# Patient Record
Sex: FEMALE | Race: WHITE | NOT HISPANIC OR LATINO | Employment: OTHER | URBAN - METROPOLITAN AREA
[De-identification: names, ages, dates, MRNs, and addresses within clinical notes are randomized per-mention and may not be internally consistent; named-entity substitution may affect disease eponyms.]

---

## 2019-04-18 ENCOUNTER — HOSPITAL ENCOUNTER (OUTPATIENT)
Dept: RADIOLOGY | Facility: MEDICAL CENTER | Age: 64
End: 2019-04-18

## 2019-04-23 ENCOUNTER — HOSPITAL ENCOUNTER (OUTPATIENT)
Dept: RADIOLOGY | Facility: MEDICAL CENTER | Age: 64
End: 2019-04-23
Attending: NURSE PRACTITIONER
Payer: COMMERCIAL

## 2019-04-23 DIAGNOSIS — Z12.31 VISIT FOR SCREENING MAMMOGRAM: ICD-10-CM

## 2019-04-23 PROCEDURE — 77067 SCR MAMMO BI INCL CAD: CPT

## 2019-10-23 ENCOUNTER — PATIENT OUTREACH (OUTPATIENT)
Dept: HEALTH INFORMATION MANAGEMENT | Facility: OTHER | Age: 64
End: 2019-10-23

## 2019-10-23 NOTE — PROGRESS NOTES
October 2019 Welcome Call     Outcome: Left Message    Please transfer to Patient Outreach Team at [phone] when patient returns call.    [facility] Verified: yes    Attempt # 1

## 2019-10-29 NOTE — PROGRESS NOTES
Outcome: Left Message    Please transfer to Patient Outreach Team at [phone] when patient returns call.    [facility] Verified: yes    Attempt # 2

## 2020-06-23 ENCOUNTER — HOSPITAL ENCOUNTER (OUTPATIENT)
Dept: RADIOLOGY | Facility: MEDICAL CENTER | Age: 65
End: 2020-06-23
Attending: NURSE PRACTITIONER
Payer: MEDICARE

## 2020-06-23 DIAGNOSIS — Z12.31 VISIT FOR SCREENING MAMMOGRAM: ICD-10-CM

## 2020-06-23 PROCEDURE — 77067 SCR MAMMO BI INCL CAD: CPT

## 2020-07-13 ENCOUNTER — HOSPITAL ENCOUNTER (OUTPATIENT)
Dept: LAB | Facility: MEDICAL CENTER | Age: 65
End: 2020-07-13
Attending: NURSE PRACTITIONER
Payer: MEDICARE

## 2020-07-13 LAB
25(OH)D3 SERPL-MCNC: 31 NG/ML (ref 30–100)
ALBUMIN SERPL BCP-MCNC: 4.3 G/DL (ref 3.2–4.9)
ALBUMIN/GLOB SERPL: 1.4 G/DL
ALP SERPL-CCNC: 170 U/L (ref 30–99)
ALT SERPL-CCNC: 24 U/L (ref 2–50)
ANION GAP SERPL CALC-SCNC: 22 MMOL/L (ref 7–16)
ANISOCYTOSIS BLD QL SMEAR: ABNORMAL
APPEARANCE UR: CLEAR
AST SERPL-CCNC: 68 U/L (ref 12–45)
BACTERIA #/AREA URNS HPF: NEGATIVE /HPF
BASOPHILS # BLD AUTO: 0.9 % (ref 0–1.8)
BASOPHILS # BLD: 0.08 K/UL (ref 0–0.12)
BILIRUB SERPL-MCNC: 0.9 MG/DL (ref 0.1–1.5)
BILIRUB UR QL STRIP.AUTO: NEGATIVE
BUN SERPL-MCNC: 16 MG/DL (ref 8–22)
CALCIUM SERPL-MCNC: 9.4 MG/DL (ref 8.5–10.5)
CHLORIDE SERPL-SCNC: 94 MMOL/L (ref 96–112)
CHOLEST SERPL-MCNC: 189 MG/DL (ref 100–199)
CO2 SERPL-SCNC: 17 MMOL/L (ref 20–33)
COLOR UR: YELLOW
CREAT SERPL-MCNC: 1.01 MG/DL (ref 0.5–1.4)
EOSINOPHIL # BLD AUTO: 0 K/UL (ref 0–0.51)
EOSINOPHIL NFR BLD: 0 % (ref 0–6.9)
EPI CELLS #/AREA URNS HPF: NEGATIVE /HPF
ERYTHROCYTE [DISTWIDTH] IN BLOOD BY AUTOMATED COUNT: 70.9 FL (ref 35.9–50)
FASTING STATUS PATIENT QL REPORTED: NORMAL
GLOBULIN SER CALC-MCNC: 3.1 G/DL (ref 1.9–3.5)
GLUCOSE SERPL-MCNC: 85 MG/DL (ref 65–99)
GLUCOSE UR STRIP.AUTO-MCNC: NEGATIVE MG/DL
HCT VFR BLD AUTO: 34.7 % (ref 37–47)
HDLC SERPL-MCNC: 112 MG/DL
HGB BLD-MCNC: 11.7 G/DL (ref 12–16)
HYALINE CASTS #/AREA URNS LPF: NORMAL /LPF
KETONES UR STRIP.AUTO-MCNC: 15 MG/DL
LDLC SERPL CALC-MCNC: 56 MG/DL
LEUKOCYTE ESTERASE UR QL STRIP.AUTO: ABNORMAL
LYMPHOCYTES # BLD AUTO: 1.51 K/UL (ref 1–4.8)
LYMPHOCYTES NFR BLD: 17.2 % (ref 22–41)
MACROCYTES BLD QL SMEAR: ABNORMAL
MANUAL DIFF BLD: NORMAL
MCH RBC QN AUTO: 39.3 PG (ref 27–33)
MCHC RBC AUTO-ENTMCNC: 33.7 G/DL (ref 33.6–35)
MCV RBC AUTO: 116.4 FL (ref 81.4–97.8)
MICRO URNS: ABNORMAL
MONOCYTES # BLD AUTO: 0.84 K/UL (ref 0–0.85)
MONOCYTES NFR BLD AUTO: 9.5 % (ref 0–13.4)
MORPHOLOGY BLD-IMP: NORMAL
NEUTROPHILS # BLD AUTO: 6.37 K/UL (ref 2–7.15)
NEUTROPHILS NFR BLD: 72.4 % (ref 44–72)
NEUTS HYPERSEG BLD QL SMEAR: NORMAL
NITRITE UR QL STRIP.AUTO: NEGATIVE
NRBC # BLD AUTO: 0 K/UL
NRBC BLD-RTO: 0 /100 WBC
PH UR STRIP.AUTO: 6.5 [PH] (ref 5–8)
PLATELET # BLD AUTO: 370 K/UL (ref 164–446)
PLATELET BLD QL SMEAR: NORMAL
PMV BLD AUTO: 9.3 FL (ref 9–12.9)
POLYCHROMASIA BLD QL SMEAR: NORMAL
POTASSIUM SERPL-SCNC: 3.7 MMOL/L (ref 3.6–5.5)
PROT SERPL-MCNC: 7.4 G/DL (ref 6–8.2)
PROT UR QL STRIP: NEGATIVE MG/DL
RBC # BLD AUTO: 2.98 M/UL (ref 4.2–5.4)
RBC # URNS HPF: NORMAL /HPF
RBC BLD AUTO: PRESENT
RBC UR QL AUTO: NEGATIVE
SODIUM SERPL-SCNC: 133 MMOL/L (ref 135–145)
SP GR UR STRIP.AUTO: 1.02
TRIGL SERPL-MCNC: 105 MG/DL (ref 0–149)
TSH SERPL DL<=0.005 MIU/L-ACNC: 0.64 UIU/ML (ref 0.38–5.33)
UROBILINOGEN UR STRIP.AUTO-MCNC: 0.2 MG/DL
WBC # BLD AUTO: 8.8 K/UL (ref 4.8–10.8)
WBC #/AREA URNS HPF: NORMAL /HPF

## 2020-07-13 PROCEDURE — 36415 COLL VENOUS BLD VENIPUNCTURE: CPT

## 2020-07-13 PROCEDURE — 81001 URINALYSIS AUTO W/SCOPE: CPT

## 2020-07-13 PROCEDURE — 85007 BL SMEAR W/DIFF WBC COUNT: CPT

## 2020-07-13 PROCEDURE — 80061 LIPID PANEL: CPT

## 2020-07-13 PROCEDURE — 82306 VITAMIN D 25 HYDROXY: CPT

## 2020-07-13 PROCEDURE — 84443 ASSAY THYROID STIM HORMONE: CPT

## 2020-07-13 PROCEDURE — 85027 COMPLETE CBC AUTOMATED: CPT

## 2020-07-13 PROCEDURE — 80053 COMPREHEN METABOLIC PANEL: CPT

## 2020-08-11 ENCOUNTER — HOSPITAL ENCOUNTER (OUTPATIENT)
Dept: LAB | Facility: MEDICAL CENTER | Age: 65
End: 2020-08-11
Attending: NURSE PRACTITIONER
Payer: MEDICARE

## 2020-08-11 PROCEDURE — 83540 ASSAY OF IRON: CPT

## 2020-08-11 PROCEDURE — 82746 ASSAY OF FOLIC ACID SERUM: CPT

## 2020-08-11 PROCEDURE — 83550 IRON BINDING TEST: CPT

## 2020-08-11 PROCEDURE — 82728 ASSAY OF FERRITIN: CPT

## 2020-08-11 PROCEDURE — 36415 COLL VENOUS BLD VENIPUNCTURE: CPT

## 2020-08-11 PROCEDURE — 82607 VITAMIN B-12: CPT

## 2020-08-12 LAB
FERRITIN SERPL-MCNC: 1144 NG/ML (ref 10–291)
FOLATE SERPL-MCNC: 4.9 NG/ML
IRON SATN MFR SERPL: 94 % (ref 15–55)
IRON SERPL-MCNC: 245 UG/DL (ref 40–170)
TIBC SERPL-MCNC: 262 UG/DL (ref 250–450)
UIBC SERPL-MCNC: <17 UG/DL (ref 110–370)
VIT B12 SERPL-MCNC: 618 PG/ML (ref 211–911)

## 2020-08-25 ENCOUNTER — HOSPITAL ENCOUNTER (OUTPATIENT)
Dept: RADIOLOGY | Facility: MEDICAL CENTER | Age: 65
End: 2020-08-25
Attending: NURSE PRACTITIONER
Payer: MEDICARE

## 2020-08-25 DIAGNOSIS — R94.5 ABNORMAL RESULTS OF LIVER FUNCTION STUDIES: ICD-10-CM

## 2020-08-25 PROCEDURE — 76705 ECHO EXAM OF ABDOMEN: CPT

## 2020-09-15 ENCOUNTER — HOSPITAL ENCOUNTER (OUTPATIENT)
Facility: MEDICAL CENTER | Age: 65
End: 2020-09-15
Attending: INTERNAL MEDICINE
Payer: MEDICARE

## 2020-09-15 LAB
ALBUMIN SERPL BCP-MCNC: 4.5 G/DL (ref 3.2–4.9)
ALBUMIN/GLOB SERPL: 1.5 G/DL
ALP SERPL-CCNC: 133 U/L (ref 30–99)
ALT SERPL-CCNC: 23 U/L (ref 2–50)
ANION GAP SERPL CALC-SCNC: 18 MMOL/L (ref 7–16)
AST SERPL-CCNC: 45 U/L (ref 12–45)
BILIRUB SERPL-MCNC: 0.7 MG/DL (ref 0.1–1.5)
BUN SERPL-MCNC: 20 MG/DL (ref 8–22)
CALCIUM SERPL-MCNC: 9.8 MG/DL (ref 8.5–10.5)
CHLORIDE SERPL-SCNC: 98 MMOL/L (ref 96–112)
CO2 SERPL-SCNC: 21 MMOL/L (ref 20–33)
CREAT SERPL-MCNC: 0.86 MG/DL (ref 0.5–1.4)
GLOBULIN SER CALC-MCNC: 3.1 G/DL (ref 1.9–3.5)
GLUCOSE SERPL-MCNC: 120 MG/DL (ref 65–99)
HGB RETIC QN AUTO: 40.8 PG/CELL (ref 29–35)
IMM RETICS NFR: 8.8 % (ref 9.3–17.4)
IRON SATN MFR SERPL: 37 % (ref 15–55)
IRON SERPL-MCNC: 115 UG/DL (ref 40–170)
LDH SERPL L TO P-CCNC: 250 U/L (ref 107–266)
POTASSIUM SERPL-SCNC: 4.2 MMOL/L (ref 3.6–5.5)
PROT SERPL-MCNC: 7.6 G/DL (ref 6–8.2)
RETICS # AUTO: 0.06 M/UL (ref 0.04–0.06)
RETICS/RBC NFR: 1.8 % (ref 0.8–2.1)
SODIUM SERPL-SCNC: 137 MMOL/L (ref 135–145)
TIBC SERPL-MCNC: 311 UG/DL (ref 250–450)
UIBC SERPL-MCNC: 196 UG/DL (ref 110–370)

## 2020-09-15 PROCEDURE — 83540 ASSAY OF IRON: CPT

## 2020-09-15 PROCEDURE — 85046 RETICYTE/HGB CONCENTRATE: CPT

## 2020-09-15 PROCEDURE — 84155 ASSAY OF PROTEIN SERUM: CPT | Mod: XU

## 2020-09-15 PROCEDURE — 83615 LACTATE (LD) (LDH) ENZYME: CPT

## 2020-09-15 PROCEDURE — 81256 HFE GENE: CPT

## 2020-09-15 PROCEDURE — 83520 IMMUNOASSAY QUANT NOS NONAB: CPT

## 2020-09-15 PROCEDURE — 84165 PROTEIN E-PHORESIS SERUM: CPT

## 2020-09-15 PROCEDURE — 80053 COMPREHEN METABOLIC PANEL: CPT

## 2020-09-15 PROCEDURE — 87389 HIV-1 AG W/HIV-1&-2 AB AG IA: CPT

## 2020-09-15 PROCEDURE — 82728 ASSAY OF FERRITIN: CPT

## 2020-09-15 PROCEDURE — 80074 ACUTE HEPATITIS PANEL: CPT

## 2020-09-15 PROCEDURE — 86038 ANTINUCLEAR ANTIBODIES: CPT

## 2020-09-15 PROCEDURE — 83550 IRON BINDING TEST: CPT

## 2020-09-16 LAB
FERRITIN SERPL-MCNC: 519 NG/ML (ref 10–291)
HAV IGM SERPL QL IA: NORMAL
HBV CORE IGM SER QL: NORMAL
HBV SURFACE AG SER QL: NORMAL
HCV AB SER QL: NORMAL
HIV 1+2 AB+HIV1 P24 AG SERPL QL IA: NORMAL

## 2020-09-17 LAB — NUCLEAR IGG SER QL IA: NORMAL

## 2020-09-18 LAB
KAPPA LC FREE SER-MCNC: 45.63 MG/L (ref 3.3–19.4)
KAPPA LC FREE/LAMBDA FREE SER NEPH: 1.25 {RATIO} (ref 0.26–1.65)
LAMBDA LC FREE SERPL-MCNC: 36.64 MG/L (ref 5.71–26.3)

## 2020-09-19 LAB
ALBUMIN SERPL ELPH-MCNC: 4.21 G/DL (ref 3.75–5.01)
ALPHA1 GLOB SERPL ELPH-MCNC: 0.32 G/DL (ref 0.19–0.46)
ALPHA2 GLOB SERPL ELPH-MCNC: 0.81 G/DL (ref 0.48–1.05)
B-GLOBULIN SERPL ELPH-MCNC: 1.05 G/DL (ref 0.48–1.1)
EER PROT ELECT SER Q1092: NORMAL
GAMMA GLOB SERPL ELPH-MCNC: 1.11 G/DL (ref 0.62–1.51)
INTERPRETATION SERPL IFE-IMP: NORMAL
PROT SERPL-MCNC: 7.5 G/DL (ref 6.3–8.2)

## 2020-09-22 LAB
HFE GENE MUT ANL BLD/T: NORMAL
HFE P.C282Y BLD/T QL: NEGATIVE
HFE P.H63D BLD/T QL: NEGATIVE
HFE P.S65C BLD/T QL: NEGATIVE

## 2020-11-18 ENCOUNTER — HOSPITAL ENCOUNTER (OUTPATIENT)
Dept: LAB | Facility: MEDICAL CENTER | Age: 65
End: 2020-11-18
Attending: INTERNAL MEDICINE
Payer: MEDICARE

## 2020-11-18 LAB
ALBUMIN SERPL BCP-MCNC: 4.7 G/DL (ref 3.2–4.9)
ALBUMIN/GLOB SERPL: 1.5 G/DL
ALP SERPL-CCNC: 92 U/L (ref 30–99)
ALT SERPL-CCNC: 15 U/L (ref 2–50)
ANION GAP SERPL CALC-SCNC: 14 MMOL/L (ref 7–16)
ANISOCYTOSIS BLD QL SMEAR: ABNORMAL
AST SERPL-CCNC: 43 U/L (ref 12–45)
BASOPHILS # BLD AUTO: 0.7 % (ref 0–1.8)
BASOPHILS # BLD: 0.04 K/UL (ref 0–0.12)
BILIRUB SERPL-MCNC: 0.7 MG/DL (ref 0.1–1.5)
BUN SERPL-MCNC: 20 MG/DL (ref 8–22)
CALCIUM SERPL-MCNC: 10 MG/DL (ref 8.5–10.5)
CHLORIDE SERPL-SCNC: 104 MMOL/L (ref 96–112)
CO2 SERPL-SCNC: 21 MMOL/L (ref 20–33)
COMMENT 1642: NORMAL
CREAT SERPL-MCNC: 1.04 MG/DL (ref 0.5–1.4)
EOSINOPHIL # BLD AUTO: 0.05 K/UL (ref 0–0.51)
EOSINOPHIL NFR BLD: 0.9 % (ref 0–6.9)
ERYTHROCYTE [DISTWIDTH] IN BLOOD BY AUTOMATED COUNT: 67.2 FL (ref 35.9–50)
FERRITIN SERPL-MCNC: 322 NG/ML (ref 10–291)
GLOBULIN SER CALC-MCNC: 3.2 G/DL (ref 1.9–3.5)
GLUCOSE SERPL-MCNC: 79 MG/DL (ref 65–99)
HCT VFR BLD AUTO: 37.4 % (ref 37–47)
HGB BLD-MCNC: 12.6 G/DL (ref 12–16)
IMM GRANULOCYTES # BLD AUTO: 0.01 K/UL (ref 0–0.11)
IMM GRANULOCYTES NFR BLD AUTO: 0.2 % (ref 0–0.9)
LYMPHOCYTES # BLD AUTO: 1.01 K/UL (ref 1–4.8)
LYMPHOCYTES NFR BLD: 17.4 % (ref 22–41)
MACROCYTES BLD QL SMEAR: ABNORMAL
MCH RBC QN AUTO: 38.5 PG (ref 27–33)
MCHC RBC AUTO-ENTMCNC: 33.7 G/DL (ref 33.6–35)
MCV RBC AUTO: 114.4 FL (ref 81.4–97.8)
MONOCYTES # BLD AUTO: 0.44 K/UL (ref 0–0.85)
MONOCYTES NFR BLD AUTO: 7.6 % (ref 0–13.4)
MORPHOLOGY BLD-IMP: NORMAL
NEUTROPHILS # BLD AUTO: 4.27 K/UL (ref 2–7.15)
NEUTROPHILS NFR BLD: 73.2 % (ref 44–72)
NRBC # BLD AUTO: 0 K/UL
NRBC BLD-RTO: 0 /100 WBC
PLATELET # BLD AUTO: 204 K/UL (ref 164–446)
PLATELET BLD QL SMEAR: NORMAL
PMV BLD AUTO: 11.7 FL (ref 9–12.9)
POTASSIUM SERPL-SCNC: 4.5 MMOL/L (ref 3.6–5.5)
PROT SERPL-MCNC: 7.9 G/DL (ref 6–8.2)
RBC # BLD AUTO: 3.27 M/UL (ref 4.2–5.4)
RBC BLD AUTO: PRESENT
SODIUM SERPL-SCNC: 139 MMOL/L (ref 135–145)
WBC # BLD AUTO: 5.8 K/UL (ref 4.8–10.8)

## 2020-11-18 PROCEDURE — 85025 COMPLETE CBC W/AUTO DIFF WBC: CPT

## 2020-11-18 PROCEDURE — 82728 ASSAY OF FERRITIN: CPT

## 2020-11-18 PROCEDURE — 36415 COLL VENOUS BLD VENIPUNCTURE: CPT

## 2020-11-18 PROCEDURE — 80053 COMPREHEN METABOLIC PANEL: CPT

## 2021-03-03 DIAGNOSIS — Z23 NEED FOR VACCINATION: ICD-10-CM

## 2021-05-22 ENCOUNTER — APPOINTMENT (OUTPATIENT)
Dept: RADIOLOGY | Facility: MEDICAL CENTER | Age: 66
DRG: 871 | End: 2021-05-22
Attending: EMERGENCY MEDICINE
Payer: MEDICARE

## 2021-05-22 ENCOUNTER — HOSPITAL ENCOUNTER (INPATIENT)
Facility: MEDICAL CENTER | Age: 66
LOS: 5 days | DRG: 871 | End: 2021-05-27
Attending: EMERGENCY MEDICINE | Admitting: INTERNAL MEDICINE
Payer: MEDICARE

## 2021-05-22 ENCOUNTER — APPOINTMENT (OUTPATIENT)
Dept: RADIOLOGY | Facility: MEDICAL CENTER | Age: 66
DRG: 871 | End: 2021-05-22
Attending: INTERNAL MEDICINE
Payer: MEDICARE

## 2021-05-22 DIAGNOSIS — R53.1 WEAKNESS: ICD-10-CM

## 2021-05-22 DIAGNOSIS — A41.9 SEPSIS, DUE TO UNSPECIFIED ORGANISM, UNSPECIFIED WHETHER ACUTE ORGAN DYSFUNCTION PRESENT (HCC): ICD-10-CM

## 2021-05-22 DIAGNOSIS — J85.1 ABSCESS OF LOWER LOBE OF RIGHT LUNG WITH PNEUMONIA (HCC): ICD-10-CM

## 2021-05-22 PROBLEM — R19.7 DIARRHEA: Status: ACTIVE | Noted: 2021-05-22

## 2021-05-22 PROBLEM — I95.9 HYPOTENSION: Status: ACTIVE | Noted: 2021-05-22

## 2021-05-22 PROBLEM — F10.10 ALCOHOL ABUSE: Status: ACTIVE | Noted: 2021-05-22

## 2021-05-22 PROBLEM — F17.200 SMOKING: Status: ACTIVE | Noted: 2021-05-22

## 2021-05-22 PROBLEM — E87.6 HYPOKALEMIA: Status: ACTIVE | Noted: 2021-05-22

## 2021-05-22 PROBLEM — N17.9 ACUTE KIDNEY INJURY (HCC): Status: ACTIVE | Noted: 2021-05-22

## 2021-05-22 PROBLEM — E87.1 HYPONATREMIA: Status: ACTIVE | Noted: 2021-05-22

## 2021-05-22 PROBLEM — J85.0 NECROTIZING PNEUMONIA (HCC): Status: ACTIVE | Noted: 2021-05-22

## 2021-05-22 LAB
ALBUMIN SERPL BCP-MCNC: 2.3 G/DL (ref 3.2–4.9)
ALBUMIN SERPL BCP-MCNC: 2.5 G/DL (ref 3.2–4.9)
ALBUMIN/GLOB SERPL: 0.5 G/DL
ALBUMIN/GLOB SERPL: 0.6 G/DL
ALP SERPL-CCNC: 173 U/L (ref 30–99)
ALP SERPL-CCNC: 218 U/L (ref 30–99)
ALT SERPL-CCNC: 27 U/L (ref 2–50)
ALT SERPL-CCNC: 30 U/L (ref 2–50)
ANION GAP SERPL CALC-SCNC: 14 MMOL/L (ref 7–16)
ANION GAP SERPL CALC-SCNC: 15 MMOL/L (ref 7–16)
ANISOCYTOSIS BLD QL SMEAR: ABNORMAL
APPEARANCE UR: CLEAR
AST SERPL-CCNC: 44 U/L (ref 12–45)
AST SERPL-CCNC: 53 U/L (ref 12–45)
BACTERIA #/AREA URNS HPF: NEGATIVE /HPF
BASOPHILS # BLD AUTO: 0 % (ref 0–1.8)
BASOPHILS # BLD: 0 K/UL (ref 0–0.12)
BILIRUB SERPL-MCNC: 1 MG/DL (ref 0.1–1.5)
BILIRUB SERPL-MCNC: 1.1 MG/DL (ref 0.1–1.5)
BILIRUB UR QL STRIP.AUTO: NEGATIVE
BUN SERPL-MCNC: 42 MG/DL (ref 8–22)
BUN SERPL-MCNC: 47 MG/DL (ref 8–22)
CALCIUM SERPL-MCNC: 9.3 MG/DL (ref 8.5–10.5)
CALCIUM SERPL-MCNC: 9.7 MG/DL (ref 8.5–10.5)
CHLORIDE SERPL-SCNC: 93 MMOL/L (ref 96–112)
CHLORIDE SERPL-SCNC: 97 MMOL/L (ref 96–112)
CK SERPL-CCNC: 11 U/L (ref 0–154)
CO2 SERPL-SCNC: 21 MMOL/L (ref 20–33)
CO2 SERPL-SCNC: 23 MMOL/L (ref 20–33)
COLOR UR: YELLOW
CORTIS SERPL-MCNC: 20.8 UG/DL (ref 0–23)
CREAT SERPL-MCNC: 0.95 MG/DL (ref 0.5–1.4)
CREAT SERPL-MCNC: 1.11 MG/DL (ref 0.5–1.4)
CREAT UR-MCNC: 56.31 MG/DL
EKG IMPRESSION: NORMAL
EOSINOPHIL # BLD AUTO: 0 K/UL (ref 0–0.51)
EOSINOPHIL NFR BLD: 0 % (ref 0–6.9)
EPI CELLS #/AREA URNS HPF: NEGATIVE /HPF
ERYTHROCYTE [DISTWIDTH] IN BLOOD BY AUTOMATED COUNT: 54.3 FL (ref 35.9–50)
GLOBULIN SER CALC-MCNC: 3.6 G/DL (ref 1.9–3.5)
GLOBULIN SER CALC-MCNC: 4.6 G/DL (ref 1.9–3.5)
GLUCOSE SERPL-MCNC: 107 MG/DL (ref 65–99)
GLUCOSE SERPL-MCNC: 119 MG/DL (ref 65–99)
GLUCOSE UR STRIP.AUTO-MCNC: NEGATIVE MG/DL
HCT VFR BLD AUTO: 29.3 % (ref 37–47)
HGB BLD-MCNC: 9.7 G/DL (ref 12–16)
HIV 1+2 AB+HIV1 P24 AG SERPL QL IA: NORMAL
HYALINE CASTS #/AREA URNS LPF: NORMAL /LPF
INR PPP: 1.19 (ref 0.87–1.13)
KETONES UR STRIP.AUTO-MCNC: NEGATIVE MG/DL
LACTATE BLD-SCNC: 1.1 MMOL/L (ref 0.5–2)
LACTATE BLD-SCNC: 1.5 MMOL/L (ref 0.5–2)
LACTATE BLD-SCNC: 2.2 MMOL/L (ref 0.5–2)
LACTATE BLD-SCNC: 2.8 MMOL/L (ref 0.5–2)
LEUKOCYTE ESTERASE UR QL STRIP.AUTO: ABNORMAL
LYMPHOCYTES # BLD AUTO: 1.47 K/UL (ref 1–4.8)
LYMPHOCYTES NFR BLD: 7 % (ref 22–41)
MACROCYTES BLD QL SMEAR: ABNORMAL
MAGNESIUM SERPL-MCNC: 1.2 MG/DL (ref 1.5–2.5)
MANUAL DIFF BLD: NORMAL
MCH RBC QN AUTO: 35.4 PG (ref 27–33)
MCHC RBC AUTO-ENTMCNC: 33.1 G/DL (ref 33.6–35)
MCV RBC AUTO: 106.9 FL (ref 81.4–97.8)
MICRO URNS: ABNORMAL
MONOCYTES # BLD AUTO: 1.64 K/UL (ref 0–0.85)
MONOCYTES NFR BLD AUTO: 7.8 % (ref 0–13.4)
MORPHOLOGY BLD-IMP: NORMAL
NEUTROPHILS # BLD AUTO: 17.89 K/UL (ref 2–7.15)
NEUTROPHILS NFR BLD: 81.7 % (ref 44–72)
NEUTS BAND NFR BLD MANUAL: 3.5 % (ref 0–10)
NITRITE UR QL STRIP.AUTO: NEGATIVE
NRBC # BLD AUTO: 0 K/UL
NRBC BLD-RTO: 0 /100 WBC
PH UR STRIP.AUTO: 7 [PH] (ref 5–8)
PHOSPHATE SERPL-MCNC: 2.9 MG/DL (ref 2.5–4.5)
PLATELET # BLD AUTO: 812 K/UL (ref 164–446)
PLATELET BLD QL SMEAR: NORMAL
PMV BLD AUTO: 10.2 FL (ref 9–12.9)
POTASSIUM SERPL-SCNC: 3.1 MMOL/L (ref 3.6–5.5)
POTASSIUM SERPL-SCNC: 3.4 MMOL/L (ref 3.6–5.5)
PROT SERPL-MCNC: 5.9 G/DL (ref 6–8.2)
PROT SERPL-MCNC: 7.1 G/DL (ref 6–8.2)
PROT UR QL STRIP: NEGATIVE MG/DL
PROTHROMBIN TIME: 15.5 SEC (ref 12–14.6)
RBC # BLD AUTO: 2.74 M/UL (ref 4.2–5.4)
RBC # URNS HPF: NORMAL /HPF
RBC BLD AUTO: PRESENT
RBC UR QL AUTO: NEGATIVE
SODIUM SERPL-SCNC: 129 MMOL/L (ref 135–145)
SODIUM SERPL-SCNC: 134 MMOL/L (ref 135–145)
SODIUM UR-SCNC: 37 MMOL/L
SP GR UR STRIP.AUTO: 1.04
TOXIC GRANULES BLD QL SMEAR: SLIGHT
TSH SERPL DL<=0.005 MIU/L-ACNC: 0.94 UIU/ML (ref 0.38–5.33)
URATE SERPL-MCNC: 6 MG/DL (ref 1.9–8.2)
UROBILINOGEN UR STRIP.AUTO-MCNC: 1 MG/DL
WBC # BLD AUTO: 21 K/UL (ref 4.8–10.8)
WBC #/AREA URNS HPF: NORMAL /HPF

## 2021-05-22 PROCEDURE — 83605 ASSAY OF LACTIC ACID: CPT

## 2021-05-22 PROCEDURE — A9270 NON-COVERED ITEM OR SERVICE: HCPCS | Performed by: INTERNAL MEDICINE

## 2021-05-22 PROCEDURE — 87899 AGENT NOS ASSAY W/OPTIC: CPT

## 2021-05-22 PROCEDURE — 83735 ASSAY OF MAGNESIUM: CPT

## 2021-05-22 PROCEDURE — 700111 HCHG RX REV CODE 636 W/ 250 OVERRIDE (IP): Performed by: EMERGENCY MEDICINE

## 2021-05-22 PROCEDURE — 36415 COLL VENOUS BLD VENIPUNCTURE: CPT

## 2021-05-22 PROCEDURE — 96365 THER/PROPH/DIAG IV INF INIT: CPT

## 2021-05-22 PROCEDURE — 84550 ASSAY OF BLOOD/URIC ACID: CPT

## 2021-05-22 PROCEDURE — 82550 ASSAY OF CK (CPK): CPT

## 2021-05-22 PROCEDURE — 87040 BLOOD CULTURE FOR BACTERIA: CPT | Mod: 91

## 2021-05-22 PROCEDURE — 85007 BL SMEAR W/DIFF WBC COUNT: CPT

## 2021-05-22 PROCEDURE — 76775 US EXAM ABDO BACK WALL LIM: CPT

## 2021-05-22 PROCEDURE — 93005 ELECTROCARDIOGRAM TRACING: CPT

## 2021-05-22 PROCEDURE — 700105 HCHG RX REV CODE 258: Performed by: INTERNAL MEDICINE

## 2021-05-22 PROCEDURE — 85610 PROTHROMBIN TIME: CPT

## 2021-05-22 PROCEDURE — 71045 X-RAY EXAM CHEST 1 VIEW: CPT

## 2021-05-22 PROCEDURE — 99285 EMERGENCY DEPT VISIT HI MDM: CPT

## 2021-05-22 PROCEDURE — 87045 FECES CULTURE AEROBIC BACT: CPT

## 2021-05-22 PROCEDURE — 700102 HCHG RX REV CODE 250 W/ 637 OVERRIDE(OP): Performed by: INTERNAL MEDICINE

## 2021-05-22 PROCEDURE — 87086 URINE CULTURE/COLONY COUNT: CPT

## 2021-05-22 PROCEDURE — U0005 INFEC AGEN DETEC AMPLI PROBE: HCPCS

## 2021-05-22 PROCEDURE — 80053 COMPREHEN METABOLIC PANEL: CPT

## 2021-05-22 PROCEDURE — 84443 ASSAY THYROID STIM HORMONE: CPT

## 2021-05-22 PROCEDURE — 84100 ASSAY OF PHOSPHORUS: CPT

## 2021-05-22 PROCEDURE — 700111 HCHG RX REV CODE 636 W/ 250 OVERRIDE (IP): Performed by: INTERNAL MEDICINE

## 2021-05-22 PROCEDURE — 87798 DETECT AGENT NOS DNA AMP: CPT

## 2021-05-22 PROCEDURE — 71260 CT THORAX DX C+: CPT | Mod: ME

## 2021-05-22 PROCEDURE — 700117 HCHG RX CONTRAST REV CODE 255: Performed by: EMERGENCY MEDICINE

## 2021-05-22 PROCEDURE — 770020 HCHG ROOM/CARE - TELE (206)

## 2021-05-22 PROCEDURE — 85027 COMPLETE CBC AUTOMATED: CPT

## 2021-05-22 PROCEDURE — G0475 HIV COMBINATION ASSAY: HCPCS

## 2021-05-22 PROCEDURE — 99221 1ST HOSP IP/OBS SF/LOW 40: CPT | Mod: AI | Performed by: INTERNAL MEDICINE

## 2021-05-22 PROCEDURE — 87633 RESP VIRUS 12-25 TARGETS: CPT

## 2021-05-22 PROCEDURE — 94760 N-INVAS EAR/PLS OXIMETRY 1: CPT

## 2021-05-22 PROCEDURE — 82533 TOTAL CORTISOL: CPT

## 2021-05-22 PROCEDURE — 87581 M.PNEUMON DNA AMP PROBE: CPT

## 2021-05-22 PROCEDURE — 82570 ASSAY OF URINE CREATININE: CPT

## 2021-05-22 PROCEDURE — 93005 ELECTROCARDIOGRAM TRACING: CPT | Performed by: EMERGENCY MEDICINE

## 2021-05-22 PROCEDURE — 87449 NOS EACH ORGANISM AG IA: CPT

## 2021-05-22 PROCEDURE — 84300 ASSAY OF URINE SODIUM: CPT

## 2021-05-22 PROCEDURE — 700105 HCHG RX REV CODE 258: Performed by: EMERGENCY MEDICINE

## 2021-05-22 PROCEDURE — 81001 URINALYSIS AUTO W/SCOPE: CPT

## 2021-05-22 PROCEDURE — U0003 INFECTIOUS AGENT DETECTION BY NUCLEIC ACID (DNA OR RNA); SEVERE ACUTE RESPIRATORY SYNDROME CORONAVIRUS 2 (SARS-COV-2) (CORONAVIRUS DISEASE [COVID-19]), AMPLIFIED PROBE TECHNIQUE, MAKING USE OF HIGH THROUGHPUT TECHNOLOGIES AS DESCRIBED BY CMS-2020-01-R: HCPCS

## 2021-05-22 PROCEDURE — 87486 CHLMYD PNEUM DNA AMP PROBE: CPT

## 2021-05-22 RX ORDER — SIMVASTATIN 20 MG
20 TABLET ORAL EVERY EVENING
COMMUNITY
Start: 2022-03-19 | End: 2023-05-28

## 2021-05-22 RX ORDER — SODIUM CHLORIDE, SODIUM LACTATE, POTASSIUM CHLORIDE, AND CALCIUM CHLORIDE .6; .31; .03; .02 G/100ML; G/100ML; G/100ML; G/100ML
1000 INJECTION, SOLUTION INTRAVENOUS
Status: COMPLETED | OUTPATIENT
Start: 2021-05-22 | End: 2021-05-22

## 2021-05-22 RX ORDER — HYDROCHLOROTHIAZIDE 25 MG/1
25 TABLET ORAL DAILY
COMMUNITY
End: 2021-05-22

## 2021-05-22 RX ORDER — POTASSIUM CHLORIDE 20 MEQ/1
40 TABLET, EXTENDED RELEASE ORAL ONCE
Status: COMPLETED | OUTPATIENT
Start: 2021-05-22 | End: 2021-05-22

## 2021-05-22 RX ORDER — SODIUM CHLORIDE, SODIUM LACTATE, POTASSIUM CHLORIDE, AND CALCIUM CHLORIDE .6; .31; .03; .02 G/100ML; G/100ML; G/100ML; G/100ML
500 INJECTION, SOLUTION INTRAVENOUS
Status: COMPLETED | OUTPATIENT
Start: 2021-05-22 | End: 2021-05-22

## 2021-05-22 RX ORDER — ACETAMINOPHEN 325 MG/1
650 TABLET ORAL EVERY 6 HOURS PRN
Status: DISCONTINUED | OUTPATIENT
Start: 2021-05-22 | End: 2021-05-27 | Stop reason: HOSPADM

## 2021-05-22 RX ORDER — SODIUM CHLORIDE, SODIUM LACTATE, POTASSIUM CHLORIDE, AND CALCIUM CHLORIDE .6; .31; .03; .02 G/100ML; G/100ML; G/100ML; G/100ML
1000 INJECTION, SOLUTION INTRAVENOUS
Status: DISCONTINUED | OUTPATIENT
Start: 2021-05-22 | End: 2021-05-27 | Stop reason: HOSPADM

## 2021-05-22 RX ORDER — BISACODYL 10 MG
10 SUPPOSITORY, RECTAL RECTAL
Status: DISCONTINUED | OUTPATIENT
Start: 2021-05-22 | End: 2021-05-22

## 2021-05-22 RX ORDER — LOSARTAN POTASSIUM 25 MG/1
25 TABLET ORAL DAILY
COMMUNITY
End: 2021-05-22

## 2021-05-22 RX ORDER — ENALAPRILAT 1.25 MG/ML
1.25 INJECTION INTRAVENOUS EVERY 6 HOURS PRN
Status: DISCONTINUED | OUTPATIENT
Start: 2021-05-22 | End: 2021-05-27 | Stop reason: HOSPADM

## 2021-05-22 RX ORDER — ONDANSETRON 4 MG/1
4 TABLET, ORALLY DISINTEGRATING ORAL EVERY 4 HOURS PRN
Status: DISCONTINUED | OUTPATIENT
Start: 2021-05-22 | End: 2021-05-27 | Stop reason: HOSPADM

## 2021-05-22 RX ORDER — LABETALOL HYDROCHLORIDE 5 MG/ML
10 INJECTION, SOLUTION INTRAVENOUS EVERY 4 HOURS PRN
Status: DISCONTINUED | OUTPATIENT
Start: 2021-05-22 | End: 2021-05-27 | Stop reason: HOSPADM

## 2021-05-22 RX ORDER — LINEZOLID 600 MG/1
600 TABLET, FILM COATED ORAL EVERY 12 HOURS
Status: DISCONTINUED | OUTPATIENT
Start: 2021-05-22 | End: 2021-05-23

## 2021-05-22 RX ORDER — ONDANSETRON 2 MG/ML
4 INJECTION INTRAMUSCULAR; INTRAVENOUS EVERY 4 HOURS PRN
Status: DISCONTINUED | OUTPATIENT
Start: 2021-05-22 | End: 2021-05-27 | Stop reason: HOSPADM

## 2021-05-22 RX ORDER — AMOXICILLIN 250 MG
2 CAPSULE ORAL 2 TIMES DAILY
Status: DISCONTINUED | OUTPATIENT
Start: 2021-05-22 | End: 2021-05-22

## 2021-05-22 RX ORDER — MAGNESIUM SULFATE HEPTAHYDRATE 40 MG/ML
4 INJECTION, SOLUTION INTRAVENOUS ONCE
Status: COMPLETED | OUTPATIENT
Start: 2021-05-22 | End: 2021-05-22

## 2021-05-22 RX ORDER — LOSARTAN POTASSIUM AND HYDROCHLOROTHIAZIDE 12.5; 1 MG/1; MG/1
1 TABLET ORAL DAILY
Status: ON HOLD | COMMUNITY
End: 2022-02-28

## 2021-05-22 RX ORDER — GAUZE BANDAGE 2" X 2"
100 BANDAGE TOPICAL DAILY
Status: DISCONTINUED | OUTPATIENT
Start: 2021-05-22 | End: 2021-05-27 | Stop reason: HOSPADM

## 2021-05-22 RX ORDER — POLYETHYLENE GLYCOL 3350 17 G/17G
1 POWDER, FOR SOLUTION ORAL
Status: DISCONTINUED | OUTPATIENT
Start: 2021-05-22 | End: 2021-05-22

## 2021-05-22 RX ADMIN — THIAMINE HCL TAB 100 MG 100 MG: 100 TAB at 17:19

## 2021-05-22 RX ADMIN — AMPICILLIN SODIUM AND SULBACTAM SODIUM 3 G: 2; 1 INJECTION, POWDER, FOR SOLUTION INTRAMUSCULAR; INTRAVENOUS at 17:19

## 2021-05-22 RX ADMIN — ENOXAPARIN SODIUM 40 MG: 40 INJECTION SUBCUTANEOUS at 20:21

## 2021-05-22 RX ADMIN — POTASSIUM CHLORIDE 40 MEQ: 1500 TABLET, EXTENDED RELEASE ORAL at 17:19

## 2021-05-22 RX ADMIN — SODIUM CHLORIDE, POTASSIUM CHLORIDE, SODIUM LACTATE AND CALCIUM CHLORIDE 1000 ML: 600; 310; 30; 20 INJECTION, SOLUTION INTRAVENOUS at 14:28

## 2021-05-22 RX ADMIN — SODIUM CHLORIDE, POTASSIUM CHLORIDE, SODIUM LACTATE AND CALCIUM CHLORIDE 500 ML: 600; 310; 30; 20 INJECTION, SOLUTION INTRAVENOUS at 12:00

## 2021-05-22 RX ADMIN — LINEZOLID 600 MG: 600 TABLET, FILM COATED ORAL at 17:19

## 2021-05-22 RX ADMIN — IOHEXOL 75 ML: 350 INJECTION, SOLUTION INTRAVENOUS at 17:00

## 2021-05-22 RX ADMIN — PIPERACILLIN AND TAZOBACTAM 3.38 G: 3; .375 INJECTION, POWDER, LYOPHILIZED, FOR SOLUTION INTRAVENOUS; PARENTERAL at 12:56

## 2021-05-22 RX ADMIN — MAGNESIUM SULFATE IN WATER 4 G: 40 INJECTION, SOLUTION INTRAVENOUS at 18:50

## 2021-05-22 ASSESSMENT — COGNITIVE AND FUNCTIONAL STATUS - GENERAL
MOVING TO AND FROM BED TO CHAIR: A LITTLE
DRESSING REGULAR LOWER BODY CLOTHING: A LOT
DRESSING REGULAR UPPER BODY CLOTHING: A LITTLE
TURNING FROM BACK TO SIDE WHILE IN FLAT BAD: A LITTLE
WALKING IN HOSPITAL ROOM: A LOT
DAILY ACTIVITIY SCORE: 17
MOBILITY SCORE: 15
PERSONAL GROOMING: A LITTLE
STANDING UP FROM CHAIR USING ARMS: A LITTLE
MOVING FROM LYING ON BACK TO SITTING ON SIDE OF FLAT BED: A LOT
CLIMB 3 TO 5 STEPS WITH RAILING: A LOT
EATING MEALS: A LITTLE
SUGGESTED CMS G CODE MODIFIER DAILY ACTIVITY: CK
TOILETING: A LITTLE
SUGGESTED CMS G CODE MODIFIER MOBILITY: CK
HELP NEEDED FOR BATHING: A LITTLE

## 2021-05-22 ASSESSMENT — ENCOUNTER SYMPTOMS
PALPITATIONS: 0
DIZZINESS: 1
COUGH: 1
HEADACHES: 0
BRUISES/BLEEDS EASILY: 0
VOMITING: 0
BLURRED VISION: 0
POLYDIPSIA: 0
FOCAL WEAKNESS: 0
BACK PAIN: 0
HALLUCINATIONS: 0
DOUBLE VISION: 0
PHOTOPHOBIA: 0
SPEECH CHANGE: 0
NAUSEA: 0
ORTHOPNEA: 0
NECK PAIN: 0
TREMORS: 0
WEIGHT LOSS: 0
SHORTNESS OF BREATH: 1
HEARTBURN: 0
NERVOUS/ANXIOUS: 0
FEVER: 0
CHILLS: 0
HEMOPTYSIS: 0
SPUTUM PRODUCTION: 1
FLANK PAIN: 0

## 2021-05-22 ASSESSMENT — LIFESTYLE VARIABLES
ON A TYPICAL DAY WHEN YOU DRINK ALCOHOL HOW MANY DRINKS DO YOU HAVE: 0
TOTAL SCORE: 0
TOTAL SCORE: 0
ALCOHOL_USE: NO
SUBSTANCE_ABUSE: 0
AVERAGE NUMBER OF DAYS PER WEEK YOU HAVE A DRINK CONTAINING ALCOHOL: 0
HOW MANY TIMES IN THE PAST YEAR HAVE YOU HAD 5 OR MORE DRINKS IN A DAY: 0
CONSUMPTION TOTAL: NEGATIVE
HAVE YOU EVER FELT YOU SHOULD CUT DOWN ON YOUR DRINKING: NO
TOTAL SCORE: 0
EVER HAD A DRINK FIRST THING IN THE MORNING TO STEADY YOUR NERVES TO GET RID OF A HANGOVER: NO
DOES PATIENT WANT TO STOP DRINKING: NO
HAVE PEOPLE ANNOYED YOU BY CRITICIZING YOUR DRINKING: NO
EVER FELT BAD OR GUILTY ABOUT YOUR DRINKING: NO
EVER_SMOKED: YES

## 2021-05-22 ASSESSMENT — PATIENT HEALTH QUESTIONNAIRE - PHQ9
1. LITTLE INTEREST OR PLEASURE IN DOING THINGS: NOT AT ALL
2. FEELING DOWN, DEPRESSED, IRRITABLE, OR HOPELESS: NOT AT ALL
SUM OF ALL RESPONSES TO PHQ9 QUESTIONS 1 AND 2: 0

## 2021-05-22 ASSESSMENT — COPD QUESTIONNAIRES
COPD SCREENING SCORE: 6
HAVE YOU SMOKED AT LEAST 100 CIGARETTES IN YOUR ENTIRE LIFE: YES
DURING THE PAST 4 WEEKS HOW MUCH DID YOU FEEL SHORT OF BREATH: SOME OF THE TIME
DO YOU EVER COUGH UP ANY MUCUS OR PHLEGM?: YES, A FEW DAYS A WEEK OR MONTH

## 2021-05-22 ASSESSMENT — FIBROSIS 4 INDEX
FIB4 SCORE: 0.69
FIB4 SCORE: 3.59
FIB4 SCORE: 0.69

## 2021-05-22 NOTE — ED NOTES
Patient ambulated to ER room with a strong steady gait. Nontoxic appearing. resp e/u. Speaks full clear sentences without difficulty. Calm, cooperative, no apparent distress noted. follows commands. Skin warm, dry, nml color. +CARMEN +PAN.      Pt c/o of productive cough x1 week with associated SOB. Hx of smoking   Also c/o of diarrhea for 10 days and weakness.     Of note, patient quit drinking etoh 12 days ago    Patient changed to hospital gown  Placed on cardiac monitor.   V/s stable. Afebrile  Pending MD weston.

## 2021-05-22 NOTE — PROGRESS NOTES
Patient transported from ED. Patient  A&Ox4. Plan of care discussed with patient. Family at bedside. Patient oriented to floor and surroundings. Patient currently on 3L nc. VSS. Patient currently resting in bed. Patient is complaining of pain 0/10. Tele box placed. Monitor room notified. 2 rn skin check performed. Patient educated to call for assistance before ambulating. Patient education regarding fall risk. Call light within reach. Fall precautions in place.

## 2021-05-22 NOTE — ASSESSMENT & PLAN NOTE
This is Sepsis Present on admission  SIRS criteria identified on my evaluation include: Tachycardia, with heart rate greater than 90 BPM and Tachypnea, with respirations greater than 20 per minute  Source is necrotizing pneumonia  Suspected onset of infection (date and time) 5/12/2021  Sepsis protocol initiated  Fluid resuscitation ordered per protocol  IV antibiotics as appropriate for source of sepsis  While organ dysfunction may be noted elsewhere in this problem list or in the chart, degree of organ dysfunction does not meet CMS criteria for severe sepsis      Now resolved.

## 2021-05-22 NOTE — ED NOTES
Med rec complete via interview with pt at bedside and phone call with pt home pharmacy. Allergies reviewed. Pt denies antibiotic use in past 14 days.

## 2021-05-22 NOTE — ED TRIAGE NOTES
"Chief Complaint   Patient presents with   • Cough     For 8 days and productive with associated SOB. History of smoking.    • Weakness     general malaise for the last couple days   • Diarrhea     for 10 days. Stool goes from soft to watery over the last 10 days. No blood noted     Patient ambulatory through triage for above complaint. Patient states \"my neighbor made me come in.\" The patient also reports stopping alcohol 12 days ago. She states she has been compliant with medications. Sepsis score of 4. Protocol initiated and two IV's with EKG done.  "

## 2021-05-22 NOTE — ASSESSMENT & PLAN NOTE
Secondary to sepsis versus diarrhea versus losartan  Hold losartan  Status post 30 cc/kg bolus, continue IV hydration  She is asymptomatic  Improving

## 2021-05-22 NOTE — ASSESSMENT & PLAN NOTE
Holding losartan on admission  Responded well to IVF  Avoid nephrotoxins  Monitor kidney function    Resolved

## 2021-05-22 NOTE — ED PROVIDER NOTES
ED Provider Note    Scribed for Mayte Thompson M.D. by Johana Nation. 5/22/2021, 12:10 PM.    Primary care provider: JERSON Lisa  Means of arrival: Walk in  History obtained from: patient  History limited by: none    CHIEF COMPLAINT  Chief Complaint   Patient presents with   • Cough     For 8 days and productive with associated SOB. History of smoking.    • Weakness     general malaise for the last couple days   • Diarrhea     for 10 days. Stool goes from soft to watery over the last 10 days. No blood noted     This patient was cared for during the COVID-19 pandemic. History and physical exam may be limited/truncated by the inherent challenges of PPE and the need to decrease staff exposure to novel coronavirus. Some aspects of disease management may be different to protect staff and help slow the spread of disease. I verified that, if possible, the patient was wearing a mask and I was wearing appropriate PPE every time I encountered the patient.     HPI  Jaymie Peacock is a 65 y.o. female who presents to the Emergency Department for chest congestion onset 10 days ago. She describes her chest congestion has been intermittent. Patient has associated diarrhea x1 daily, productive cough, mild lightheadedness and mild dizziness. Denies fevers, nausea, or vomiting. She states she has been able to hydrate adequately. Endorses smoking a pack of cigarettes daily, but states she stopped drinking alcohol 1 week ago. Patient denies any COVID exposure and received her COVID vaccine. She has a history of hypertension and takes Losartan and HCTZ. Patient states her blood pressure blood pressure is typically around 104/70.    REVIEW OF SYSTEMS  Pertinent positives include chest congestion, diarrhea, productive cough, mild lightheadedness and mild dizziness. Pertinent negatives include no fevers, nausea, or vomiting. All other systems reviewed and negative.    PAST MEDICAL HISTORY   has a past medical history of  "Hypertension and Smoking.    SURGICAL HISTORY   has a past surgical history that includes us-needle core bx-breast panel (Right).    SOCIAL HISTORY  Social History     Tobacco Use   • Smoking status: Current Every Day Smoker   • Smokeless tobacco: Never Used   Vaping Use   • Vaping Use: Never used   Substance Use Topics   • Alcohol use: Not Currently     Comment: Stopped 12 days ago   • Drug use: Not Currently      Social History     Substance and Sexual Activity   Drug Use Not Currently       FAMILY HISTORY  History reviewed. No pertinent family history.    CURRENT MEDICATIONS  Home Medications     Reviewed by Paola Hunter (Pharmacy Tech) on 05/22/21 at 1424  Med List Status: Complete   Medication Last Dose Status   losartan-hydrochlorothiazide (HYZAAR) 100-12.5 MG per tablet 5/22/2021 Active   Multiple Vitamins-Minerals (OCUVITE ADULT 50+ PO) >5 days ago Active                ALLERGIES  No Known Allergies    PHYSICAL EXAM  VITAL SIGNS: BP (!) 86/50   Pulse (!) 110   Temp 36.4 °C (97.5 °F) (Temporal)   Resp (!) 24   Ht 1.676 m (5' 6\")   Wt 53.5 kg (118 lb)   SpO2 95%   BMI 19.05 kg/m²   Constitutional: Thin, Alert in no apparent distress.  HENT: No signs of trauma, Bilateral external ears normal, Nose normal.   Eyes: Pupils are equal and reactive, Conjunctiva normal, Non-icteric.   Neck: Normal range of motion, No tenderness, Supple, No stridor.   Cardiovascular: Tachycardic, Regular rhythm, no murmurs.   Thorax & Lungs: Normal breath sounds, No respiratory distress, No wheezing, No chest tenderness.   Abdomen: Bowel sounds normal, Soft, No tenderness, No masses, No peritoneal signs.  Skin: Warm, Dry, No erythema, No rash.   Musculoskeletal:  No major deformities noted.  Neurologic: Alert, moving all extremities without difficulty, no focal deficits.    LABS  Labs Reviewed   LACTIC ACID - Abnormal; Notable for the following components:       Result Value    Lactic Acid 2.8 (*)     All other " "components within normal limits   COMP METABOLIC PANEL - Abnormal; Notable for the following components:    Sodium 134 (*)     Potassium 3.1 (*)     Glucose 107 (*)     Bun 42 (*)     Alkaline Phosphatase 173 (*)     Albumin 2.3 (*)     Total Protein 5.9 (*)     Globulin 3.6 (*)     All other components within normal limits   URINALYSIS - Abnormal; Notable for the following components:    Leukocyte Esterase Small (*)     All other components within normal limits    Narrative:     Indication for culture:->Evaluation for sepsis without a  clear source of infection   COMP METABOLIC PANEL - Abnormal; Notable for the following components:    Sodium 129 (*)     Potassium 3.4 (*)     Chloride 93 (*)     Glucose 119 (*)     Bun 47 (*)     AST(SGOT) 53 (*)     Alkaline Phosphatase 218 (*)     Albumin 2.5 (*)     Globulin 4.6 (*)     All other components within normal limits   ESTIMATED GFR - Abnormal; Notable for the following components:    GFR If  59 (*)     GFR If Non  49 (*)     All other components within normal limits   ESTIMATED GFR - Abnormal; Notable for the following components:    GFR If Non  59 (*)     All other components within normal limits   URINE CULTURE(NEW)    Narrative:     Indication for culture:->Evaluation for sepsis without a  clear source of infection   BLOOD CULTURE    Narrative:     Per Hospital Policy: Only change Specimen Src: to \"Line\" if  specified by physician order.   BLOOD CULTURE    Narrative:     Per Hospital Policy: Only change Specimen Src: to \"Line\" if  specified by physician order.   SARS-COV-2, PCR (IN-HOUSE)    Narrative:     Have you been in close contact with a person who is suspected  or known to be positive for COVID-19 within the last 30 days  (e.g. last seen that person < 30 days ago)->No   LACTIC ACID   LEGIONELLA PNEUMOPHILA UR AG    Narrative:     UTILIZATION ALERT: Has Flu/RSV/COVID PCR testing been  performed, resulted " reviewed, and consulted with Infectious  Diseases or PICU Provider Teams?->Yes  Have you been in close contact with a person who is suspected  or known to be positive for COVID-19 within the last 30 days  (e.g. last seen that person < 30 days ago)->Unknown  Sputum cultures, induced if needed. If not done within the  last 24 hours  FENa = (UrineNa / SerumNA) / (UrineCr / SerumCr) *100   URINE SODIUM RANDOM    Narrative:     UTILIZATION ALERT: Has Flu/RSV/COVID PCR testing been  performed, resulted reviewed, and consulted with Infectious  Diseases or PICU Provider Teams?->Yes  Have you been in close contact with a person who is suspected  or known to be positive for COVID-19 within the last 30 days  (e.g. last seen that person < 30 days ago)->Unknown  Sputum cultures, induced if needed. If not done within the  last 24 hours  FENa = (UrineNa / SerumNA) / (UrineCr / SerumCr) *100   URINE CREATININE RANDOM    Narrative:     UTILIZATION ALERT: Has Flu/RSV/COVID PCR testing been  performed, resulted reviewed, and consulted with Infectious  Diseases or PICU Provider Teams?->Yes  Have you been in close contact with a person who is suspected  or known to be positive for COVID-19 within the last 30 days  (e.g. last seen that person < 30 days ago)->Unknown  Sputum cultures, induced if needed. If not done within the  last 24 hours  FENa = (UrineNa / SerumNA) / (UrineCr / SerumCr) *100   URINE MICROSCOPIC (W/UA)    Narrative:     Indication for culture:->Evaluation for sepsis without a  clear source of infection   LACTIC ACID   MRSA BY PCR (AMP)   RESPIRATORY PANEL, PCR (W/SARS COV-2)   CULTURE RESPIRATORY W/ [facility] STN   CULTURE STOOL   BLOOD CULTURE   BLOOD CULTURE   CBC WITH DIFFERENTIAL   CORTISOL   HIV AG/AB COMBO ASSAY DIAGNOSTIC   LACTIC ACID   MAGNESIUM   PHOSPHORUS   PROTHROMBIN TIME   TSH WITH REFLEX TO FT4   URIC ACID   CREATINE KINASE     All labs reviewed by me.    EKG  Results for orders placed or performed during the  hospital encounter of 21   EKG   Result Value Ref Range    Report       [facility] Emergency Dept.    Test Date:  2021  Pt Name:    VALENTINE ALANIZ                Department: ER  MRN:        [id number]                      Room:  Gender:     Female                       Technician: [id number]  :        [id number]                   Requested By:ER TRIAGE PROTOCOL  Order #:    [id number]                    Reading MD: ILA HANSON    Measurements  Intervals                                Axis  Rate:       128                          P:          0  KS:         177                          QRS:        57  QRSD:       94                           T:          -25  QT:         312  QTc:        456    Interpretive Statements  Sinus tachycardia  Normal axis  No evidence of acute ischemia  Electronically Signed On 2021 14:12:49 PDT by ILA HANSON       12 Lead EKG; Interpreted by me as shown above.     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      No renal calculus. No hydronephrosis.      CT-CHEST (THORAX) WITH   Final Result         1.  Necrotizing right lower lobe pneumonia with loculated air-fluid collection which may represent abscess. Recommend follow-up after treatment to ensure resolution and exclude possibility of malignancy.   2.  Mildly prominent mediastinal lymph nodes are most likely reactive.   3.  Emphysematous changes.   4.  No pleural effusion.   5.  Hepatic steatosis/chronic liver disease.      Fleischner Society pulmonary nodule recommendations:         DX-CHEST-PORTABLE (1 VIEW)   Final Result         Airspace opacity with cavitary change in the right middle lobe, concerning for necrotizing pneumonia. The differential includes necrotic mass. Further evaluation with CT is recommended.      EC-ECHOCARDIOGRAM COMPLETE W/O CONT    (Results Pending)     The radiologist's interpretation of all radiological studies  have been reviewed by me.    CRITICAL CARE NOTE:  Critical care time was provided for 30 minutes minutes exclusive of separately billable procedures and treating other patients. This involved direct bedside patient care, speaking with family members, review of past medical records, reviewing the results of the laboratory and diagnostic studies, consulting with other physicians, as well as evaluating the effectiveness of the therapy instituted as described.      COURSE & MEDICAL DECISION MAKING  Pertinent Labs & Imaging studies reviewed. (See chart for details)    Differential diagnoses include but are not limited to: pneumonia, electrolyte abnormality, sepsis.    12:10 PM - Patient seen and examined at bedside. Patients chest x-ray showed necrotizing pneumonia. I discussed that we will obtain labs and imaging to further evaluate for a cause of her symptoms. Patient will be treated with Zosyn and IV fluids for hypertension and tachycardia. Ordered CT chest, chest x-ray, lactic acid, CBC w/ diff, CMP, UA, urine culture, blood cultures, and EKG to evaluate her symptoms.     2:09 PM - Patient was reevaluated at bedside. Discussed lab and radiology results with the patient and informed them that there is an abscess in her right lung that will need to be drained. Patient will be treated with an additional liter of fluids for sepsis. Sudarshan hospitalist. Ordered COVID/SARS.    2:22 PM - I discussed the patient's case and the above findings with Dr. Simons (Hospitalist) who agreed to evaluate patient for hospitalization. Patients care will be transferred at this time.     HYDRATION: Based on the patient's presentation of Sepsis the patient was given IV fluids. IV Hydration was used because oral hydration was not adequate alone. Upon recheck following hydration, the patient was improved.    Decision Making:  This is a 65 y.o. year old female who presents with cough some shortness of breath generalized weakness and  orthostasis.  On exam patient was hypotensive and tachycardic.  She did meet sepsis criteria.  She has evidence of a right lower lobe necrotizing pneumonia/possible abscess.  She was given fluids per sepsis protocol she was given antibiotics.  She had some improvement of her heart rate with the fluids but she remains slightly hypotensive.  Her initial lactate was 2.8 this improved to 1.5 with fluids.  She will need hospitalization for further fluid resuscitation and antibiotics.    DISPOSITION:  Patient will be hospitalized by Dr. Simons in guarded condition.    FINAL IMPRESSION  1. Sepsis, due to unspecified organism, unspecified whether acute organ dysfunction present (HCC)    2. Abscess of lower lobe of right lung with pneumonia (HCC)           This dictation has been created using voice recognition software and/or scribes. The accuracy of the dictation is limited by the abilities of the software and the expertise of the scribes. I expect there may be some errors of grammar and possibly content. I made every attempt to manually correct the errors within my dictation. However, errors related to voice recognition software and/or scribes may still exist and should be interpreted within the appropriate context.     Johana HORTON (Melvin), am scribing for, and in the presence of, Mayte Thompson M.D..    Electronically signed by: Johana Nation (Melvin), 5/22/2021    Mayte HORTON M.D. personally performed the services described in this documentation, as scribed by Johana Nation in my presence, and it is both accurate and complete. C    The note accurately reflects work and decisions made by me.  Mayte Thompson M.D.  5/22/2021  5:05 PM

## 2021-05-22 NOTE — CARE PLAN
Problem: Pain - Standard  Goal: Alleviation of pain or a reduction in pain to the patient’s comfort goal  Outcome: Progressing     Problem: Knowledge Deficit - Standard  Goal: Patient and family/care givers will demonstrate understanding of plan of care, disease process/condition, diagnostic tests and medications  Outcome: Progressing     Problem: Hemodynamics  Goal: Patient's hemodynamics, fluid balance and neurologic status will be stable or improve  Outcome: Progressing   The patient is Watcher - Medium risk of patient condition declining or worsening

## 2021-05-22 NOTE — H&P
Hospital Medicine History & Physical Note    Date of Service  5/22/2021    Primary Care Physician  JERSON Lisa    Consultants      Code Status  Full Code    Chief Complaint  Chief Complaint   Patient presents with   • Cough     For 8 days and productive with associated SOB. History of smoking.    • Weakness     general malaise for the last couple days   • Diarrhea     for 10 days. Stool goes from soft to watery over the last 10 days. No blood noted       History of Presenting Illness  65 y.o. female with past medical history of hypertension, alcohol abuse, smoking cigarettes, who presented 5/22/2021 with complaints of cough productive of brown sputum for 1-1/2 weeks, exertional shortness of breath, generalized weakness, orthostatic lightheadedness, loss in 130 bowel movements 1 times a day in the last 10 days.  Patient states she quit drinking about 12 days ago, she used to drink 3 strong alcoholic beverages a day.  She denies exposure to people with tuberculosis.  According to her she got COVID-19 vaccination in April.  Denies travel outside [place].  No sick contacts reported.  Denies history of seizure or loss of consciousness in the last month.  Denies dysphagia.  Patient presented hypotensive 86/50, heart rate 110, respiratory rate 24.  Blood pressure improved to 91/63 after 30 cc/kg sepsis bolus.  CT showed concern for right lower lobe abscess.    Review of Systems  Review of Systems   Constitutional: Positive for malaise/fatigue. Negative for chills, fever and weight loss.   HENT: Negative for ear pain, hearing loss and tinnitus.    Eyes: Negative for blurred vision, double vision and photophobia.   Respiratory: Positive for cough, sputum production and shortness of breath. Negative for hemoptysis.    Cardiovascular: Negative for chest pain, palpitations and orthopnea.   Gastrointestinal: Negative for heartburn, nausea and vomiting.   Genitourinary: Negative for dysuria, flank pain, frequency and  hematuria.   Musculoskeletal: Negative for back pain, joint pain and neck pain.   Skin: Negative for itching and rash.   Neurological: Positive for dizziness. Negative for tremors, speech change, focal weakness and headaches.   Endo/Heme/Allergies: Negative for environmental allergies and polydipsia. Does not bruise/bleed easily.   Psychiatric/Behavioral: Negative for hallucinations and substance abuse. The patient is not nervous/anxious.        Past Medical History   has a past medical history of Hypertension and Smoking.    Surgical History   has a past surgical history that includes us-needle core bx-breast panel (Right).     Family History  family history is not on file.     Social History   reports that she has been smoking. She has never used smokeless tobacco. She reports previous alcohol use. She reports previous drug use.    Allergies  No Known Allergies    Medications  Prior to Admission Medications   Prescriptions Last Dose Informant Patient Reported? Taking?   Multiple Vitamins-Minerals (OCUVITE ADULT 50+ PO) >5 days ago at unknown Patient Yes Yes   Sig: Take 1 tablet by mouth every day.   losartan-hydrochlorothiazide (HYZAAR) 100-12.5 MG per tablet 5/22/2021 at AM Patient Yes Yes   Sig: Take 1 tablet by mouth every day.   simvastatin (ZOCOR) 20 MG Tab 5/22/2021 at AM  Yes Yes   Sig: Take 20 mg by mouth every day.      Facility-Administered Medications: None       Physical Exam  Temp:  [36.4 °C (97.5 °F)] 36.4 °C (97.5 °F)  Pulse:  [] 108  Resp:  [19-26] 19  BP: (86-91)/(50-63) 91/63  SpO2:  [92 %-95 %] 92 %    Physical Exam  Vitals and nursing note reviewed.   Constitutional:       General: She is not in acute distress.     Appearance: Normal appearance.   HENT:      Head: Normocephalic and atraumatic.      Nose: Nose normal.      Mouth/Throat:      Mouth: Mucous membranes are moist.   Eyes:      Extraocular Movements: Extraocular movements intact.      Pupils: Pupils are equal, round, and  reactive to light.   Cardiovascular:      Rate and Rhythm: Normal rate and regular rhythm.   Pulmonary:      Effort: Pulmonary effort is normal.      Breath sounds: Examination of the right-lower field reveals decreased breath sounds and rhonchi. Decreased breath sounds and rhonchi present.   Abdominal:      General: Abdomen is flat. There is no distension.      Tenderness: There is no abdominal tenderness.   Musculoskeletal:         General: No swelling or deformity. Normal range of motion.      Cervical back: Normal range of motion and neck supple.   Skin:     General: Skin is warm and dry.   Neurological:      General: No focal deficit present.      Mental Status: She is alert and oriented to person, place, and time.   Psychiatric:         Mood and Affect: Mood normal.         Behavior: Behavior normal.         Laboratory:      Recent Labs     05/22/21  1132   SODIUM 129*   POTASSIUM 3.4*   CHLORIDE 93*   CO2 21   GLUCOSE 119*   BUN 47*   CREATININE 1.11   CALCIUM 9.7     Recent Labs     05/22/21  1132   ALTSGPT 30   ASTSGOT 53*   ALKPHOSPHAT 218*   TBILIRUBIN 1.1   GLUCOSE 119*         No results for input(s): NTPROBNP in the last 72 hours.      No results for input(s): TROPONINT in the last 72 hours.    Imaging:  CT-CHEST (THORAX) WITH   Final Result         1.  Necrotizing right lower lobe pneumonia with loculated air-fluid collection which may represent abscess. Recommend follow-up after treatment to ensure resolution and exclude possibility of malignancy.   2.  Mildly prominent mediastinal lymph nodes are most likely reactive.   3.  Emphysematous changes.   4.  No pleural effusion.   5.  Hepatic steatosis/chronic liver disease.      Fleischner Society pulmonary nodule recommendations:         DX-CHEST-PORTABLE (1 VIEW)   Final Result         Airspace opacity with cavitary change in the right middle lobe, concerning for necrotizing pneumonia. The differential includes necrotic mass. Further evaluation with CT  is recommended.            Assessment/Plan:  I anticipate this patient will require at least two midnights for appropriate medical management, necessitating inpatient admission.    Necrotizing pneumonia (HCC)  Assessment & Plan  CT chest: Necrotizing right lower lobe pneumonia with loculated air-fluid collection which may represent abscess. Recommend follow-up after treatment to ensure resolution and exclude possibility of malignancy  Probably secondary to aspiration?  Will start empiric antibiotics Unasyn and vancomycin, will check MRSA screen and will consider to discontinue vancomycin  Blood culture, sputum culture ordered  Legionella urine antigen, considering concomitant diarrhea  Repeat chest x-ray; aspiration could be considered if fluid collection does not resolve with antibiotics  We will consider ID and pulmonary consult    Sepsis (HCC)  Assessment & Plan  This is Sepsis Present on admission  SIRS criteria identified on my evaluation include: Tachycardia, with heart rate greater than 90 BPM and Tachypnea, with respirations greater than 20 per minute  Source is necrotizing pneumonia  Suspected onset of infection (date and time) 5/12/2021  Sepsis protocol initiated  Fluid resuscitation ordered per protocol  IV antibiotics as appropriate for source of sepsis  While organ dysfunction may be noted elsewhere in this problem list or in the chart, degree of organ dysfunction does not meet CMS criteria for severe sepsis          Diarrhea  Assessment & Plan  Denies recent exposure to antibiotics  States that bowel movements are loose to watery only once a day in the last 10 days  We will check stool culture, Legionella urine antigen.  If continues/worsen, will consider C. difficile rule out    Hyponatremia  Assessment & Plan  We will plan to recheck sodium level after fluid resuscitation    Hypokalemia  Assessment & Plan  Will replace p.o.    Hypotension  Assessment & Plan  Secondary to sepsis versus diarrhea versus  losartan  Hold losartan  Status post 30 cc/kg bolus, continue IV hydration  Check cortisol level    Acute kidney injury (HCC)  Assessment & Plan  Will hold losartan  We will check urine electrolytes, urinalysis, kidney ultrasound  Avoid nephrotoxins  Monitor kidney function    Alcohol abuse  Assessment & Plan  Quit drinking 12 days ago  Thiamine supplementation  Monitor for withdrawal    Smoking  Assessment & Plan  Smoking cessation counseling  Nicotine patch

## 2021-05-22 NOTE — PROGRESS NOTES
2 RN skin check complete with Taneya.   Devices in place oxygen tubing.  Skin assessed under devices Yes.  Confirmed pressure ulcers found on none.  New potential pressure ulcers noted on none.   The following interventions in place patient turns self, pillows in support for positioning and encouraged to reposition.     Skin intact, free of redness.

## 2021-05-22 NOTE — ASSESSMENT & PLAN NOTE
CT chest: Necrotizing right lower lobe pneumonia with loculated air-fluid collection which may represent abscess. Recommend follow-up after treatment to ensure resolution and exclude possibility of malignancy  Probably secondary to aspiration?  Started empiric antibiotics Unasyn and vancomycin initially  Blood culture, sputum culture ordered  Legionella urine antigen, considering concomitant diarrhea  ID consulted and recommending IV Unasyn only at this point  Improving

## 2021-05-22 NOTE — ASSESSMENT & PLAN NOTE
Denies, recent exposure to antibiotics  States that bowel movements are loose to watery only once a day in the last 10 days  We will check stool culture, Legionella urine antigen.  If continues/worsen, will consider C. difficile rule out

## 2021-05-23 ENCOUNTER — APPOINTMENT (OUTPATIENT)
Dept: CARDIOLOGY | Facility: MEDICAL CENTER | Age: 66
DRG: 871 | End: 2021-05-23
Attending: INTERNAL MEDICINE
Payer: MEDICARE

## 2021-05-23 PROBLEM — D72.829 LEUKOCYTOSIS: Status: ACTIVE | Noted: 2021-05-23

## 2021-05-23 PROBLEM — D75.839 THROMBOCYTOSIS: Status: ACTIVE | Noted: 2021-05-23

## 2021-05-23 LAB
ANION GAP SERPL CALC-SCNC: 15 MMOL/L (ref 7–16)
ANISOCYTOSIS BLD QL SMEAR: ABNORMAL
B PARAP IS1001 DNA NPH QL NAA+NON-PROBE: NOT DETECTED
B PERT.PT PRMT NPH QL NAA+NON-PROBE: NOT DETECTED
BASOPHILS # BLD AUTO: 0 % (ref 0–1.8)
BASOPHILS # BLD: 0 K/UL (ref 0–0.12)
BUN SERPL-MCNC: 29 MG/DL (ref 8–22)
C PNEUM DNA NPH QL NAA+NON-PROBE: NOT DETECTED
CALCIUM SERPL-MCNC: 9.6 MG/DL (ref 8.5–10.5)
CHLORIDE SERPL-SCNC: 99 MMOL/L (ref 96–112)
CO2 SERPL-SCNC: 21 MMOL/L (ref 20–33)
CREAT SERPL-MCNC: 0.98 MG/DL (ref 0.5–1.4)
EOSINOPHIL # BLD AUTO: 0.22 K/UL (ref 0–0.51)
EOSINOPHIL NFR BLD: 0.9 % (ref 0–6.9)
ERYTHROCYTE [DISTWIDTH] IN BLOOD BY AUTOMATED COUNT: 51.9 FL (ref 35.9–50)
FERRITIN SERPL-MCNC: 1707 NG/ML (ref 10–291)
FLUAV RNA NPH QL NAA+NON-PROBE: NOT DETECTED
FLUBV RNA NPH QL NAA+NON-PROBE: NOT DETECTED
FOLATE SERPL-MCNC: 5.5 NG/ML
GLUCOSE SERPL-MCNC: 108 MG/DL (ref 65–99)
GRAM STN SPEC: NORMAL
HADV DNA NPH QL NAA+NON-PROBE: NOT DETECTED
HCOV 229E RNA NPH QL NAA+NON-PROBE: NOT DETECTED
HCOV HKU1 RNA NPH QL NAA+NON-PROBE: NOT DETECTED
HCOV NL63 RNA NPH QL NAA+NON-PROBE: NOT DETECTED
HCOV OC43 RNA NPH QL NAA+NON-PROBE: NOT DETECTED
HCT VFR BLD AUTO: 24.9 % (ref 37–47)
HGB BLD-MCNC: 8.5 G/DL (ref 12–16)
HGB RETIC QN AUTO: 35.7 PG/CELL (ref 29–35)
HMPV RNA NPH QL NAA+NON-PROBE: NOT DETECTED
HPIV1 RNA NPH QL NAA+NON-PROBE: NOT DETECTED
HPIV2 RNA NPH QL NAA+NON-PROBE: NOT DETECTED
HPIV3 RNA NPH QL NAA+NON-PROBE: NOT DETECTED
HPIV4 RNA NPH QL NAA+NON-PROBE: NOT DETECTED
HYPOCHROMIA BLD QL SMEAR: ABNORMAL
IMM RETICS NFR: 41.1 % (ref 9.3–17.4)
IRON SATN MFR SERPL: 13 % (ref 15–55)
IRON SERPL-MCNC: 19 UG/DL (ref 40–170)
LACTATE BLD-SCNC: 1 MMOL/L (ref 0.5–2)
LV EJECT FRACT MOD 2C 99903: 55.58
LV EJECT FRACT MOD 4C 99902: 70.57
LV EJECT FRACT MOD BP 99901: 64.96
LYMPHOCYTES # BLD AUTO: 0.42 K/UL (ref 1–4.8)
LYMPHOCYTES NFR BLD: 1.7 % (ref 22–41)
M PNEUMO DNA NPH QL NAA+NON-PROBE: NOT DETECTED
MACROCYTES BLD QL SMEAR: ABNORMAL
MAGNESIUM SERPL-MCNC: 1.9 MG/DL (ref 1.5–2.5)
MANUAL DIFF BLD: NORMAL
MCH RBC QN AUTO: 36 PG (ref 27–33)
MCHC RBC AUTO-ENTMCNC: 34.1 G/DL (ref 33.6–35)
MCV RBC AUTO: 105.5 FL (ref 81.4–97.8)
MICROCYTES BLD QL SMEAR: ABNORMAL
MONOCYTES # BLD AUTO: 1.93 K/UL (ref 0–0.85)
MONOCYTES NFR BLD AUTO: 7.8 % (ref 0–13.4)
MORPHOLOGY BLD-IMP: NORMAL
MRSA DNA SPEC QL NAA+PROBE: NORMAL
NEUTROPHILS # BLD AUTO: 22.22 K/UL (ref 2–7.15)
NEUTROPHILS NFR BLD: 88.7 % (ref 44–72)
NEUTS BAND NFR BLD MANUAL: 0.9 % (ref 0–10)
NRBC # BLD AUTO: 0 K/UL
NRBC BLD-RTO: 0 /100 WBC
PLATELET # BLD AUTO: 690 K/UL (ref 164–446)
PLATELET BLD QL SMEAR: NORMAL
PMV BLD AUTO: 10 FL (ref 9–12.9)
POLYCHROMASIA BLD QL SMEAR: NORMAL
POTASSIUM SERPL-SCNC: 3.2 MMOL/L (ref 3.6–5.5)
PROCALCITONIN SERPL-MCNC: 1.47 NG/ML
RBC # BLD AUTO: 2.36 M/UL (ref 4.2–5.4)
RBC BLD AUTO: PRESENT
RETICS # AUTO: 0.07 M/UL (ref 0.04–0.06)
RETICS/RBC NFR: 2.7 % (ref 0.8–2.1)
RSV RNA NPH QL NAA+NON-PROBE: NOT DETECTED
RV+EV RNA NPH QL NAA+NON-PROBE: NOT DETECTED
SARS-COV-2 RNA NPH QL NAA+NON-PROBE: NOTDETECTED
SARS-COV-2 RNA RESP QL NAA+PROBE: NOTDETECTED
SIGNIFICANT IND 70042: NORMAL
SIGNIFICANT IND 70042: NORMAL
SITE SITE: NORMAL
SITE SITE: NORMAL
SODIUM SERPL-SCNC: 135 MMOL/L (ref 135–145)
SOURCE SOURCE: NORMAL
SOURCE SOURCE: NORMAL
SPECIMEN SOURCE: NORMAL
TIBC SERPL-MCNC: 142 UG/DL (ref 250–450)
UIBC SERPL-MCNC: 123 UG/DL (ref 110–370)
VIT B12 SERPL-MCNC: 1763 PG/ML (ref 211–911)
WBC # BLD AUTO: 24.8 K/UL (ref 4.8–10.8)

## 2021-05-23 PROCEDURE — 83735 ASSAY OF MAGNESIUM: CPT

## 2021-05-23 PROCEDURE — 700102 HCHG RX REV CODE 250 W/ 637 OVERRIDE(OP): Performed by: STUDENT IN AN ORGANIZED HEALTH CARE EDUCATION/TRAINING PROGRAM

## 2021-05-23 PROCEDURE — 85007 BL SMEAR W/DIFF WBC COUNT: CPT

## 2021-05-23 PROCEDURE — 770020 HCHG ROOM/CARE - TELE (206)

## 2021-05-23 PROCEDURE — 700111 HCHG RX REV CODE 636 W/ 250 OVERRIDE (IP): Performed by: INTERNAL MEDICINE

## 2021-05-23 PROCEDURE — 83550 IRON BINDING TEST: CPT

## 2021-05-23 PROCEDURE — 85027 COMPLETE CBC AUTOMATED: CPT

## 2021-05-23 PROCEDURE — 700105 HCHG RX REV CODE 258: Performed by: STUDENT IN AN ORGANIZED HEALTH CARE EDUCATION/TRAINING PROGRAM

## 2021-05-23 PROCEDURE — 87205 SMEAR GRAM STAIN: CPT

## 2021-05-23 PROCEDURE — 36415 COLL VENOUS BLD VENIPUNCTURE: CPT

## 2021-05-23 PROCEDURE — 82728 ASSAY OF FERRITIN: CPT

## 2021-05-23 PROCEDURE — 87641 MR-STAPH DNA AMP PROBE: CPT

## 2021-05-23 PROCEDURE — 85046 RETICYTE/HGB CONCENTRATE: CPT

## 2021-05-23 PROCEDURE — 700105 HCHG RX REV CODE 258: Performed by: INTERNAL MEDICINE

## 2021-05-23 PROCEDURE — 84145 PROCALCITONIN (PCT): CPT

## 2021-05-23 PROCEDURE — 700102 HCHG RX REV CODE 250 W/ 637 OVERRIDE(OP): Performed by: INTERNAL MEDICINE

## 2021-05-23 PROCEDURE — 87070 CULTURE OTHR SPECIMN AEROBIC: CPT

## 2021-05-23 PROCEDURE — 93306 TTE W/DOPPLER COMPLETE: CPT | Mod: 26 | Performed by: INTERNAL MEDICINE

## 2021-05-23 PROCEDURE — 99233 SBSQ HOSP IP/OBS HIGH 50: CPT | Performed by: STUDENT IN AN ORGANIZED HEALTH CARE EDUCATION/TRAINING PROGRAM

## 2021-05-23 PROCEDURE — A9270 NON-COVERED ITEM OR SERVICE: HCPCS | Performed by: INTERNAL MEDICINE

## 2021-05-23 PROCEDURE — A9270 NON-COVERED ITEM OR SERVICE: HCPCS | Performed by: STUDENT IN AN ORGANIZED HEALTH CARE EDUCATION/TRAINING PROGRAM

## 2021-05-23 PROCEDURE — 80048 BASIC METABOLIC PNL TOTAL CA: CPT

## 2021-05-23 PROCEDURE — 93306 TTE W/DOPPLER COMPLETE: CPT

## 2021-05-23 PROCEDURE — 83540 ASSAY OF IRON: CPT

## 2021-05-23 PROCEDURE — 83605 ASSAY OF LACTIC ACID: CPT

## 2021-05-23 PROCEDURE — 82746 ASSAY OF FOLIC ACID SERUM: CPT

## 2021-05-23 PROCEDURE — 99223 1ST HOSP IP/OBS HIGH 75: CPT | Performed by: INTERNAL MEDICINE

## 2021-05-23 PROCEDURE — 82607 VITAMIN B-12: CPT

## 2021-05-23 RX ORDER — SODIUM CHLORIDE 9 MG/ML
INJECTION, SOLUTION INTRAVENOUS CONTINUOUS
Status: DISCONTINUED | OUTPATIENT
Start: 2021-05-23 | End: 2021-05-26

## 2021-05-23 RX ORDER — POTASSIUM CHLORIDE 20 MEQ/1
40 TABLET, EXTENDED RELEASE ORAL 3 TIMES DAILY
Status: COMPLETED | OUTPATIENT
Start: 2021-05-23 | End: 2021-05-24

## 2021-05-23 RX ADMIN — POTASSIUM CHLORIDE 40 MEQ: 1500 TABLET, EXTENDED RELEASE ORAL at 21:50

## 2021-05-23 RX ADMIN — LINEZOLID 600 MG: 600 TABLET, FILM COATED ORAL at 16:38

## 2021-05-23 RX ADMIN — SODIUM CHLORIDE: 9 INJECTION, SOLUTION INTRAVENOUS at 19:35

## 2021-05-23 RX ADMIN — THIAMINE HCL TAB 100 MG 100 MG: 100 TAB at 05:06

## 2021-05-23 RX ADMIN — AMPICILLIN SODIUM AND SULBACTAM SODIUM 3 G: 2; 1 INJECTION, POWDER, FOR SOLUTION INTRAMUSCULAR; INTRAVENOUS at 00:05

## 2021-05-23 RX ADMIN — LINEZOLID 600 MG: 600 TABLET, FILM COATED ORAL at 05:06

## 2021-05-23 RX ADMIN — AMPICILLIN SODIUM AND SULBACTAM SODIUM 3 G: 2; 1 INJECTION, POWDER, FOR SOLUTION INTRAMUSCULAR; INTRAVENOUS at 23:31

## 2021-05-23 RX ADMIN — AMPICILLIN SODIUM AND SULBACTAM SODIUM 3 G: 2; 1 INJECTION, POWDER, FOR SOLUTION INTRAMUSCULAR; INTRAVENOUS at 11:27

## 2021-05-23 RX ADMIN — AMPICILLIN SODIUM AND SULBACTAM SODIUM 3 G: 2; 1 INJECTION, POWDER, FOR SOLUTION INTRAMUSCULAR; INTRAVENOUS at 05:06

## 2021-05-23 RX ADMIN — AMPICILLIN SODIUM AND SULBACTAM SODIUM 3 G: 2; 1 INJECTION, POWDER, FOR SOLUTION INTRAMUSCULAR; INTRAVENOUS at 16:38

## 2021-05-23 ASSESSMENT — PAIN DESCRIPTION - PAIN TYPE
TYPE: ACUTE PAIN;CHRONIC PAIN

## 2021-05-23 ASSESSMENT — ENCOUNTER SYMPTOMS
ABDOMINAL PAIN: 0
NERVOUS/ANXIOUS: 0
WEAKNESS: 0
NAUSEA: 0
COUGH: 1
SHORTNESS OF BREATH: 1
PALPITATIONS: 0
FEVER: 0
VOMITING: 0
DIZZINESS: 0
DIARRHEA: 1
CHILLS: 0
BLURRED VISION: 0
DEPRESSION: 0
MYALGIAS: 0
NECK PAIN: 0
HEARTBURN: 0
WEAKNESS: 1
CONSTIPATION: 0
HEADACHES: 0
DOUBLE VISION: 0
SPUTUM PRODUCTION: 1
SHORTNESS OF BREATH: 0
HEMOPTYSIS: 0
SORE THROAT: 0
FOCAL WEAKNESS: 0

## 2021-05-23 NOTE — PROGRESS NOTES
Notified hospitalist of BP of 85/55, MAP 65. Pt asymptomatic and runs low according to her and previously recorded Bps. According to pt PCP and day shift MDs aware. No new orders at this time.

## 2021-05-23 NOTE — CARE PLAN
The patient is Watcher - Medium risk of patient condition declining or worsening         Progress made toward(s) clinical / shift goals:  educated pt on the purpose of various medications including lovenox.       Patient is not progressing towards the following goals: ADLs, would like to brush teeth in the morning      Problem: Knowledge Deficit - Standard  Goal: Patient and family/care givers will demonstrate understanding of plan of care, disease process/condition, diagnostic tests and medications  5/22/2021 2059 by Jennifer Richardson R.N.  Outcome: Progressing  Educated pt on the purpose of lovenox injection, calling before getting out of bed, and the plan of care.     Problem: Respiratory  Goal: Patient will achieve/maintain optimum respiratory ventilation and gas exchange  Outcome: Progressing     Problem: Communication  Goal: The ability to communicate needs accurately and effectively will improve  Outcome: Progressing     Problem: Self Care  Goal: Patient will have the ability to perform ADLs independently or with assistance (bathe, groom, dress, toilet and feed)  Outcome: Progressing   Educated pt on the importance of completing ADLs like brushing teeth to prevent cardiac related issues, infections, etc. Will continue to reinforce

## 2021-05-23 NOTE — PROGRESS NOTES
"Hospital Medicine Daily Progress Note    Date of Service  5/23/2021    Chief Complaint  65 y.o. female presented 5/22/2021 with cough, weakness and diarrhea    Hospital Course  Per Dr. Simons's HPI:  \"65 y.o. F with past  hypertension, alcohol abuse, smoking cigarettes presented 5/22/2021 with complaints of cough productive of brown sputum for 1-1/2 weeks, exertional shortness of breath, generalized weakness, orthostatic lightheadedness, loss of bowel movements 1 times a day in the last 10 days. Patient states she quit drinking about 12 days ago, she used to drink 3 strong alcoholic beverages a day.  She denies exposure to people with tuberculosis.  According to her, she got COVID-19 vaccination in April.  Denies travel outside [place].  No sick contacts reported.  Denies history of seizure or loss of consciousness in the last month.  Denies dysphagia.  Patient presented hypotensive 86/50, heart rate 110, respiratory rate 24. Blood pressure improved to 91/63 after 30 cc/kg sepsis bolus.  CT showed concern for right lower lobe abscess.\"    Pt was then admitted for management of sepsis 2/2 necrotizing pneumonia. IV Abx started on admission.      Interval Problem Update  5/23 on telemetry  Vitals reviewed; afebrile.  The rest of the vitals within normal parameters on 3L O2 (O2 sat in high 90s).  Pain scale reported - none    At bedside when seen alongside her two friends, reported feeling a bit better. Current treatment plan with IV Abx and eval from ID team discussed.     Labs reviewed  Procal 1.47  Iron 19, TIBC 142, Ferritin 1707 - ?acute inflammation  B12 1763  WBC 21 > 24.8  Hb 8.5  .5  Plt 690    Consultants/Specialty  ID    Code Status  Full Code    Disposition  TBD   Likely home when medically cleared.    Discussed with patient, patient's nurse and with multidisciplinary team during rounds including , pharmacist and charge nurse.      Review of Systems  Review of Systems   Constitutional: " Negative for chills, fever and malaise/fatigue.   HENT: Negative for sore throat.    Respiratory: Positive for cough and sputum production. Negative for shortness of breath.    Cardiovascular: Negative for chest pain, palpitations and leg swelling.   Gastrointestinal: Positive for diarrhea. Negative for abdominal pain, heartburn, nausea and vomiting.   Genitourinary: Negative for dysuria, frequency and urgency.   Musculoskeletal: Negative for myalgias and neck pain.   Neurological: Positive for weakness. Negative for dizziness, focal weakness and headaches.   Psychiatric/Behavioral: Negative for depression.        Physical Exam  Temp:  [36.3 °C (97.4 °F)-37 °C (98.6 °F)] 36.4 °C (97.5 °F)  Pulse:  [] 90  Resp:  [14-18] 18  BP: (74-92)/(52-59) 83/54  SpO2:  [91 %-100 %] 99 %    Physical Exam  Vitals and nursing note reviewed.   Constitutional:       General: She is not in acute distress.     Appearance: Normal appearance. She is not ill-appearing.   HENT:      Head: Normocephalic and atraumatic.      Mouth/Throat:      Mouth: Mucous membranes are moist.      Pharynx: Oropharynx is clear.   Eyes:      General: No scleral icterus.     Conjunctiva/sclera: Conjunctivae normal.   Cardiovascular:      Rate and Rhythm: Normal rate and regular rhythm.      Pulses: Normal pulses.      Heart sounds: Normal heart sounds.   Pulmonary:      Effort: Pulmonary effort is normal. No respiratory distress.      Breath sounds: Rales present. No wheezing.   Abdominal:      General: Bowel sounds are normal. There is no distension.      Palpations: Abdomen is soft.      Tenderness: There is no abdominal tenderness.   Musculoskeletal:         General: No swelling or tenderness. Normal range of motion.   Skin:     General: Skin is warm and dry.      Capillary Refill: Capillary refill takes less than 2 seconds.   Neurological:      General: No focal deficit present.      Mental Status: She is alert and oriented to person, place, and  time. Mental status is at baseline.   Psychiatric:         Mood and Affect: Mood normal.         Fluids    Intake/Output Summary (Last 24 hours) at 5/23/2021 1735  Last data filed at 5/23/2021 0715  Gross per 24 hour   Intake 510 ml   Output 0 ml   Net 510 ml       Laboratory  Recent Labs     05/22/21  1645 05/23/21  0230   WBC 21.0* 24.8*   RBC 2.74* 2.36*   HEMOGLOBIN 9.7* 8.5*   HEMATOCRIT 29.3* 24.9*   .9* 105.5*   MCH 35.4* 36.0*   MCHC 33.1* 34.1   RDW 54.3* 51.9*   PLATELETCT 812* 690*   MPV 10.2 10.0     Recent Labs     05/22/21  1132 05/22/21  1423   SODIUM 129* 134*   POTASSIUM 3.4* 3.1*   CHLORIDE 93* 97   CO2 21 23   GLUCOSE 119* 107*   BUN 47* 42*   CREATININE 1.11 0.95   CALCIUM 9.7 9.3     Recent Labs     05/22/21  1645   INR 1.19*               Imaging  EC-ECHOCARDIOGRAM COMPLETE W/O CONT   Final Result      US-RENAL   Final Result         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      No renal calculus. No hydronephrosis.      CT-CHEST (THORAX) WITH   Final Result         1.  Necrotizing right lower lobe pneumonia with loculated air-fluid collection which may represent abscess. Recommend follow-up after treatment to ensure resolution and exclude possibility of malignancy.   2.  Mildly prominent mediastinal lymph nodes are most likely reactive.   3.  Emphysematous changes.   4.  No pleural effusion.   5.  Hepatic steatosis/chronic liver disease.      Fleischner Society pulmonary nodule recommendations:         DX-CHEST-PORTABLE (1 VIEW)   Final Result         Airspace opacity with cavitary change in the right middle lobe, concerning for necrotizing pneumonia. The differential includes necrotic mass. Further evaluation with CT is recommended.           Assessment/Plan  Necrotizing pneumonia (HCC)  Assessment & Plan  CT chest: Necrotizing right lower lobe pneumonia with loculated air-fluid collection which may represent abscess. Recommend follow-up after treatment to ensure  resolution and exclude possibility of malignancy  Probably secondary to aspiration?  Started empiric antibiotics Unasyn and vancomycin; vancomycin switched to linezolid  Will check MRSA screen and will consider to discontinue linezolid  Blood culture, sputum culture ordered  Legionella urine antigen, considering concomitant diarrhea    ID consult appreciated     Sepsis (HCC)  Assessment & Plan  This is Sepsis Present on admission  SIRS criteria identified on my evaluation include: Tachycardia, with heart rate greater than 90 BPM and Tachypnea, with respirations greater than 20 per minute  Source is necrotizing pneumonia  Suspected onset of infection (date and time) 5/12/2021  Sepsis protocol initiated  Fluid resuscitation ordered per protocol  IV antibiotics as appropriate for source of sepsis  While organ dysfunction may be noted elsewhere in this problem list or in the chart, degree of organ dysfunction does not meet CMS criteria for severe sepsis          Diarrhea  Assessment & Plan  Denies, recent exposure to antibiotics  States that bowel movements are loose to watery only once a day in the last 10 days  We will check stool culture, Legionella urine antigen.  If continues/worsen, will consider C. difficile rule out    Hyponatremia  Assessment & Plan  Improved with fluid resuscitation  Will monitor    Hypokalemia  Assessment & Plan  Replaced p.o.  Will monitor    Hypotension  Assessment & Plan  Secondary to sepsis versus diarrhea versus losartan  Hold losartan  Status post 30 cc/kg bolus, continue IV hydration      Acute kidney injury (HCC)  Assessment & Plan  Holding losartan on admission  Responded well to IVF  Avoid nephrotoxins  Monitor kidney function    Alcohol abuse  Assessment & Plan  Quit drinking 12 days ago  Thiamine supplementation  Monitor for withdrawal    Smoking- (present on admission)  Assessment & Plan  Smoking cessation counseling  Nicotine patch         VTE prophylaxis: Enoxaparin

## 2021-05-23 NOTE — CONSULTS
Consults   INFECTIOUS DISEASES INPATIENT CONSULT NOTE     Date of Service: 5/23/2021    Consult Requested By: Pancho Melendez M.D.    Reason for Consultation: Necrotizing pneumonia    History of Present Illness:   Jaymie Peacock is a 65 y.o.  admitted 5/22/2021. Pt has a past medical history of alcohol abuse and tobacco abuse.  She states she stopped smoking and drinking approximately 10 days ago due to feeling unwell.  She reports that approximately 10 days ago she developed a productive cough and shortness of breath which was progressive.  No noted fevers or chills.  She also generalized malaise and some diarrhea which is now resolved.    Hospital Course:   She has been afebrile, hypotensive and on 3 L nasal cannula.  Significant leukocytosis continues.  Cultures were obtained as well as CT chest which found a significant right-sided necrotizing pneumonia.    Review Of Systems:  Review of Systems   Constitutional: Positive for malaise/fatigue. Negative for chills and fever.   HENT: Negative for hearing loss and tinnitus.    Eyes: Negative for blurred vision and double vision.   Respiratory: Positive for cough, sputum production and shortness of breath. Negative for hemoptysis.    Cardiovascular: Negative for chest pain and leg swelling.   Gastrointestinal: Positive for diarrhea. Negative for abdominal pain, constipation, nausea and vomiting.   Genitourinary: Negative for dysuria.   Musculoskeletal: Negative for joint pain and myalgias.   Skin: Negative for rash.   Neurological: Negative for weakness and headaches.   Psychiatric/Behavioral: The patient is not nervous/anxious.          PMH:   Past Medical History:   Diagnosis Date   • Hypertension    • Smoking        PSH:  Past Surgical History:   Procedure Laterality Date   • US-NEEDLE CORE BX-BREAST PANEL Right        FAMILY HX:  History reviewed. No pertinent family history.    SOCIAL HX:  Social History     Socioeconomic History   • Marital status: Unknown      Spouse name: Not on file   • Number of children: Not on file   • Years of education: Not on file   • Highest education level: Not on file   Occupational History   • Not on file   Tobacco Use   • Smoking status: Current Every Day Smoker   • Smokeless tobacco: Never Used   Vaping Use   • Vaping Use: Never used   Substance and Sexual Activity   • Alcohol use: Not Currently     Comment: Stopped 12 days ago   • Drug use: Not Currently   • Sexual activity: Not on file   Other Topics Concern   • Not on file   Social History Narrative   • Not on file     Social Determinants of Health     Financial Resource Strain:    • Difficulty of Paying Living Expenses:    Food Insecurity:    • Worried About Running Out of Food in the Last Year:    • Ran Out of Food in the Last Year:    Transportation Needs:    • Lack of Transportation (Medical):    • Lack of Transportation (Non-Medical):    Physical Activity:    • Days of Exercise per Week:    • Minutes of Exercise per Session:    Stress:    • Feeling of Stress :    Social Connections:    • Frequency of Communication with Friends and Family:    • Frequency of Social Gatherings with Friends and Family:    • Attends Alev[employer] Services:    • Active Member of Clubs or Organizations:    • Attends Club or Organization Meetings:    • Marital Status:    Intimate Partner Violence:    • Fear of Current or Ex-Partner:    • Emotionally Abused:    • Physically Abused:    • Sexually Abused:      Social History     Tobacco Use   Smoking Status Current Every Day Smoker   Smokeless Tobacco Never Used     Social History     Substance and Sexual Activity   Alcohol Use Not Currently    Comment: Stopped 12 days ago       Allergies/Intolerances:  No Known Allergies    History reviewed with the patient     Other Current Medications:    Current Facility-Administered Medications:   •  Respiratory Therapy Consult, , Nebulization, Continuous RT, Rogers Simons M.D.  •  enoxaparin (LOVENOX) inj 40 mg, 40 mg,  "Subcutaneous, DAILY, Rogers Simons M.D., 40 mg at 21  •  acetaminophen (Tylenol) tablet 650 mg, 650 mg, Oral, Q6HRS PRN, Rogers Simons M.D.  •  enalaprilat (VASOTEC) injection 1.25 mg, 1.25 mg, Intravenous, Q6HRS PRN, Rogers Simons M.D.  •  labetalol (NORMODYNE/TRANDATE) injection 10 mg, 10 mg, Intravenous, Q4HRS PRN, Rogers Simons M.D.  •  ondansetron (ZOFRAN) syringe/vial injection 4 mg, 4 mg, Intravenous, Q4HRS PRN, Rogers Simons M.D.  •  ondansetron (ZOFRAN ODT) dispertab 4 mg, 4 mg, Oral, Q4HRS PRN, Rogers Simons M.D.  •  ampicillin/sulbactam (UNASYN) 3 g in  mL IVPB, 3 g, Intravenous, Q6HRS, Rogers Simons M.D., Stopped at 21 1157  •  lactated ringers infusion (BOLUS), 1,000 mL, Intravenous, Once PRN, Rogers Simons M.D.  •  thiamine (Vitamin B-1) tablet 100 mg, 100 mg, Oral, DAILY, Rogers Simons M.D., 100 mg at 21 0506  •  Pharmacy Consult Request - to monitor for nephrotoxic agents, 1 Each, Other, PHARMACY TO DOSE, Rogers Simons M.D.  •  linezolid (ZYVOX) tablet 600 mg, 600 mg, Oral, Q12HRS, Rogers Simons M.D., 600 mg at 21 0506  [unfilled]    Most Recent Vital Signs:  BP (!) 86/55   Pulse 97   Temp 37 °C (98.6 °F) (Temporal)   Resp 14   Ht 1.676 m (5' 6\")   Wt 60.3 kg (132 lb 15 oz)   SpO2 100%   BMI 21.46 kg/m²   Temp  Av.5 °C (97.7 °F)  Min: 36.3 °C (97.4 °F)  Max: 37 °C (98.6 °F)    Physical Exam:  Physical Exam  Constitutional:       Appearance: Normal appearance.   HENT:      Head: Normocephalic and atraumatic.      Right Ear: External ear normal.      Left Ear: External ear normal.      Nose: Nose normal.      Mouth/Throat:      Mouth: Mucous membranes are dry.      Pharynx: Oropharynx is clear.   Eyes:      Extraocular Movements: Extraocular movements intact.      Conjunctiva/sclera: Conjunctivae normal.      Pupils: Pupils are equal, round, and reactive to light.   Cardiovascular:      Rate and Rhythm: " "Normal rate and regular rhythm.      Heart sounds: Normal heart sounds.   Pulmonary:      Effort: Pulmonary effort is normal.      Comments: Decreased breath sounds bilaterally, most significant on right  Abdominal:      General: Abdomen is flat. Bowel sounds are normal.      Palpations: Abdomen is soft.   Musculoskeletal:      Cervical back: Normal range of motion.      Right lower leg: No edema.      Left lower leg: No edema.   Skin:     General: Skin is warm and dry.   Neurological:      General: No focal deficit present.      Mental Status: She is alert and oriented to person, place, and time.   Psychiatric:         Mood and Affect: Mood normal.         Behavior: Behavior normal.             Pertinent Lab Results:  Recent Labs     05/22/21  1645 05/23/21  0230   WBC 21.0* 24.8*      Recent Labs     05/22/21  1645 05/23/21  0230   HEMOGLOBIN 9.7* 8.5*   HEMATOCRIT 29.3* 24.9*   .9* 105.5*   MCH 35.4* 36.0*   MACROCYTOSIS 2+* 1+   ANISOCYTOSIS 2+* 1+   PLATELETCT 812* 690*         Recent Labs     05/22/21  1132 05/22/21  1423   SODIUM 129* 134*   POTASSIUM 3.4* 3.1*   CHLORIDE 93* 97   CO2 21 23   CREATININE 1.11 0.95        Recent Labs     05/22/21  1132 05/22/21  1423   ALBUMIN 2.5* 2.3*        Pertinent Micro:  Results     Procedure Component Value Units Date/Time    SARS-CoV-2 PCR (24 hour In-House): Collect NP swab in [facility], [place] [[id number]] Collected: 05/22/21 1423    Order Status: Completed Specimen: Respirate Updated: 05/23/21 1036     SARS-CoV-2 Source NP Swab     SARS-CoV-2 by PCR NotDetected     Comment: PATIENTS: Important information regarding your results and instructions can  be found at [email or web address]   \"After your  Covid-19 Test\"    [facility] providers: PLEASE REFER TO DE-ESCALATION AND RETESTING PROTOCOL  on [email or web address]    **The Roche SARS-CoV-2 RT-PCR test has been made available for use under the  Emergency Use Authorization (EUA) only.         Narrative:      " "Have you been in close contact with a person who is suspected  or known to be positive for COVID-19 within the last 30 days  (e.g. last seen that person < 30 days ago)->No    BLOOD CULTURE [[id number]] Collected: 05/22/21 1132    Order Status: Completed Specimen: Blood from Peripheral Updated: 05/23/21 0721     Significant Indicator NEG     Source BLD     Site PERIPHERAL     Culture Result No Growth  Note: Blood cultures are incubated for 5 days and  are monitored continuously.Positive blood cultures  are called to the RN and reported as soon as  they are identified.      Narrative:      Per Hospital Policy: Only change Specimen Src: to \"Line\" if  specified by physician order.  No site indicated    Blood Culture [[id number]] Collected: 05/22/21 1645    Order Status: Completed Specimen: Blood from Peripheral Updated: 05/23/21 0721     Significant Indicator NEG     Source BLD     Site PERIPHERAL     Culture Result No Growth  Note: Blood cultures are incubated for 5 days and  are monitored continuously.Positive blood cultures  are called to the RN and reported as soon as  they are identified.      Narrative:      From different peripheral sites, if not done within the last  24 hours (Per Hospital Policy: Only change specimen source to  \"Line\" if specified by physician order)  Right AC    Blood Culture [[id number]] Collected: 05/22/21 1658    Order Status: Completed Specimen: Blood from Peripheral Updated: 05/23/21 0721     Significant Indicator NEG     Source BLD     Site PERIPHERAL     Culture Result No Growth  Note: Blood cultures are incubated for 5 days and  are monitored continuously.Positive blood cultures  are called to the RN and reported as soon as  they are identified.      Narrative:      From different peripheral sites, if not done within the last  24 hours (Per Hospital Policy: Only change specimen source to  \"Line\" if specified by physician order)  Left Forearm/Arm    BLOOD CULTURE [[id number]] Collected: " "05/22/21 1132    Order Status: Completed Specimen: Blood from Peripheral Updated: 05/23/21 0721     Significant Indicator NEG     Source BLD     Site PERIPHERAL     Culture Result No Growth  Note: Blood cultures are incubated for 5 days and  are monitored continuously.Positive blood cultures  are called to the RN and reported as soon as  they are identified.      Narrative:      Per Hospital Policy: Only change Specimen Src: to \"Line\" if  specified by physician order.  No site indicated    URINALYSIS [[id number]]  (Abnormal) Collected: 05/22/21 1600    Order Status: Completed Specimen: Urine Updated: 05/22/21 1650     Color Yellow     Character Clear     Specific Gravity 1.042     Ph 7.0     Glucose Negative mg/dL      Ketones Negative mg/dL      Protein Negative mg/dL      Bilirubin Negative     Urobilinogen, Urine 1.0     Nitrite Negative     Leukocyte Esterase Small     Occult Blood Negative     Micro Urine Req Microscopic    Narrative:      Indication for culture:->Evaluation for sepsis without a  clear source of infection    URINE CULTURE(NEW) [[id number]] Collected: 05/22/21 1600    Order Status: Completed Specimen: Urine Updated: 05/22/21 1622    Narrative:      Indication for culture:->Evaluation for sepsis without a  clear source of infection    MRSA By PCR (Amp) [[id number]]     Order Status: Sent Specimen: Respirate from Nares     Blood Culture [[id number]]     Order Status: Canceled Specimen: Blood from Peripheral     Blood Culture [[id number]]     Order Status: Canceled Specimen: Blood from Peripheral     Culture Respiratory W/ GRM STN [[id number]]     Order Status: Completed Specimen: Respirate from Sputum     CULTURE STOOL [[id number]]     Order Status: Sent Specimen: Stool         No results found for: BLOODCULTU, BLDCULT, BCHOLD     Studies:  CT-CHEST (THORAX) WITH    Result Date: 5/22/2021 5/22/2021 12:57 PM HISTORY/REASON FOR EXAM:  Pneumonia. TECHNIQUE/EXAM DESCRIPTION: CT scan of the chest with " contrast. Thin-section helical images were obtained from the lung apices through the adrenal glands following the bolus administration of contrast. 75 mL of Omnipaque 350 nonionic contrast was utilized. Low dose optimization technique was utilized for this CT exam including automated exposure control and adjustment of the mA and/or kV according to patient size. COMPARISON:  Radiograph earlier in the day FINDINGS: Neck Base:  10 mm circumscribed hypodense right thyroid nodule is indeterminate. This could be further characterized with outpatient ultrasound as clinically indicated. Lungs/Pleura: Right lower lobe consolidation consistent with pneumonia. There is extensive irregular somewhat circumscribed fluid collection in the right lower lobe in keeping with necrotizing infection and possible pulmonary abscess. Mild emphysematous changes. Cardiovascular Structures:  Heart size is normal. No pericardial effusion. There is mild atherosclerosis. Aorta is normal in caliber without aneurysm or dissection. Central pulmonary arteries are normal in caliber. Mediastinum/ lymph nodes: Borderline enlarged mediastinal lymph nodes are probably reactive. Upper Abdomen:  Diffuse hypoattenuation of the liver suggesting fatty infiltration. Prominence of the left lobe and small appearance of the right lobe, nonspecific however could represent changes of chronic liver disease/cirrhosis. Soft tissues/wall: Within normal limits. Bones:  No acute or aggressive abnormality.     1.  Necrotizing right lower lobe pneumonia with loculated air-fluid collection which may represent abscess. Recommend follow-up after treatment to ensure resolution and exclude possibility of malignancy. 2.  Mildly prominent mediastinal lymph nodes are most likely reactive. 3.  Emphysematous changes. 4.  No pleural effusion. 5.  Hepatic steatosis/chronic liver disease. Fleischner Society pulmonary nodule recommendations:     DX-CHEST-PORTABLE (1 VIEW)    Result Date:  5/22/2021 5/22/2021 12:10 PM HISTORY/REASON FOR EXAM:  possible sepsis. Productive cough. TECHNIQUE/EXAM DESCRIPTION AND NUMBER OF VIEWS: Single portable view of the chest. COMPARISON: None FINDINGS: Airspace opacity with cavitary change in the right middle lobe No pleural effusion. No pneumothorax. Normal cardiopericardial silhouette.     Airspace opacity with cavitary change in the right middle lobe, concerning for necrotizing pneumonia. The differential includes necrotic mass. Further evaluation with CT is recommended.    US-RENAL    Result Date: 5/22/2021 5/22/2021 3:37 PM HISTORY/REASON FOR EXAM:  Abnormal Labs TECHNIQUE/EXAM DESCRIPTION: Renal ultrasound. COMPARISON:  None FINDINGS: The right kidney measures 9.76 cm.  The right kidney appears normal in contour and parenchymal echotexture. The corticomedullary differentiation is preserved. The right renal collecting system is not dilated. There are no renal calculi. There is a 0.8 cm  echogenic lesion in the lower pole right kidney. The left kidney measures 10.97 cm. The left kidney appears normal in contour and parenchymal echotexture. The corticomedullary differentiation is preserved. The left renal collecting system is not dilated. There are no renal calculi or masses. The bladder demonstrates no focal wall abnormality.     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 No renal calculus. No hydronephrosis.    EC-ECHOCARDIOGRAM COMPLETE W/O CONT    Result Date: 5/23/2021  Results Will be Available after Interpretation by Cardiologist.      ASSESSMENT/PLAN:     65 y.o.  admitted 5/22/2021. Pt has a past medical history of alcohol abuse and tobacco abuse.  She reports that approximately 10 days ago she developed a productive cough and shortness of breath which was progressive.      Hospital Course:   She has been afebrile, hypotensive and on 3 L nasal cannula.  Significant leukocytosis continues.  Cultures were obtained as well as CT chest  which found a significant right-sided necrotizing pneumonia.    Problem List     Necrotizing pneumonia  -Concern for aspiration particularly in the setting of heavy alcohol use  -CT chest on 5/23 with necrotizing right lower lobe pneumonia and loculated air-fluid collection thought to represent abscess.  To note emphysematous changes.  -HIV negative  Leukocytosis  Thrombocytosis  Hypotension, since admit may be at baseline     Plan     --- Continue Unasyn and linezolid for now, MRSA nares are negative can stop linezolid -dissipate she will need multiple weeks of antibiotics.  We will continue IV for now but once improved can likely transition to Augmentin for extended course and then repeat CT chest as an outpatient  --- MRSA nares are ordered -  need to be collected  --- Respiratory culture ordered, not yet collected  --- Follow-up Legionella urinary antigen -in process  --- Procalcitonin ordered for a.m.  --- Monitor labs  --- If recurrence of diarrhea obtain C. difficile testing  --- Monitor for alcohol withdrawal  --- Encouraged continuing cessation of smoking      Plan of care discussed with SOFY Melendez M.D.. Will continue to follow    Soni Piper M.D.

## 2021-05-23 NOTE — PROGRESS NOTES
Bedside report received from day nurse. Assumed care of patient. Pt. resting comfortably without any sign of distress. A&Ox4, on 3L NC, no complaints at this time. POC reviewed and white board updated, Tele box on, Call light in reach, Bed locked in lowest position.

## 2021-05-23 NOTE — CARE PLAN
Problem: Pain - Standard  Goal: Alleviation of pain or a reduction in pain to the patient’s comfort goal  Outcome: Progressing     Problem: Knowledge Deficit - Standard  Goal: Patient and family/care givers will demonstrate understanding of plan of care, disease process/condition, diagnostic tests and medications  Outcome: Progressing    Problem: Fluid Volume  Goal: Fluid volume balance will be maintained  Outcome: Progressing     Problem: Urinary - Renal Perfusion  Goal: Ability to achieve and maintain adequate renal perfusion and functioning will improve  Outcome: Progressing     Problem: Respiratory  Goal: Patient will achieve/maintain optimum respiratory ventilation and gas exchange  Outcome: Progressing     Assumed care of patient at 0715. Received bedside report from night shift RN. Bed is locked and lowest position, call light within reach. Treaded socks in place. Patient updated on plan of care, no complaints or pain at this time. White board updated. Pt AxOx4 Patient breathing pattern is unlabored. Pt is tele. All needs met at this time. Will continue care and monitoring as ordered.

## 2021-05-24 LAB
ALBUMIN SERPL BCP-MCNC: 2.1 G/DL (ref 3.2–4.9)
ALBUMIN/GLOB SERPL: 0.8 G/DL
ALP SERPL-CCNC: 156 U/L (ref 30–99)
ALT SERPL-CCNC: 20 U/L (ref 2–50)
ANION GAP SERPL CALC-SCNC: 13 MMOL/L (ref 7–16)
ANION GAP SERPL CALC-SCNC: 8 MMOL/L (ref 7–16)
ANISOCYTOSIS BLD QL SMEAR: ABNORMAL
AST SERPL-CCNC: 40 U/L (ref 12–45)
BACTERIA UR CULT: NORMAL
BASOPHILS # BLD AUTO: 0 % (ref 0–1.8)
BASOPHILS # BLD: 0 K/UL (ref 0–0.12)
BILIRUB SERPL-MCNC: 0.5 MG/DL (ref 0.1–1.5)
BUN SERPL-MCNC: 23 MG/DL (ref 8–22)
BUN SERPL-MCNC: 36 MG/DL (ref 8–22)
CALCIUM SERPL-MCNC: 8.5 MG/DL (ref 8.5–10.5)
CALCIUM SERPL-MCNC: 8.6 MG/DL (ref 8.5–10.5)
CHLORIDE SERPL-SCNC: 105 MMOL/L (ref 96–112)
CHLORIDE SERPL-SCNC: 98 MMOL/L (ref 96–112)
CO2 SERPL-SCNC: 20 MMOL/L (ref 20–33)
CO2 SERPL-SCNC: 23 MMOL/L (ref 20–33)
CREAT SERPL-MCNC: 0.87 MG/DL (ref 0.5–1.4)
CREAT SERPL-MCNC: 0.93 MG/DL (ref 0.5–1.4)
EOSINOPHIL # BLD AUTO: 0 K/UL (ref 0–0.51)
EOSINOPHIL NFR BLD: 0 % (ref 0–6.9)
ERYTHROCYTE [DISTWIDTH] IN BLOOD BY AUTOMATED COUNT: 54.5 FL (ref 35.9–50)
GLOBULIN SER CALC-MCNC: 2.8 G/DL (ref 1.9–3.5)
GLUCOSE SERPL-MCNC: 74 MG/DL (ref 65–99)
GLUCOSE SERPL-MCNC: 88 MG/DL (ref 65–99)
HCT VFR BLD AUTO: 24.9 % (ref 37–47)
HGB BLD-MCNC: 8.4 G/DL (ref 12–16)
HYPOCHROMIA BLD QL SMEAR: ABNORMAL
L PNEUMO AG UR QL IA: NEGATIVE
LYMPHOCYTES # BLD AUTO: 0.16 K/UL (ref 1–4.8)
LYMPHOCYTES NFR BLD: 0.9 % (ref 22–41)
MACROCYTES BLD QL SMEAR: ABNORMAL
MAGNESIUM SERPL-MCNC: 1.6 MG/DL (ref 1.5–2.5)
MANUAL DIFF BLD: NORMAL
MCH RBC QN AUTO: 36.5 PG (ref 27–33)
MCHC RBC AUTO-ENTMCNC: 33.7 G/DL (ref 33.6–35)
MCV RBC AUTO: 108.3 FL (ref 81.4–97.8)
MICROCYTES BLD QL SMEAR: ABNORMAL
MONOCYTES # BLD AUTO: 0.62 K/UL (ref 0–0.85)
MONOCYTES NFR BLD AUTO: 3.5 % (ref 0–13.4)
MORPHOLOGY BLD-IMP: NORMAL
NEUTROPHILS # BLD AUTO: 16.83 K/UL (ref 2–7.15)
NEUTROPHILS NFR BLD: 93.9 % (ref 44–72)
NEUTS BAND NFR BLD MANUAL: 1.7 % (ref 0–10)
NRBC # BLD AUTO: 0 K/UL
NRBC BLD-RTO: 0 /100 WBC
PLATELET # BLD AUTO: 654 K/UL (ref 164–446)
PLATELET BLD QL SMEAR: NORMAL
PMV BLD AUTO: 9.8 FL (ref 9–12.9)
POLYCHROMASIA BLD QL SMEAR: NORMAL
POTASSIUM SERPL-SCNC: 3.3 MMOL/L (ref 3.6–5.5)
POTASSIUM SERPL-SCNC: 3.5 MMOL/L (ref 3.6–5.5)
PROT SERPL-MCNC: 4.9 G/DL (ref 6–8.2)
RBC # BLD AUTO: 2.3 M/UL (ref 4.2–5.4)
RBC BLD AUTO: PRESENT
SIGNIFICANT IND 70042: NORMAL
SITE SITE: NORMAL
SODIUM SERPL-SCNC: 131 MMOL/L (ref 135–145)
SODIUM SERPL-SCNC: 136 MMOL/L (ref 135–145)
SOURCE SOURCE: NORMAL
WBC # BLD AUTO: 17.6 K/UL (ref 4.8–10.8)

## 2021-05-24 PROCEDURE — A9270 NON-COVERED ITEM OR SERVICE: HCPCS | Performed by: INTERNAL MEDICINE

## 2021-05-24 PROCEDURE — 85007 BL SMEAR W/DIFF WBC COUNT: CPT

## 2021-05-24 PROCEDURE — 700102 HCHG RX REV CODE 250 W/ 637 OVERRIDE(OP): Performed by: INTERNAL MEDICINE

## 2021-05-24 PROCEDURE — 700105 HCHG RX REV CODE 258: Performed by: INTERNAL MEDICINE

## 2021-05-24 PROCEDURE — 770020 HCHG ROOM/CARE - TELE (206)

## 2021-05-24 PROCEDURE — 700111 HCHG RX REV CODE 636 W/ 250 OVERRIDE (IP): Performed by: INTERNAL MEDICINE

## 2021-05-24 PROCEDURE — 700105 HCHG RX REV CODE 258: Performed by: STUDENT IN AN ORGANIZED HEALTH CARE EDUCATION/TRAINING PROGRAM

## 2021-05-24 PROCEDURE — 83735 ASSAY OF MAGNESIUM: CPT

## 2021-05-24 PROCEDURE — 92610 EVALUATE SWALLOWING FUNCTION: CPT

## 2021-05-24 PROCEDURE — 700102 HCHG RX REV CODE 250 W/ 637 OVERRIDE(OP): Performed by: STUDENT IN AN ORGANIZED HEALTH CARE EDUCATION/TRAINING PROGRAM

## 2021-05-24 PROCEDURE — 80048 BASIC METABOLIC PNL TOTAL CA: CPT

## 2021-05-24 PROCEDURE — 36415 COLL VENOUS BLD VENIPUNCTURE: CPT

## 2021-05-24 PROCEDURE — 99232 SBSQ HOSP IP/OBS MODERATE 35: CPT | Performed by: INTERNAL MEDICINE

## 2021-05-24 PROCEDURE — A9270 NON-COVERED ITEM OR SERVICE: HCPCS | Performed by: STUDENT IN AN ORGANIZED HEALTH CARE EDUCATION/TRAINING PROGRAM

## 2021-05-24 PROCEDURE — 85027 COMPLETE CBC AUTOMATED: CPT

## 2021-05-24 RX ADMIN — SODIUM CHLORIDE: 9 INJECTION, SOLUTION INTRAVENOUS at 03:35

## 2021-05-24 RX ADMIN — AMPICILLIN SODIUM AND SULBACTAM SODIUM 3 G: 2; 1 INJECTION, POWDER, FOR SOLUTION INTRAMUSCULAR; INTRAVENOUS at 18:26

## 2021-05-24 RX ADMIN — SODIUM CHLORIDE: 9 INJECTION, SOLUTION INTRAVENOUS at 15:21

## 2021-05-24 RX ADMIN — POTASSIUM CHLORIDE 40 MEQ: 1500 TABLET, EXTENDED RELEASE ORAL at 04:52

## 2021-05-24 RX ADMIN — AMPICILLIN SODIUM AND SULBACTAM SODIUM 3 G: 2; 1 INJECTION, POWDER, FOR SOLUTION INTRAMUSCULAR; INTRAVENOUS at 04:52

## 2021-05-24 RX ADMIN — AMPICILLIN SODIUM AND SULBACTAM SODIUM 3 G: 2; 1 INJECTION, POWDER, FOR SOLUTION INTRAMUSCULAR; INTRAVENOUS at 13:23

## 2021-05-24 RX ADMIN — AMPICILLIN SODIUM AND SULBACTAM SODIUM 3 G: 2; 1 INJECTION, POWDER, FOR SOLUTION INTRAMUSCULAR; INTRAVENOUS at 23:50

## 2021-05-24 RX ADMIN — ENOXAPARIN SODIUM 40 MG: 40 INJECTION SUBCUTANEOUS at 04:52

## 2021-05-24 RX ADMIN — THIAMINE HCL TAB 100 MG 100 MG: 100 TAB at 04:52

## 2021-05-24 ASSESSMENT — ENCOUNTER SYMPTOMS
TINGLING: 0
WEIGHT LOSS: 0
ABDOMINAL PAIN: 0
NAUSEA: 0
TREMORS: 0
ORTHOPNEA: 0
DOUBLE VISION: 0
SPUTUM PRODUCTION: 1
SPEECH CHANGE: 0
COUGH: 1
HEADACHES: 0
BLURRED VISION: 0
PALPITATIONS: 0
SHORTNESS OF BREATH: 1
BACK PAIN: 0
FOCAL WEAKNESS: 0
SHORTNESS OF BREATH: 0
SENSORY CHANGE: 0
HALLUCINATIONS: 0
MYALGIAS: 0
NERVOUS/ANXIOUS: 0
DIZZINESS: 0
HEMOPTYSIS: 0
FEVER: 0
VOMITING: 0
NECK PAIN: 0
CONSTIPATION: 0
DIARRHEA: 0
PHOTOPHOBIA: 0
CHILLS: 0
EYE PAIN: 0

## 2021-05-24 ASSESSMENT — LIFESTYLE VARIABLES: SUBSTANCE_ABUSE: 0

## 2021-05-24 ASSESSMENT — PAIN DESCRIPTION - PAIN TYPE: TYPE: ACUTE PAIN

## 2021-05-24 NOTE — THERAPY
"Speech Language Pathology   Clinical Swallow Evaluation     Patient Name: Jaymie Peacock  AGE:  65 y.o., SEX:  female  Medical Record #: [id number]  Today's Date: 5/24/2021          Assessment    Patient is a \"65 y.o. female presented 5/22/2021 with cough, weakness and diarrhea.  Admitted for  management of sepsis 2/2 necrotizing pneumonia.\" Recently stopped drinking; consumed 3 strong alcoholic beverages/day, per EMR.      The patient was seen for a clinical swallow evaluation on this date.  She was pleasant and agreeable.  Patient followed directives to the oral [facility] exam with no gross deficits appreciated.  Patient stated she primarily consumes a liquid diet (only had yogurt and liquids this morning for breakfast) at baseline due to preference and poor appetite.  Presentation of PO included ice chips, thin liquids, soft solids, mixed consistencies, and regular textures.  The patient presented with functional mastication and bolus manipulation, timely initiation of swallow trigger and complete laryngeal elevation upon palpation.  Patient with no overt s/sx of aspiration with any consistency consumed.  Recommend the patient continue a regular diet with thin liquids as tolerated.  Please hold PO with any change in mental status.  SLP following x1 during a meal.      Plan    Recommend Speech Therapy 1 time per week until therapy goals are met for the following treatments:  Dysphagia Training and Patient / Family / Caregiver Education.    Discharge Recommendations: (P) Anticipate that the patient will have no further speech therapy needs after discharge from the hospital    Subjective    Pleasant and agreeable.      Objective       05/24/21 1026   Oral Motor Eval    Is Patient Able to Complete Oral Motor Eval Yes, Within Normal Limits   Laryngeal Function   Voice Quality Within Functional Limits   Volutional Cough Within Functional Limits   Excursion Upon Swallow Complete   Oral Food Presentation   Ice Chips Within " Functional Limits   Single Swallow Thin (0) Within Functional Limits   Serial Swallow Thin (0) Within Functional Limits   Soft & Bite-Sized (6) - (Dysphagia III) Within Functional Limits   Regular (7) Within Functional Limits   Self Feeding Independent   Tracheostomy   Tracheostomy  No   Dysphagia Strategies / Recommendations   Strategies / Interventions Recommended (Yes / No) Yes   Compensatory Strategies Head of Bed 90 Degrees During Eating / Drinking   Diet / Liquid Recommendation Regular (7);Thin (0)   Medication Administration  Whole with Liquid Wash   Therapy Interventions Dysphagia Therapy By Speech Language Pathologist   Dysphagia Rating   Nutritional Liquid Intake Rating Scale Non thickened beverages   Nutritional Food Intake Rating Scale Total oral diet with no restrictions   Patient / Family Goals   Patient / Family Goal #1 To go home   Short Term Goals   Short Term Goal # 1 The patient will independenlty consume RG7/TN0 diet with no overt s/sx of aspiration.

## 2021-05-24 NOTE — CARE PLAN
The patient is Watcher - Medium risk of patient condition declining or worsening    Shift Goals  Clinical Goals: Oxygen demand will decrease  Patient Goals: Improve brathing    Progress made toward(s) clinical / shift goals:    Problem: Knowledge Deficit - Standard  Goal: Patient and family/care givers will demonstrate understanding of plan of care, disease process/condition, diagnostic tests and medications  Outcome: Progressing     Problem: Hemodynamics  Goal: Patient's hemodynamics, fluid balance and neurologic status will be stable or improve  Outcome: Progressing     Problem: Fluid Volume  Goal: Fluid volume balance will be maintained  Outcome: Progressing     Problem: Respiratory  Goal: Patient will achieve/maintain optimum respiratory ventilation and gas exchange  Outcome: Progressing     Problem: Fall Risk  Goal: Patient will remain free from falls  Outcome: Progressing       Patient is not progressing towards the following goals: n/a

## 2021-05-24 NOTE — PROGRESS NOTES
Telemetry Shift Summary     Rhythm SR/ST  HR Range   Ectopy Rpvc  Measurements .18/.12/.46       Normal Values  Rhythm SR  HR Range    Measurements 0.12-0.20 / 0.06-0.10  / 0.30-0.52    Ongoing care no acute issues at this time. Patient resting in bed safely with no c/o anything at this time. Will continue care and monitoring as ordered.

## 2021-05-24 NOTE — PROGRESS NOTES
Infectious Disease Progress Note    Author: Soni Piper M.D. Date & Time of service: 2021  10:41 AM    Chief Complaint:  Necrotizing pneumonia    Interval History:      Review of Systems:  Review of Systems   Constitutional: Negative for chills, fever and malaise/fatigue.   Respiratory: Positive for cough and sputum production. Negative for shortness of breath.    Gastrointestinal: Negative for abdominal pain, constipation, diarrhea, nausea and vomiting.   Musculoskeletal: Negative for myalgias.   Psychiatric/Behavioral: The patient is not nervous/anxious.        Hemodynamics:  Temp (24hrs), Av.6 °C (97.9 °F), Min:36.3 °C (97.3 °F), Max:37 °C (98.6 °F)  Temperature: 36.3 °C (97.3 °F)  Pulse  Av  Min: 71  Max: 110   Blood Pressure : (!) 85/54       Physical Exam:  Physical Exam  Constitutional:       Appearance: Normal appearance.   Cardiovascular:      Rate and Rhythm: Normal rate and regular rhythm.      Heart sounds: Normal heart sounds.   Pulmonary:      Effort: Pulmonary effort is normal.      Breath sounds: Wheezing present.      Comments: Decreased breath sounds bilaterally, left greater than right and mild crackles  Abdominal:      General: Abdomen is flat. Bowel sounds are normal.      Palpations: Abdomen is soft.   Musculoskeletal:      Right lower leg: No edema.      Left lower leg: No edema.   Skin:     General: Skin is warm and dry.   Neurological:      General: No focal deficit present.      Mental Status: She is alert and oriented to person, place, and time.   Psychiatric:         Mood and Affect: Mood normal.         Behavior: Behavior normal.         Meds:    Current Facility-Administered Medications:   •  NS  •  Respiratory Therapy Consult  •  enoxaparin  •  acetaminophen  •  enalaprilat  •  labetalol  •  ondansetron  •  ondansetron  •  ampicillin-sulbactam (UNASYN) IV  •  LR  •  thiamine  •  Pharmacy    Labs:  Recent Labs     21  1645 21  0230 21  0329   WBC  21.0* 24.8* 17.6*   RBC 2.74* 2.36* 2.30*   HEMOGLOBIN 9.7* 8.5* 8.4*   HEMATOCRIT 29.3* 24.9* 24.9*   .9* 105.5* 108.3*   MCH 35.4* 36.0* 36.5*   RDW 54.3* 51.9* 54.5*   PLATELETCT 812* 690* 654*   MPV 10.2 10.0 9.8   NEUTSPOLYS 81.70* 88.70* 93.90*   LYMPHOCYTES 7.00* 1.70* 0.90*   MONOCYTES 7.80 7.80 3.50   EOSINOPHILS 0.00 0.90 0.00   BASOPHILS 0.00 0.00 0.00   RBCMORPHOLO Present Present Present     Recent Labs     05/22/21  1423 05/22/21  1645 05/23/21  1351 05/24/21  0329   SODIUM 134*  --  135 136   POTASSIUM 3.1*  --  3.2* 3.3*   CHLORIDE 97  --  99 105   CO2 23 --  21 23   GLUCOSE 107*  --  108* 88   BUN 42*  --  29* 23*   CPKTOTAL  --  11  --   --      Recent Labs     05/22/21  1132 05/22/21  1132 05/22/21  1423 05/23/21  1351 05/24/21  0329   ALBUMIN 2.5*  --  2.3*  --   --    TBILIRUBIN 1.1  --  1.0  --   --    ALKPHOSPHAT 218*  --  173*  --   --    TOTPROTEIN 7.1  --  5.9*  --   --    ALTSGPT 30  --  27  --   --    ASTSGOT 53*  --  44  --   --    CREATININE 1.11   < > 0.95 0.98 0.87    < > = values in this interval not displayed.       Imaging:  CT-CHEST (THORAX) WITH    Result Date: 5/22/2021 5/22/2021 12:57 PM HISTORY/REASON FOR EXAM:  Pneumonia. TECHNIQUE/EXAM DESCRIPTION: CT scan of the chest with contrast. Thin-section helical images were obtained from the lung apices through the adrenal glands following the bolus administration of contrast. 75 mL of Omnipaque 350 nonionic contrast was utilized. Low dose optimization technique was utilized for this CT exam including automated exposure control and adjustment of the mA and/or kV according to patient size. COMPARISON:  Radiograph earlier in the day FINDINGS: Neck Base:  10 mm circumscribed hypodense right thyroid nodule is indeterminate. This could be further characterized with outpatient ultrasound as clinically indicated. Lungs/Pleura: Right lower lobe consolidation consistent with pneumonia. There is extensive irregular somewhat  circumscribed fluid collection in the right lower lobe in keeping with necrotizing infection and possible pulmonary abscess. Mild emphysematous changes. Cardiovascular Structures:  Heart size is normal. No pericardial effusion. There is mild atherosclerosis. Aorta is normal in caliber without aneurysm or dissection. Central pulmonary arteries are normal in caliber. Mediastinum/ lymph nodes: Borderline enlarged mediastinal lymph nodes are probably reactive. Upper Abdomen:  Diffuse hypoattenuation of the liver suggesting fatty infiltration. Prominence of the left lobe and small appearance of the right lobe, nonspecific however could represent changes of chronic liver disease/cirrhosis. Soft tissues/wall: Within normal limits. Bones:  No acute or aggressive abnormality.     1.  Necrotizing right lower lobe pneumonia with loculated air-fluid collection which may represent abscess. Recommend follow-up after treatment to ensure resolution and exclude possibility of malignancy. 2.  Mildly prominent mediastinal lymph nodes are most likely reactive. 3.  Emphysematous changes. 4.  No pleural effusion. 5.  Hepatic steatosis/chronic liver disease. Fleischner Society pulmonary nodule recommendations:     DX-CHEST-PORTABLE (1 VIEW)    Result Date: 5/22/2021 5/22/2021 12:10 PM HISTORY/REASON FOR EXAM:  possible sepsis. Productive cough. TECHNIQUE/EXAM DESCRIPTION AND NUMBER OF VIEWS: Single portable view of the chest. COMPARISON: None FINDINGS: Airspace opacity with cavitary change in the right middle lobe No pleural effusion. No pneumothorax. Normal cardiopericardial silhouette.     Airspace opacity with cavitary change in the right middle lobe, concerning for necrotizing pneumonia. The differential includes necrotic mass. Further evaluation with CT is recommended.    US-RENAL    Result Date: 5/22/2021 5/22/2021 3:37 PM HISTORY/REASON FOR EXAM:  Abnormal Labs TECHNIQUE/EXAM DESCRIPTION: Renal ultrasound. COMPARISON:  None  FINDINGS: The right kidney measures 9.76 cm.  The right kidney appears normal in contour and parenchymal echotexture. The corticomedullary differentiation is preserved. The right renal collecting system is not dilated. There are no renal calculi. There is a 0.8 cm  echogenic lesion in the lower pole right kidney. The left kidney measures 10.97 cm. The left kidney appears normal in contour and parenchymal echotexture. The corticomedullary differentiation is preserved. The left renal collecting system is not dilated. There are no renal calculi or masses. The bladder demonstrates no focal wall abnormality.     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 No renal calculus. No hydronephrosis.    EC-ECHOCARDIOGRAM COMPLETE W/O CONT    Result Date: 2021  Transthoracic Echo Report Echocardiography Laboratory CONCLUSIONS Normal left ventricular size, thickness, systolic function, and diastolic function. Left ventricular ejection fraction is visually estimated to be 65%. Trace tricuspid regurgitation. Normal inferior vena cava size and inspiratory collapse. VALENTINE ALANIZ Exam Date:         2021                    10:53 Exam Location:     Inpatient Priority:          Routine Ordering Physician:        BAL COLÓN Referring Physician: Sonographer:               Johana HUGHES Age:    65     Gender:    F MRN:    [id number] :    [id number] BSA:    1.58   Ht (in):    65     Wt (lb):    118 Exam Type:     Complete Indications:     Shortness of breath ICD Codes:       R06.02 CPT Codes:       21247 BP:   85     /   53     HR:   85 Technical Quality:       Fair MEASUREMENTS  (Male / Female) Normal Values 2D ECHO LV Diastolic Diameter PLAX        4.2 cm                4.2 - 5.9 / 3.9 - 5.3 cm LV Systolic Diameter PLAX         3 cm                  2.1 - 4.0 cm IVS Diastolic Thickness           0.48 cm               LVPW Diastolic Thickness          0.79 cm               LVOT Diameter                      1.9 cm                LV Ejection Fraction MOD BP       65 %                  >= 55  % LV Ejection Fraction MOD 4C       70.6 %                LV Ejection Fraction MOD 2C       55.6 %                IVC Diameter                      0.48 cm               DOPPLER AV Peak Velocity                  1.5 m/s               AV Peak Gradient                  9.4 mmHg              AV Mean Gradient                  4.9 mmHg              LVOT Peak Velocity                0.89 m/s              AV Area Cont Eq vti               1.8 cm2               Mitral E Point Velocity           0.59 m/s              Mitral E to A Ratio               0.71                  MV Pressure Half Time             29.2 ms               MV Area PHT                       7.5 cm2               MV Deceleration Time              101 ms                TR Peak Velocity                  221 cm/s              PV Peak Velocity                  0.82 m/s              PV Peak Gradient                  2.7 mmHg              RVOT Peak Velocity                0.76 m/s              * Indicates values subject to auto-interpretation LV EF:        % FINDINGS Left Ventricle Normal left ventricular size, thickness, systolic function, and diastolic function. Left ventricular ejection fraction is visually estimated to be 65%. Normal regional wall motion. Right Ventricle Normal right ventricular size and systolic function. Right Atrium Normal right atrial size. Normal inferior vena cava size and inspiratory collapse. Left Atrium Normal left atrial size. Left atrial volume index is 11  mL/sq m. Mitral Valve Structurally normal mitral valve without significant stenosis or regurgitation. Aortic Valve Structurally normal aortic valve without significant stenosis or regurgitation. Tricuspid Valve Structurally normal tricuspid valve. Trace tricuspid regurgitation. Right ventricular systolic pressure is estimated to be 24 mmHg. Right atrial pressure is estimated to be 3 mmHg.  Pulmonic Valve The pulmonic valve is not well visualized. Pericardium Normal pericardium without effusion. Aorta Normal aortic root for body surface area. Ascending aorta diameter is 2.7 normal aortic root for body surface area. cm. Jensen Purdy MD (Electronically Signed) Final Date:     23 May 2021 16:26      Micro:  Results     Procedure Component Value Units Date/Time    URINE CULTURE(NEW) [[id number]] Collected: 05/22/21 1600    Order Status: Completed Specimen: Urine Updated: 05/24/21 0826     Significant Indicator NEG     Source UR     Site -     Culture Result Usual urogenital jackelyn 10-50,000 cfu/mL    Narrative:      Indication for culture:->Evaluation for sepsis without a  clear source of infection  Indication for culture:->Evaluation for sepsis without a    CULTURE STOOL [[id number]] Collected: 05/22/21 0338    Order Status: Completed Specimen: Stool Updated: 05/24/21 0351    GRAM STAIN [[id number]] Collected: 05/23/21 1620    Order Status: Completed Specimen: Respirate Updated: 05/23/21 1902     Significant Indicator .     Source RESP     Site Sputum     Gram Stain Result Specimen Quality Score: 2+  Many WBCs.  Few epithelial cells.  Many mixed bacteria, no predominant organism seen.      MRSA By PCR (Amp) [[id number]] Collected: 05/23/21 1620    Order Status: Completed Specimen: Respirate from Nares Updated: 05/23/21 1902     Significant Indicator NEG     Source RESP     Site NARES     MRSA PCR Negative for MRSA by PCR.    Narrative:      UTILIZATION ALERT: Has Flu/RSV/COVID PCR testing been  performed, resulted reviewed, and consulted with Infectious  Diseases or PICU Provider Teams?->Yes  Have you been in close contact with a person who is suspected  or known to be positive for COVID-19 within the last 30 days  (e.g. last seen that person < 30 days ago)->Unknown  Sputum cultures, induced if needed. If not done within the  last 24 hours  FENa = (UrineNa / SerumNA) / (UrineCr / SerumCr) *100  Sputum  "cultures, induced if needed. If not done within the    CULTURE RESPIRATORY W/ GRM STN [[id number]] Collected: 05/23/21 1620    Order Status: Completed Specimen: Respirate Updated: 05/23/21 1902     Significant Indicator NEG     Source RESP     Site Sputum     Culture Result -     Gram Stain Result Specimen Quality Score: 2+  Many WBCs.  Few epithelial cells.  Many mixed bacteria, no predominant organism seen.      Culture Respiratory W/ GRM STN [[id number]] Collected: 05/23/21 1620    Order Status: Canceled Specimen: Other from Sputum     SARS-CoV-2 PCR (24 hour In-House): Collect NP swab in VTM [[id number]] Collected: 05/22/21 1423    Order Status: Completed Specimen: Respirate Updated: 05/23/21 1036     SARS-CoV-2 Source NP Swab     SARS-CoV-2 by PCR NotDetected     Comment: PATIENTS: Important information regarding your results and instructions can  be found at [email or web address]   \"After your  Covid-19 Test\"    [facility] providers: PLEASE REFER TO DE-ESCALATION AND RETESTING PROTOCOL  on [email or web address]    **The Roche SARS-CoV-2 RT-PCR test has been made available for use under the  Emergency Use Authorization (EUA) only.         Narrative:      Have you been in close contact with a person who is suspected  or known to be positive for COVID-19 within the last 30 days  (e.g. last seen that person < 30 days ago)->No    BLOOD CULTURE [[id number]] Collected: 05/22/21 1132    Order Status: Completed Specimen: Blood from Peripheral Updated: 05/23/21 0721     Significant Indicator NEG     Source BLD     Site PERIPHERAL     Culture Result No Growth  Note: Blood cultures are incubated for 5 days and  are monitored continuously.Positive blood cultures  are called to the RN and reported as soon as  they are identified.      Narrative:      Per Hospital Policy: Only change Specimen Src: to \"Line\" if  specified by physician order.  No site indicated    Blood Culture [[id number]] Collected: 05/22/21 " "1645    Order Status: Completed Specimen: Blood from Peripheral Updated: 05/23/21 0721     Significant Indicator NEG     Source BLD     Site PERIPHERAL     Culture Result No Growth  Note: Blood cultures are incubated for 5 days and  are monitored continuously.Positive blood cultures  are called to the RN and reported as soon as  they are identified.      Narrative:      From different peripheral sites, if not done within the last  24 hours (Per Hospital Policy: Only change specimen source to  \"Line\" if specified by physician order)  Right AC    Blood Culture [[id number]] Collected: 05/22/21 1658    Order Status: Completed Specimen: Blood from Peripheral Updated: 05/23/21 0721     Significant Indicator NEG     Source BLD     Site PERIPHERAL     Culture Result No Growth  Note: Blood cultures are incubated for 5 days and  are monitored continuously.Positive blood cultures  are called to the RN and reported as soon as  they are identified.      Narrative:      From different peripheral sites, if not done within the last  24 hours (Per Hospital Policy: Only change specimen source to  \"Line\" if specified by physician order)  Left Forearm/Arm    BLOOD CULTURE [[id number]] Collected: 05/22/21 1132    Order Status: Completed Specimen: Blood from Peripheral Updated: 05/23/21 0721     Significant Indicator NEG     Source BLD     Site PERIPHERAL     Culture Result No Growth  Note: Blood cultures are incubated for 5 days and  are monitored continuously.Positive blood cultures  are called to the RN and reported as soon as  they are identified.      Narrative:      Per Hospital Policy: Only change Specimen Src: to \"Line\" if  specified by physician order.  No site indicated    URINALYSIS [[id number]]  (Abnormal) Collected: 05/22/21 1600    Order Status: Completed Specimen: Urine Updated: 05/22/21 1650     Color Yellow     Character Clear     Specific Gravity 1.042     Ph 7.0     Glucose Negative mg/dL      Ketones Negative mg/dL      " Protein Negative mg/dL      Bilirubin Negative     Urobilinogen, Urine 1.0     Nitrite Negative     Leukocyte Esterase Small     Occult Blood Negative     Micro Urine Req Microscopic    Narrative:      Indication for culture:->Evaluation for sepsis without a  clear source of infection    Blood Culture [[id number]]     Order Status: Canceled Specimen: Blood from Peripheral     Blood Culture [[id number]]     Order Status: Canceled Specimen: Blood from Peripheral           Assessment:  Active Hospital Problems    Diagnosis    • Necrotizing pneumonia (HCC) [J85.0]    • Sepsis (HCC) [A41.9]    • Leukocytosis [D72.829]    • Thrombocytosis (HCC) [D47.3]    • Smoking [F17.200]    • Alcohol abuse [F10.10]    • Acute kidney injury (HCC) [N17.9]    • Hypotension [I95.9]    • Hypokalemia [E87.6]    • Hyponatremia [E87.1]    • Diarrhea [R19.7]      Interval 24 hours:      AF, O2 3 L nasal cannula but satting 99%  Labs reviewed  Micro reviewed    Patient continues to have productive cough is requiring 3 L oxygen although she is satting well.  Patient did on Unasyn as below.    Assessment:  65 y.o.  admitted 5/22/2021. Pt has a past medical history of alcohol abuse and tobacco abuse.  She reports that approximately 10 days ago she developed a productive cough and shortness of breath which was progressive.       Hospital Course:   She has been afebrile, hypotensive and on 3 L nasal cannula.  Significant leukocytosis continues.  Cultures were obtained as well as CT chest which found a significant right-sided necrotizing pneumonia.     Problem List      Necrotizing pneumonia  -Concern for aspiration particularly in the setting of heavy alcohol use  -CT chest on 5/23 with necrotizing right lower lobe pneumonia and loculated air-fluid collection thought to represent abscess.  To note emphysematous changes.  -HIV negative  -MRSA nares negative  -Respiratory culture with no predominant pathogen  -Respiratory panel and COVID-19  negative  -Procalcitonin 1.47 on 5/23  Leukocytosis, ongoing but improved today  Thrombocytosis  Hypotension, since admit may be at baseline      Plan      --- Continue Unasyn will stop linezolid -  anticipate she will need multiple weeks of antibiotics.  IV for now but once improved can likely transition to Augmentin 875/125 BID for 4-6 weeks and then repeat CT chest as an outpatient  --- Follow-up Legionella urinary antigen -in process  --- Monitor labs  --- If recurrence of diarrhea obtain C. difficile testing  --- Monitor for alcohol withdrawal  --- Encouraged continuing cessation of smoking        Plan of care discussed with Dr. Lopez.  Will continue to follow.

## 2021-05-24 NOTE — CARE PLAN
Problem: Pain - Standard  Goal: Alleviation of pain or a reduction in pain to the patient’s comfort goal  5/23/2021 1808 by Tom Bell R.N.  Outcome: Progressing  5/23/2021 0750 by Tom Bell R.N.  Outcome: Progressing     Problem: Knowledge Deficit - Standard  Goal: Patient and family/care givers will demonstrate understanding of plan of care, disease process/condition, diagnostic tests and medications  5/23/2021 1808 by Tom Bell R.N.  Outcome: Progressing  5/23/2021 0750 by Tom Bell R.N.  Outcome: Progressing     Problem: Fluid Volume  Goal: Fluid volume balance will be maintained  5/23/2021 1808 by Tom Bell R.N.  Outcome: Progressing  5/23/2021 0750 by Tom Bell R.N.  Outcome: Progressing     Problem: Urinary - Renal Perfusion  Goal: Ability to achieve and maintain adequate renal perfusion and functioning will improve  5/23/2021 1808 by Tom Bell R.N.  Outcome: Progressing  5/23/2021 0750 by Tom Bell R.N.  Outcome: Progressing     Ongoing care no acute issues at this time. Continue ABX as ordered. Patient resting in bed safely with no c/o anything at this time. Will continue care and monitoring as ordered.

## 2021-05-24 NOTE — PROGRESS NOTES
Bedside report received from day nurse. Assumed care of patient. Pt. resting comfortably without any sign of distress. A&Ox4, VSS, on 3L NC, no complaints at this time. POC reviewed and white board updated, Tele box on, Call light in reach, Bed locked in lowest position.

## 2021-05-24 NOTE — CARE PLAN
The patient is Watcher - Medium risk of patient condition declining or worsening         Progress made toward(s) clinical / shift goals:  ADLs     Patient is not progressing towards the following goals:      Problem: Fluid Volume  Goal: Fluid volume balance will be maintained  Outcome: Progressing   Educated pt on the purpose of IV fluids to remain hydrated      Problem: Respiratory  Goal: Patient will achieve/maintain optimum respiratory ventilation and gas exchange  Outcome: Progressing     Problem: Communication  Goal: The ability to communicate needs accurately and effectively will improve  Outcome: Progressing     Problem: Self Care  Goal: Patient will have the ability to perform ADLs independently or with assistance (bathe, groom, dress, toilet and feed)  Outcome: Progressing    Educated pt on the importance of completing oral care as pt already has pneumonia.

## 2021-05-24 NOTE — PROGRESS NOTES
Rechecked BP as per orders from MD Rae. New BP of 83/56, MAP 65. Pt asymptomatic. Still running continuous NS at 125ml/hr. Kyra updated on new BP. New orders to increase NS to 150ml/hr.

## 2021-05-24 NOTE — PROGRESS NOTES
Paged hospitalist regarding BP of 81/56, MAP of 64. Pt asymptomatic and runs low according to her and previous vitals recorded. Currently running NS at 125ml/hr. Orders to recheck in 30 minutes and page back if it is still low.

## 2021-05-25 LAB
ANION GAP SERPL CALC-SCNC: 8 MMOL/L (ref 7–16)
BACTERIA SPEC RESP CULT: NORMAL
BUN SERPL-MCNC: 11 MG/DL (ref 8–22)
CALCIUM SERPL-MCNC: 8.7 MG/DL (ref 8.5–10.5)
CHLORIDE SERPL-SCNC: 106 MMOL/L (ref 96–112)
CO2 SERPL-SCNC: 23 MMOL/L (ref 20–33)
CREAT SERPL-MCNC: 0.8 MG/DL (ref 0.5–1.4)
ERYTHROCYTE [DISTWIDTH] IN BLOOD BY AUTOMATED COUNT: 56.2 FL (ref 35.9–50)
GLUCOSE SERPL-MCNC: 86 MG/DL (ref 65–99)
GRAM STN SPEC: NORMAL
HCT VFR BLD AUTO: 24.4 % (ref 37–47)
HGB BLD-MCNC: 8 G/DL (ref 12–16)
MAGNESIUM SERPL-MCNC: 1.2 MG/DL (ref 1.5–2.5)
MCH RBC QN AUTO: 35.9 PG (ref 27–33)
MCHC RBC AUTO-ENTMCNC: 32.8 G/DL (ref 33.6–35)
MCV RBC AUTO: 109.4 FL (ref 81.4–97.8)
PLATELET # BLD AUTO: 652 K/UL (ref 164–446)
PMV BLD AUTO: 9.8 FL (ref 9–12.9)
POTASSIUM SERPL-SCNC: 3.7 MMOL/L (ref 3.6–5.5)
RBC # BLD AUTO: 2.23 M/UL (ref 4.2–5.4)
SIGNIFICANT IND 70042: NORMAL
SITE SITE: NORMAL
SODIUM SERPL-SCNC: 137 MMOL/L (ref 135–145)
SOURCE SOURCE: NORMAL
WBC # BLD AUTO: 15.6 K/UL (ref 4.8–10.8)

## 2021-05-25 PROCEDURE — 83735 ASSAY OF MAGNESIUM: CPT

## 2021-05-25 PROCEDURE — 700105 HCHG RX REV CODE 258: Performed by: INTERNAL MEDICINE

## 2021-05-25 PROCEDURE — A9270 NON-COVERED ITEM OR SERVICE: HCPCS | Performed by: INTERNAL MEDICINE

## 2021-05-25 PROCEDURE — 770020 HCHG ROOM/CARE - TELE (206)

## 2021-05-25 PROCEDURE — 80048 BASIC METABOLIC PNL TOTAL CA: CPT

## 2021-05-25 PROCEDURE — 700102 HCHG RX REV CODE 250 W/ 637 OVERRIDE(OP): Performed by: INTERNAL MEDICINE

## 2021-05-25 PROCEDURE — 36415 COLL VENOUS BLD VENIPUNCTURE: CPT

## 2021-05-25 PROCEDURE — 700111 HCHG RX REV CODE 636 W/ 250 OVERRIDE (IP): Performed by: INTERNAL MEDICINE

## 2021-05-25 PROCEDURE — 99232 SBSQ HOSP IP/OBS MODERATE 35: CPT | Performed by: INTERNAL MEDICINE

## 2021-05-25 PROCEDURE — 85027 COMPLETE CBC AUTOMATED: CPT

## 2021-05-25 RX ORDER — MAGNESIUM SULFATE HEPTAHYDRATE 40 MG/ML
2 INJECTION, SOLUTION INTRAVENOUS ONCE
Status: COMPLETED | OUTPATIENT
Start: 2021-05-25 | End: 2021-05-25

## 2021-05-25 RX ADMIN — ENOXAPARIN SODIUM 40 MG: 40 INJECTION SUBCUTANEOUS at 05:07

## 2021-05-25 RX ADMIN — AMPICILLIN SODIUM AND SULBACTAM SODIUM 3 G: 2; 1 INJECTION, POWDER, FOR SOLUTION INTRAMUSCULAR; INTRAVENOUS at 17:06

## 2021-05-25 RX ADMIN — AMPICILLIN SODIUM AND SULBACTAM SODIUM 3 G: 2; 1 INJECTION, POWDER, FOR SOLUTION INTRAMUSCULAR; INTRAVENOUS at 11:22

## 2021-05-25 RX ADMIN — AMPICILLIN SODIUM AND SULBACTAM SODIUM 3 G: 2; 1 INJECTION, POWDER, FOR SOLUTION INTRAMUSCULAR; INTRAVENOUS at 05:08

## 2021-05-25 RX ADMIN — THIAMINE HCL TAB 100 MG 100 MG: 100 TAB at 05:07

## 2021-05-25 RX ADMIN — MAGNESIUM SULFATE 2 G: 2 INJECTION INTRAVENOUS at 08:15

## 2021-05-25 RX ADMIN — SODIUM CHLORIDE: 9 INJECTION, SOLUTION INTRAVENOUS at 16:46

## 2021-05-25 ASSESSMENT — PAIN DESCRIPTION - PAIN TYPE: TYPE: ACUTE PAIN

## 2021-05-25 ASSESSMENT — ENCOUNTER SYMPTOMS
PHOTOPHOBIA: 0
NECK PAIN: 0
ORTHOPNEA: 0
SHORTNESS OF BREATH: 1
HEMOPTYSIS: 0
MYALGIAS: 0
SPUTUM PRODUCTION: 1
SPEECH CHANGE: 0
COUGH: 1
WEIGHT LOSS: 0
HALLUCINATIONS: 0
FOCAL WEAKNESS: 0
TINGLING: 0
EYE PAIN: 0
DIARRHEA: 0
SENSORY CHANGE: 0
BACK PAIN: 0
ABDOMINAL PAIN: 0
TREMORS: 0
CONSTIPATION: 0

## 2021-05-25 ASSESSMENT — FIBROSIS 4 INDEX: FIB4 SCORE: 0.91

## 2021-05-25 ASSESSMENT — LIFESTYLE VARIABLES: SUBSTANCE_ABUSE: 0

## 2021-05-25 NOTE — CARE PLAN
Problem: Respiratory  Goal: Patient will achieve/maintain optimum respiratory ventilation and gas exchange  Outcome: Progressing     Problem: Fall Risk  Goal: Patient will remain free from falls  Outcome: Progressing   The patient is Stable - Low risk of patient condition declining or worsening    Shift Goals  Clinical Goals: Patient will remain in the hospital unil medically cleared  Patient Goals: Patient will sleep comfrotably     Progress made toward(s) clinical / shift goals:  Titrated oxygen down to 1L. Patient tolerating ambulation to bathroom well    Patient is not progressing towards the following goals:

## 2021-05-25 NOTE — PROGRESS NOTES
"Hospital Medicine Daily Progress Note    Date of Service  5/24/2021    Chief Complaint  65 y.o. female presented 5/22/2021 with cough, weakness and diarrhea    Hospital Course  As per Dr. Melendez note    \"65 y.o. F with past  hypertension, alcohol abuse, smoking cigarettes presented 5/22/2021 with complaints of cough productive of brown sputum for 1-1/2 weeks, exertional shortness of breath, generalized weakness, orthostatic lightheadedness, loss of bowel movements 1 times a day in the last 10 days. Patient states she quit drinking about 12 days ago, she used to drink 3 strong alcoholic beverages a day.  She denies exposure to people with tuberculosis.  According to her, she got COVID-19 vaccination in April.  Denies travel outside [place].  No sick contacts reported.  Denies history of seizure or loss of consciousness in the last month.  Denies dysphagia.  Patient presented hypotensive 86/50, heart rate 110, respiratory rate 24. Blood pressure improved to 91/63 after 30 cc/kg sepsis bolus.  CT showed concern for right lower lobe abscess.\"    Pt was then admitted for management of sepsis 2/2 necrotizing pneumonia. IV Abx started on admission\".     Infectious disease consulted and she has been receiving IV antibiotics.     Interval Problem Update  I evaluated and examined her at the bedside  She reported that she is feeling better.  She continued to have productive cough.  Continue IV antibiotic as per infectious disease per  Discussed plan of care with her and answered all her questions    Consultants/Specialty  ID    Code Status  Full Code    Disposition  TBD   Likely home when medically cleared.        Review of Systems  Review of Systems   Constitutional: Positive for malaise/fatigue. Negative for chills, fever and weight loss.   HENT: Negative for hearing loss and tinnitus.    Eyes: Negative for blurred vision, double vision, photophobia and pain.   Respiratory: Positive for cough, sputum production and shortness of " breath. Negative for hemoptysis.    Cardiovascular: Negative for chest pain, palpitations, orthopnea and leg swelling.   Gastrointestinal: Negative for abdominal pain, constipation, diarrhea, nausea and vomiting.   Genitourinary: Negative for dysuria, frequency and urgency.   Musculoskeletal: Negative for back pain, joint pain, myalgias and neck pain.   Skin: Negative for rash.   Neurological: Negative for dizziness, tingling, tremors, sensory change, speech change, focal weakness and headaches.   Psychiatric/Behavioral: Negative for hallucinations and substance abuse.   All other systems reviewed and are negative.       Physical Exam  Temp:  [36.3 °C (97.3 °F)-37 °C (98.6 °F)] 36.4 °C (97.6 °F)  Pulse:  [71-88] 77  Resp:  [14-18] 18  BP: ()/(54-61) 96/61  SpO2:  [96 %-100 %] 99 %    Physical Exam  Vitals reviewed.   Constitutional:       General: She is not in acute distress.     Appearance: Normal appearance. She is not ill-appearing.   HENT:      Head: Normocephalic and atraumatic.      Nose: No congestion.   Eyes:      General:         Right eye: No discharge.         Left eye: No discharge.      Pupils: Pupils are equal, round, and reactive to light.   Cardiovascular:      Rate and Rhythm: Normal rate and regular rhythm.      Pulses: Normal pulses.      Heart sounds: Normal heart sounds. No murmur heard.     Pulmonary:      Effort: Pulmonary effort is normal. No respiratory distress.      Breath sounds: No stridor. Rales present.   Abdominal:      General: Bowel sounds are normal. There is no distension.      Palpations: Abdomen is soft.      Tenderness: There is no abdominal tenderness.   Musculoskeletal:         General: No swelling or tenderness. Normal range of motion.      Cervical back: Normal range of motion. No rigidity.   Skin:     General: Skin is warm.      Capillary Refill: Capillary refill takes less than 2 seconds.      Coloration: Skin is not jaundiced or pale.      Findings: No bruising.    Neurological:      General: No focal deficit present.      Mental Status: She is alert and oriented to person, place, and time.      Cranial Nerves: No cranial nerve deficit.   Psychiatric:         Mood and Affect: Mood normal.         Behavior: Behavior normal.         Fluids  No intake or output data in the 24 hours ending 05/24/21 1801    Laboratory  Recent Labs     05/22/21  1645 05/23/21  0230 05/24/21  0329   WBC 21.0* 24.8* 17.6*   RBC 2.74* 2.36* 2.30*   HEMOGLOBIN 9.7* 8.5* 8.4*   HEMATOCRIT 29.3* 24.9* 24.9*   .9* 105.5* 108.3*   MCH 35.4* 36.0* 36.5*   MCHC 33.1* 34.1 33.7   RDW 54.3* 51.9* 54.5*   PLATELETCT 812* 690* 654*   MPV 10.2 10.0 9.8     Recent Labs     05/22/21  2304 05/23/21  1351 05/24/21  0329   SODIUM 131* 135 136   POTASSIUM 3.5* 3.2* 3.3*   CHLORIDE 98 99 105   CO2 20 21 23   GLUCOSE 74 108* 88   BUN 36* 29* 23*   CREATININE 0.93 0.98 0.87   CALCIUM 8.6 9.6 8.5     Recent Labs     05/22/21  1645   INR 1.19*               Imaging  EC-ECHOCARDIOGRAM COMPLETE W/O CONT   Final Result      US-RENAL   Final Result         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      No renal calculus. No hydronephrosis.      CT-CHEST (THORAX) WITH   Final Result         1.  Necrotizing right lower lobe pneumonia with loculated air-fluid collection which may represent abscess. Recommend follow-up after treatment to ensure resolution and exclude possibility of malignancy.   2.  Mildly prominent mediastinal lymph nodes are most likely reactive.   3.  Emphysematous changes.   4.  No pleural effusion.   5.  Hepatic steatosis/chronic liver disease.      Fleischner Society pulmonary nodule recommendations:         DX-CHEST-PORTABLE (1 VIEW)   Final Result         Airspace opacity with cavitary change in the right middle lobe, concerning for necrotizing pneumonia. The differential includes necrotic mass. Further evaluation with CT is recommended.           Assessment/Plan  Necrotizing  pneumonia (HCC)  Assessment & Plan  CT chest: Necrotizing right lower lobe pneumonia with loculated air-fluid collection which may represent abscess. Recommend follow-up after treatment to ensure resolution and exclude possibility of malignancy  Probably secondary to aspiration?  Started empiric antibiotics Unasyn and vancomycin; vancomycin switched to linezolid  Will check MRSA screen and will consider to discontinue linezolid  Blood culture, sputum culture ordered  Legionella urine antigen, considering concomitant diarrhea    Continue IV antibiotic as per infectious disease recommendations    Sepsis (HCC)  Assessment & Plan  This is Sepsis Present on admission  SIRS criteria identified on my evaluation include: Tachycardia, with heart rate greater than 90 BPM and Tachypnea, with respirations greater than 20 per minute  Source is necrotizing pneumonia  Suspected onset of infection (date and time) 5/12/2021  Sepsis protocol initiated  Fluid resuscitation ordered per protocol  IV antibiotics as appropriate for source of sepsis  While organ dysfunction may be noted elsewhere in this problem list or in the chart, degree of organ dysfunction does not meet CMS criteria for severe sepsis      Now resolved.    Thrombocytosis (HCC)- (present on admission)  Assessment & Plan  Likely reactive from infection  Trending down.  Continue to monitor.    Diarrhea  Assessment & Plan  Denies, recent exposure to antibiotics  States that bowel movements are loose to watery only once a day in the last 10 days  We will check stool culture, Legionella urine antigen.  If continues/worsen, will consider C. difficile rule out    Hyponatremia  Assessment & Plan  Now resolved.    Hypokalemia  Assessment & Plan  Replaced p.o.  Will monitor    Hypotension  Assessment & Plan  Secondary to sepsis versus diarrhea versus losartan  Hold losartan  Status post 30 cc/kg bolus, continue IV hydration  She is asymptomatic    Acute kidney injury  (HCC)  Assessment & Plan  Holding losartan on admission  Responded well to IVF  Avoid nephrotoxins  Monitor kidney function    Resolved    Alcohol abuse  Assessment & Plan  Quit drinking 12 days ago  Thiamine supplementation  Monitor for withdrawal    Smoking- (present on admission)  Assessment & Plan  Smoking cessation counseling  Nicotine patch       I discussed plan of care during multidisciplinary rounds regarding her current medical condition and discharge plan.    VTE prophylaxis: Enoxaparin

## 2021-05-25 NOTE — PROGRESS NOTES
"Hospital Medicine Daily Progress Note    Date of Service  5/25/2021    Chief Complaint  65 y.o. female presented 5/22/2021 with cough, weakness and diarrhea    Hospital Course  As per Dr. Melendez note    \"65 y.o. F with past  hypertension, alcohol abuse, smoking cigarettes presented 5/22/2021 with complaints of cough productive of brown sputum for 1-1/2 weeks, exertional shortness of breath, generalized weakness, orthostatic lightheadedness, loss of bowel movements 1 times a day in the last 10 days. Patient states she quit drinking about 12 days ago, she used to drink 3 strong alcoholic beverages a day.  She denies exposure to people with tuberculosis.  According to her, she got COVID-19 vaccination in April.  Denies travel outside [place].  No sick contacts reported.  Denies history of seizure or loss of consciousness in the last month.  Denies dysphagia.  Patient presented hypotensive 86/50, heart rate 110, respiratory rate 24. Blood pressure improved to 91/63 after 30 cc/kg sepsis bolus.  CT showed concern for right lower lobe abscess.\"    Pt was then admitted for management of sepsis 2/2 necrotizing pneumonia. IV Abx started on admission\".     Infectious disease consulted and she has been receiving IV antibiotics.     Interval Problem Update  I evaluated and examined her at the bedside  She reported that she is feeling better.  She continued to have productive cough.  Continue IV antibiotic as per infectious disease per  Discussed plan of care with her and answered all her questions    5/25: The patient is only on 1 L oxygen.  Noticed infectious disease recommendation.  We will continue with antibiotic and titrate oxygen as needed.  Consultants/Specialty  ID    Code Status  Full Code    Disposition  TBD   Likely home when medically cleared.        Review of Systems  Review of Systems   Constitutional: Positive for malaise/fatigue. Negative for weight loss.   HENT: Negative for hearing loss and tinnitus.    Eyes: " Negative for photophobia and pain.   Respiratory: Positive for cough, sputum production and shortness of breath. Negative for hemoptysis.    Cardiovascular: Negative for orthopnea and leg swelling.   Gastrointestinal: Negative for abdominal pain, constipation and diarrhea.   Genitourinary: Negative for frequency and urgency.   Musculoskeletal: Negative for back pain, joint pain, myalgias and neck pain.   Skin: Negative for rash.   Neurological: Negative for tingling, tremors, sensory change, speech change and focal weakness.   Psychiatric/Behavioral: Negative for hallucinations and substance abuse.   All other systems reviewed and are negative.       Physical Exam  Temp:  [36.2 °C (97.1 °F)-36.8 °C (98.2 °F)] 36.4 °C (97.6 °F)  Pulse:  [77-93] 78  Resp:  [17-18] 17  BP: (85-99)/(50-69) 96/64  SpO2:  [92 %-99 %] 97 %    Physical Exam  Vitals reviewed.   Constitutional:       General: She is not in acute distress.     Appearance: Normal appearance. She is not ill-appearing.   HENT:      Head: Normocephalic and atraumatic.      Nose: No congestion.   Eyes:      Pupils: Pupils are equal, round, and reactive to light.   Cardiovascular:      Rate and Rhythm: Normal rate and regular rhythm.      Pulses: Normal pulses.      Heart sounds: Normal heart sounds. No murmur heard.     Pulmonary:      Effort: Pulmonary effort is normal. No respiratory distress.      Breath sounds: Rales present.   Abdominal:      General: Bowel sounds are normal. There is no distension.      Palpations: Abdomen is soft.   Musculoskeletal:         General: No swelling. Normal range of motion.      Cervical back: Normal range of motion. No rigidity.   Skin:     General: Skin is warm.      Capillary Refill: Capillary refill takes less than 2 seconds.      Coloration: Skin is not jaundiced.   Neurological:      General: No focal deficit present.      Mental Status: She is alert.      Cranial Nerves: No cranial nerve deficit.         Fluids  No intake  or output data in the 24 hours ending 05/25/21 0751    Laboratory  Recent Labs     05/23/21  0230 05/24/21  0329 05/25/21  0245   WBC 24.8* 17.6* 15.6*   RBC 2.36* 2.30* 2.23*   HEMOGLOBIN 8.5* 8.4* 8.0*   HEMATOCRIT 24.9* 24.9* 24.4*   .5* 108.3* 109.4*   MCH 36.0* 36.5* 35.9*   MCHC 34.1 33.7 32.8*   RDW 51.9* 54.5* 56.2*   PLATELETCT 690* 654* 652*   MPV 10.0 9.8 9.8     Recent Labs     05/23/21  1351 05/24/21  0329 05/25/21  0245   SODIUM 135 136 137   POTASSIUM 3.2* 3.3* 3.7   CHLORIDE 99 105 106   CO2 21 23 23   GLUCOSE 108* 88 86   BUN 29* 23* 11   CREATININE 0.98 0.87 0.80   CALCIUM 9.6 8.5 8.7     Recent Labs     05/22/21  1645   INR 1.19*               Imaging  EC-ECHOCARDIOGRAM COMPLETE W/O CONT   Final Result      US-RENAL   Final Result         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      No renal calculus. No hydronephrosis.      CT-CHEST (THORAX) WITH   Final Result         1.  Necrotizing right lower lobe pneumonia with loculated air-fluid collection which may represent abscess. Recommend follow-up after treatment to ensure resolution and exclude possibility of malignancy.   2.  Mildly prominent mediastinal lymph nodes are most likely reactive.   3.  Emphysematous changes.   4.  No pleural effusion.   5.  Hepatic steatosis/chronic liver disease.      Fleischner Society pulmonary nodule recommendations:         DX-CHEST-PORTABLE (1 VIEW)   Final Result         Airspace opacity with cavitary change in the right middle lobe, concerning for necrotizing pneumonia. The differential includes necrotic mass. Further evaluation with CT is recommended.           Assessment/Plan  Necrotizing pneumonia (HCC)  Assessment & Plan  CT chest: Necrotizing right lower lobe pneumonia with loculated air-fluid collection which may represent abscess. Recommend follow-up after treatment to ensure resolution and exclude possibility of malignancy  Probably secondary to aspiration?  Started empiric  antibiotics Unasyn and vancomycin initially  Blood culture, sputum culture ordered  Legionella urine antigen, considering concomitant diarrhea  ID consulted and recommending IV Unasyn only at this point      Sepsis (HCC)  Assessment & Plan  This is Sepsis Present on admission  SIRS criteria identified on my evaluation include: Tachycardia, with heart rate greater than 90 BPM and Tachypnea, with respirations greater than 20 per minute  Source is necrotizing pneumonia  Suspected onset of infection (date and time) 5/12/2021  Sepsis protocol initiated  Fluid resuscitation ordered per protocol  IV antibiotics as appropriate for source of sepsis  While organ dysfunction may be noted elsewhere in this problem list or in the chart, degree of organ dysfunction does not meet CMS criteria for severe sepsis      Now resolved.    Thrombocytosis (HCC)- (present on admission)  Assessment & Plan  Likely reactive from infection  Trending down.  Continue to monitor.    Diarrhea  Assessment & Plan  Denies, recent exposure to antibiotics  States that bowel movements are loose to watery only once a day in the last 10 days  We will check stool culture, Legionella urine antigen.  If continues/worsen, will consider C. difficile rule out    Hyponatremia  Assessment & Plan  Now resolved.    Hypokalemia  Assessment & Plan  Replaced p.o.  Will monitor    Hypotension  Assessment & Plan  Secondary to sepsis versus diarrhea versus losartan  Hold losartan  Status post 30 cc/kg bolus, continue IV hydration  She is asymptomatic  Improving    Acute kidney injury (HCC)  Assessment & Plan  Holding losartan on admission  Responded well to IVF  Avoid nephrotoxins  Monitor kidney function    Resolved    Alcohol abuse  Assessment & Plan  Quit drinking 12 days ago  Thiamine supplementation  Monitor for withdrawal    Smoking- (present on admission)  Assessment & Plan  Smoking cessation counseling  Nicotine patch       I discussed plan of care during  multidisciplinary rounds regarding her current medical condition and discharge plan.    VTE prophylaxis: Enoxaparin

## 2021-05-25 NOTE — PROGRESS NOTES
Received bedside report from RN, pt care assumed, VS stable, pt assessment complete. Pt AAOx4, c/o 0/10 pain at this time. No signs of acute distress noted at this time. POC discussed with pt and verbalizes no questions. Pt denies any additional needs at this time. Bed in lowest position, bed alarm on. Pt educated on fall risk and verbalized understanding, call light within reach, hourly rounding initiated.

## 2021-05-25 NOTE — PROGRESS NOTES
"Assumed care of patient at 1915, received bedside report from day shift RN. Bed is locked and in lowest position with call light within reach. Treaded socks in place. Patient updated on plan of care, no complaints or pain at this time. White board updated. Pt A&Ox4. Patient's breathing pattern is unlabored on 2L NC. Tele monitor in place and cardiac rhythm being monitored.     Pt states: \"I want to leave because I am sick of being here.\" Patient was educated on the importance of finishing her antibiotic therapy and getting more test results interpreted. Patient states she understands and is resting comfortably.  "

## 2021-05-25 NOTE — CARE PLAN
The patient is Stable - Low risk of patient condition declining or worsening    Shift Goals  Clinical Goals: Patient will remain in the hospital unil medically cleared  Patient Goals: Patient will sleep comfrotably     Progress made toward(s) clinical / shift goals:  Patient pulled off her tele box and IV and stated she wanted to leave the hospital. Patient was educated about the importance of finishing abx therapy. Patient is easy to redirect. Since then, patient has been resting comfortably.     Patient is not progressing towards the following goals:N/A      Problem: Psychosocial  Goal: Patient's level of anxiety will decrease  5/25/2021 0208 by Jaye Polanco R.N.  Outcome: Progressing    Problem: Communication  Goal: The ability to communicate needs accurately and effectively will improve  5/25/2021 0208 by Jaye Polanco R.N.  Outcome: Progressing

## 2021-05-26 LAB
ALBUMIN SERPL BCP-MCNC: 1.9 G/DL (ref 3.2–4.9)
ALBUMIN/GLOB SERPL: 0.5 G/DL
ALP SERPL-CCNC: 150 U/L (ref 30–99)
ALT SERPL-CCNC: 12 U/L (ref 2–50)
ANION GAP SERPL CALC-SCNC: 9 MMOL/L (ref 7–16)
ANISOCYTOSIS BLD QL SMEAR: ABNORMAL
AST SERPL-CCNC: 21 U/L (ref 12–45)
BACTERIA STL CULT: NORMAL
BASOPHILS # BLD AUTO: 0 % (ref 0–1.8)
BASOPHILS # BLD: 0 K/UL (ref 0–0.12)
BILIRUB SERPL-MCNC: 0.4 MG/DL (ref 0.1–1.5)
BUN SERPL-MCNC: 9 MG/DL (ref 8–22)
C JEJUNI+C COLI AG STL QL: NORMAL
CALCIUM SERPL-MCNC: 8.6 MG/DL (ref 8.5–10.5)
CHLORIDE SERPL-SCNC: 110 MMOL/L (ref 96–112)
CO2 SERPL-SCNC: 20 MMOL/L (ref 20–33)
CREAT SERPL-MCNC: 0.7 MG/DL (ref 0.5–1.4)
EOSINOPHIL # BLD AUTO: 0 K/UL (ref 0–0.51)
EOSINOPHIL NFR BLD: 0 % (ref 0–6.9)
ERYTHROCYTE [DISTWIDTH] IN BLOOD BY AUTOMATED COUNT: 55.8 FL (ref 35.9–50)
GLOBULIN SER CALC-MCNC: 3.7 G/DL (ref 1.9–3.5)
GLUCOSE SERPL-MCNC: 87 MG/DL (ref 65–99)
HCT VFR BLD AUTO: 26.3 % (ref 37–47)
HGB BLD-MCNC: 8.6 G/DL (ref 12–16)
LYMPHOCYTES # BLD AUTO: 1.22 K/UL (ref 1–4.8)
LYMPHOCYTES NFR BLD: 7.8 % (ref 22–41)
MACROCYTES BLD QL SMEAR: ABNORMAL
MANUAL DIFF BLD: NORMAL
MCH RBC QN AUTO: 36.1 PG (ref 27–33)
MCHC RBC AUTO-ENTMCNC: 32.7 G/DL (ref 33.6–35)
MCV RBC AUTO: 110.5 FL (ref 81.4–97.8)
METAMYELOCYTES NFR BLD MANUAL: 0.9 %
MONOCYTES # BLD AUTO: 0.67 K/UL (ref 0–0.85)
MONOCYTES NFR BLD AUTO: 4.3 % (ref 0–13.4)
MORPHOLOGY BLD-IMP: NORMAL
NEUTROPHILS # BLD AUTO: 13.57 K/UL (ref 2–7.15)
NEUTROPHILS NFR BLD: 86.1 % (ref 44–72)
NEUTS BAND NFR BLD MANUAL: 0.9 % (ref 0–10)
NRBC # BLD AUTO: 0 K/UL
NRBC BLD-RTO: 0 /100 WBC
PLATELET # BLD AUTO: 700 K/UL (ref 164–446)
PLATELET BLD QL SMEAR: NORMAL
PMV BLD AUTO: 9.9 FL (ref 9–12.9)
POTASSIUM SERPL-SCNC: 3.3 MMOL/L (ref 3.6–5.5)
PROT SERPL-MCNC: 5.6 G/DL (ref 6–8.2)
RBC # BLD AUTO: 2.38 M/UL (ref 4.2–5.4)
RBC BLD AUTO: PRESENT
SIGNIFICANT IND 70042: NORMAL
SITE SITE: NORMAL
SODIUM SERPL-SCNC: 139 MMOL/L (ref 135–145)
SOURCE SOURCE: NORMAL
WBC # BLD AUTO: 15.6 K/UL (ref 4.8–10.8)

## 2021-05-26 PROCEDURE — 99232 SBSQ HOSP IP/OBS MODERATE 35: CPT | Performed by: INTERNAL MEDICINE

## 2021-05-26 PROCEDURE — 700105 HCHG RX REV CODE 258: Performed by: INTERNAL MEDICINE

## 2021-05-26 PROCEDURE — 85007 BL SMEAR W/DIFF WBC COUNT: CPT

## 2021-05-26 PROCEDURE — 700102 HCHG RX REV CODE 250 W/ 637 OVERRIDE(OP): Performed by: INTERNAL MEDICINE

## 2021-05-26 PROCEDURE — 97161 PT EVAL LOW COMPLEX 20 MIN: CPT

## 2021-05-26 PROCEDURE — 80053 COMPREHEN METABOLIC PANEL: CPT

## 2021-05-26 PROCEDURE — 770020 HCHG ROOM/CARE - TELE (206)

## 2021-05-26 PROCEDURE — 700111 HCHG RX REV CODE 636 W/ 250 OVERRIDE (IP): Performed by: INTERNAL MEDICINE

## 2021-05-26 PROCEDURE — 36415 COLL VENOUS BLD VENIPUNCTURE: CPT

## 2021-05-26 PROCEDURE — A9270 NON-COVERED ITEM OR SERVICE: HCPCS | Performed by: INTERNAL MEDICINE

## 2021-05-26 PROCEDURE — 85027 COMPLETE CBC AUTOMATED: CPT

## 2021-05-26 RX ORDER — POTASSIUM CHLORIDE 20 MEQ/1
40 TABLET, EXTENDED RELEASE ORAL ONCE
Status: COMPLETED | OUTPATIENT
Start: 2021-05-26 | End: 2021-05-26

## 2021-05-26 RX ADMIN — AMPICILLIN SODIUM AND SULBACTAM SODIUM 3 G: 2; 1 INJECTION, POWDER, FOR SOLUTION INTRAMUSCULAR; INTRAVENOUS at 23:00

## 2021-05-26 RX ADMIN — THIAMINE HCL TAB 100 MG 100 MG: 100 TAB at 05:16

## 2021-05-26 RX ADMIN — POTASSIUM CHLORIDE 40 MEQ: 20 TABLET, EXTENDED RELEASE ORAL at 08:00

## 2021-05-26 RX ADMIN — AMPICILLIN SODIUM AND SULBACTAM SODIUM 3 G: 2; 1 INJECTION, POWDER, FOR SOLUTION INTRAMUSCULAR; INTRAVENOUS at 00:29

## 2021-05-26 RX ADMIN — ENOXAPARIN SODIUM 40 MG: 40 INJECTION SUBCUTANEOUS at 05:15

## 2021-05-26 RX ADMIN — AMPICILLIN SODIUM AND SULBACTAM SODIUM 3 G: 2; 1 INJECTION, POWDER, FOR SOLUTION INTRAMUSCULAR; INTRAVENOUS at 11:53

## 2021-05-26 RX ADMIN — AMPICILLIN SODIUM AND SULBACTAM SODIUM 3 G: 2; 1 INJECTION, POWDER, FOR SOLUTION INTRAMUSCULAR; INTRAVENOUS at 17:58

## 2021-05-26 RX ADMIN — SODIUM CHLORIDE: 9 INJECTION, SOLUTION INTRAVENOUS at 05:15

## 2021-05-26 RX ADMIN — AMPICILLIN SODIUM AND SULBACTAM SODIUM 3 G: 2; 1 INJECTION, POWDER, FOR SOLUTION INTRAMUSCULAR; INTRAVENOUS at 05:15

## 2021-05-26 ASSESSMENT — GAIT ASSESSMENTS
DEVIATION: DECREASED BASE OF SUPPORT;DECREASED HEEL STRIKE;DECREASED TOE OFF
DISTANCE (FEET): 100
ASSISTIVE DEVICE: FRONT WHEEL WALKER

## 2021-05-26 ASSESSMENT — ENCOUNTER SYMPTOMS
NERVOUS/ANXIOUS: 0
NAUSEA: 0
TINGLING: 0
HALLUCINATIONS: 0
DIARRHEA: 0
COUGH: 1
SPEECH CHANGE: 0
EYE PAIN: 0
CONSTIPATION: 0
CHILLS: 0
NECK PAIN: 0
VOMITING: 0
DIARRHEA: 1
SPUTUM PRODUCTION: 1
WEIGHT LOSS: 0
TREMORS: 0
PHOTOPHOBIA: 0
BACK PAIN: 0
HEMOPTYSIS: 0
SENSORY CHANGE: 0
SHORTNESS OF BREATH: 1
FEVER: 0
ORTHOPNEA: 0
ABDOMINAL PAIN: 0

## 2021-05-26 ASSESSMENT — COGNITIVE AND FUNCTIONAL STATUS - GENERAL
CLIMB 3 TO 5 STEPS WITH RAILING: A LITTLE
MOVING TO AND FROM BED TO CHAIR: A LITTLE
TURNING FROM BACK TO SIDE WHILE IN FLAT BAD: A LITTLE
SUGGESTED CMS G CODE MODIFIER MOBILITY: CK
MOVING FROM LYING ON BACK TO SITTING ON SIDE OF FLAT BED: A LOT
WALKING IN HOSPITAL ROOM: A LITTLE
MOBILITY SCORE: 17
STANDING UP FROM CHAIR USING ARMS: A LITTLE

## 2021-05-26 ASSESSMENT — LIFESTYLE VARIABLES: SUBSTANCE_ABUSE: 0

## 2021-05-26 ASSESSMENT — PAIN DESCRIPTION - PAIN TYPE: TYPE: ACUTE PAIN

## 2021-05-26 NOTE — PROGRESS NOTES
Monitor Summary:     Rhythm: SR 69-92  Ectopy: PVCs(r), PACs(r), big  Measurements: .20/.08/.38

## 2021-05-26 NOTE — PROGRESS NOTES
Received bedside report from ELROY berry, pt care assumed, VS stable, pt assessment complete. Pt AAOx4, no  pain at this time. No signs of acute distress noted at this time. POC discussed with pt and verbalizes no questions. Pt denies any additional needs at this time. Bed in lowest position, bed alarm off. Pt educated on fall risk and verbalized understanding, call light within reach, hourly rounding initiated. In a sinus rhythm.

## 2021-05-26 NOTE — PROGRESS NOTES
Assumed care of patient at 1915, received bedside report from day shift RN. Bed is locked and in lowest position with call light within reach. Treaded socks in place. Patient updated on plan of care, no complaints or pain at this time. White board updated. Pt A&Ox4. Patient's breathing pattern is unlabored on 1L NC. Tele monitor in place and cardiac rhythm being monitored. All needs met at this time.

## 2021-05-26 NOTE — PROGRESS NOTES
"Hospital Medicine Daily Progress Note    Date of Service  5/26/2021    Chief Complaint  65 y.o. female presented 5/22/2021 with cough, weakness and diarrhea    Hospital Course  As per Dr. Melendez note    \"65 y.o. F with past  hypertension, alcohol abuse, smoking cigarettes presented 5/22/2021 with complaints of cough productive of brown sputum for 1-1/2 weeks, exertional shortness of breath, generalized weakness, orthostatic lightheadedness, loss of bowel movements 1 times a day in the last 10 days. Patient states she quit drinking about 12 days ago, she used to drink 3 strong alcoholic beverages a day.  She denies exposure to people with tuberculosis.  According to her, she got COVID-19 vaccination in April.  Denies travel outside [place].  No sick contacts reported.  Denies history of seizure or loss of consciousness in the last month.  Denies dysphagia.  Patient presented hypotensive 86/50, heart rate 110, respiratory rate 24. Blood pressure improved to 91/63 after 30 cc/kg sepsis bolus.  CT showed concern for right lower lobe abscess.\"    Pt was then admitted for management of sepsis 2/2 necrotizing pneumonia. IV Abx started on admission\".     Infectious disease consulted and she has been receiving IV antibiotics.     Interval Problem Update  I evaluated and examined her at the bedside  She reported that she is feeling better.  She continued to have productive cough.  Continue IV antibiotic as per infectious disease per  Discussed plan of care with her and answered all her questions    5/25: The patient is only on 1 L oxygen.  Noticed infectious disease recommendation.  We will continue with antibiotic and titrate oxygen as needed.    5/26: The patient is feeling better each day and satting well on room air.  Her white blood cell count is still elevated at 15.6.  Continue antibiotic.  PT OT ordered.    Consultants/Specialty  ID    Code Status  Full Code    Disposition  TBD   Likely home when medically " cleared.        Review of Systems  Review of Systems   Constitutional: Positive for malaise/fatigue. Negative for weight loss.   HENT: Negative for hearing loss and tinnitus.    Eyes: Negative for photophobia and pain.   Respiratory: Positive for cough, sputum production and shortness of breath. Negative for hemoptysis.    Cardiovascular: Negative for orthopnea and leg swelling.   Gastrointestinal: Negative for abdominal pain, constipation and diarrhea.   Genitourinary: Negative for frequency and urgency.   Musculoskeletal: Negative for back pain, joint pain and neck pain.   Skin: Negative for rash.   Neurological: Negative for tingling, tremors, sensory change and speech change.   Psychiatric/Behavioral: Negative for hallucinations and substance abuse.   All other systems reviewed and are negative.       Physical Exam  Temp:  [35.8 °C (96.5 °F)-37.1 °C (98.8 °F)] 37.1 °C (98.7 °F)  Pulse:  [76-92] 92  Resp:  [16-17] 17  BP: ()/(64-76) 103/70  SpO2:  [96 %-98 %] 96 %    Physical Exam  Vitals reviewed.   Constitutional:       General: She is not in acute distress.     Appearance: Normal appearance. She is not ill-appearing.   HENT:      Head: Normocephalic and atraumatic.      Nose: No congestion.   Eyes:      Pupils: Pupils are equal, round, and reactive to light.   Cardiovascular:      Rate and Rhythm: Normal rate.      Pulses: Normal pulses.      Heart sounds: No murmur heard.     Pulmonary:      Effort: Pulmonary effort is normal. No respiratory distress.      Breath sounds: Rales present.   Abdominal:      General: Bowel sounds are normal. There is no distension.      Palpations: Abdomen is soft.   Musculoskeletal:         General: No swelling. Normal range of motion.      Cervical back: Normal range of motion. No rigidity.   Skin:     General: Skin is warm.      Capillary Refill: Capillary refill takes less than 2 seconds.      Coloration: Skin is not jaundiced.   Neurological:      General: No focal  deficit present.      Mental Status: She is alert.         Fluids    Intake/Output Summary (Last 24 hours) at 5/26/2021 0733  Last data filed at 5/25/2021 0900  Gross per 24 hour   Intake 360 ml   Output --   Net 360 ml       Laboratory  Recent Labs     05/24/21 0329 05/25/21 0245 05/26/21  0235   WBC 17.6* 15.6* 15.6*   RBC 2.30* 2.23* 2.38*   HEMOGLOBIN 8.4* 8.0* 8.6*   HEMATOCRIT 24.9* 24.4* 26.3*   .3* 109.4* 110.5*   MCH 36.5* 35.9* 36.1*   MCHC 33.7 32.8* 32.7*   RDW 54.5* 56.2* 55.8*   PLATELETCT 654* 652* 700*   MPV 9.8 9.8 9.9     Recent Labs     05/24/21 0329 05/25/21 0245 05/26/21  0235   SODIUM 136 137 139   POTASSIUM 3.3* 3.7 3.3*   CHLORIDE 105 106 110   CO2 23 23 20   GLUCOSE 88 86 87   BUN 23* 11 9   CREATININE 0.87 0.80 0.70   CALCIUM 8.5 8.7 8.6                   Imaging  EC-ECHOCARDIOGRAM COMPLETE W/O CONT   Final Result      US-RENAL   Final Result         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      No renal calculus. No hydronephrosis.      CT-CHEST (THORAX) WITH   Final Result         1.  Necrotizing right lower lobe pneumonia with loculated air-fluid collection which may represent abscess. Recommend follow-up after treatment to ensure resolution and exclude possibility of malignancy.   2.  Mildly prominent mediastinal lymph nodes are most likely reactive.   3.  Emphysematous changes.   4.  No pleural effusion.   5.  Hepatic steatosis/chronic liver disease.      Fleischner Society pulmonary nodule recommendations:         DX-CHEST-PORTABLE (1 VIEW)   Final Result         Airspace opacity with cavitary change in the right middle lobe, concerning for necrotizing pneumonia. The differential includes necrotic mass. Further evaluation with CT is recommended.           Assessment/Plan  Necrotizing pneumonia (HCC)  Assessment & Plan  CT chest: Necrotizing right lower lobe pneumonia with loculated air-fluid collection which may represent abscess. Recommend follow-up  after treatment to ensure resolution and exclude possibility of malignancy  Probably secondary to aspiration?  Started empiric antibiotics Unasyn and vancomycin initially  Blood culture, sputum culture ordered  Legionella urine antigen, considering concomitant diarrhea  ID consulted and recommending IV Unasyn only at this point  Improving      Sepsis (HCC)  Assessment & Plan  This is Sepsis Present on admission  SIRS criteria identified on my evaluation include: Tachycardia, with heart rate greater than 90 BPM and Tachypnea, with respirations greater than 20 per minute  Source is necrotizing pneumonia  Suspected onset of infection (date and time) 5/12/2021  Sepsis protocol initiated  Fluid resuscitation ordered per protocol  IV antibiotics as appropriate for source of sepsis  While organ dysfunction may be noted elsewhere in this problem list or in the chart, degree of organ dysfunction does not meet CMS criteria for severe sepsis      Now resolved.    Thrombocytosis (HCC)- (present on admission)  Assessment & Plan  Likely reactive from infection  Trending down.  Continue to monitor.    Leukocytosis- (present on admission)  Assessment & Plan  Remain elevated  Follow CBC in the morning    Diarrhea  Assessment & Plan  Denies, recent exposure to antibiotics  States that bowel movements are loose to watery only once a day in the last 10 days  We will check stool culture, Legionella urine antigen.  If continues/worsen, will consider C. difficile rule out    Hyponatremia  Assessment & Plan  Now resolved.    Hypokalemia  Assessment & Plan  Replaced p.o.  Will monitor    Hypotension  Assessment & Plan  Secondary to sepsis versus diarrhea versus losartan  Hold losartan  Status post 30 cc/kg bolus, continue IV hydration  She is asymptomatic  Improving    Acute kidney injury (HCC)  Assessment & Plan  Holding losartan on admission  Responded well to IVF  Avoid nephrotoxins  Monitor kidney function    Resolved    Alcohol  abuse  Assessment & Plan  Quit drinking 12 days ago  Thiamine supplementation  Monitor for withdrawal    Smoking- (present on admission)  Assessment & Plan  Smoking cessation counseling  Nicotine patch       I discussed plan of care during multidisciplinary rounds regarding her current medical condition and discharge plan.    VTE prophylaxis: Enoxaparin

## 2021-05-26 NOTE — CARE PLAN
The patient is Stable - Low risk of patient condition declining or worsening    Shift Goals  Clinical Goals: Patient will use the call bell before ambulating   Patient Goals: Patient will sleep through the night  Family Goals: N/A    Progress made toward(s) clinical / shift goals:  Patient has been sleeping comfortably throughout the night. Patient anxiety about being in the hospital has subsided.     Patient is not progressing towards the following goals: Patient has not been using the call bell before trying to ambulate. Patient has a bed alarm on to alert staff. Patient is a low fall risk but is attached to IV, oxygen and  so needs assistance disconnecting before ambulating. Patient educated on call bell use.          Problem: Fall Risk  Goal: Patient will remain free from falls  Outcome: Progressing     Problem: Communication  Goal: The ability to communicate needs accurately and effectively will improve  5/26/2021 0120 by Jaye Polanco REvelynN.  Outcome: Not Met

## 2021-05-26 NOTE — CARE PLAN
The patient is Stable - Low risk of patient condition declining or worsening    Shift Goals  Clinical Goals: Patient will use the call bell before ambulating   Patient Goals: Patient will sleep through the night  Family Goals: N/A    Progress made toward(s) clinical / shift goals:      Patient is not progressing towards the following goals:

## 2021-05-26 NOTE — THERAPY
Physical Therapy   Initial Evaluation     Patient Name: Jaymie Peacock  Age:  65 y.o., Sex:  female  Medical Record #: [id number]  Today's Date: 5/26/2021     Precautions: Fall Risk    Assessment  Patient is 65 y.o. female admitted with cough, weakness, and diarrhea diagnosed with sepsis 2/2 necrotizing pneumonia. Of note, pt reported she stopped drinking alcohol 12 days ago. Prior to admission, pt was independent living alone in [facility]. She has neighbors that check on her intermittently and may be able to assist with some IADLs at [place]. During the initial evaluation, pt completed bed mobility with SPV, transfers with SPV, and ambulated short household distances with CGA and FWW. Attempted to ambulate without FWW,pt required min assist and demonstrated staggered gait reaching for support on wall. PT will cont while in acute care setting.    Anticipate if patient ambulates 2-3x/day while in acute care setting, she will return to her functional baseline.     Plan    Recommend Physical Therapy 4 times per week until therapy goals are met for the following treatments:  Gait Training, Neuro Re-Education / Balance, Self Care/Home Evaluation, Stair Training, Therapeutic Activities and Therapeutic Exercises    DC Equipment Recommendations: Front-Wheel Walker  Discharge Recommendations: Recommend home health for continued physical therapy services          05/26/21 1609   Prior Living Situation   Prior Services None   Housing / Facility 2 Story House   Steps Into Home 3   Steps In Home   (FOS to basement, doesnt have to go down there)   Equipment Owned None   Lives with - Patient's Self Care Capacity Alone and Able to Care For Self   Comments pts neighbors check on her from time to time and may be able to assist some if needed with IADLs   Prior Level of Functional Mobility   Bed Mobility Independent   Transfer Status Independent   Ambulation Independent   Distance Ambulation (Feet)   (community)   Assistive Devices Used None    Stairs Independent   Comments independent prior   Cognition    Level of Consciousness Alert   Comments appeared disoriented to time. asked several times what day of the week it is   Gait Analysis   Gait Level Of Assist   (CGA)   Assistive Device Front Wheel Walker   Distance (Feet) 100   # of Times Distance was Traveled 1   Deviation Decreased Base Of Support;Decreased Heel Strike;Decreased Toe Off   # of Stairs Climbed 0   Weight Bearing Status no restrictions   Comments attempted without FWW, required min assist   Bed Mobility    Supine to Sit Supervised   Sit to Supine Supervised   Scooting Supervised   Rolling Supervised   Functional Mobility   Sit to Stand   (CGA)   Bed, Chair, Wheelchair Transfer   (CGA)   Transfer Method Stand Step   Mobility in room and hallway   Short Term Goals    Short Term Goal # 1 pt will be able to ambulate 300ft with LRAD and SPV in 6tx in order to return home at prior level   Short Term Goal # 2 pt will be able to negotiate 3 steps with LRAD and SPV in 6tx in order to return home   Education Group   Education Provided Role of Physical Therapist;Gait Training   Role of Physical Therapist Patient Response Patient;Acceptance;Explanation;Verbal Demonstration   Gait Training Patient Response Patient;Acceptance;Explanation;Verbal Demonstration   Anticipated Discharge Equipment and Recommendations   DC Equipment Recommendations Front-Wheel Walker   Discharge Recommendations Recommend home health for continued physical therapy services

## 2021-05-26 NOTE — PROGRESS NOTES
Infectious Disease Progress Note    Author: Soni Piper M.D. Date & Time of service: 2021  9:53 AM       Chief Complaint:  Necrotizing pneumonia     Interval History:   AF, O2 3 L nasal cannula but satting 99%, productive cough is requiring 3 L oxygen although she is satting well.        Review of Systems:  Review of Systems   Constitutional: Positive for malaise/fatigue. Negative for chills and fever.   Respiratory: Positive for cough and shortness of breath.    Cardiovascular: Negative for chest pain.   Gastrointestinal: Positive for diarrhea. Negative for abdominal pain, constipation, nausea and vomiting.   Psychiatric/Behavioral: The patient is not nervous/anxious.        Hemodynamics:  Temp (24hrs), Av.7 °C (98 °F), Min:35.8 °C (96.5 °F), Max:37.1 °C (98.8 °F)  Temperature: 37.1 °C (98.7 °F)  Pulse  Av.2  Min: 71  Max: 110   Blood Pressure : 103/70       Physical Exam:  Physical Exam  Constitutional:       Appearance: Normal appearance.   Cardiovascular:      Rate and Rhythm: Normal rate and regular rhythm.      Heart sounds: Normal heart sounds.   Pulmonary:      Effort: Pulmonary effort is normal.      Comments: Decreased breath sounds in bases, faint crackles  Abdominal:      General: Abdomen is flat. Bowel sounds are normal.      Palpations: Abdomen is soft.   Musculoskeletal:      Right lower leg: No edema.      Left lower leg: No edema.   Skin:     General: Skin is warm and dry.   Neurological:      General: No focal deficit present.      Mental Status: She is alert and oriented to person, place, and time.   Psychiatric:         Mood and Affect: Mood normal.         Behavior: Behavior normal.         Meds:    Current Facility-Administered Medications:   •  NS  •  Respiratory Therapy Consult  •  enoxaparin  •  acetaminophen  •  enalaprilat  •  labetalol  •  ondansetron  •  ondansetron  •  ampicillin-sulbactam (UNASYN) IV  •  LR  •  thiamine  •  Pharmacy    Labs:  Recent Labs      05/24/21 0329 05/25/21 0245 05/26/21  0235   WBC 17.6* 15.6* 15.6*   RBC 2.30* 2.23* 2.38*   HEMOGLOBIN 8.4* 8.0* 8.6*   HEMATOCRIT 24.9* 24.4* 26.3*   .3* 109.4* 110.5*   MCH 36.5* 35.9* 36.1*   RDW 54.5* 56.2* 55.8*   PLATELETCT 654* 652* 700*   MPV 9.8 9.8 9.9   NEUTSPOLYS 93.90*  --  86.10*   LYMPHOCYTES 0.90*  --  7.80*   MONOCYTES 3.50  --  4.30   EOSINOPHILS 0.00  --  0.00   BASOPHILS 0.00  --  0.00   RBCMORPHOLO Present  --  Present     Recent Labs     05/24/21 0329 05/25/21 0245 05/26/21  0235   SODIUM 136 137 139   POTASSIUM 3.3* 3.7 3.3*   CHLORIDE 105 106 110   CO2 23 23 20   GLUCOSE 88 86 87   BUN 23* 11 9     Recent Labs     05/24/21 0329 05/25/21 0245 05/26/21  0235   ALBUMIN  --   --  1.9*   TBILIRUBIN  --   --  0.4   ALKPHOSPHAT  --   --  150*   TOTPROTEIN  --   --  5.6*   ALTSGPT  --   --  12   ASTSGOT  --   --  21   CREATININE 0.87 0.80 0.70       Imaging:  CT-CHEST (THORAX) WITH    Result Date: 5/22/2021 5/22/2021 12:57 PM HISTORY/REASON FOR EXAM:  Pneumonia. TECHNIQUE/EXAM DESCRIPTION: CT scan of the chest with contrast. Thin-section helical images were obtained from the lung apices through the adrenal glands following the bolus administration of contrast. 75 mL of Omnipaque 350 nonionic contrast was utilized. Low dose optimization technique was utilized for this CT exam including automated exposure control and adjustment of the mA and/or kV according to patient size. COMPARISON:  Radiograph earlier in the day FINDINGS: Neck Base:  10 mm circumscribed hypodense right thyroid nodule is indeterminate. This could be further characterized with outpatient ultrasound as clinically indicated. Lungs/Pleura: Right lower lobe consolidation consistent with pneumonia. There is extensive irregular somewhat circumscribed fluid collection in the right lower lobe in keeping with necrotizing infection and possible pulmonary abscess. Mild emphysematous changes. Cardiovascular Structures:  Heart size  is normal. No pericardial effusion. There is mild atherosclerosis. Aorta is normal in caliber without aneurysm or dissection. Central pulmonary arteries are normal in caliber. Mediastinum/ lymph nodes: Borderline enlarged mediastinal lymph nodes are probably reactive. Upper Abdomen:  Diffuse hypoattenuation of the liver suggesting fatty infiltration. Prominence of the left lobe and small appearance of the right lobe, nonspecific however could represent changes of chronic liver disease/cirrhosis. Soft tissues/wall: Within normal limits. Bones:  No acute or aggressive abnormality.     1.  Necrotizing right lower lobe pneumonia with loculated air-fluid collection which may represent abscess. Recommend follow-up after treatment to ensure resolution and exclude possibility of malignancy. 2.  Mildly prominent mediastinal lymph nodes are most likely reactive. 3.  Emphysematous changes. 4.  No pleural effusion. 5.  Hepatic steatosis/chronic liver disease. Fleischner Society pulmonary nodule recommendations:     DX-CHEST-PORTABLE (1 VIEW)    Result Date: 5/22/2021 5/22/2021 12:10 PM HISTORY/REASON FOR EXAM:  possible sepsis. Productive cough. TECHNIQUE/EXAM DESCRIPTION AND NUMBER OF VIEWS: Single portable view of the chest. COMPARISON: None FINDINGS: Airspace opacity with cavitary change in the right middle lobe No pleural effusion. No pneumothorax. Normal cardiopericardial silhouette.     Airspace opacity with cavitary change in the right middle lobe, concerning for necrotizing pneumonia. The differential includes necrotic mass. Further evaluation with CT is recommended.    US-RENAL    Result Date: 5/22/2021 5/22/2021 3:37 PM HISTORY/REASON FOR EXAM:  Abnormal Labs TECHNIQUE/EXAM DESCRIPTION: Renal ultrasound. COMPARISON:  None FINDINGS: The right kidney measures 9.76 cm.  The right kidney appears normal in contour and parenchymal echotexture. The corticomedullary differentiation is preserved. The right renal collecting  system is not dilated. There are no renal calculi. There is a 0.8 cm  echogenic lesion in the lower pole right kidney. The left kidney measures 10.97 cm. The left kidney appears normal in contour and parenchymal echotexture. The corticomedullary differentiation is preserved. The left renal collecting system is not dilated. There are no renal calculi or masses. The bladder demonstrates no focal wall abnormality.     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 No renal calculus. No hydronephrosis.    EC-ECHOCARDIOGRAM COMPLETE W/O CONT    Result Date: 2021  Transthoracic Echo Report Echocardiography Laboratory CONCLUSIONS Normal left ventricular size, thickness, systolic function, and diastolic function. Left ventricular ejection fraction is visually estimated to be 65%. Trace tricuspid regurgitation. Normal inferior vena cava size and inspiratory collapse. VALENTINE ALANIZ Exam Date:         2021                    10:53 Exam Location:     Inpatient Priority:          Routine Ordering Physician:        BAL COLÓN Referring Physician: Sonographer:               Johana HUGHES Age:    65     Gender:    F MRN:    [id number] :    1955 BSA:    1.58   Ht (in):    65     Wt (lb):    118 Exam Type:     Complete Indications:     Shortness of breath ICD Codes:       R06.02 CPT Codes:       80584 BP:   85     /   53     HR:   85 Technical Quality:       Fair MEASUREMENTS  (Male / Female) Normal Values 2D ECHO LV Diastolic Diameter PLAX        4.2 cm                4.2 - 5.9 / 3.9 - 5.3 cm LV Systolic Diameter PLAX         3 cm                  2.1 - 4.0 cm IVS Diastolic Thickness           0.48 cm               LVPW Diastolic Thickness          0.79 cm               LVOT Diameter                     1.9 cm                LV Ejection Fraction MOD BP       65 %                  >= 55  % LV Ejection Fraction MOD 4C       70.6 %                LV Ejection Fraction MOD 2C       55.6 %                 IVC Diameter                      0.48 cm               DOPPLER AV Peak Velocity                  1.5 m/s               AV Peak Gradient                  9.4 mmHg              AV Mean Gradient                  4.9 mmHg              LVOT Peak Velocity                0.89 m/s              AV Area Cont Eq vti               1.8 cm2               Mitral E Point Velocity           0.59 m/s              Mitral E to A Ratio               0.71                  MV Pressure Half Time             29.2 ms               MV Area PHT                       7.5 cm2               MV Deceleration Time              101 ms                TR Peak Velocity                  221 cm/s              PV Peak Velocity                  0.82 m/s              PV Peak Gradient                  2.7 mmHg              RVOT Peak Velocity                0.76 m/s              * Indicates values subject to auto-interpretation LV EF:        % FINDINGS Left Ventricle Normal left ventricular size, thickness, systolic function, and diastolic function. Left ventricular ejection fraction is visually estimated to be 65%. Normal regional wall motion. Right Ventricle Normal right ventricular size and systolic function. Right Atrium Normal right atrial size. Normal inferior vena cava size and inspiratory collapse. Left Atrium Normal left atrial size. Left atrial volume index is 11  mL/sq m. Mitral Valve Structurally normal mitral valve without significant stenosis or regurgitation. Aortic Valve Structurally normal aortic valve without significant stenosis or regurgitation. Tricuspid Valve Structurally normal tricuspid valve. Trace tricuspid regurgitation. Right ventricular systolic pressure is estimated to be 24 mmHg. Right atrial pressure is estimated to be 3 mmHg. Pulmonic Valve The pulmonic valve is not well visualized. Pericardium Normal pericardium without effusion. Aorta Normal aortic root for body surface area. Ascending aorta diameter is 2.7 normal  aortic root for body surface area. cm. Jensen Purdy MD (Electronically Signed) Final Date:     23 May 2021 16:26      Micro:  Results     Procedure Component Value Units Date/Time    CULTURE STOOL [[id number]] Collected: 05/22/21 0338    Order Status: Completed Specimen: Stool Updated: 05/25/21 1646     Significant Indicator NEG     Source STL     Site STOOL     Culture Result Shiga Toxin testing not performed due to lack of growth in  [place]key broth.    NOTE:    Stool cultures are screened for Shiga Toxins 1 and 2,    Salmonella, Shigella, Campylobacter, Aeromonas,    Plesiomonas.   Culture in progress.       Campylobactor Antigen Negative for Campylobacter Antigen    CULTURE RESPIRATORY W/ GRM STN [[id number]] Collected: 05/23/21 1620    Order Status: Completed Specimen: Respirate Updated: 05/25/21 0907     Significant Indicator NEG     Source RESP     Site Sputum     Culture Result Light growth usual upper respiratory jackelyn  No clinically significant Staphylococcus aureus, Methicillin  Resistant Staphylococcus aureus, or Pseudomonas species  isolated.       Gram Stain Result Specimen Quality Score: 2+  Many WBCs.  Few epithelial cells.  Many mixed bacteria, no predominant organism seen.      URINE CULTURE(NEW) [[id number]] Collected: 05/22/21 1600    Order Status: Completed Specimen: Urine Updated: 05/24/21 0826     Significant Indicator NEG     Source UR     Site -     Culture Result Usual urogenital jackelyn 10-50,000 cfu/mL    Narrative:      Indication for culture:->Evaluation for sepsis without a  clear source of infection  Indication for culture:->Evaluation for sepsis without a    GRAM STAIN [[id number]] Collected: 05/23/21 1620    Order Status: Completed Specimen: Respirate Updated: 05/23/21 1902     Significant Indicator .     Source RESP     Site Sputum     Gram Stain Result Specimen Quality Score: 2+  Many WBCs.  Few epithelial cells.  Many mixed bacteria, no predominant organism seen.      MRSA By PCR (Amp)  "[[id number]] Collected: 05/23/21 1620    Order Status: Completed Specimen: Respirate from Nares Updated: 05/23/21 1902     Significant Indicator NEG     Source RESP     Site NARES     MRSA PCR Negative for MRSA by PCR.    Narrative:      UTILIZATION ALERT: Has Flu/RSV/COVID PCR testing been  performed, resulted reviewed, and consulted with Infectious  Diseases or PICU Provider Teams?->Yes  Have you been in close contact with a person who is suspected  or known to be positive for COVID-19 within the last 30 days  (e.g. last seen that person < 30 days ago)->Unknown  Sputum cultures, induced if needed. If not done within the  last 24 hours  FENa = (UrineNa / SerumNA) / (UrineCr / SerumCr) *100  Sputum cultures, induced if needed. If not done within the    Culture Respiratory W/ GRM STN [[id number]] Collected: 05/23/21 1620    Order Status: Canceled Specimen: Other from Sputum     SARS-CoV-2 PCR (24 hour In-House): Collect NP swab in VTM [[id number]] Collected: 05/22/21 1423    Order Status: Completed Specimen: Respirate Updated: 05/23/21 1036     SARS-CoV-2 Source NP Swab     SARS-CoV-2 by PCR NotDetected     Comment: PATIENTS: Important information regarding your results and instructions can  be found at [email or web address]   \"After your  Covid-19 Test\"    [facility] providers: PLEASE REFER TO DE-ESCALATION AND RETESTING PROTOCOL  on [email or web address]    **The Roche SARS-CoV-2 RT-PCR test has been made available for use under the  Emergency Use Authorization (EUA) only.         Narrative:      Have you been in close contact with a person who is suspected  or known to be positive for COVID-19 within the last 30 days  (e.g. last seen that person < 30 days ago)->No    BLOOD CULTURE [[id number]] Collected: 05/22/21 1132    Order Status: Completed Specimen: Blood from Peripheral Updated: 05/23/21 0721     Significant Indicator NEG     Source BLD     Site PERIPHERAL     Culture Result No Growth  Note: " "Blood cultures are incubated for 5 days and  are monitored continuously.Positive blood cultures  are called to the RN and reported as soon as  they are identified.      Narrative:      Per Hospital Policy: Only change Specimen Src: to \"Line\" if  specified by physician order.  No site indicated    Blood Culture [[id number]] Collected: 05/22/21 1645    Order Status: Completed Specimen: Blood from Peripheral Updated: 05/23/21 0721     Significant Indicator NEG     Source BLD     Site PERIPHERAL     Culture Result No Growth  Note: Blood cultures are incubated for 5 days and  are monitored continuously.Positive blood cultures  are called to the RN and reported as soon as  they are identified.      Narrative:      From different peripheral sites, if not done within the last  24 hours (Per Hospital Policy: Only change specimen source to  \"Line\" if specified by physician order)  Right AC    Blood Culture [[id number]] Collected: 05/22/21 1658    Order Status: Completed Specimen: Blood from Peripheral Updated: 05/23/21 0721     Significant Indicator NEG     Source BLD     Site PERIPHERAL     Culture Result No Growth  Note: Blood cultures are incubated for 5 days and  are monitored continuously.Positive blood cultures  are called to the RN and reported as soon as  they are identified.      Narrative:      From different peripheral sites, if not done within the last  24 hours (Per Hospital Policy: Only change specimen source to  \"Line\" if specified by physician order)  Left Forearm/Arm    BLOOD CULTURE [[id number]] Collected: 05/22/21 1132    Order Status: Completed Specimen: Blood from Peripheral Updated: 05/23/21 0721     Significant Indicator NEG     Source BLD     Site PERIPHERAL     Culture Result No Growth  Note: Blood cultures are incubated for 5 days and  are monitored continuously.Positive blood cultures  are called to the RN and reported as soon as  they are identified.      Narrative:      Per Hospital Policy: Only " "change Specimen Src: to \"Line\" if  specified by physician order.  No site indicated    URINALYSIS [[id number]]  (Abnormal) Collected: 05/22/21 1600    Order Status: Completed Specimen: Urine Updated: 05/22/21 1650     Color Yellow     Character Clear     Specific Gravity 1.042     Ph 7.0     Glucose Negative mg/dL      Ketones Negative mg/dL      Protein Negative mg/dL      Bilirubin Negative     Urobilinogen, Urine 1.0     Nitrite Negative     Leukocyte Esterase Small     Occult Blood Negative     Micro Urine Req Microscopic    Narrative:      Indication for culture:->Evaluation for sepsis without a  clear source of infection    Blood Culture [[id number]]     Order Status: Canceled Specimen: Blood from Peripheral     Blood Culture [[id number]]     Order Status: Canceled Specimen: Blood from Peripheral           Assessment:  Active Hospital Problems    Diagnosis    • Necrotizing pneumonia (HCC) [J85.0]    • Sepsis (HCC) [A41.9]    • Leukocytosis [D72.829]    • Thrombocytosis (HCC) [D47.3]    • Smoking [F17.200]    • Alcohol abuse [F10.10]    • Acute kidney injury (HCC) [N17.9]    • Hypotension [I95.9]    • Hypokalemia [E87.6]    • Hyponatremia [E87.1]    • Diarrhea [R19.7]      Interval 24 hours:      AF, O2 RA  Labs reviewed  Micro reviewed    Patient continues to improve still with cough but now on room air.  Occasional diarrhea but overall improved.  Antibiotic continued as below.    Assessment:  65 y.o.  admitted 5/22/2021. Pt has a past medical history of alcohol abuse and tobacco abuse.  She reports that approximately 10 days ago she developed a productive cough and shortness of breath which was progressive.       Hospital Course:   She has been afebrile, hypotensive and on 3 L nasal cannula.  Significant leukocytosis continues.  Cultures were obtained as well as CT chest which found a significant right-sided necrotizing pneumonia.     Problem List      Necrotizing pneumonia  -Concern for aspiration " particularly in the setting of heavy alcohol use  -CT chest on 5/23 with necrotizing right lower lobe pneumonia and loculated air-fluid collection thought to represent abscess.  To note emphysematous changes.  -HIV negative  -MRSA nares negative  -Respiratory culture with no predominant pathogen  -Respiratory panel and COVID-19 negative  -Legionella antigen negative  -Procalcitonin 1.47 on 5/23  Leukocytosis,  improved overall but persistent  Thrombocytosis  Hypotension, since admit may be at baseline   Diarrhea on arrival, improved  -Stool culture sent and Shiga toxin not perform due to lack of growth, negative for Campylobacter antigen     Plan      --- Continue Unasyn -  anticipate she will need multiple weeks of antibiotics.  IV for now but once improved can likely transition to Augmentin 875/125 BID for 4-6 weeks and then repeat CT chest as an outpatient  --- Monitor labs  --- If recurrence of significant iarrhea obtain C. difficile testing  --- Monitor for alcohol withdrawal  --- Encouraged continuing cessation of smoking      Plan of care discussed with Dr. Anthony.  Will continue to follow.

## 2021-05-27 ENCOUNTER — PHARMACY VISIT (OUTPATIENT)
Dept: PHARMACY | Facility: MEDICAL CENTER | Age: 66
End: 2021-05-27
Payer: COMMERCIAL

## 2021-05-27 ENCOUNTER — PATIENT OUTREACH (OUTPATIENT)
Dept: HEALTH INFORMATION MANAGEMENT | Facility: OTHER | Age: 66
End: 2021-05-27

## 2021-05-27 ENCOUNTER — HOME HEALTH ADMISSION (OUTPATIENT)
Dept: HOME HEALTH SERVICES | Facility: HOME HEALTHCARE | Age: 66
End: 2021-05-27
Payer: MEDICARE

## 2021-05-27 VITALS
RESPIRATION RATE: 18 BRPM | OXYGEN SATURATION: 98 % | BODY MASS INDEX: 21.29 KG/M2 | WEIGHT: 132.5 LBS | HEIGHT: 66 IN | TEMPERATURE: 97.3 F | HEART RATE: 81 BPM | DIASTOLIC BLOOD PRESSURE: 91 MMHG | SYSTOLIC BLOOD PRESSURE: 133 MMHG

## 2021-05-27 LAB
ALBUMIN SERPL BCP-MCNC: 2 G/DL (ref 3.2–4.9)
ALBUMIN/GLOB SERPL: 0.6 G/DL
ALP SERPL-CCNC: 130 U/L (ref 30–99)
ALT SERPL-CCNC: 11 U/L (ref 2–50)
ANION GAP SERPL CALC-SCNC: 10 MMOL/L (ref 7–16)
AST SERPL-CCNC: 16 U/L (ref 12–45)
BACTERIA BLD CULT: NORMAL
BASOPHILS # BLD AUTO: 0.6 % (ref 0–1.8)
BASOPHILS # BLD: 0.09 K/UL (ref 0–0.12)
BILIRUB SERPL-MCNC: 0.4 MG/DL (ref 0.1–1.5)
BUN SERPL-MCNC: 7 MG/DL (ref 8–22)
CALCIUM SERPL-MCNC: 8.5 MG/DL (ref 8.5–10.5)
CHLORIDE SERPL-SCNC: 110 MMOL/L (ref 96–112)
CO2 SERPL-SCNC: 20 MMOL/L (ref 20–33)
CREAT SERPL-MCNC: 0.68 MG/DL (ref 0.5–1.4)
EOSINOPHIL # BLD AUTO: 0.12 K/UL (ref 0–0.51)
EOSINOPHIL NFR BLD: 0.8 % (ref 0–6.9)
ERYTHROCYTE [DISTWIDTH] IN BLOOD BY AUTOMATED COUNT: 54.7 FL (ref 35.9–50)
GLOBULIN SER CALC-MCNC: 3.4 G/DL (ref 1.9–3.5)
GLUCOSE SERPL-MCNC: 93 MG/DL (ref 65–99)
HCT VFR BLD AUTO: 25.5 % (ref 37–47)
HGB BLD-MCNC: 8.4 G/DL (ref 12–16)
IMM GRANULOCYTES # BLD AUTO: 0.74 K/UL (ref 0–0.11)
IMM GRANULOCYTES NFR BLD AUTO: 5 % (ref 0–0.9)
LYMPHOCYTES # BLD AUTO: 1.04 K/UL (ref 1–4.8)
LYMPHOCYTES NFR BLD: 7.1 % (ref 22–41)
MAGNESIUM SERPL-MCNC: 1.1 MG/DL (ref 1.5–2.5)
MCH RBC QN AUTO: 36.1 PG (ref 27–33)
MCHC RBC AUTO-ENTMCNC: 32.9 G/DL (ref 33.6–35)
MCV RBC AUTO: 109.4 FL (ref 81.4–97.8)
MONOCYTES # BLD AUTO: 0.97 K/UL (ref 0–0.85)
MONOCYTES NFR BLD AUTO: 6.6 % (ref 0–13.4)
NEUTROPHILS # BLD AUTO: 11.71 K/UL (ref 2–7.15)
NEUTROPHILS NFR BLD: 79.9 % (ref 44–72)
NRBC # BLD AUTO: 0 K/UL
NRBC BLD-RTO: 0 /100 WBC
PLATELET # BLD AUTO: 630 K/UL (ref 164–446)
PMV BLD AUTO: 9.7 FL (ref 9–12.9)
POTASSIUM SERPL-SCNC: 3 MMOL/L (ref 3.6–5.5)
PROT SERPL-MCNC: 5.4 G/DL (ref 6–8.2)
RBC # BLD AUTO: 2.33 M/UL (ref 4.2–5.4)
SIGNIFICANT IND 70042: NORMAL
SITE SITE: NORMAL
SODIUM SERPL-SCNC: 140 MMOL/L (ref 135–145)
SOURCE SOURCE: NORMAL
WBC # BLD AUTO: 14.7 K/UL (ref 4.8–10.8)

## 2021-05-27 PROCEDURE — 700105 HCHG RX REV CODE 258: Performed by: INTERNAL MEDICINE

## 2021-05-27 PROCEDURE — A9270 NON-COVERED ITEM OR SERVICE: HCPCS | Performed by: INTERNAL MEDICINE

## 2021-05-27 PROCEDURE — 80053 COMPREHEN METABOLIC PANEL: CPT

## 2021-05-27 PROCEDURE — 97165 OT EVAL LOW COMPLEX 30 MIN: CPT

## 2021-05-27 PROCEDURE — 700102 HCHG RX REV CODE 250 W/ 637 OVERRIDE(OP): Performed by: INTERNAL MEDICINE

## 2021-05-27 PROCEDURE — 97530 THERAPEUTIC ACTIVITIES: CPT | Mod: CQ

## 2021-05-27 PROCEDURE — 36415 COLL VENOUS BLD VENIPUNCTURE: CPT

## 2021-05-27 PROCEDURE — 99239 HOSP IP/OBS DSCHRG MGMT >30: CPT | Performed by: INTERNAL MEDICINE

## 2021-05-27 PROCEDURE — 83735 ASSAY OF MAGNESIUM: CPT

## 2021-05-27 PROCEDURE — 97116 GAIT TRAINING THERAPY: CPT | Mod: CQ

## 2021-05-27 PROCEDURE — RXMED WILLOW AMBULATORY MEDICATION CHARGE: Performed by: INTERNAL MEDICINE

## 2021-05-27 PROCEDURE — 85025 COMPLETE CBC W/AUTO DIFF WBC: CPT

## 2021-05-27 PROCEDURE — 700111 HCHG RX REV CODE 636 W/ 250 OVERRIDE (IP): Performed by: INTERNAL MEDICINE

## 2021-05-27 RX ORDER — AMOXICILLIN AND CLAVULANATE POTASSIUM 875; 125 MG/1; MG/1
1 TABLET, FILM COATED ORAL 2 TIMES DAILY
Qty: 4 TABLET | Refills: 0 | Status: SHIPPED | OUTPATIENT
Start: 2021-05-27 | End: 2021-05-27 | Stop reason: SDUPTHER

## 2021-05-27 RX ORDER — POTASSIUM CHLORIDE 20 MEQ/1
40 TABLET, EXTENDED RELEASE ORAL 2 TIMES DAILY
Status: DISCONTINUED | OUTPATIENT
Start: 2021-05-27 | End: 2021-05-27 | Stop reason: HOSPADM

## 2021-05-27 RX ORDER — AMOXICILLIN AND CLAVULANATE POTASSIUM 875; 125 MG/1; MG/1
1 TABLET, FILM COATED ORAL 2 TIMES DAILY
Qty: 84 TABLET | Refills: 0 | Status: SHIPPED | OUTPATIENT
Start: 2021-05-27 | End: 2021-07-08

## 2021-05-27 RX ADMIN — AMPICILLIN SODIUM AND SULBACTAM SODIUM 3 G: 2; 1 INJECTION, POWDER, FOR SOLUTION INTRAMUSCULAR; INTRAVENOUS at 05:04

## 2021-05-27 RX ADMIN — THIAMINE HCL TAB 100 MG 100 MG: 100 TAB at 05:04

## 2021-05-27 RX ADMIN — POTASSIUM CHLORIDE 40 MEQ: 1500 TABLET, EXTENDED RELEASE ORAL at 05:04

## 2021-05-27 RX ADMIN — ENOXAPARIN SODIUM 40 MG: 40 INJECTION SUBCUTANEOUS at 05:04

## 2021-05-27 ASSESSMENT — ACTIVITIES OF DAILY LIVING (ADL): TOILETING: INDEPENDENT

## 2021-05-27 ASSESSMENT — GAIT ASSESSMENTS
GAIT LEVEL OF ASSIST: SUPERVISED
DISTANCE (FEET): 200

## 2021-05-27 ASSESSMENT — COGNITIVE AND FUNCTIONAL STATUS - GENERAL
SUGGESTED CMS G CODE MODIFIER MOBILITY: CK
MOVING FROM LYING ON BACK TO SITTING ON SIDE OF FLAT BED: A LOT
SUGGESTED CMS G CODE MODIFIER DAILY ACTIVITY: CI
MOBILITY SCORE: 17
DAILY ACTIVITIY SCORE: 23
STANDING UP FROM CHAIR USING ARMS: A LITTLE
CLIMB 3 TO 5 STEPS WITH RAILING: A LITTLE
TURNING FROM BACK TO SIDE WHILE IN FLAT BAD: A LITTLE
WALKING IN HOSPITAL ROOM: A LITTLE
HELP NEEDED FOR BATHING: A LITTLE
MOVING TO AND FROM BED TO CHAIR: A LITTLE

## 2021-05-27 ASSESSMENT — PAIN DESCRIPTION - PAIN TYPE: TYPE: ACUTE PAIN

## 2021-05-27 NOTE — PROGRESS NOTES
Assumed care of patient at 1915, received bedside report from day shift RN. Bed is locked and in lowest position with call light within reach. Treaded socks in place. Patient updated on plan of care, no complaints or pain at this time. White board updated. Pt A&Ox4 Patient's breathing pattern is unlabored. Tele monitor in place and cardiac rhythm being monitored. All needs met at this time.

## 2021-05-27 NOTE — DISCHARGE PLANNING
ATTN: Case Management  RE: Referral for Home Health    Please tell the patient that they need to establish with [facility] in order to have Home Health.  As of 05/27/2021, we have accepted the Home Health referral for the patient listed above.    A [facility] clinician will be out to see the patient within 48 hours. If you have any questions or concerns regarding the patient's transition to Home Health, please do not hesitate to contact us at [phone].      We look forward to collaborating with you,  [facility] Home Health Team

## 2021-05-27 NOTE — FACE TO FACE
Face to Face Supporting Documentation - Home Health    The encounter with this patient was in whole or in part the primary reason for home health admission.    Date of encounter:   Patient:                    MRN:                       YOB: 2022  Jaymie Peacock  [id number]  1955     Home health to see patient for:  Skilled Nursing care for assessment, interventions & education    Skilled need for:  Comment: weakness    Skilled nursing interventions to include:  Comment: PT/OT    Homebound status evidenced by:  Need the aid of supportive devices such as crutches, canes, wheelchairs or walkers. Leaving home requires a considerable and taxing effort. There is a normal inability to leave the home.    Community Physician to provide follow up care: JERSON Lisa     Optional Interventions? No      I certify the face to face encounter for this home health care referral meets the CMS requirements and the encounter/clinical assessment with the patient was, in whole, or in part, for the medical condition(s) listed above, which is the primary reason for home health care. Based on my clinical findings: the service(s) are medically necessary, support the need for home health care, and the homebound criteria are met.  I certify that this patient has had a face to face encounter by myself.  Ernesto Anthony M.D. - NPI: [id number]

## 2021-05-27 NOTE — CARE PLAN
The patient is Stable - Low risk of patient condition declining or worsening    Shift Goals  Clinical Goals: Patient oxygen saturation will remain WDL on room air  Patient Goals: Patient will sleep with minimal disruption  Family Goals: N/A    Progress made toward(s) clinical / shift goals:  Administered medications as ordered, pt on RA and no signs of distress noted.       Problem: Pain - Standard  Goal: Alleviation of pain or a reduction in pain to the patient’s comfort goal  Outcome: Progressing  Patient educated to pain scale system. Patient encouraged to verbalize discomfort. Patient taught about non-pharmacological pain management. Patient is comfortable at this time without statements of discomfort or pain.     Problem: Respiratory  Goal: Patient will achieve/maintain optimum respiratory ventilation and gas exchange  Outcome: Progressing  Patient respiratory status assessed. Patient educated on importance of coughing and deep breathing. Deep breaths are encouraged. Educated on how ambulation and movement can affect respiratory status. Patient indicates understanding.

## 2021-05-27 NOTE — DISCHARGE SUMMARY
"Discharge Summary    CHIEF COMPLAINT ON ADMISSION  Chief Complaint   Patient presents with   • Cough     For 8 days and productive with associated SOB. History of smoking.    • Weakness     general malaise for the last couple days   • Diarrhea     for 10 days. Stool goes from soft to watery over the last 10 days. No blood noted       Reason for Admission  Shortness of Breath; Cough     Admission Date  5/22/2021    CODE STATUS  Full Code    HPI & HOSPITAL COURSE  65 y.o. F with past  hypertension, alcohol abuse, smoking cigarettes presented 5/22/2021 with complaints of cough productive of brown sputum for 1-1/2 weeks, exertional shortness of breath, generalized weakness, orthostatic lightheadedness, loss of bowel movements 1 times a day in the last 10 days. Patient states she quit drinking about 12 days ago, she used to drink 3 strong alcoholic beverages a day.  She denies exposure to people with tuberculosis.  According to her, she got COVID-19 vaccination in April.  Denies travel outside [place].  No sick contacts reported.  Denies history of seizure or loss of consciousness in the last month.  Denies dysphagia.  Patient presented hypotensive 86/50, heart rate 110, respiratory rate 24. Blood pressure improved to 91/63 after 30 cc/kg sepsis bolus.  CT showed concern for right lower lobe abscess.\"     Pt was then admitted for management of sepsis 2/2 necrotizing pneumonia. IV Abx started on admission\".   He also developed acute kidney injury related to sepsis and later on resolved.  Initially put on supplemental oxygen right now patient is satting well on room air.  Infectious disease was consulted for her necrotizing pneumonia and recommended IV Unasyn and changed to p.o. oral Augmentin for 4 to 6 weeks upon discharge.  The patient will need to get repeat CT scan after antibiotic treatment.  PT OT saw the patient and recommended home health which was arranged upon discharge.  I saw and examined the patient " today.     Please call [phone] to schedule PCP appointment for patient.    Required specialty appointments include:     As below      Therefore, she is discharged in fair and stable condition to home with close outpatient follow-up.    The patient met 2-midnight criteria for an inpatient stay at the time of discharge.    Discharge Date  05/27/21      FOLLOW UP ITEMS POST DISCHARGE      DISCHARGE DIAGNOSES  Active Problems:    Necrotizing pneumonia (HCC) POA: Unknown    Sepsis (HCC) POA: Unknown    Smoking POA: Yes    Alcohol abuse POA: Unknown    Acute kidney injury (HCC) POA: Unknown    Hypotension POA: Unknown    Hypokalemia POA: Unknown    Hyponatremia POA: Unknown    Diarrhea POA: Unknown    Leukocytosis POA: Yes    Thrombocytosis (HCC) POA: Yes  Resolved Problems:    * No resolved hospital problems. *      FOLLOW UP  Future Appointments   Date Time Provider Department Center   6/28/2021  2:45 PM S KIM BD 1 [facility]   6/28/2021  3:50 PM S KIM MG 1 [facility]     Gin Diaz, A.P.R.N.  [address] [address]  [phone]    In 1 week      Dr. Piper in 1 week    MEDICATIONS ON DISCHARGE     Medication List      START taking these medications      Instructions   amoxicillin-clavulanate 875-125 MG Tabs  Commonly known as: AUGMENTIN   Take 1 tablet by mouth 2 times a day for 2 days.  Dose: 1 tablet        CONTINUE taking these medications      Instructions   losartan-hydrochlorothiazide 100-12.5 MG per tablet  Commonly known as: HYZAAR   Take 1 tablet by mouth every day.  Dose: 1 tablet     OCUVITE ADULT 50+ PO   Take 1 tablet by mouth every day.  Dose: 1 tablet     simvastatin 20 MG Tabs  Commonly known as: ZOCOR   Take 20 mg by mouth every day.  Dose: 20 mg            Allergies  No Known Allergies    DIET  Orders Placed This Encounter   Procedures   • Diet Order Diet: Regular (please include an Activia yogurt on breakfast and lunch trays. Thanks!)      Standing Status:   Standing     Number of Occurrences:   1     Order Specific Question:   Diet:     Answer:   Regular [1]     Comments:   please include an Activia yogurt on breakfast and lunch trays. Thanks!       ACTIVITY  As tolerated.  Weight bearing as tolerated    CONSULTATIONS  ID    PROCEDURES      LABORATORY  Lab Results   Component Value Date    SODIUM 140 05/27/2021    POTASSIUM 3.0 (L) 05/27/2021    CHLORIDE 110 05/27/2021    CO2 20 05/27/2021    GLUCOSE 93 05/27/2021    BUN 7 (L) 05/27/2021    CREATININE 0.68 05/27/2021        Lab Results   Component Value Date    WBC 14.7 (H) 05/27/2021    HEMOGLOBIN 8.4 (L) 05/27/2021    HEMATOCRIT 25.5 (L) 05/27/2021    PLATELETCT 630 (H) 05/27/2021        Total time of the discharge process exceeds 44 minutes.

## 2021-05-27 NOTE — THERAPY
"Occupational Therapy   Initial Evaluation     Patient Name: Jaymie Peacock  Age:  65 y.o., Sex:  female  Medical Record #: [id number]  Today's Date: 5/27/2021     Precautions  Precautions: Fall Risk    Assessment  Patient is 65 y.o. female who presents to acute due to cough, weakness, and diarrhea, found to have sepsis due to necrotizing pneumonia. Pt appears to have returned to functional baseline performing BADLs at SPV level. Recommend DC home.    Plan    Recommend Occupational Therapy Eval only    DC Equipment Recommendations: (P) None  Discharge Recommendations: (P) Home Health services.     Subjective    \"I need to get home to my cat!\"     Objective       05/27/21 1056   Total Time Spent   Total Time Spent (Mins) 20   Charge Group   OT Evaluation OT Evaluation Low   Initial Contact Note    Initial Contact Note Order Received and Verified, Occupational Therapy Evaluation in Progress with Full Report to Follow.   Prior Living Situation   Prior Services None   Housing / Facility 1 Story House  (w/ laundry in the basement)   Bathroom Set up Walk In Shower;Shower Chair   Equipment Owned Tub / Shower Seat   Lives with - Patient's Self Care Capacity Alone and Able to Care For Self   Comments Pt reports she has supportive neighbors that willl assist w/ groceries and laundry   Prior Level of ADL Function   Self Feeding Independent   Grooming / Hygiene Independent   Bathing Independent   Dressing Independent   Toileting Independent   Prior Level of IADL Function   Medication Management Independent   Laundry Independent   Kitchen Mobility Independent   Finances Independent   Home Management Independent   Shopping Independent   Prior Level Of Mobility Independent Without Device in Community;Independent Without Device in Home   Driving / Transportation Driving Independent   Occupation (Pre-Hospital Vocational) Retired Due To Age   Vitals   O2 Delivery Device None - Room Air   Pain 0 - 10 Group   Therapist Pain Assessment " Post Activity Pain Same as Prior to Activity;Nurse Notified  (no c/o pain during session)   Cognition    Cognition / Consciousness WDL   Level of Consciousness Alert   Comments pleasent, cooperative, eager to return home   Active ROM Upper Body   Active ROM Upper Body  WDL   Strength Upper Body   Upper Body Strength  WDL   Coordination Upper Body   Coordination WDL   Balance Assessment   Sitting Balance (Static) Fair +   Sitting Balance (Dynamic) Fair +   Standing Balance (Static) Fair   Standing Balance (Dynamic) Fair -   Weight Shift Sitting Good   Weight Shift Standing Fair   Comments no AD w/in room   Bed Mobility    Supine to Sit Supervised   Sit to Supine Supervised   Scooting Supervised   ADL Assessment   Grooming Supervision;Standing  (washed face, brushed hair)   Upper Body Dressing Supervision   Lower Body Dressing Supervision  (pants and shoes)   Toileting Supervision   How much help from another person does the patient currently need...   Putting on and taking off regular lower body clothing? 4   Bathing (including washing, rinsing, and drying)? 3   Toileting, which includes using a toilet, bedpan, or urinal? 4   Putting on and taking off regular upper body clothing? 4   Taking care of personal grooming such as brushing teeth? 4   Eating meals? 4   6 Clicks Daily Activity Score 23   Functional Mobility   Sit to Stand Supervised   Bed, Chair, Wheelchair Transfer Supervised   Toilet Transfers Supervised   Mobility within room and bathroom no AD   Edema / Skin Assessment   Edema / Skin  Not Assessed   Activity Tolerance   Sitting in Chair 2-3 min on toilet   Sitting Edge of Bed 8 min   Standing 8 min   Education Group   Role of Occupational Therapist Patient Response Patient;Acceptance;Explanation;Demonstration;Verbal Demonstration   Anticipated Discharge Equipment and Recommendations   DC Equipment Recommendations None   Discharge Recommendations Anticipate that the patient will have no further  occupational therapy needs after discharge from the hospital   Interdisciplinary Plan of Care Collaboration   IDT Collaboration with  Nursing   Patient Position at End of Therapy In Bed;Bed Alarm On;Call Light within Reach;Tray Table within Reach;Phone within Reach   Collaboration Comments report given   Session Information   Date / Session Number  5/27, 1x only

## 2021-05-27 NOTE — DISCHARGE PLANNING
Home Health to accept this referral pending patient's completion of appointment to establish with [facility] CALL scheduled for 06/02/21.  Thank you!

## 2021-05-27 NOTE — PROGRESS NOTES
Assumed care of pt. Bedside report received from night shift RNJaye. Pt aaox4, on RA and no signs of distress noted at this time. Tele box is on and rhythm verified. POC discussed with pt and pt verbalized no questions at this time. Bed low and locked. Call light and personal belongings within reach. All needs met at this time. Will continue POC

## 2021-05-27 NOTE — DISCHARGE PLANNING
Anticipated Discharge Disposition: Home with [facility]     Action: RN CM spoke with patient at bedside.  RN CM explained that [facility] will reach out to set up an appointment, and then home health services will start afterwards.  RN CM stressed the importance of attending appointment with [facility].  Patient verbalized understanding.    Barriers to Discharge: None     Plan: Case coordination available to discuss any further discharge barriers.

## 2021-05-27 NOTE — PROGRESS NOTES
Monitor Summary:     Rhythm:  SR 69-78  Ectopy: PVCs(r), PACs(r)  Measurements: .20/.07/.33

## 2021-05-27 NOTE — DISCHARGE PLANNING
Received Choice form at [phone]  Agency/Facility Name: [facility]  Referral sent per Choice form @ [phone]

## 2021-05-27 NOTE — DISCHARGE PLANNING
Anticipated Discharge Disposition: Home with HH    Action: RN CM spoke with patient at bedside.  Patient confirmed that the address on the facesheet is correct.  Patient's current PCP is Toña Tejada PA-C, last seen in late April.  RN CM went over  choice form with patient, patient provided choice for 1. [facility] , and 2. [facility] .  Choice form faxed to SAMRA Mohamud.    Barriers to Discharge:  set up     Plan: Case coordination to continue to follow up with medical team to discuss discharge barriers.

## 2021-05-27 NOTE — CARE PLAN
The patient is Stable - Low risk of patient condition declining or worsening    Shift Goals  Clinical Goals: Patient oxygen saturation will remain WDL on room air  Patient Goals: Patient will sleep with minimal disruption  Family Goals: N/A    Progress made toward(s) clinical / shift goals:  Patient O2 sats have remained in the mid-high 90s on RA. Patient has a productive cough but only mild WOB upon exertion.     Patient is not progressing towards the following goals: N/A    Problem: Respiratory  Goal: Patient will achieve/maintain optimum respiratory ventilation and gas exchange  Outcome: Progressing     Problem: Psychosocial  Goal: Patient's level of anxiety will decrease  Outcome: Progressing

## 2021-05-27 NOTE — THERAPY
Physical Therapy   Daily Treatment     Patient Name: Jaymie Peacock  Age:  65 y.o., Sex:  female  Medical Record #: [id number]  Today's Date: 5/27/2021     Precautions: Fall Risk    Assessment    Pt was pleasant and agreeable to therapy session. She has progressed with all mobility and more alert today compared to previous. Pt wanting to try further distance with no AD. She did complete with decreased angelia but no lob. Close SBA provided for safety, educated using fww for safety while recovering for longer distances. She completed all transfers within room with no ad and spv. Educated on pacing and energy conservation once home. At this time pt has met all acute therapy goals, updated primary PT at this time. Encouraged to continue to mobilize with nursing staff.     Plan      DC Equipment Recommendations: Front-Wheel Walker  Discharge Recommendations: Recommend home health for continued physical therapy services         05/27/21 0974   Other Treatments   Other Treatments Provided educated on energy conservation, pacing and safety with activity at home    Balance   Sitting Balance (Static) Fair +   Sitting Balance (Dynamic) Fair +   Standing Balance (Static) Fair   Standing Balance (Dynamic) Fair -   Weight Shift Sitting Good   Weight Shift Standing Fair   Comments with no AD    Gait Analysis   Gait Level Of Assist Supervised  (close SBA )   Assistive Device None   Distance (Feet) 200  (capable of further but wanting to wait for friends arrival )   # of Times Distance was Traveled 1   # of Stairs Climbed   (pt declined stating not concerned )   Skilled Intervention Verbal Cuing   Comments pt wanting to trial without ad, close SBA. Decreased angelia but no lob at this time. Educated fww for stability with longer distance while recovery. Receptive and agreeable   Bed Mobility    Supine to Sit Supervised   Sit to Supine Supervised   Scooting Supervised   Rolling Supervised   Comments no bed features at this time     Functional Mobility   Sit to Stand Supervised   Bed, Chair, Wheelchair Transfer Supervised   Comments no AD at this time    Short Term Goals    Short Term Goal # 1 pt will be able to ambulate 300ft with LRAD and SPV in 6tx in order to return home at prior level   Goal Outcome # 1 Progressing as expected  (appears capable but wants to wait for friends )   Short Term Goal # 2 pt will be able to negotiate 3 steps with LRAD and SPV in 6tx in order to return home  (deferred practicing at this time reports no concerns )

## 2021-05-27 NOTE — DISCHARGE INSTRUCTIONS
Discharge Instructions    Discharged to home by car with friend. Discharged via wheelchair, hospital escort: Yes.  Special equipment needed: Not Applicable    Be sure to schedule a follow-up appointment with your primary care doctor or any specialists as instructed.     Discharge Plan:   Diet Plan: Discussed  Activity Level: Discussed  Confirmed Follow up Appointment: Appointment Scheduled  Confirmed Symptoms Management: Discussed  Medication Reconciliation Updated: Yes    I understand that a diet low in cholesterol, fat, and sodium is recommended for good health. Unless I have been given specific instructions below for another diet, I accept this instruction as my diet prescription.   Other diet: Regular     Special Instructions: None    · Is patient discharged on Warfarin / Coumadin?   No     Depression / Suicide Risk    As you are discharged from this [facility] facility, it is important to learn how to keep safe from harming yourself.    Recognize the warning signs:  · Abrupt changes in personality, positive or negative- including increase in energy   · Giving away possessions  · Change in eating patterns- significant weight changes-  positive or negative  · Change in sleeping patterns- unable to sleep or sleeping all the time   · Unwillingness or inability to communicate  · Depression  · Unusual sadness, discouragement and loneliness  · Talk of wanting to die  · Neglect of personal appearance   · Rebelliousness- reckless behavior  · Withdrawal from people/activities they love  · Confusion- inability to concentrate     If you or a loved one observes any of these behaviors or has concerns about self-harm, here's what you can do:  · Talk about it- your feelings and reasons for harming yourself  · Remove any means that you might use to hurt yourself (examples: pills, rope, extension cords, firearm)  · Get professional help from the community (Mental Health, Substance Abuse, psychological counseling)  · Do not be  alone:Call your Safe Contact- someone whom you trust who will be there for you.  · Call your local CRISIS HOTLINE 164-8578 or 607-005-7044  · Call your local Children's Mobile Crisis Response Team [place] (268) 887-9583 or www.[employer]  · Call the toll free National Suicide Prevention Hotlines   · National Suicide Prevention Lifeline 721-283-FOEO (9821)  · [employer] Hope Line Network 800-SUICIDE (621-1311)        Community-Acquired Pneumonia, Adult  Pneumonia is an infection of the lungs. It causes swelling in the airways of the lungs. Mucus and fluid may also build up inside the airways.  One type of pneumonia can happen while a person is in a hospital. A different type can happen when a person is not in a hospital (community-acquired pneumonia).   What are the causes?    This condition is caused by germs (viruses, bacteria, or fungi). Some types of germs can be passed from one person to another. This can happen when you breathe in droplets from the cough or sneeze of an infected person.  What increases the risk?  You are more likely to develop this condition if you:  · Have a long-term (chronic) disease, such as:  ? Chronic obstructive pulmonary disease (COPD).  ? Asthma.  ? Cystic fibrosis.  ? Congestive heart failure.  ? Diabetes.  ? Kidney disease.  · Have HIV.  · Have sickle cell disease.  · Have had your spleen removed.  · Do not take good care of your teeth and mouth (poor dental hygiene).  · Have a medical condition that increases the risk of breathing in droplets from your own mouth and nose.  · Have a weakened body defense system (immune system).  · Are a smoker.  · Travel to areas where the germs that cause this illness are common.  · Are around certain animals or the places they live.  What are the signs or symptoms?  · A dry cough.  · A wet (productive) cough.  · Fever.  · Sweating.  · Chest pain. This often happens when breathing deeply or coughing.  · Fast breathing or trouble  breathing.  · Shortness of breath.  · Shaking chills.  · Feeling tired (fatigue).  · Muscle aches.  How is this treated?  Treatment for this condition depends on many things. Most adults can be treated at home. In some cases, treatment must happen in a hospital. Treatment may include:  · Medicines given by mouth or through an IV tube.  · Being given extra oxygen.  · Respiratory therapy.  In rare cases, treatment for very bad pneumonia may include:  · Using a machine to help you breathe.  · Having a procedure to remove fluid from around your lungs.  Follow these instructions at home:  Medicines  · Take over-the-counter and prescription medicines only as told by your doctor.  ? Only take cough medicine if you are losing sleep.  · If you were prescribed an antibiotic medicine, take it as told by your doctor. Do not stop taking the antibiotic even if you start to feel better.  General instructions    · Sleep with your head and neck raised (elevated). You can do this by sleeping in a recliner or by putting a few pillows under your head.  · Rest as needed. Get at least 8 hours of sleep each night.  · Drink enough water to keep your pee (urine) pale yellow.  · Eat a healthy diet that includes plenty of vegetables, fruits, whole grains, low-fat dairy products, and lean protein.  · Do not use any products that contain nicotine or tobacco. These include cigarettes, e-cigarettes, and chewing tobacco. If you need help quitting, ask your doctor.  · Keep all follow-up visits as told by your doctor. This is important.  How is this prevented?  A shot (vaccine) can help prevent pneumonia. Shots are often suggested for:  · People older than 65 years of age.  · People older than 19 years of age who:  ? Are having cancer treatment.  ? Have long-term (chronic) lung disease.  ? Have problems with their body's defense system.  You may also prevent pneumonia if you take these actions:  · Get the flu (influenza) shot every year.  · Go to  the dentist as often as told.  · Wash your hands often. If you cannot use soap and water, use hand .  Contact a doctor if:  · You have a fever.  · You lose sleep because your cough medicine does not help.  Get help right away if:  · You are short of breath and it gets worse.  · You have more chest pain.  · Your sickness gets worse. This is very serious if:  ? You are an older adult.  ? Your body's defense system is weak.  · You cough up blood.  Summary  · Pneumonia is an infection of the lungs.  · Most adults can be treated at home. Some will need treatment in a hospital.  · Drink enough water to keep your pee pale yellow.  · Get at least 8 hours of sleep each night.  This information is not intended to replace advice given to you by your health care provider. Make sure you discuss any questions you have with your health care provider.  Document Released: 06/05/2009 Document Revised: 04/08/2020 Document Reviewed: 08/15/2019  [employer] Patient Education © 2020 [employer]           Smoking Tobacco Information, Adult  Smoking tobacco can be harmful to your health. Tobacco contains a poisonous (toxic), colorless chemical called nicotine. Nicotine is addictive. It changes the brain and can make it hard to stop smoking. Tobacco also has other toxic chemicals that can hurt your body and raise your risk of many cancers.  How can smoking tobacco affect me?  Smoking tobacco puts you at risk for:  · Cancer. Smoking is most commonly associated with lung cancer, but can also lead to cancer in other parts of the body.  · Chronic obstructive pulmonary disease (COPD). This is a long-term lung condition that makes it hard to breathe. It also gets worse over time.  · High blood pressure (hypertension), heart disease, stroke, or heart attack.  · Lung infections, such as pneumonia.  · Cataracts. This is when the lenses in the eyes become clouded.  · Digestive problems. This may include peptic ulcers, heartburn, and  gastroesophageal reflux disease (GERD).  · Oral health problems, such as gum disease and tooth loss.  · Loss of taste and smell.  Smoking can affect your appearance by causing:  · Wrinkles.  · Yellow or stained teeth, fingers, and fingernails.  Smoking tobacco can also affect your social life, because:  · It may be challenging to find places to smoke when away from home. Many workplaces, restaurants, hotels, and public places are tobacco-free.  · Smoking is expensive. This is due to the cost of tobacco and the long-term costs of treating health problems from smoking.  · Secondhand smoke may affect those around you. Secondhand smoke can cause lung cancer, breathing problems, and heart disease. Children of smokers have a higher risk for:  ? Sudden infant death syndrome (SIDS).  ? Ear infections.  ? Lung infections.  If you currently smoke tobacco, quitting now can help you:  · Lead a longer and healthier life.  · Look, smell, breathe, and feel better over time.  · Save money.  · Protect others from the harms of secondhand smoke.  What actions can I take to prevent health problems?  Quit smoking    · Do not start smoking. Quit if you already do.  · Make a plan to quit smoking and commit to it. Look for programs to help you and ask your health care provider for recommendations and ideas.  · Set a date and write down all the reasons you want to quit.  · Let your friends and family know you are quitting so they can help and support you. Consider finding friends who also want to quit. It can be easier to quit with someone else, so that you can support each other.  · Talk with your health care provider about using nicotine replacement medicines to help you quit, such as gum, lozenges, patches, sprays, or pills.  · Do not replace cigarette smoking with electronic cigarettes, which are commonly called e-cigarettes. The safety of e-cigarettes is not known, and some may contain harmful chemicals.  · If you try to quit but  return to smoking, stay positive. It is common to slip up when you first quit, so take it one day at a time.  · Be prepared for cravings. When you feel the urge to smoke, chew gum or suck on hard candy.  Lifestyle  · Stay busy and take care of your body.  · Drink enough fluid to keep your urine pale yellow.  · Get plenty of exercise and eat a healthy diet. This can help prevent weight gain after quitting.  · Monitor your eating habits. Quitting smoking can cause you to have a larger appetite than when you smoke.  · Find ways to relax. Go out with friends or family to a movie or a restaurant where people do not smoke.  · Ask your health care provider about having regular tests (screenings) to check for cancer. This may include blood tests, imaging tests, and other tests.  · Find ways to manage your stress, such as meditation, yoga, or exercise.  Where to find support  To get support to quit smoking, consider:  · Asking your health care provider for more information and resources.  · Taking classes to learn more about quitting smoking.  · Looking for local organizations that offer resources about quitting smoking.  · Joining a support group for people who want to quit smoking in your local community.  · Calling the smokefree.gov counselor helpline: 1-800-Quit-Now (1-172.135.8400)  Where to find more information  You may find more information about quitting smoking from:  · HelpGuide.org: www.helpguide.org  · Smokefree.gov: smokefree.gov  · American Lung Association: www.lung.org  Contact a health care provider if you:  · Have problems breathing.  · Notice that your lips, nose, or fingers turn blue.  · Have chest pain.  · Are coughing up blood.  · Feel faint or you pass out.  · Have other health changes that cause you to worry.  Summary  · Smoking tobacco can negatively affect your health, the health of those around you, your finances, and your social life.  · Do not start smoking. Quit if you already do. If you need  help quitting, ask your health care provider.  · Think about joining a support group for people who want to quit smoking in your local community. There are many effective programs that will help you to quit this behavior.  This information is not intended to replace advice given to you by your health care provider. Make sure you discuss any questions you have with your health care provider.  Document Released: 01/02/2018 Document Revised: 02/06/2019 Document Reviewed: 01/02/2018  Elsevier Patient Education © 2020 [employer]vier Inc.

## 2021-05-28 ENCOUNTER — TELEPHONE (OUTPATIENT)
Dept: INFECTIOUS DISEASES | Facility: MEDICAL CENTER | Age: 66
End: 2021-05-28

## 2021-05-28 NOTE — DISCHARGE PLANNING
Meds-to-Beds: Discharge prescription orders listed below delivered to patient's bedside. ELROY Can notified. Patient counseled. Patient elected to have co-payment billed to patient account.       Jaymie Peacock   Home Medication Instructions MELY:[id number]    Printed on:05/27/21 0515   Medication Information                      amoxicillin-clavulanate (AUGMENTIN) 875-125 MG Tab  Take 1 tablet by mouth 2 times a day for 42 days.                 Vania Muniz, PharmD

## 2021-06-04 ENCOUNTER — HOSPITAL ENCOUNTER (OUTPATIENT)
Dept: LAB | Facility: MEDICAL CENTER | Age: 66
End: 2021-06-04
Attending: STUDENT IN AN ORGANIZED HEALTH CARE EDUCATION/TRAINING PROGRAM
Payer: MEDICARE

## 2021-06-04 LAB
ALBUMIN SERPL BCP-MCNC: 3 G/DL (ref 3.2–4.9)
ALBUMIN/GLOB SERPL: 0.8 G/DL
ALP SERPL-CCNC: 136 U/L (ref 30–99)
ALT SERPL-CCNC: 12 U/L (ref 2–50)
ANION GAP SERPL CALC-SCNC: 12 MMOL/L (ref 7–16)
APPEARANCE UR: ABNORMAL
AST SERPL-CCNC: 21 U/L (ref 12–45)
BACTERIA #/AREA URNS HPF: ABNORMAL /HPF
BASOPHILS # BLD AUTO: 0.7 % (ref 0–1.8)
BASOPHILS # BLD: 0.06 K/UL (ref 0–0.12)
BILIRUB SERPL-MCNC: 0.5 MG/DL (ref 0.1–1.5)
BILIRUB UR QL STRIP.AUTO: NEGATIVE
BUN SERPL-MCNC: 6 MG/DL (ref 8–22)
CALCIUM SERPL-MCNC: 9 MG/DL (ref 8.5–10.5)
CHLORIDE SERPL-SCNC: 106 MMOL/L (ref 96–112)
CO2 SERPL-SCNC: 21 MMOL/L (ref 20–33)
COLOR UR: YELLOW
CREAT SERPL-MCNC: 0.96 MG/DL (ref 0.5–1.4)
EOSINOPHIL # BLD AUTO: 0.07 K/UL (ref 0–0.51)
EOSINOPHIL NFR BLD: 0.8 % (ref 0–6.9)
EPI CELLS #/AREA URNS HPF: ABNORMAL /HPF
ERYTHROCYTE [DISTWIDTH] IN BLOOD BY AUTOMATED COUNT: 54.2 FL (ref 35.9–50)
FERRITIN SERPL-MCNC: 756 NG/ML (ref 10–291)
FOLATE SERPL-MCNC: 4 NG/ML
GLOBULIN SER CALC-MCNC: 3.6 G/DL (ref 1.9–3.5)
GLUCOSE SERPL-MCNC: 93 MG/DL (ref 65–99)
GLUCOSE UR STRIP.AUTO-MCNC: NEGATIVE MG/DL
HCT VFR BLD AUTO: 28.4 % (ref 37–47)
HGB BLD-MCNC: 9 G/DL (ref 12–16)
HYALINE CASTS #/AREA URNS LPF: ABNORMAL /LPF
IMM GRANULOCYTES # BLD AUTO: 0.06 K/UL (ref 0–0.11)
IMM GRANULOCYTES NFR BLD AUTO: 0.7 % (ref 0–0.9)
KETONES UR STRIP.AUTO-MCNC: NEGATIVE MG/DL
LEUKOCYTE ESTERASE UR QL STRIP.AUTO: NEGATIVE
LYMPHOCYTES # BLD AUTO: 1.14 K/UL (ref 1–4.8)
LYMPHOCYTES NFR BLD: 13.3 % (ref 22–41)
MAGNESIUM SERPL-MCNC: 1 MG/DL (ref 1.5–2.5)
MCH RBC QN AUTO: 35.2 PG (ref 27–33)
MCHC RBC AUTO-ENTMCNC: 31.7 G/DL (ref 33.6–35)
MCV RBC AUTO: 110.9 FL (ref 81.4–97.8)
MICRO URNS: ABNORMAL
MONOCYTES # BLD AUTO: 0.67 K/UL (ref 0–0.85)
MONOCYTES NFR BLD AUTO: 7.8 % (ref 0–13.4)
NEUTROPHILS # BLD AUTO: 6.58 K/UL (ref 2–7.15)
NEUTROPHILS NFR BLD: 76.7 % (ref 44–72)
NITRITE UR QL STRIP.AUTO: NEGATIVE
NRBC # BLD AUTO: 0 K/UL
NRBC BLD-RTO: 0 /100 WBC
PH UR STRIP.AUTO: 6.5 [PH] (ref 5–8)
PHOSPHATE SERPL-MCNC: 3.6 MG/DL (ref 2.5–4.5)
PLATELET # BLD AUTO: 391 K/UL (ref 164–446)
PMV BLD AUTO: 10.9 FL (ref 9–12.9)
POTASSIUM SERPL-SCNC: 4 MMOL/L (ref 3.6–5.5)
PROT SERPL-MCNC: 6.6 G/DL (ref 6–8.2)
PROT UR QL STRIP: 100 MG/DL
RBC # BLD AUTO: 2.56 M/UL (ref 4.2–5.4)
RBC # URNS HPF: ABNORMAL /HPF
RBC UR QL AUTO: NEGATIVE
SODIUM SERPL-SCNC: 139 MMOL/L (ref 135–145)
SP GR UR STRIP.AUTO: 1.02
TSH SERPL DL<=0.005 MIU/L-ACNC: 1.01 UIU/ML (ref 0.38–5.33)
UROBILINOGEN UR STRIP.AUTO-MCNC: 0.2 MG/DL
VIT B12 SERPL-MCNC: 1326 PG/ML (ref 211–911)
WBC # BLD AUTO: 8.6 K/UL (ref 4.8–10.8)
WBC #/AREA URNS HPF: ABNORMAL /HPF

## 2021-06-04 PROCEDURE — 82728 ASSAY OF FERRITIN: CPT

## 2021-06-04 PROCEDURE — 82746 ASSAY OF FOLIC ACID SERUM: CPT

## 2021-06-04 PROCEDURE — 84100 ASSAY OF PHOSPHORUS: CPT

## 2021-06-04 PROCEDURE — 84443 ASSAY THYROID STIM HORMONE: CPT

## 2021-06-04 PROCEDURE — 83735 ASSAY OF MAGNESIUM: CPT

## 2021-06-04 PROCEDURE — 85025 COMPLETE CBC W/AUTO DIFF WBC: CPT

## 2021-06-04 PROCEDURE — 81001 URINALYSIS AUTO W/SCOPE: CPT

## 2021-06-04 PROCEDURE — 82607 VITAMIN B-12: CPT

## 2021-06-04 PROCEDURE — 36415 COLL VENOUS BLD VENIPUNCTURE: CPT

## 2021-06-04 PROCEDURE — 80053 COMPREHEN METABOLIC PANEL: CPT

## 2021-06-08 ENCOUNTER — HOSPITAL ENCOUNTER (OUTPATIENT)
Dept: LAB | Facility: MEDICAL CENTER | Age: 66
End: 2021-06-08
Attending: STUDENT IN AN ORGANIZED HEALTH CARE EDUCATION/TRAINING PROGRAM
Payer: MEDICARE

## 2021-06-08 ENCOUNTER — APPOINTMENT (OUTPATIENT)
Dept: INFECTIOUS DISEASES | Facility: MEDICAL CENTER | Age: 66
End: 2021-06-08
Payer: MEDICARE

## 2021-06-08 LAB
ALBUMIN SERPL BCP-MCNC: 3.2 G/DL (ref 3.2–4.9)
ALBUMIN/GLOB SERPL: 0.9 G/DL
ALP SERPL-CCNC: 183 U/L (ref 30–99)
ALT SERPL-CCNC: 13 U/L (ref 2–50)
ANION GAP SERPL CALC-SCNC: 13 MMOL/L (ref 7–16)
AST SERPL-CCNC: 30 U/L (ref 12–45)
BASOPHILS # BLD AUTO: 0.7 % (ref 0–1.8)
BASOPHILS # BLD: 0.07 K/UL (ref 0–0.12)
BILIRUB SERPL-MCNC: 0.5 MG/DL (ref 0.1–1.5)
BUN SERPL-MCNC: 8 MG/DL (ref 8–22)
CALCIUM SERPL-MCNC: 8.9 MG/DL (ref 8.5–10.5)
CHLORIDE SERPL-SCNC: 102 MMOL/L (ref 96–112)
CO2 SERPL-SCNC: 24 MMOL/L (ref 20–33)
CREAT SERPL-MCNC: 0.82 MG/DL (ref 0.5–1.4)
EOSINOPHIL # BLD AUTO: 0.05 K/UL (ref 0–0.51)
EOSINOPHIL NFR BLD: 0.5 % (ref 0–6.9)
ERYTHROCYTE [DISTWIDTH] IN BLOOD BY AUTOMATED COUNT: 51.8 FL (ref 35.9–50)
FERRITIN SERPL-MCNC: 684 NG/ML (ref 10–291)
FOLATE SERPL-MCNC: 4.3 NG/ML
GLOBULIN SER CALC-MCNC: 3.7 G/DL (ref 1.9–3.5)
GLUCOSE SERPL-MCNC: 105 MG/DL (ref 65–99)
HCT VFR BLD AUTO: 29.4 % (ref 37–47)
HGB BLD-MCNC: 9.5 G/DL (ref 12–16)
IMM GRANULOCYTES # BLD AUTO: 0.04 K/UL (ref 0–0.11)
IMM GRANULOCYTES NFR BLD AUTO: 0.4 % (ref 0–0.9)
LYMPHOCYTES # BLD AUTO: 1.21 K/UL (ref 1–4.8)
LYMPHOCYTES NFR BLD: 12.3 % (ref 22–41)
MAGNESIUM SERPL-MCNC: 0.8 MG/DL (ref 1.5–2.5)
MCH RBC QN AUTO: 35.3 PG (ref 27–33)
MCHC RBC AUTO-ENTMCNC: 32.3 G/DL (ref 33.6–35)
MCV RBC AUTO: 109.3 FL (ref 81.4–97.8)
MONOCYTES # BLD AUTO: 0.65 K/UL (ref 0–0.85)
MONOCYTES NFR BLD AUTO: 6.6 % (ref 0–13.4)
NEUTROPHILS # BLD AUTO: 7.84 K/UL (ref 2–7.15)
NEUTROPHILS NFR BLD: 79.5 % (ref 44–72)
NRBC # BLD AUTO: 0 K/UL
NRBC BLD-RTO: 0 /100 WBC
PHOSPHATE SERPL-MCNC: 3.4 MG/DL (ref 2.5–4.5)
PLATELET # BLD AUTO: 352 K/UL (ref 164–446)
PMV BLD AUTO: 11.2 FL (ref 9–12.9)
POTASSIUM SERPL-SCNC: 3.4 MMOL/L (ref 3.6–5.5)
PROT SERPL-MCNC: 6.9 G/DL (ref 6–8.2)
RBC # BLD AUTO: 2.69 M/UL (ref 4.2–5.4)
SODIUM SERPL-SCNC: 139 MMOL/L (ref 135–145)
VIT B12 SERPL-MCNC: 1180 PG/ML (ref 211–911)
WBC # BLD AUTO: 9.9 K/UL (ref 4.8–10.8)

## 2021-06-08 PROCEDURE — 82746 ASSAY OF FOLIC ACID SERUM: CPT

## 2021-06-08 PROCEDURE — 83735 ASSAY OF MAGNESIUM: CPT

## 2021-06-08 PROCEDURE — 82607 VITAMIN B-12: CPT

## 2021-06-08 PROCEDURE — 82728 ASSAY OF FERRITIN: CPT

## 2021-06-08 PROCEDURE — 84100 ASSAY OF PHOSPHORUS: CPT

## 2021-06-08 PROCEDURE — 36415 COLL VENOUS BLD VENIPUNCTURE: CPT

## 2021-06-08 PROCEDURE — 80053 COMPREHEN METABOLIC PANEL: CPT

## 2021-06-08 PROCEDURE — 85025 COMPLETE CBC W/AUTO DIFF WBC: CPT

## 2021-06-11 ENCOUNTER — HOSPITAL ENCOUNTER (OUTPATIENT)
Dept: LAB | Facility: MEDICAL CENTER | Age: 66
End: 2021-06-11
Attending: STUDENT IN AN ORGANIZED HEALTH CARE EDUCATION/TRAINING PROGRAM
Payer: MEDICARE

## 2021-06-11 LAB
ALBUMIN SERPL BCP-MCNC: 3.1 G/DL (ref 3.2–4.9)
ALBUMIN/GLOB SERPL: 0.9 G/DL
ALP SERPL-CCNC: 193 U/L (ref 30–99)
ALT SERPL-CCNC: 9 U/L (ref 2–50)
ANION GAP SERPL CALC-SCNC: 14 MMOL/L (ref 7–16)
AST SERPL-CCNC: 22 U/L (ref 12–45)
BASOPHILS # BLD AUTO: 0.6 % (ref 0–1.8)
BASOPHILS # BLD: 0.04 K/UL (ref 0–0.12)
BILIRUB SERPL-MCNC: 0.5 MG/DL (ref 0.1–1.5)
BUN SERPL-MCNC: 10 MG/DL (ref 8–22)
CALCIUM SERPL-MCNC: 9.2 MG/DL (ref 8.5–10.5)
CHLORIDE SERPL-SCNC: 100 MMOL/L (ref 96–112)
CO2 SERPL-SCNC: 24 MMOL/L (ref 20–33)
CREAT SERPL-MCNC: 0.86 MG/DL (ref 0.5–1.4)
EOSINOPHIL # BLD AUTO: 0.1 K/UL (ref 0–0.51)
EOSINOPHIL NFR BLD: 1.4 % (ref 0–6.9)
ERYTHROCYTE [DISTWIDTH] IN BLOOD BY AUTOMATED COUNT: 50.2 FL (ref 35.9–50)
FASTING STATUS PATIENT QL REPORTED: NORMAL
GLOBULIN SER CALC-MCNC: 3.6 G/DL (ref 1.9–3.5)
GLUCOSE SERPL-MCNC: 100 MG/DL (ref 65–99)
HCT VFR BLD AUTO: 31 % (ref 37–47)
HGB BLD-MCNC: 9.9 G/DL (ref 12–16)
IMM GRANULOCYTES # BLD AUTO: 0.03 K/UL (ref 0–0.11)
IMM GRANULOCYTES NFR BLD AUTO: 0.4 % (ref 0–0.9)
LYMPHOCYTES # BLD AUTO: 1.03 K/UL (ref 1–4.8)
LYMPHOCYTES NFR BLD: 14.4 % (ref 22–41)
MAGNESIUM SERPL-MCNC: 0.9 MG/DL (ref 1.5–2.5)
MCH RBC QN AUTO: 35.1 PG (ref 27–33)
MCHC RBC AUTO-ENTMCNC: 31.9 G/DL (ref 33.6–35)
MCV RBC AUTO: 109.9 FL (ref 81.4–97.8)
MONOCYTES # BLD AUTO: 0.5 K/UL (ref 0–0.85)
MONOCYTES NFR BLD AUTO: 7 % (ref 0–13.4)
NEUTROPHILS # BLD AUTO: 5.43 K/UL (ref 2–7.15)
NEUTROPHILS NFR BLD: 76.2 % (ref 44–72)
NRBC # BLD AUTO: 0 K/UL
NRBC BLD-RTO: 0 /100 WBC
PHOSPHATE SERPL-MCNC: 2.9 MG/DL (ref 2.5–4.5)
PLATELET # BLD AUTO: 397 K/UL (ref 164–446)
PMV BLD AUTO: 11 FL (ref 9–12.9)
POTASSIUM SERPL-SCNC: 4 MMOL/L (ref 3.6–5.5)
PROT SERPL-MCNC: 6.7 G/DL (ref 6–8.2)
RBC # BLD AUTO: 2.82 M/UL (ref 4.2–5.4)
SODIUM SERPL-SCNC: 138 MMOL/L (ref 135–145)
WBC # BLD AUTO: 7.1 K/UL (ref 4.8–10.8)

## 2021-06-11 PROCEDURE — 80053 COMPREHEN METABOLIC PANEL: CPT

## 2021-06-11 PROCEDURE — 83735 ASSAY OF MAGNESIUM: CPT

## 2021-06-11 PROCEDURE — 36415 COLL VENOUS BLD VENIPUNCTURE: CPT

## 2021-06-11 PROCEDURE — 85025 COMPLETE CBC W/AUTO DIFF WBC: CPT

## 2021-06-11 PROCEDURE — 84100 ASSAY OF PHOSPHORUS: CPT

## 2021-06-15 NOTE — PROGRESS NOTES
[facility]    Subjective:     Chief Complaint   Patient presents with   • Hospital Follow-up     Necrotizing pneumonia     This is my first time meeting Ms. Peacock.      Interval History: 65 y.o. female with a past medical history of alcohol abuse and tobacco abuse.  Hospitalized from 5/22-5/27/2021, admitted for worsening productive cough with shortness of breath.  Patient noted that the cough was shortness of breath started approximately 10 days PTA.  CT of the chest showed significant right-sided necrotizing pneumonia and loculated air-fluid collection thought to represent an abscess.  Blood and urine cultures on 5/22 were negative.  Sputum cultures on 5/23 were negative.  Treated with IV Unasyn inpatient, then transitioned to p.o. Augmentin 875/125 mg twice daily x4 to 6 weeks with repeat CT chest outpatient prior to completion of antibiotics.     Hospital records reviewed    Today, 6/16/2021: Patient reports feeling much better since discharging home from the hospital.  Has been tolerating the p.o. Augmentin without adverse effect.  Denies feeling generally ill, fevers/chills, general malaise, headache, n/v/d, abdominal pain, chest pain, shortness of breath, cough, sore throat or rash.  Has an appointment with her PCP next week.  Is still has not schedule an appointment to see pulmonary, but plans to do so soon.  Notes that she is going on vacation from 6/29-7/9.  Notes that she would prefer to stay on the antibiotics during her vacation to avoid any issues with completing them just prior to her vacation.  Continues to smoke half pack PPD.    ROS as above in HPI.    Past Medical History:   Diagnosis Date   • Hypertension    • Smoking        Social History     Tobacco Use   • Smoking status: Current Every Day Smoker     Packs/day: 0.50     Types: Cigarettes   • Smokeless tobacco: Never Used   Vaping Use   • Vaping Use: Never used   Substance Use Topics   • Alcohol use: Not Currently     Comment:  "Stopped 12 days ago   • Drug use: Not Currently       Allergies: Patient has no known allergies.    Pt's medication and problem list reviewed.     Objective:     /60 (BP Location: Left arm, Patient Position: Sitting, BP Cuff Size: Adult)   Pulse 82   Temp 36.4 °C (97.5 °F) (Temporal)   Resp 16   Ht 1.676 m (5' 6\") Comment: patient reported  Wt 51.5 kg (113 lb 8 oz)   SpO2 98%   BMI 18.32 kg/m²     Physical Exam  Vitals reviewed.   Constitutional:       General: She is not in acute distress.     Appearance: Normal appearance. She is not ill-appearing.   HENT:      Head: Normocephalic and atraumatic.      Right Ear: External ear normal.      Left Ear: External ear normal.   Eyes:      Extraocular Movements: Extraocular movements intact.      Conjunctiva/sclera: Conjunctivae normal.      Pupils: Pupils are equal, round, and reactive to light.   Cardiovascular:      Rate and Rhythm: Normal rate and regular rhythm.      Heart sounds: Normal heart sounds. No murmur heard.     Pulmonary:      Effort: Pulmonary effort is normal. No respiratory distress.      Breath sounds: Normal breath sounds. No wheezing, rhonchi or rales.   Chest:      Chest wall: No tenderness.   Abdominal:      General: Abdomen is flat. Bowel sounds are normal. There is no distension.      Palpations: Abdomen is soft.      Tenderness: There is no abdominal tenderness.   Musculoskeletal:         General: No swelling. Normal range of motion.      Cervical back: Normal range of motion and neck supple. No tenderness.   Skin:     General: Skin is warm and dry.      Findings: No erythema or rash.   Neurological:      General: No focal deficit present.      Mental Status: She is alert and oriented to person, place, and time. Mental status is at baseline.      Cranial Nerves: No cranial nerve deficit.      Gait: Gait normal.   Psychiatric:         Mood and Affect: Mood normal.         Behavior: Behavior normal.         Thought Content: Thought " content normal.         Judgment: Judgment normal.      Comments: Pleasant         Labs:   Ref. Range 6/11/2021 10:38   WBC Latest Ref Range: 4.8 - 10.8 K/uL 7.1   RBC Latest Ref Range: 4.20 - 5.40 M/uL 2.82 (L)   Hemoglobin Latest Ref Range: 12.0 - 16.0 g/dL 9.9 (L)   Hematocrit Latest Ref Range: 37.0 - 47.0 % 31.0 (L)   MCV Latest Ref Range: 81.4 - 97.8 fL 109.9 (H)   MCH Latest Ref Range: 27.0 - 33.0 pg 35.1 (H)   MCHC Latest Ref Range: 33.6 - 35.0 g/dL 31.9 (L)   RDW Latest Ref Range: 35.9 - 50.0 fL 50.2 (H)   Platelet Count Latest Ref Range: 164 - 446 K/uL 397   MPV Latest Ref Range: 9.0 - 12.9 fL 11.0   Neutrophils-Polys Latest Ref Range: 44.00 - 72.00 % 76.20 (H)   Neutrophils (Absolute) Latest Ref Range: 2.00 - 7.15 K/uL 5.43   Lymphocytes Latest Ref Range: 22.00 - 41.00 % 14.40 (L)   Lymphs (Absolute) Latest Ref Range: 1.00 - 4.80 K/uL 1.03   Monocytes Latest Ref Range: 0.00 - 13.40 % 7.00   Monos (Absolute) Latest Ref Range: 0.00 - 0.85 K/uL 0.50   Eosinophils Latest Ref Range: 0.00 - 6.90 % 1.40   Eos (Absolute) Latest Ref Range: 0.00 - 0.51 K/uL 0.10   Basophils Latest Ref Range: 0.00 - 1.80 % 0.60   Baso (Absolute) Latest Ref Range: 0.00 - 0.12 K/uL 0.04   Immature Granulocytes Latest Ref Range: 0.00 - 0.90 % 0.40   Immature Granulocytes (abs) Latest Ref Range: 0.00 - 0.11 K/uL 0.03   Nucleated RBC Latest Units: /100 WBC 0.00   NRBC (Absolute) Latest Units: K/uL 0.00   Sodium Latest Ref Range: 135 - 145 mmol/L 138   Potassium Latest Ref Range: 3.6 - 5.5 mmol/L 4.0   Chloride Latest Ref Range: 96 - 112 mmol/L 100   Co2 Latest Ref Range: 20 - 33 mmol/L 24   Anion Gap Latest Ref Range: 7.0 - 16.0  14.0   Glucose Latest Ref Range: 65 - 99 mg/dL 100 (H)   Bun Latest Ref Range: 8 - 22 mg/dL 10   Creatinine Latest Ref Range: 0.50 - 1.40 mg/dL 0.86   GFR If  Latest Ref Range: >60 mL/min/1.73 m 2 >60   GFR If Non  Latest Ref Range: >60 mL/min/1.73 m 2 >60   Calcium Latest Ref  Range: 8.5 - 10.5 mg/dL 9.2   AST(SGOT) Latest Ref Range: 12 - 45 U/L 22   ALT(SGPT) Latest Ref Range: 2 - 50 U/L 9   Alkaline Phosphatase Latest Ref Range: 30 - 99 U/L 193 (H)   Total Bilirubin Latest Ref Range: 0.1 - 1.5 mg/dL 0.5   Albumin Latest Ref Range: 3.2 - 4.9 g/dL 3.1 (L)   Total Protein Latest Ref Range: 6.0 - 8.2 g/dL 6.7   Globulin Latest Ref Range: 1.9 - 3.5 g/dL 3.6 (H)   A-G Ratio Latest Units: g/dL 0.9   Phosphorus Latest Ref Range: 2.5 - 4.5 mg/dL 2.9   Magnesium Latest Ref Range: 1.5 - 2.5 mg/dL 0.9 (LL)   Fasting Status Unknown Non-Fasting        Assessment and Plan:   The following treatment plan was discussed with patient at length:    1. Necrotizing pneumonia (HCC)  DX-CHEST-2 VIEWS    -Continue p.o. Augmentin 875/175 mg twice daily, to complete a 4 to 6-week course.  Repeat chest x-ray to be done at the end of next week to determine if patient is okay to finish the Augmentin at the 4-week timeframe.   2. Leukocytosis, unspecified type      Resolved on last check   3. Thrombocytosis (HCC)      Resolved on last check   4. Diarrhea, unspecified type      Resolved     Follow up: 4 weeks, RTC sooner if needed. FU with PCP for ongoing chronic medical conditions.     Nicole Burrell, ENEDINA.P.R.N.       Please note that this dictation was created using voice recognition software. I have  worked with technical experts from [facility]  [employer] to optimize the interface.  I have made every reasonable attempt to correct obvious errors, but there may be errors of grammar and possibly content that I did not discover before finalizing the note.

## 2021-06-16 ENCOUNTER — OFFICE VISIT (OUTPATIENT)
Dept: INFECTIOUS DISEASES | Facility: MEDICAL CENTER | Age: 66
End: 2021-06-16
Payer: MEDICARE

## 2021-06-16 VITALS
WEIGHT: 113.5 LBS | OXYGEN SATURATION: 98 % | BODY MASS INDEX: 18.24 KG/M2 | TEMPERATURE: 97.5 F | DIASTOLIC BLOOD PRESSURE: 60 MMHG | HEART RATE: 82 BPM | SYSTOLIC BLOOD PRESSURE: 114 MMHG | HEIGHT: 66 IN | RESPIRATION RATE: 16 BRPM

## 2021-06-16 DIAGNOSIS — D72.829 LEUKOCYTOSIS, UNSPECIFIED TYPE: ICD-10-CM

## 2021-06-16 DIAGNOSIS — J85.0 NECROTIZING PNEUMONIA (HCC): ICD-10-CM

## 2021-06-16 DIAGNOSIS — R19.7 DIARRHEA, UNSPECIFIED TYPE: ICD-10-CM

## 2021-06-16 DIAGNOSIS — D75.839 THROMBOCYTOSIS: ICD-10-CM

## 2021-06-16 PROCEDURE — 99214 OFFICE O/P EST MOD 30 MIN: CPT | Performed by: NURSE PRACTITIONER

## 2021-06-16 RX ORDER — NALTREXONE HYDROCHLORIDE 50 MG/1
50 TABLET, FILM COATED ORAL
COMMUNITY
Start: 2021-06-04 | End: 2022-02-18

## 2021-06-16 RX ORDER — MELOXICAM 15 MG/1
TABLET ORAL
COMMUNITY
Start: 2021-04-27 | End: 2021-06-16

## 2021-06-16 ASSESSMENT — FIBROSIS 4 INDEX: FIB4 SCORE: 1.22

## 2021-06-24 ENCOUNTER — HOSPITAL ENCOUNTER (OUTPATIENT)
Dept: RADIOLOGY | Facility: MEDICAL CENTER | Age: 66
End: 2021-06-24
Attending: STUDENT IN AN ORGANIZED HEALTH CARE EDUCATION/TRAINING PROGRAM
Payer: MEDICARE

## 2021-06-24 ENCOUNTER — HOSPITAL ENCOUNTER (OUTPATIENT)
Dept: LAB | Facility: MEDICAL CENTER | Age: 66
End: 2021-06-24
Attending: STUDENT IN AN ORGANIZED HEALTH CARE EDUCATION/TRAINING PROGRAM
Payer: MEDICARE

## 2021-06-24 ENCOUNTER — HOSPITAL ENCOUNTER (OUTPATIENT)
Dept: RADIOLOGY | Facility: MEDICAL CENTER | Age: 66
End: 2021-06-24
Attending: NURSE PRACTITIONER
Payer: MEDICARE

## 2021-06-24 ENCOUNTER — DOCUMENTATION (OUTPATIENT)
Dept: INFECTIOUS DISEASES | Facility: MEDICAL CENTER | Age: 66
End: 2021-06-24

## 2021-06-24 DIAGNOSIS — M25.559 PAIN IN UNSPECIFIED HIP: ICD-10-CM

## 2021-06-24 DIAGNOSIS — J85.0 NECROTIZING PNEUMONIA (HCC): ICD-10-CM

## 2021-06-24 LAB
ALBUMIN SERPL BCP-MCNC: 3.4 G/DL (ref 3.2–4.9)
ALBUMIN/GLOB SERPL: 1.1 G/DL
ALP SERPL-CCNC: 111 U/L (ref 30–99)
ALT SERPL-CCNC: 13 U/L (ref 2–50)
ANION GAP SERPL CALC-SCNC: 11 MMOL/L (ref 7–16)
AST SERPL-CCNC: 33 U/L (ref 12–45)
BASOPHILS # BLD AUTO: 0.7 % (ref 0–1.8)
BASOPHILS # BLD: 0.04 K/UL (ref 0–0.12)
BILIRUB SERPL-MCNC: 0.3 MG/DL (ref 0.1–1.5)
BUN SERPL-MCNC: 13 MG/DL (ref 8–22)
CALCIUM SERPL-MCNC: 9.4 MG/DL (ref 8.5–10.5)
CHLORIDE SERPL-SCNC: 105 MMOL/L (ref 96–112)
CO2 SERPL-SCNC: 26 MMOL/L (ref 20–33)
CREAT SERPL-MCNC: 1.07 MG/DL (ref 0.5–1.4)
EOSINOPHIL # BLD AUTO: 0.06 K/UL (ref 0–0.51)
EOSINOPHIL NFR BLD: 1 % (ref 0–6.9)
ERYTHROCYTE [DISTWIDTH] IN BLOOD BY AUTOMATED COUNT: 48.1 FL (ref 35.9–50)
GLOBULIN SER CALC-MCNC: 3.2 G/DL (ref 1.9–3.5)
GLUCOSE SERPL-MCNC: 116 MG/DL (ref 65–99)
HCT VFR BLD AUTO: 33.9 % (ref 37–47)
HGB BLD-MCNC: 10.8 G/DL (ref 12–16)
IMM GRANULOCYTES # BLD AUTO: 0.01 K/UL (ref 0–0.11)
IMM GRANULOCYTES NFR BLD AUTO: 0.2 % (ref 0–0.9)
LYMPHOCYTES # BLD AUTO: 1.08 K/UL (ref 1–4.8)
LYMPHOCYTES NFR BLD: 17.9 % (ref 22–41)
MAGNESIUM SERPL-MCNC: 1.4 MG/DL (ref 1.5–2.5)
MCH RBC QN AUTO: 34.3 PG (ref 27–33)
MCHC RBC AUTO-ENTMCNC: 31.9 G/DL (ref 33.6–35)
MCV RBC AUTO: 107.6 FL (ref 81.4–97.8)
MONOCYTES # BLD AUTO: 0.36 K/UL (ref 0–0.85)
MONOCYTES NFR BLD AUTO: 6 % (ref 0–13.4)
NEUTROPHILS # BLD AUTO: 4.47 K/UL (ref 2–7.15)
NEUTROPHILS NFR BLD: 74.2 % (ref 44–72)
NRBC # BLD AUTO: 0 K/UL
NRBC BLD-RTO: 0 /100 WBC
PHOSPHATE SERPL-MCNC: 2.9 MG/DL (ref 2.5–4.5)
PLATELET # BLD AUTO: 359 K/UL (ref 164–446)
PMV BLD AUTO: 11.1 FL (ref 9–12.9)
POTASSIUM SERPL-SCNC: 3.8 MMOL/L (ref 3.6–5.5)
PROT SERPL-MCNC: 6.6 G/DL (ref 6–8.2)
RBC # BLD AUTO: 3.15 M/UL (ref 4.2–5.4)
SODIUM SERPL-SCNC: 142 MMOL/L (ref 135–145)
WBC # BLD AUTO: 6 K/UL (ref 4.8–10.8)

## 2021-06-24 PROCEDURE — 84100 ASSAY OF PHOSPHORUS: CPT

## 2021-06-24 PROCEDURE — 36415 COLL VENOUS BLD VENIPUNCTURE: CPT

## 2021-06-24 PROCEDURE — 85025 COMPLETE CBC W/AUTO DIFF WBC: CPT

## 2021-06-24 PROCEDURE — 72202 X-RAY EXAM SI JOINTS 3/> VWS: CPT

## 2021-06-24 PROCEDURE — 83735 ASSAY OF MAGNESIUM: CPT

## 2021-06-24 PROCEDURE — 80053 COMPREHEN METABOLIC PANEL: CPT

## 2021-06-24 PROCEDURE — 71046 X-RAY EXAM CHEST 2 VIEWS: CPT

## 2021-06-24 NOTE — PROGRESS NOTES
Chest x-ray reviewed, showing opacifications in RLL and RML, possibly atelectasis or scarring versus persistent infiltrate/pneumonia; however no pleural effusions appreciated.  Called patient to go over results, states that she is feeling good, no shortness of breath or cough.  Wants to complete the full 6-week course as she is leaving for a vacation within the next week and does not want to risk getting sick during that time.  If doing well by the time her follow-up appointment on 7/13 occurs without issue, then she can cancel the appointment at that time.

## 2021-06-28 ENCOUNTER — HOSPITAL ENCOUNTER (OUTPATIENT)
Dept: RADIOLOGY | Facility: MEDICAL CENTER | Age: 66
End: 2021-06-28
Attending: STUDENT IN AN ORGANIZED HEALTH CARE EDUCATION/TRAINING PROGRAM
Payer: MEDICARE

## 2021-06-28 DIAGNOSIS — Z12.31 ENCOUNTER FOR MAMMOGRAM TO ESTABLISH BASELINE MAMMOGRAM: ICD-10-CM

## 2021-06-28 DIAGNOSIS — N95.9 UNSPECIFIED MENOPAUSAL AND PERIMENOPAUSAL DISORDER: ICD-10-CM

## 2021-06-28 PROCEDURE — 77080 DXA BONE DENSITY AXIAL: CPT

## 2021-06-28 PROCEDURE — 77063 BREAST TOMOSYNTHESIS BI: CPT

## 2021-07-13 ENCOUNTER — APPOINTMENT (OUTPATIENT)
Dept: INFECTIOUS DISEASES | Facility: MEDICAL CENTER | Age: 66
End: 2021-07-13
Payer: MEDICARE

## 2021-07-15 ENCOUNTER — HOME HEALTH ADMISSION (OUTPATIENT)
Dept: HOME HEALTH SERVICES | Facility: HOME HEALTHCARE | Age: 66
End: 2021-07-15
Payer: MEDICARE

## 2021-07-17 ENCOUNTER — HOME CARE VISIT (OUTPATIENT)
Dept: HOME HEALTH SERVICES | Facility: HOME HEALTHCARE | Age: 66
End: 2021-07-17
Payer: MEDICARE

## 2021-07-17 PROCEDURE — 665001 SOC-HOME HEALTH

## 2021-07-17 PROCEDURE — G0493 RN CARE EA 15 MIN HH/HOSPICE: HCPCS

## 2021-07-17 ASSESSMENT — ENCOUNTER SYMPTOMS: DEBILITATING PAIN: 1

## 2021-07-17 ASSESSMENT — FIBROSIS 4 INDEX: FIB4 SCORE: 1.68

## 2021-07-18 VITALS
TEMPERATURE: 97.8 F | HEART RATE: 88 BPM | RESPIRATION RATE: 18 BRPM | HEIGHT: 68 IN | WEIGHT: 126 LBS | BODY MASS INDEX: 19.1 KG/M2 | DIASTOLIC BLOOD PRESSURE: 68 MMHG | OXYGEN SATURATION: 97 % | SYSTOLIC BLOOD PRESSURE: 110 MMHG

## 2021-07-18 SDOH — ECONOMIC STABILITY: HOUSING INSECURITY
HOME SAFETY: EDUCATED PT ON RISKS FOR FALLS R/T UNEVEN FLOORING,SEVERAL THROW RUGS,IMPROPER FOOTWEAR; PT HAS AN AFO TO BE WORN TO RLE AND WEARING SOMETIMES")"

## 2021-07-18 ASSESSMENT — PATIENT HEALTH QUESTIONNAIRE - PHQ9
2. FEELING DOWN, DEPRESSED, IRRITABLE, OR HOPELESS: 00
CLINICAL INTERPRETATION OF PHQ2 SCORE: 0
1. LITTLE INTEREST OR PLEASURE IN DOING THINGS: 00

## 2021-07-18 ASSESSMENT — ENCOUNTER SYMPTOMS
VOMITING: DENIES
SHORTNESS OF BREATH: T
NAUSEA: DENIES

## 2021-07-19 ENCOUNTER — HOME CARE VISIT (OUTPATIENT)
Dept: HOME HEALTH SERVICES | Facility: HOME HEALTHCARE | Age: 66
End: 2021-07-19
Payer: MEDICARE

## 2021-07-19 ENCOUNTER — DOCUMENTATION (OUTPATIENT)
Dept: MEDICAL GROUP | Facility: PHYSICIAN GROUP | Age: 66
End: 2021-07-19

## 2021-07-19 VITALS
DIASTOLIC BLOOD PRESSURE: 80 MMHG | TEMPERATURE: 98.2 F | SYSTOLIC BLOOD PRESSURE: 120 MMHG | OXYGEN SATURATION: 98 % | HEART RATE: 87 BPM | RESPIRATION RATE: 18 BRPM

## 2021-07-19 PROCEDURE — G0151 HHCP-SERV OF PT,EA 15 MIN: HCPCS

## 2021-07-19 PROCEDURE — G0493 RN CARE EA 15 MIN HH/HOSPICE: HCPCS

## 2021-07-19 ASSESSMENT — ENCOUNTER SYMPTOMS: DEBILITATING PAIN: 1

## 2021-07-19 NOTE — PROGRESS NOTES
Medication chart review for [facility] services    PCP:  Toña Tejada P.A.-C.  [address] [address]  [place] [address]  Fax: [phone]    Current medication list     Current Outpatient Medications:   •  meloxicam, 15 mg, Oral, DAILY  •  enoxaparin, 40 mg, Subcutaneous, DAILY  •  Calcium-Magnesium-Vitamin D (CALCIUM 1200+D3 PO), 1 tablet, Oral, DAILY AT 1800  •  acetaminophen, 1,000 mg, Oral, Q6HRS PRN  •  Ocuvite Adult 50+, 1 capsule, Oral, DAILY AT 1800  •  HYDROcodone-acetaminophen, 1 tablet, Oral, Q4HRS  •  Magnesium Glycinate, 1 capsule, Oral, DAILY AT 1800  •  naltrexone, Take 50 mg by mouth.  •  losartan-hydrochlorothiazide, 1 tablet, Oral, DAILY  •  simvastatin, 20 mg, Oral, QDAY    No Known Allergies     Labs     Lab Results   Component Value Date/Time    SODIUM 142 06/24/2021 11:42 AM    POTASSIUM 3.8 06/24/2021 11:42 AM    CHLORIDE 105 06/24/2021 11:42 AM    CO2 26 06/24/2021 11:42 AM    GLUCOSE 116 (H) 06/24/2021 11:42 AM    BUN 13 06/24/2021 11:42 AM    CREATININE 1.07 06/24/2021 11:42 AM      Lab Results   Component Value Date/Time    ALKPHOSPHAT 111 (H) 06/24/2021 11:42 AM    ASTSGOT 33 06/24/2021 11:42 AM    ALTSGPT 13 06/24/2021 11:42 AM    TBILIRUBIN 0.3 06/24/2021 11:42 AM    ALBUMIN 3.4 06/24/2021 11:42 AM    ALBUMIN 4.21 09/15/2020 02:55 PM    INR 1.19 (H) 05/22/2021 04:45 PM          Assessment and Plan:   • Received referral from [facility]. Medications reviewed.     Drug-Drug: meloxicam and enoxaparin  The risk of bleeding induced by LMWH and Factor XA Inhibitors may be increased by coadministration of NSAIDs.           Rodriguez Gold, PharmD, MS, BCACP, LCC  [facility]  Phone [phone] fax [phone]    This note was created using voice recognition software (Dragon). The accuracy of the dictation is limited by the abilities of the software. I have reviewed the note prior to signing, however some errors in grammar and context are still  possible. If you have any questions related to this note please do not hesitate to contact our office.

## 2021-07-19 NOTE — Clinical Note
"History pertinent to today's visit: COPD, Wound Care, Anticoagulation/Bleeding precautions and Pneumonia . Skilled need: Teaching,assessment. Pt reports her pain is \"3\", improving using Tylenol mainly; reporetd her \"friends are bringing her meals and her appetite great\"  Right hip dressing D&I no s/sx infection; pt denies any falls, no medications changes.Pt to call Dr. Noble today to schedule apoointment for f/u. Edema to BLE improving since last SN visit as she is elevating and walking frequently. Pt/Cg response to the services provided: Pt is positively thinking, trying to continue to cut back on her smoking and has strong support system. Plan for the next visit: Per POC. Case communication: SE  "

## 2021-07-20 VITALS
SYSTOLIC BLOOD PRESSURE: 112 MMHG | RESPIRATION RATE: 18 BRPM | HEART RATE: 88 BPM | TEMPERATURE: 97.6 F | DIASTOLIC BLOOD PRESSURE: 68 MMHG

## 2021-07-20 ASSESSMENT — BALANCE ASSESSMENTS
STANDING BALANCE: 1 - STEADY BUT WIDE STANCE AND USES CANE OR OTHER SUPPORT
EYES CLOSED AT MAXIMUM POSITION NUDGED: 1 - STEADY
SITTING DOWN: 1 - USES ARMS OR NOT SMOOTH MOTION
NUDGED SCORE: 1
SITTING BALANCE: 1 - STEADY, SAFE
NUDGED: 1 - STAGGERS, GRABS, CATCHES SELF
ATTEMPTS TO ARISE: 1 - ABLE, REQUIRES MORE THAN ONE ATTEMPT
ARISING SCORE: 1
IMMEDIATE STANDING BALANCE FIRST 5 SECONDS: 1 - STEADY BUT USES WALKER OR OTHER SUPPORT
ARISES: 1 - ABLE, USES ARMS TO HELP
TURNING 360 DEGREES STEPS: 0 - DISCONTINUOUS STEPS
BALANCE SCORE: 8

## 2021-07-20 ASSESSMENT — ACTIVITIES OF DAILY LIVING (ADL)
AMBULATION ASSISTANCE ON FLAT SURFACES: 1
AMBULATION_DISTANCE/DURATION_TOLERATED: 75 FEET X 2

## 2021-07-20 ASSESSMENT — GAIT ASSESSMENTS
PATH SCORE: 1
TRUNK: 0 - MARKED SWAY OR USES WALKING AID
WALKING STANCE: 0 - HEELS APART
STEP CONTINUITY: 0 - STOPPING OR DISCONTINUITY BETWEEN STEPS
GAIT SCORE: 5
PATH: 1 - MILD/MODERATE DEVIATION OR USES WALKING AID
BALANCE AND GAIT SCORE: 13
INITIATION OF GAIT IMMEDIATELY AFTER GO: 1 - NO HESITANCY
STEP SYMMETRY: 0 - RIGHT AND LEFT STEP LENGTH NOT EQUAL
TRUNK SCORE: 0

## 2021-07-20 ASSESSMENT — ENCOUNTER SYMPTOMS
NAUSEA: DENIES
ARTHRALGIAS: 1
DEBILITATING PAIN: 1
MUSCLE WEAKNESS: 1
LIMITED RANGE OF MOTION: 1
LIMITED RANGE OF MOTION: 1
MUSCLE WEAKNESS: 1
VOMITING: DENIES

## 2021-07-22 ENCOUNTER — HOME CARE VISIT (OUTPATIENT)
Dept: HOME HEALTH SERVICES | Facility: HOME HEALTHCARE | Age: 66
End: 2021-07-22
Payer: MEDICARE

## 2021-07-22 VITALS
SYSTOLIC BLOOD PRESSURE: 118 MMHG | DIASTOLIC BLOOD PRESSURE: 62 MMHG | RESPIRATION RATE: 18 BRPM | OXYGEN SATURATION: 95 % | HEART RATE: 89 BPM | TEMPERATURE: 97.5 F

## 2021-07-22 PROCEDURE — G0152 HHCP-SERV OF OT,EA 15 MIN: HCPCS

## 2021-07-22 PROCEDURE — G0299 HHS/HOSPICE OF RN EA 15 MIN: HCPCS

## 2021-07-22 ASSESSMENT — ENCOUNTER SYMPTOMS
VOMITING: DENIES
LIMITED RANGE OF MOTION: 1
MUSCLE WEAKNESS: 1
NAUSEA: DENIES

## 2021-07-23 ENCOUNTER — PATIENT OUTREACH (OUTPATIENT)
Dept: HEALTH INFORMATION MANAGEMENT | Facility: OTHER | Age: 66
End: 2021-07-23

## 2021-07-23 ENCOUNTER — HOME CARE VISIT (OUTPATIENT)
Dept: HOME HEALTH SERVICES | Facility: HOME HEALTHCARE | Age: 66
End: 2021-07-23
Payer: MEDICARE

## 2021-07-23 VITALS
TEMPERATURE: 97.6 F | OXYGEN SATURATION: 95 % | HEART RATE: 89 BPM | DIASTOLIC BLOOD PRESSURE: 66 MMHG | SYSTOLIC BLOOD PRESSURE: 122 MMHG | RESPIRATION RATE: 18 BRPM

## 2021-07-23 PROCEDURE — G0151 HHCP-SERV OF PT,EA 15 MIN: HCPCS

## 2021-07-23 NOTE — NON-PROVIDER
Outcome: Left Message to schedule Comprehensive Health Assessment.      Please transfer to Patient Outreach Team at [phone] when patient returns call.        Attempt # 1

## 2021-07-26 ENCOUNTER — HOME CARE VISIT (OUTPATIENT)
Dept: HOME HEALTH SERVICES | Facility: HOME HEALTHCARE | Age: 66
End: 2021-07-26
Payer: MEDICARE

## 2021-07-26 VITALS
SYSTOLIC BLOOD PRESSURE: 118 MMHG | RESPIRATION RATE: 18 BRPM | HEART RATE: 84 BPM | OXYGEN SATURATION: 95 % | DIASTOLIC BLOOD PRESSURE: 62 MMHG | TEMPERATURE: 96.5 F

## 2021-07-26 VITALS
OXYGEN SATURATION: 95 % | SYSTOLIC BLOOD PRESSURE: 118 MMHG | TEMPERATURE: 96.6 F | DIASTOLIC BLOOD PRESSURE: 62 MMHG | HEART RATE: 84 BPM | RESPIRATION RATE: 18 BRPM

## 2021-07-26 PROCEDURE — G0152 HHCP-SERV OF OT,EA 15 MIN: HCPCS

## 2021-07-26 PROCEDURE — G0299 HHS/HOSPICE OF RN EA 15 MIN: HCPCS

## 2021-07-26 PROCEDURE — G0151 HHCP-SERV OF PT,EA 15 MIN: HCPCS

## 2021-07-26 ASSESSMENT — ENCOUNTER SYMPTOMS
LIMITED RANGE OF MOTION: 1
NAUSEA: DENIES
VOMITING: DENIES

## 2021-07-26 ASSESSMENT — ACTIVITIES OF DAILY LIVING (ADL)
DRESSING_LB_CURRENT_FUNCTION: INDEPENDENT
OASIS_M1830: 05

## 2021-07-26 NOTE — CASE COMMUNICATION
Quality Review for 7.17.21 SOC OASIS performed on by ROSIO Garcia RN on 7.26, 2021:    Edits completed by ROSIO Garcia RN:  1. Labeled chart for REMSA COPD upon dc  2. Changed  to 3 and  to 7.8.21 per Epic  3. Added #6 to  pt is a current smoker  4. Changed  to 3 per functional limitations  5. Changed , , ,  to 2,  to 3, [id number] C,E,F,G,H to 4, [id number] A,B,C,D,E,F,I,J, K,P to 4 per narrative pt needs supervision with ambulation, transfers and dressing. Changed  to 5, pt would need showering DME to safely perform  6. Changed  and  to 3 per ambulation score  7. Changed  to 6  8. Added right AFO brace to activities permitted. Added prognosis as good per chart review  9. Added ambulate only with assistance, fall risk and high risk medication precautions to safety measures  10. Updated F2F data  11. Changed  to 2 per wound assessment and narrative  12. Added post op care and anticoag education to care plan

## 2021-07-26 NOTE — CASE COMMUNICATION
Pt axox4, temporal temp 96.6F, asymptomatic, all other VS WNL. Surgical incision covered by semi-transparent initial dressing, no drainage note. Pt will establish with Dr. Surya Singletary, orthopaedic surgeon, tomorrow. Surgeon will assess wound. She had right hip surgery at [facility] early July. SN has scheduled visit to follow up after MD appt. Will update wound care plan if appropriate.   Strong cigarette smell inside house. Pt doesn't use any supplement oxygen. Reviewed smoking cessation with pt. Pt voices no plan to quit.

## 2021-07-27 PROBLEM — M21.371 RIGHT FOOT DROP: Status: ACTIVE | Noted: 2021-07-27

## 2021-07-27 PROBLEM — Z96.641 HISTORY OF HEMIARTHROPLASTY OF RIGHT HIP: Status: ACTIVE | Noted: 2021-07-27

## 2021-07-27 ASSESSMENT — ENCOUNTER SYMPTOMS
PERSON REPORTING PAIN: PATIENT
DENIES PAIN: 1

## 2021-07-27 NOTE — CASE COMMUNICATION
"CM noted.   ----- Message -----  From: Carlene Floyd R.N.  Sent: 7/20/2021   8:44 AM PDT  To: Leena Franz R.N.      History pertinent to today's visit: COPD, Wound Care, Anticoagulation/Bleeding precautions and Pneumonia . Skilled need: Teaching,assessment. Pt reports her pain is \"3\", improving using Tylenol mainly; reporetd her \"friends are bringing her meals and her appetite great\"  Right hip dressing D&I no s/sx infection; pt denies any falls, no medications changes.Pt to call Dr. Noble today to schedule apoointment for f/u. Edema to BLE improving since last SN visit as she is elevating and walking frequently. Pt/Cg response to the services provided: Pt is positively thinking, trying to continue to cut back on her smoking and has strong support system. Plan for the next visit: Per POC. Case communication: SE  "

## 2021-07-28 ASSESSMENT — ENCOUNTER SYMPTOMS
AGGRESSION WITHIN DEFINED LIMITS: 1
ANGER WITHIN DEFINED LIMITS: 1

## 2021-07-28 NOTE — CASE COMMUNICATION
I agree with these changes  ----- Message -----  From: Katie Garcia R.N.  Sent: 7/26/2021   3:14 PM PDT  To: Carlene Floyd R.N.      Quality Review for 7.17.21 SOC OASIS performed on by ROSIO Garcia RN on 7.26, 2021:    Edits completed by ROSIO Garcia RN:  1. Labeled chart for REMSA COPD upon dc  2. Changed  to 3 and  to 7.8.21 per Epic  3. Added #6 to  pt is a current smoker  4. Changed  to 3 per functional limitations  5. Changed , , ,  to 2,  to 3, [id number] C,E,F,G,H to 4, [id number] A,B,C,D,E,F,I,J, K,P to 4 per narrative pt needs supervision with ambulation, transfers and dressing. Changed  to 5, pt would need showering DME to safely perform  6. Changed  and  to 3 per ambulation score  7. Changed  to 6  8. Added right AFO brace to activities permitted. Added prognosis as good per chart review  9. Added ambulate only with assistance, fall risk and high risk medication precautions to safety measures  10. Updated F2F data  11. Changed  to 2 per wound assessment and narrative  12. Added post op care and anticoag education to care plan

## 2021-07-29 ENCOUNTER — HOME CARE VISIT (OUTPATIENT)
Dept: HOME HEALTH SERVICES | Facility: HOME HEALTHCARE | Age: 66
End: 2021-07-29
Payer: MEDICARE

## 2021-07-29 PROCEDURE — G0299 HHS/HOSPICE OF RN EA 15 MIN: HCPCS

## 2021-07-30 ENCOUNTER — HOME CARE VISIT (OUTPATIENT)
Dept: HOME HEALTH SERVICES | Facility: HOME HEALTHCARE | Age: 66
End: 2021-07-30
Payer: MEDICARE

## 2021-07-30 VITALS
HEART RATE: 89 BPM | TEMPERATURE: 97.7 F | SYSTOLIC BLOOD PRESSURE: 114 MMHG | DIASTOLIC BLOOD PRESSURE: 72 MMHG | RESPIRATION RATE: 18 BRPM | OXYGEN SATURATION: 97 %

## 2021-07-30 PROCEDURE — G0151 HHCP-SERV OF PT,EA 15 MIN: HCPCS

## 2021-07-30 ASSESSMENT — ENCOUNTER SYMPTOMS
PERSON REPORTING PAIN: PATIENT
PAIN LOCATION: BACK
PAIN LOCATION - PAIN QUALITY: ACHY
LOWEST PAIN SEVERITY IN PAST 24 HOURS: 0/10
PAIN LOCATION - PAIN SEVERITY: 3/10
PAIN LOCATION - PAIN FREQUENCY: INTERMITTENT
HIGHEST PAIN SEVERITY IN PAST 24 HOURS: 3/10
PAIN LOCATION - EXACERBATING FACTORS: ACTIVITY
PAIN SEVERITY GOAL: 0/10
DENIES PAIN: 1

## 2021-08-01 VITALS
OXYGEN SATURATION: 99 % | TEMPERATURE: 98.1 F | SYSTOLIC BLOOD PRESSURE: 100 MMHG | HEART RATE: 85 BPM | DIASTOLIC BLOOD PRESSURE: 72 MMHG | RESPIRATION RATE: 18 BRPM

## 2021-08-01 ASSESSMENT — ENCOUNTER SYMPTOMS
PAIN LOCATION - PAIN QUALITY: ACHY
PAIN LOCATION - RELIEVING FACTORS: RESTING
VOMITING: DENIES
HIGHEST PAIN SEVERITY IN PAST 24 HOURS: 1/10
PAIN LOCATION: RIGHT HIP
LOWEST PAIN SEVERITY IN PAST 24 HOURS: 0/10
PAIN LOCATION - PAIN FREQUENCY: INFREQUENT
PAIN SEVERITY GOAL: 0/10
PAIN LOCATION - PAIN SEVERITY: 1/10
PAIN: 1
NAUSEA: DENIES
MUSCLE WEAKNESS: 1
PAIN LOCATION - EXACERBATING FACTORS: PHYSICAL ACTIVITIES
SUBJECTIVE PAIN PROGRESSION: RAPIDLY IMPROVING

## 2021-08-01 ASSESSMENT — PAIN SCALES - PAIN ASSESSMENT IN ADVANCED DEMENTIA (PAINAD)
TOTALSCORE: 0
BODYLANGUAGE: 0 - RELAXED.
NEGVOCALIZATION: 0 - NONE.
CONSOLABILITY: 0 - NO NEED TO CONSOLE.
FACIALEXPRESSION: 0 - SMILING OR INEXPRESSIVE.

## 2021-08-02 NOTE — CASE COMMUNICATION
SNV on 7/29/21  Pt axox4, all VS WNL. Pt was seen by Dr. Singletary at [facility] on 7/27/21 to establish. Xray was done and no significant finding per pt. Dr. Singletary has ordered MRI for foot drop evaluation. Surgical dressing wasn't removed during MD visit. Pt had right hip repair on 7/5/21 at [facility] in [place]. Per pt she was told by her surgeon at the [facility] that the initial dressing will fall off. However, it's been over three week and dressing is still in place. This RN called and LVM with Dr. Singletary's MA to obtain new wound order order. No call back by end of Friday. Wound photo is taken and sent to pt's chart.

## 2021-08-04 ENCOUNTER — HOME CARE VISIT (OUTPATIENT)
Dept: HOME HEALTH SERVICES | Facility: HOME HEALTHCARE | Age: 66
End: 2021-08-04
Payer: MEDICARE

## 2021-08-04 VITALS
DIASTOLIC BLOOD PRESSURE: 72 MMHG | OXYGEN SATURATION: 98 % | HEART RATE: 100 BPM | SYSTOLIC BLOOD PRESSURE: 116 MMHG | RESPIRATION RATE: 18 BRPM | TEMPERATURE: 97.7 F

## 2021-08-04 PROCEDURE — G0151 HHCP-SERV OF PT,EA 15 MIN: HCPCS

## 2021-08-04 PROCEDURE — G0299 HHS/HOSPICE OF RN EA 15 MIN: HCPCS

## 2021-08-04 ASSESSMENT — GAIT ASSESSMENTS
INITIATION OF GAIT IMMEDIATELY AFTER GO: 1 - NO HESITANCY
TRUNK SCORE: 0
PATH SCORE: 1
PATH: 1 - MILD/MODERATE DEVIATION OR USES WALKING AID
WALKING STANCE: 0 - HEELS APART
GAIT SCORE: 6
BALANCE AND GAIT SCORE: 20
STEP SYMMETRY: 0 - RIGHT AND LEFT STEP LENGTH NOT EQUAL
TRUNK: 0 - MARKED SWAY OR USES WALKING AID
STEP CONTINUITY: 1 - STEPS APPEAR CONTINUOUS

## 2021-08-04 ASSESSMENT — BALANCE ASSESSMENTS
ATTEMPTS TO ARISE: 2 - ABLE TO RISE, ONE ATTEMPT
EYES CLOSED AT MAXIMUM POSITION NUDGED: 1 - STEADY
ARISING SCORE: 1
STANDING BALANCE: 2 - NARROW STANCE WITHOUT SUPPORT
NUDGED SCORE: 2
BALANCE SCORE: 14
SITTING BALANCE: 1 - STEADY, SAFE
ARISES: 1 - ABLE, USES ARMS TO HELP
IMMEDIATE STANDING BALANCE FIRST 5 SECONDS: 2 - STEADY WITHOUT WALKER OR OTHER SUPPORT
SITTING DOWN: 2 - SAFE, SMOOTH MOTION
TURNING 360 DEGREES STEPS: 0 - DISCONTINUOUS STEPS
NUDGED: 2 - STEADY

## 2021-08-04 ASSESSMENT — ENCOUNTER SYMPTOMS
HIGHEST PAIN SEVERITY IN PAST 24 HOURS: 2/10
LOWEST PAIN SEVERITY IN PAST 24 HOURS: 0/10
PAIN LOCATION - PAIN FREQUENCY: INTERMITTENT
PERSON REPORTING PAIN: PATIENT
PAIN LOCATION: BACK
SUBJECTIVE PAIN PROGRESSION: GRADUALLY IMPROVING
PAIN SEVERITY GOAL: 0/10
PAIN LOCATION - PAIN QUALITY: ACHY
PAIN: 1
PAIN LOCATION - EXACERBATING FACTORS: USE OF WALKER
PAIN LOCATION - PAIN SEVERITY: 1/10

## 2021-08-04 NOTE — Clinical Note
d/c pt from HHPT services effective 8/4/21 with  PT goals met. Highly recommend pt follow up with outpatient physical therapy once discharged from  services.

## 2021-08-06 VITALS
TEMPERATURE: 97.7 F | RESPIRATION RATE: 18 BRPM | HEART RATE: 87 BPM | DIASTOLIC BLOOD PRESSURE: 72 MMHG | OXYGEN SATURATION: 98 % | SYSTOLIC BLOOD PRESSURE: 116 MMHG

## 2021-08-06 ASSESSMENT — ENCOUNTER SYMPTOMS
LOWEST PAIN SEVERITY IN PAST 24 HOURS: 0/10
NAUSEA: DENIES
PAIN SEVERITY GOAL: 0/10
DENIES PAIN: 1
PERSON REPORTING PAIN: PATIENT
HIGHEST PAIN SEVERITY IN PAST 24 HOURS: 0/10
VOMITING: DENIES

## 2021-08-07 NOTE — CASE COMMUNICATION
Initial surgical dressing removed per order. Surgical incision approximated, ERICKSON, no active bleeding/drainage, no s/sx of infection. Photo taken and sent to chart.  Pt signed [facility] for 8/11/21 for agency [place]. All goals met. Plan to transition to outpatient PT.

## 2021-08-11 ENCOUNTER — HOME CARE VISIT (OUTPATIENT)
Dept: HOME HEALTH SERVICES | Facility: HOME HEALTHCARE | Age: 66
End: 2021-08-11
Payer: MEDICARE

## 2021-08-11 PROCEDURE — G0493 RN CARE EA 15 MIN HH/HOSPICE: HCPCS

## 2021-08-11 NOTE — CASE COMMUNICATION
CM noted. Thank you.    ----- Message -----  From: Sj Carter PT  Sent: 8/4/2021   1:17 PM PDT  To: Leena Franz R.N.      Pt presents with a resting HR of 100bpm.

## 2021-08-11 NOTE — CASE COMMUNICATION
Pt will be DC'd from [facility] on 8/11/21. Plan to transition to outpatient PT next week.   ----- Message -----  From: Sj Carter, PT  Sent: 8/4/2021   1:17 PM PDT  To: TROY Wilcox pt from PT services effective 8/4/21 with  PT goals met. Highly recommend pt follow up with outpatient physical therapy once discharged from  services.

## 2021-08-13 ENCOUNTER — DOCUMENTATION (OUTPATIENT)
Dept: HOME HEALTH SERVICES | Facility: HOME HEALTHCARE | Age: 66
End: 2021-08-13

## 2021-08-13 ENCOUNTER — HOME CARE VISIT (OUTPATIENT)
Dept: HOME HEALTH SERVICES | Facility: HOME HEALTHCARE | Age: 66
End: 2021-08-13
Payer: MEDICARE

## 2021-08-13 VITALS
DIASTOLIC BLOOD PRESSURE: 62 MMHG | RESPIRATION RATE: 16 BRPM | HEART RATE: 84 BPM | TEMPERATURE: 97.4 F | SYSTOLIC BLOOD PRESSURE: 114 MMHG | OXYGEN SATURATION: 97 %

## 2021-08-13 ASSESSMENT — ENCOUNTER SYMPTOMS
LOWEST PAIN SEVERITY IN PAST 24 HOURS: 0/10
DENIES PAIN: 1
HIGHEST PAIN SEVERITY IN PAST 24 HOURS: 0/10
PAIN SEVERITY GOAL: 0/10
PERSON REPORTING PAIN: PATIENT

## 2021-08-13 ASSESSMENT — PAIN SCALES - PAIN ASSESSMENT IN ADVANCED DEMENTIA (PAINAD)
NEGVOCALIZATION: 0 - NONE.
CONSOLABILITY: 0 - NO NEED TO CONSOLE.
TOTALSCORE: 0
FACIALEXPRESSION: 0 - SMILING OR INEXPRESSIVE.
BODYLANGUAGE: 0 - RELAXED.

## 2021-08-13 ASSESSMENT — PATIENT HEALTH QUESTIONNAIRE - PHQ9: CLINICAL INTERPRETATION OF PHQ2 SCORE: 0

## 2021-08-13 ASSESSMENT — ACTIVITIES OF DAILY LIVING (ADL)
HOME_HEALTH_OASIS: 00
OASIS_M1830: 00

## 2021-08-13 NOTE — CASE COMMUNICATION
Quality Review for 8/11/21 [place] OASIS by BERRY Salazar, ELROY on  August 12, 2021      Edits completed by BERRY Salazar RN:  1.  is NA, there is no documentation of a clinically significant medication issue identified

## 2021-08-13 NOTE — CASE COMMUNICATION
FYI: Jaymie Peacock has been referred to the [facility] Outreach program for continued care and follow up for COPD after discharge from [facility]. Thank you for allowing [facility] to serve your patients health care needs.

## 2021-08-14 NOTE — CASE COMMUNICATION
I agree with these changes.   ----- Message -----  From: Liana Salazar R.N.  Sent: 8/13/2021  10:58 AM PDT  To: Leena Franz R.N.      Quality Review for 8/11/21 [place] OASIS by BERRY Salazar, RN on  August 12, 2021      Edits completed by BERRY Salazar RN:  1.  is NA, there is no documentation of a clinically significant medication issue identified

## 2021-08-30 ENCOUNTER — APPOINTMENT (OUTPATIENT)
Dept: SLEEP MEDICINE | Facility: MEDICAL CENTER | Age: 66
End: 2021-08-30
Payer: MEDICARE

## 2021-09-22 NOTE — NON-PROVIDER
Outcome: Patient declined Comprehensive [place] Assessment. Requested FIT kit.     Please transfer to Patient Outreach Team at [phone] when patient returns call.    [facility] Verified: yes    Attempt # 1

## 2021-12-15 NOTE — ASSESSMENT & PLAN NOTE
----- Message from Devonte Sue MD sent at 12/15/2021  7:13 AM CST -----  Liver shows likely fatty deposits but would like GI eyes opinion on this.  Please refer to GI   Smoking cessation counseling  Nicotine patch

## 2022-02-18 ENCOUNTER — APPOINTMENT (OUTPATIENT)
Dept: RADIOLOGY | Facility: MEDICAL CENTER | Age: 67
DRG: 280 | End: 2022-02-18
Attending: EMERGENCY MEDICINE
Payer: MEDICARE

## 2022-02-18 ENCOUNTER — HOSPITAL ENCOUNTER (INPATIENT)
Facility: MEDICAL CENTER | Age: 67
LOS: 10 days | DRG: 280 | End: 2022-02-28
Attending: EMERGENCY MEDICINE | Admitting: INTERNAL MEDICINE
Payer: MEDICARE

## 2022-02-18 ENCOUNTER — APPOINTMENT (OUTPATIENT)
Dept: RADIOLOGY | Facility: MEDICAL CENTER | Age: 67
DRG: 280 | End: 2022-02-18
Attending: INTERNAL MEDICINE
Payer: MEDICARE

## 2022-02-18 DIAGNOSIS — F10.10 ALCOHOL ABUSE: ICD-10-CM

## 2022-02-18 DIAGNOSIS — I50.20 HFREF (HEART FAILURE WITH REDUCED EJECTION FRACTION) (HCC): ICD-10-CM

## 2022-02-18 DIAGNOSIS — I51.81 TAKOTSUBO CARDIOMYOPATHY: ICD-10-CM

## 2022-02-18 DIAGNOSIS — J43.9 PULMONARY EMPHYSEMA, UNSPECIFIED EMPHYSEMA TYPE (HCC): ICD-10-CM

## 2022-02-18 DIAGNOSIS — D72.829 LEUKOCYTOSIS, UNSPECIFIED TYPE: ICD-10-CM

## 2022-02-18 DIAGNOSIS — K92.1 GASTROINTESTINAL HEMORRHAGE WITH MELENA: ICD-10-CM

## 2022-02-18 DIAGNOSIS — I50.21 ACUTE SYSTOLIC HEART FAILURE (HCC): ICD-10-CM

## 2022-02-18 DIAGNOSIS — E83.42 HYPOMAGNESEMIA: ICD-10-CM

## 2022-02-18 DIAGNOSIS — N17.9 AKI (ACUTE KIDNEY INJURY) (HCC): ICD-10-CM

## 2022-02-18 DIAGNOSIS — E43 PROTEIN-CALORIE MALNUTRITION, SEVERE (HCC): ICD-10-CM

## 2022-02-18 DIAGNOSIS — G93.40 ENCEPHALOPATHY ACUTE: ICD-10-CM

## 2022-02-18 DIAGNOSIS — I21.4 NSTEMI (NON-ST ELEVATION MYOCARDIAL INFARCTION) (HCC): ICD-10-CM

## 2022-02-18 PROBLEM — E46 MALNUTRITION (HCC): Status: ACTIVE | Noted: 2022-02-18

## 2022-02-18 PROBLEM — D64.9 ANEMIA: Status: ACTIVE | Noted: 2022-02-18

## 2022-02-18 LAB
ABO + RH BLD: NORMAL
ABO GROUP BLD: NORMAL
ALBUMIN SERPL BCP-MCNC: 3.6 G/DL (ref 3.2–4.9)
ALBUMIN/GLOB SERPL: 1.4 G/DL
ALP SERPL-CCNC: 94 U/L (ref 30–99)
ALT SERPL-CCNC: 5 U/L (ref 2–50)
ANION GAP SERPL CALC-SCNC: 28 MMOL/L (ref 7–16)
APPEARANCE UR: CLEAR
AST SERPL-CCNC: 9 U/L (ref 12–45)
BARCODED ABORH UBTYP: 6200
BARCODED PRD CODE UBPRD: NORMAL
BARCODED UNIT NUM UBUNT: NORMAL
BASOPHILS # BLD AUTO: 1 % (ref 0–1.8)
BASOPHILS # BLD: 0.05 K/UL (ref 0–0.12)
BILIRUB SERPL-MCNC: 0.3 MG/DL (ref 0.1–1.5)
BILIRUB UR QL STRIP.AUTO: ABNORMAL
BLD GP AB SCN SERPL QL: NORMAL
BUN SERPL-MCNC: 169 MG/DL (ref 8–22)
CALCIUM SERPL-MCNC: 9.7 MG/DL (ref 8.5–10.5)
CHLORIDE SERPL-SCNC: 103 MMOL/L (ref 96–112)
CO2 SERPL-SCNC: 13 MMOL/L (ref 20–33)
COLOR UR: YELLOW
COMPONENT R 8504R: NORMAL
CREAT SERPL-MCNC: 3.4 MG/DL (ref 0.5–1.4)
EOSINOPHIL # BLD AUTO: 0.06 K/UL (ref 0–0.51)
EOSINOPHIL NFR BLD: 1.2 % (ref 0–6.9)
ERYTHROCYTE [DISTWIDTH] IN BLOOD BY AUTOMATED COUNT: 43 FL (ref 35.9–50)
GLOBULIN SER CALC-MCNC: 2.6 G/DL (ref 1.9–3.5)
GLUCOSE SERPL-MCNC: 105 MG/DL (ref 65–99)
GLUCOSE UR STRIP.AUTO-MCNC: NEGATIVE MG/DL
HCT VFR BLD AUTO: 22.7 % (ref 37–47)
HGB BLD-MCNC: 7.9 G/DL (ref 12–16)
IMM GRANULOCYTES # BLD AUTO: 0.04 K/UL (ref 0–0.11)
IMM GRANULOCYTES NFR BLD AUTO: 0.8 % (ref 0–0.9)
KETONES UR STRIP.AUTO-MCNC: ABNORMAL MG/DL
LACTATE BLD-SCNC: 1.2 MMOL/L (ref 0.5–2)
LACTATE BLD-SCNC: 1.6 MMOL/L (ref 0.5–2)
LEUKOCYTE ESTERASE UR QL STRIP.AUTO: NEGATIVE
LYMPHOCYTES # BLD AUTO: 0.4 K/UL (ref 1–4.8)
LYMPHOCYTES NFR BLD: 7.9 % (ref 22–41)
MAGNESIUM SERPL-MCNC: 1.4 MG/DL (ref 1.5–2.5)
MCH RBC QN AUTO: 32.1 PG (ref 27–33)
MCHC RBC AUTO-ENTMCNC: 34.8 G/DL (ref 33.6–35)
MCV RBC AUTO: 92.3 FL (ref 81.4–97.8)
MICRO URNS: ABNORMAL
MONOCYTES # BLD AUTO: 0.52 K/UL (ref 0–0.85)
MONOCYTES NFR BLD AUTO: 10.2 % (ref 0–13.4)
NEUTROPHILS # BLD AUTO: 4.01 K/UL (ref 2–7.15)
NEUTROPHILS NFR BLD: 78.9 % (ref 44–72)
NITRITE UR QL STRIP.AUTO: NEGATIVE
NRBC # BLD AUTO: 0 K/UL
NRBC BLD-RTO: 0 /100 WBC
PH UR STRIP.AUTO: 6 [PH] (ref 5–8)
PHOSPHATE SERPL-MCNC: 5 MG/DL (ref 2.5–4.5)
PLATELET # BLD AUTO: 276 K/UL (ref 164–446)
PMV BLD AUTO: 11 FL (ref 9–12.9)
POTASSIUM SERPL-SCNC: 2.7 MMOL/L (ref 3.6–5.5)
POTASSIUM SERPL-SCNC: 2.7 MMOL/L (ref 3.6–5.5)
PROCALCITONIN SERPL-MCNC: 0.22 NG/ML
PRODUCT TYPE UPROD: NORMAL
PROT SERPL-MCNC: 6.2 G/DL (ref 6–8.2)
PROT UR QL STRIP: NEGATIVE MG/DL
RBC # BLD AUTO: 2.46 M/UL (ref 4.2–5.4)
RBC UR QL AUTO: NEGATIVE
RH BLD: NORMAL
SODIUM SERPL-SCNC: 144 MMOL/L (ref 135–145)
SP GR UR STRIP.AUTO: 1.01
TSH SERPL DL<=0.005 MIU/L-ACNC: 0.64 UIU/ML (ref 0.38–5.33)
UNIT STATUS USTAT: NORMAL
UROBILINOGEN UR STRIP.AUTO-MCNC: 0.2 MG/DL
WBC # BLD AUTO: 5.1 K/UL (ref 4.8–10.8)

## 2022-02-18 PROCEDURE — 86901 BLOOD TYPING SEROLOGIC RH(D): CPT

## 2022-02-18 PROCEDURE — 84132 ASSAY OF SERUM POTASSIUM: CPT

## 2022-02-18 PROCEDURE — 86923 COMPATIBILITY TEST ELECTRIC: CPT

## 2022-02-18 PROCEDURE — 99223 1ST HOSP IP/OBS HIGH 75: CPT | Mod: AI | Performed by: INTERNAL MEDICINE

## 2022-02-18 PROCEDURE — 700105 HCHG RX REV CODE 258: Performed by: EMERGENCY MEDICINE

## 2022-02-18 PROCEDURE — 87086 URINE CULTURE/COLONY COUNT: CPT

## 2022-02-18 PROCEDURE — 81003 URINALYSIS AUTO W/O SCOPE: CPT

## 2022-02-18 PROCEDURE — 71045 X-RAY EXAM CHEST 1 VIEW: CPT

## 2022-02-18 PROCEDURE — 700102 HCHG RX REV CODE 250 W/ 637 OVERRIDE(OP): Performed by: INTERNAL MEDICINE

## 2022-02-18 PROCEDURE — A9270 NON-COVERED ITEM OR SERVICE: HCPCS | Performed by: INTERNAL MEDICINE

## 2022-02-18 PROCEDURE — 96365 THER/PROPH/DIAG IV INF INIT: CPT

## 2022-02-18 PROCEDURE — 36430 TRANSFUSION BLD/BLD COMPNT: CPT

## 2022-02-18 PROCEDURE — 700111 HCHG RX REV CODE 636 W/ 250 OVERRIDE (IP): Performed by: INTERNAL MEDICINE

## 2022-02-18 PROCEDURE — P9016 RBC LEUKOCYTES REDUCED: HCPCS

## 2022-02-18 PROCEDURE — 99285 EMERGENCY DEPT VISIT HI MDM: CPT

## 2022-02-18 PROCEDURE — 83735 ASSAY OF MAGNESIUM: CPT

## 2022-02-18 PROCEDURE — 700111 HCHG RX REV CODE 636 W/ 250 OVERRIDE (IP): Performed by: EMERGENCY MEDICINE

## 2022-02-18 PROCEDURE — 87040 BLOOD CULTURE FOR BACTERIA: CPT | Mod: 91

## 2022-02-18 PROCEDURE — 76775 US EXAM ABDO BACK WALL LIM: CPT

## 2022-02-18 PROCEDURE — 700102 HCHG RX REV CODE 250 W/ 637 OVERRIDE(OP): Performed by: EMERGENCY MEDICINE

## 2022-02-18 PROCEDURE — 85025 COMPLETE CBC W/AUTO DIFF WBC: CPT

## 2022-02-18 PROCEDURE — 84443 ASSAY THYROID STIM HORMONE: CPT

## 2022-02-18 PROCEDURE — 36415 COLL VENOUS BLD VENIPUNCTURE: CPT

## 2022-02-18 PROCEDURE — 84145 PROCALCITONIN (PCT): CPT

## 2022-02-18 PROCEDURE — 84100 ASSAY OF PHOSPHORUS: CPT

## 2022-02-18 PROCEDURE — 86900 BLOOD TYPING SEROLOGIC ABO: CPT

## 2022-02-18 PROCEDURE — 80053 COMPREHEN METABOLIC PANEL: CPT

## 2022-02-18 PROCEDURE — A9270 NON-COVERED ITEM OR SERVICE: HCPCS | Performed by: EMERGENCY MEDICINE

## 2022-02-18 PROCEDURE — 770006 HCHG ROOM/CARE - MED/SURG/GYN SEMI*

## 2022-02-18 PROCEDURE — 700105 HCHG RX REV CODE 258: Performed by: INTERNAL MEDICINE

## 2022-02-18 PROCEDURE — 83605 ASSAY OF LACTIC ACID: CPT

## 2022-02-18 PROCEDURE — 86850 RBC ANTIBODY SCREEN: CPT

## 2022-02-18 RX ORDER — ONDANSETRON 2 MG/ML
4 INJECTION INTRAMUSCULAR; INTRAVENOUS EVERY 4 HOURS PRN
Status: DISCONTINUED | OUTPATIENT
Start: 2022-02-18 | End: 2022-02-23

## 2022-02-18 RX ORDER — HYDROCODONE BITARTRATE AND ACETAMINOPHEN 5; 325 MG/1; MG/1
1 TABLET ORAL EVERY 4 HOURS PRN
Status: DISCONTINUED | OUTPATIENT
Start: 2022-02-18 | End: 2022-02-22

## 2022-02-18 RX ORDER — POTASSIUM CHLORIDE 20 MEQ/1
40 TABLET, EXTENDED RELEASE ORAL 3 TIMES DAILY
Status: DISCONTINUED | OUTPATIENT
Start: 2022-02-19 | End: 2022-02-19

## 2022-02-18 RX ORDER — ACETAMINOPHEN 325 MG/1
650 TABLET ORAL EVERY 6 HOURS PRN
Status: DISCONTINUED | OUTPATIENT
Start: 2022-02-18 | End: 2022-02-23

## 2022-02-18 RX ORDER — SODIUM CHLORIDE, SODIUM LACTATE, POTASSIUM CHLORIDE, AND CALCIUM CHLORIDE .6; .31; .03; .02 G/100ML; G/100ML; G/100ML; G/100ML
30 INJECTION, SOLUTION INTRAVENOUS ONCE
Status: COMPLETED | OUTPATIENT
Start: 2022-02-18 | End: 2022-02-18

## 2022-02-18 RX ORDER — POLYETHYLENE GLYCOL 3350 17 G/17G
1 POWDER, FOR SOLUTION ORAL DAILY
Status: DISCONTINUED | OUTPATIENT
Start: 2022-02-19 | End: 2022-02-23

## 2022-02-18 RX ORDER — SODIUM CHLORIDE 9 MG/ML
INJECTION, SOLUTION INTRAVENOUS CONTINUOUS
Status: DISCONTINUED | OUTPATIENT
Start: 2022-02-18 | End: 2022-02-19

## 2022-02-18 RX ORDER — SODIUM CHLORIDE 9 MG/ML
INJECTION, SOLUTION INTRAVENOUS CONTINUOUS
Status: DISCONTINUED | OUTPATIENT
Start: 2022-02-18 | End: 2022-02-18

## 2022-02-18 RX ORDER — DEXTROSE AND SODIUM CHLORIDE 5; .9 G/100ML; G/100ML
INJECTION, SOLUTION INTRAVENOUS CONTINUOUS
Status: DISCONTINUED | OUTPATIENT
Start: 2022-02-18 | End: 2022-02-18

## 2022-02-18 RX ORDER — LABETALOL HYDROCHLORIDE 5 MG/ML
10 INJECTION, SOLUTION INTRAVENOUS EVERY 4 HOURS PRN
Status: DISCONTINUED | OUTPATIENT
Start: 2022-02-18 | End: 2022-02-23

## 2022-02-18 RX ORDER — AMOXICILLIN 250 MG
2 CAPSULE ORAL 2 TIMES DAILY
Status: DISCONTINUED | OUTPATIENT
Start: 2022-02-18 | End: 2022-02-18

## 2022-02-18 RX ORDER — SIMVASTATIN 20 MG
20 TABLET ORAL EVERY EVENING
Status: DISCONTINUED | OUTPATIENT
Start: 2022-02-18 | End: 2022-02-19

## 2022-02-18 RX ORDER — BISACODYL 10 MG
10 SUPPOSITORY, RECTAL RECTAL
Status: DISCONTINUED | OUTPATIENT
Start: 2022-02-18 | End: 2022-02-23

## 2022-02-18 RX ORDER — POTASSIUM CHLORIDE 20 MEQ/1
40 TABLET, EXTENDED RELEASE ORAL DAILY
Status: DISCONTINUED | OUTPATIENT
Start: 2022-02-18 | End: 2022-02-18

## 2022-02-18 RX ORDER — POTASSIUM CHLORIDE 7.45 MG/ML
10 INJECTION INTRAVENOUS
Status: COMPLETED | OUTPATIENT
Start: 2022-02-18 | End: 2022-02-19

## 2022-02-18 RX ORDER — AMOXICILLIN 250 MG
2 CAPSULE ORAL 2 TIMES DAILY
Status: DISCONTINUED | OUTPATIENT
Start: 2022-02-19 | End: 2022-02-23

## 2022-02-18 RX ORDER — SODIUM CHLORIDE 9 MG/ML
1000 INJECTION, SOLUTION INTRAVENOUS ONCE
Status: COMPLETED | OUTPATIENT
Start: 2022-02-18 | End: 2022-02-18

## 2022-02-18 RX ORDER — BISACODYL 10 MG
10 SUPPOSITORY, RECTAL RECTAL
Status: DISCONTINUED | OUTPATIENT
Start: 2022-02-18 | End: 2022-02-18

## 2022-02-18 RX ORDER — POTASSIUM CHLORIDE 7.45 MG/ML
10 INJECTION INTRAVENOUS ONCE
Status: COMPLETED | OUTPATIENT
Start: 2022-02-18 | End: 2022-02-18

## 2022-02-18 RX ORDER — ONDANSETRON 4 MG/1
4 TABLET, ORALLY DISINTEGRATING ORAL EVERY 4 HOURS PRN
Status: DISCONTINUED | OUTPATIENT
Start: 2022-02-18 | End: 2022-02-23

## 2022-02-18 RX ORDER — POLYETHYLENE GLYCOL 3350 17 G/17G
1 POWDER, FOR SOLUTION ORAL
Status: DISCONTINUED | OUTPATIENT
Start: 2022-02-18 | End: 2022-02-18

## 2022-02-18 RX ORDER — HEPARIN SODIUM 5000 [USP'U]/ML
5000 INJECTION, SOLUTION INTRAVENOUS; SUBCUTANEOUS EVERY 8 HOURS
Status: DISCONTINUED | OUTPATIENT
Start: 2022-02-18 | End: 2022-02-19

## 2022-02-18 RX ORDER — MAGNESIUM SULFATE HEPTAHYDRATE 40 MG/ML
4 INJECTION, SOLUTION INTRAVENOUS ONCE
Status: COMPLETED | OUTPATIENT
Start: 2022-02-18 | End: 2022-02-19

## 2022-02-18 RX ADMIN — POTASSIUM CHLORIDE 10 MEQ: 7.46 INJECTION, SOLUTION INTRAVENOUS at 15:10

## 2022-02-18 RX ADMIN — SIMVASTATIN 20 MG: 40 TABLET, FILM COATED ORAL at 19:04

## 2022-02-18 RX ADMIN — HEPARIN SODIUM 5000 UNITS: 5000 INJECTION, SOLUTION INTRAVENOUS; SUBCUTANEOUS at 22:50

## 2022-02-18 RX ADMIN — MAGNESIUM SULFATE HEPTAHYDRATE 4 G: 40 INJECTION, SOLUTION INTRAVENOUS at 22:50

## 2022-02-18 RX ADMIN — SODIUM CHLORIDE: 9 INJECTION, SOLUTION INTRAVENOUS at 18:56

## 2022-02-18 RX ADMIN — POTASSIUM CHLORIDE 10 MEQ: 7.46 INJECTION, SOLUTION INTRAVENOUS at 23:09

## 2022-02-18 RX ADMIN — SODIUM CHLORIDE, POTASSIUM CHLORIDE, SODIUM LACTATE AND CALCIUM CHLORIDE 1362 ML: 600; 310; 30; 20 INJECTION, SOLUTION INTRAVENOUS at 13:54

## 2022-02-18 RX ADMIN — SODIUM CHLORIDE: 9 INJECTION, SOLUTION INTRAVENOUS at 22:58

## 2022-02-18 RX ADMIN — POTASSIUM CHLORIDE 40 MEQ: 1500 TABLET, EXTENDED RELEASE ORAL at 15:10

## 2022-02-18 RX ADMIN — SODIUM CHLORIDE 1000 ML: 9 INJECTION, SOLUTION INTRAVENOUS at 15:54

## 2022-02-18 ASSESSMENT — PATIENT HEALTH QUESTIONNAIRE - PHQ9
SUM OF ALL RESPONSES TO PHQ9 QUESTIONS 1 AND 2: 0
1. LITTLE INTEREST OR PLEASURE IN DOING THINGS: NOT AT ALL
2. FEELING DOWN, DEPRESSED, IRRITABLE, OR HOPELESS: NOT AT ALL

## 2022-02-18 ASSESSMENT — COGNITIVE AND FUNCTIONAL STATUS - GENERAL
SUGGESTED CMS G CODE MODIFIER MOBILITY: CK
MOBILITY SCORE: 17
SUGGESTED CMS G CODE MODIFIER DAILY ACTIVITY: CI
DAILY ACTIVITIY SCORE: 23
MOVING FROM LYING ON BACK TO SITTING ON SIDE OF FLAT BED: A LITTLE
TURNING FROM BACK TO SIDE WHILE IN FLAT BAD: A LOT
MOVING TO AND FROM BED TO CHAIR: A LITTLE
STANDING UP FROM CHAIR USING ARMS: A LITTLE
CLIMB 3 TO 5 STEPS WITH RAILING: A LITTLE
DRESSING REGULAR LOWER BODY CLOTHING: A LITTLE
WALKING IN HOSPITAL ROOM: A LITTLE

## 2022-02-18 ASSESSMENT — LIFESTYLE VARIABLES
HAVE YOU EVER FELT YOU SHOULD CUT DOWN ON YOUR DRINKING: NO
HAVE PEOPLE ANNOYED YOU BY CRITICIZING YOUR DRINKING: NO
EVER HAD A DRINK FIRST THING IN THE MORNING TO STEADY YOUR NERVES TO GET RID OF A HANGOVER: NO
AVERAGE NUMBER OF DAYS PER WEEK YOU HAVE A DRINK CONTAINING ALCOHOL: 0
TOTAL SCORE: 0
EVER FELT BAD OR GUILTY ABOUT YOUR DRINKING: NO
CONSUMPTION TOTAL: NEGATIVE
ALCOHOL_USE: NO
HOW MANY TIMES IN THE PAST YEAR HAVE YOU HAD 5 OR MORE DRINKS IN A DAY: 0
TOTAL SCORE: 0
TOTAL SCORE: 0
ON A TYPICAL DAY WHEN YOU DRINK ALCOHOL HOW MANY DRINKS DO YOU HAVE: 0

## 2022-02-18 ASSESSMENT — ENCOUNTER SYMPTOMS
CARDIOVASCULAR NEGATIVE: 1
PSYCHIATRIC NEGATIVE: 1
MUSCULOSKELETAL NEGATIVE: 1
RESPIRATORY NEGATIVE: 1
NEUROLOGICAL NEGATIVE: 1
CONSTITUTIONAL NEGATIVE: 1

## 2022-02-18 ASSESSMENT — FIBROSIS 4 INDEX: FIB4 SCORE: 1.71

## 2022-02-18 NOTE — ED NOTES
Assist RN: straight cath used to obtain urine sample. 2 drops of urine voided in catheter. Straight cath remains in place to collect adequate sample as urine is produced. Patient denies further needs. Call light within reach.

## 2022-02-18 NOTE — ED TRIAGE NOTES
"Chief Complaint   Patient presents with   • ALOC     Pt noted by friends to be disoriented. AOx3, disoriented to situation   • Failure to Thrive     Pt noted to be found in bed, not eating, or drinking. Pt disheveled     BIBA from home. Pt lives alone and found lying in bed, disheveled appearance. Pt's friends called because pt has not been eating or drinking. Pt pleasantly confused. Pt's friend Jaye at pt's home. Would like updates [phone]    PIV placed PTA    EMS administered 1L NS    BP (!) 83/54   Pulse (!) 104   Temp 36.3 °C (97.4 °F) (Temporal)   Resp 19   Ht 1.676 m (5' 6\")   Wt 45.4 kg (100 lb)   SpO2 99%   BMI 16.14 kg/m²     "

## 2022-02-18 NOTE — ED PROVIDER NOTES
ED Provider Note    CHIEF COMPLAINT  Chief Complaint   Patient presents with   • ALOC     Pt noted by friends to be disoriented. AOx3, disoriented to situation   • Failure to Thrive     Pt noted to be found in bed, not eating, or drinking. Pt disheveled       HPI  Jaymie Peacock is a 66 y.o. female who presents for evaluation of sluggishness, not feeling well.  The patient apparently lives by herself.  She is relatively high functioning.  She does not have any family in the area.  Neighbors did a well check and noted that she was sluggish.  There is no report of any trauma or fall.  Patient was noted to be dehydrated and cachectic and brought in by ambulance.  She denies any head injury chest or abdominal pain.  She has had poor oral intake over the last several days, not eating or drinking much.  She specifically denies dysuria high fevers or chills.  No black or bloody stools    REVIEW OF SYSTEMS  See HPI for further details.  No high fevers chills night sweats or weight loss all other systems are negative.     PAST MEDICAL HISTORY  Past Medical History:   Diagnosis Date   • Hypertension    • Osteoporosis    • Smoking        FAMILY HISTORY  Noncontributory    SOCIAL HISTORY  Social History     Socioeconomic History   • Marital status: Unknown   Tobacco Use   • Smoking status: Current Every Day Smoker     Packs/day: 0.50     Types: Cigarettes   • Smokeless tobacco: Never Used   Vaping Use   • Vaping Use: Never used   Substance and Sexual Activity   • Alcohol use: Not Currently     Comment: Stopped 12 days ago   • Drug use: Not Currently       SURGICAL HISTORY  Past Surgical History:   Procedure Laterality Date   • US-NEEDLE CORE BX-BREAST PANEL Right        CURRENT MEDICATIONS  Home Medications     Reviewed by Tavia Mendez R.N. (Registered Nurse) on 02/18/22 at 1257  Med List Status: <None>   Medication Last Dose Status   acetaminophen (TYLENOL) 500 MG Tab  Active   Calcium-Magnesium-Vitamin D (CALCIUM  "1200+D3 PO)  Active   HYDROcodone-acetaminophen (NORCO) 5-325 MG Tab per tablet  Active   losartan-hydrochlorothiazide (HYZAAR) 100-12.5 MG per tablet  Active   Magnesium Glycinate 665 MG Cap  Active   meloxicam (MOBIC) 15 MG tablet  Active   Multiple Vitamins-Minerals (OCUVITE ADULT 50+) Cap  Active   naltrexone (DEPADE) 50 MG Tab  Active   simvastatin (ZOCOR) 20 MG Tab  Active                ALLERGIES  No Known Allergies    PHYSICAL EXAM  VITAL SIGNS: BP (!) 83/54   Pulse (!) 104   Temp 36.3 °C (97.4 °F) (Temporal)   Resp 19   Ht 1.676 m (5' 6\")   Wt 45.4 kg (100 lb)   SpO2 99%   BMI 16.14 kg/m²       Constitutional: Cachectic ill-appearing  HENT: Normocephalic, Atraumatic, Bilateral external ears normal, dry mucous membranes, No oral exudates, Nose normal.   Eyes: PERRLA, EOMI, Conjunctiva pale l, No discharge.   Neck: Normal range of motion, No tenderness, Supple, No stridor.   Cardiovascular: Normal heart rate, Normal rhythm, No murmurs, No rubs, No gallops.   Thorax & Lungs: Normal breath sounds, No respiratory distress, No wheezing, No chest tenderness.   Abdomen: Bowel sounds normal, Soft, No tenderness, No masses, No pulsatile masses.   Skin: Warm, Dry, No erythema, No rash.   Back: No tenderness, No CVA tenderness.   Genitalia: External genitalia appear normal, No masses or lesions. No discharge.   Rectal: Normal appearance, brown stool Hemoccult negative   extremities: Intact distal pulses, No edema, No tenderness, No cyanosis, No clubbing.   Neurologic: Sluggish but responsive oriented to time place and person.  Moving all extremities no seizures    DX-CHEST-PORTABLE (1 VIEW)   Final Result      1.  Emphysema.   2.  Small linear scar or residual subsegmental atelectatic change at the right medial lung base where there was previously extensive consolidation and cavitation. (See CT of the chest 5/22/2021)   3.  No acute infiltrates.        Results for orders placed or performed during the hospital " encounter of 02/18/22   Lactic acid (lactate)   Result Value Ref Range    Lactic Acid 1.2 0.5 - 2.0 mmol/L   Lactic acid (lactate): Repeat if initial lactic acid result is greater than 2   Result Value Ref Range    Lactic Acid 1.6 0.5 - 2.0 mmol/L   CBC WITH DIFFERENTIAL   Result Value Ref Range    WBC 5.1 4.8 - 10.8 K/uL    RBC 2.46 (L) 4.20 - 5.40 M/uL    Hemoglobin 7.9 (L) 12.0 - 16.0 g/dL    Hematocrit 22.7 (L) 37.0 - 47.0 %    MCV 92.3 81.4 - 97.8 fL    MCH 32.1 27.0 - 33.0 pg    MCHC 34.8 33.6 - 35.0 g/dL    RDW 43.0 35.9 - 50.0 fL    Platelet Count 276 164 - 446 K/uL    MPV 11.0 9.0 - 12.9 fL    Neutrophils-Polys 78.90 (H) 44.00 - 72.00 %    Lymphocytes 7.90 (L) 22.00 - 41.00 %    Monocytes 10.20 0.00 - 13.40 %    Eosinophils 1.20 0.00 - 6.90 %    Basophils 1.00 0.00 - 1.80 %    Immature Granulocytes 0.80 0.00 - 0.90 %    Nucleated RBC 0.00 /100 WBC    Neutrophils (Absolute) 4.01 2.00 - 7.15 K/uL    Lymphs (Absolute) 0.40 (L) 1.00 - 4.80 K/uL    Monos (Absolute) 0.52 0.00 - 0.85 K/uL    Eos (Absolute) 0.06 0.00 - 0.51 K/uL    Baso (Absolute) 0.05 0.00 - 0.12 K/uL    Immature Granulocytes (abs) 0.04 0.00 - 0.11 K/uL    NRBC (Absolute) 0.00 K/uL   COMP METABOLIC PANEL   Result Value Ref Range    Sodium 144 135 - 145 mmol/L    Potassium 2.7 (LL) 3.6 - 5.5 mmol/L    Chloride 103 96 - 112 mmol/L    Co2 13 (L) 20 - 33 mmol/L    Anion Gap 28.0 (H) 7.0 - 16.0    Glucose 105 (H) 65 - 99 mg/dL    Bun 169 (HH) 8 - 22 mg/dL    Creatinine 3.40 (H) 0.50 - 1.40 mg/dL    Calcium 9.7 8.5 - 10.5 mg/dL    AST(SGOT) 9 (L) 12 - 45 U/L    ALT(SGPT) 5 2 - 50 U/L    Alkaline Phosphatase 94 30 - 99 U/L    Total Bilirubin 0.3 0.1 - 1.5 mg/dL    Albumin 3.6 3.2 - 4.9 g/dL    Total Protein 6.2 6.0 - 8.2 g/dL    Globulin 2.6 1.9 - 3.5 g/dL    A-G Ratio 1.4 g/dL   URINALYSIS    Specimen: Urine   Result Value Ref Range    Color Yellow     Character Clear     Specific Gravity 1.010 <1.035    Ph 6.0 5.0 - 8.0    Glucose Negative Negative  mg/dL    Ketones Trace (A) Negative mg/dL    Protein Negative Negative mg/dL    Bilirubin Moderate (A) Negative    Urobilinogen, Urine 0.2 Negative    Nitrite Negative Negative    Leukocyte Esterase Negative Negative    Occult Blood Negative Negative    Micro Urine Req see below    TSH WITH REFLEX TO FT4   Result Value Ref Range    TSH 0.640 0.380 - 5.330 uIU/mL   ESTIMATED GFR   Result Value Ref Range    GFR If  16 (A) >60 mL/min/1.73 m 2    GFR If Non  13 (A) >60 mL/min/1.73 m 2   MAGNESIUM   Result Value Ref Range    Magnesium 1.4 (L) 1.5 - 2.5 mg/dL   COD - Adult (Type and Screen)   Result Value Ref Range    ABO Grouping Only A     Rh Grouping Only POS     Antibody Screen-Cod NEG    ABO Rh Confirm   Result Value Ref Range    ABO Rh Confirm A POS         COURSE & MEDICAL DECISION MAKING  Pertinent Labs & Imaging studies reviewed. (See chart for details)  Patient appears critically ill on arrival.  Septic protocols initiated due to hypotension.  The patient had extensive work-up here.  2 large-bore peripheral IVs were established sepsis bolus was administered.  She has no leukocytosis or bandemia.  Chest x-ray does not demonstrate any airspace abnormalities.  She does not have any neurological deficit or evidence of head injury to suggest stroke or intracranial hemorrhage.  Here her work-up does diagnose acute onset severe metabolic acidosis with renal failure, electrolyte disturbances including moderate to severe hypokalemia, hypomagnesia .  Crystalloid boluses were administered and blood pressure is improved.  Both IV and oral potassium has been administered.  The patient is Hemoccult negative but appears to be pale and has mild persistent tachycardia.  I am quite concerned about possible blood loss but there is no suggestion of acute bleeding here.  I have ordered a unit of packed red cells.  Consultation with nephrology will be obtained regarding her renal failure with  concomitant metabolic acidosis.  Dr. Kee reviewed the case briefly and indicated that he recommended aggressive crystalloid resuscitation with supplemental bicarb after initial boluses and they will follow up on the patient tomorrow.    FINAL IMPRESSION  1. Acute renal failure with metabolic acidosis  2.  Symptomatic anemia requiring blood transfusion  3.  Hypovolemic shock    CRITICAL CARE TIME:    The patient required approximately 35 minutes worth of critical care time. This excludes any procedures. This includes time spent directly at caring for the patient, making critical medical decisions, involving consultants and speaking with the family.  Electronically signed by: Olegario Vasques M.D., 2/18/2022 1:20 PM

## 2022-02-19 ENCOUNTER — APPOINTMENT (OUTPATIENT)
Dept: CARDIOLOGY | Facility: MEDICAL CENTER | Age: 67
DRG: 280 | End: 2022-02-19
Attending: INTERNAL MEDICINE
Payer: MEDICARE

## 2022-02-19 PROBLEM — I50.21 ACUTE SYSTOLIC HEART FAILURE (HCC): Status: ACTIVE | Noted: 2022-02-19

## 2022-02-19 PROBLEM — I21.4 NSTEMI (NON-ST ELEVATION MYOCARDIAL INFARCTION) (HCC): Status: ACTIVE | Noted: 2022-02-19

## 2022-02-19 LAB
ALBUMIN SERPL BCP-MCNC: 2.5 G/DL (ref 3.2–4.9)
ALBUMIN SERPL BCP-MCNC: 2.9 G/DL (ref 3.2–4.9)
ALBUMIN/GLOB SERPL: 1.1 G/DL
ALBUMIN/GLOB SERPL: 1.2 G/DL
ALP SERPL-CCNC: 92 U/L (ref 30–99)
ALP SERPL-CCNC: 93 U/L (ref 30–99)
ALT SERPL-CCNC: 10 U/L (ref 2–50)
ALT SERPL-CCNC: 8 U/L (ref 2–50)
ANION GAP SERPL CALC-SCNC: 13 MMOL/L (ref 7–16)
ANION GAP SERPL CALC-SCNC: 16 MMOL/L (ref 7–16)
ANION GAP SERPL CALC-SCNC: 19 MMOL/L (ref 7–16)
APTT PPP: 27 SEC (ref 24.7–36)
AST SERPL-CCNC: 31 U/L (ref 12–45)
AST SERPL-CCNC: 41 U/L (ref 12–45)
BASOPHILS # BLD AUTO: 0.4 % (ref 0–1.8)
BASOPHILS # BLD: 0.02 K/UL (ref 0–0.12)
BILIRUB SERPL-MCNC: 0.3 MG/DL (ref 0.1–1.5)
BILIRUB SERPL-MCNC: 0.4 MG/DL (ref 0.1–1.5)
BUN SERPL-MCNC: 107 MG/DL (ref 8–22)
BUN SERPL-MCNC: 119 MG/DL (ref 8–22)
BUN SERPL-MCNC: 127 MG/DL (ref 8–22)
CALCIUM SERPL-MCNC: 9 MG/DL (ref 8.5–10.5)
CALCIUM SERPL-MCNC: 9.2 MG/DL (ref 8.5–10.5)
CALCIUM SERPL-MCNC: 9.2 MG/DL (ref 8.5–10.5)
CHLORIDE SERPL-SCNC: 107 MMOL/L (ref 96–112)
CHLORIDE SERPL-SCNC: 110 MMOL/L (ref 96–112)
CHLORIDE SERPL-SCNC: 111 MMOL/L (ref 96–112)
CHOLEST SERPL-MCNC: 148 MG/DL (ref 100–199)
CO2 SERPL-SCNC: 15 MMOL/L (ref 20–33)
CO2 SERPL-SCNC: 16 MMOL/L (ref 20–33)
CO2 SERPL-SCNC: 16 MMOL/L (ref 20–33)
CORTIS SERPL-MCNC: 13 UG/DL (ref 0–23)
CREAT SERPL-MCNC: 1.64 MG/DL (ref 0.5–1.4)
CREAT SERPL-MCNC: 1.84 MG/DL (ref 0.5–1.4)
CREAT SERPL-MCNC: 2.22 MG/DL (ref 0.5–1.4)
EKG IMPRESSION: NORMAL
EOSINOPHIL # BLD AUTO: 0.06 K/UL (ref 0–0.51)
EOSINOPHIL NFR BLD: 1.1 % (ref 0–6.9)
ERYTHROCYTE [DISTWIDTH] IN BLOOD BY AUTOMATED COUNT: 49.7 FL (ref 35.9–50)
GLOBULIN SER CALC-MCNC: 2.3 G/DL (ref 1.9–3.5)
GLOBULIN SER CALC-MCNC: 2.4 G/DL (ref 1.9–3.5)
GLUCOSE SERPL-MCNC: 101 MG/DL (ref 65–99)
GLUCOSE SERPL-MCNC: 108 MG/DL (ref 65–99)
GLUCOSE SERPL-MCNC: 146 MG/DL (ref 65–99)
HCT VFR BLD AUTO: 25.6 % (ref 37–47)
HDLC SERPL-MCNC: 76 MG/DL
HGB BLD-MCNC: 8.8 G/DL (ref 12–16)
IMM GRANULOCYTES # BLD AUTO: 0.04 K/UL (ref 0–0.11)
IMM GRANULOCYTES NFR BLD AUTO: 0.7 % (ref 0–0.9)
INR PPP: 1.04 (ref 0.87–1.13)
IRON SATN MFR SERPL: 89 % (ref 15–55)
IRON SERPL-MCNC: 134 UG/DL (ref 40–170)
LDLC SERPL CALC-MCNC: 53 MG/DL
LV EJECT FRACT  99904: 35
LYMPHOCYTES # BLD AUTO: 0.43 K/UL (ref 1–4.8)
LYMPHOCYTES NFR BLD: 7.9 % (ref 22–41)
MAGNESIUM SERPL-MCNC: 2.4 MG/DL (ref 1.5–2.5)
MAGNESIUM SERPL-MCNC: 2.7 MG/DL (ref 1.5–2.5)
MAGNESIUM SERPL-MCNC: 3.1 MG/DL (ref 1.5–2.5)
MCH RBC QN AUTO: 30.3 PG (ref 27–33)
MCHC RBC AUTO-ENTMCNC: 34.4 G/DL (ref 33.6–35)
MCV RBC AUTO: 88.3 FL (ref 81.4–97.8)
MONOCYTES # BLD AUTO: 0.42 K/UL (ref 0–0.85)
MONOCYTES NFR BLD AUTO: 7.7 % (ref 0–13.4)
NEUTROPHILS # BLD AUTO: 4.5 K/UL (ref 2–7.15)
NEUTROPHILS NFR BLD: 82.2 % (ref 44–72)
NRBC # BLD AUTO: 0 K/UL
NRBC BLD-RTO: 0 /100 WBC
NT-PROBNP SERPL IA-MCNC: 7324 PG/ML (ref 0–125)
PHOSPHATE SERPL-MCNC: 3.2 MG/DL (ref 2.5–4.5)
PHOSPHATE SERPL-MCNC: 3.4 MG/DL (ref 2.5–4.5)
PLATELET # BLD AUTO: 238 K/UL (ref 164–446)
PMV BLD AUTO: 10.8 FL (ref 9–12.9)
POTASSIUM SERPL-SCNC: 3 MMOL/L (ref 3.6–5.5)
POTASSIUM SERPL-SCNC: 3.4 MMOL/L (ref 3.6–5.5)
POTASSIUM SERPL-SCNC: 3.5 MMOL/L (ref 3.6–5.5)
PROT SERPL-MCNC: 4.8 G/DL (ref 6–8.2)
PROT SERPL-MCNC: 5.3 G/DL (ref 6–8.2)
PROTHROMBIN TIME: 13.3 SEC (ref 12–14.6)
RBC # BLD AUTO: 2.9 M/UL (ref 4.2–5.4)
SODIUM SERPL-SCNC: 139 MMOL/L (ref 135–145)
SODIUM SERPL-SCNC: 139 MMOL/L (ref 135–145)
SODIUM SERPL-SCNC: 145 MMOL/L (ref 135–145)
TIBC SERPL-MCNC: 151 UG/DL (ref 250–450)
TRIGL SERPL-MCNC: 95 MG/DL (ref 0–149)
TROPONIN T SERPL-MCNC: 248 NG/L (ref 6–19)
TROPONIN T SERPL-MCNC: 464 NG/L (ref 6–19)
TROPONIN T SERPL-MCNC: 473 NG/L (ref 6–19)
TROPONIN T SERPL-MCNC: 594 NG/L (ref 6–19)
UFH PPP CHRO-ACNC: 0.57 IU/ML
UFH PPP CHRO-ACNC: <0.1 IU/ML
UIBC SERPL-MCNC: <17 UG/DL (ref 110–370)
VIT B12 SERPL-MCNC: 958 PG/ML (ref 211–911)
WBC # BLD AUTO: 5.5 K/UL (ref 4.8–10.8)

## 2022-02-19 PROCEDURE — 30233N1 TRANSFUSION OF NONAUTOLOGOUS RED BLOOD CELLS INTO PERIPHERAL VEIN, PERCUTANEOUS APPROACH: ICD-10-PCS | Performed by: STUDENT IN AN ORGANIZED HEALTH CARE EDUCATION/TRAINING PROGRAM

## 2022-02-19 PROCEDURE — 83880 ASSAY OF NATRIURETIC PEPTIDE: CPT

## 2022-02-19 PROCEDURE — 93005 ELECTROCARDIOGRAM TRACING: CPT | Performed by: NURSE PRACTITIONER

## 2022-02-19 PROCEDURE — 99223 1ST HOSP IP/OBS HIGH 75: CPT | Performed by: INTERNAL MEDICINE

## 2022-02-19 PROCEDURE — 82607 VITAMIN B-12: CPT

## 2022-02-19 PROCEDURE — 93010 ELECTROCARDIOGRAM REPORT: CPT | Performed by: INTERNAL MEDICINE

## 2022-02-19 PROCEDURE — 36415 COLL VENOUS BLD VENIPUNCTURE: CPT

## 2022-02-19 PROCEDURE — 84484 ASSAY OF TROPONIN QUANT: CPT | Mod: 91

## 2022-02-19 PROCEDURE — 93010 ELECTROCARDIOGRAM REPORT: CPT | Mod: 77 | Performed by: INTERNAL MEDICINE

## 2022-02-19 PROCEDURE — 99233 SBSQ HOSP IP/OBS HIGH 50: CPT | Performed by: INTERNAL MEDICINE

## 2022-02-19 PROCEDURE — 700105 HCHG RX REV CODE 258: Performed by: NURSE PRACTITIONER

## 2022-02-19 PROCEDURE — 85730 THROMBOPLASTIN TIME PARTIAL: CPT

## 2022-02-19 PROCEDURE — 83550 IRON BINDING TEST: CPT

## 2022-02-19 PROCEDURE — 85520 HEPARIN ASSAY: CPT

## 2022-02-19 PROCEDURE — 80053 COMPREHEN METABOLIC PANEL: CPT | Mod: 91

## 2022-02-19 PROCEDURE — 700105 HCHG RX REV CODE 258: Performed by: HOSPITALIST

## 2022-02-19 PROCEDURE — 83540 ASSAY OF IRON: CPT

## 2022-02-19 PROCEDURE — 85610 PROTHROMBIN TIME: CPT

## 2022-02-19 PROCEDURE — 93005 ELECTROCARDIOGRAM TRACING: CPT | Performed by: INTERNAL MEDICINE

## 2022-02-19 PROCEDURE — 700117 HCHG RX CONTRAST REV CODE 255: Performed by: INTERNAL MEDICINE

## 2022-02-19 PROCEDURE — 93306 TTE W/DOPPLER COMPLETE: CPT | Mod: 26 | Performed by: INTERNAL MEDICINE

## 2022-02-19 PROCEDURE — 82533 TOTAL CORTISOL: CPT

## 2022-02-19 PROCEDURE — A9270 NON-COVERED ITEM OR SERVICE: HCPCS | Performed by: INTERNAL MEDICINE

## 2022-02-19 PROCEDURE — 700111 HCHG RX REV CODE 636 W/ 250 OVERRIDE (IP): Performed by: HOSPITALIST

## 2022-02-19 PROCEDURE — 80048 BASIC METABOLIC PNL TOTAL CA: CPT

## 2022-02-19 PROCEDURE — 700102 HCHG RX REV CODE 250 W/ 637 OVERRIDE(OP): Performed by: INTERNAL MEDICINE

## 2022-02-19 PROCEDURE — 700111 HCHG RX REV CODE 636 W/ 250 OVERRIDE (IP): Performed by: INTERNAL MEDICINE

## 2022-02-19 PROCEDURE — 85025 COMPLETE CBC W/AUTO DIFF WBC: CPT

## 2022-02-19 PROCEDURE — 770020 HCHG ROOM/CARE - TELE (206)

## 2022-02-19 PROCEDURE — 93005 ELECTROCARDIOGRAM TRACING: CPT | Performed by: STUDENT IN AN ORGANIZED HEALTH CARE EDUCATION/TRAINING PROGRAM

## 2022-02-19 PROCEDURE — 83735 ASSAY OF MAGNESIUM: CPT | Mod: 91

## 2022-02-19 PROCEDURE — 80061 LIPID PANEL: CPT

## 2022-02-19 PROCEDURE — 93306 TTE W/DOPPLER COMPLETE: CPT

## 2022-02-19 PROCEDURE — 700111 HCHG RX REV CODE 636 W/ 250 OVERRIDE (IP): Performed by: NURSE PRACTITIONER

## 2022-02-19 PROCEDURE — 84100 ASSAY OF PHOSPHORUS: CPT

## 2022-02-19 RX ORDER — HEPARIN SODIUM 1000 [USP'U]/ML
60 INJECTION, SOLUTION INTRAVENOUS; SUBCUTANEOUS ONCE
Status: COMPLETED | OUTPATIENT
Start: 2022-02-19 | End: 2022-02-19

## 2022-02-19 RX ORDER — POTASSIUM CHLORIDE 20 MEQ/1
40 TABLET, EXTENDED RELEASE ORAL ONCE
Status: COMPLETED | OUTPATIENT
Start: 2022-02-19 | End: 2022-02-19

## 2022-02-19 RX ORDER — CARVEDILOL 6.25 MG/1
6.25 TABLET ORAL 2 TIMES DAILY WITH MEALS
Status: DISCONTINUED | OUTPATIENT
Start: 2022-02-19 | End: 2022-02-19

## 2022-02-19 RX ORDER — SODIUM CHLORIDE, SODIUM LACTATE, POTASSIUM CHLORIDE, CALCIUM CHLORIDE 600; 310; 30; 20 MG/100ML; MG/100ML; MG/100ML; MG/100ML
INJECTION, SOLUTION INTRAVENOUS CONTINUOUS
Status: DISCONTINUED | OUTPATIENT
Start: 2022-02-19 | End: 2022-02-21

## 2022-02-19 RX ORDER — ASPIRIN 300 MG/1
300 SUPPOSITORY RECTAL DAILY
Status: DISCONTINUED | OUTPATIENT
Start: 2022-02-19 | End: 2022-02-20

## 2022-02-19 RX ORDER — HEPARIN SODIUM 5000 [USP'U]/100ML
0-30 INJECTION, SOLUTION INTRAVENOUS CONTINUOUS
Status: DISCONTINUED | OUTPATIENT
Start: 2022-02-19 | End: 2022-02-20

## 2022-02-19 RX ORDER — POTASSIUM CHLORIDE 7.45 MG/ML
10 INJECTION INTRAVENOUS
Status: DISPENSED | OUTPATIENT
Start: 2022-02-19 | End: 2022-02-19

## 2022-02-19 RX ORDER — ATORVASTATIN CALCIUM 40 MG/1
40 TABLET, FILM COATED ORAL EVERY EVENING
Status: DISCONTINUED | OUTPATIENT
Start: 2022-02-19 | End: 2022-02-23

## 2022-02-19 RX ORDER — HEPARIN SODIUM 1000 [USP'U]/ML
40 INJECTION, SOLUTION INTRAVENOUS; SUBCUTANEOUS PRN
Status: DISCONTINUED | OUTPATIENT
Start: 2022-02-19 | End: 2022-02-19

## 2022-02-19 RX ORDER — HEPARIN SODIUM 5000 [USP'U]/100ML
0-30 INJECTION, SOLUTION INTRAVENOUS CONTINUOUS
Status: DISCONTINUED | OUTPATIENT
Start: 2022-02-19 | End: 2022-02-19

## 2022-02-19 RX ORDER — ASPIRIN 325 MG
325 TABLET ORAL DAILY
Status: DISCONTINUED | OUTPATIENT
Start: 2022-02-19 | End: 2022-02-20

## 2022-02-19 RX ORDER — CARVEDILOL 3.12 MG/1
3.12 TABLET ORAL 2 TIMES DAILY WITH MEALS
Status: DISCONTINUED | OUTPATIENT
Start: 2022-02-19 | End: 2022-02-21

## 2022-02-19 RX ORDER — ASPIRIN 81 MG/1
324 TABLET, CHEWABLE ORAL DAILY
Status: DISCONTINUED | OUTPATIENT
Start: 2022-02-19 | End: 2022-02-20

## 2022-02-19 RX ORDER — POTASSIUM CHLORIDE 7.45 MG/ML
10 INJECTION INTRAVENOUS
Status: ACTIVE | OUTPATIENT
Start: 2022-02-19 | End: 2022-02-19

## 2022-02-19 RX ORDER — SODIUM CHLORIDE, SODIUM LACTATE, POTASSIUM CHLORIDE, AND CALCIUM CHLORIDE .6; .31; .03; .02 G/100ML; G/100ML; G/100ML; G/100ML
500 INJECTION, SOLUTION INTRAVENOUS ONCE
Status: COMPLETED | OUTPATIENT
Start: 2022-02-19 | End: 2022-02-19

## 2022-02-19 RX ORDER — SODIUM CHLORIDE 9 MG/ML
500 INJECTION, SOLUTION INTRAVENOUS ONCE
Status: COMPLETED | OUTPATIENT
Start: 2022-02-19 | End: 2022-02-19

## 2022-02-19 RX ORDER — SODIUM CHLORIDE, SODIUM LACTATE, POTASSIUM CHLORIDE, CALCIUM CHLORIDE 600; 310; 30; 20 MG/100ML; MG/100ML; MG/100ML; MG/100ML
INJECTION, SOLUTION INTRAVENOUS CONTINUOUS
Status: DISCONTINUED | OUTPATIENT
Start: 2022-02-19 | End: 2022-02-19

## 2022-02-19 RX ORDER — HEPARIN SODIUM 1000 [USP'U]/ML
80 INJECTION, SOLUTION INTRAVENOUS; SUBCUTANEOUS ONCE
Status: DISCONTINUED | OUTPATIENT
Start: 2022-02-19 | End: 2022-02-19

## 2022-02-19 RX ORDER — HEPARIN SODIUM 1000 [USP'U]/ML
30 INJECTION, SOLUTION INTRAVENOUS; SUBCUTANEOUS PRN
Status: DISCONTINUED | OUTPATIENT
Start: 2022-02-19 | End: 2022-02-20

## 2022-02-19 RX ADMIN — SENNOSIDES AND DOCUSATE SODIUM 2 TABLET: 50; 8.6 TABLET ORAL at 05:04

## 2022-02-19 RX ADMIN — SODIUM BICARBONATE: 84 INJECTION, SOLUTION INTRAVENOUS at 01:48

## 2022-02-19 RX ADMIN — ASPIRIN 325 MG ORAL TABLET 325 MG: 325 PILL ORAL at 08:47

## 2022-02-19 RX ADMIN — SODIUM CHLORIDE, POTASSIUM CHLORIDE, SODIUM LACTATE AND CALCIUM CHLORIDE 500 ML: 600; 310; 30; 20 INJECTION, SOLUTION INTRAVENOUS at 17:01

## 2022-02-19 RX ADMIN — POTASSIUM CHLORIDE 40 MEQ: 1500 TABLET, EXTENDED RELEASE ORAL at 10:24

## 2022-02-19 RX ADMIN — POTASSIUM CHLORIDE 10 MEQ: 7.46 INJECTION, SOLUTION INTRAVENOUS at 00:28

## 2022-02-19 RX ADMIN — POTASSIUM CHLORIDE 10 MEQ: 7.46 INJECTION, SOLUTION INTRAVENOUS at 06:10

## 2022-02-19 RX ADMIN — SENNOSIDES AND DOCUSATE SODIUM 2 TABLET: 50; 8.6 TABLET ORAL at 17:04

## 2022-02-19 RX ADMIN — SODIUM CHLORIDE 500 ML: 9 INJECTION, SOLUTION INTRAVENOUS at 19:12

## 2022-02-19 RX ADMIN — SODIUM CHLORIDE, POTASSIUM CHLORIDE, SODIUM LACTATE AND CALCIUM CHLORIDE 500 ML: 600; 310; 30; 20 INJECTION, SOLUTION INTRAVENOUS at 04:50

## 2022-02-19 RX ADMIN — SODIUM CHLORIDE, POTASSIUM CHLORIDE, SODIUM LACTATE AND CALCIUM CHLORIDE 500 ML: 600; 310; 30; 20 INJECTION, SOLUTION INTRAVENOUS at 11:15

## 2022-02-19 RX ADMIN — HUMAN ALBUMIN MICROSPHERES AND PERFLUTREN 3 ML: 10; .22 INJECTION, SOLUTION INTRAVENOUS at 13:00

## 2022-02-19 RX ADMIN — HEPARIN SODIUM 12 UNITS/KG/HR: 5000 INJECTION, SOLUTION INTRAVENOUS at 14:15

## 2022-02-19 RX ADMIN — SODIUM CHLORIDE, POTASSIUM CHLORIDE, SODIUM LACTATE AND CALCIUM CHLORIDE 500 ML: 600; 310; 30; 20 INJECTION, SOLUTION INTRAVENOUS at 15:15

## 2022-02-19 RX ADMIN — HEPARIN SODIUM 2700 UNITS: 1000 INJECTION, SOLUTION INTRAVENOUS; SUBCUTANEOUS at 14:14

## 2022-02-19 RX ADMIN — THIAMINE HYDROCHLORIDE 100 MG: 100 INJECTION, SOLUTION INTRAMUSCULAR; INTRAVENOUS at 12:00

## 2022-02-19 RX ADMIN — SODIUM CHLORIDE, POTASSIUM CHLORIDE, SODIUM LACTATE AND CALCIUM CHLORIDE: 600; 310; 30; 20 INJECTION, SOLUTION INTRAVENOUS at 11:15

## 2022-02-19 RX ADMIN — CARVEDILOL 6.25 MG: 6.25 TABLET, FILM COATED ORAL at 08:47

## 2022-02-19 RX ADMIN — POTASSIUM CHLORIDE 10 MEQ: 7.46 INJECTION, SOLUTION INTRAVENOUS at 08:49

## 2022-02-19 RX ADMIN — ATORVASTATIN CALCIUM 40 MG: 40 TABLET, FILM COATED ORAL at 17:04

## 2022-02-19 ASSESSMENT — PAIN DESCRIPTION - PAIN TYPE: TYPE: ACUTE PAIN

## 2022-02-19 ASSESSMENT — ENCOUNTER SYMPTOMS
VOMITING: 0
DIZZINESS: 0
SHORTNESS OF BREATH: 0
CHILLS: 0
ABDOMINAL PAIN: 0
FEVER: 0
NAUSEA: 0
DIAPHORESIS: 0

## 2022-02-19 NOTE — ASSESSMENT & PLAN NOTE
"Recent history of heavy use.   Patient reports quitting \"a couple of weeks\" prior to admission.  Continue thiamine supplementation.  Will Check vitamin B12.  "

## 2022-02-19 NOTE — PROGRESS NOTES
Received report from Clarisa Finley. Pt is A&O x4. Pt on RA, no signs of acute distress. Pt has no complaints of pain. POC discussed with patient. Safety precautions in place, bed in lowest position, and call light and personal belongings within reach. Hourly rounding in place.

## 2022-02-19 NOTE — ED NOTES
Med Rec completed per patient, patient's friend Jaye over the phone, and home pharmacy ([facility])  Allergies reviewed  No ORAL antibiotics in last 30 days

## 2022-02-19 NOTE — PROGRESS NOTES
4 Eyes Skin Assessment Completed by ELROY Hough and ELROY Salazar.    Head WDL  Ears WDL  Nose WDL  Mouth WDL  Neck WDL  Breast/Chest WDL  Shoulder Blades WDL  Spine WDL  (R) Arm/Elbow/Hand WDL  (L) Arm/Elbow/Hand WDL  Abdomen WDL  Groin WDL  Scrotum/Coccyx/Buttocks Fissure/skin tear, redness, discoloration  (R) Leg WDL  (L) Leg WDL  (R) Heel/Foot/Toe boggy, redness, blanching  (L) Heel/Foot/Toe boggy, redness, blanching        Devices In Places purwick, PIV      Interventions In Place Sacral Mepilex, Pillows and Pressure Redistribution Mattress    Possible Skin Injury Yes    Pictures Uploaded Into Epic Yes  Wound Consult Placed Yes  RN Wound Prevention Protocol Ordered Yes

## 2022-02-19 NOTE — PROGRESS NOTES
HOSPITALIST CROSS COVER    Called by RN for critical troponin of 248. Patient asymptomatic. Patient received replacement for potassium and magnesium upon admission yesterday evening. Repeat potassium 3.0, mag elevated at 3.1, phos improved to 3.4. YAMIL improving somewhat with , Cr 2.2, eGFR 22. Anion gap improved to 15 and CO2 now 19.Hgb stable at 8.8 (7.9 prior to PRBCs in ED). Patient asymptomatic    VS: 36.1, HR 98, RR 18, O2 94%, /70    A/P  #Elevated troponin  Demand ischemia  - EKG  - repeat troponin  - transfer to tele floor    #YAMIL with metabolic acidosis  Improving  - continue bicarb drip  - monitor labs  - nephrology will see in am    #Electrolyte imbalance  Mag now 3.1, K+3.0, phos 3.4  - replete potassium (patient scheduled for 40 meq K+ this morning, but says she is unable to tolerate the oral pills)  - monitor labs      ADDENDUM  EKG with no acute ST-T changes  Patient remains asymptomatic

## 2022-02-19 NOTE — CONSULTS
Cardiology Initial Consult Note    DOS: 2/19/2022    Referring physician: Dr Mcclendon    Chief complaint/Reason for consult: Abnormal troponin    HPI: 67 y/o F with a history of hypertension, brought in after wellness check by neighbors for altered mental status. She was found to be malnourished with significant electrolyte derangement and acute kidney injury. Troponin was ordered although patient denied chest pain, this was positive and repeat was higher prompting cardiology consultation. On my evaluation pt denies palpitations, chest pain, syncope or presyncope, or SOB at rest currently.    ROS (+ highlighted in bold):  Constitutional: Fevers/chills/fatigue/weightloss  HEENT: Blurry vision/eye pain/sore throat/hearing loss  Respiratory: Shortness of breath/cough  Cardiovascular: Chest pain/palpitations/edema/orthopnea/syncope  GI: Nausea/vomitting/diarrhea  MSK: Arthralgias/myagias/muscle weakness  Skin: Rash/sores  Neurological: Numbness/tremors/vertigo  Endocrine: Excessive thirst/polyuria/cold intolerance/heat intolerance  Psych: Depression/anxiety    Past Medical History:   Diagnosis Date   • Hypertension    • Osteoporosis    • Smoking        Past Surgical History:   Procedure Laterality Date   • US-NEEDLE CORE BX-BREAST PANEL Right        Social History     Socioeconomic History   • Marital status: Unknown     Spouse name: Not on file   • Number of children: Not on file   • Years of education: Not on file   • Highest education level: Not on file   Occupational History   • Not on file   Tobacco Use   • Smoking status: Former Smoker     Packs/day: 0.50     Types: Cigarettes   • Smokeless tobacco: Never Used   Vaping Use   • Vaping Use: Never used   Substance and Sexual Activity   • Alcohol use: Not Currently     Comment: Stopped 12 days ago   • Drug use: Not Currently   • Sexual activity: Not on file   Other Topics Concern   • Not on file   Social History Narrative   • Not on file     Social Determinants of Health      Financial Resource Strain: Not on file   Food Insecurity: Not on file   Transportation Needs: Not on file   Physical Activity: Not on file   Stress: Not on file   Social Connections: Not on file   Intimate Partner Violence: Not on file   Housing Stability: Not on file       History reviewed. No pertinent family history.    No Known Allergies    Current Facility-Administered Medications   Medication Dose Route Frequency Provider Last Rate Last Admin   • aspirin (ASA) tablet 325 mg  325 mg Oral DAILY Namrata Mcclendon M.D.   325 mg at 02/19/22 0847    Or   • aspirin (ASA) chewable tab 324 mg  324 mg Oral DAILY Namrata Mcclendon M.D.        Or   • aspirin (ASA) suppository 300 mg  300 mg Rectal DAILY Namrata Mcclendon M.D.       • carvedilol (COREG) tablet 6.25 mg  6.25 mg Oral BID WITH MEALS Namrata Mcclednon M.D.   6.25 mg at 02/19/22 0847   • atorvastatin (LIPITOR) tablet 40 mg  40 mg Oral Q EVENING Namrata Mcclendon M.D.       • lactated ringers infusion   Intravenous Continuous Meek Griffin M.D.       • thiamine (B-1) IVPB 100 mg in 100 mL D5W (premix)  100 mg Intravenous DAILY Meek Griffin M.D.       • HYDROcodone-acetaminophen (NORCO) 5-325 MG per tablet 1 Tablet  1 Tablet Oral Q4HRS PRN Aditya Byrd M.D.       • Pharmacy Consult Request - to monitor for nephrotoxic agents  1 Each Other PHARMACY TO DOSE Aditya Byrd M.D.       • heparin injection 5,000 Units  5,000 Units Subcutaneous Q8HRS Aditya Byrd M.D.   5,000 Units at 02/18/22 2250   • acetaminophen (Tylenol) tablet 650 mg  650 mg Oral Q6HRS PRN Aditya Byrd M.D.       • ondansetron (ZOFRAN) syringe/vial injection 4 mg  4 mg Intravenous Q4HRS PRN Aditya Byrd M.D.       • ondansetron (ZOFRAN ODT) dispertab 4 mg  4 mg Oral Q4HRS PRN Aditya Byrd M.D.       • labetalol (NORMODYNE/TRANDATE) injection 10 mg  10 mg Intravenous Q4HRS PRN Aditya Byrd M.D.       • polyethylene glycol/lytes (MIRALAX) PACKET 1  Packet  1 Packet Oral DAILY Aditya Byrd M.D.        And   • senna-docusate (PERICOLACE or SENOKOT S) 8.6-50 MG per tablet 2 Tablet  2 Tablet Oral BID Aditya Bryd M.D.   2 Tablet at 02/19/22 0504    And   • magnesium hydroxide (MILK OF MAGNESIA) suspension 30 mL  30 mL Oral QDAY PRN Aditya Byrd M.D.        And   • bisacodyl (DULCOLAX) suppository 10 mg  10 mg Rectal QDAY PRN Aditya Byrd M.D.           Physical Exam:  Vitals:    02/18/22 1924 02/18/22 2100 02/19/22 0400 02/19/22 0800   BP: (!) 97/67 143/84 100/70 (!) 94/73   Pulse: 97 (!) 102 98 (!) 101   Resp: 16 20 18 19   Temp:  36.6 °C (97.8 °F) 36.1 °C (97 °F) 36.1 °C (97 °F)   TempSrc:  Temporal Temporal Temporal   SpO2: 97% 95% 94% 100%   Weight:       Height:         General appearance: NAD, conversant   Eyes: anicteric sclerae, moist conjunctivae; no lid-lag; PERRLA  HENT: Atraumatic; oropharynx clear with moist mucous membranes and no mucosal ulcerations; normal hard and soft palate  Neck: Trachea midline; FROM, supple, no thyromegaly or lymphadenopathy  Lungs: CTA, with normal respiratory effort and no intercostal retractions  CV: RRR, no MRGs, no JVD   Abdomen: Soft, non-tender; no masses or HSM  Extremities: No peripheral edema or extremity lymphadenopathy  Skin: Normal temperature, turgor and texture; no rash, ulcers or subcutaneous nodules  Psych: Appropriate affect, alert and oriented to person, place and time    Data:  Lipids:   Lab Results   Component Value Date/Time    CHOLSTRLTOT 148 02/19/2022 07:50 AM    TRIGLYCERIDE 95 02/19/2022 07:50 AM    HDL 76 02/19/2022 07:50 AM    LDL 53 02/19/2022 07:50 AM        BMP:  Lab Results   Component Value Date/Time    SODIUM 145 02/19/2022 0313    POTASSIUM 3.0 (L) 02/19/2022 0313    CHLORIDE 111 02/19/2022 0313    CO2 15 (L) 02/19/2022 0313    GLUCOSE 101 (H) 02/19/2022 0313     (HH) 02/19/2022 0313    CREATININE 2.22 (H) 02/19/2022 0313    CALCIUM 9.2 02/19/2022 0313     ANION 19.0 (H) 02/19/2022 0313        TSH:   Lab Results   Component Value Date/Time    TSHULTRASEN 0.640 02/18/2022 1300        THYROXINE (T4):   No results found for: PAWANIR     CBC:   Lab Results   Component Value Date/Time    WBC 5.5 02/19/2022 03:13 AM    RBC 2.90 (L) 02/19/2022 03:13 AM    HEMOGLOBIN 8.8 (L) 02/19/2022 03:13 AM    HEMATOCRIT 25.6 (L) 02/19/2022 03:13 AM    MCV 88.3 02/19/2022 03:13 AM    MCH 30.3 02/19/2022 03:13 AM    MCHC 34.4 02/19/2022 03:13 AM    RDW 49.7 02/19/2022 03:13 AM    PLATELETCT 238 02/19/2022 03:13 AM    MPV 10.8 02/19/2022 03:13 AM    NEUTSPOLYS 82.20 (H) 02/19/2022 03:13 AM    LYMPHOCYTES 7.90 (L) 02/19/2022 03:13 AM    MONOCYTES 7.70 02/19/2022 03:13 AM    EOSINOPHILS 1.10 02/19/2022 03:13 AM    BASOPHILS 0.40 02/19/2022 03:13 AM    IMMGRAN 0.70 02/19/2022 03:13 AM    NRBC 0.00 02/19/2022 03:13 AM    NEUTS 4.50 02/19/2022 03:13 AM    LYMPHS 0.43 (L) 02/19/2022 03:13 AM    MONOS 0.42 02/19/2022 03:13 AM    EOS 0.06 02/19/2022 03:13 AM    BASO 0.02 02/19/2022 03:13 AM    IMMGRANAB 0.04 02/19/2022 03:13 AM    NRBCAB 0.00 02/19/2022 03:13 AM        CBC w/o DIFF  Lab Results   Component Value Date/Time    WBC 5.5 02/19/2022 03:13 AM    RBC 2.90 (L) 02/19/2022 03:13 AM    HEMOGLOBIN 8.8 (L) 02/19/2022 03:13 AM    MCV 88.3 02/19/2022 03:13 AM    MCH 30.3 02/19/2022 03:13 AM    MCHC 34.4 02/19/2022 03:13 AM    RDW 49.7 02/19/2022 03:13 AM    MPV 10.8 02/19/2022 03:13 AM       Prior echo/stress results reviewed: Echo in 2021 shows normal EF    Current echo images interpreted by me: Severely depressed EF with apical ballooning and basilar sparing in pattern of stress induced cardiomyopathy.    EKG interpreted by me: Sinus tachycardia, poor R wave progression    Impression/Plan:  1. Acute systolic heart failure  2. NSTEMI  3. Acute renal failure  4. Altered mental status  5. Hypokalemia    - Acute heart failure is most likely stress induced cardiomyopathy in setting of acute  illness.  - NSTEMI likely type II. Troponin elevation in setting of YAMIL is modest and unlikely to reflect ACS to the degree of causing severe heart failure, additionally the ECG is not acutely alarming for STEMI or acute ischemia and she denies chest pain  - Ischemic/coronary workup is probably reasonable but I believe risk outweighs benefit currently in setting of YAMIL for urgent LHC  - I recommend heparin gtt, ASA, and trending of troponin  - Upon resolution of her YAMIL a CCTA may be the treatment of choice. If no severe obstructive lesions then medical management would be ideal. I would have concern of placing stents given potential non-compliance of antiplatelet therapies    Thank you for the consult. Cardiology will follow.    Joel Norris MD  Cardiac Electrophysiology

## 2022-02-19 NOTE — H&P
Hospital Medicine History & Physical Note    Date of Service  2/18/2022    Primary Care Physician  Toña Tejada P.A.-C.    Consultants  none   Code Status  Full Code    Chief Complaint  Chief Complaint   Patient presents with   • ALOC     Pt noted by friends to be disoriented. AOx3, disoriented to situation   • Failure to Thrive     Pt noted to be found in bed, not eating, or drinking. Pt disheveled       History of Presenting Illness    66-year-old female with history of hypertension and smoking presented 2/18 with altered mental status patient has been living by herself and was able to take care of herself however today her neighbors did check on her and she was sluggish, denied any symptoms with no pain however patient states she was not eating well and no appetite, patient was skinny with malnutrition and dehydrated on admission, labs showed YAMIL, with hypokalemia and hypomagnesemia, also hypotension, IV fluid was started with some improvement in her mentation.  Patient lives by herself and her family lives far away, patient did not want me to call family.    I discussed the plan of care with patient, bedside RN and charge RN.    Review of Systems  Review of Systems   Constitutional: Negative.    HENT: Negative.    Respiratory: Negative.    Cardiovascular: Negative.    Genitourinary: Negative.    Musculoskeletal: Negative.    Neurological: Negative.    Psychiatric/Behavioral: Negative.    All other systems reviewed and are negative.      Past Medical History   has a past medical history of Hypertension, Osteoporosis, and Smoking.    Surgical History   has a past surgical history that includes us-needle core bx-breast panel (Right).     Family History  Patient denied any family history of cancer or heart disease  Family history reviewed with patient. There is no family history that is pertinent to the chief complaint.     Social History   reports that she has been smoking cigarettes. She has been smoking about  0.50 packs per day. She has never used smokeless tobacco. She reports previous alcohol use. She reports previous drug use.    Allergies  No Known Allergies    Medications  Prior to Admission Medications   Prescriptions Last Dose Informant Patient Reported? Taking?   Calcium-Magnesium-Vitamin D (CALCIUM 1200+D3 PO)   Yes No   Sig: Take 1 tablet by mouth every day at 6 PM. Indications: suppliment   HYDROcodone-acetaminophen (NORCO) 5-325 MG Tab per tablet   Yes No   Sig: Take 1 tablet by mouth every 4 hours. Take one or 2 tablets by mouth every 4 to 6 hours as needed for pain  Indications: Pain   Magnesium Glycinate 665 MG Cap   Yes No   Sig: Take 1 capsule by mouth every day at 6 PM. Indications: suppliment   Multiple Vitamins-Minerals (OCUVITE ADULT 50+) Cap   Yes No   Sig: Take 1 capsule by mouth every day at 6 PM. Indications: suppliment   acetaminophen (TYLENOL) 500 MG Tab   Yes No   Sig: Take 1,000 mg by mouth every 6 hours as needed. Indications: Pain   losartan-hydrochlorothiazide (HYZAAR) 100-12.5 MG per tablet  Patient Yes No   Sig: Take 1 tablet by mouth every day.   meloxicam (MOBIC) 15 MG tablet   Yes No   Sig: Take 15 mg by mouth every day. Indications: Joint Damage causing Pain and Loss of Function   naltrexone (DEPADE) 50 MG Tab   Yes No   Sig: Take 50 mg by mouth.   simvastatin (ZOCOR) 20 MG Tab   Yes No   Sig: Take 20 mg by mouth every day.      Facility-Administered Medications: None       Physical Exam  Temp:  [36.3 °C (97.4 °F)-36.9 °C (98.5 °F)] 36.6 °C (97.8 °F)  Pulse:  [] 102  Resp:  [10-36] 20  BP: ()/(53-84) 143/84  SpO2:  [95 %-99 %] 95 %  Blood Pressure : (!) 87/56   Temperature: 36.9 °C (98.5 °F)   Pulse: 96   Respiration: 14   Pulse Oximetry: 99 %       Physical Exam  Constitutional:       Appearance: She is not ill-appearing.   HENT:      Head: Normocephalic and atraumatic.   Eyes:      General: No scleral icterus.  Cardiovascular:      Rate and Rhythm: Normal rate.       Heart sounds: No murmur heard.  Pulmonary:      Effort: No respiratory distress.      Breath sounds: No wheezing.   Abdominal:      General: There is no distension.      Tenderness: There is no abdominal tenderness. There is no guarding.   Musculoskeletal:         General: No swelling.      Right lower leg: No edema.      Left lower leg: No edema.   Skin:     General: Skin is dry.      Coloration: Skin is not jaundiced or pale.      Findings: No bruising or lesion.   Neurological:      General: No focal deficit present.      Mental Status: She is alert. Mental status is at baseline.      Cranial Nerves: No cranial nerve deficit.      Sensory: No sensory deficit.      Motor: No weakness.      Comments: Patient is alert and oriented x3 however some confusing and slow thinking         Laboratory:  Recent Labs     02/18/22  1300   WBC 5.1   RBC 2.46*   HEMOGLOBIN 7.9*   HEMATOCRIT 22.7*   MCV 92.3   MCH 32.1   MCHC 34.8   RDW 43.0   PLATELETCT 276   MPV 11.0     Recent Labs     02/18/22  1300   SODIUM 144   POTASSIUM 2.7*   CHLORIDE 103   CO2 13*   GLUCOSE 105*   *   CREATININE 3.40*   CALCIUM 9.7     Recent Labs     02/18/22  1300   ALTSGPT 5   ASTSGOT 9*   ALKPHOSPHAT 94   TBILIRUBIN 0.3   GLUCOSE 105*         No results for input(s): NTPROBNP in the last 72 hours.      No results for input(s): TROPONINT in the last 72 hours.    Imaging:  US-RENAL   Final Result      1.  No hydronephrosis.   2.  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      DX-CHEST-PORTABLE (1 VIEW)   Final Result      1.  Emphysema.   2.  Small linear scar or residual subsegmental atelectatic change at the right medial lung base where there was previously extensive consolidation and cavitation. (See CT of the chest 5/22/2021)   3.  No acute infiltrates.          X-Ray:  I have personally reviewed the images and compared with prior images.  EKG:  I have personally  reviewed the images and compared with prior images.    Assessment/Plan:  I anticipate this patient will require at least two midnights for appropriate medical management, necessitating inpatient admission.    * YAMIL (acute kidney injury) (HCC)- (present on admission)  Assessment & Plan  Came with creatinine 3.4 and her baseline around 0.8  Patient states she was not eating or drinking  Urine did not show protein or infection  Ultrasound showed acute kidney size around 10  Likely prerenal  IV fluid and evaluate the patient with labs tomorrow  Avoid nephrotoxic medication    Malnutrition (HCC)  Assessment & Plan  Patient looks skinny and muscles wast  BMI 16.1  Patient states she was not eating and does not have appetite  Patient might need cancer screening as outpatient especially she has history of smoking  Check TSH tomorrow    Hypokalemia- (present on admission)  Assessment & Plan  Came with potassium 2.7  No changes in EKG  Replace IV and oral  Repeat labs    Hypotension- (present on admission)  Assessment & Plan  Came with blood pressure 80/40  IV fluid  Hold blood pressure losartan and hydrochlorothiazide due to worsening kidney function  No signs of infection  Close monitoring    Emphysema lung (HCC)  Assessment & Plan  Patient has history of smoking for years  X-ray showed signs of emphysema  No wheezing and patient is on room air  Patient needs CT scan for cancer screening as outpatient and pulmonary function test  Inhaler as needed  No exacerbation    Anemia  Assessment & Plan  Patient has chronic anemia with elevated MCV however she came with worsening and hemoglobin 7.9  Denied any signs of bleeding  Patient received 1 unit of red blood cell at the emergency by ED physician  Patient needs colonoscopy and EGD could be done as outpatient, occult blood in stool is pending  Not able to do work-up for anemia since patient had 1 unit of red blood    Hypomagnesemia  Assessment & Plan  Magnesium 1.4  No prolonged  QTC and no changes in her EKG  Replace IV      VTE prophylaxis: SCDs/TEDs and enoxaparin ppx       Interventions to be considered in all patients in order to minimize the risk of delirium.   -do not disturb patient (vitals or lab draws) between the hours of 10 PM and 6 AM.  -ideally the patient should not sleep during the day and we should avoid day time naps.   -up in chair for meals  -ambulate at least three times daily, as able  -watch for constipation  -timed voiding - ask patient is she would like to go to the bathroom q 2-3 hours, except during the do not disturb hours.   -remove all necessary lines (central lines, peripheral IVs, feeding tubes, holloway catheters)  -unless patient has shown harm to self or others I would recommend against use of restraints - either chemical or physical (antipsychotics)   -minimize polypharmacy, do not dose medication during sleep hours

## 2022-02-19 NOTE — PROGRESS NOTES
Pt arrived to unit via hospital bed at 1000. Pt oriented to room, unit, and plan of care. Tele-monitor placed. Pt is A&Ox2 to time and self only. Pt was reoriented. Oral potassium was administered, pt has continuous fluids sodium bicarb 50 mEq 1/2 NS running at 42 ml/hr. All questions answered at this time. Call light within reach; fall precautions in place.

## 2022-02-19 NOTE — PROGRESS NOTES
Salima from Lab called with critical result of Troponin 248 at 0429. Critical lab result read back to Salima.   ONEYDA Romero,  notified of critical lab result at 0433.  Critical lab result read back by ONEYDA Esteban.

## 2022-02-19 NOTE — PROGRESS NOTES
HOSPITALIST CROSS COVER    This is a 66 yr old female admitted for AMS, YAMIL with metabolic acidosis, electrolyte imbalance, hypovolemic shock, and symptomatic anemia.  Patient was found by neighbors to be sluggish, malnurished and dehydrated. Patient reported poor appetite. Normally high functioning and able to care for herself. In the ED, the patient received fluids, K+/mg+ replacement, as well as 1 unit PRBCs in ED. Patient's case was reviewed by nephrology, who recommended aggressive crystalloid resuscitation with supplemental bicarb after initial boluses.     During the night, I was notified by RN per protocol that repeat potassium after admission was 2.7, which is the same as it was in the ED earlier in the day, and for which she had received oral and IV potassium. She also had a mag of 1.4.  Per RN, patient had not yet received the entire 20 meq of IV potassium, nor the entire 4 g of IV mag ordered upon admission. Patient was alert, per RN.     In ED, found to have , Cr 3.4, eGFR 13 (previously normal in June) with metabolic acidosis with CO2 13, Agap 28. As mentioned, ED consulted nephrologist who briefly reviewed this case, recommending aggressive crystalloid resuscitation with supplemental bicarb after initial boluses.      VS: 36.6, , RR 20, O2 95% on RA, /84    A/P   #YAMIL with metabolic acidosis  - continue IV LR, monitor for fluid overload due to aggressive hydration and receiving a unit PRBC  - IV bicarb  - repeat labs in am    #Electrolyte imbalance  - continue electrolyte replacement  - repeat labs in am and replete as indicated

## 2022-02-19 NOTE — PROGRESS NOTES
Patient transferred to [place] [id number]. Report given to receiving nurse. Belongings gathered and sent with patient.

## 2022-02-19 NOTE — CARE PLAN
The patient is Watcher - Medium risk of patient condition declining or worsening    Shift Goals  Clinical Goals: increase K levels    Progress made toward(s) clinical / shift goals:  Pt receiving K IV and Mag. Labs drawn per order    Patient is not progressing towards the following goals:

## 2022-02-19 NOTE — PROGRESS NOTES
Hospital Medicine Daily Progress Note    Date of Service  2/19/2022    Chief Complaint  Jaymie Peacock is a 66 y.o. female admitted 2/18/2022 with altered mental status    Hospital Course  66-year-old female with history of hypertension and smoking presented 2/18 with altered mental status patient has been living by herself and was able to take care of herself however today her neighbors did check on her and she was sluggish, denied any symptoms with no pain however patient states she was not eating well and no appetite, patient was skinny with malnutrition and dehydrated on admission, labs showed YAMIL, with hypokalemia and hypomagnesemia, also hypotension, IV fluid was started with some improvement in her mentation.  Patient lives by herself and her family lives far away, patient did not want me to call family.      Interval Problem Update  Electrolytes replaced, will recheck.  Concerning troponin at 248, repeat 473. Values may be inflated in the setting of acute renal failure. EKG without evidence of acute ischemia. Empiric full dose ASA, high intensity statin, beta blocker ordered. Transferred for telemetry monitoring in the setting of probable NSTEMI. Cardiology consulted and Dr. Norris evaluating, appreciate assistance.  ARF improving with IV fluids.    I have personally seen and examined the patient at bedside. I discussed the plan of care with patient, bedside RN, charge RN and cardiology.    Consultants/Specialty  cardiology    Code Status  Full Code    Disposition  Patient is not medically cleared for discharge.   Anticipate discharge to to home with close outpatient follow-up.  I have placed the appropriate orders for post-discharge needs.    Review of Systems  Review of Systems   Constitutional: Positive for malaise/fatigue. Negative for chills, diaphoresis and fever.   Respiratory: Negative for shortness of breath.    Cardiovascular: Negative for chest pain and leg swelling.   Gastrointestinal: Negative  for abdominal pain, nausea and vomiting.   Neurological: Negative for dizziness.   All other systems reviewed and are negative.       Physical Exam  Temp:  [36.1 °C (97 °F)-36.9 °C (98.5 °F)] 36.1 °C (97 °F)  Pulse:  [] 101  Resp:  [10-36] 19  BP: ()/(53-84) 94/73  SpO2:  [94 %-100 %] 100 %    Physical Exam  Vitals and nursing note reviewed.   Constitutional:       General: She is not in acute distress.     Appearance: She is underweight.   HENT:      Head: Normocephalic and atraumatic.      Comments: Temporal wasting bilaterally     Right Ear: External ear normal.      Left Ear: External ear normal.      Nose: Nose normal. No congestion.      Mouth/Throat:      Mouth: Mucous membranes are dry.      Pharynx: Oropharynx is clear.   Eyes:      General: No scleral icterus.     Conjunctiva/sclera: Conjunctivae normal.   Cardiovascular:      Rate and Rhythm: Regular rhythm. Tachycardia present.      Heart sounds: No murmur heard.  Pulmonary:      Breath sounds: Normal breath sounds. No wheezing or rales.   Abdominal:      General: There is no distension.      Palpations: Abdomen is soft.      Tenderness: There is no abdominal tenderness. There is no guarding.   Musculoskeletal:      Cervical back: Neck supple. No rigidity.      Right lower leg: No edema.      Left lower leg: No edema.   Skin:     General: Skin is warm and dry.      Coloration: Skin is pale.   Neurological:      General: No focal deficit present.      Mental Status: She is alert and oriented to person, place, and time. She is confused.   Psychiatric:         Mood and Affect: Mood is depressed. Affect is flat.         Behavior: Behavior is cooperative.         Fluids    Intake/Output Summary (Last 24 hours) at 2/19/2022 1221  Last data filed at 2/18/2022 1614  Gross per 24 hour   Intake 1354 ml   Output --   Net 1354 ml       Laboratory  Recent Labs     02/18/22  1300 02/19/22  0313   WBC 5.1 5.5   RBC 2.46* 2.90*   HEMOGLOBIN 7.9* 8.8*    HEMATOCRIT 22.7* 25.6*   MCV 92.3 88.3   MCH 32.1 30.3   MCHC 34.8 34.4   RDW 43.0 49.7   PLATELETCT 276 238   MPV 11.0 10.8     Recent Labs     02/18/22  1300 02/18/22  2245 02/19/22  0313 02/19/22  1121   SODIUM 144  --  145 139   POTASSIUM 2.7* 2.7* 3.0* 3.4*   CHLORIDE 103  --  111 107   CO2 13*  --  15* 16*   GLUCOSE 105*  --  101* 108*   *  --  127* 119*   CREATININE 3.40*  --  2.22* 1.84*   CALCIUM 9.7  --  9.2 9.2             Recent Labs     02/19/22  0750   TRIGLYCERIDE 95   HDL 76   LDL 53       Imaging  EC-ECHOCARDIOGRAM COMPLETE W/ CONT         US-RENAL   Final Result      1.  No hydronephrosis.   2.  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      DX-CHEST-PORTABLE (1 VIEW)   Final Result      1.  Emphysema.   2.  Small linear scar or residual subsegmental atelectatic change at the right medial lung base where there was previously extensive consolidation and cavitation. (See CT of the chest 5/22/2021)   3.  No acute infiltrates.         I have personally interpreted the EKG dated 2/19/22 08:20am. Sinus tachycardia with rate 100, no ST elevation or depression. QTc 421 wnl.    Assessment/Plan  * YAMIL (acute kidney injury) (HCC)- (present on admission)  Assessment & Plan  Came with creatinine 3.4 and her baseline around 0.8  Patient states she was not eating or drinking  Urine did not show protein or infection  Ultrasound showed acute kidney size around 10  Likely prerenal  Improving with IV fluids. Continue.   Avoid nephrotoxic medication    NSTEMI (non-ST elevation myocardial infarction) (HCC)- (present on admission)  Assessment & Plan  Suspect type 2, demand ischemia 2/2 acute illness. Troponins 248>473>594 in the setting of acute renal failure, nonoliguric.  Initiated full dose ASA, high intensity statin, beta blocker.  Appreciate Cardiology consult. Initiated heparin gtt per recommendations.  Transferred for cardiac  "monitoring.  Trend troponins.    Hypotension- (present on admission)  Assessment & Plan  Came with blood pressure 80/40, improving with IV fluids.  Continue IV fluid  Hold blood pressure losartan and hydrochlorothiazide due to worsening kidney function  No signs of infection  Close monitoring    Anemia  Assessment & Plan  Patient has chronic anemia with elevated MCV however she came with worsening and hemoglobin 7.9  Denied any signs of bleeding  Patient received 1 unit of red blood cell at the emergency by ED physician  Patient needs colonoscopy and EGD could be done as outpatient, occult blood in stool is pending  Requested Iron/TIBC add on to labs drawn 2/18 prior to transfusion    Hypomagnesemia- (present on admission)  Assessment & Plan  Magnesium 1.4  No prolonged QTC and no changes in her EKG  Replaced IV  Recheck    Hypokalemia- (present on admission)  Assessment & Plan  Presented with potassium 2.7, improving with supplementation.  No EKG changes  Continue aggressive supplementation.    Alcohol abuse- (present on admission)  Assessment & Plan  Recent history of heavy use. Patient reports quitting \"a couple of weeks\" prior to admission.  Continue thiamine supplementation.  Check vitamin B12.    Emphysema lung (HCC)  Assessment & Plan  Patient has history of smoking for years  X-ray showed signs of emphysema  No wheezing and patient is on room air  Patient needs CT scan for cancer screening as outpatient and pulmonary function test  Inhaler as needed  No exacerbation    Malnutrition (HCC)  Assessment & Plan  BMI 16.1, temporal wasting, limbs appear atrophied.  Patient states she was not eating and does not have appetite.  Possible geriatric depression vs substance dependence given recent history of heavy alcohol use.  Patient might need cancer screening as outpatient especially she has history of smoking  TSH wnl  Dietitian consulted, appreciate assistance.       VTE prophylaxis: SCDs/TEDs and therapeutic " anticoagulation with heparin    I have performed a physical exam and reviewed and updated ROS and Plan today (2/19/2022). In review of yesterday's note (2/18/2022), there are no changes except as documented above.

## 2022-02-19 NOTE — PROGRESS NOTES
Higher Level of care-    * S6 called at 0739    * Unable to place in tele bed d/t capacity     * Dr. Mcclendon contacted about inability to move patient due to lack of beds    * Requested STAT trop to see which way is trending and      * Called bedside nurse to make sure lab request was being processed     * Will monitor to follow up

## 2022-02-19 NOTE — ASSESSMENT & PLAN NOTE
2/19/2022 echocardiogram suggestive of stress-induced cardiomyopathy with ejection fraction 35%  No chest pain and troponin was trending down  Cardiology signed off as patient not a candidate for catheterization as she is too weak and high risk  Pending repeat labs

## 2022-02-19 NOTE — PROGRESS NOTES
Salima from Lab called with critical result of Potassium 2.7 at 2323. Critical lab result read back to Salima.   ONEYDA Romero, notified of critical lab result at 2328.  Critical lab result read back by ONEYDA Esteban.

## 2022-02-19 NOTE — PROGRESS NOTES
Dr. Griffin was updated regarding BP 81/61.  ml bolus was ordered along with continuous fluids LR running at 100 ml/hr. Pt is tolerating intervention well.

## 2022-02-19 NOTE — ASSESSMENT & PLAN NOTE
Creatinine trending down  Patient euvolemic  Encourage p.o. intake  Avoid nephrotoxin  Renal dose meds  Labs in a.m.

## 2022-02-19 NOTE — HOSPITAL COURSE
66-year-old female with history of hypertension and smoking presented 2/18 with altered mental status patient has been living by herself and was able to take care of herself however today her neighbors did check on her and she was sluggish, denied any symptoms with no pain however patient states she was not eating well and no appetite, patient was skinny with malnutrition and dehydrated on admission, labs showed YAMIL, with hypokalemia and hypomagnesemia, also hypotension, IV fluid was started with some improvement in her mentation.  Patient lives by herself and her family lives far away, patient did not want me to call family.

## 2022-02-19 NOTE — PROGRESS NOTES
4 Eyes Skin Assessment Completed by Adrienne RN and ELROY Valdez.    Head WDL  Ears WDL  Nose WDL  Mouth WDL  Neck WDL  Breast/Chest WDL  Shoulder Blades WDL  Spine WDL  (R) Arm/Elbow/Hand WDL  (L) Arm/Elbow/Hand WDL  Abdomen WDL  Groin WDL  Scrotum/Coccyx/Buttocks Redness, Non-Blanching and Excoriation (open wound on sacrum) mepilex removed pt is incontinent of urine  (R) Leg WDL  (L) Leg WDL  (R) Heel/Foot/Toe Redness, Blanching and Boggy  (L) Heel/Foot/Toe Redness, Blanching and Boggy          Devices In Places ECG, Blood Pressure Cuff and Pulse Ox      Interventions In Place Heel Mepilex, Waffle Overlay, Pillows and Q2 Turns    Possible Skin Injury Yes    Pictures Uploaded Into Epic Yes  Wound Consult Placed Yes  RN Wound Prevention Protocol Ordered No

## 2022-02-19 NOTE — PROGRESS NOTES
Dr. Griffin was updated pt's BP is 76/59 another 500 ml bolus was administered, pt is tolerating intervention well.

## 2022-02-20 PROBLEM — E43 PROTEIN-CALORIE MALNUTRITION, SEVERE (HCC): Status: ACTIVE | Noted: 2022-02-18

## 2022-02-20 PROBLEM — G93.40 ENCEPHALOPATHY ACUTE: Status: ACTIVE | Noted: 2022-02-20

## 2022-02-20 LAB
ALBUMIN SERPL BCP-MCNC: 2.5 G/DL (ref 3.2–4.9)
ALBUMIN/GLOB SERPL: 1.1 G/DL
ALP SERPL-CCNC: 100 U/L (ref 30–99)
ALT SERPL-CCNC: 11 U/L (ref 2–50)
AMMONIA PLAS-SCNC: 15 UMOL/L (ref 11–45)
AMPHET UR QL SCN: NEGATIVE
ANION GAP SERPL CALC-SCNC: 11 MMOL/L (ref 7–16)
AST SERPL-CCNC: 30 U/L (ref 12–45)
BACTERIA UR CULT: NORMAL
BARBITURATES UR QL SCN: NEGATIVE
BASOPHILS # BLD AUTO: 0.6 % (ref 0–1.8)
BASOPHILS # BLD: 0.04 K/UL (ref 0–0.12)
BENZODIAZ UR QL SCN: NEGATIVE
BILIRUB SERPL-MCNC: 0.3 MG/DL (ref 0.1–1.5)
BUN SERPL-MCNC: 102 MG/DL (ref 8–22)
BZE UR QL SCN: NEGATIVE
CALCIUM SERPL-MCNC: 9.1 MG/DL (ref 8.5–10.5)
CANNABINOIDS UR QL SCN: NEGATIVE
CHLORIDE SERPL-SCNC: 112 MMOL/L (ref 96–112)
CK SERPL-CCNC: 52 U/L (ref 0–154)
CO2 SERPL-SCNC: 17 MMOL/L (ref 20–33)
CREAT SERPL-MCNC: 1.51 MG/DL (ref 0.5–1.4)
EOSINOPHIL # BLD AUTO: 0.13 K/UL (ref 0–0.51)
EOSINOPHIL NFR BLD: 1.9 % (ref 0–6.9)
ERYTHROCYTE [DISTWIDTH] IN BLOOD BY AUTOMATED COUNT: 52.5 FL (ref 35.9–50)
GLOBULIN SER CALC-MCNC: 2.3 G/DL (ref 1.9–3.5)
GLUCOSE SERPL-MCNC: 99 MG/DL (ref 65–99)
HCT VFR BLD AUTO: 23.1 % (ref 37–47)
HCT VFR BLD AUTO: 27.8 % (ref 37–47)
HCT VFR BLD AUTO: 29.1 % (ref 37–47)
HEMOCCULT STL QL: POSITIVE
HGB BLD-MCNC: 7.9 G/DL (ref 12–16)
HGB BLD-MCNC: 9.1 G/DL (ref 12–16)
HGB BLD-MCNC: 9.9 G/DL (ref 12–16)
IMM GRANULOCYTES # BLD AUTO: 0.05 K/UL (ref 0–0.11)
IMM GRANULOCYTES NFR BLD AUTO: 0.7 % (ref 0–0.9)
LYMPHOCYTES # BLD AUTO: 0.81 K/UL (ref 1–4.8)
LYMPHOCYTES NFR BLD: 12.1 % (ref 22–41)
MAGNESIUM SERPL-MCNC: 2.1 MG/DL (ref 1.5–2.5)
MCH RBC QN AUTO: 30.4 PG (ref 27–33)
MCHC RBC AUTO-ENTMCNC: 34.2 G/DL (ref 33.6–35)
MCV RBC AUTO: 88.8 FL (ref 81.4–97.8)
METHADONE UR QL SCN: NEGATIVE
MONOCYTES # BLD AUTO: 0.58 K/UL (ref 0–0.85)
MONOCYTES NFR BLD AUTO: 8.7 % (ref 0–13.4)
NEUTROPHILS # BLD AUTO: 5.08 K/UL (ref 2–7.15)
NEUTROPHILS NFR BLD: 76 % (ref 44–72)
NRBC # BLD AUTO: 0 K/UL
NRBC BLD-RTO: 0 /100 WBC
NT-PROBNP SERPL IA-MCNC: ABNORMAL PG/ML (ref 0–125)
OPIATES UR QL SCN: NEGATIVE
OXYCODONE UR QL SCN: NEGATIVE
PCP UR QL SCN: NEGATIVE
PHOSPHATE SERPL-MCNC: 2.3 MG/DL (ref 2.5–4.5)
PLATELET # BLD AUTO: 234 K/UL (ref 164–446)
PMV BLD AUTO: 10.9 FL (ref 9–12.9)
POTASSIUM SERPL-SCNC: 3.3 MMOL/L (ref 3.6–5.5)
PROPOXYPH UR QL SCN: NEGATIVE
PROT SERPL-MCNC: 4.8 G/DL (ref 6–8.2)
RBC # BLD AUTO: 2.6 M/UL (ref 4.2–5.4)
SIGNIFICANT IND 70042: NORMAL
SITE SITE: NORMAL
SODIUM SERPL-SCNC: 140 MMOL/L (ref 135–145)
SOURCE SOURCE: NORMAL
T4 FREE SERPL-MCNC: 1.06 NG/DL (ref 0.93–1.7)
TROPONIN T SERPL-MCNC: 258 NG/L (ref 6–19)
TROPONIN T SERPL-MCNC: 338 NG/L (ref 6–19)
TSH SERPL DL<=0.005 MIU/L-ACNC: 0.97 UIU/ML (ref 0.38–5.33)
UFH PPP CHRO-ACNC: 0.55 IU/ML
WBC # BLD AUTO: 6.7 K/UL (ref 4.8–10.8)

## 2022-02-20 PROCEDURE — 84100 ASSAY OF PHOSPHORUS: CPT

## 2022-02-20 PROCEDURE — 83880 ASSAY OF NATRIURETIC PEPTIDE: CPT

## 2022-02-20 PROCEDURE — 85014 HEMATOCRIT: CPT | Mod: 91

## 2022-02-20 PROCEDURE — 700102 HCHG RX REV CODE 250 W/ 637 OVERRIDE(OP): Performed by: INTERNAL MEDICINE

## 2022-02-20 PROCEDURE — 99233 SBSQ HOSP IP/OBS HIGH 50: CPT | Performed by: INTERNAL MEDICINE

## 2022-02-20 PROCEDURE — 700111 HCHG RX REV CODE 636 W/ 250 OVERRIDE (IP): Performed by: HOSPITALIST

## 2022-02-20 PROCEDURE — 85520 HEPARIN ASSAY: CPT

## 2022-02-20 PROCEDURE — 84439 ASSAY OF FREE THYROXINE: CPT

## 2022-02-20 PROCEDURE — 99233 SBSQ HOSP IP/OBS HIGH 50: CPT | Performed by: HOSPITALIST

## 2022-02-20 PROCEDURE — 770020 HCHG ROOM/CARE - TELE (206)

## 2022-02-20 PROCEDURE — 80307 DRUG TEST PRSMV CHEM ANLYZR: CPT

## 2022-02-20 PROCEDURE — 82272 OCCULT BLD FECES 1-3 TESTS: CPT

## 2022-02-20 PROCEDURE — 700105 HCHG RX REV CODE 258: Performed by: HOSPITALIST

## 2022-02-20 PROCEDURE — 82140 ASSAY OF AMMONIA: CPT

## 2022-02-20 PROCEDURE — A9270 NON-COVERED ITEM OR SERVICE: HCPCS | Performed by: INTERNAL MEDICINE

## 2022-02-20 PROCEDURE — 80053 COMPREHEN METABOLIC PANEL: CPT

## 2022-02-20 PROCEDURE — 36415 COLL VENOUS BLD VENIPUNCTURE: CPT

## 2022-02-20 PROCEDURE — 82550 ASSAY OF CK (CPK): CPT

## 2022-02-20 PROCEDURE — 84443 ASSAY THYROID STIM HORMONE: CPT

## 2022-02-20 PROCEDURE — 700102 HCHG RX REV CODE 250 W/ 637 OVERRIDE(OP): Performed by: HOSPITALIST

## 2022-02-20 PROCEDURE — 85018 HEMOGLOBIN: CPT

## 2022-02-20 PROCEDURE — 85025 COMPLETE CBC W/AUTO DIFF WBC: CPT

## 2022-02-20 PROCEDURE — 83735 ASSAY OF MAGNESIUM: CPT

## 2022-02-20 PROCEDURE — 84484 ASSAY OF TROPONIN QUANT: CPT

## 2022-02-20 PROCEDURE — A9270 NON-COVERED ITEM OR SERVICE: HCPCS | Performed by: HOSPITALIST

## 2022-02-20 RX ORDER — POTASSIUM CHLORIDE 20 MEQ/1
40 TABLET, EXTENDED RELEASE ORAL ONCE
Status: COMPLETED | OUTPATIENT
Start: 2022-02-20 | End: 2022-02-20

## 2022-02-20 RX ORDER — ASPIRIN 81 MG/1
81 TABLET, CHEWABLE ORAL DAILY
Status: DISCONTINUED | OUTPATIENT
Start: 2022-02-21 | End: 2022-02-23

## 2022-02-20 RX ORDER — MIDODRINE HYDROCHLORIDE 5 MG/1
5 TABLET ORAL
Status: DISCONTINUED | OUTPATIENT
Start: 2022-02-20 | End: 2022-02-23

## 2022-02-20 RX ORDER — OMEPRAZOLE 20 MG/1
40 CAPSULE, DELAYED RELEASE ORAL 2 TIMES DAILY
Status: DISCONTINUED | OUTPATIENT
Start: 2022-02-20 | End: 2022-02-21

## 2022-02-20 RX ORDER — HEPARIN SODIUM 5000 [USP'U]/ML
5000 INJECTION, SOLUTION INTRAVENOUS; SUBCUTANEOUS EVERY 8 HOURS
Status: DISCONTINUED | OUTPATIENT
Start: 2022-02-20 | End: 2022-02-21

## 2022-02-20 RX ADMIN — ATORVASTATIN CALCIUM 40 MG: 40 TABLET, FILM COATED ORAL at 16:34

## 2022-02-20 RX ADMIN — MIDODRINE HYDROCHLORIDE 5 MG: 5 TABLET ORAL at 16:34

## 2022-02-20 RX ADMIN — POTASSIUM CHLORIDE 40 MEQ: 1500 TABLET, EXTENDED RELEASE ORAL at 08:03

## 2022-02-20 RX ADMIN — SODIUM CHLORIDE, POTASSIUM CHLORIDE, SODIUM LACTATE AND CALCIUM CHLORIDE: 600; 310; 30; 20 INJECTION, SOLUTION INTRAVENOUS at 12:11

## 2022-02-20 RX ADMIN — ASPIRIN 324 MG: 81 TABLET, CHEWABLE ORAL at 06:11

## 2022-02-20 RX ADMIN — THIAMINE HYDROCHLORIDE 100 MG: 100 INJECTION, SOLUTION INTRAMUSCULAR; INTRAVENOUS at 06:10

## 2022-02-20 RX ADMIN — DIBASIC SODIUM PHOSPHATE, MONOBASIC POTASSIUM PHOSPHATE AND MONOBASIC SODIUM PHOSPHATE 500 MG: 852; 155; 130 TABLET ORAL at 16:34

## 2022-02-20 RX ADMIN — MIDODRINE HYDROCHLORIDE 5 MG: 5 TABLET ORAL at 11:30

## 2022-02-20 RX ADMIN — DIBASIC SODIUM PHOSPHATE, MONOBASIC POTASSIUM PHOSPHATE AND MONOBASIC SODIUM PHOSPHATE 500 MG: 852; 155; 130 TABLET ORAL at 12:11

## 2022-02-20 RX ADMIN — OMEPRAZOLE 40 MG: 20 CAPSULE, DELAYED RELEASE ORAL at 12:37

## 2022-02-20 ASSESSMENT — ENCOUNTER SYMPTOMS
DIAPHORESIS: 0
NERVOUS/ANXIOUS: 1
SHORTNESS OF BREATH: 0
MEMORY LOSS: 1
DIZZINESS: 0
NAUSEA: 0
CARDIOVASCULAR NEGATIVE: 1
CHILLS: 0
GASTROINTESTINAL NEGATIVE: 1
EYE REDNESS: 1
NEUROLOGICAL NEGATIVE: 1
ABDOMINAL PAIN: 0
FEVER: 0
MUSCULOSKELETAL NEGATIVE: 1
RESPIRATORY NEGATIVE: 1
VOMITING: 0

## 2022-02-20 ASSESSMENT — FIBROSIS 4 INDEX: FIB4 SCORE: 2.59

## 2022-02-20 ASSESSMENT — COGNITIVE AND FUNCTIONAL STATUS - GENERAL
SUGGESTED CMS G CODE MODIFIER MOBILITY: CL
MOVING FROM LYING ON BACK TO SITTING ON SIDE OF FLAT BED: A LOT
PERSONAL GROOMING: A LITTLE
HELP NEEDED FOR BATHING: A LITTLE
DRESSING REGULAR LOWER BODY CLOTHING: A LITTLE
SUGGESTED CMS G CODE MODIFIER DAILY ACTIVITY: CK
CLIMB 3 TO 5 STEPS WITH RAILING: A LOT
DRESSING REGULAR UPPER BODY CLOTHING: A LITTLE
MOBILITY SCORE: 14
WALKING IN HOSPITAL ROOM: A LOT
TOILETING: A LITTLE
DAILY ACTIVITIY SCORE: 19
MOVING TO AND FROM BED TO CHAIR: A LITTLE
STANDING UP FROM CHAIR USING ARMS: A LOT
TURNING FROM BACK TO SIDE WHILE IN FLAT BAD: A LITTLE

## 2022-02-20 ASSESSMENT — PAIN DESCRIPTION - PAIN TYPE
TYPE: ACUTE PAIN

## 2022-02-20 ASSESSMENT — LIFESTYLE VARIABLES: SUBSTANCE_ABUSE: 1

## 2022-02-20 NOTE — CARE PLAN
Problem: Hemodynamics  Goal: Patient's hemodynamics, fluid balance and neurologic status will be stable or improve  Outcome: Progressing     Problem: Fluid Volume  Goal: Fluid volume balance will be maintained  Outcome: Progressing     Problem: Urinary - Renal Perfusion  Goal: Ability to achieve and maintain adequate renal perfusion and functioning will improve  Outcome: Progressing     Problem: Respiratory  Goal: Patient will achieve/maintain optimum respiratory ventilation and gas exchange  Outcome: Progressing     Problem: Physical Regulation  Goal: Diagnostic test results will improve  Outcome: Progressing  Goal: Signs and symptoms of infection will decrease  Outcome: Progressing     Problem: Skin Integrity  Goal: Skin integrity is maintained or improved  Outcome: Progressing     Problem: Fall Risk  Goal: Patient will remain free from falls  Outcome: Progressing     The patient is Watcher - Medium risk of patient condition declining or worsening    Shift Goals  Clinical Goals: stable vital signs, stable or improved mentation  Patient Goals: PATTI  Family Goals: PATTI    Progress made toward(s) clinical / shift goals:  BP has been stable, MAP goal>50. Patient remains confused. AAOx1. Incontinent of stool and urine multiple times this shift. Barrier cream used to protect skin, as there is a small open area on her buttocks.    Patient is not progressing towards the following goals: Patient neuro status not appropriate to retain education at this time.      Problem: Knowledge Deficit - Standard  Goal: Patient and family/care givers will demonstrate understanding of plan of care, disease process/condition, diagnostic tests and medications  Outcome: Not Progressing

## 2022-02-20 NOTE — CARE PLAN
The patient is Watcher - Medium risk of patient condition declining or worsening    Shift Goals  Clinical Goals: (P) Pt will remain free from falls. Pt will remain hemodynamically stable.  Patient Goals: (P) Go home  Family Goals: (P) PATTI    Progress made toward(s) clinical / shift goals:    Problem: Hemodynamics  Goal: Patient's hemodynamics, fluid balance and neurologic status will be stable or improve  Outcome: Progressing     Problem: Fall Risk  Goal: Patient will remain free from falls  Outcome: Progressing       Patient is not progressing towards the following goals:      Problem: Knowledge Deficit - Standard  Goal: Patient and family/care givers will demonstrate understanding of plan of care, disease process/condition, diagnostic tests and medications  Outcome: Not Progressing     Problem: Fluid Volume  Goal: Fluid volume balance will be maintained  Outcome: Not Progressing     Problem: Skin Integrity  Goal: Skin integrity is maintained or improved  Outcome: Not Progressing

## 2022-02-20 NOTE — PROGRESS NOTES
Cardiology Follow Up Progress Note    Date of Service  2/20/2022    Attending Physician  Meek Griffin M.D.    Chief Complaint   NSTEMI, cardiomyopathy, altered mental status, acute kidney injury, anemia, hypotension, malnutrition and ETOH abuse.    SREEDHAR Peacock is a 66 y.o. female admitted 2/18/2022 with above.    Interim Events  No significant changes noted from cardiac standpoint within the past 24 hours.    Review of Systems  Review of Systems   Unable to perform ROS: Other       Vital signs in last 24 hours  Temp:  [35.7 °C (96.2 °F)-37.4 °C (99.4 °F)] (P) 37.4 °C (99.4 °F)  Pulse:  [] 113  Resp:  [15-27] 18  BP: (72-93)/(50-70) (P) 98/65  SpO2:  [96 %-100 %] 98 %    Physical Exam  Physical Exam  Constitutional:       Appearance: Normal appearance. She is well-developed and normal weight.      Comments: Thin and frail.  Confused.   HENT:      Head: Normocephalic and atraumatic.      Mouth/Throat:      Mouth: Mucous membranes are moist.   Eyes:      Extraocular Movements: Extraocular movements intact.      Conjunctiva/sclera: Conjunctivae normal.   Cardiovascular:      Rate and Rhythm: Normal rate and regular rhythm.      Pulses: Normal pulses.      Heart sounds: Normal heart sounds.   Pulmonary:      Effort: Pulmonary effort is normal.      Breath sounds: Normal breath sounds.   Abdominal:      General: Bowel sounds are normal.      Palpations: Abdomen is soft.   Musculoskeletal:         General: Normal range of motion.      Cervical back: Normal range of motion and neck supple.   Skin:     General: Skin is warm and dry.   Neurological:      General: No focal deficit present.      Mental Status: Mental status is at baseline.   Psychiatric:         Behavior: Behavior normal.         Lab Review  Lab Results   Component Value Date/Time    WBC 6.7 02/20/2022 01:20 AM    RBC 2.60 (L) 02/20/2022 01:20 AM    HEMOGLOBIN 7.9 (L) 02/20/2022 01:20 AM    HEMATOCRIT 23.1 (L) 02/20/2022 01:20 AM    MCV  88.8 02/20/2022 01:20 AM    MCH 30.4 02/20/2022 01:20 AM    MCHC 34.2 02/20/2022 01:20 AM    MPV 10.9 02/20/2022 01:20 AM      Lab Results   Component Value Date/Time    SODIUM 140 02/20/2022 01:20 AM    POTASSIUM 3.3 (L) 02/20/2022 01:20 AM    CHLORIDE 112 02/20/2022 01:20 AM    CO2 17 (L) 02/20/2022 01:20 AM    GLUCOSE 99 02/20/2022 01:20 AM     (HH) 02/20/2022 01:20 AM    CREATININE 1.51 (H) 02/20/2022 01:20 AM      Lab Results   Component Value Date/Time    ASTSGOT 30 02/20/2022 01:20 AM    ALTSGPT 11 02/20/2022 01:20 AM     Lab Results   Component Value Date/Time    CHOLSTRLTOT 148 02/19/2022 07:50 AM    LDL 53 02/19/2022 07:50 AM    HDL 76 02/19/2022 07:50 AM    TRIGLYCERIDE 95 02/19/2022 07:50 AM    TROPONINT 258 (H) 02/20/2022 09:12 AM       Recent Labs     02/19/22  0750 02/20/22  0120   NTPROBNP [id number]* [id number]*       Current Facility-Administered Medications:   •  midodrine (PROAMATINE) tablet 5 mg, 5 mg, Oral, TID WITH MEALS, Meek Griffin M.D., 5 mg at 02/20/22 1130  •  heparin injection 5,000 Units, 5,000 Units, Subcutaneous, Q8HRS, Meek Griffin M.D.  •  phosphorus (K-Phos-Neutral) per tablet 500 mg, 500 mg, Oral, Q6HRS, Meek Griffin M.D., 500 mg at 02/20/22 1211  •  [START ON 2/21/2022] aspirin (ASA) chewable tab 81 mg, 81 mg, Oral, DAILY, Meek Griffin M.D.  •  omeprazole (PRILOSEC) capsule 40 mg, 40 mg, Oral, BID, Meek Griffin M.D.  •  atorvastatin (LIPITOR) tablet 40 mg, 40 mg, Oral, Q EVENING, Namrata Mcclendon M.D., 40 mg at 02/19/22 1704  •  lactated ringers infusion, , Intravenous, Continuous, Meek Griffin M.D., Last Rate: 100 mL/hr at 02/20/22 1211, New Bag at 02/20/22 1211  •  thiamine (B-1) IVPB 100 mg in 100 mL D5W (premix), 100 mg, Intravenous, DAILY, Meek Griffin M.D., Stopped at 02/20/22 0640  •  Perflutren Protein A Microsph SUSP 3 mL, 3 mL, Intravenous, Once, Joel Norris M.D.  •  [Held by provider] carvedilol (COREG) tablet 3.125 mg,  3.125 mg, Oral, BID WITH MEALS, Meek Griffin M.D.  •  HYDROcodone-acetaminophen (NORCO) 5-325 MG per tablet 1 Tablet, 1 Tablet, Oral, Q4HRS PRN, Aditya Byrd M.D.  •  Pharmacy Consult Request - to monitor for nephrotoxic agents, 1 Each, Other, PHARMACY TO DOSE, Aditya Byrd M.D.  •  acetaminophen (Tylenol) tablet 650 mg, 650 mg, Oral, Q6HRS PRN, Aditya Byrd M.D.  •  ondansetron (ZOFRAN) syringe/vial injection 4 mg, 4 mg, Intravenous, Q4HRS PRN, Aditya Byrd M.D.  •  ondansetron (ZOFRAN ODT) dispertab 4 mg, 4 mg, Oral, Q4HRS PRN, Aditya Byrd M.D.  •  labetalol (NORMODYNE/TRANDATE) injection 10 mg, 10 mg, Intravenous, Q4HRS PRN, Aditya Bryd M.D.  •  senna-docusate (PERICOLACE or SENOKOT S) 8.6-50 MG per tablet 2 Tablet, 2 Tablet, Oral, BID, 2 Tablet at 02/19/22 1704 **AND** polyethylene glycol/lytes (MIRALAX) PACKET 1 Packet, 1 Packet, Oral, DAILY **AND** magnesium hydroxide (MILK OF MAGNESIA) suspension 30 mL, 30 mL, Oral, QDAY PRN **AND** bisacodyl (DULCOLAX) suppository 10 mg, 10 mg, Rectal, QDAY PRN, Aditya Byrd M.D.    Cardiac Imaging and Procedures Review  CARDIAC STUDIES/PROCEDURES:    ECHOCARDIOGRAM CONCLUSIONS (02/19/22)  Cardiologist at bedside.  Contrast was used to enhance definition of the endocardial borders.   Technically difficult but adequate study for interpretation.   Severely reduced left ventricular systolic function.  The left ventricular ejection fraction is visually estimated to be 35%.  Basilar sparing in a pattern of stress induced cardiomyopathy.  Normal right ventricular size and systolic function.  (study result reviewed)    EKG performed on (02/19/22) was reviewed: EKG personally interpreted shows sinus rhythm with anterior Q waves.    Assessment/Plan  1. NSTEMI: She remains clinically stable unchanged from cardiac standpoint. We will continue with medical therapy only.   2. Dilated cardiomyopathy: Her volume status is adequate. We will  continue with current care.    Thank you for allowing me to participate in the care of this patient.  Cardiology will sign off on this patient    Please contact me with any questions.    Pato Bernal M.D.   Cardiologist, [facility]  [phone]

## 2022-02-20 NOTE — PROGRESS NOTES
Dr. Diego was updated regarding pt's low BP 81/63. Pt is asymptomatic. Pt has not responded to either of the 3 500 ml LR fluid boluses during shift.

## 2022-02-20 NOTE — DISCHARGE PLANNING
H/SCP TCN chart review completed. Collaborated with [facility] [place] and bedside RN José Miguel.  RN notes pt is A&Ox1 today and suggests reaching out to friend contact Jaye. The most current review of medical record, knowledge of pt's PLOF and social support, LACE+ score of 73, 6 clicks scores of 11 mobility were considered.      JUNG Jaye contacted by phone. Introduced TCN program and inquired to pt's PLOF.  Jaye states she previously lived across the street from the pt; since has moved away, but still does wellness checks. The other listed contact is Salena, who is a current neighbor. The pt lives alone in a 1 story home with 3 steps to enter. Pt's spouse  5 yrs ago. Jaye reports the pt's health steadily declined since then. Pt is reported to be a heavy drinker and smoker. She has a hx of refusing medical care unless her condition is severe. Health declined substantially in the last year. May 2021 found to have necrotizing pneumonia, 2021 R hip fx. She was on service with [facility] July-Aug 2021. Since the hip fx, ambulation was reliant on FWW.  Jaye notes, over the last month the pt would no longer leave her home. Friends/neighboors would bring food, but it did not appear the food was ever eaten or that she was getting out of bed at all.  They would often find the pt soiled in bed. Since the pt was alert and oriented, they respected her wishes not to go to the hospital. It was decided between the friends to call EMS when the pt's cognition deteriorated.  Jaye wants to be sure it is know that the pt will likely desire to go home, but there will be no one there to support her. Jaye also states that Ms. Peacock made it clear to her that she would not want to be on any life support like intubation. TCN notes pt is listed as full code. Jaye states the pt never got around to completing a will, but had planned to put her niece Carolina in charge of her care.  Jaye will reach back out to TCN with Carolina's contact  information.       Requested provider consults for: Palliative- via Voal[facility] to Dr. Mcclendon. No choice obtained as pt is not currently capable of discussing discharge planning. Will discuss this care with LSW and reach out to family if indicated. TCN will continue to follow and collaborate with discharge planning team as additional post acute needs arise. Thank you.

## 2022-02-20 NOTE — DISCHARGE PLANNING
TCN following. HTH/SCP chart review completed. Noted pt emergently transferring to higher level of care 2' to concerns with elevating troponins. Noted a&o x2 and hypotensive as well. Will monitor and assist with dc planning once more medically stable. Will continue to monitor at this time.

## 2022-02-20 NOTE — DISCHARGE PLANNING
TRINYN received call from pt's marcellus Piper.  Would like to be informed of medical treatment plan and be involved in discharge planning.  Contact phone# [phone].  SE [facility] to update contact information on file.

## 2022-02-20 NOTE — CARE PLAN
The patient is Stable - Low risk of patient condition declining or worsening    Shift Goals  Clinical Goals: Pt will  not fall during shift  Patient Goals: PATTI  Family Goals: PATTI    Progress made toward(s) clinical / shift goals:  Fall precautions in place. Bed in lowest position. Non-skid socks in place. Personal possessions within reach. Mobility sign on door. Bed-alarm on. Call light within reach. Pt educated regarding fall prevention and states understanding.        Problem: Knowledge Deficit - Standard  Goal: Patient and family/care givers will demonstrate understanding of plan of care, disease process/condition, diagnostic tests and medications  2/20/2022 0936 by José Miguel Chatterjee R.N.  Outcome: Not Progressing  Note: Pt is A&Ox1 to self only, pt is reoriented frequently   2/20/2022 0936 by José Miguel Chatterjee R.N.  Outcome: Progressing     Problem: Skin Integrity  Goal: Skin integrity is maintained or improved  2/20/2022 0936 by José Miguel Chatterjee R.N.  Outcome: Progressing  Note: Pt turned and positioned every two hours. Incontinence care provided and barrier paste applied as needed.    2/20/2022 0936 by José Miguel Chatterjee R.N.  Outcome: Progressing

## 2022-02-20 NOTE — PROGRESS NOTES
Patient up to unit from [facility]. Patient is A&Ox1, VSS, no signs of distress. Tele monitor placed and verified by monitor room. All questions and concerns answered. Bed alarm on, bed in lowest and locked position, call light in reach, will continue to monitor. Patient having black, tarry stool. Occult blood came back positive. Dr. Griffin notified. Awaiting further orders.

## 2022-02-20 NOTE — CARE PLAN
The patient is Stable - Low risk of patient condition declining or worsening    Shift Goals SBP will remain above 90 during shift  Clinical Goals:     Progress made toward(s) clinical / shift goals:  Pt was administered 500 ml LR bolus twice during shift, BP is 84/61 at this time. Pt remains asymptomatic.        Problem: Knowledge Deficit - Standard  Goal: Patient and family/care givers will demonstrate understanding of plan of care, disease process/condition, diagnostic tests and medications  2/19/2022 1634 by José Miguel Chatterjee R.N.  Outcome: Progressing  Note: Pt educated regarding plan of care and medications. All questions answered.    2/19/2022 1634 by José Miguel Chatterjee R.N.  Outcome: Progressing     Problem: Hemodynamics  Goal: Patient's hemodynamics, fluid balance and neurologic status will be stable or improve  2/19/2022 1634 by José Miguel Chatterjee R.N.  Outcome: Progressing  Note: IVF running, BP stabilized, VS taken Q4 hours, pt on continuous cardiac monitoring. Daily labs drawn.    2/19/2022 1634 by José Miguel Chatterjee R.N.  Outcome: Progressing

## 2022-02-20 NOTE — ASSESSMENT & PLAN NOTE
Poor prognosis  Off of comfort care as of 2/24/2022 2/19/2022 echocardiogram suggestive of stress-induced cardiomyopathy with ejection fraction 35%  Patient euvolemic  Start carvedilol  Monitor

## 2022-02-20 NOTE — PROGRESS NOTES
[facility] Daily Progress Note    Date of Service  2/20/2022    Chief Complaint  Jaymie Peacock is a 66 y.o. female admitted 2/18/2022 with altered mental status    Hospital Course  66-year-old female with history of hypertension and smoking presented 2/18 with altered mental status patient has been living by herself and was able to take care of herself however today her neighbors did check on her and she was sluggish, denied any symptoms with no pain however patient states she was not eating well and no appetite, patient was skinny with malnutrition and dehydrated on admission, labs showed YAMIL, with hypokalemia and hypomagnesemia, also hypotension, IV fluid was started with some improvement in her mentation.  Patient lives by herself and her family lives far away, patient did not want me to call family.  The patient apparently with chronic alcoholism, states she lives alone, appears to have longer period of poor p.o. intake, anorexia, still quite confused and poor historian    Interval Problem Update  Patient seen and examined today.    Patient tolerating treatment and therapies.  All Data, Medication data reviewed.  Case discussed with nursing as available.  Plan of Care reviewed with patient and notified of changes.    2/20 the patient slowly improving, her mentation is still limited, ANO times 1-2 initially but she can be reoriented, noted to have dark stools with positive occult blood, poor p.o. intake, and patient refuses regular meals, drinks fluids, follow-up H&H without further drop in hemoglobin, electrolytes being replaced, renal function improving, significant malnutrition, U tox negative  2/19 electrolytes replaced, will recheck.  Concerning troponin at 248, repeat 473. Values may be inflated in the setting of acute renal failure. EKG without evidence of acute ischemia. Empiric full dose ASA, high intensity statin, beta blocker ordered. Transferred for telemetry monitoring in the setting of  probable NSTEMI. Cardiology consulted and Dr. Norris evaluating, appreciate assistance.  ARF improving with IV fluids.    I have personally seen and examined the patient at bedside. I discussed the plan of care with patient, bedside RN, charge RN and cardiology.    Consultants/Specialty  cardiology    Code Status  Full Code    Disposition  Patient is not medically cleared for discharge.   Anticipate discharge to to home with close outpatient follow-up. Versus SNF  I have placed the appropriate orders for post-discharge needs.    Review of Systems  Review of Systems   Constitutional: Positive for malaise/fatigue. Negative for chills, diaphoresis and fever.   HENT: Negative.    Eyes: Positive for redness.   Respiratory: Negative.  Negative for shortness of breath.    Cardiovascular: Negative.  Negative for chest pain and leg swelling.   Gastrointestinal: Negative.  Negative for abdominal pain, nausea and vomiting.   Genitourinary: Negative.    Musculoskeletal: Negative.    Skin: Negative.    Neurological: Negative.  Negative for dizziness.   Endo/Heme/Allergies: Negative.    Psychiatric/Behavioral: Positive for memory loss and substance abuse. The patient is nervous/anxious.    All other systems reviewed and are negative.       Physical Exam  Temp:  [35.7 °C (96.2 °F)-36.4 °C (97.5 °F)] 35.7 °C (96.2 °F)  Pulse:  [] 84  Resp:  [15-27] 17  BP: (72-94)/(50-73) 93/69  SpO2:  [96 %-100 %] 98 %    Physical Exam  Vitals and nursing note reviewed.   Constitutional:       General: She is not in acute distress.     Appearance: She is underweight.      Comments: Elderly female, emaciated, cachectic, poorly kempt, looks older than stated age   HENT:      Head: Normocephalic and atraumatic.      Comments: Temporal wasting bilaterally     Right Ear: External ear normal.      Left Ear: External ear normal.      Nose: Nose normal. No congestion.      Mouth/Throat:      Mouth: Mucous membranes are dry.      Pharynx: Oropharynx is  clear.   Eyes:      General: No scleral icterus.     Conjunctiva/sclera: Conjunctivae normal.   Cardiovascular:      Rate and Rhythm: Normal rate and regular rhythm.      Heart sounds: No murmur heard.  Pulmonary:      Breath sounds: Rhonchi present. No wheezing or rales.   Abdominal:      General: There is no distension.      Palpations: Abdomen is soft.      Tenderness: There is no abdominal tenderness. There is no guarding.   Musculoskeletal:      Cervical back: Neck supple. No rigidity.      Right lower leg: No edema.      Left lower leg: No edema.   Skin:     General: Skin is warm and dry.      Coloration: Skin is pale.      Findings: Bruising and rash present.   Neurological:      General: No focal deficit present.      Mental Status: She is alert. She is disoriented and confused.   Psychiatric:         Mood and Affect: Mood is depressed. Affect is flat.         Behavior: Behavior is cooperative.         Fluids    Intake/Output Summary (Last 24 hours) at 2/20/2022 0726  Last data filed at 2/20/2022 0300  Gross per 24 hour   Intake 7180.33 ml   Output --   Net 7180.33 ml       Laboratory  Recent Labs     02/18/22  1300 02/19/22  0313 02/20/22  0120   WBC 5.1 5.5 6.7   RBC 2.46* 2.90* 2.60*   HEMOGLOBIN 7.9* 8.8* 7.9*   HEMATOCRIT 22.7* 25.6* 23.1*   MCV 92.3 88.3 88.8   MCH 32.1 30.3 30.4   MCHC 34.8 34.4 34.2   RDW 43.0 49.7 52.5*   PLATELETCT 276 238 234   MPV 11.0 10.8 10.9     Recent Labs     02/19/22  1121 02/19/22  1725 02/20/22  0120   SODIUM 139 139 140   POTASSIUM 3.4* 3.5* 3.3*   CHLORIDE 107 110 112   CO2 16* 16* 17*   GLUCOSE 108* 146* 99   * 107* 102*   CREATININE 1.84* 1.64* 1.51*   CALCIUM 9.2 9.0 9.1     Recent Labs     02/19/22  1249   APTT 27.0   INR 1.04         Recent Labs     02/19/22  0750   TRIGLYCERIDE 95   HDL 76   LDL 53       Imaging  EC-ECHOCARDIOGRAM COMPLETE W/ CONT   Final Result      US-RENAL   Final Result      1.  No hydronephrosis.   2.  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      DX-CHEST-PORTABLE (1 VIEW)   Final Result      1.  Emphysema.   2.  Small linear scar or residual subsegmental atelectatic change at the right medial lung base where there was previously extensive consolidation and cavitation. (See CT of the chest 5/22/2021)   3.  No acute infiltrates.         I have personally interpreted the EKG dated 2/19/22 08:20am. Sinus tachycardia with rate 100, no ST elevation or depression. QTc 421 wnl.    Assessment/Plan  * YAMIL (acute kidney injury) ([facility])- (present on admission)  Assessment & Plan  Came with creatinine 3.4 and her baseline around 0.8  Patient states she was not eating or drinking  Urine did not show protein or infection  Ultrasound showed acute kidney size around 10  Likely prerenal  Improving with IV fluids. Continue.   Avoid nephrotoxic medication    Encephalopathy acute  Assessment & Plan  Most likely a combination of her chronic substance abuse, acute illness, renal failure      Acute systolic heart failure (HCC)- (present on admission)  Assessment & Plan  Strong suspicion for acute stress induced cardiomyopathy 2/2 acute illness.    NSTEMI (non-ST elevation myocardial infarction) ([facility])- (present on admission)  Assessment & Plan  Suspect type 2, demand ischemia 2/2 acute illness. Troponins 248>473>594 in the setting of acute renal failure, nonoliguric.  Initiated full dose ASA, high intensity statin, beta blocker.  Appreciate Cardiology consult.  The patient denies chest pain, nondiagnostic EKGs, no ST elevation  Labs the patient's possible GI bleeding, reducing aspirin dose, stop heparin, monitor  The patient likely will have ischemic evaluation once overall improved    Emphysema lung (HCC)  Assessment & Plan  Patient has history of smoking for years  X-ray showed signs of emphysema  No wheezing and patient is on room air  Patient needs CT scan for cancer screening as outpatient  "and pulmonary function test  Inhaler as needed  No exacerbation    Anemia  Assessment & Plan  Patient has chronic anemia with elevated MCV however she came with worsening and hemoglobin 7.9  Positive occult blood, dark stools  Patient received 1 unit of red blood cell at the emergency by ED physician  Start PPI, question GI bleed    Hypomagnesemia- (present on admission)  Assessment & Plan  Magnesium 1.4  No prolonged QTC and no changes in her EKG  Replaced IV  Recheck    Protein-calorie malnutrition, severe (HCC)  Assessment & Plan  BMI 16.1, temporal wasting, limbs appear atrophied.  Patient states she was not eating and does not have appetite.  Possible geriatric depression vs substance dependence given recent history of heavy alcohol use.  Patient might need cancer screening as outpatient especially she has history of smoking  TSH l  Dietitian consulted, appreciate assistance.    Hypokalemia- (present on admission)  Assessment & Plan  Presented with potassium 2.7, improving with supplementation.  No EKG changes  Continue aggressive supplementation.    Hypotension- (present on admission)  Assessment & Plan  Came with blood pressure 80/40, improving with IV fluids.  Continue IV fluid  Hold blood pressure losartan and hydrochlorothiazide due to worsening kidney function  No signs of infection  Close monitoring    Alcohol abuse- (present on admission)  Assessment & Plan  Recent history of heavy use. Patient reports quitting \"a couple of weeks\" prior to admission.  Continue thiamine supplementation.  Check vitamin B12.  Plan  Evaluate for GI bleed, serial H&H's, stop heparin, reduce aspirin dose,  Blood pressure support with fluid as well as midodrine  Start twice daily PPI  Consider GI consultation and endoscopy if the patient has continued blood loss evidence  Postpone cardiac work-up until the patient is overall further stabilized  Continue vitamin support, electrolyte replacement   to assist, the " patient appears to fail in her home environment,  Unknown family or other support at home  Question need for EPS report  See orders  Medically complex high risk patient      VTE prophylaxis: SCDs/TEDs and therapeutic anticoagulation with heparin    I have performed a physical exam and reviewed and updated ROS and Plan today (2/20/2022). In review of yesterday's note (2/19/2022), there are no changes except as documented above.      Please note that this dictation was created using voice recognition software. I have made every reasonable attempt to correct obvious errors, but I expect that there are errors of grammar and possibly context that I did not discover before finalizing the note.

## 2022-02-20 NOTE — CARE PLAN
Problem: Nutritional:  Goal: Achieve adequate nutritional intake  Description: Patient will consume ~50% of small meals and Boost supplements.   Outcome: Not Met

## 2022-02-20 NOTE — DIETARY
"Nutrition Services: Consult FTT    Day 2 of admit.  Jaymie Peacock is a 66 y.o. female with admitting DX of YAMIL (acute kidney injury) ([facility])     Current problem list (copied from notes):  · BIBA from home. Pt lives alone and found lying in bed, disheveled appearance. Pt's friends called because pt has not been eating or drinking.  · Patient was noted to be dehydrated and cachectic.  · She has had poor oral intake over the last several days, not eating or drinking much.   · Patient states she was not eating well and no appetite, patient was skinny with malnutrition and dehydrated on admission, labs showed YAMIL, with hypokalemia and hypomagnesemia, also hypotension, IV fluid was started with some improvement in her mentation.  · Malnutrition. Temporal wasting bilaterally, limbs appear atrophied.  · Recent history of heavy ETOH use. Patient reports quitting \"a couple of weeks\" prior to admission. Continue thiamine supplementation.  · Over the last month the pt would no longer leave her home. Friends/neighboors would bring food, but it did not appear the food was ever eaten or that she was getting out of bed at all.     Assessment:  Estimated Nutritional Needs based on:   Height: 167.6 cm (5' 6\")  Weight: 46.8 kg (103 lb 2.8 oz)  Ideal Body Weight: 59 kg (130 lb)  Percent Ideal Body Weight: 79.4  Body mass index is 16.65 kg/m²., BMI classification: Underweight    Calculation/Equation: REE per MSJ x1.2 = 1225 kcal/day  Total Calories / day: 1400 (Calories / k)  Total Grams Protein / day: 70 (Grams Protein / k.5)     Evaluation:   1. Pt appears very malnourished but unfortunately did not provide much information to this RD re: diet and preferences except that she will not eat meat. Pt said she did not eat breakfast this morning simply because she \"didn't feel like eating.\" Observed Boost supplement on bedside table and explained importance of consumption due to malnourished state; pt seemed indifferent. Pt did " drink almost 2 containers of of juice this morning (~8 ounces).   2. Per chart review, pt last here 5/2021 and weighed 53.524 kg @ that time, indicating significant 13% weight loss in 9 months.   3. Severe fat loss and severe muscle wasting appreciated.   4. Labs: K+ 3.3, phos 2.3.  5. Meds include electrolyte replacement and thiamine.   6. Recommend daily MVI with minerals.      Malnutrition Risk: Severe starvation related malnutrition as evidenced by significant weight loss of 13% in 9 months and severe fat loss and severe muscle wasting.      Recommendations/Plan:  1. Encourage PO intake of meals and Boost.   2. Document PO intake in ADLs.  3. Monitor refeeding labs closely. Consider checking BID until WNL.   4. Monitor weight.    RD following, although limited interventions unless pt wants to consider TF.

## 2022-02-21 ENCOUNTER — APPOINTMENT (OUTPATIENT)
Dept: RADIOLOGY | Facility: MEDICAL CENTER | Age: 67
DRG: 280 | End: 2022-02-21
Attending: STUDENT IN AN ORGANIZED HEALTH CARE EDUCATION/TRAINING PROGRAM
Payer: MEDICARE

## 2022-02-21 PROBLEM — K92.2 GI BLEED: Status: ACTIVE | Noted: 2022-02-18

## 2022-02-21 LAB
ALBUMIN SERPL BCP-MCNC: 2.8 G/DL (ref 3.2–4.9)
ALBUMIN/GLOB SERPL: 1.2 G/DL
ALP SERPL-CCNC: 100 U/L (ref 30–99)
ALT SERPL-CCNC: 9 U/L (ref 2–50)
ANION GAP SERPL CALC-SCNC: 15 MMOL/L (ref 7–16)
AST SERPL-CCNC: 23 U/L (ref 12–45)
BILIRUB SERPL-MCNC: 0.2 MG/DL (ref 0.1–1.5)
BUN SERPL-MCNC: 92 MG/DL (ref 8–22)
CALCIUM SERPL-MCNC: 9.3 MG/DL (ref 8.5–10.5)
CHLORIDE SERPL-SCNC: 115 MMOL/L (ref 96–112)
CO2 SERPL-SCNC: 15 MMOL/L (ref 20–33)
CREAT SERPL-MCNC: 1.32 MG/DL (ref 0.5–1.4)
EKG IMPRESSION: NORMAL
ERYTHROCYTE [DISTWIDTH] IN BLOOD BY AUTOMATED COUNT: 52.3 FL (ref 35.9–50)
GLOBULIN SER CALC-MCNC: 2.4 G/DL (ref 1.9–3.5)
GLUCOSE SERPL-MCNC: 96 MG/DL (ref 65–99)
HCT VFR BLD AUTO: 23.8 % (ref 37–47)
HCT VFR BLD AUTO: 26.7 % (ref 37–47)
HCT VFR BLD AUTO: 26.9 % (ref 37–47)
HGB BLD-MCNC: 8 G/DL (ref 12–16)
HGB BLD-MCNC: 8.6 G/DL (ref 12–16)
HGB BLD-MCNC: 8.9 G/DL (ref 12–16)
MAGNESIUM SERPL-MCNC: 1.7 MG/DL (ref 1.5–2.5)
MCH RBC QN AUTO: 29.7 PG (ref 27–33)
MCHC RBC AUTO-ENTMCNC: 33.1 G/DL (ref 33.6–35)
MCV RBC AUTO: 89.7 FL (ref 81.4–97.8)
PHOSPHATE SERPL-MCNC: 2.9 MG/DL (ref 2.5–4.5)
PLATELET # BLD AUTO: 268 K/UL (ref 164–446)
PMV BLD AUTO: 11.5 FL (ref 9–12.9)
POTASSIUM SERPL-SCNC: 3.4 MMOL/L (ref 3.6–5.5)
PROT SERPL-MCNC: 5.2 G/DL (ref 6–8.2)
RBC # BLD AUTO: 3 M/UL (ref 4.2–5.4)
SODIUM SERPL-SCNC: 145 MMOL/L (ref 135–145)
WBC # BLD AUTO: 9.3 K/UL (ref 4.8–10.8)

## 2022-02-21 PROCEDURE — 83735 ASSAY OF MAGNESIUM: CPT

## 2022-02-21 PROCEDURE — 80053 COMPREHEN METABOLIC PANEL: CPT

## 2022-02-21 PROCEDURE — 700105 HCHG RX REV CODE 258: Performed by: STUDENT IN AN ORGANIZED HEALTH CARE EDUCATION/TRAINING PROGRAM

## 2022-02-21 PROCEDURE — 770020 HCHG ROOM/CARE - TELE (206)

## 2022-02-21 PROCEDURE — 85014 HEMATOCRIT: CPT

## 2022-02-21 PROCEDURE — 97602 WOUND(S) CARE NON-SELECTIVE: CPT

## 2022-02-21 PROCEDURE — 700111 HCHG RX REV CODE 636 W/ 250 OVERRIDE (IP): Performed by: NURSE PRACTITIONER

## 2022-02-21 PROCEDURE — 700105 HCHG RX REV CODE 258: Performed by: HOSPITALIST

## 2022-02-21 PROCEDURE — 93010 ELECTROCARDIOGRAM REPORT: CPT | Performed by: INTERNAL MEDICINE

## 2022-02-21 PROCEDURE — 99232 SBSQ HOSP IP/OBS MODERATE 35: CPT | Performed by: STUDENT IN AN ORGANIZED HEALTH CARE EDUCATION/TRAINING PROGRAM

## 2022-02-21 PROCEDURE — A9270 NON-COVERED ITEM OR SERVICE: HCPCS | Performed by: INTERNAL MEDICINE

## 2022-02-21 PROCEDURE — 70450 CT HEAD/BRAIN W/O DYE: CPT | Mod: ME

## 2022-02-21 PROCEDURE — 84100 ASSAY OF PHOSPHORUS: CPT

## 2022-02-21 PROCEDURE — 302106 OSTOMY POWDER: Performed by: STUDENT IN AN ORGANIZED HEALTH CARE EDUCATION/TRAINING PROGRAM

## 2022-02-21 PROCEDURE — 97166 OT EVAL MOD COMPLEX 45 MIN: CPT

## 2022-02-21 PROCEDURE — 93005 ELECTROCARDIOGRAM TRACING: CPT | Performed by: STUDENT IN AN ORGANIZED HEALTH CARE EDUCATION/TRAINING PROGRAM

## 2022-02-21 PROCEDURE — 85027 COMPLETE CBC AUTOMATED: CPT

## 2022-02-21 PROCEDURE — C9113 INJ PANTOPRAZOLE SODIUM, VIA: HCPCS | Performed by: NURSE PRACTITIONER

## 2022-02-21 PROCEDURE — 700102 HCHG RX REV CODE 250 W/ 637 OVERRIDE(OP): Performed by: STUDENT IN AN ORGANIZED HEALTH CARE EDUCATION/TRAINING PROGRAM

## 2022-02-21 PROCEDURE — 700102 HCHG RX REV CODE 250 W/ 637 OVERRIDE(OP): Performed by: HOSPITALIST

## 2022-02-21 PROCEDURE — 700102 HCHG RX REV CODE 250 W/ 637 OVERRIDE(OP): Performed by: INTERNAL MEDICINE

## 2022-02-21 PROCEDURE — 85018 HEMOGLOBIN: CPT

## 2022-02-21 PROCEDURE — A9270 NON-COVERED ITEM OR SERVICE: HCPCS | Performed by: HOSPITALIST

## 2022-02-21 PROCEDURE — 700111 HCHG RX REV CODE 636 W/ 250 OVERRIDE (IP): Performed by: HOSPITALIST

## 2022-02-21 PROCEDURE — A9270 NON-COVERED ITEM OR SERVICE: HCPCS | Performed by: STUDENT IN AN ORGANIZED HEALTH CARE EDUCATION/TRAINING PROGRAM

## 2022-02-21 PROCEDURE — 97161 PT EVAL LOW COMPLEX 20 MIN: CPT

## 2022-02-21 PROCEDURE — 36415 COLL VENOUS BLD VENIPUNCTURE: CPT

## 2022-02-21 RX ORDER — PANTOPRAZOLE SODIUM 40 MG/10ML
40 INJECTION, POWDER, LYOPHILIZED, FOR SOLUTION INTRAVENOUS 2 TIMES DAILY
Status: DISCONTINUED | OUTPATIENT
Start: 2022-02-21 | End: 2022-02-23

## 2022-02-21 RX ORDER — POTASSIUM CHLORIDE 20 MEQ/1
40 TABLET, EXTENDED RELEASE ORAL 2 TIMES DAILY
Status: COMPLETED | OUTPATIENT
Start: 2022-02-21 | End: 2022-02-21

## 2022-02-21 RX ORDER — SODIUM CHLORIDE, SODIUM LACTATE, POTASSIUM CHLORIDE, CALCIUM CHLORIDE 600; 310; 30; 20 MG/100ML; MG/100ML; MG/100ML; MG/100ML
INJECTION, SOLUTION INTRAVENOUS CONTINUOUS
Status: DISCONTINUED | OUTPATIENT
Start: 2022-02-21 | End: 2022-02-22

## 2022-02-21 RX ADMIN — DIBASIC SODIUM PHOSPHATE, MONOBASIC POTASSIUM PHOSPHATE AND MONOBASIC SODIUM PHOSPHATE 500 MG: 852; 155; 130 TABLET ORAL at 06:17

## 2022-02-21 RX ADMIN — OMEPRAZOLE 40 MG: 20 CAPSULE, DELAYED RELEASE ORAL at 06:17

## 2022-02-21 RX ADMIN — SODIUM CHLORIDE, POTASSIUM CHLORIDE, SODIUM LACTATE AND CALCIUM CHLORIDE: 600; 310; 30; 20 INJECTION, SOLUTION INTRAVENOUS at 19:29

## 2022-02-21 RX ADMIN — ATORVASTATIN CALCIUM 40 MG: 40 TABLET, FILM COATED ORAL at 17:11

## 2022-02-21 RX ADMIN — POTASSIUM CHLORIDE 40 MEQ: 20 TABLET, EXTENDED RELEASE ORAL at 18:00

## 2022-02-21 RX ADMIN — ASPIRIN 81 MG: 81 TABLET, CHEWABLE ORAL at 06:17

## 2022-02-21 RX ADMIN — DIBASIC SODIUM PHOSPHATE, MONOBASIC POTASSIUM PHOSPHATE AND MONOBASIC SODIUM PHOSPHATE 500 MG: 852; 155; 130 TABLET ORAL at 00:24

## 2022-02-21 RX ADMIN — DIBASIC SODIUM PHOSPHATE, MONOBASIC POTASSIUM PHOSPHATE AND MONOBASIC SODIUM PHOSPHATE 500 MG: 852; 155; 130 TABLET ORAL at 23:12

## 2022-02-21 RX ADMIN — SODIUM CHLORIDE, POTASSIUM CHLORIDE, SODIUM LACTATE AND CALCIUM CHLORIDE: 600; 310; 30; 20 INJECTION, SOLUTION INTRAVENOUS at 12:15

## 2022-02-21 RX ADMIN — THIAMINE HYDROCHLORIDE 100 MG: 100 INJECTION, SOLUTION INTRAMUSCULAR; INTRAVENOUS at 06:17

## 2022-02-21 RX ADMIN — MIDODRINE HYDROCHLORIDE 5 MG: 5 TABLET ORAL at 17:12

## 2022-02-21 RX ADMIN — SODIUM CHLORIDE, POTASSIUM CHLORIDE, SODIUM LACTATE AND CALCIUM CHLORIDE: 600; 310; 30; 20 INJECTION, SOLUTION INTRAVENOUS at 07:04

## 2022-02-21 RX ADMIN — DIBASIC SODIUM PHOSPHATE, MONOBASIC POTASSIUM PHOSPHATE AND MONOBASIC SODIUM PHOSPHATE 500 MG: 852; 155; 130 TABLET ORAL at 12:08

## 2022-02-21 RX ADMIN — DIBASIC SODIUM PHOSPHATE, MONOBASIC POTASSIUM PHOSPHATE AND MONOBASIC SODIUM PHOSPHATE 500 MG: 852; 155; 130 TABLET ORAL at 17:12

## 2022-02-21 RX ADMIN — POTASSIUM CHLORIDE 40 MEQ: 20 TABLET, EXTENDED RELEASE ORAL at 07:47

## 2022-02-21 RX ADMIN — MIDODRINE HYDROCHLORIDE 5 MG: 5 TABLET ORAL at 12:08

## 2022-02-21 RX ADMIN — MIDODRINE HYDROCHLORIDE 5 MG: 5 TABLET ORAL at 07:04

## 2022-02-21 RX ADMIN — PANTOPRAZOLE SODIUM 40 MG: 40 INJECTION, POWDER, LYOPHILIZED, FOR SOLUTION INTRAVENOUS at 17:12

## 2022-02-21 ASSESSMENT — ACTIVITIES OF DAILY LIVING (ADL): TOILETING: UNABLE TO DETERMINE AT THIS TIME

## 2022-02-21 ASSESSMENT — COGNITIVE AND FUNCTIONAL STATUS - GENERAL
STANDING UP FROM CHAIR USING ARMS: A LITTLE
DRESSING REGULAR LOWER BODY CLOTHING: A LITTLE
MOVING FROM LYING ON BACK TO SITTING ON SIDE OF FLAT BED: A LOT
WALKING IN HOSPITAL ROOM: A LOT
CLIMB 3 TO 5 STEPS WITH RAILING: A LOT
SUGGESTED CMS G CODE MODIFIER DAILY ACTIVITY: CK
HELP NEEDED FOR BATHING: A LITTLE
SUGGESTED CMS G CODE MODIFIER MOBILITY: CL
TOILETING: A LOT
PERSONAL GROOMING: A LITTLE
STANDING UP FROM CHAIR USING ARMS: A LOT
MOBILITY SCORE: 16
HELP NEEDED FOR BATHING: A LITTLE
DRESSING REGULAR LOWER BODY CLOTHING: A LITTLE
DRESSING REGULAR UPPER BODY CLOTHING: A LITTLE
TURNING FROM BACK TO SIDE WHILE IN FLAT BAD: A LITTLE
MOVING TO AND FROM BED TO CHAIR: A LITTLE
MOBILITY SCORE: 14
TURNING FROM BACK TO SIDE WHILE IN FLAT BAD: A LITTLE
DAILY ACTIVITIY SCORE: 18
TOILETING: A LOT
MOVING TO AND FROM BED TO CHAIR: A LITTLE
DAILY ACTIVITIY SCORE: 18
MOVING FROM LYING ON BACK TO SITTING ON SIDE OF FLAT BED: A LOT
SUGGESTED CMS G CODE MODIFIER MOBILITY: CK
CLIMB 3 TO 5 STEPS WITH RAILING: A LOT
PERSONAL GROOMING: A LITTLE
DRESSING REGULAR UPPER BODY CLOTHING: A LITTLE
WALKING IN HOSPITAL ROOM: A LITTLE
SUGGESTED CMS G CODE MODIFIER DAILY ACTIVITY: CK

## 2022-02-21 ASSESSMENT — LIFESTYLE VARIABLES: SUBSTANCE_ABUSE: 1

## 2022-02-21 ASSESSMENT — ENCOUNTER SYMPTOMS
ABDOMINAL PAIN: 0
SHORTNESS OF BREATH: 0
CHILLS: 0
MEMORY LOSS: 1
VOMITING: 0
MUSCULOSKELETAL NEGATIVE: 1
DIZZINESS: 0
DIAPHORESIS: 0
FEVER: 0
GASTROINTESTINAL NEGATIVE: 1
CARDIOVASCULAR NEGATIVE: 1
NAUSEA: 0
EYE REDNESS: 1
NEUROLOGICAL NEGATIVE: 1
NERVOUS/ANXIOUS: 1
RESPIRATORY NEGATIVE: 1

## 2022-02-21 ASSESSMENT — GAIT ASSESSMENTS: GAIT LEVEL OF ASSIST: UNABLE TO PARTICIPATE

## 2022-02-21 ASSESSMENT — PAIN DESCRIPTION - PAIN TYPE: TYPE: ACUTE PAIN

## 2022-02-21 NOTE — DISCHARGE PLANNING
Anticipated Discharge Disposition: SNF vs other     Action: pt pending medical clearance, pt currently on 0 liters O2, 6 clicks are 19/14. Orientation: A&Oself.      Barriers to Discharge: medical clearance     Plan: f/u with pt and medical team to discuss dc needs and barriers.    RNCM spoke to bedside NAP, who stated lifelong friend at bedside Irwin Ashby [phone] (home phone, Esthela is his wife) who stated she has no children and a brother and sister of the patient in [place]. Pt sister is at LTC and pt brother in the hospital in [place]. Irwin stated he will try to get a contact number any other family in [place], but did state, Nena is likely NOK.

## 2022-02-21 NOTE — THERAPY
"Physical Therapy   Initial Evaluation     Patient Name: Jaymie Peacock  Age:  66 y.o., Sex:  female  Medical Record #: [id number]  Today's Date: 2/21/2022     Assessment  Patient is 66 y.o. female who presented acutely with AMS, found altered by neighbors who checked on her & found she had not been eating.  PMH includes HTN and smoking.  Today patient primarily limited by decreased strength, balance, coordination, activity tolerance, and functional mobility.  She was able to get out of elevated bed without assistance with cues for initiation.  Patient performed STS x 2 with FWW and CGA, poor standing balance and posterior lean.  Gait deferred due to poor static standing balance and diarrhea.  Patient would benefit from ongoing acute PT and post acute placement to address impairments and maximize safety with functional mobility.      Plan    Recommend Physical Therapy 3 times per week until therapy goals are met for the following treatments:  Bed Mobility, Equipment, Gait Training, Manual Therapy, Self Care/Home Evaluation, Stair Training, Therapeutic Activities and Therapeutic Exercises  Recommend post-acute placement for continued physical therapy services prior to discharge home.       Objective     02/21/22 0940   Prior Living Situation   Prior Services None   Housing / Facility 1 Story House (+ basement)   Steps Into Home (\"8-10\")   Steps In Home (FOS to basement)   Equipment Owned Front-Wheel Walker   Lives with - Patient's Self Care Capacity Alone and Unable to Care For Self   Comments Pt stated she has neighbors who check on her and help if needed but unable to state how they help.  Per chart review pt has ~3 steps to enter home but today stated she has 8-10   Prior Level of Functional Mobility   Bed Mobility Independent   Transfer Status Independent   Ambulation Independent   Distance Ambulation (Feet) (community)   Assistive Devices Used None   Stairs Independent   Comments Pt reported she was IND prior to " "admit   Cognition    Cognition / Consciousness X   Level of Consciousness Alert   Safety Awareness Impaired   New Learning Impaired   Attention Impaired   Comments Nonsensical speech at times, oriented to \"[facility]\" after reading clinician's scrubs. Stated the month was 2022 then 2023   Active ROM Lower Body    Active ROM Lower Body  WDL   Strength Lower Body   Lower Body Strength  X   Comments B LE grossly 3+/5, difficulty following commands for formal testing   Sensation Lower Body   Lower Extremity Sensation   X   Comments Denied numbness/tingling   Balance Assessment   Sitting Balance (Static) Fair   Sitting Balance (Dynamic) Fair   Standing Balance (Static) Poor +   Standing Balance (Dynamic) Poor   Weight Shift Sitting Fair   Weight Shift Standing Poor   Comments w/ FWW   Gait Analysis   Gait Level Of Assist Unable to Participate   Comments Unstable static standing balance, posterior lean.  Gait deferred   Bed Mobility    Supine to Sit Supervised   Sit to Supine Supervised   Comments HOB elevated   Functional Mobility   Sit to Stand Contact Guard Assist   Bed, Chair, Wheelchair Transfer Unable to Participate   Mobility bed mobility, STS x 2   Comments uncontrolled diarrhea after STS #1, STS #2 for linen change   Activity Tolerance   Sitting in Chair NT   Sitting Edge of Bed 8 min   Standing 2 min total   Short Term Goals    Short Term Goal # 1 Pt will perform supine <> sit without bed features with SPV within 6 visits in order to progress toward PLOF   Short Term Goal # 2 Pt will perform STS/functional transfers with FWW and SPV within 6 visits in order to progress OOB activity   Short Term Goal # 3 Pt will ambulate 25 ft with min A within 6 visits in order to progress toward PLOF   Short Term Goal # 4 Pt will negotiate 8 steps with min A within 6 visits in order to progress toward home   Session Information   Date / Session Number  2/21 - 1 (1/3, 2/27)     "

## 2022-02-21 NOTE — RESPIRATORY CARE
COPD EDUCATION by COPD CLINICAL EDUCATOR  2/20/2022 at 4:06 PM by Karen Simon, RRT     Patient not available our team will revisit

## 2022-02-21 NOTE — THERAPY
Occupational Therapy   Initial Evaluation     Patient Name: Jaymie Peacock  Age:  66 y.o., Sex:  female  Medical Record #: [id number]  Today's Date: 2/21/2022     Precautions: (P) Fall Risk    Assessment  Patient is 66 y.o. female admitted AMS with Hx of HTN, smoking and failure to thrive. Pt limited today by confusion, poor insight, poor balance, impaired activity tolerance, and poor gross coordination impacting ADLs and mobility. Pt required supervision for LB dressing, min A for STS at EOB, supervision for bed mob, min A with seated G/H. At this time recommend post acute placement, per chart pt lives alone and has been having increased difficulty managing self cares. Will follow for skilled OT.    Plan    Recommend Occupational Therapy 3 times per week until therapy goals are met for the following treatments:  Adaptive Equipment, Cognitive Skill Development, Self Care/Activities of Daily Living, Therapeutic Activities and Therapeutic Exercises.              02/21/22 0941   Prior Living Situation   Prior Services None   Housing / Facility 1 Story House   Bathroom Set up Bathtub / Shower Combination   Equipment Owned Front-Wheel Walker   Lives with - Patient's Self Care Capacity Alone and Unable to Care For Self   Comments Pt unreliable historian but reports IPLOF and neighbors rarely check up   Prior Level of ADL Function   Self Feeding Unable To Determine At This Time   Grooming / Hygiene Unable To Determine At This Time   Bathing Unable To Determine At This Time   Dressing Unable To Determine At This Time   Toileting Unable To Determine At This Time   Comments per chart pt has been having difficulty caring for self at home, found by neighbors soiled at times   Prior Level of IADL Function   Medication Management Unable To Determine At This Time   Precautions   Precautions Fall Risk   Cognition    Cognition / Consciousness X   Level of Consciousness Alert   Safety Awareness Impaired   New Learning Impaired    Attention Impaired   Comments Pt confused stating nonsentical sentences. Able to follow simplet 1 step commands accurately   Active ROM Upper Body   Active ROM Upper Body  WDL   Strength Upper Body   Upper Body Strength  WDL   Coordination Upper Body   Coordination X   Comments poor gross coordination   Balance Assessment   Sitting Balance (Static) Fair   Sitting Balance (Dynamic) Fair   Standing Balance (Static) Poor +   Standing Balance (Dynamic) Poor   Weight Shift Sitting Fair   Weight Shift Standing Poor   Bed Mobility    Supine to Sit Supervised   Sit to Supine Supervised   Scooting Moderate Assist   ADL Assessment   Grooming Minimal Assist;Seated   Lower Body Dressing Supervision   Toileting Total Assist  (cleaning incontinent BM)   How much help from another person does the patient currently need...   6 Clicks Daily Activity Score 18   Functional Mobility   Sit to Stand Contact Guard Assist   Bed, Chair, Wheelchair Transfer Unable to Participate   Toilet Transfers Unable to Participate   Comments became incontinent at EOB and started to sway, returned to supine   Activity Tolerance   Sitting Edge of Bed 7 min   Standing 3 min   Patient / Family Goals   Patient / Family Goal #1 none stated   Short Term Goals   Short Term Goal # 1 Pt will demo FB dressing SPV   Short Term Goal # 2 Pt will complete toileting SPV   Short Term Goal # 3 Pt will tolerate 5 min standing G/H SPV   Education Group   Role of Occupational Therapist Patient Response Patient;Acceptance;Explanation   Problem List   Problem List Decreased Active Daily Living Skills;Decreased Homemaking Skills;Decreased Activity Tolerance;Decreased Functional Mobility;Safety Awareness Deficits / Cognition;Impaired Cognitive Function;Impaired Postural Control / Balance

## 2022-02-21 NOTE — CONSULTS
"Gastroenterology Consult Note:   Kranthi Fernandez M.D. with JERSON Armas  Date & Time note created:    2/21/2022   11:27 AM     Referring MD:  Dr. Francisco Menjivar    Patient ID:  Name:             Jaymie Peacock   YOB: 1955  Age:                 66 y.o.  female   MRN:               [id number]                                                             Reason for Consult:      FOBT positive    History of Present Illness:    This is a 65 yo female PMH HTN, alcohol abuse, who was admitted to the hospital 2/18/22 with ALOC, failure to thrive. One of her neighbors did a welfare check, and patient was found to be sluggish and has not been eating well due to no appetite. She was found to be dehydrated, YAMIL, hypokalemia, hypomagnesemia, hypotension. Cardiology consulted due to elevated troponin. Dr. Norris likely elevated troponin was due to YAMIL. Acute heart failure likely due to stress induced cardiomyopathy in setting of acute illness. Recommended heparin gtt, ASA, trending troponin. During hospitalization, stools were dark. FOBT positive.  Hgb ranged from 7.9 (2/18/22) to 8.6 (2/21/22). She received 1 unit of PRBC (2/18/22). Heparin was discontinued 2/20/22.    Currently, patient is AAO x person, year. She believes she is at the [facility] in [place]. She denies abdominal pain, N/V, changes in bowel habits. States, she stopped eating because \"I just don't feel hungry.\" Last alcohol beverage was a \"few months ago.\"    Troponin 2/19/22: 248-->473-->594-->464-->258  NT-proBNP: 23,303  Echocardiogram: LVEF: 35%      Review of Systems:      Review of Systems   Unable to perform ROS: Mental acuity             Physical Exam:  Vitals/ General Appearance:   Weight/BMI: Body mass index is 16.65 kg/m².    Vitals:    02/20/22 2002 02/20/22 2320 02/21/22 0407 02/21/22 0705   BP: 118/62 100/71 (!) 91/65 (!) 94/67   Pulse: 98 67 (!) 128 (!) 127   Resp: 18 18 18 18   Temp: 36.3 °C (97.4 °F) 36.3 " °C (97.4 °F) 36.1 °C (96.9 °F) 36.7 °C (98 °F)   TempSrc: Temporal Temporal Temporal Temporal   SpO2: 95% 93%  92%   Weight:       Height:         Oxygen Therapy:  Pulse Oximetry: 92 %, O2 (LPM): 0, O2 Delivery Device: None - Room Air    Physical Exam  Vitals reviewed.   Constitutional:       Appearance: She is ill-appearing.   HENT:      Head: Normocephalic and atraumatic.      Right Ear: External ear normal.      Left Ear: External ear normal.      Mouth/Throat:      Mouth: Mucous membranes are moist.   Eyes:      Extraocular Movements: Extraocular movements intact.      Pupils: Pupils are equal, round, and reactive to light.   Cardiovascular:      Rate and Rhythm: Regular rhythm. Tachycardia present.      Pulses: Normal pulses.      Heart sounds: Normal heart sounds.   Pulmonary:      Effort: Pulmonary effort is normal.      Breath sounds: Normal breath sounds.   Abdominal:      General: Bowel sounds are normal.      Palpations: Abdomen is soft.      Tenderness: There is no abdominal tenderness. There is no guarding.   Musculoskeletal:         General: Normal range of motion.      Cervical back: Normal range of motion and neck supple.   Skin:     General: Skin is warm and dry.      Capillary Refill: Capillary refill takes less than 2 seconds.      Coloration: Skin is pale.   Neurological:      Mental Status: She is alert. She is disoriented.         Past Medical History:   Past Medical History:   Diagnosis Date    Hypertension     Osteoporosis     Smoking        Past Surgical History:  Past Surgical History:   Procedure Laterality Date    US-NEEDLE CORE BX-BREAST PANEL Right        Hospital Medications:    Current Facility-Administered Medications:     potassium chloride SA (Kdur) tablet 40 mEq, 40 mEq, Oral, BID, Francisco Menjivar M.D., 40 mEq at 02/21/22 0747    midodrine (PROAMATINE) tablet 5 mg, 5 mg, Oral, TID WITH MEALS, Meek Griffin M.D., 5 mg at 02/21/22 0704    phosphorus (K-Phos-Neutral) per tablet  500 mg, 500 mg, Oral, Q6HRS, Meek Griffin M.D., 500 mg at 02/21/22 0617    aspirin (ASA) chewable tab 81 mg, 81 mg, Oral, DAILY, Meek Griffin M.D., 81 mg at 02/21/22 0617    omeprazole (PRILOSEC) capsule 40 mg, 40 mg, Oral, BID, Meek Griffin M.D., 40 mg at 02/21/22 0617    atorvastatin (LIPITOR) tablet 40 mg, 40 mg, Oral, Q EVENING, Namrata Mcclendon M.D., 40 mg at 02/20/22 1634    HYDROcodone-acetaminophen (NORCO) 5-325 MG per tablet 1 Tablet, 1 Tablet, Oral, Q4HRS PRN, Aditya Byrd M.D.    Pharmacy Consult Request - to monitor for nephrotoxic agents, 1 Each, Other, PHARMACY TO DOSE, Aditya Byrd M.D.    acetaminophen (Tylenol) tablet 650 mg, 650 mg, Oral, Q6HRS PRN, Aditya Byrd M.D.    ondansetron (ZOFRAN) syringe/vial injection 4 mg, 4 mg, Intravenous, Q4HRS PRN, Aditya Byrd M.D.    ondansetron (ZOFRAN ODT) dispertab 4 mg, 4 mg, Oral, Q4HRS PRN, Aditya Byrd M.D.    labetalol (NORMODYNE/TRANDATE) injection 10 mg, 10 mg, Intravenous, Q4HRS PRN, Aditya Byrd M.D.    senna-docusate (PERICOLACE or SENOKOT S) 8.6-50 MG per tablet 2 Tablet, 2 Tablet, Oral, BID, 2 Tablet at 02/19/22 1704 **AND** polyethylene glycol/lytes (MIRALAX) PACKET 1 Packet, 1 Packet, Oral, DAILY **AND** magnesium hydroxide (MILK OF MAGNESIA) suspension 30 mL, 30 mL, Oral, QDAY PRN **AND** bisacodyl (DULCOLAX) suppository 10 mg, 10 mg, Rectal, QDAY PRN, Aditya Byrd M.D.    Current Outpatient Medications:  Current Facility-Administered Medications   Medication Dose Route Frequency Provider Last Rate Last Admin    potassium chloride SA (Kdur) tablet 40 mEq  40 mEq Oral BID Francisco Menjivar M.D.   40 mEq at 02/21/22 0747    midodrine (PROAMATINE) tablet 5 mg  5 mg Oral TID WITH MEALS Meek Griffin M.D.   5 mg at 02/21/22 0704    phosphorus (K-Phos-Neutral) per tablet 500 mg  500 mg Oral Q6HRS Meek Griffin M.D.   500 mg at 02/21/22 0617    aspirin (ASA) chewable tab 81 mg  81 mg  Oral DAILY Meek Griffin M.D.   81 mg at 02/21/22 0617    omeprazole (PRILOSEC) capsule 40 mg  40 mg Oral BID Meek Griffin M.D.   40 mg at 02/21/22 0617    atorvastatin (LIPITOR) tablet 40 mg  40 mg Oral Q EVENING Namrata Mcclendon M.D.   40 mg at 02/20/22 1634    HYDROcodone-acetaminophen (NORCO) 5-325 MG per tablet 1 Tablet  1 Tablet Oral Q4HRS PRN Aditya Byrd M.D.        Pharmacy Consult Request - to monitor for nephrotoxic agents  1 Each Other PHARMACY TO DOSE Aditya Byrd M.D.        acetaminophen (Tylenol) tablet 650 mg  650 mg Oral Q6HRS PRN Aditya Byrd M.D.        ondansetron (ZOFRAN) syringe/vial injection 4 mg  4 mg Intravenous Q4HRS PRN Aditya Byrd M.D.        ondansetron (ZOFRAN ODT) dispertab 4 mg  4 mg Oral Q4HRS PRN Aditya Byrd M.D.        labetalol (NORMODYNE/TRANDATE) injection 10 mg  10 mg Intravenous Q4HRS PRN Aditya Byrd M.D.        polyethylene glycol/lytes (MIRALAX) PACKET 1 Packet  1 Packet Oral DAILY Aditya Byrd M.D.        And    senna-docusate (PERICOLACE or SENOKOT S) 8.6-50 MG per tablet 2 Tablet  2 Tablet Oral BID Aditya Byrd M.D.   2 Tablet at 02/19/22 1704    And    magnesium hydroxide (MILK OF MAGNESIA) suspension 30 mL  30 mL Oral QDAY PRN Aditya Byrd M.D.        And    bisacodyl (DULCOLAX) suppository 10 mg  10 mg Rectal QDAY PRN Aditya Byrd M.D.           Medication Allergy:  No Known Allergies    Family History:  History reviewed. No pertinent family history.    Social History:  Social History     Socioeconomic History    Marital status: Unknown     Spouse name: Not on file    Number of children: Not on file    Years of education: Not on file    Highest education level: Not on file   Occupational History    Not on file   Tobacco Use    Smoking status: Former Smoker     Packs/day: 0.50     Types: Cigarettes    Smokeless tobacco: Never Used   Vaping Use    Vaping Use: Never used   Substance and Sexual  Activity    Alcohol use: Not Currently     Comment: Stopped 12 days ago    Drug use: Not Currently    Sexual activity: Not on file   Other Topics Concern    Not on file   Social History Narrative    Not on file     Social Determinants of Health     Financial Resource Strain: Not on file   Food Insecurity: Not on file   Transportation Needs: Not on file   Physical Activity: Not on file   Stress: Not on file   Social Connections: Not on file   Intimate Partner Violence: Not on file   Housing Stability: Not on file         MDM (Data Review):     Records reviewed and summarized in current documentation    Lab Data Review:  Recent Results (from the past 24 hour(s))   HEMOGLOBIN AND HEMATOCRIT    Collection Time: 02/20/22 12:30 PM   Result Value Ref Range    Hemoglobin 9.9 (L) 12.0 - 16.0 g/dL    Hematocrit 29.1 (L) 37.0 - 47.0 %   HEMOGLOBIN AND HEMATOCRIT    Collection Time: 02/20/22  5:09 PM   Result Value Ref Range    Hemoglobin 9.1 (L) 12.0 - 16.0 g/dL    Hematocrit 27.8 (L) 37.0 - 47.0 %   CBC WITHOUT DIFFERENTIAL    Collection Time: 02/21/22  1:06 AM   Result Value Ref Range    WBC 9.3 4.8 - 10.8 K/uL    RBC 3.00 (L) 4.20 - 5.40 M/uL    Hemoglobin 8.9 (L) 12.0 - 16.0 g/dL    Hematocrit 26.9 (L) 37.0 - 47.0 %    MCV 89.7 81.4 - 97.8 fL    MCH 29.7 27.0 - 33.0 pg    MCHC 33.1 (L) 33.6 - 35.0 g/dL    RDW 52.3 (H) 35.9 - 50.0 fL    Platelet Count 268 164 - 446 K/uL    MPV 11.5 9.0 - 12.9 fL   Comp Metabolic Panel    Collection Time: 02/21/22  1:06 AM   Result Value Ref Range    Sodium 145 135 - 145 mmol/L    Potassium 3.4 (L) 3.6 - 5.5 mmol/L    Chloride 115 (H) 96 - 112 mmol/L    Co2 15 (L) 20 - 33 mmol/L    Anion Gap 15.0 7.0 - 16.0    Glucose 96 65 - 99 mg/dL    Bun 92 (HH) 8 - 22 mg/dL    Creatinine 1.32 0.50 - 1.40 mg/dL    Calcium 9.3 8.5 - 10.5 mg/dL    AST(SGOT) 23 12 - 45 U/L    ALT(SGPT) 9 2 - 50 U/L    Alkaline Phosphatase 100 (H) 30 - 99 U/L    Total Bilirubin 0.2 0.1 - 1.5 mg/dL    Albumin 2.8 (L) 3.2 -  4.9 g/dL    Total Protein 5.2 (L) 6.0 - 8.2 g/dL    Globulin 2.4 1.9 - 3.5 g/dL    A-G Ratio 1.2 g/dL   MAGNESIUM    Collection Time: 22  1:06 AM   Result Value Ref Range    Magnesium 1.7 1.5 - 2.5 mg/dL   PHOSPHORUS    Collection Time: 22  1:06 AM   Result Value Ref Range    Phosphorus 2.9 2.5 - 4.5 mg/dL   ESTIMATED GFR    Collection Time: 22  1:06 AM   Result Value Ref Range    GFR If  49 (A) >60 mL/min/1.73 m 2    GFR If Non African American 40 (A) >60 mL/min/1.73 m 2   EKG    Collection Time: 22  8:04 AM   Result Value Ref Range    Report       [facility]    Test Date:  2022  Pt Name:    VALENTINE ALANIZ                Department: [facility]  MRN:        [id number]                      Room:       [facility]  Gender:     Female                       Technician: PIERRE  :        1955                   Requested By:CHRISTOPHER SCHOFIELD  Order #:    [id number]                    Reading MD: Jensen Purdy MD    Measurements  Intervals                                Axis  Rate:       127                          P:          0  RI:         112                          QRS:        59  QRSD:       78                           T:          255  QT:         304  QTc:        442    Interpretive Statements  SINUS TACHYCARDIA  ATRIAL PREMATURE COMPLEX  LOW VOLTAGE THROUGHOUT  BORDERLINE R WAVE PROGRESSION, ANTERIOR LEADS  ARTIFACT IN LEAD(S) II,III,aVR,aVL,aVF,V1,V2,V3,V4,V5,V6  Electronically Signed On 2022 8:37:32 PST by Jensen Purdy MD     HEMOGLOBIN AND HEMATOCRIT    Collection Time: 22  9:07 AM   Result Value Ref Range    Hemoglobin 8.6 (L) 12.0 - 16.0 g/dL    Hematocrit 26.7 (L) 37.0 - 47.0 %         CT-HEAD W/O   Final Result      1. No CT evidence of acute infarct, hemorrhage or mass.   2. Moderate global parenchymal atrophy. Chronic small vessel ischemic changes.      EC-ECHOCARDIOGRAM COMPLETE W/ CONT   Final Result      US-RENAL   Final Result      1.  No hydronephrosis.    2.  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      DX-CHEST-PORTABLE (1 VIEW)   Final Result      1.  Emphysema.   2.  Small linear scar or residual subsegmental atelectatic change at the right medial lung base where there was previously extensive consolidation and cavitation. (See CT of the chest 5/22/2021)   3.  No acute infiltrates.           MDM (Assessment and Plan):     Patient Active Problem List    Diagnosis Date Noted    Encephalopathy acute 02/20/2022    NSTEMI (non-ST elevation myocardial infarction) ([facility]) 02/19/2022    Acute systolic heart failure (HCC) 02/19/2022    YAMIL (acute kidney injury) ([facility]) 02/18/2022    Protein-calorie malnutrition, severe ([facility]) 02/18/2022    Hypomagnesemia 02/18/2022    Anemia 02/18/2022    Emphysema lung ([facility]) 02/18/2022    History of hemiarthroplasty of right hip 07/27/2021    Right foot drop 07/27/2021    Leukocytosis 05/23/2021    Thrombocytosis 05/23/2021    Necrotizing pneumonia (HCC) 05/22/2021    Sepsis ([facility]) 05/22/2021    Smoking 05/22/2021    Alcohol abuse 05/22/2021    Acute kidney injury (HCC) 05/22/2021    Hypotension 05/22/2021    Hypokalemia 05/22/2021    Hyponatremia 05/22/2021    Diarrhea 05/22/2021     This is a 65 yo female who was admitted to the hospital 2/18/22 with ALOC, failure to thrive. Her neighbor checked in on patient and she was sluggish. Stated she has not been eating well for several months. She was found to be dehydrated, YAMIL with electrolyte derangement. Troponin and NT-proBNP elevated; cardiology consulted. Likely the elevated troponin due to YAMIL. Acute heart failure due to stress induced cardiomyopathy due to acute illness. Cardiology recommend heparin gtt. Baby aspirin and trend troponin. She started to have black tarry stools after starting Heparin. Hgb ranged from 7.9-->8.6 (current). Heparin discontinued 2/20/22.      ASSESSMENT:  1. Melena  2. Anemia  3.  ALOC  4. Protein Calorie Malnutrition  5. NSTEMI  6. Acute systolic heart failure  7. YAMIL    PLAN:  1. Due to recent NSTEMI and Acute heart failure, recommend not proceeding with endoscopic procedure and treat medically  2. Recommend Protonix 40mg IV BID  3. Trend Hgb and transfuse >7  4. If Hgb remains stable, may resume Heparin gtt 1-2 days     Thank your for the opportunity to assist in the care of your patient.  Please call for any questions or concerns.    GI Attending -    Patient seen, examined, chart reviewed and case discussed and plan arrived at with A.P.R.RADHA.  Agree with this note.    Will observe for now and consider endoscopy when and if cardiac issues improve and or acute bleeding occurs.    Kranthi Fernandez M.D.    [email or web address].

## 2022-02-21 NOTE — PROGRESS NOTES
Hospital Medicine Daily Progress Note    Date of Service  2/21/2022    Chief Complaint  Jaymie Peacock is a 66 y.o. female admitted 2/18/2022 with altered mental status    Hospital Course  66-year-old female with history of hypertension and smoking presented 2/18 with altered mental status patient has been living by herself and was able to take care of herself however today her neighbors did check on her and she was sluggish, denied any symptoms with no pain however patient states she was not eating well and no appetite, patient was skinny with malnutrition and dehydrated on admission, labs showed YAMIL, with hypokalemia and hypomagnesemia, also hypotension, IV fluid was started with some improvement in her mentation.  Patient lives by herself and her family lives far away, patient did not want me to call family.  The patient apparently with chronic alcoholism, states she lives alone, appears to have longer period of poor p.o. intake, anorexia, still quite confused and poor historian.     2/20 the patient slowly improving, her mentation is still limited, ANO times 1-2 initially but she can be reoriented, noted to have dark stools with positive occult blood, poor p.o. intake, and patient refuses regular meals, drinks fluids, follow-up H&H without further drop in hemoglobin, electrolytes being replaced, renal function improving, significant malnutrition, U tox negative  2/19 electrolytes replaced, will recheck.  Concerning troponin at 248, repeat 473. Values may be inflated in the setting of acute renal failure. EKG without evidence of acute ischemia. Empiric full dose ASA, high intensity statin, beta blocker ordered. Transferred for telemetry monitoring in the setting of probable NSTEMI. Cardiology consulted and Dr. Norris evaluating, appreciate assistance.  ARF improving with IV fluids.    Interval Problem Update  Patient seen and examined today. AAO x 1 to self but answering some questions appropriately. Patient  stated that she lives in [place], no family members around, she has her sister and brother in [place].  Cardiology signed off. Recommended to continue medical management.  Episodes of black stool overnight. Hemoglobin stable. Initiated on PPI twice daily. GI consulted. Liquid diet, no red. Trend H & H.   Gentle IVF in the setting of new diagnosis heart failure  CT head.   Low BP & Tachycardia this AM, hold coreg for now.   K replete   PT/OT     I have personally seen and examined the patient at bedside. I discussed the plan of care with patient, bedside RN, charge RN and cardiology.    Consultants/Specialty  cardiology and GI    Code Status  Full Code    Disposition  Patient is not medically cleared for discharge.   Anticipate discharge to D. I have placed the appropriate orders for post-discharge needs.    Review of Systems  Review of Systems   Constitutional: Positive for malaise/fatigue. Negative for chills, diaphoresis and fever.   HENT: Negative.    Eyes: Positive for redness.   Respiratory: Negative.  Negative for shortness of breath.    Cardiovascular: Negative.  Negative for chest pain and leg swelling.   Gastrointestinal: Negative.  Negative for abdominal pain, nausea and vomiting.   Genitourinary: Negative.    Musculoskeletal: Negative.    Skin: Negative.    Neurological: Negative.  Negative for dizziness.   Endo/Heme/Allergies: Negative.    Psychiatric/Behavioral: Positive for memory loss and substance abuse. The patient is nervous/anxious.    All other systems reviewed and are negative.       Physical Exam  Temp:  [36.1 °C (96.9 °F)-37.4 °C (99.4 °F)] 36.2 °C (97.2 °F)  Pulse:  [] 97  Resp:  [17-18] 18  BP: ()/(57-71) 114/57  SpO2:  [92 %-95 %] 93 %    Physical Exam  Vitals and nursing note reviewed.   Constitutional:       General: She is not in acute distress.     Appearance: She is underweight.      Comments: Elderly female, emaciated, cachectic, poorly kempt, looks older than stated age    HENT:      Head: Normocephalic and atraumatic.      Comments: Temporal wasting bilaterally     Right Ear: External ear normal.      Left Ear: External ear normal.      Nose: Nose normal. No congestion.      Mouth/Throat:      Mouth: Mucous membranes are dry.      Pharynx: Oropharynx is clear.   Eyes:      General: No scleral icterus.     Conjunctiva/sclera: Conjunctivae normal.   Cardiovascular:      Rate and Rhythm: Normal rate and regular rhythm.      Heart sounds: No murmur heard.  Pulmonary:      Breath sounds: Rhonchi present. No wheezing or rales.   Abdominal:      General: There is no distension.      Palpations: Abdomen is soft.      Tenderness: There is no abdominal tenderness. There is no guarding.   Musculoskeletal:      Cervical back: Neck supple. No rigidity.      Right lower leg: No edema.      Left lower leg: No edema.   Skin:     General: Skin is warm and dry.      Coloration: Skin is pale.      Findings: Bruising and rash present.   Neurological:      General: No focal deficit present.      Mental Status: She is alert. She is disoriented and confused.   Psychiatric:         Mood and Affect: Mood is depressed. Affect is flat.         Behavior: Behavior is cooperative.         Fluids    Intake/Output Summary (Last 24 hours) at 2/21/2022 1148  Last data filed at 2/21/2022 1000  Gross per 24 hour   Intake 240 ml   Output --   Net 240 ml       Laboratory  Recent Labs     02/19/22  0313 02/20/22  0120 02/20/22  1230 02/20/22  1709 02/21/22  0106 02/21/22  0907   WBC 5.5 6.7  --   --  9.3  --    RBC 2.90* 2.60*  --   --  3.00*  --    HEMOGLOBIN 8.8* 7.9*   < > 9.1* 8.9* 8.6*   HEMATOCRIT 25.6* 23.1*   < > 27.8* 26.9* 26.7*   MCV 88.3 88.8  --   --  89.7  --    MCH 30.3 30.4  --   --  29.7  --    MCHC 34.4 34.2  --   --  33.1*  --    RDW 49.7 52.5*  --   --  52.3*  --    PLATELETCT 238 234  --   --  268  --    MPV 10.8 10.9  --   --  11.5  --     < > = values in this interval not displayed.     Recent  Labs     02/19/22  1725 02/20/22  0120 02/21/22  0106   SODIUM 139 140 145   POTASSIUM 3.5* 3.3* 3.4*   CHLORIDE 110 112 115*   CO2 16* 17* 15*   GLUCOSE 146* 99 96   * 102* 92*   CREATININE 1.64* 1.51* 1.32   CALCIUM 9.0 9.1 9.3     Recent Labs     02/19/22  1249   APTT 27.0   INR 1.04         Recent Labs     02/19/22  0750   TRIGLYCERIDE 95   HDL 76   LDL 53       Imaging  CT-HEAD W/O   Final Result      1. No CT evidence of acute infarct, hemorrhage or mass.   2. Moderate global parenchymal atrophy. Chronic small vessel ischemic changes.      EC-ECHOCARDIOGRAM COMPLETE W/ CONT   Final Result      US-RENAL   Final Result      1.  No hydronephrosis.   2.  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      DX-CHEST-PORTABLE (1 VIEW)   Final Result      1.  Emphysema.   2.  Small linear scar or residual subsegmental atelectatic change at the right medial lung base where there was previously extensive consolidation and cavitation. (See CT of the chest 5/22/2021)   3.  No acute infiltrates.         I have personally interpreted the EKG dated 2/19/22 08:20am. Sinus tachycardia with rate 100, no ST elevation or depression. QTc 421 wnl.    Assessment/Plan  * YAMIL (acute kidney injury) ([facility])- (present on admission)  Assessment & Plan  Came with creatinine 3.4 and her baseline around 0.8  Patient states she was not eating or drinking  Urine did not show protein or infection  Renal US reviewed   Likely prerenal  Improving with IV fluids. Continue.   Avoid nephrotoxic medication    Encephalopathy acute  Assessment & Plan  Most likely a combination of her chronic substance abuse, acute illness, renal failure  CT head       Acute systolic heart failure (HCC)- (present on admission)  Assessment & Plan  Strong suspicion for acute stress induced cardiomyopathy 2/2 acute illness.    NSTEMI (non-ST elevation myocardial infarction) ([facility])- (present on  admission)  Assessment & Plan  Suspect type 2, demand ischemia 2/2 acute illness. Troponins 248>473>594 in the setting of acute renal failure, nonoliguric.  Initiated full dose ASA, high intensity statin, beta blocker.  Appreciate Cardiology consult.  The patient denies chest pain, nondiagnostic EKGs, no ST elevation  Labs the patient's possible GI bleeding, reducing aspirin dose, stop heparin, monitor  The patient likely will have ischemic evaluation once overall improved    Emphysema lung (HCC)  Assessment & Plan  Patient has history of smoking for years  X-ray showed signs of emphysema  No wheezing and patient is on room air  Patient needs CT scan for cancer screening as outpatient and pulmonary function test  Inhaler as needed  No exacerbation    GI bleed  Assessment & Plan  Patient has chronic anemia with elevated MCV however she came with worsening and hemoglobin 7.9  Positive occult blood, dark stools  Patient received 1 unit of red blood cell at the emergency by ED physician  Start PPI BID    2/20 : Episodes of black stool overnight. Hemoglobin stable. Initiated on PPI twice daily. GI consulted. Liquid diet, no red. Trend H & H.   Gentle IVF in the setting of new diagnosis heart failure      Hypomagnesemia- (present on admission)  Assessment & Plan  Replace as needed     Protein-calorie malnutrition, severe (HCC)  Assessment & Plan  BMI 16.1, temporal wasting, limbs appear atrophied.  Patient states she was not eating and does not have appetite.  Possible geriatric depression vs substance dependence given recent history of heavy alcohol use.  Patient might need cancer screening as outpatient especially she has history of smoking  TSH wnl  Dietitian consulted, appreciate assistance.    Hypokalemia- (present on admission)  Assessment & Plan  Presented with potassium 2.7, improving with supplementation.  No EKG changes  Continue aggressive supplementation.    Hypotension- (present on admission)  Assessment &  "Plan  Came with blood pressure 80/40, improving with IV fluids.  New onset CHF   GI bleed     Gentle IV fluid  Hold blood pressure losartan and hydrochlorothiazide due to worsening kidney function  Hold coreg   No signs of infection  Close monitoring      Alcohol abuse- (present on admission)  Assessment & Plan  Recent history of heavy use. Patient reports quitting \"a couple of weeks\" prior to admission.  Continue thiamine supplementation.  Will Check vitamin B12.      VTE prophylaxis: SCDs/TEDs and pharmacologic prophylaxis contraindicated due to GI bleed     I have performed a physical exam and reviewed and updated ROS and Plan today (2/21/2022). In review of yesterday's note (2/20/2022), there are no changes except as documented above.          "

## 2022-02-21 NOTE — CARE PLAN
Problem: Hemodynamics  Goal: Patient's hemodynamics, fluid balance and neurologic status will be stable or improve  Outcome: Progressing  Note: Pt remains hemodynamically stable this shift      Problem: Respiratory  Goal: Patient will achieve/maintain optimum respiratory ventilation and gas exchange  Outcome: Progressing  Note: Pt remains on room air      Problem: Skin Integrity  Goal: Skin integrity is maintained or improved  Outcome: Progressing   The patient is Watcher - Medium risk of patient condition declining or worsening    Shift Goals  Clinical Goals: remain free from falls,monitor HG, hemodynamically stable  Patient Goals: rest  Family Goals: PATTI    Progress made toward(s) clinical / shift goals:  Pt remains free from falls this shift and hemodynamics remains stable     Patient is not progressing towards the following goals:

## 2022-02-21 NOTE — CARE PLAN
The patient is Watcher - Medium risk of patient condition declining or worsening    Shift Goals  Clinical Goals: (P) Pt will remain free from falls. Pt will be hemodynamically stable.  Patient Goals: (P) Go home  Family Goals: (P) PATTI    Progress made toward(s) clinical / shift goals:    Problem: Respiratory  Goal: Patient will achieve/maintain optimum respiratory ventilation and gas exchange  Outcome: Progressing     Problem: Fall Risk  Goal: Patient will remain free from falls  Outcome: Progressing       Patient is not progressing towards the following goals:      Problem: Skin Integrity  Goal: Skin integrity is maintained or improved  Outcome: Not Progressing

## 2022-02-21 NOTE — PROGRESS NOTES
Pt took out IV catheter with LR running. Pt was found with IV catheter in mouth sucking on LR. Pharmacy contacted and stated that pt is okay

## 2022-02-21 NOTE — PROGRESS NOTES
Report received, pt care assumed, tele box on and rate verified. VSS and pt is on room air. Pt aaox1 with orientation to self , no signs of distress noted at this time. POC discussed with pt and verbalizes no questions. Pt c/o of no pain at this time. Pt denies any additional needs at this time. Bed in lowest position, bed alarm on, pt educated on fall risk and verbalized understanding, call light within reach, will continue with plan of care.

## 2022-02-21 NOTE — PROGRESS NOTES
Report received from Clarisa Crain. Assumed pt care. Pt resting comfortably. Pt A&O x 1. Fall precautions in place, call light and belongings within reach, bed in lowest position. No signs of distress.

## 2022-02-21 NOTE — DISCHARGE PLANNING
TCN following. HTH/SCP chart review completed. Appreciate palliative consult has been placed. SE Layton states she will reach out to marcellus Barrett as indicated. No additional TCN needs this day.

## 2022-02-22 LAB
ALBUMIN SERPL BCP-MCNC: 2.6 G/DL (ref 3.2–4.9)
ALBUMIN/GLOB SERPL: 1.1 G/DL
ALP SERPL-CCNC: 100 U/L (ref 30–99)
ALT SERPL-CCNC: 9 U/L (ref 2–50)
ANION GAP SERPL CALC-SCNC: 14 MMOL/L (ref 7–16)
AST SERPL-CCNC: 19 U/L (ref 12–45)
BASOPHILS # BLD AUTO: 0.5 % (ref 0–1.8)
BASOPHILS # BLD: 0.04 K/UL (ref 0–0.12)
BILIRUB SERPL-MCNC: 0.3 MG/DL (ref 0.1–1.5)
BUN SERPL-MCNC: 72 MG/DL (ref 8–22)
CALCIUM SERPL-MCNC: 8.9 MG/DL (ref 8.5–10.5)
CHLORIDE SERPL-SCNC: 116 MMOL/L (ref 96–112)
CO2 SERPL-SCNC: 17 MMOL/L (ref 20–33)
CREAT SERPL-MCNC: 1.35 MG/DL (ref 0.5–1.4)
EOSINOPHIL # BLD AUTO: 0.08 K/UL (ref 0–0.51)
EOSINOPHIL NFR BLD: 0.9 % (ref 0–6.9)
ERYTHROCYTE [DISTWIDTH] IN BLOOD BY AUTOMATED COUNT: 51.8 FL (ref 35.9–50)
FERRITIN SERPL-MCNC: 955 NG/ML (ref 10–291)
GLOBULIN SER CALC-MCNC: 2.4 G/DL (ref 1.9–3.5)
GLUCOSE SERPL-MCNC: 100 MG/DL (ref 65–99)
HCT VFR BLD AUTO: 21.9 % (ref 37–47)
HCT VFR BLD AUTO: 22.8 % (ref 37–47)
HCT VFR BLD AUTO: 23.1 % (ref 37–47)
HGB BLD-MCNC: 7.4 G/DL (ref 12–16)
HGB BLD-MCNC: 7.5 G/DL (ref 12–16)
HGB BLD-MCNC: 7.6 G/DL (ref 12–16)
HGB RETIC QN AUTO: 28.5 PG/CELL (ref 29–35)
IMM GRANULOCYTES # BLD AUTO: 0.03 K/UL (ref 0–0.11)
IMM GRANULOCYTES NFR BLD AUTO: 0.4 % (ref 0–0.9)
IMM RETICS NFR: 10.8 % (ref 9.3–17.4)
IRON SATN MFR SERPL: 47 % (ref 15–55)
IRON SERPL-MCNC: 63 UG/DL (ref 40–170)
LYMPHOCYTES # BLD AUTO: 0.82 K/UL (ref 1–4.8)
LYMPHOCYTES NFR BLD: 9.7 % (ref 22–41)
MCH RBC QN AUTO: 30.2 PG (ref 27–33)
MCHC RBC AUTO-ENTMCNC: 33.8 G/DL (ref 33.6–35)
MCV RBC AUTO: 89.4 FL (ref 81.4–97.8)
MONOCYTES # BLD AUTO: 0.51 K/UL (ref 0–0.85)
MONOCYTES NFR BLD AUTO: 6 % (ref 0–13.4)
NEUTROPHILS # BLD AUTO: 6.97 K/UL (ref 2–7.15)
NEUTROPHILS NFR BLD: 82.5 % (ref 44–72)
NRBC # BLD AUTO: 0 K/UL
NRBC BLD-RTO: 0 /100 WBC
PLATELET # BLD AUTO: 213 K/UL (ref 164–446)
PMV BLD AUTO: 11.5 FL (ref 9–12.9)
POTASSIUM SERPL-SCNC: 3.8 MMOL/L (ref 3.6–5.5)
PROT SERPL-MCNC: 5 G/DL (ref 6–8.2)
RBC # BLD AUTO: 2.45 M/UL (ref 4.2–5.4)
RETICS # AUTO: 0.01 M/UL (ref 0.04–0.06)
RETICS/RBC NFR: 0.6 % (ref 0.8–2.1)
SODIUM SERPL-SCNC: 147 MMOL/L (ref 135–145)
TIBC SERPL-MCNC: 134 UG/DL (ref 250–450)
UIBC SERPL-MCNC: 71 UG/DL (ref 110–370)
WBC # BLD AUTO: 8.5 K/UL (ref 4.8–10.8)

## 2022-02-22 PROCEDURE — 83970 ASSAY OF PARATHORMONE: CPT

## 2022-02-22 PROCEDURE — A9270 NON-COVERED ITEM OR SERVICE: HCPCS | Performed by: INTERNAL MEDICINE

## 2022-02-22 PROCEDURE — 82728 ASSAY OF FERRITIN: CPT

## 2022-02-22 PROCEDURE — 700105 HCHG RX REV CODE 258: Performed by: STUDENT IN AN ORGANIZED HEALTH CARE EDUCATION/TRAINING PROGRAM

## 2022-02-22 PROCEDURE — 85014 HEMATOCRIT: CPT | Mod: 91

## 2022-02-22 PROCEDURE — 85025 COMPLETE CBC W/AUTO DIFF WBC: CPT

## 2022-02-22 PROCEDURE — 700102 HCHG RX REV CODE 250 W/ 637 OVERRIDE(OP): Performed by: HOSPITALIST

## 2022-02-22 PROCEDURE — 700111 HCHG RX REV CODE 636 W/ 250 OVERRIDE (IP): Performed by: NURSE PRACTITIONER

## 2022-02-22 PROCEDURE — 80053 COMPREHEN METABOLIC PANEL: CPT

## 2022-02-22 PROCEDURE — 85018 HEMOGLOBIN: CPT | Mod: 91

## 2022-02-22 PROCEDURE — 36415 COLL VENOUS BLD VENIPUNCTURE: CPT

## 2022-02-22 PROCEDURE — 99233 SBSQ HOSP IP/OBS HIGH 50: CPT | Performed by: STUDENT IN AN ORGANIZED HEALTH CARE EDUCATION/TRAINING PROGRAM

## 2022-02-22 PROCEDURE — 700102 HCHG RX REV CODE 250 W/ 637 OVERRIDE(OP): Performed by: INTERNAL MEDICINE

## 2022-02-22 PROCEDURE — 83550 IRON BINDING TEST: CPT

## 2022-02-22 PROCEDURE — 770020 HCHG ROOM/CARE - TELE (206)

## 2022-02-22 PROCEDURE — 85046 RETICYTE/HGB CONCENTRATE: CPT

## 2022-02-22 PROCEDURE — A9270 NON-COVERED ITEM OR SERVICE: HCPCS | Performed by: HOSPITALIST

## 2022-02-22 PROCEDURE — 83540 ASSAY OF IRON: CPT

## 2022-02-22 PROCEDURE — C9113 INJ PANTOPRAZOLE SODIUM, VIA: HCPCS | Performed by: NURSE PRACTITIONER

## 2022-02-22 RX ORDER — SPIRONOLACTONE 25 MG/1
12.5 TABLET ORAL
Status: DISCONTINUED | OUTPATIENT
Start: 2022-02-23 | End: 2022-02-22

## 2022-02-22 RX ORDER — DEXTROSE MONOHYDRATE 50 MG/ML
INJECTION, SOLUTION INTRAVENOUS CONTINUOUS
Status: DISCONTINUED | OUTPATIENT
Start: 2022-02-22 | End: 2022-02-23

## 2022-02-22 RX ORDER — CARVEDILOL 3.12 MG/1
3.12 TABLET ORAL 2 TIMES DAILY WITH MEALS
Status: DISCONTINUED | OUTPATIENT
Start: 2022-02-22 | End: 2022-02-22

## 2022-02-22 RX ADMIN — DEXTROSE MONOHYDRATE: 50 INJECTION, SOLUTION INTRAVENOUS at 23:37

## 2022-02-22 RX ADMIN — DIBASIC SODIUM PHOSPHATE, MONOBASIC POTASSIUM PHOSPHATE AND MONOBASIC SODIUM PHOSPHATE 500 MG: 852; 155; 130 TABLET ORAL at 04:28

## 2022-02-22 RX ADMIN — PANTOPRAZOLE SODIUM 40 MG: 40 INJECTION, POWDER, LYOPHILIZED, FOR SOLUTION INTRAVENOUS at 16:18

## 2022-02-22 RX ADMIN — SODIUM CHLORIDE, POTASSIUM CHLORIDE, SODIUM LACTATE AND CALCIUM CHLORIDE: 600; 310; 30; 20 INJECTION, SOLUTION INTRAVENOUS at 07:59

## 2022-02-22 RX ADMIN — ATORVASTATIN CALCIUM 40 MG: 40 TABLET, FILM COATED ORAL at 16:18

## 2022-02-22 RX ADMIN — MIDODRINE HYDROCHLORIDE 5 MG: 5 TABLET ORAL at 16:18

## 2022-02-22 RX ADMIN — MIDODRINE HYDROCHLORIDE 5 MG: 5 TABLET ORAL at 11:24

## 2022-02-22 RX ADMIN — DIBASIC SODIUM PHOSPHATE, MONOBASIC POTASSIUM PHOSPHATE AND MONOBASIC SODIUM PHOSPHATE 500 MG: 852; 155; 130 TABLET ORAL at 11:24

## 2022-02-22 RX ADMIN — DIBASIC SODIUM PHOSPHATE, MONOBASIC POTASSIUM PHOSPHATE AND MONOBASIC SODIUM PHOSPHATE 500 MG: 852; 155; 130 TABLET ORAL at 23:12

## 2022-02-22 RX ADMIN — DIBASIC SODIUM PHOSPHATE, MONOBASIC POTASSIUM PHOSPHATE AND MONOBASIC SODIUM PHOSPHATE 500 MG: 852; 155; 130 TABLET ORAL at 16:18

## 2022-02-22 RX ADMIN — PANTOPRAZOLE SODIUM 40 MG: 40 INJECTION, POWDER, LYOPHILIZED, FOR SOLUTION INTRAVENOUS at 04:28

## 2022-02-22 RX ADMIN — ASPIRIN 81 MG: 81 TABLET, CHEWABLE ORAL at 04:28

## 2022-02-22 RX ADMIN — DEXTROSE MONOHYDRATE: 50 INJECTION, SOLUTION INTRAVENOUS at 09:27

## 2022-02-22 RX ADMIN — MIDODRINE HYDROCHLORIDE 5 MG: 5 TABLET ORAL at 07:57

## 2022-02-22 ASSESSMENT — ENCOUNTER SYMPTOMS
SHORTNESS OF BREATH: 0
CARDIOVASCULAR NEGATIVE: 1
GASTROINTESTINAL NEGATIVE: 1
MUSCULOSKELETAL NEGATIVE: 1
ABDOMINAL PAIN: 0
MEMORY LOSS: 1
DIZZINESS: 0
FEVER: 0
CHILLS: 0
NEUROLOGICAL NEGATIVE: 1
RESPIRATORY NEGATIVE: 1
NERVOUS/ANXIOUS: 1
DIAPHORESIS: 0
VOMITING: 0
NAUSEA: 0
EYE REDNESS: 1

## 2022-02-22 ASSESSMENT — LIFESTYLE VARIABLES: SUBSTANCE_ABUSE: 1

## 2022-02-22 ASSESSMENT — FIBROSIS 4 INDEX
FIB4 SCORE: 1.99
FIB4 SCORE: 1.99

## 2022-02-22 NOTE — PROGRESS NOTES
"Gastroenterology Consult Note:   Kranthi Fernandez M.D. with JERSON Armas  Date & Time note created:    2/22/2022   9:06 AM     Referring MD:  Dr. Francisco Menjivar    Patient ID:  Name:             Jaymie Peacock   YOB: 1955  Age:                 66 y.o.  female   MRN:               [id number]                                                             Reason for Consult:      FOBT positive    History of Present Illness:    This is a 67 yo female PMH HTN, alcohol abuse, who was admitted to the hospital 2/18/22 with ALOC, failure to thrive. One of her neighbors did a welfare check, and patient was found to be sluggish and has not been eating well due to no appetite. She was found to be dehydrated, YAMIL, hypokalemia, hypomagnesemia, hypotension. Cardiology consulted due to elevated troponin. Dr. Norris likely elevated troponin was due to YAMIL. Acute heart failure likely due to stress induced cardiomyopathy in setting of acute illness. Recommended heparin gtt, ASA, trending troponin. During hospitalization, stools were dark. FOBT positive.  Hgb ranged from 7.9 (2/18/22) to 8.6 (2/21/22). She received 1 unit of PRBC (2/18/22). Heparin was discontinued 2/20/22.    Currently, patient is AAO x person, year. She believes she is at the [facility] in [place]. She denies abdominal pain, N/V, changes in bowel habits. States, she stopped eating because \"I just don't feel hungry.\" Last alcohol beverage was a \"few months ago.\"    Troponin 2/19/22: 248-->473-->594-->464-->258  NT-proBNP: 23,303  Echocardiogram: LVEF: 35%    INTERVAL HISTORY:    2/22/22: Disoriented to place. No overt GI bleed. Hgb trended down 7.4 (8.0 on 2/21/22). BUN/crt: slowly trending down. Appetite poor.         Review of Systems:      Review of Systems   Unable to perform ROS: Mental acuity             Physical Exam:  Vitals/ General Appearance:   Weight/BMI: Body mass index is 17.79 kg/m².    Vitals:    02/21/22 2335 " 02/22/22 0249 02/22/22 0252 02/22/22 0400   BP: (!) 91/65 (!) 98/85     Pulse: (!) 110 (!) 127     Resp: 18 18     Temp: 36 °C (96.8 °F)  36.1 °C (97 °F)    TempSrc: Temporal  Temporal    SpO2: 95%      Weight:    50 kg (110 lb 3.7 oz)   Height:         Oxygen Therapy:  Pulse Oximetry: 95 %, O2 (LPM): 0, O2 Delivery Device: None - Room Air    Physical Exam  Vitals reviewed.   Constitutional:       Appearance: She is ill-appearing.   HENT:      Head: Normocephalic and atraumatic.      Right Ear: External ear normal.      Left Ear: External ear normal.      Mouth/Throat:      Mouth: Mucous membranes are moist.   Eyes:      Extraocular Movements: Extraocular movements intact.      Pupils: Pupils are equal, round, and reactive to light.   Cardiovascular:      Rate and Rhythm: Regular rhythm. Tachycardia present.      Pulses: Normal pulses.      Heart sounds: Normal heart sounds.   Pulmonary:      Effort: Pulmonary effort is normal.      Breath sounds: Normal breath sounds.   Abdominal:      General: Bowel sounds are normal.      Palpations: Abdomen is soft.      Tenderness: There is no abdominal tenderness. There is no guarding.   Musculoskeletal:         General: Normal range of motion.      Cervical back: Normal range of motion and neck supple.   Skin:     General: Skin is warm and dry.      Capillary Refill: Capillary refill takes less than 2 seconds.      Coloration: Skin is pale.   Neurological:      Mental Status: She is alert. She is disoriented.         Past Medical History:   Past Medical History:   Diagnosis Date   • Hypertension    • Osteoporosis    • Smoking        Past Surgical History:  Past Surgical History:   Procedure Laterality Date   • US-NEEDLE CORE BX-BREAST PANEL Right        Hospital Medications:    Current Facility-Administered Medications:   •  dextrose 5% infusion, , Intravenous, Continuous, Parker Sellers M.D.  •  pantoprazole (Protonix) injection 40 mg, 40 mg, Intravenous, BID,  JERSON Armas, 40 mg at 02/22/22 0428  •  midodrine (PROAMATINE) tablet 5 mg, 5 mg, Oral, TID WITH MEALS, Meek Griffin M.D., 5 mg at 02/22/22 0757  •  phosphorus (K-Phos-Neutral) per tablet 500 mg, 500 mg, Oral, Q6HRS, Meek Griffin M.D., 500 mg at 02/22/22 0428  •  [Held by provider] aspirin (ASA) chewable tab 81 mg, 81 mg, Oral, DAILY, Meek Griffin M.D., 81 mg at 02/22/22 0428  •  atorvastatin (LIPITOR) tablet 40 mg, 40 mg, Oral, Q EVENING, Namrata Mcclendon M.D., 40 mg at 02/21/22 1711  •  Pharmacy Consult Request - to monitor for nephrotoxic agents, 1 Each, Other, PHARMACY TO DOSE, Aditya Byrd M.D.  •  acetaminophen (Tylenol) tablet 650 mg, 650 mg, Oral, Q6HRS PRN, Aditya Byrd M.D.  •  ondansetron (ZOFRAN) syringe/vial injection 4 mg, 4 mg, Intravenous, Q4HRS PRN, Aditya Byrd M.D.  •  ondansetron (ZOFRAN ODT) dispertab 4 mg, 4 mg, Oral, Q4HRS PRN, Aditya Byrd M.D.  •  labetalol (NORMODYNE/TRANDATE) injection 10 mg, 10 mg, Intravenous, Q4HRS PRN, Aditya Byrd M.D.  •  senna-docusate (PERICOLACE or SENOKOT S) 8.6-50 MG per tablet 2 Tablet, 2 Tablet, Oral, BID, 2 Tablet at 02/19/22 1704 **AND** polyethylene glycol/lytes (MIRALAX) PACKET 1 Packet, 1 Packet, Oral, DAILY **AND** magnesium hydroxide (MILK OF MAGNESIA) suspension 30 mL, 30 mL, Oral, QDAY PRN **AND** bisacodyl (DULCOLAX) suppository 10 mg, 10 mg, Rectal, QDAY PRN, Aditya Byrd M.D.    Current Outpatient Medications:  Current Facility-Administered Medications   Medication Dose Route Frequency Provider Last Rate Last Admin   • dextrose 5% infusion   Intravenous Continuous Parker Sellers M.D.       • pantoprazole (Protonix) injection 40 mg  40 mg Intravenous BID Ana Mcleod, A.P.R.N.   40 mg at 02/22/22 8577   • midodrine (PROAMATINE) tablet 5 mg  5 mg Oral TID WITH MEALS Meek Griffin M.D.   5 mg at 02/22/22 4227   • phosphorus (K-Phos-Neutral) per tablet  500 mg  500 mg Oral Q6HRS Meek Griffin M.D.   500 mg at 02/22/22 0428   • [Held by provider] aspirin (ASA) chewable tab 81 mg  81 mg Oral DAILY Meek Griffin M.D.   81 mg at 02/22/22 0428   • atorvastatin (LIPITOR) tablet 40 mg  40 mg Oral Q EVENING Namrata Mcclendon M.D.   40 mg at 02/21/22 1711   • Pharmacy Consult Request - to monitor for nephrotoxic agents  1 Each Other PHARMACY TO DOSE Aditya Byrd M.D.       • acetaminophen (Tylenol) tablet 650 mg  650 mg Oral Q6HRS PRN Aditya Byrd M.D.       • ondansetron (ZOFRAN) syringe/vial injection 4 mg  4 mg Intravenous Q4HRS PRN Aditya Byrd M.D.       • ondansetron (ZOFRAN ODT) dispertab 4 mg  4 mg Oral Q4HRS PRN Aditya Byrd M.D.       • labetalol (NORMODYNE/TRANDATE) injection 10 mg  10 mg Intravenous Q4HRS PRN Aditya Byrd M.D.       • polyethylene glycol/lytes (MIRALAX) PACKET 1 Packet  1 Packet Oral DAILY Aditya Byrd M.D.        And   • senna-docusate (PERICOLACE or SENOKOT S) 8.6-50 MG per tablet 2 Tablet  2 Tablet Oral BID Aditya Byrd M.D.   2 Tablet at 02/19/22 1704    And   • magnesium hydroxide (MILK OF MAGNESIA) suspension 30 mL  30 mL Oral QDAY PRN Aditya Byrd M.D.        And   • bisacodyl (DULCOLAX) suppository 10 mg  10 mg Rectal QDAY PRN Aditya Byrd M.D.           Medication Allergy:  No Known Allergies    Family History:  History reviewed. No pertinent family history.    Social History:  Social History     Socioeconomic History   • Marital status: Unknown     Spouse name: Not on file   • Number of children: Not on file   • Years of education: Not on file   • Highest education level: Not on file   Occupational History   • Not on file   Tobacco Use   • Smoking status: Former Smoker     Packs/day: 0.50     Types: Cigarettes   • Smokeless tobacco: Never Used   Vaping Use   • Vaping Use: Never used   Substance and Sexual Activity   • Alcohol use: Not Currently     Comment: Stopped 12  days ago   • Drug use: Not Currently   • Sexual activity: Not on file   Other Topics Concern   • Not on file   Social History Narrative   • Not on file     Social Determinants of Health     Financial Resource Strain: Not on file   Food Insecurity: Not on file   Transportation Needs: Not on file   Physical Activity: Not on file   Stress: Not on file   Social Connections: Not on file   Intimate Partner Violence: Not on file   Housing Stability: Not on file         MDM (Data Review):     Records reviewed and summarized in current documentation    Lab Data Review:  Recent Results (from the past 24 hour(s))   HEMOGLOBIN AND HEMATOCRIT    Collection Time: 02/21/22  9:07 AM   Result Value Ref Range    Hemoglobin 8.6 (L) 12.0 - 16.0 g/dL    Hematocrit 26.7 (L) 37.0 - 47.0 %   HEMOGLOBIN AND HEMATOCRIT    Collection Time: 02/21/22  5:04 PM   Result Value Ref Range    Hemoglobin 8.0 (L) 12.0 - 16.0 g/dL    Hematocrit 23.8 (L) 37.0 - 47.0 %   CBC WITH DIFFERENTIAL    Collection Time: 02/22/22 12:51 AM   Result Value Ref Range    WBC 8.5 4.8 - 10.8 K/uL    RBC 2.45 (L) 4.20 - 5.40 M/uL    Hemoglobin 7.4 (L) 12.0 - 16.0 g/dL    Hematocrit 21.9 (L) 37.0 - 47.0 %    MCV 89.4 81.4 - 97.8 fL    MCH 30.2 27.0 - 33.0 pg    MCHC 33.8 33.6 - 35.0 g/dL    RDW 51.8 (H) 35.9 - 50.0 fL    Platelet Count 213 164 - 446 K/uL    MPV 11.5 9.0 - 12.9 fL    Neutrophils-Polys 82.50 (H) 44.00 - 72.00 %    Lymphocytes 9.70 (L) 22.00 - 41.00 %    Monocytes 6.00 0.00 - 13.40 %    Eosinophils 0.90 0.00 - 6.90 %    Basophils 0.50 0.00 - 1.80 %    Immature Granulocytes 0.40 0.00 - 0.90 %    Nucleated RBC 0.00 /100 WBC    Neutrophils (Absolute) 6.97 2.00 - 7.15 K/uL    Lymphs (Absolute) 0.82 (L) 1.00 - 4.80 K/uL    Monos (Absolute) 0.51 0.00 - 0.85 K/uL    Eos (Absolute) 0.08 0.00 - 0.51 K/uL    Baso (Absolute) 0.04 0.00 - 0.12 K/uL    Immature Granulocytes (abs) 0.03 0.00 - 0.11 K/uL    NRBC (Absolute) 0.00 K/uL   Comp Metabolic Panel    Collection Time:  02/22/22 12:51 AM   Result Value Ref Range    Sodium 147 (H) 135 - 145 mmol/L    Potassium 3.8 3.6 - 5.5 mmol/L    Chloride 116 (H) 96 - 112 mmol/L    Co2 17 (L) 20 - 33 mmol/L    Anion Gap 14.0 7.0 - 16.0    Glucose 100 (H) 65 - 99 mg/dL    Bun 72 (HH) 8 - 22 mg/dL    Creatinine 1.35 0.50 - 1.40 mg/dL    Calcium 8.9 8.5 - 10.5 mg/dL    AST(SGOT) 19 12 - 45 U/L    ALT(SGPT) 9 2 - 50 U/L    Alkaline Phosphatase 100 (H) 30 - 99 U/L    Total Bilirubin 0.3 0.1 - 1.5 mg/dL    Albumin 2.6 (L) 3.2 - 4.9 g/dL    Total Protein 5.0 (L) 6.0 - 8.2 g/dL    Globulin 2.4 1.9 - 3.5 g/dL    A-G Ratio 1.1 g/dL   ESTIMATED GFR    Collection Time: 02/22/22 12:51 AM   Result Value Ref Range    GFR If  47 (A) >60 mL/min/1.73 m 2    GFR If Non  39 (A) >60 mL/min/1.73 m 2         CT-HEAD W/O   Final Result      1. No CT evidence of acute infarct, hemorrhage or mass.   2. Moderate global parenchymal atrophy. Chronic small vessel ischemic changes.      EC-ECHOCARDIOGRAM COMPLETE W/ CONT   Final Result      US-RENAL   Final Result      1.  No hydronephrosis.   2.  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      DX-CHEST-PORTABLE (1 VIEW)   Final Result      1.  Emphysema.   2.  Small linear scar or residual subsegmental atelectatic change at the right medial lung base where there was previously extensive consolidation and cavitation. (See CT of the chest 5/22/2021)   3.  No acute infiltrates.           MDM (Assessment and Plan):     Patient Active Problem List    Diagnosis Date Noted   • Encephalopathy acute 02/20/2022   • NSTEMI (non-ST elevation myocardial infarction) ([facility]) 02/19/2022   • Acute systolic heart failure ([facility]) 02/19/2022   • YAMIL (acute kidney injury) ([facility]) 02/18/2022   • Protein-calorie malnutrition, severe ([facility]) 02/18/2022   • Hypomagnesemia 02/18/2022   • GI bleed 02/18/2022   • Emphysema lung ([facility]) 02/18/2022   •  History of hemiarthroplasty of right hip 07/27/2021   • Right foot drop 07/27/2021   • Leukocytosis 05/23/2021   • Thrombocytosis 05/23/2021   • Necrotizing pneumonia ([facility]CC) 05/22/2021   • Sepsis (HCC) 05/22/2021   • Smoking 05/22/2021   • Alcohol abuse 05/22/2021   • Acute kidney injury (HCC) 05/22/2021   • Hypotension 05/22/2021   • Hypokalemia 05/22/2021   • Hyponatremia 05/22/2021   • Diarrhea 05/22/2021     This is a 67 yo female who was admitted to the hospital 2/18/22 with ALOC, failure to thrive. Her neighbor checked in on patient and she was sluggish. Stated she has not been eating well for several months. She was found to be dehydrated, YAMIL with electrolyte derangement. Troponin and NT-proBNP elevated; cardiology consulted. Likely the elevated troponin due to YAMIL. Acute heart failure due to stress induced cardiomyopathy due to acute illness. Cardiology recommend heparin gtt. Baby aspirin and trend troponin. She started to have black tarry stools after starting Heparin. Hgb ranged from 7.9-->8.6 (current). Heparin discontinued 2/20/22.      ASSESSMENT:  1. Melena  2. Anemia  3. ALOC  4. Protein Calorie Malnutrition  5. NSTEMI  6. Acute systolic heart failure  7. YAMIL    PLAN:  1. Due to recent NSTEMI and Acute heart failure, recommend not proceeding with endoscopic procedure and treat medically. Would recommend outpatient EGD/colonoscopy when she recovers from acute illness.    2. Recommend Protonix 40mg IV BID  3. Trend Hgb and transfuse >7  4. If Hgb remains stable, may resume Heparin     GI WILL SIGN OFF.PLEASE CALL IF ANY QUESTIONS OR CONCERNS.    Thank your for the opportunity to assist in the care of your patient.  Please call for any questions or concerns.    GI Attending -    Patient seen, examined, chart reviewed and case discussed and plan arrived at with A.P.R.N.  Agree with this note.    Will observe for now and consider endoscopy when and if cardiac issues improve and or acute bleeding  occurs.    Kranthi Fernandez M.D.    NILTON Armas.

## 2022-02-22 NOTE — WOUND TEAM
[facility]  Inpatient Services  Initial Wound and Skin Care Evaluation    Admission Date: 2/18/2022     Last order of IP CONSULT TO WOUND CARE was found on 2/19/2022 from Hospital Encounter on 2/18/2022     HPI, PMH, SH: Reviewed    Past Surgical History:   Procedure Laterality Date   • US-NEEDLE CORE BX-BREAST PANEL Right      Social History     Tobacco Use   • Smoking status: Former Smoker     Packs/day: 0.50     Types: Cigarettes   • Smokeless tobacco: Never Used   Substance Use Topics   • Alcohol use: Not Currently     Comment: Stopped 12 days ago     Chief Complaint   Patient presents with   • ALOC     Pt noted by friends to be disoriented. AOx3, disoriented to situation   • Failure to Thrive     Pt noted to be found in bed, not eating, or drinking. Pt disheveled     Diagnosis: YAMIL (acute kidney injury) (HCC) [N17.9]    Unit where seen by Wound Team: [id number]     WOUND CONSULT/FOLLOW UP RELATED TO:  Sacrum     WOUND HISTORY:  66-year-old female with history of hypertension and smoking presented 2/18 with altered mental status patient has been living by herself and was able to take care of herself however today her neighbors did check on her and she was sluggish, denied any symptoms with no pain however patient states she was not eating well and no appetite, patient was skinny with malnutrition and dehydrated on admission, labs showed YAMIL, with hypokalemia and hypomagnesemia, also hypotension, IV fluid was started with some improvement in her mentation.  Patient lives by herself and her family lives far away, patient did not want me to call family.  Patient is incontinent    WOUND ASSESSMENT/LDA  Wound 02/19/22 Sacrum MASD (Active)   Wound Image   02/21/22 1600   Site Assessment Red;Pink;Painful 02/21/22 1600   Periwound Assessment Pink;Red 02/21/22 1600   Margins Undefined edges;Attached edges 02/21/22 1600   Closure Open to air 02/21/22 1600   Drainage Amount None 02/21/22 1600   Treatments  Cleansed;Site care;Offloading 02/21/22 1600   Wound Cleansing Soap and Water 02/21/22 1600   Periwound Protectant Viscopaste;Stoma Powder;Barrier Paste 02/21/22 1600   Dressing Options Viscopaste;Open to Air 02/21/22 1600   Dressing Changed New 02/21/22 1600   Dressing Status Open to Air 02/21/22 1600   Dressing Change/Treatment Frequency Every Shift, and As Needed 02/21/22 1600   NEXT Dressing Change/Treatment Date 02/21/22 02/21/22 1600   NEXT Weekly Photo (Inpatient Only) 02/28/22 02/21/22 1600   Shape kissing 02/21/22 1600   Wound Odor None 02/21/22 1600   Pulses N/A 02/21/22 1600   Exposed Structures None 02/21/22 1600   Number of days: 2        Vascular:    CAM:   No results found.    Lab Values:    Lab Results   Component Value Date/Time    WBC 9.3 02/21/2022 01:06 AM    RBC 3.00 (L) 02/21/2022 01:06 AM    HEMOGLOBIN 8.6 (L) 02/21/2022 09:07 AM    HEMATOCRIT 26.7 (L) 02/21/2022 09:07 AM        Culture Results show:  No results found for this or any previous visit (from the past 720 hour(s)).    Pain Level/Medicated:  C/o pain       INTERVENTIONS BY WOUND TEAM:  Chart and images reviewed. Discussed with bedside RN. All areas of concern (based on picture review, LDA review and discussion with bedside RN) have been thoroughly assessed. Documentation of areas based on significant findings. This RN in to assess patient. Performed standard wound care which includes appropriate positioning, dressing removal and non-selective debridement. Pictures and measurements obtained weekly if/when required.  Preparation for Dressing removal: Dressing soaked with n/a  Non-selectively Debrided with:  soap and water and gauze.  Sharp debridement: n/a  Debra wound: Cleansed with soap and water, Prepped with barrier paste  Primary Dressing: viscopaste and stoma powder  Secondary (Outer) Dressing: offloading    Interdisciplinary consultation: Patient, Bedside RN , Wound RN Ailin    EVALUATION / RATIONALE FOR TREATMENT:  Most Recent  Date:  2/21/22: RONNIE noted, patient is incontinent and was soiled upon arrival.  Cleaned patient and changed linens.  Barrier cream applied, some discoloration was noted to area near Coccyx, patient is at high risk for development of DTI.  Low air loss bed ordered.       Goals: Steady decrease in wound area and depth weekly.    WOUND TEAM PLAN OF CARE ([X] for frequency of wound follow up,): X  Nursing to follow orders written for wound care. Contact wound team if area fails to progress, deteriorates or with any questions/concerns  Dressing changes by wound team:                   Follow up 3 times weekly:                NPWT change 3 times weekly:     Follow up 1-2 times weekly:      Follow up Bi-Monthly:                   Follow up as needed:   PLease re-consult if area worsens.    Other (explain):     NURSING PLAN OF CARE ORDERS (X):  Dressing changes: See Dressing Care orders: X  Skin care: See Skin Care orders: X  RN Prevention Protocol: X  Rectal tube care: See Rectal Tube Care orders:   Other orders:    RSKIN:   CURRENTLY IN PLACE (X), APPLIED THIS VISIT (A), ORDERED (O):   Q shift Forrest:  X  Q shift pressure point assessments:  X    Surface/Positioning X  Pressure redistribution mattress     X       Low Airloss          Bariatric foam      Bariatric LAURA     Waffle cushion        Waffle Overlay      X    Reposition q 2 hours X     TAPs Turning system     Z Xiang Pillow     Offloading/Redistribution contraindicated  Sacral Mepilex (Silicone dressing)     Heel Mepilex (Silicone dressing)         Heel float boots (Prevalon boot)             Float Heels off Bed with Pillows           Respiratory Room air  Silicone O2 tubing         Gray Foam Ear protectors     Cannula fixation Device (Tender )          High flow offloading Clip    Elastic head band offloading device      Anchorfast                                                         Trach with Optifoam split foam             Containment/Moisture Prevention  X    Rectal tube or BMS    Purwick/Condom Cath     X   Ahumada Catheter    Barrier wipes       X    Barrier paste     X  Antifungal tx      Interdry        Mobilization PATTI      Up to chair        Ambulate      PT/OT      Nutrition PO      Dietician        Diabetes Education      PO     TF     TPN     NPO   # days     Other        Anticipated discharge plans: PATTI  LTACH:        SNF/Rehab:                  Home Health Care:           Outpatient Wound Center:            Self/Family Care:        Other:                  Vac Discharge Needs:   Not Applicable Pt not on a wound vac:   X    Regular Vac while inpatient, alternative dressing at DC:        Regular Vac in use and continued at DC:            Reg. Vac w/ Skin Sub/Biologic in use. Will need to be changed 2x wkly:      Veraflo Vac while inpatient, ok to transition to Regular Vac on Discharge:           Veraflo Vac while inpatient, will need to remain on Veraflo Vac upon discharge:

## 2022-02-22 NOTE — RESPIRATORY CARE
COPD EDUCATION by COPD CLINICAL EDUCATOR  2/21/2022 at 4:19 PM by Autumn Lorenzana, RRT     Patient interviewed reviewed by COPD education team. Patient does not have a history or diagnosis of COPD and is a non-smoker.  Therefore, patient does not qualify for the COPD program.

## 2022-02-22 NOTE — CARE PLAN
The patient is Stable - Low risk of patient condition declining or worsening    Shift Goals  Clinical Goals: Pt will remain free from falls. Pt will be hemodynamically stable.  Patient Goals: Go home  Family Goals: PATTI    Progress made toward(s) clinical / shift goals:    Problem: Hemodynamics  Goal: Patient's hemodynamics, fluid balance and neurologic status will be stable or improve  Outcome: Progressing     Problem: Fluid Volume  Goal: Fluid volume balance will be maintained  Outcome: Progressing       Patient is not progressing towards the following goals:

## 2022-02-22 NOTE — DIETARY
Nutrition Services:  D/t high risk for refeeding, cont to monitor magnesium and phosphorus in addition to other labs.  Strongly consider addition of continued PO thiamine.  RD(s) continue to follow

## 2022-02-22 NOTE — DISCHARGE PLANNING
HTH/SCP TCN chart review completed. Collaborated with Kinjal SAEZ. Patient not seen at bedside as she is oriented to self only and unable to effectively communicate with re: POC/dc planning. Discussed pt with Kinjal SAEZ who notes that palliative is attempting to see pt/speak with family today re: GOC, code status, etc. Note that PT and OT recommending post acute placement. However as pt lives alone and current cognitive status may be new baseline, appears that pt will likely eventually need long term care situation as she appears generally unable to care for self PTA. Will await palliative recs and note that this may be situation requiring more complex dc planning/guardianship issues and will continue to monitor.  TCN will continue to follow and collaborate with discharge planning team as additional post acute needs arise. Thank you.    Previously completed:  - Orders received for: PT/OT  - Choice forms:  None as pt unable to effectively discuss dc planning/choice given current (baseline?) cognition; awaiting palliative discussion with pt/family for possible GOC/POC with re: eventual dc   - GSC introduced (/N), referral ( not sent).

## 2022-02-23 ENCOUNTER — HOSPICE ADMISSION (OUTPATIENT)
Dept: HOSPICE | Facility: HOSPICE | Age: 67
End: 2022-02-23
Payer: MEDICARE

## 2022-02-23 LAB
ALBUMIN SERPL BCP-MCNC: 2.7 G/DL (ref 3.2–4.9)
ALBUMIN/GLOB SERPL: 1.2 G/DL
ALP SERPL-CCNC: 97 U/L (ref 30–99)
ALT SERPL-CCNC: 6 U/L (ref 2–50)
ANION GAP SERPL CALC-SCNC: 16 MMOL/L (ref 7–16)
AST SERPL-CCNC: 17 U/L (ref 12–45)
BACTERIA BLD CULT: NORMAL
BACTERIA BLD CULT: NORMAL
BASOPHILS # BLD AUTO: 0.5 % (ref 0–1.8)
BASOPHILS # BLD: 0.04 K/UL (ref 0–0.12)
BILIRUB SERPL-MCNC: 0.3 MG/DL (ref 0.1–1.5)
BUN SERPL-MCNC: 57 MG/DL (ref 8–22)
CALCIUM SERPL-MCNC: 8.3 MG/DL (ref 8.5–10.5)
CHLORIDE SERPL-SCNC: 112 MMOL/L (ref 96–112)
CO2 SERPL-SCNC: 17 MMOL/L (ref 20–33)
CREAT SERPL-MCNC: 1.38 MG/DL (ref 0.5–1.4)
EOSINOPHIL # BLD AUTO: 0.22 K/UL (ref 0–0.51)
EOSINOPHIL NFR BLD: 2.9 % (ref 0–6.9)
ERYTHROCYTE [DISTWIDTH] IN BLOOD BY AUTOMATED COUNT: 53.8 FL (ref 35.9–50)
GLOBULIN SER CALC-MCNC: 2.3 G/DL (ref 1.9–3.5)
GLUCOSE SERPL-MCNC: 109 MG/DL (ref 65–99)
HCT VFR BLD AUTO: 22.2 % (ref 37–47)
HCT VFR BLD AUTO: 24.2 % (ref 37–47)
HGB BLD-MCNC: 7.2 G/DL (ref 12–16)
HGB BLD-MCNC: 7.7 G/DL (ref 12–16)
IMM GRANULOCYTES # BLD AUTO: 0.03 K/UL (ref 0–0.11)
IMM GRANULOCYTES NFR BLD AUTO: 0.4 % (ref 0–0.9)
LYMPHOCYTES # BLD AUTO: 0.66 K/UL (ref 1–4.8)
LYMPHOCYTES NFR BLD: 8.6 % (ref 22–41)
MAGNESIUM SERPL-MCNC: 1 MG/DL (ref 1.5–2.5)
MCH RBC QN AUTO: 29.5 PG (ref 27–33)
MCHC RBC AUTO-ENTMCNC: 32.4 G/DL (ref 33.6–35)
MCV RBC AUTO: 91 FL (ref 81.4–97.8)
MONOCYTES # BLD AUTO: 0.45 K/UL (ref 0–0.85)
MONOCYTES NFR BLD AUTO: 5.9 % (ref 0–13.4)
NEUTROPHILS # BLD AUTO: 6.29 K/UL (ref 2–7.15)
NEUTROPHILS NFR BLD: 81.7 % (ref 44–72)
NRBC # BLD AUTO: 0 K/UL
NRBC BLD-RTO: 0 /100 WBC
PHOSPHATE SERPL-MCNC: 5.4 MG/DL (ref 2.5–4.5)
PLATELET # BLD AUTO: 187 K/UL (ref 164–446)
PMV BLD AUTO: 11.9 FL (ref 9–12.9)
POTASSIUM SERPL-SCNC: 3.4 MMOL/L (ref 3.6–5.5)
PROT SERPL-MCNC: 5 G/DL (ref 6–8.2)
PTH-INTACT SERPL-MCNC: 31.2 PG/ML (ref 14–72)
RBC # BLD AUTO: 2.44 M/UL (ref 4.2–5.4)
SIGNIFICANT IND 70042: NORMAL
SIGNIFICANT IND 70042: NORMAL
SITE SITE: NORMAL
SITE SITE: NORMAL
SODIUM SERPL-SCNC: 145 MMOL/L (ref 135–145)
SOURCE SOURCE: NORMAL
SOURCE SOURCE: NORMAL
WBC # BLD AUTO: 7.7 K/UL (ref 4.8–10.8)

## 2022-02-23 PROCEDURE — 83735 ASSAY OF MAGNESIUM: CPT

## 2022-02-23 PROCEDURE — 700111 HCHG RX REV CODE 636 W/ 250 OVERRIDE (IP): Performed by: NURSE PRACTITIONER

## 2022-02-23 PROCEDURE — C9113 INJ PANTOPRAZOLE SODIUM, VIA: HCPCS | Performed by: NURSE PRACTITIONER

## 2022-02-23 PROCEDURE — 84100 ASSAY OF PHOSPHORUS: CPT

## 2022-02-23 PROCEDURE — A9270 NON-COVERED ITEM OR SERVICE: HCPCS | Performed by: HOSPITALIST

## 2022-02-23 PROCEDURE — 700102 HCHG RX REV CODE 250 W/ 637 OVERRIDE(OP): Performed by: HOSPITALIST

## 2022-02-23 PROCEDURE — 97530 THERAPEUTIC ACTIVITIES: CPT | Mod: CQ

## 2022-02-23 PROCEDURE — 700111 HCHG RX REV CODE 636 W/ 250 OVERRIDE (IP): Performed by: STUDENT IN AN ORGANIZED HEALTH CARE EDUCATION/TRAINING PROGRAM

## 2022-02-23 PROCEDURE — 85025 COMPLETE CBC W/AUTO DIFF WBC: CPT

## 2022-02-23 PROCEDURE — 85018 HEMOGLOBIN: CPT

## 2022-02-23 PROCEDURE — 770001 HCHG ROOM/CARE - MED/SURG/GYN PRIV*

## 2022-02-23 PROCEDURE — 80053 COMPREHEN METABOLIC PANEL: CPT

## 2022-02-23 PROCEDURE — 36415 COLL VENOUS BLD VENIPUNCTURE: CPT

## 2022-02-23 PROCEDURE — 85014 HEMATOCRIT: CPT

## 2022-02-23 PROCEDURE — 700102 HCHG RX REV CODE 250 W/ 637 OVERRIDE(OP): Performed by: STUDENT IN AN ORGANIZED HEALTH CARE EDUCATION/TRAINING PROGRAM

## 2022-02-23 PROCEDURE — 700105 HCHG RX REV CODE 258: Performed by: STUDENT IN AN ORGANIZED HEALTH CARE EDUCATION/TRAINING PROGRAM

## 2022-02-23 PROCEDURE — 99232 SBSQ HOSP IP/OBS MODERATE 35: CPT | Performed by: STUDENT IN AN ORGANIZED HEALTH CARE EDUCATION/TRAINING PROGRAM

## 2022-02-23 PROCEDURE — A9270 NON-COVERED ITEM OR SERVICE: HCPCS | Performed by: STUDENT IN AN ORGANIZED HEALTH CARE EDUCATION/TRAINING PROGRAM

## 2022-02-23 RX ORDER — POTASSIUM CHLORIDE 20 MEQ/1
40 TABLET, EXTENDED RELEASE ORAL ONCE
Status: COMPLETED | OUTPATIENT
Start: 2022-02-23 | End: 2022-02-23

## 2022-02-23 RX ORDER — MORPHINE SULFATE 10 MG/ML
5 INJECTION, SOLUTION INTRAMUSCULAR; INTRAVENOUS
Status: DISCONTINUED | OUTPATIENT
Start: 2022-02-23 | End: 2022-02-24

## 2022-02-23 RX ORDER — ACETAMINOPHEN 650 MG/1
650 SUPPOSITORY RECTAL EVERY 4 HOURS PRN
Status: DISCONTINUED | OUTPATIENT
Start: 2022-02-23 | End: 2022-02-24

## 2022-02-23 RX ORDER — ONDANSETRON 2 MG/ML
8 INJECTION INTRAMUSCULAR; INTRAVENOUS EVERY 8 HOURS PRN
Status: DISCONTINUED | OUTPATIENT
Start: 2022-02-23 | End: 2022-02-24

## 2022-02-23 RX ORDER — SODIUM CHLORIDE, SODIUM LACTATE, POTASSIUM CHLORIDE, AND CALCIUM CHLORIDE .6; .31; .03; .02 G/100ML; G/100ML; G/100ML; G/100ML
250 INJECTION, SOLUTION INTRAVENOUS ONCE
Status: COMPLETED | OUTPATIENT
Start: 2022-02-23 | End: 2022-02-24

## 2022-02-23 RX ORDER — ATROPINE SULFATE 10 MG/ML
2 SOLUTION/ DROPS OPHTHALMIC EVERY 4 HOURS PRN
Status: DISCONTINUED | OUTPATIENT
Start: 2022-02-23 | End: 2022-02-24

## 2022-02-23 RX ORDER — MORPHINE SULFATE 10 MG/ML
10 INJECTION, SOLUTION INTRAMUSCULAR; INTRAVENOUS
Status: DISCONTINUED | OUTPATIENT
Start: 2022-02-23 | End: 2022-02-24

## 2022-02-23 RX ORDER — LORAZEPAM 2 MG/ML
1 INJECTION INTRAMUSCULAR
Status: DISCONTINUED | OUTPATIENT
Start: 2022-02-23 | End: 2022-02-24

## 2022-02-23 RX ORDER — POLYVINYL ALCOHOL 14 MG/ML
2 SOLUTION/ DROPS OPHTHALMIC EVERY 6 HOURS PRN
Status: DISCONTINUED | OUTPATIENT
Start: 2022-02-23 | End: 2022-02-24

## 2022-02-23 RX ORDER — ACETAMINOPHEN 325 MG/1
650 TABLET ORAL EVERY 4 HOURS PRN
Status: DISCONTINUED | OUTPATIENT
Start: 2022-02-23 | End: 2022-02-24

## 2022-02-23 RX ORDER — LORAZEPAM 2 MG/ML
1 CONCENTRATE ORAL
Status: DISCONTINUED | OUTPATIENT
Start: 2022-02-23 | End: 2022-02-24

## 2022-02-23 RX ORDER — DOCUSATE SODIUM 100 MG/1
100 CAPSULE, LIQUID FILLED ORAL EVERY 12 HOURS
Status: DISCONTINUED | OUTPATIENT
Start: 2022-02-23 | End: 2022-02-24

## 2022-02-23 RX ORDER — MAGNESIUM SULFATE HEPTAHYDRATE 40 MG/ML
4 INJECTION, SOLUTION INTRAVENOUS ONCE
Status: COMPLETED | OUTPATIENT
Start: 2022-02-23 | End: 2022-02-23

## 2022-02-23 RX ORDER — SODIUM CHLORIDE, SODIUM LACTATE, POTASSIUM CHLORIDE, AND CALCIUM CHLORIDE .6; .31; .03; .02 G/100ML; G/100ML; G/100ML; G/100ML
250 INJECTION, SOLUTION INTRAVENOUS ONCE
Status: DISCONTINUED | OUTPATIENT
Start: 2022-02-23 | End: 2022-02-23

## 2022-02-23 RX ORDER — ONDANSETRON 4 MG/1
8 TABLET, ORALLY DISINTEGRATING ORAL EVERY 8 HOURS PRN
Status: DISCONTINUED | OUTPATIENT
Start: 2022-02-23 | End: 2022-02-24

## 2022-02-23 RX ADMIN — POTASSIUM CHLORIDE 40 MEQ: 1500 TABLET, EXTENDED RELEASE ORAL at 08:41

## 2022-02-23 RX ADMIN — MIDODRINE HYDROCHLORIDE 5 MG: 5 TABLET ORAL at 08:41

## 2022-02-23 RX ADMIN — MAGNESIUM SULFATE HEPTAHYDRATE 4 G: 40 INJECTION, SOLUTION INTRAVENOUS at 08:41

## 2022-02-23 RX ADMIN — PANTOPRAZOLE SODIUM 40 MG: 40 INJECTION, POWDER, LYOPHILIZED, FOR SOLUTION INTRAVENOUS at 05:03

## 2022-02-23 RX ADMIN — MIDODRINE HYDROCHLORIDE 5 MG: 5 TABLET ORAL at 10:02

## 2022-02-23 RX ADMIN — DIBASIC SODIUM PHOSPHATE, MONOBASIC POTASSIUM PHOSPHATE AND MONOBASIC SODIUM PHOSPHATE 500 MG: 852; 155; 130 TABLET ORAL at 05:03

## 2022-02-23 RX ADMIN — SODIUM CHLORIDE, POTASSIUM CHLORIDE, SODIUM LACTATE AND CALCIUM CHLORIDE 250 ML: 600; 310; 30; 20 INJECTION, SOLUTION INTRAVENOUS at 08:41

## 2022-02-23 ASSESSMENT — ENCOUNTER SYMPTOMS
FEVER: 0
ABDOMINAL PAIN: 0
MEMORY LOSS: 1
DIZZINESS: 0
CHILLS: 0
DIAPHORESIS: 0
NEUROLOGICAL NEGATIVE: 1
VOMITING: 0
RESPIRATORY NEGATIVE: 1
GASTROINTESTINAL NEGATIVE: 1
CARDIOVASCULAR NEGATIVE: 1
NERVOUS/ANXIOUS: 1
MUSCULOSKELETAL NEGATIVE: 1
NAUSEA: 0
SHORTNESS OF BREATH: 0
EYE REDNESS: 1

## 2022-02-23 ASSESSMENT — COGNITIVE AND FUNCTIONAL STATUS - GENERAL
MOVING FROM LYING ON BACK TO SITTING ON SIDE OF FLAT BED: A LOT
STANDING UP FROM CHAIR USING ARMS: A LITTLE
MOVING TO AND FROM BED TO CHAIR: A LITTLE
TURNING FROM BACK TO SIDE WHILE IN FLAT BAD: A LITTLE
CLIMB 3 TO 5 STEPS WITH RAILING: A LOT
SUGGESTED CMS G CODE MODIFIER MOBILITY: CK
WALKING IN HOSPITAL ROOM: A LOT
MOBILITY SCORE: 15

## 2022-02-23 ASSESSMENT — PAIN DESCRIPTION - PAIN TYPE: TYPE: ACUTE PAIN

## 2022-02-23 ASSESSMENT — GAIT ASSESSMENTS: GAIT LEVEL OF ASSIST: UNABLE TO PARTICIPATE

## 2022-02-23 ASSESSMENT — LIFESTYLE VARIABLES: SUBSTANCE_ABUSE: 1

## 2022-02-23 NOTE — PROGRESS NOTES
Pt had an incontinent urinary episode, this RN and her CCT went in separately trying to clean pt. Pt refused both times. We will continue to monitor and try again shortly.

## 2022-02-23 NOTE — PROGRESS NOTES
Hospital Medicine Daily Progress Note    Date of Service  2/23/2022    Chief Complaint  Jaymie Peacock is a 66 y.o. female admitted 2/18/2022 with altered mental status    Hospital Course  66-year-old female with history of hypertension and smoking presented 2/18 with altered mental status patient has been living by herself and was able to take care of herself however today her neighbors did check on her and she was sluggish, denied any symptoms with no pain however patient states she was not eating well and no appetite, patient was skinny with malnutrition and dehydrated on admission, labs showed YAMIL, with hypokalemia and hypomagnesemia, also hypotension, IV fluid was started with some improvement in her mentation.  Patient lives by herself and her family lives far away, patient did not want me to call family.  The patient apparently with chronic alcoholism, states she lives alone, appears to have longer period of poor p.o. intake, anorexia, still quite confused and poor historian.     2/20 the patient slowly improving, her mentation is still limited, ANO times 1-2 initially but she can be reoriented, noted to have dark stools with positive occult blood, poor p.o. intake, and patient refuses regular meals, drinks fluids, follow-up H&H without further drop in hemoglobin, electrolytes being replaced, renal function improving, significant malnutrition, U tox negative    2/19 electrolytes replaced, will recheck.  Concerning troponin at 248, repeat 473. Values may be inflated in the setting of acute renal failure. EKG without evidence of acute ischemia. Empiric full dose ASA, high intensity statin, beta blocker ordered. Transferred for telemetry monitoring in the setting of probable NSTEMI. Cardiology consulted and Dr. Norris evaluating, appreciate assistance.  ARF improving with IV fluids.    2/22/2022  I had an extensive conversation with patient's niece (next of kin) Tana regarding patient's clinical condition which  involved NSTEMI, acute renal failure, altered mental status, and gastrointestinal bleed. Discussed code status as well as hospice. Tana reporting that she has been noticing a decline in her and regarding her mentation prior to being in the hospital.  Tana voiced understanding and would like to discuss this with other family members as well as talk to her aunt prior to making decision. Will discuss on AM.    Interval Problem Update  Patient seen and examined today  AAOx1 to self  Vital signs with tachycardia in the 120s and hypotension  Palliative care following, palliative care team reached out to patient's needs Tana who reported that she discuss patient's condition with family members were all in agreement with initiating comfort care as this is what Jaymie would want.  Additionally, Tana was able to speak with Jaymie via video phone call today.  During which palliative care team as Jaymie which she want and Tana reporting that Rene stated that she does not want to continue medical treatment and does not want to get poked in the morning for labs. Tana confirmed her request of initiating comfort care as this is what Jaymie would want.  All questions were answered  Comfort care order set placed  Hospice referral place    I have personally seen and examined the patient at bedside. I discussed the plan of care with patient, bedside RN, charge RN and cardiology.    Consultants/Specialty  cardiology and GI    Code Status  Comfort Care/DNR    Disposition  Patient is medically cleared for discharge.   Anticipate discharge to Hospice    Review of Systems  Review of Systems   Constitutional: Positive for malaise/fatigue. Negative for chills, diaphoresis and fever.   HENT: Negative.    Eyes: Positive for redness.   Respiratory: Negative.  Negative for shortness of breath.    Cardiovascular: Negative.  Negative for chest pain and leg swelling.   Gastrointestinal: Negative.  Negative for abdominal pain, nausea and  vomiting.   Genitourinary: Negative.    Musculoskeletal: Negative.    Skin: Negative.    Neurological: Negative.  Negative for dizziness.   Endo/Heme/Allergies: Negative.    Psychiatric/Behavioral: Positive for memory loss and substance abuse. The patient is nervous/anxious.    All other systems reviewed and are negative.       Physical Exam  Temp:  [36.3 °C (97.4 °F)-37 °C (98.6 °F)] 37 °C (98.6 °F)  Pulse:  [105-123] 123  Resp:  [18] 18  BP: (90-98)/(61-70) 94/65  SpO2:  [95 %-97 %] 95 %    Patient comfort care as of 2/23/2022    Fluids  No intake or output data in the 24 hours ending 02/23/22 1520    Laboratory  Recent Labs     02/21/22  0106 02/21/22  0907 02/22/22  0051 02/22/22  1218 02/22/22  1650 02/23/22  0104 02/23/22  0857   WBC 9.3  --  8.5  --   --  7.7  --    RBC 3.00*  --  2.45*  --   --  2.44*  --    HEMOGLOBIN 8.9*   < > 7.4*   < > 7.5* 7.2* 7.7*   HEMATOCRIT 26.9*   < > 21.9*   < > 22.8* 22.2* 24.2*   MCV 89.7  --  89.4  --   --  91.0  --    MCH 29.7  --  30.2  --   --  29.5  --    MCHC 33.1*  --  33.8  --   --  32.4*  --    RDW 52.3*  --  51.8*  --   --  53.8*  --    PLATELETCT 268  --  213  --   --  187  --    MPV 11.5  --  11.5  --   --  11.9  --     < > = values in this interval not displayed.     Recent Labs     02/21/22  0106 02/22/22  0051 02/23/22  0104   SODIUM 145 147* 145   POTASSIUM 3.4* 3.8 3.4*   CHLORIDE 115* 116* 112   CO2 15* 17* 17*   GLUCOSE 96 100* 109*   BUN 92* 72* 57*   CREATININE 1.32 1.35 1.38   CALCIUM 9.3 8.9 8.3*                   Imaging  CT-HEAD W/O   Final Result      1. No CT evidence of acute infarct, hemorrhage or mass.   2. Moderate global parenchymal atrophy. Chronic small vessel ischemic changes.      EC-ECHOCARDIOGRAM COMPLETE W/ CONT   Final Result      US-RENAL   Final Result      1.  No hydronephrosis.   2.  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      DX-CHEST-PORTABLE (1 VIEW)  "  Final Result      1.  Emphysema.   2.  Small linear scar or residual subsegmental atelectatic change at the right medial lung base where there was previously extensive consolidation and cavitation. (See CT of the chest 5/22/2021)   3.  No acute infiltrates.         I have personally interpreted the EKG dated 2/19/22 08:20am. Sinus tachycardia with rate 100, no ST elevation or depression. QTc 421 wnl.    Assessment/Plan  * Acute systolic heart failure (HCC)- (present on admission)  Assessment & Plan  Poor prognosis  Patient comfort care as of 2/23/2022  Discontinue IV hydration  No morning or vitals labs  Hospice referral    Encephalopathy acute- (present on admission)  Assessment & Plan  Poor prognosis  Patient comfort care as of 2/23/2022  Discontinue IV hydration  No morning or vitals labs  Hospice referral    GI bleed  Assessment & Plan  Patient comfort care as of 2/23/2022  Discontinue IV hydration  No morning or vitals labs  Hospice referral    YAMIL (acute kidney injury) ([facility])- (present on admission)  Assessment & Plan  Patient comfort care as of 2/23/2022  Discontinue IV hydration  No morning or vitals labs  Hospice referral    Hypotension- (present on admission)  Assessment & Plan  Patient comfort care as of 2/23/2022  Discontinue IV hydration  No morning or vitals labs  Hospice referral      Alcohol abuse- (present on admission)  Assessment & Plan  Recent history of heavy use. Patient reports quitting \"a couple of weeks\" prior to admission.  Continue thiamine supplementation.  Will Check vitamin B12.    NSTEMI (non-ST elevation myocardial infarction) ([facility])- (present on admission)  Assessment & Plan  Poor prognosis  Patient comfort care as of 2/23/2022  Discontinue IV hydration  No morning or vitals labs  Hospice referral    Hypomagnesemia- (present on admission)  Assessment & Plan  Patient comfort care as of 2/23/2022  Discontinue IV hydration  No morning or vitals labs  Hospice referral    Protein-calorie " malnutrition, severe (HCC)- (present on admission)  Assessment & Plan  Patient comfort care as of 2/23/2022  Discontinue IV hydration  No morning or vitals labs  Hospice referral    Hypokalemia- (present on admission)  Assessment & Plan  Patient comfort care as of 2/23/2022  Discontinue IV hydration  No morning or vitals labs  Hospice referral    Emphysema lung (HCC)  Assessment & Plan  Patient comfort care as of 2/23/2022  Discontinue IV hydration  No morning or vitals labs  Hospice referral      VTE prophylaxis: No DVT prophylaxis, patient, current as of 2/23/2022      I have performed a physical exam and reviewed and updated ROS and Plan today (2/23/2022). In review of yesterday's note (2/22/2022), there are no changes except as documented above.

## 2022-02-23 NOTE — PROGRESS NOTES
Bedside report received from day RN, pt care assumed, assessment completed. Pt is A&O 2, pt denies current pain, pt given blankets for warmth/comfort. Updated on POC, questions answered. Bed in lowest, locked position, treaded socks on, call light and belongings within reach. Fall precautions in place.

## 2022-02-23 NOTE — CARE PLAN
Problem: Nutritional:  Goal: Achieve adequate nutritional intake  Description: Patient will consume ~50% of small meals and Boost supplements.   Outcome: Not Progressing

## 2022-02-23 NOTE — HEART FAILURE PROGRAM
Patient with a new diagnosis of heart failure with reduced ejection fraction (HFrEF) in the setting of malnutrition and ETOH abuse.     EF is 35%  EF less than 35% for greater than 3 months: no, new dx  Residence: [place]  Insurance: [facility] Eligibility: yes sent  Indication on facesheet for Hydralazine Hydrochloride/Isosorbide Dinitrate? No    Discharge notes indicate that patient will likely need long term care also that palliative are consulted. Also, that patient was not caring for herself PTA and that she is still not cognitively intact. Due to all of this, it is very likely that patient will not discharge any time soon. I've asked schedulers to keep an eye for the need to schedule a HF f/u.    Pneumococcal vaccine: 2020  Influenza vaccine: MISSING  Tobacco Status: active  Cessation counseling: MISSING    DM dx: no  Atrial arrhythmia dx: no    Nurses: HF has been added as a title to the education tab and to the care plan. Would you please take credit for the great work that you're doing by documenting in these? Thank you!    Providers: below are Guideline Directed Medical Therapy (GDMT) for HFrEF. If any cannot be prescribed by discharge, would you please note the clinical reason for each in your discharge summary? Thanks! Adela  • Evidence Based Beta Blocker (bisoprolol, carvedilol, or toprol xl), for EF of 40% or less  • JANEE - I, for EF of 40% or less ARNI is preferred If not cost prohibitive for patient  • SGLT2 inhibitor with proven ASCVD, HF, or DKD benefit (canagliflozin, dapagliflozin, or empagliflozin) If not cost prohibitive for patient  • Aldosterone antagonist, for EF of 35% or less, if applicable  • Anticoagulation for atrial arrhythmia, if applicable  • Glycemic control for DM + HF, if applicable  • Lipid lowering medication for DM + HF, if applicable  • Hydralazine Hydrochloride/Isosorbide Dinitrate. The combination of hydralazine and isosorbide- dinitrate is  recommended to reduce morbidity and mortality for patients self-described  Americans with NYHA class III-IV HFrEF (EF 40% or less), receiving optimal therapy with ACE inhibitors and beta blockers, unless contraindicated (Class I, DENIZ: A).  • Pneumococcal vaccine, if not previously received  • Influenza vaccine for current season, if not previously received  • Smoking cessation counseling documented, if applicable  • Device screening, if applicable  • Referral to disease management program specializing in heart failure care- requested that schedulers notify me if patient cannot be scheduled at the [facility]

## 2022-02-23 NOTE — DIETARY
Nutrition Services: Update   Day 5 of admit.  Jaymie Peacock is a 66 y.o. female with admitting DX of YAMIL.    Pt is currently on full liquids no red diet. PO intake is mostly <25% of meals with 1 meal refused and best meal was once with 25-50% eaten. Pt does have Boost Plus ordered with meals. Wt 2/22: 50 kg via bed scale - weights have been fluctuating from 46.8 kg on 2/20 to weight of 50 kg and 54.2 kg on 2/22. Likely inaccurate. Pt has moisture wound to sacrum. No edema noted. Pt is on RA. Palliative care following for GOC discussions, ongoing. No POLST on file. RD is monitoring daily for high refeeding risk. K 3.4, Phos 5.4, Mag 1.0. No repletion currently on MAR, will discuss with MD.    Malnutrition Risk: Severe malnutrition noted by RD on 2/20.    Recommendations/Plan:  1. Diet per MD, advance diet as appropriate to liberalized Regular diet. Continue Boost Plus with meals.  2. Pending GOC discussions if aggressive treatment wanted, consider nutrition support with cortrak and TF.  3. Replace electrolytes as needed, will discuss with MD as K/Mag are low.    RD following.

## 2022-02-23 NOTE — CARE PLAN
The patient is Watcher - Medium risk of patient condition declining or worsening    Shift Goals  Clinical Goals: Safety/Monitor labs  Patient Goals: Safety  Family Goals: n/a    Progress made toward(s) clinical / shift goals:    Problem: Hemodynamics  Goal: Patient's hemodynamics, fluid balance and neurologic status will be stable or improve  Outcome: Progressing     Problem: Skin Integrity  Goal: Skin integrity is maintained or improved  Outcome: Progressing       Pts VS/Labs are being monitored closely as well as the pt's skin integrity. Wound care is being done every shift on sacrum.    Patient is not progressing towards the following goals:      Problem: Knowledge Deficit - Standard  Goal: Patient and family/care givers will demonstrate understanding of plan of care, disease process/condition, diagnostic tests and medications  Outcome: Not Progressing      Pt unable to comprehend plan of care at this present time.

## 2022-02-23 NOTE — DISCHARGE PLANNING
HTH/SCP TCN chart review completed. Collaborated with Kinjal SAEZ. Pt essentially oriented x1 and unable to effectively communicate with re: dc planning, choice, etc. Note per palliative: Dr. Graham to provide clinical update to pt's marcellus Barrett.  (Palliative) Will then follow up to discuss goals of care and plan of care including code status. Will continue to monitor situation. As noted prior, anticipate pt would need more of long term care situation, even if she was to dc to SNF/rehab prior. TCN will continue to follow and collaborate with discharge planning team as additional post acute needs arise. Thank you.    Previously completed:  - Orders received for: PT and OT -> rec post acute placement  - Choice forms: none as awaiting final GOC/POC dependent on final outcome with palliative/medical POC  - GSC introduced (N), referral (not sent).

## 2022-02-23 NOTE — PROGRESS NOTES
[facility] Medicine Daily Progress Note    Date of Service  2/22/2022    Chief Complaint  Jaymie Peacock is a 66 y.o. female admitted 2/18/2022 with altered mental status    Hospital Course  66-year-old female with history of hypertension and smoking presented 2/18 with altered mental status patient has been living by herself and was able to take care of herself however today her neighbors did check on her and she was sluggish, denied any symptoms with no pain however patient states she was not eating well and no appetite, patient was skinny with malnutrition and dehydrated on admission, labs showed YAMIL, with hypokalemia and hypomagnesemia, also hypotension, IV fluid was started with some improvement in her mentation.  Patient lives by herself and her family lives far away, patient did not want me to call family.  The patient apparently with chronic alcoholism, states she lives alone, appears to have longer period of poor p.o. intake, anorexia, still quite confused and poor historian.     2/20 the patient slowly improving, her mentation is still limited, ANO times 1-2 initially but she can be reoriented, noted to have dark stools with positive occult blood, poor p.o. intake, and patient refuses regular meals, drinks fluids, follow-up H&H without further drop in hemoglobin, electrolytes being replaced, renal function improving, significant malnutrition, U tox negative  2/19 electrolytes replaced, will recheck.  Concerning troponin at 248, repeat 473. Values may be inflated in the setting of acute renal failure. EKG without evidence of acute ischemia. Empiric full dose ASA, high intensity statin, beta blocker ordered. Transferred for telemetry monitoring in the setting of probable NSTEMI. Cardiology consulted and Dr. Norris evaluating, appreciate assistance.  ARF improving with IV fluids.    Interval Problem Update  Patient seen and examined today.   AAOx1 to self  Troponin trending down  Kidney function improving  slowly  Head CT showing moderate global parenchymal atrophy chronic small vessel ischemic changes, likely vascular dementia  IV hydration for dehydration/hypernatremia  Cardiology signed off. Recommended to continue medical management.  Gastroenterology signed off.  Recommend to continue medical management  Palliative care following, appreciate recommendations    I had an extensive conversation with patient's niece (next of kin) Tana regarding patient's clinical condition which involved NSTEMI, acute renal failure, altered mental status, and gastrointestinal bleed. Discussed code status as well as hospice. Tana reporting that she has been noticing a decline in her and regarding her mentation prior to being in the hospital.  Tana voiced understanding and would like to discuss this with other family members as well as talk to her aunt prior to making decision. Will discuss on AM.    I have personally seen and examined the patient at bedside. I discussed the plan of care with patient, bedside RN, charge RN and cardiology.    Consultants/Specialty  cardiology and GI    Code Status  Full Code    Disposition  Patient is not medically cleared for discharge.   Anticipate discharge to TBD. I have placed the appropriate orders for post-discharge needs.    Review of Systems  Review of Systems   Constitutional: Positive for malaise/fatigue. Negative for chills, diaphoresis and fever.   HENT: Negative.    Eyes: Positive for redness.   Respiratory: Negative.  Negative for shortness of breath.    Cardiovascular: Negative.  Negative for chest pain and leg swelling.   Gastrointestinal: Negative.  Negative for abdominal pain, nausea and vomiting.   Genitourinary: Negative.    Musculoskeletal: Negative.    Skin: Negative.    Neurological: Negative.  Negative for dizziness.   Endo/Heme/Allergies: Negative.    Psychiatric/Behavioral: Positive for memory loss and substance abuse. The patient is nervous/anxious.    All other systems  reviewed and are negative.       Physical Exam  Temp:  [35.8 °C (96.5 °F)-36.3 °C (97.4 °F)] 36.3 °C (97.4 °F)  Pulse:  [] 105  Resp:  [15-18] 18  BP: (86-98)/(48-85) 90/61  SpO2:  [95 %-97 %] 97 %    Physical Exam  Vitals and nursing note reviewed.   Constitutional:       General: She is not in acute distress.     Appearance: She is underweight.      Comments: Elderly female, emaciated, cachectic, poorly kempt, looks older than stated age   HENT:      Head: Normocephalic and atraumatic.      Comments: Temporal wasting bilaterally     Right Ear: External ear normal.      Left Ear: External ear normal.      Nose: Nose normal. No congestion.      Mouth/Throat:      Mouth: Mucous membranes are dry.      Pharynx: Oropharynx is clear.   Eyes:      General: No scleral icterus.     Conjunctiva/sclera: Conjunctivae normal.   Cardiovascular:      Rate and Rhythm: Normal rate and regular rhythm.      Heart sounds: No murmur heard.  Pulmonary:      Breath sounds: Rhonchi present. No wheezing or rales.   Abdominal:      General: There is no distension.      Palpations: Abdomen is soft.      Tenderness: There is no abdominal tenderness. There is no guarding.   Musculoskeletal:      Cervical back: Neck supple. No rigidity.      Right lower leg: No edema.      Left lower leg: No edema.   Skin:     General: Skin is warm and dry.      Coloration: Skin is pale.      Findings: Bruising and rash present.   Neurological:      General: No focal deficit present.      Mental Status: She is alert. She is disoriented and confused.   Psychiatric:         Mood and Affect: Mood is depressed. Affect is flat.         Behavior: Behavior is cooperative.         Fluids    Intake/Output Summary (Last 24 hours) at 2/22/2022 1943  Last data filed at 2/22/2022 0800  Gross per 24 hour   Intake 0 ml   Output --   Net 0 ml       Laboratory  Recent Labs     02/20/22  0120 02/20/22  1230 02/21/22  0106 02/21/22  0907 02/22/22  0051 02/22/22  1218  02/22/22  1650   WBC 6.7  --  9.3  --  8.5  --   --    RBC 2.60*  --  3.00*  --  2.45*  --   --    HEMOGLOBIN 7.9*   < > 8.9*   < > 7.4* 7.6* 7.5*   HEMATOCRIT 23.1*   < > 26.9*   < > 21.9* 23.1* 22.8*   MCV 88.8  --  89.7  --  89.4  --   --    MCH 30.4  --  29.7  --  30.2  --   --    MCHC 34.2  --  33.1*  --  33.8  --   --    RDW 52.5*  --  52.3*  --  51.8*  --   --    PLATELETCT 234  --  268  --  213  --   --    MPV 10.9  --  11.5  --  11.5  --   --     < > = values in this interval not displayed.     Recent Labs     02/20/22  0120 02/21/22  0106 02/22/22  0051   SODIUM 140 145 147*   POTASSIUM 3.3* 3.4* 3.8   CHLORIDE 112 115* 116*   CO2 17* 15* 17*   GLUCOSE 99 96 100*   * 92* 72*   CREATININE 1.51* 1.32 1.35   CALCIUM 9.1 9.3 8.9                   Imaging  CT-HEAD W/O   Final Result      1. No CT evidence of acute infarct, hemorrhage or mass.   2. Moderate global parenchymal atrophy. Chronic small vessel ischemic changes.      EC-ECHOCARDIOGRAM COMPLETE W/ CONT   Final Result      US-RENAL   Final Result      1.  No hydronephrosis.   2.  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      DX-CHEST-PORTABLE (1 VIEW)   Final Result      1.  Emphysema.   2.  Small linear scar or residual subsegmental atelectatic change at the right medial lung base where there was previously extensive consolidation and cavitation. (See CT of the chest 5/22/2021)   3.  No acute infiltrates.         I have personally interpreted the EKG dated 2/19/22 08:20am. Sinus tachycardia with rate 100, no ST elevation or depression. QTc 421 wnl.    Assessment/Plan  * YAMIL (acute kidney injury) (HCC)- (present on admission)  Assessment & Plan  Creatinine trending up slowly  Continue IV hydration  Renal toxins  Renal dose meds  Labs in a.m.    Encephalopathy acute  Assessment & Plan  AAOX1  Head CT showing moderate global parenchymal atrophy chronic small vessel  "ischemic changes, likely vascular dementia  Likely vascular dementia in setting of chronic alcohol abuse  Dehydration contributing  Concerned that this might be her new baseline  Continue IV hydration  Limit sedatives  Frequent reorientation  Up to chair for all meals as tolerated    GI bleed  Assessment & Plan  Patient has chronic anemia with elevated MCV however she came with worsening and hemoglobin 7.9  Positive occult blood, dark stools  Patient received 1 unit of red blood cell at the emergency by ED physician    Had episode of melena on 2/20  Continue PPI  Hold aspirin  Iron studies  GI signed off, not recommending procedure, continue medical management    Hypotension- (present on admission)  Assessment & Plan  Came with blood pressure 80/40, improving with IV fluids.  New onset CHF   GI bleed     Gentle IV fluid  Hold blood pressure losartan and hydrochlorothiazide due to worsening kidney function  Hold coreg   No signs of infection  Close monitoring      Acute systolic heart failure (HCC)- (present on admission)  Assessment & Plan  Strong suspicion for acute stress induced cardiomyopathy 2/2 acute illness.  Echocardiogram with ejection fraction at 35% and dilated cardiomyopathy  Medical management as per cardiology    Alcohol abuse- (present on admission)  Assessment & Plan  Recent history of heavy use. Patient reports quitting \"a couple of weeks\" prior to admission.  Continue thiamine supplementation.  Will Check vitamin B12.    NSTEMI (non-ST elevation myocardial infarction) (HCC)- (present on admission)  Assessment & Plan  Suspect type 2, demand ischemia 2/2 acute illness. Troponins 248>473>594 in the setting of acute renal failure, nonoliguric.  Initiated full dose ASA, high intensity statin, beta blocker.  Appreciate Cardiology consult.  The patient denies chest pain, nondiagnostic EKGs, no ST elevation  Labs the patient's possible GI bleeding, reducing aspirin dose, stop heparin, monitor  The patient " likely will have ischemic evaluation once overall improved    Hypomagnesemia- (present on admission)  Assessment & Plan  Replace as needed     Protein-calorie malnutrition, severe (HCC)- (present on admission)  Assessment & Plan  BMI 16.1, temporal wasting, limbs appear atrophied.  Patient states she was not eating and does not have appetite.  Possible geriatric depression vs substance dependence given recent history of heavy alcohol use.  Patient might need cancer screening as outpatient especially she has history of smoking  TSH wnl  Dietitian consulted, appreciate assistance.    Hypokalemia- (present on admission)  Assessment & Plan  Presented with potassium 2.7, improving with supplementation.  No EKG changes  Continue aggressive supplementation.    Emphysema lung (HCC)  Assessment & Plan  Patient has history of smoking for years  X-ray showed signs of emphysema  No wheezing and patient is on room air  Patient needs CT scan for cancer screening as outpatient and pulmonary function test  Inhaler as needed  No exacerbation      VTE prophylaxis: SCDs/TEDs and pharmacologic prophylaxis contraindicated due to GI bleed     I have performed a physical exam and reviewed and updated ROS and Plan today (2/22/2022). In review of yesterday's note (2/21/2022), there are no changes except as documented above.

## 2022-02-23 NOTE — CARE PLAN
The patient is Stable - Low risk of patient condition declining or worsening    Shift Goals  Clinical Goals: watch labs, patient safety, no falls  Patient Goals: safety, rest  Family Goals: arnie    Progress made toward(s) clinical / shift goals:      Problem: Hemodynamics  Goal: Patient's hemodynamics, fluid balance and neurologic status will be stable or improve  Outcome: Progressing     Problem: Fluid Volume  Goal: Fluid volume balance will be maintained  Outcome: Progressing     Problem: Urinary - Renal Perfusion  Goal: Ability to achieve and maintain adequate renal perfusion and functioning will improve  Outcome: Progressing       Patient is not progressing towards the following goals:      Problem: Knowledge Deficit - Standard  Goal: Patient and family/care givers will demonstrate understanding of plan of care, disease process/condition, diagnostic tests and medications  Outcome: Not Progressing

## 2022-02-23 NOTE — CONSULTS
Reason for PC Consult: Advance Care Planning    Consulted by: Dr. Menjivar    Assessment:  General:   Pt is a 66 year old female admitted for FTT and ALOC on 2022.  Pt found by neighbor during a well check and reported that pt looked to have been in bed and not eating or drinking.  Pt dehydrated, malnourished with hypokalemia, YAMIL, hypomagnesia, hypotension, blood in stool no acute GIB outpatient EGD/Colonoscopy recommended, anemia requiring transfusion, NSTEMI, cardiomyopathy, acute systolic heart failure EF 35%.  Pt with a past history of ETOH use, smoking and HTN.     Social: Pt lives alone in a single family home in [place].  Pt's   5 years ago.  Pt was independent with ADLs.  Maddy reports weekly phone calls with pt.  Neighbors and friends would also check on pt.      Consults:   Cardiology  Gastroenterology    Dyspnea: No-    Last BM: 22-    Pain: Yes-  Pt reports pain all over declined any medication or other interventions.  Depression: Unable to determine-  Pt reports being tired.  Pt oriented to self only able to state date by reading board in room.  Dementia: Unable to Determine;       Spiritual:  Is Caodaism or spirituality important for coping with this illness? Yes- Zoroastrian per pt's niece   Has a  or spiritual provider visit been requested? Yes Per niece she would appreciate any support    Palliative Performance Scale: 40%    Advance Directive: None-    DPOA:  -  ELOISE Piper  POLST: No-      Code Status: Full-      Outcome:  Introduced self and role of Palliative Care.  Pt oriented to self only.  Reads date from board on wall.  Unable to state her sister's name.  Does state that Nena would be the one to help with decisions.  Pt reports she is in a [place] office.  Unable to state what is wrong with her.  Reports wanting to return home.    Called Maddy Piper pt's niece who lives in [place].  Maddy reports the pt's sister Sakina is her mother Sakina has dementia and is in LTC.  Pt  "also has a brother who is currently hospitalized with frost bite. Explored patient's values, beliefs, and preferences with Nena in order to identify goals of care and a plan of care.  Pt lives independently.  Pt reported to [place] wanting to get better from a hip replacement and foot drop and had been working with a PT. The pt had talked about moving back to [place] to be with family.  Pt also told Maddy that she was going to make her the pt's POA but doesn't think she ever completed the paperwork.  Discussed pt's current full code status.  Maddy not sure what pt's wishes would be.  \"I don't think she is ready to give up\".  Maddy was able to talk to the pt today but her responses were brief and vague.  Discussed the medical teams concerns related tot the pt's condition.  Will have Dr. Graham call with medical update then plan for further discussion regarding goals of care and the plan of care.   Active listening, reflection, validation, normalization, and empathic support utilized throughout this encounter.  All questions answered.  Palliative Care contact information given.     Recommendations: I do no recommend hospice at this time as goals of care have not been determined.     Updated: Dr. Graham & Kinjal ABBASI CM    Plan: Dr. Graham to provide clinical update to pt's niece Nena.  Will then follow up to discuss goals of care and plan of care including code status.    Thank you for allowing Palliative Care to participate in this patient's care. Please feel free to call [phone] with any questions or concerns.  "

## 2022-02-24 ENCOUNTER — HOME CARE VISIT (OUTPATIENT)
Dept: HOSPICE | Facility: HOSPICE | Age: 67
End: 2022-02-24
Payer: MEDICARE

## 2022-02-24 LAB
25(OH)D3 SERPL-MCNC: 40 NG/ML (ref 30–100)
ABO GROUP BLD: NORMAL
ALBUMIN SERPL BCP-MCNC: 2.5 G/DL (ref 3.2–4.9)
ALBUMIN/GLOB SERPL: 1 G/DL
ALP SERPL-CCNC: 110 U/L (ref 30–99)
ALT SERPL-CCNC: 10 U/L (ref 2–50)
ANION GAP SERPL CALC-SCNC: 14 MMOL/L (ref 7–16)
AST SERPL-CCNC: 18 U/L (ref 12–45)
BARCODED ABORH UBTYP: 6200
BARCODED PRD CODE UBPRD: NORMAL
BARCODED UNIT NUM UBUNT: NORMAL
BASOPHILS # BLD AUTO: 0.6 % (ref 0–1.8)
BASOPHILS # BLD: 0.03 K/UL (ref 0–0.12)
BILIRUB SERPL-MCNC: 0.2 MG/DL (ref 0.1–1.5)
BLD GP AB SCN SERPL QL: NORMAL
BUN SERPL-MCNC: 46 MG/DL (ref 8–22)
CALCIUM SERPL-MCNC: 8.2 MG/DL (ref 8.5–10.5)
CHLORIDE SERPL-SCNC: 109 MMOL/L (ref 96–112)
CO2 SERPL-SCNC: 18 MMOL/L (ref 20–33)
COMPONENT R 8504R: NORMAL
CREAT SERPL-MCNC: 1.54 MG/DL (ref 0.5–1.4)
EOSINOPHIL # BLD AUTO: 0.28 K/UL (ref 0–0.51)
EOSINOPHIL NFR BLD: 5.7 % (ref 0–6.9)
ERYTHROCYTE [DISTWIDTH] IN BLOOD BY AUTOMATED COUNT: 55.2 FL (ref 35.9–50)
GLOBULIN SER CALC-MCNC: 2.4 G/DL (ref 1.9–3.5)
GLUCOSE SERPL-MCNC: 129 MG/DL (ref 65–99)
HCT VFR BLD AUTO: 22.2 % (ref 37–47)
HGB BLD-MCNC: 6.9 G/DL (ref 12–16)
IMM GRANULOCYTES # BLD AUTO: 0.02 K/UL (ref 0–0.11)
IMM GRANULOCYTES NFR BLD AUTO: 0.4 % (ref 0–0.9)
LYMPHOCYTES # BLD AUTO: 0.64 K/UL (ref 1–4.8)
LYMPHOCYTES NFR BLD: 13.1 % (ref 22–41)
MAGNESIUM SERPL-MCNC: 1.7 MG/DL (ref 1.5–2.5)
MCH RBC QN AUTO: 29.4 PG (ref 27–33)
MCHC RBC AUTO-ENTMCNC: 31.1 G/DL (ref 33.6–35)
MCV RBC AUTO: 94.5 FL (ref 81.4–97.8)
MONOCYTES # BLD AUTO: 0.54 K/UL (ref 0–0.85)
MONOCYTES NFR BLD AUTO: 11.1 % (ref 0–13.4)
NEUTROPHILS # BLD AUTO: 3.37 K/UL (ref 2–7.15)
NEUTROPHILS NFR BLD: 69.1 % (ref 44–72)
NRBC # BLD AUTO: 0 K/UL
NRBC BLD-RTO: 0 /100 WBC
PHOSPHATE SERPL-MCNC: 3.9 MG/DL (ref 2.5–4.5)
PLATELET # BLD AUTO: 197 K/UL (ref 164–446)
PMV BLD AUTO: 11 FL (ref 9–12.9)
POTASSIUM SERPL-SCNC: 3 MMOL/L (ref 3.6–5.5)
PRODUCT TYPE UPROD: NORMAL
PROT SERPL-MCNC: 4.9 G/DL (ref 6–8.2)
RBC # BLD AUTO: 2.35 M/UL (ref 4.2–5.4)
RH BLD: NORMAL
SODIUM SERPL-SCNC: 141 MMOL/L (ref 135–145)
TROPONIN T SERPL-MCNC: 72 NG/L (ref 6–19)
UNIT STATUS USTAT: NORMAL
WBC # BLD AUTO: 4.9 K/UL (ref 4.8–10.8)

## 2022-02-24 PROCEDURE — 770001 HCHG ROOM/CARE - MED/SURG/GYN PRIV*

## 2022-02-24 PROCEDURE — 700111 HCHG RX REV CODE 636 W/ 250 OVERRIDE (IP): Performed by: STUDENT IN AN ORGANIZED HEALTH CARE EDUCATION/TRAINING PROGRAM

## 2022-02-24 PROCEDURE — 86901 BLOOD TYPING SEROLOGIC RH(D): CPT

## 2022-02-24 PROCEDURE — 36415 COLL VENOUS BLD VENIPUNCTURE: CPT

## 2022-02-24 PROCEDURE — 86900 BLOOD TYPING SEROLOGIC ABO: CPT

## 2022-02-24 PROCEDURE — 84100 ASSAY OF PHOSPHORUS: CPT

## 2022-02-24 PROCEDURE — 83735 ASSAY OF MAGNESIUM: CPT

## 2022-02-24 PROCEDURE — 84484 ASSAY OF TROPONIN QUANT: CPT

## 2022-02-24 PROCEDURE — 36430 TRANSFUSION BLD/BLD COMPNT: CPT

## 2022-02-24 PROCEDURE — 99232 SBSQ HOSP IP/OBS MODERATE 35: CPT | Performed by: STUDENT IN AN ORGANIZED HEALTH CARE EDUCATION/TRAINING PROGRAM

## 2022-02-24 PROCEDURE — 302146: Performed by: STUDENT IN AN ORGANIZED HEALTH CARE EDUCATION/TRAINING PROGRAM

## 2022-02-24 PROCEDURE — 86923 COMPATIBILITY TEST ELECTRIC: CPT

## 2022-02-24 PROCEDURE — A9270 NON-COVERED ITEM OR SERVICE: HCPCS | Performed by: STUDENT IN AN ORGANIZED HEALTH CARE EDUCATION/TRAINING PROGRAM

## 2022-02-24 PROCEDURE — P9016 RBC LEUKOCYTES REDUCED: HCPCS

## 2022-02-24 PROCEDURE — 700102 HCHG RX REV CODE 250 W/ 637 OVERRIDE(OP): Performed by: STUDENT IN AN ORGANIZED HEALTH CARE EDUCATION/TRAINING PROGRAM

## 2022-02-24 PROCEDURE — 86850 RBC ANTIBODY SCREEN: CPT

## 2022-02-24 PROCEDURE — 80053 COMPREHEN METABOLIC PANEL: CPT

## 2022-02-24 PROCEDURE — 82306 VITAMIN D 25 HYDROXY: CPT

## 2022-02-24 PROCEDURE — 85025 COMPLETE CBC W/AUTO DIFF WBC: CPT

## 2022-02-24 RX ORDER — BISACODYL 10 MG
10 SUPPOSITORY, RECTAL RECTAL
Status: DISCONTINUED | OUTPATIENT
Start: 2022-02-24 | End: 2022-02-28 | Stop reason: HOSPADM

## 2022-02-24 RX ORDER — HEPARIN SODIUM 5000 [USP'U]/ML
5000 INJECTION, SOLUTION INTRAVENOUS; SUBCUTANEOUS EVERY 8 HOURS
Status: DISCONTINUED | OUTPATIENT
Start: 2022-02-24 | End: 2022-02-28 | Stop reason: HOSPADM

## 2022-02-24 RX ORDER — AMOXICILLIN 250 MG
2 CAPSULE ORAL 2 TIMES DAILY
Status: DISCONTINUED | OUTPATIENT
Start: 2022-02-24 | End: 2022-02-28 | Stop reason: HOSPADM

## 2022-02-24 RX ORDER — ACETAMINOPHEN 325 MG/1
650 TABLET ORAL EVERY 6 HOURS PRN
Status: DISCONTINUED | OUTPATIENT
Start: 2022-02-24 | End: 2022-02-28 | Stop reason: HOSPADM

## 2022-02-24 RX ORDER — CHOLECALCIFEROL (VITAMIN D3) 125 MCG
1000 CAPSULE ORAL DAILY
Status: DISCONTINUED | OUTPATIENT
Start: 2022-02-24 | End: 2022-02-28 | Stop reason: HOSPADM

## 2022-02-24 RX ORDER — GAUZE BANDAGE 2" X 2"
100 BANDAGE TOPICAL DAILY
Status: DISCONTINUED | OUTPATIENT
Start: 2022-02-24 | End: 2022-02-28 | Stop reason: HOSPADM

## 2022-02-24 RX ORDER — OMEPRAZOLE 20 MG/1
20 CAPSULE, DELAYED RELEASE ORAL DAILY
Status: DISCONTINUED | OUTPATIENT
Start: 2022-02-24 | End: 2022-02-28 | Stop reason: HOSPADM

## 2022-02-24 RX ORDER — LABETALOL HYDROCHLORIDE 5 MG/ML
10 INJECTION, SOLUTION INTRAVENOUS EVERY 4 HOURS PRN
Status: DISCONTINUED | OUTPATIENT
Start: 2022-02-24 | End: 2022-02-28 | Stop reason: HOSPADM

## 2022-02-24 RX ORDER — POLYETHYLENE GLYCOL 3350 17 G/17G
1 POWDER, FOR SOLUTION ORAL
Status: DISCONTINUED | OUTPATIENT
Start: 2022-02-24 | End: 2022-02-28 | Stop reason: HOSPADM

## 2022-02-24 RX ORDER — POTASSIUM CHLORIDE 20 MEQ/1
40 TABLET, EXTENDED RELEASE ORAL ONCE
Status: COMPLETED | OUTPATIENT
Start: 2022-02-25 | End: 2022-02-25

## 2022-02-24 RX ORDER — ASPIRIN 81 MG/1
81 TABLET, CHEWABLE ORAL DAILY
Status: DISCONTINUED | OUTPATIENT
Start: 2022-02-24 | End: 2022-02-28 | Stop reason: HOSPADM

## 2022-02-24 RX ORDER — MIRTAZAPINE 15 MG/1
15 TABLET, FILM COATED ORAL
Status: DISCONTINUED | OUTPATIENT
Start: 2022-02-24 | End: 2022-02-28 | Stop reason: HOSPADM

## 2022-02-24 RX ADMIN — OMEPRAZOLE 20 MG: 20 CAPSULE, DELAYED RELEASE ORAL at 16:34

## 2022-02-24 RX ADMIN — CYANOCOBALAMIN TAB 500 MCG 1000 MCG: 500 TAB at 16:35

## 2022-02-24 RX ADMIN — ASPIRIN 81 MG: 81 TABLET, CHEWABLE ORAL at 16:35

## 2022-02-24 RX ADMIN — HEPARIN SODIUM 5000 UNITS: 5000 INJECTION, SOLUTION INTRAVENOUS; SUBCUTANEOUS at 16:35

## 2022-02-24 RX ADMIN — MIRTAZAPINE 15 MG: 15 TABLET, FILM COATED ORAL at 21:22

## 2022-02-24 RX ADMIN — Medication 100 MG: at 16:35

## 2022-02-24 RX ADMIN — HEPARIN SODIUM 5000 UNITS: 5000 INJECTION, SOLUTION INTRAVENOUS; SUBCUTANEOUS at 21:33

## 2022-02-24 ASSESSMENT — ACTIVITIES OF DAILY LIVING (ADL)
PHYSICAL_TRANSFER_REQUIRES_ASSISTANCE: 1
BATHING_REQUIRES_ASSISTANCE: 1
DRESSING_REQUIRES_ASSISTANCE: 1
AMBULATION_REQUIRES_ASSISTANCE: 1
CONTINENCE_REQUIRES_ASSISTANCE: 1

## 2022-02-24 ASSESSMENT — ENCOUNTER SYMPTOMS
SHORTNESS OF BREATH: 1
NEUROLOGICAL NEGATIVE: 1
NERVOUS/ANXIOUS: 1
NAUSEA: 0
DIZZINESS: 0
SHORTNESS OF BREATH: 0
EYE REDNESS: 1
ABDOMINAL PAIN: 0
CARDIOVASCULAR NEGATIVE: 1
VOMITING: 0
RESPIRATORY NEGATIVE: 1
FEVER: 0
GASTROINTESTINAL NEGATIVE: 1
DIAPHORESIS: 0
CHILLS: 0
MEMORY LOSS: 1
MUSCULOSKELETAL NEGATIVE: 1

## 2022-02-24 ASSESSMENT — PAIN DESCRIPTION - PAIN TYPE
TYPE: ACUTE PAIN

## 2022-02-24 ASSESSMENT — LIFESTYLE VARIABLES: SUBSTANCE_ABUSE: 1

## 2022-02-24 ASSESSMENT — COPD QUESTIONNAIRES: COPD: 1

## 2022-02-24 NOTE — DISCHARGE PLANNING
[facility] TCN chart review completed. Collaborated with Kinjal SAEZ and PT. Patient seen at bedside. Note that she had change in status yesterday to comfort care/hospice plan. However at this time, pt's cognition is much improved and she appears to be oriented x4 and able to appropriately communicate (notable change from attempt to visit with pt yesterday). Pt reports she would be agreeable to dc to SNF to recover strength in order to eventually return to prior home environment. She is at times tangential with discussion with this TCN and note may benefit from psych consult given condition/behavior PTA. Discussed with Kinjal SAEZ post visit with pt re: possible change in plan, current cognitive status of pt, possible psych consult as well as re-consult of therapies as these were discontinued after change to comfort care. Did not obtain choice at this time as pt wanting to relax and watch TV as well as given current comfort status (Kinjal SAEZ aware of TCN plan currently). TCN will continue to follow and collaborate with discharge planning team as additional post acute needs arise. Thank you.    Previously completed:  - Orders received for: considerations for PT and OT re-consult given change in status/possible change from comfort and dc plan (?)   - Choice forms: none - await change in comfort status/consults  - GSC introduced (Y), referral (not sent).

## 2022-02-24 NOTE — PROGRESS NOTES
Bedside report received and patient care assumed. Pt is resting in bed, no signs and symptoms of pain, and is on RA. All fall precautions are in place, belongings at bedside table.  Pt call light within reach. Will continue to monitor.

## 2022-02-24 NOTE — CARE PLAN
The patient is Stable - Low risk of patient condition declining or worsening    Shift Goals  Clinical Goals: remain free from injury, comfort care  Patient Goals: PATTI  Family Goals: patti    Progress made toward(s) clinical / shift goals:    Problem: Knowledge Deficit - Standard  Goal: Patient and family/care givers will demonstrate understanding of plan of care, disease process/condition, diagnostic tests and medications  Outcome: Progressing     Problem: Fall Risk  Goal: Patient will remain free from falls  Outcome: Progressing       Patient is not progressing towards the following goals:

## 2022-02-24 NOTE — PROGRESS NOTES
Hospital Medicine Daily Progress Note    Date of Service  2/24/2022    Chief Complaint  Jaymie Peacock is a 66 y.o. female admitted 2/18/2022 with altered mental status    Hospital Course  66-year-old female with history of hypertension and smoking presented 2/18 with altered mental status patient has been living by herself and was able to take care of herself however today her neighbors did check on her and she was sluggish, denied any symptoms with no pain however patient states she was not eating well and no appetite, patient was skinny with malnutrition and dehydrated on admission, labs showed YAMIL, with hypokalemia and hypomagnesemia, also hypotension, IV fluid was started with some improvement in her mentation.  Patient lives by herself and her family lives far away, patient did not want me to call family.  The patient apparently with chronic alcoholism, states she lives alone, appears to have longer period of poor p.o. intake, anorexia, still quite confused and poor historian.     2/20 the patient slowly improving, her mentation is still limited, ANO times 1-2 initially but she can be reoriented, noted to have dark stools with positive occult blood, poor p.o. intake, and patient refuses regular meals, drinks fluids, follow-up H&H without further drop in hemoglobin, electrolytes being replaced, renal function improving, significant malnutrition, U tox negative    2/19 electrolytes replaced, will recheck.  Concerning troponin at 248, repeat 473. Values may be inflated in the setting of acute renal failure. EKG without evidence of acute ischemia. Empiric full dose ASA, high intensity statin, beta blocker ordered. Transferred for telemetry monitoring in the setting of probable NSTEMI. Cardiology consulted and Dr. Norris evaluating, appreciate assistance.  ARF improving with IV fluids.    2/22/2022  I had an extensive conversation with patient's niece (next of kin) Tana regarding patient's clinical condition which  "involved NSTEMI, acute renal failure, altered mental status, and gastrointestinal bleed. Discussed code status as well as hospice. Tana reporting that she has been noticing a decline in her and regarding her mentation prior to being in the hospital.  Tana voiced understanding and would like to discuss this with other family members as well as talk to her aunt prior to making decision. Will discuss on AM.    Interval Problem Update  Patient seen and examined today  Marked improvement with mentation  Patient AAOX4 and able to participate in conversation    Patient reporting today that she would like to pursued medical management to attempt to improve current heart condition.  She stated \"I do not want to die, I want to take medicines and I want to live \".  I had an extensive conversation with patient and nidawit Bonilla for which patient and on a understand patient's poor prognosis but will like to continue with medical management.  Patient reporting that she would like to remain as DNR/DNI.    Blood pressure in the lower side, map above 65  2/19/2022 echocardiogram suggestive of stress-induced cardiomyopathy with ejection fraction 35%  Start ASA  Pending labs    I have personally seen and examined the patient at bedside. I discussed the plan of care with patient, bedside RN, charge RN and cardiology.    Consultants/Specialty  cardiology and GI    Code Status  DNAR/DNI    Disposition  Patient is medically cleared for discharge.   Anticipate discharge to Hospice    Review of Systems  Review of Systems   Constitutional: Positive for malaise/fatigue. Negative for chills, diaphoresis and fever.   HENT: Negative.    Eyes: Positive for redness.   Respiratory: Negative.  Negative for shortness of breath.    Cardiovascular: Negative.  Negative for chest pain and leg swelling.   Gastrointestinal: Negative.  Negative for abdominal pain, nausea and vomiting.   Genitourinary: Negative.    Musculoskeletal: Negative.    Skin: " Negative.    Neurological: Negative.  Negative for dizziness.   Endo/Heme/Allergies: Negative.    Psychiatric/Behavioral: Positive for memory loss and substance abuse. The patient is nervous/anxious.    All other systems reviewed and are negative.       Physical Exam  Temp:  [36.1 °C (97 °F)-36.4 °C (97.5 °F)] 36.4 °C (97.5 °F)  Pulse:  [] 69  Resp:  [16-18] 16  BP: (87-98)/(59-60) 87/60  SpO2:  [96 %] 96 %    Physical Exam  Constitutional:       Appearance: Normal appearance. She is ill-appearing.      Comments: Frail and cachectic   HENT:      Head: Normocephalic and atraumatic.      Mouth/Throat:      Mouth: Mucous membranes are dry.   Eyes:      Extraocular Movements: Extraocular movements intact.      Pupils: Pupils are equal, round, and reactive to light.   Cardiovascular:      Rate and Rhythm: Normal rate and regular rhythm.   Pulmonary:      Effort: No respiratory distress.   Abdominal:      General: Abdomen is flat. Bowel sounds are normal. There is no distension.      Palpations: Abdomen is soft.      Tenderness: There is no abdominal tenderness. There is no guarding or rebound.   Musculoskeletal:      Cervical back: Normal range of motion and neck supple.      Right lower leg: No edema.      Left lower leg: No edema.   Skin:     General: Skin is warm and dry.   Neurological:      Mental Status: She is alert and oriented to person, place, and time.      Cranial Nerves: No cranial nerve deficit.   Psychiatric:         Mood and Affect: Mood normal.         Behavior: Behavior normal.         Thought Content: Thought content normal.         Judgment: Judgment normal.         Fluids    Intake/Output Summary (Last 24 hours) at 2/24/2022 1530  Last data filed at 2/24/2022 1300  Gross per 24 hour   Intake 760 ml   Output --   Net 760 ml       Laboratory  Recent Labs     02/22/22  0051 02/22/22  1218 02/22/22  1650 02/23/22  0104 02/23/22  0857   WBC 8.5  --   --  7.7  --    RBC 2.45*  --   --  2.44*  --     HEMOGLOBIN 7.4*   < > 7.5* 7.2* 7.7*   HEMATOCRIT 21.9*   < > 22.8* 22.2* 24.2*   MCV 89.4  --   --  91.0  --    MCH 30.2  --   --  29.5  --    MCHC 33.8  --   --  32.4*  --    RDW 51.8*  --   --  53.8*  --    PLATELETCT 213  --   --  187  --    MPV 11.5  --   --  11.9  --     < > = values in this interval not displayed.     Recent Labs     02/22/22  0051 02/23/22  0104   SODIUM 147* 145   POTASSIUM 3.8 3.4*   CHLORIDE 116* 112   CO2 17* 17*   GLUCOSE 100* 109*   BUN 72* 57*   CREATININE 1.35 1.38   CALCIUM 8.9 8.3*                   Imaging  CT-HEAD W/O   Final Result      1. No CT evidence of acute infarct, hemorrhage or mass.   2. Moderate global parenchymal atrophy. Chronic small vessel ischemic changes.      EC-ECHOCARDIOGRAM COMPLETE W/ CONT   Final Result      US-RENAL   Final Result      1.  No hydronephrosis.   2.  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      DX-CHEST-PORTABLE (1 VIEW)   Final Result      1.  Emphysema.   2.  Small linear scar or residual subsegmental atelectatic change at the right medial lung base where there was previously extensive consolidation and cavitation. (See CT of the chest 5/22/2021)   3.  No acute infiltrates.         Assessment/Plan  * Acute systolic heart failure (HCC)- (present on admission)  Assessment & Plan  Poor prognosis  Patient comfort care as of 2/23/2022  Patient mentation improved and reported that she does not want to be comfort care  Pursue medical management  2/19/2022 echocardiogram suggestive of stress-induced cardiomyopathy with ejection fraction 35%  Pending Labs    GI bleed  Assessment & Plan  Resolved    YAMIL (acute kidney injury) (HCC)- (present on admission)  Assessment & Plan  Patient mentation improved and reported that she does not want to be comfort care  Pursue medical management  2/19/2022 echocardiogram suggestive of stress-induced cardiomyopathy with ejection fraction  "35%  Pending Labs    Hypotension- (present on admission)  Assessment & Plan  Blood pressure in the 80s/60s  Map above 65  Likely secondary to HFrEF from stress-induced cardiomyopathy  Monitor    Alcohol abuse- (present on admission)  Assessment & Plan  Recent history of heavy use.   Patient reports quitting \"a couple of weeks\" prior to admission.  Continue thiamine supplementation.  Will Check vitamin B12.    NSTEMI (non-ST elevation myocardial infarction) (HCC)- (present on admission)  Assessment & Plan  2/19/2022 echocardiogram suggestive of stress-induced cardiomyopathy with ejection fraction 35%  No chest pain and troponin was trending down  Cardiology signed off as patient not a candidate for catheterization as she is too weak and high risk  Pending repeat labs    Hypomagnesemia- (present on admission)  Assessment & Plan  Replete as necessary    Protein-calorie malnutrition, severe (HCC)- (present on admission)  Assessment & Plan  Patient comfort care as of 2/23/2022  Discontinue IV hydration  No morning or vitals labs  Hospice referral    Hypokalemia- (present on admission)  Assessment & Plan  Replete as necessary    Encephalopathy acute- (present on admission)  Assessment & Plan  Resolved    Emphysema lung (HCC)  Assessment & Plan  History of  Monitor      VTE prophylaxis: Heparin prophylaxis      I have performed a physical exam and reviewed and updated ROS and Plan today (2/24/2022). In review of yesterday's note (2/23/2022), there are no changes except as documented above.          "

## 2022-02-24 NOTE — THERAPY
Physical Therapy   Daily Treatment     Patient Name: Jaymie Peacock  Age:  66 y.o., Sex:  female  Medical Record #: [id number]  Today's Date: 2/23/2022     Precautions  Precautions: Fall Risk    Assessment    Pt was pleasant, reluctant to participate but did with encouragement. She reached EOB with SPV and able to hold herself up. She kept attempting to lay back down but with initiation from therapist completed 4 STS with Lulú and HHA seated rest breaks in between. Attempted to side step but unable to at this time due to poor balance and sequencing. Will follow up as able.     Plan    Continue current treatment plan.    DC Equipment Recommendations: Unable to determine at this time  Discharge Recommendations: Recommend post-acute placement for additional physical therapy services prior to discharge home         02/23/22 1131   Balance   Sitting Balance (Static) Fair   Sitting Balance (Dynamic) Fair   Standing Balance (Static) Poor +   Standing Balance (Dynamic) Poor   Weight Shift Sitting Fair   Comments seated balance at EOB with no assist,   Gait Analysis   Gait Level Of Assist Unable to Participate   Comments posterior lean and static standing balance   Bed Mobility    Supine to Sit Supervised   Sit to Supine Supervised   Comments hob elevated, extra encouragement to participate   Functional Mobility   Sit to Stand Minimal Assist   Skilled Intervention Verbal Cuing;Sequencing   Comments STS 4x with seated rest breaks.   Short Term Goals    Short Term Goal # 1 Pt will perform supine <> sit without bed features with SPV within 6 visits in order to progress toward PLOF   Goal Outcome # 1 goal not met   Short Term Goal # 2 Pt will perform STS/functional transfers with FWW and SPV within 6 visits in order to progress OOB activity   Goal Outcome # 2 Goal not met   Short Term Goal # 3 Pt will ambulate 25 ft with min A within 6 visits in order to progress toward PLOF   Goal Outcome # 3 Goal not met   Short Term Goal # 4 Pt  will negotiate 8 steps with min ENEDINA within 6 visits in order to progress toward home   Goal Outcome # 4 Goal not met

## 2022-02-24 NOTE — HOSPICE
Approved for community hospice by Dr. Dalton for HFrEF.  Recommend [facility] placement, since she is unable to care for herself.  Per Sakina Gomez's note, pt doesn't have family or friends that are able to provide 24/7 care.

## 2022-02-24 NOTE — PROGRESS NOTES
"Pt oriented as of this shift and expressing she wants to \"take pills\" and \"live longer.\" MD to bedside to discuss situation and assess patients mentation. Comfort care/ code status discussed as well as POC. Pt agreeing to pursue treatment at this time. Phone call to marcellus Barrett, with MD and patient. All in agreement. Pt will resume treatment at this time. Agrees to DNR/I. Continuing with new POC.   "

## 2022-02-24 NOTE — PROGRESS NOTES
Received bedside report from RN, pt care assumed, VSS, pt assessment complete. Pt AAOx2 (self and time) . Pt denies any additional needs at this time. Bed locked/in lowest position, bed alarm on, pt educated on fall risk and verbalized understanding, call light within reach, hourly rounding initiated.

## 2022-02-24 NOTE — PROGRESS NOTES
Palliative Care follow-up  Pt is sleeping difficult to awaken.  Call placed to Nena pt's niece.  Nena describes  information on pt's condition provided by Dr. Graham yesterday.  Nena spoke to her Uncle about the pt's condition and plan of care.  The pt's brother is hospitalized with frost bite.  Family in agreement that pt would not want resuscitation or other life prolonging interventions such as a tube feeding. Nena after talking to the pt's friends feels that the pt has not been honest with her about her condition or how she is doing.  Discussed option of comfort focused care and hospice. Family believe that comfort care is what Jaymie would want. Called Nena on Face Time so that she could see and speak to the pt.  Jaymie recognized Nena and talked about going home and also taking a trip back to [place]. Discussed with Jaymie goals of care including focusing on her comfort.  Jaymie in agreement. Unable to engage Jaymie in significant conversation.  Jaymie appears confused but happy to be talking to Nena.    Discussed hospice in some detail. Jaymie would need help in her home along with hospice.  Nena states her friends/neighbors are unable to provide care. Discussed the option of paid caregivers or GH.  Nena is not the POA so has no way to access Jaymie's funds.  Will ask SW to provide resources.  All questions answered and support provided.    Updated: Dr. Graham & Carlene ABBASI CM    Plan: Goals of care changed to focus on the pt's comfort. Hospice referral.  SW to obtain hospice choice from Nena and provide resources regarding financial questions.      Thank you for allowing Palliative Care to support this patient and family. Contact [phone] for additional assistance, change in patient status, or with any questions/concerns.

## 2022-02-25 ENCOUNTER — HOME CARE VISIT (OUTPATIENT)
Dept: HOSPICE | Facility: HOSPICE | Age: 67
End: 2022-02-25
Payer: MEDICARE

## 2022-02-25 LAB
ANION GAP SERPL CALC-SCNC: 10 MMOL/L (ref 7–16)
BUN SERPL-MCNC: 42 MG/DL (ref 8–22)
CALCIUM SERPL-MCNC: 8.2 MG/DL (ref 8.5–10.5)
CHLORIDE SERPL-SCNC: 111 MMOL/L (ref 96–112)
CO2 SERPL-SCNC: 21 MMOL/L (ref 20–33)
CREAT SERPL-MCNC: 1.41 MG/DL (ref 0.5–1.4)
ERYTHROCYTE [DISTWIDTH] IN BLOOD BY AUTOMATED COUNT: 56.9 FL (ref 35.9–50)
GLUCOSE SERPL-MCNC: 78 MG/DL (ref 65–99)
HCT VFR BLD AUTO: 26.1 % (ref 37–47)
HGB BLD-MCNC: 8.6 G/DL (ref 12–16)
MAGNESIUM SERPL-MCNC: 1.6 MG/DL (ref 1.5–2.5)
MCH RBC QN AUTO: 28.7 PG (ref 27–33)
MCHC RBC AUTO-ENTMCNC: 33 G/DL (ref 33.6–35)
MCV RBC AUTO: 87 FL (ref 81.4–97.8)
PLATELET # BLD AUTO: 183 K/UL (ref 164–446)
PMV BLD AUTO: 10.9 FL (ref 9–12.9)
POTASSIUM SERPL-SCNC: 3.8 MMOL/L (ref 3.6–5.5)
PTH-INTACT SERPL-MCNC: 34.2 PG/ML (ref 14–72)
RBC # BLD AUTO: 3 M/UL (ref 4.2–5.4)
SODIUM SERPL-SCNC: 142 MMOL/L (ref 135–145)
WBC # BLD AUTO: 4.5 K/UL (ref 4.8–10.8)

## 2022-02-25 PROCEDURE — 80048 BASIC METABOLIC PNL TOTAL CA: CPT

## 2022-02-25 PROCEDURE — 83735 ASSAY OF MAGNESIUM: CPT

## 2022-02-25 PROCEDURE — 85027 COMPLETE CBC AUTOMATED: CPT

## 2022-02-25 PROCEDURE — 83970 ASSAY OF PARATHORMONE: CPT

## 2022-02-25 PROCEDURE — A9270 NON-COVERED ITEM OR SERVICE: HCPCS | Performed by: STUDENT IN AN ORGANIZED HEALTH CARE EDUCATION/TRAINING PROGRAM

## 2022-02-25 PROCEDURE — 99232 SBSQ HOSP IP/OBS MODERATE 35: CPT | Performed by: STUDENT IN AN ORGANIZED HEALTH CARE EDUCATION/TRAINING PROGRAM

## 2022-02-25 PROCEDURE — 36415 COLL VENOUS BLD VENIPUNCTURE: CPT

## 2022-02-25 PROCEDURE — A9270 NON-COVERED ITEM OR SERVICE: HCPCS | Performed by: HOSPITALIST

## 2022-02-25 PROCEDURE — 700102 HCHG RX REV CODE 250 W/ 637 OVERRIDE(OP): Performed by: HOSPITALIST

## 2022-02-25 PROCEDURE — 700111 HCHG RX REV CODE 636 W/ 250 OVERRIDE (IP): Performed by: STUDENT IN AN ORGANIZED HEALTH CARE EDUCATION/TRAINING PROGRAM

## 2022-02-25 PROCEDURE — 97162 PT EVAL MOD COMPLEX 30 MIN: CPT

## 2022-02-25 PROCEDURE — 770001 HCHG ROOM/CARE - MED/SURG/GYN PRIV*

## 2022-02-25 PROCEDURE — 700102 HCHG RX REV CODE 250 W/ 637 OVERRIDE(OP): Performed by: STUDENT IN AN ORGANIZED HEALTH CARE EDUCATION/TRAINING PROGRAM

## 2022-02-25 RX ORDER — MAGNESIUM SULFATE HEPTAHYDRATE 40 MG/ML
2 INJECTION, SOLUTION INTRAVENOUS ONCE
Status: COMPLETED | OUTPATIENT
Start: 2022-02-25 | End: 2022-02-25

## 2022-02-25 RX ORDER — CARVEDILOL 3.12 MG/1
3.12 TABLET ORAL 2 TIMES DAILY WITH MEALS
Status: DISCONTINUED | OUTPATIENT
Start: 2022-02-25 | End: 2022-02-28 | Stop reason: HOSPADM

## 2022-02-25 RX ADMIN — HEPARIN SODIUM 5000 UNITS: 5000 INJECTION, SOLUTION INTRAVENOUS; SUBCUTANEOUS at 21:23

## 2022-02-25 RX ADMIN — MAGNESIUM SULFATE HEPTAHYDRATE 2 G: 40 INJECTION, SOLUTION INTRAVENOUS at 08:46

## 2022-02-25 RX ADMIN — Medication 100 MG: at 05:34

## 2022-02-25 RX ADMIN — OMEPRAZOLE 20 MG: 20 CAPSULE, DELAYED RELEASE ORAL at 05:33

## 2022-02-25 RX ADMIN — POTASSIUM CHLORIDE 40 MEQ: 1500 TABLET, EXTENDED RELEASE ORAL at 00:38

## 2022-02-25 RX ADMIN — ASPIRIN 81 MG: 81 TABLET, CHEWABLE ORAL at 17:35

## 2022-02-25 RX ADMIN — HEPARIN SODIUM 5000 UNITS: 5000 INJECTION, SOLUTION INTRAVENOUS; SUBCUTANEOUS at 06:54

## 2022-02-25 RX ADMIN — SENNOSIDES AND DOCUSATE SODIUM 2 TABLET: 50; 8.6 TABLET ORAL at 05:36

## 2022-02-25 RX ADMIN — HEPARIN SODIUM 5000 UNITS: 5000 INJECTION, SOLUTION INTRAVENOUS; SUBCUTANEOUS at 15:01

## 2022-02-25 RX ADMIN — CYANOCOBALAMIN TAB 500 MCG 1000 MCG: 500 TAB at 05:34

## 2022-02-25 RX ADMIN — MIRTAZAPINE 15 MG: 15 TABLET, FILM COATED ORAL at 21:23

## 2022-02-25 ASSESSMENT — COGNITIVE AND FUNCTIONAL STATUS - GENERAL
SUGGESTED CMS G CODE MODIFIER MOBILITY: CK
MOBILITY SCORE: 17
MOVING FROM LYING ON BACK TO SITTING ON SIDE OF FLAT BED: A LITTLE
WALKING IN HOSPITAL ROOM: A LOT
MOVING TO AND FROM BED TO CHAIR: A LITTLE
CLIMB 3 TO 5 STEPS WITH RAILING: A LOT
STANDING UP FROM CHAIR USING ARMS: A LITTLE

## 2022-02-25 ASSESSMENT — ENCOUNTER SYMPTOMS
CARDIOVASCULAR NEGATIVE: 1
MEMORY LOSS: 1
GASTROINTESTINAL NEGATIVE: 1
NEUROLOGICAL NEGATIVE: 1
ABDOMINAL PAIN: 0
RESPIRATORY NEGATIVE: 1
VOMITING: 0
CHILLS: 0
EYE REDNESS: 1
DIAPHORESIS: 0
SHORTNESS OF BREATH: 0
FEVER: 0
MUSCULOSKELETAL NEGATIVE: 1
NERVOUS/ANXIOUS: 1
DIZZINESS: 0
NAUSEA: 0

## 2022-02-25 ASSESSMENT — GAIT ASSESSMENTS
ASSISTIVE DEVICE: FRONT WHEEL WALKER
GAIT LEVEL OF ASSIST: MINIMAL ASSIST
DISTANCE (FEET): 10
DEVIATION: DECREASED BASE OF SUPPORT;BRADYKINETIC;DECREASED TOE OFF

## 2022-02-25 ASSESSMENT — LIFESTYLE VARIABLES: SUBSTANCE_ABUSE: 1

## 2022-02-25 NOTE — DISCHARGE PLANNING
Received Choice form at 1600  Agency/Facility Name: [facility]  Referral sent per Choice form at 1601

## 2022-02-25 NOTE — PROGRESS NOTES
Bedside report received and patient care assumed. Pt is resting in bed, A&Ox4, with no complaints of pain, and is on RA. Blood transfusion consent signed. No signs of distress present. Fall precautions are in place, belongings at bedside table.  Pt was updated on POC, no questions or concerns. Pt educated on use of call light for assistance. No needs at this time. Will continue to monitor.

## 2022-02-25 NOTE — CARE PLAN
The patient is Watcher - Medium risk of patient condition declining or worsening    Shift Goals  Clinical Goals: Blood transfusion, vss  Patient Goals: Rest  Family Goals: PATTI    Progress made toward(s) clinical / shift goals:    Problem: Knowledge Deficit - Standard  Goal: Patient and family/care givers will demonstrate understanding of plan of care, disease process/condition, diagnostic tests and medications  Outcome: Progressing     Problem: Hemodynamics  Goal: Patient's hemodynamics, fluid balance and neurologic status will be stable or improve  Outcome: Progressing     Problem: Respiratory  Goal: Patient will achieve/maintain optimum respiratory ventilation and gas exchange  Outcome: Progressing     Problem: Fall Risk  Goal: Patient will remain free from falls  Outcome: Progressing       Patient is not progressing towards the following goals:

## 2022-02-25 NOTE — DISCHARGE PLANNING
[facility] TCN chart review completed. Collaborated with Kinjla SAEZ. Note that Kinjal SAEZ obtained SNF choice yesterday after speaking with this TCN given change in status/once comfort care status removed given pt desire for rehab/placement, continued medical tx and eventual return to home. TCN will continue to follow and collaborate with discharge planning team as additional post acute needs arise. Thank you.    Previously completed:  - Orders received for: PT and OT - rec post acute placement  - Choice forms: SNF -> note [facility] accepted  - [facility] introduced (Y), referral ( not sent)

## 2022-02-25 NOTE — PROGRESS NOTES
Updated Dr. Tang on patient status and recent labs. HGB 6.9. Soft SBP in the 80s- patient asymptomatic otherwise. Pt updated and consents to receiving blood. Orders received from MD. Robles RN to continue with administration.

## 2022-02-25 NOTE — DIETARY
"Nutrition services: Day 7 of admit.  Jaymie Peacock is a 66 y.o. female with admitting DX of YAMIL.       Consult received for Failure to Thrive. Pt was comfort care status but more alert today and status now changed to DNAR, DNI per pt wishes. Pt reports only eating bites and dislikes Boost drinks. Discussed different options to help increase nutrition, pt did not want nutrition support such as feeding tube and states she is just going to do the best she can with PO intake, only wanting sherbet added to meals. Pt did not want lunch or dinner trays but encouraged her to at least try to eat a few bites. Pt agreeable but was not receptive to discuss other foods she likes, etc, and did not want supplements added to meals. Pt states she wants to take nap so interview was cut short.       Assessment:  Height: 167.6 cm (5' 6\")  Weight: 54.2 kg (119 lb 7.8 oz)  Weight to Use in Calculations: 46.8 kg (103 lb 2.8 oz)  Ideal Body Weight: 59 kg (130 lb)  Percent Ideal Body Weight: 79.4  Body mass index is 19.29 kg/m²., BMI classification: Normal.   Diet/Intake: Regular.     Evaluation:   1. Pt with Moisture associated skin dermatitis to sacrum, no pressure ulcers noted.   2. Labs/meds reviewed. Thiamine in place. Mirtazapine added yesterday.   3. Wt up from admit, likely fluid related. Pt with CHF.   4. Hospice referral being discussed.   5. Pt appears to have inadequate intake since admit, ~ 7 days. High risk for refeeding syndrome.     Malnutrition Risk (per RD note on 2/20): Severe starvation related malnutrition as evidenced by significant weight loss of 13% in 9 months and severe fat loss and severe muscle wasting.     Recommendations/Plan:  1. Continue appetite stimulant as appropriate with plan of care.   2. Will add Sherbet to meals per pt request.   3. Encourage PO and document intake  as % taken in ADL's to provide interdisciplinary communication across all shifts.   4. Monitor weight trends, provide weekly measured " wts.   5. Nutrition rep will continue to see patient for ongoing meal and snack preferences.   6. Monitor for refeeding: Order BMP w/ Mg and Phos x 7 days. Replete K, Phos and Mg prn. Continue supplement 100 mg Thiamine x 7 days to reduce risk of refeeding      RD following.

## 2022-02-25 NOTE — PROGRESS NOTES
Hospital Medicine Daily Progress Note    Date of Service  2/25/2022    Chief Complaint  Jaymie Peacock is a 66 y.o. female admitted 2/18/2022 with altered mental status    Hospital Course  66-year-old female with history of hypertension and smoking presented 2/18 with altered mental status patient has been living by herself and was able to take care of herself however today her neighbors did check on her and she was sluggish, denied any symptoms with no pain however patient states she was not eating well and no appetite, patient was skinny with malnutrition and dehydrated on admission, labs showed YAMIL, with hypokalemia and hypomagnesemia, also hypotension, IV fluid was started with some improvement in her mentation.  Patient lives by herself and her family lives far away, patient did not want me to call family.  The patient apparently with chronic alcoholism, states she lives alone, appears to have longer period of poor p.o. intake, anorexia, still quite confused and poor historian.     2/20 the patient slowly improving, her mentation is still limited, ANO times 1-2 initially but she can be reoriented, noted to have dark stools with positive occult blood, poor p.o. intake, and patient refuses regular meals, drinks fluids, follow-up H&H without further drop in hemoglobin, electrolytes being replaced, renal function improving, significant malnutrition, U tox negative    2/19 electrolytes replaced, will recheck.  Concerning troponin at 248, repeat 473. Values may be inflated in the setting of acute renal failure. EKG without evidence of acute ischemia. Empiric full dose ASA, high intensity statin, beta blocker ordered. Transferred for telemetry monitoring in the setting of probable NSTEMI. Cardiology consulted and Dr. Norris evaluating, appreciate assistance.  ARF improving with IV fluids.    2/22/2022  I had an extensive conversation with patient's niece (next of kin) Tana regarding patient's clinical condition which  "involved NSTEMI, acute renal failure, altered mental status, and gastrointestinal bleed. Discussed code status as well as hospice. Tana reporting that she has been noticing a decline in her and regarding her mentation prior to being in the hospital.  Tana voiced understanding and would like to discuss this with other family members as well as talk to her aunt prior to making decision. Will discuss on AM.    2/24/2022  Patient reporting today that she would like to pursued medical management to attempt to improve current heart condition.  She stated \"I do not want to die, I want to take medicines and I want to live \".  I had an extensive conversation with patient and nidawit Bonilla for which patient and on a understand patient's poor prognosis but will like to continue with medical management.  Patient reporting that she would like to remain as DNR/DNI.    Interval Problem Update  Patient seen and examined today  Blood pressure stable today  Patient euvolemic  Continue ASA  Start carvedilol  Labs on AM    I have personally seen and examined the patient at bedside. I discussed the plan of care with patient, bedside RN, charge RN and cardiology.    Consultants/Specialty  cardiology and GI    Code Status  DNAR/DNI    Disposition  Patient is medically cleared for discharge.   Anticipate discharge to SNF    Review of Systems  Review of Systems   Constitutional: Positive for malaise/fatigue. Negative for chills, diaphoresis and fever.   HENT: Negative.    Eyes: Positive for redness.   Respiratory: Negative.  Negative for shortness of breath.    Cardiovascular: Negative.  Negative for chest pain and leg swelling.   Gastrointestinal: Negative.  Negative for abdominal pain, nausea and vomiting.   Genitourinary: Negative.    Musculoskeletal: Negative.    Skin: Negative.    Neurological: Negative.  Negative for dizziness.   Endo/Heme/Allergies: Negative.    Psychiatric/Behavioral: Positive for memory loss and substance abuse. " The patient is nervous/anxious.    All other systems reviewed and are negative.       Physical Exam  Temp:  [35.9 °C (96.7 °F)-36.9 °C (98.4 °F)] 35.9 °C (96.7 °F)  Pulse:  [] 91  Resp:  [15-16] 16  BP: ()/(52-76) 108/76  SpO2:  [95 %-99 %] 98 %    Physical Exam  Constitutional:       Appearance: Normal appearance. She is ill-appearing.      Comments: Frail and cachectic   HENT:      Head: Normocephalic and atraumatic.      Mouth/Throat:      Mouth: Mucous membranes are dry.   Eyes:      Extraocular Movements: Extraocular movements intact.      Pupils: Pupils are equal, round, and reactive to light.   Cardiovascular:      Rate and Rhythm: Normal rate and regular rhythm.   Pulmonary:      Effort: No respiratory distress.   Abdominal:      General: Abdomen is flat. Bowel sounds are normal. There is no distension.      Palpations: Abdomen is soft.      Tenderness: There is no abdominal tenderness. There is no guarding or rebound.   Musculoskeletal:      Cervical back: Normal range of motion and neck supple.      Right lower leg: No edema.      Left lower leg: No edema.   Skin:     General: Skin is warm and dry.   Neurological:      Mental Status: She is alert and oriented to person, place, and time.      Cranial Nerves: No cranial nerve deficit.   Psychiatric:         Mood and Affect: Mood normal.         Behavior: Behavior normal.         Thought Content: Thought content normal.         Judgment: Judgment normal.         Fluids    Intake/Output Summary (Last 24 hours) at 2/25/2022 1348  Last data filed at 2/25/2022 0846  Gross per 24 hour   Intake 240 ml   Output --   Net 240 ml       Laboratory  Recent Labs     02/23/22  0104 02/23/22  0857 02/24/22  1812 02/25/22  0527   WBC 7.7  --  4.9 4.5*   RBC 2.44*  --  2.35* 3.00*   HEMOGLOBIN 7.2* 7.7* 6.9* 8.6*   HEMATOCRIT 22.2* 24.2* 22.2* 26.1*   MCV 91.0  --  94.5 87.0   MCH 29.5  --  29.4 28.7   MCHC 32.4*  --  31.1* 33.0*   RDW 53.8*  --  55.2* 56.9*    PLATELETCT 187  --  197 183   MPV 11.9  --  11.0 10.9     Recent Labs     02/23/22  0104 02/24/22  1812 02/25/22  0527   SODIUM 145 141 142   POTASSIUM 3.4* 3.0* 3.8   CHLORIDE 112 109 111   CO2 17* 18* 21   GLUCOSE 109* 129* 78   BUN 57* 46* 42*   CREATININE 1.38 1.54* 1.41*   CALCIUM 8.3* 8.2* 8.2*                   Imaging  CT-HEAD W/O   Final Result      1. No CT evidence of acute infarct, hemorrhage or mass.   2. Moderate global parenchymal atrophy. Chronic small vessel ischemic changes.      EC-ECHOCARDIOGRAM COMPLETE W/ CONT   Final Result      US-RENAL   Final Result      1.  No hydronephrosis.   2.  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      DX-CHEST-PORTABLE (1 VIEW)   Final Result      1.  Emphysema.   2.  Small linear scar or residual subsegmental atelectatic change at the right medial lung base where there was previously extensive consolidation and cavitation. (See CT of the chest 5/22/2021)   3.  No acute infiltrates.         Assessment/Plan  * Acute systolic heart failure (HCC)- (present on admission)  Assessment & Plan  Poor prognosis  Off of comfort care as of 2/24/2022 2/19/2022 echocardiogram suggestive of stress-induced cardiomyopathy with ejection fraction 35%  Patient euvolemic  Start carvedilol  Monitor    NSTEMI (non-ST elevation myocardial infarction) (HCC)- (present on admission)  Assessment & Plan  2/19/2022 echocardiogram suggestive of stress-induced cardiomyopathy with ejection fraction 35%  No chest pain and troponin was trending down  Cardiology signed off as patient not a candidate for catheterization as she is too weak and high risk  Pending repeat labs    Protein-calorie malnutrition, severe (HCC)- (present on admission)  Assessment & Plan  Supplements  Continue mirtazapine  Dietary following, appreciate recommendations    YAMIL (acute kidney injury) (HCC)- (present on admission)  Assessment &  "Plan  Creatinine trending down  Patient euvolemic  Encourage p.o. intake  Avoid nephrotoxin  Renal dose meds  Labs in a.m.    Alcohol abuse- (present on admission)  Assessment & Plan  Recent history of heavy use.   Patient reports quitting \"a couple of weeks\" prior to admission.  Continue thiamine supplementation.  Will Check vitamin B12.    Hypomagnesemia- (present on admission)  Assessment & Plan  Replete as necessary    Encephalopathy acute- (present on admission)  Assessment & Plan  Resolved    Emphysema lung (HCC)  Assessment & Plan  History of  Monitor    GI bleed  Assessment & Plan  Resolved    Hypokalemia- (present on admission)  Assessment & Plan  Replete as necessary    Hypotension- (present on admission)  Assessment & Plan  Resolved    VTE prophylaxis: Heparin prophylaxis    I have performed a physical exam and reviewed and updated ROS and Plan today (2/25/2022). In review of yesterday's note (2/24/2022), there are no changes except as documented above.          "

## 2022-02-25 NOTE — PROGRESS NOTES
Bedside report received and patient care assumed. Pt is sitting up in the chair, A&O4, with no complaints of pain, and is on RA.  All fall precautions are in place, belongings at bedside table.  Pt was updated on POC, no questions or concerns. Pt educated on use of call light for assistance. Will continue to monitor.

## 2022-02-25 NOTE — PROGRESS NOTES
MD Bowles notified of BP 80/52. MAP 61. Per MD ok if MAP is above 50. Per MD redraw hemoglobin post transfusion during morning labs.

## 2022-02-25 NOTE — THERAPY
"Missed Therapy     Patient Name: Jaymie Peacock  Age:  66 y.o., Sex:  female  Medical Record #: [id number]  Today's Date: 2/25/2022 02/25/22 1335   Interdisciplinary Plan of Care Collaboration   Collaboration Comments Attempted to see pt for OT treatment. Pt declined due to fatigue after getting up multiple times today between Physical Therapy and trips to the bathroom with nursing. \"Come back and see me tomorrow.\"     "

## 2022-02-26 LAB
ANION GAP SERPL CALC-SCNC: 9 MMOL/L (ref 7–16)
BUN SERPL-MCNC: 36 MG/DL (ref 8–22)
CALCIUM SERPL-MCNC: 8.5 MG/DL (ref 8.5–10.5)
CHLORIDE SERPL-SCNC: 109 MMOL/L (ref 96–112)
CO2 SERPL-SCNC: 22 MMOL/L (ref 20–33)
CREAT SERPL-MCNC: 1.45 MG/DL (ref 0.5–1.4)
GLUCOSE SERPL-MCNC: 74 MG/DL (ref 65–99)
MAGNESIUM SERPL-MCNC: 2 MG/DL (ref 1.5–2.5)
PHOSPHATE SERPL-MCNC: 3.4 MG/DL (ref 2.5–4.5)
POTASSIUM SERPL-SCNC: 3.7 MMOL/L (ref 3.6–5.5)
SODIUM SERPL-SCNC: 140 MMOL/L (ref 135–145)

## 2022-02-26 PROCEDURE — 700102 HCHG RX REV CODE 250 W/ 637 OVERRIDE(OP): Performed by: STUDENT IN AN ORGANIZED HEALTH CARE EDUCATION/TRAINING PROGRAM

## 2022-02-26 PROCEDURE — 99232 SBSQ HOSP IP/OBS MODERATE 35: CPT | Performed by: STUDENT IN AN ORGANIZED HEALTH CARE EDUCATION/TRAINING PROGRAM

## 2022-02-26 PROCEDURE — 770001 HCHG ROOM/CARE - MED/SURG/GYN PRIV*

## 2022-02-26 PROCEDURE — 84100 ASSAY OF PHOSPHORUS: CPT

## 2022-02-26 PROCEDURE — A9270 NON-COVERED ITEM OR SERVICE: HCPCS | Performed by: STUDENT IN AN ORGANIZED HEALTH CARE EDUCATION/TRAINING PROGRAM

## 2022-02-26 PROCEDURE — 80048 BASIC METABOLIC PNL TOTAL CA: CPT

## 2022-02-26 PROCEDURE — 700111 HCHG RX REV CODE 636 W/ 250 OVERRIDE (IP): Performed by: STUDENT IN AN ORGANIZED HEALTH CARE EDUCATION/TRAINING PROGRAM

## 2022-02-26 PROCEDURE — 83735 ASSAY OF MAGNESIUM: CPT

## 2022-02-26 PROCEDURE — 36415 COLL VENOUS BLD VENIPUNCTURE: CPT

## 2022-02-26 RX ADMIN — OMEPRAZOLE 20 MG: 20 CAPSULE, DELAYED RELEASE ORAL at 05:08

## 2022-02-26 RX ADMIN — HEPARIN SODIUM 5000 UNITS: 5000 INJECTION, SOLUTION INTRAVENOUS; SUBCUTANEOUS at 05:08

## 2022-02-26 RX ADMIN — MIRTAZAPINE 15 MG: 15 TABLET, FILM COATED ORAL at 20:52

## 2022-02-26 RX ADMIN — SENNOSIDES AND DOCUSATE SODIUM 2 TABLET: 50; 8.6 TABLET ORAL at 17:29

## 2022-02-26 RX ADMIN — ASPIRIN 81 MG: 81 TABLET, CHEWABLE ORAL at 17:29

## 2022-02-26 RX ADMIN — CYANOCOBALAMIN TAB 500 MCG 1000 MCG: 500 TAB at 05:08

## 2022-02-26 RX ADMIN — HEPARIN SODIUM 5000 UNITS: 5000 INJECTION, SOLUTION INTRAVENOUS; SUBCUTANEOUS at 14:13

## 2022-02-26 RX ADMIN — HEPARIN SODIUM 5000 UNITS: 5000 INJECTION, SOLUTION INTRAVENOUS; SUBCUTANEOUS at 20:52

## 2022-02-26 RX ADMIN — Medication 100 MG: at 05:08

## 2022-02-26 ASSESSMENT — ENCOUNTER SYMPTOMS
VOMITING: 0
NERVOUS/ANXIOUS: 1
DIZZINESS: 0
FEVER: 0
CARDIOVASCULAR NEGATIVE: 1
MUSCULOSKELETAL NEGATIVE: 1
ABDOMINAL PAIN: 0
MEMORY LOSS: 1
CHILLS: 0
GASTROINTESTINAL NEGATIVE: 1
EYE REDNESS: 1
NAUSEA: 0
SHORTNESS OF BREATH: 0
DIAPHORESIS: 0
NEUROLOGICAL NEGATIVE: 1
RESPIRATORY NEGATIVE: 1

## 2022-02-26 ASSESSMENT — PAIN DESCRIPTION - PAIN TYPE: TYPE: ACUTE PAIN

## 2022-02-26 ASSESSMENT — FIBROSIS 4 INDEX
FIB4 SCORE: 2.08
FIB4 SCORE: 2.08

## 2022-02-26 ASSESSMENT — LIFESTYLE VARIABLES: SUBSTANCE_ABUSE: 1

## 2022-02-26 NOTE — CARE PLAN
The patient is Watcher - Medium risk of patient condition declining or worsening    Shift Goals  Clinical Goals: Safety  Patient Goals: rest  Family Goals: arnie    Progress made toward(s) clinical / shift goals:    Problem: Knowledge Deficit - Standard  Goal: Patient and family/care givers will demonstrate understanding of plan of care, disease process/condition, diagnostic tests and medications  Outcome: Progressing     Problem: Fall Risk  Goal: Patient will remain free from falls  Outcome: Progressing       Patient is not progressing towards the following goals:

## 2022-02-26 NOTE — PROGRESS NOTES
"Palliative Care follow-up  Call back received from Nena.  Discussed Jaymie's improvement and plan for continued treatment and SNF.  Advised Nena that Jaymie not sure of SNF.  Nena states Jaymie promised her she would \"follow the rules and do everything to get better\".  Advised Nena  that Jaymie declined to complete advance directive documents.  Nena will encourage pt to get documents completed.  Nena available to assist and encourage pt as needed.     Thank you for allowing Palliative Care to support this patient and family. Contact [phone] for additional assistance, change in patient status, or with any questions/concerns.   "

## 2022-02-26 NOTE — PROGRESS NOTES
Hospital Medicine Daily Progress Note    Date of Service  2/26/2022    Chief Complaint  Jaymie Peacock is a 66 y.o. female admitted 2/18/2022 with altered mental status    Hospital Course  66-year-old female with history of hypertension and smoking presented 2/18 with altered mental status patient has been living by herself and was able to take care of herself however today her neighbors did check on her and she was sluggish, denied any symptoms with no pain however patient states she was not eating well and no appetite, patient was skinny with malnutrition and dehydrated on admission, labs showed YAMIL, with hypokalemia and hypomagnesemia, also hypotension, IV fluid was started with some improvement in her mentation.  Patient lives by herself and her family lives far away, patient did not want me to call family.  The patient apparently with chronic alcoholism, states she lives alone, appears to have longer period of poor p.o. intake, anorexia, still quite confused and poor historian.     2/20 the patient slowly improving, her mentation is still limited, ANO times 1-2 initially but she can be reoriented, noted to have dark stools with positive occult blood, poor p.o. intake, and patient refuses regular meals, drinks fluids, follow-up H&H without further drop in hemoglobin, electrolytes being replaced, renal function improving, significant malnutrition, U tox negative    2/19 electrolytes replaced, will recheck.  Concerning troponin at 248, repeat 473. Values may be inflated in the setting of acute renal failure. EKG without evidence of acute ischemia. Empiric full dose ASA, high intensity statin, beta blocker ordered. Transferred for telemetry monitoring in the setting of probable NSTEMI. Cardiology consulted and Dr. Norris evaluating, appreciate assistance.  ARF improving with IV fluids.    2/22/2022  I had an extensive conversation with patient's niece (next of kin) Tana regarding patient's clinical condition which  "involved NSTEMI, acute renal failure, altered mental status, and gastrointestinal bleed. Discussed code status as well as hospice. Tana reporting that she has been noticing a decline in her and regarding her mentation prior to being in the hospital.  Tana voiced understanding and would like to discuss this with other family members as well as talk to her aunt prior to making decision. Will discuss on AM.    2/24/2022  Patient reporting today that she would like to pursued medical management to attempt to improve current heart condition.  She stated \"I do not want to die, I want to take medicines and I want to live \".  I had an extensive conversation with patient and niece Chad for which patient and on a understand patient's poor prognosis but will like to continue with medical management.  Patient reporting that she would like to remain as DNR/DNI.    Interval Problem Update  Patient seen and examined today  Sitting in bed watching television, happy  Blood pressure stable today  Patient euvolemic  Pending SNF    I have personally seen and examined the patient at bedside. I discussed the plan of care with patient, bedside RN, charge RN and cardiology.    Consultants/Specialty  cardiology and GI    Code Status  DNAR/DNI    Disposition  Patient is medically cleared for discharge.   Anticipate discharge to SNF    Review of Systems  Review of Systems   Constitutional: Positive for malaise/fatigue. Negative for chills, diaphoresis and fever.   HENT: Negative.    Eyes: Positive for redness.   Respiratory: Negative.  Negative for shortness of breath.    Cardiovascular: Negative.  Negative for chest pain and leg swelling.   Gastrointestinal: Negative.  Negative for abdominal pain, nausea and vomiting.   Genitourinary: Negative.    Musculoskeletal: Negative.    Skin: Negative.    Neurological: Negative.  Negative for dizziness.   Endo/Heme/Allergies: Negative.    Psychiatric/Behavioral: Positive for memory loss and " substance abuse. The patient is nervous/anxious.    All other systems reviewed and are negative.       Physical Exam  Temp:  [36.2 °C (97.1 °F)-36.4 °C (97.6 °F)] 36.2 °C (97.1 °F)  Pulse:  [85-99] 85  Resp:  [16-18] 17  BP: ()/(64-73) 102/72  SpO2:  [92 %-97 %] 92 %    Physical Exam  Constitutional:       Appearance: Normal appearance. She is ill-appearing.      Comments: Frail and cachectic   HENT:      Head: Normocephalic and atraumatic.      Mouth/Throat:      Mouth: Mucous membranes are dry.   Eyes:      Extraocular Movements: Extraocular movements intact.      Pupils: Pupils are equal, round, and reactive to light.   Cardiovascular:      Rate and Rhythm: Normal rate and regular rhythm.   Pulmonary:      Effort: No respiratory distress.   Abdominal:      General: Abdomen is flat. Bowel sounds are normal. There is no distension.      Palpations: Abdomen is soft.      Tenderness: There is no abdominal tenderness. There is no guarding or rebound.   Musculoskeletal:      Cervical back: Normal range of motion and neck supple.      Right lower leg: No edema.      Left lower leg: No edema.   Skin:     General: Skin is warm and dry.   Neurological:      Mental Status: She is alert and oriented to person, place, and time.      Cranial Nerves: No cranial nerve deficit.   Psychiatric:         Mood and Affect: Mood normal.         Behavior: Behavior normal.         Thought Content: Thought content normal.         Judgment: Judgment normal.         Fluids    Intake/Output Summary (Last 24 hours) at 2/26/2022 1436  Last data filed at 2/25/2022 1700  Gross per 24 hour   Intake 100 ml   Output --   Net 100 ml       Laboratory  Recent Labs     02/24/22 1812 02/25/22 0527   WBC 4.9 4.5*   RBC 2.35* 3.00*   HEMOGLOBIN 6.9* 8.6*   HEMATOCRIT 22.2* 26.1*   MCV 94.5 87.0   MCH 29.4 28.7   MCHC 31.1* 33.0*   RDW 55.2* 56.9*   PLATELETCT 197 183   MPV 11.0 10.9     Recent Labs     02/24/22 1812 02/25/22 0527 02/26/22  0415    SODIUM 141 142 140   POTASSIUM 3.0* 3.8 3.7   CHLORIDE 109 111 109   CO2 18* 21 22   GLUCOSE 129* 78 74   BUN 46* 42* 36*   CREATININE 1.54* 1.41* 1.45*   CALCIUM 8.2* 8.2* 8.5                   Imaging  CT-HEAD W/O   Final Result      1. No CT evidence of acute infarct, hemorrhage or mass.   2. Moderate global parenchymal atrophy. Chronic small vessel ischemic changes.      EC-ECHOCARDIOGRAM COMPLETE W/ CONT   Final Result      US-RENAL   Final Result      1.  No hydronephrosis.   2.  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      DX-CHEST-PORTABLE (1 VIEW)   Final Result      1.  Emphysema.   2.  Small linear scar or residual subsegmental atelectatic change at the right medial lung base where there was previously extensive consolidation and cavitation. (See CT of the chest 5/22/2021)   3.  No acute infiltrates.         Assessment/Plan  * Acute systolic heart failure (HCC)- (present on admission)  Assessment & Plan  Poor prognosis  Off of comfort care as of 2/24/2022 2/19/2022 echocardiogram suggestive of stress-induced cardiomyopathy with ejection fraction 35%  Patient euvolemic  Start carvedilol  Monitor    NSTEMI (non-ST elevation myocardial infarction) (HCC)- (present on admission)  Assessment & Plan  2/19/2022 echocardiogram suggestive of stress-induced cardiomyopathy with ejection fraction 35%  No chest pain and troponin was trending down  Cardiology signed off as patient not a candidate for catheterization as she is too weak and high risk  Pending repeat labs    Protein-calorie malnutrition, severe (HCC)- (present on admission)  Assessment & Plan  Supplements  Continue mirtazapine  Dietary following, appreciate recommendations    YAMIL (acute kidney injury) (HCC)- (present on admission)  Assessment & Plan  Creatinine trending down  Patient euvolemic  Encourage p.o. intake  Avoid nephrotoxin  Renal dose meds  Labs in a.m.    Alcohol  "abuse- (present on admission)  Assessment & Plan  Recent history of heavy use.   Patient reports quitting \"a couple of weeks\" prior to admission.  Continue thiamine supplementation.  Will Check vitamin B12.    Hypomagnesemia- (present on admission)  Assessment & Plan  Replete as necessary    Encephalopathy acute- (present on admission)  Assessment & Plan  Resolved    Emphysema lung (HCC)  Assessment & Plan  History of  Monitor    GI bleed  Assessment & Plan  Resolved    Hypokalemia- (present on admission)  Assessment & Plan  Replete as necessary    Hypotension- (present on admission)  Assessment & Plan  Resolved    VTE prophylaxis: Heparin prophylaxis    I have performed a physical exam and reviewed and updated ROS and Plan today (2/26/2022). In review of yesterday's note (2/25/2022), there are no changes except as documented above.          "

## 2022-02-26 NOTE — CARE PLAN
Problem: Knowledge Deficit - Standard  Goal: Patient and family/care givers will demonstrate understanding of plan of care, disease process/condition, diagnostic tests and medications  Outcome: Progressing     Problem: Skin Integrity  Goal: Skin integrity is maintained or improved  Outcome: Progressing     Problem: Fall Risk  Goal: Patient will remain free from falls  Outcome: Progressing   The patient is Stable - Low risk of patient condition declining or worsening    Shift Goals  Clinical Goals: safety  Patient Goals: rest  Family Goals: arnie    Progress made toward(s) clinical / shift goals:  Pt educated on plan of care for tonight, bed alarm in place, pt turns self with encouragement from staff    Patient is not progressing towards the following goals:N/A    Assumed care of patient at change of shift.   Report received at bedside rounding  Patient is calm, in bed, with no distress noted.  Call light and patient belongings are in reach, bed is locked and in lowest position- hourly rounding is in place. See flow sheets for further assessment.

## 2022-02-26 NOTE — THERAPY
Physical Therapy   Initial Evaluation     Patient Name: Jaymie Peacock  Age:  66 y.o., Sex:  female  Medical Record #: [id number]  Today's Date: 2/25/2022     Precautions  Precautions: Fall Risk    Assessment  Patient is 66 y.o. female presenting for AMS, NSTEMI, acute systolic HF, and FTT.  The pt was seen for evaluation for a 2nd time d/t significant improvement in cognition (however still somewhat delayed) and  functional mobility.  She was able to provide an accurate PLOF and home setup (see flowsheet) and reportedly lives alone w/ limited support available.  She is currently limited in functional independence by limited muscular and CV endurance.  The pt was able to demo supine<>sit eob w/ hob elevated and use of bed rails spv, along w/ STS eob w/ fww Amanda.  She demo'd gait distance 10' around foot of bed using fww Amanda for stability via slow angelia, limited toe clearance, and decreased BUDDY w/ no LOB observed, distance limited by fatigue.  Recommend placement given pt's inability to care for self and high risk of future falls at this time.  Will follow for acute PT needs.      Plan    Recommend Physical Therapy 4 times per week until therapy goals are met for the following treatments:  Bed Mobility, Community Re-integration, Equipment, Gait Training, Manual Therapy, Neuro Re-Education / Balance, Orthotics Training, Self Care/Home Evaluation, Stair Training, Therapeutic Activities, and Therapeutic Exercises    DC Equipment Recommendations: Unable to determine at this time  Discharge Recommendations: Recommend post-acute placement for additional physical therapy services prior to discharge home       Subjective/Objective       02/25/22 0952   Prior Living Situation   Prior Services Home-Independent   Housing / Facility [address] [facility] [address]   Steps Into Home 0  (pt previously reported 8-10 steps)   Steps In Home 0  (pt previoiusly reports fos to basement)   Equipment Owned Front-Wheel Walker   Lives with - Patient's Self  Care Capacity Alone and Unable to Care For Self   Comments pt reporting previously independent, friend who assists w/ finances and transportation   Prior Level of Functional Mobility   Bed Mobility Independent   Transfer Status Independent   Ambulation Independent   Distance Ambulation (Feet)   (community distances)   Assistive Devices Used None   Stairs Independent   History of Falls   History of Falls No   Date of Last Fall   (pt reporting that she has had no recent falls)   Cognition    Cognition / Consciousness X   Level of Consciousness Alert   Comments pt pleasant and cooperative, however somewhat delayed and requiring consistent cuing for mobility tasks   Passive ROM Lower Body   Passive ROM Lower Body WDL   Active ROM Lower Body    Active ROM Lower Body  WDL   Comments observed during functional mobility   Strength Lower Body   Lower Body Strength  X   Comments generalized weakness BLE   Sensation Lower Body   Lower Extremity Sensation   WDL   Comments sensation intact to LT/DP BLE   Coordination Lower Body    Coordination Lower Body  X   Toe Tapping Right Impaired   Toe Tapping Left Impaired   Balance Assessment   Sitting Balance (Static) Fair   Sitting Balance (Dynamic) Fair   Standing Balance (Static) Poor +   Standing Balance (Dynamic) Poor   Weight Shift Sitting Fair   Weight Shift Standing Poor   Comments stand w/ fww   Gait Analysis   Gait Level Of Assist Minimal Assist   Assistive Device Front Wheel Walker   Distance (Feet) 10   # of Times Distance was Traveled 1   Deviation Decreased Base Of Support;Bradykinetic;Decreased Toe Off   # of Stairs Climbed 0   Weight Bearing Status no restrictions   Comments distance limited by weakness and fatigue   Bed Mobility    Supine to Sit Supervised   Sit to Supine Supervised   Scooting Supervised   Rolling Supervised   Comments hob elevated, use of bed rails   Functional Mobility   Sit to Stand Minimal Assist   Bed, Chair, Wheelchair Transfer Minimal Assist    Transfer Method Stand Step   Mobility STS eob w/ fww   Activity Tolerance   Sitting Edge of Bed 15min   Standing 5min   Edema / Skin Assessment   Edema / Skin  Not Assessed   Short Term Goals    Short Term Goal # 1 Pt will demo supine<>sit eob w/ hob flat and no bed rails spv in 6 visits for independence w/ bed mobility.   Short Term Goal # 2 Pt will demo stand step txr eob<>chair w/ fww spv in 6 visits for functional mobility tasks.   Short Term Goal # 3 Pt will demo gait distance >150' using fww spv in 6 visits for household ambulation   Education Group   Education Provided Role of Physical Therapist   Role of Physical Therapist Patient Response Patient;Acceptance;Explanation;Demonstration;Action Demonstration   Additional Comments pt receptive of edu provided   Problem List    Problems Impaired Transfers;Impaired Ambulation;Impaired Bed Mobility;Functional Strength Deficit;Impaired Balance;Impaired Coordination;Decreased Activity Tolerance;Safety Awareness Deficits / Cognition   Session Information   Date / Session Number  2/25- 1 (2/3, 2/27)

## 2022-02-26 NOTE — PROGRESS NOTES
"Palliative Care follow-up  Pt awake and alert. Pt's friends Irwin and Esthela are at bedside helping pay pt's bills. Reintroduced self and described the support of Palliative Care.  Asked Jaymie about her goals and plan of care.  Jaymie not sure she wants to go to a SNF.  Jaymie reports wanting to try to get better. Reported this writers  prior conversations with pt and Nena. Jaymie reports Nena would be the one to help with decisions if she could not.  \"We would talk about it\".  Pt educated about advance directives and POLST form.  Offered to complete DPOA-HC and POLST form with pt.  Pt declined \"can we do it tomorrow I've had too much today\".  Support provided. Pt gave permission for this writer to contact Nena to provide an update.    Call place to Nena.  No answer message left to call Palliative Care for any questions or update.     Plan: Palliative Care to follow up with pt prior to discharge to execute advance directives and/or POLST form.  Will continue to provide additional support as needed.     Thank you for allowing Palliative Care to support this patient and family. Contact [phone] for additional assistance, change in patient status, or with any questions/concerns.   "

## 2022-02-27 LAB
ANION GAP SERPL CALC-SCNC: 14 MMOL/L (ref 7–16)
ANISOCYTOSIS BLD QL SMEAR: ABNORMAL
BASOPHILS # BLD AUTO: 0 % (ref 0–1.8)
BASOPHILS # BLD: 0 K/UL (ref 0–0.12)
BUN SERPL-MCNC: 33 MG/DL (ref 8–22)
BURR CELLS BLD QL SMEAR: NORMAL
CALCIUM SERPL-MCNC: 8.6 MG/DL (ref 8.5–10.5)
CHLORIDE SERPL-SCNC: 110 MMOL/L (ref 96–112)
CO2 SERPL-SCNC: 16 MMOL/L (ref 20–33)
CREAT SERPL-MCNC: 1.37 MG/DL (ref 0.5–1.4)
EOSINOPHIL # BLD AUTO: 0.11 K/UL (ref 0–0.51)
EOSINOPHIL NFR BLD: 1.9 % (ref 0–6.9)
ERYTHROCYTE [DISTWIDTH] IN BLOOD BY AUTOMATED COUNT: 65.7 FL (ref 35.9–50)
GLUCOSE SERPL-MCNC: 98 MG/DL (ref 65–99)
HCT VFR BLD AUTO: 30.8 % (ref 37–47)
HGB BLD-MCNC: 9.1 G/DL (ref 12–16)
HYPOCHROMIA BLD QL SMEAR: ABNORMAL
LYMPHOCYTES # BLD AUTO: 0.67 K/UL (ref 1–4.8)
LYMPHOCYTES NFR BLD: 11.7 % (ref 22–41)
MAGNESIUM SERPL-MCNC: 1.7 MG/DL (ref 1.5–2.5)
MANUAL DIFF BLD: NORMAL
MCH RBC QN AUTO: 28.9 PG (ref 27–33)
MCHC RBC AUTO-ENTMCNC: 29.5 G/DL (ref 33.6–35)
MCV RBC AUTO: 97.8 FL (ref 81.4–97.8)
MICROCYTES BLD QL SMEAR: ABNORMAL
MONOCYTES # BLD AUTO: 0.33 K/UL (ref 0–0.85)
MONOCYTES NFR BLD AUTO: 5.8 % (ref 0–13.4)
MORPHOLOGY BLD-IMP: NORMAL
NEUTROPHILS # BLD AUTO: 4.59 K/UL (ref 2–7.15)
NEUTROPHILS NFR BLD: 80.6 % (ref 44–72)
NRBC # BLD AUTO: 0 K/UL
NRBC BLD-RTO: 0 /100 WBC
OVALOCYTES BLD QL SMEAR: NORMAL
PHOSPHATE SERPL-MCNC: 3.5 MG/DL (ref 2.5–4.5)
PLATELET # BLD AUTO: 251 K/UL (ref 164–446)
PLATELET BLD QL SMEAR: NORMAL
PMV BLD AUTO: 10.3 FL (ref 9–12.9)
POIKILOCYTOSIS BLD QL SMEAR: NORMAL
POTASSIUM SERPL-SCNC: 3.7 MMOL/L (ref 3.6–5.5)
RBC # BLD AUTO: 3.15 M/UL (ref 4.2–5.4)
RBC BLD AUTO: PRESENT
SCHISTOCYTES BLD QL SMEAR: NORMAL
SODIUM SERPL-SCNC: 140 MMOL/L (ref 135–145)
WBC # BLD AUTO: 5.7 K/UL (ref 4.8–10.8)

## 2022-02-27 PROCEDURE — 99232 SBSQ HOSP IP/OBS MODERATE 35: CPT | Performed by: STUDENT IN AN ORGANIZED HEALTH CARE EDUCATION/TRAINING PROGRAM

## 2022-02-27 PROCEDURE — 36415 COLL VENOUS BLD VENIPUNCTURE: CPT

## 2022-02-27 PROCEDURE — 85007 BL SMEAR W/DIFF WBC COUNT: CPT

## 2022-02-27 PROCEDURE — 83735 ASSAY OF MAGNESIUM: CPT

## 2022-02-27 PROCEDURE — 700111 HCHG RX REV CODE 636 W/ 250 OVERRIDE (IP): Performed by: STUDENT IN AN ORGANIZED HEALTH CARE EDUCATION/TRAINING PROGRAM

## 2022-02-27 PROCEDURE — A9270 NON-COVERED ITEM OR SERVICE: HCPCS | Performed by: STUDENT IN AN ORGANIZED HEALTH CARE EDUCATION/TRAINING PROGRAM

## 2022-02-27 PROCEDURE — 80048 BASIC METABOLIC PNL TOTAL CA: CPT

## 2022-02-27 PROCEDURE — 85027 COMPLETE CBC AUTOMATED: CPT

## 2022-02-27 PROCEDURE — 700102 HCHG RX REV CODE 250 W/ 637 OVERRIDE(OP): Performed by: STUDENT IN AN ORGANIZED HEALTH CARE EDUCATION/TRAINING PROGRAM

## 2022-02-27 PROCEDURE — 84100 ASSAY OF PHOSPHORUS: CPT

## 2022-02-27 PROCEDURE — 770001 HCHG ROOM/CARE - MED/SURG/GYN PRIV*

## 2022-02-27 RX ADMIN — SENNOSIDES AND DOCUSATE SODIUM 2 TABLET: 50; 8.6 TABLET ORAL at 04:42

## 2022-02-27 RX ADMIN — OMEPRAZOLE 20 MG: 20 CAPSULE, DELAYED RELEASE ORAL at 04:42

## 2022-02-27 RX ADMIN — HEPARIN SODIUM 5000 UNITS: 5000 INJECTION, SOLUTION INTRAVENOUS; SUBCUTANEOUS at 21:34

## 2022-02-27 RX ADMIN — Medication 100 MG: at 04:42

## 2022-02-27 RX ADMIN — CYANOCOBALAMIN TAB 500 MCG 1000 MCG: 500 TAB at 04:42

## 2022-02-27 RX ADMIN — CARVEDILOL 3.12 MG: 3.12 TABLET, FILM COATED ORAL at 16:44

## 2022-02-27 RX ADMIN — ASPIRIN 81 MG: 81 TABLET, CHEWABLE ORAL at 16:44

## 2022-02-27 RX ADMIN — HEPARIN SODIUM 5000 UNITS: 5000 INJECTION, SOLUTION INTRAVENOUS; SUBCUTANEOUS at 04:42

## 2022-02-27 RX ADMIN — MIRTAZAPINE 15 MG: 15 TABLET, FILM COATED ORAL at 21:34

## 2022-02-27 ASSESSMENT — ENCOUNTER SYMPTOMS
DIAPHORESIS: 0
CHILLS: 0
FEVER: 0
NERVOUS/ANXIOUS: 1
RESPIRATORY NEGATIVE: 1
CARDIOVASCULAR NEGATIVE: 1
DIZZINESS: 0
NEUROLOGICAL NEGATIVE: 1
VOMITING: 0
MUSCULOSKELETAL NEGATIVE: 1
EYE REDNESS: 1
MEMORY LOSS: 1
GASTROINTESTINAL NEGATIVE: 1
SHORTNESS OF BREATH: 0
NAUSEA: 0
ABDOMINAL PAIN: 0

## 2022-02-27 ASSESSMENT — PAIN DESCRIPTION - PAIN TYPE: TYPE: ACUTE PAIN

## 2022-02-27 ASSESSMENT — LIFESTYLE VARIABLES: SUBSTANCE_ABUSE: 1

## 2022-02-27 ASSESSMENT — FIBROSIS 4 INDEX: FIB4 SCORE: 1.52

## 2022-02-27 NOTE — WOUND TEAM
[facility]  Inpatient Services  Wound and Skin Care Re-Evaluation    Admission Date: 2/18/2022     Last order of IP CONSULT TO WOUND CARE was found on 2/19/2022 from Hospital Encounter on 2/18/2022     HPI, PMH, SH: Reviewed    Past Surgical History:   Procedure Laterality Date   • US-NEEDLE CORE BX-BREAST PANEL Right      Social History     Tobacco Use   • Smoking status: Former Smoker     Packs/day: 0.50     Types: Cigarettes   • Smokeless tobacco: Never Used   Substance Use Topics   • Alcohol use: Not Currently     Comment: Stopped 12 days ago     Chief Complaint   Patient presents with   • ALOC     Pt noted by friends to be disoriented. AOx3, disoriented to situation   • Failure to Thrive     Pt noted to be found in bed, not eating, or drinking. Pt disheveled     Diagnosis: YAMIL (acute kidney injury) (HCC) [N17.9]    Unit where seen by Wound Team: [id number]/01     WOUND CONSULT/FOLLOW UP RELATED TO:  Sacrum     WOUND HISTORY:  66-year-old female with history of hypertension and smoking presented 2/18 with altered mental status patient has been living by herself and was able to take care of herself however today her neighbors did check on her and she was sluggish, denied any symptoms with no pain however patient states she was not eating well and no appetite, patient was skinny with malnutrition and dehydrated on admission, labs showed YAMIL, with hypokalemia and hypomagnesemia, also hypotension, IV fluid was started with some improvement in her mentation.  Patient lives by herself and her family lives far away, patient did not want me to call family.  Patient is incontinent    WOUND ASSESSMENT/LDA       Wound 02/19/22 Pressure Injury Sacrum POA (Active)   Wound Image       ^^CURRENT PHOTO^^                                      ^^PREVIOUS PHOTO FROM ADMIT ^^   02/24/22 1630   Site Assessment Red    Periwound Assessment Clean;Dry;Intact;Scar tissue    Margins Attached edges;Defined edges    Closure  Adhesive bandage    Drainage Amount None    Treatments Zinc - oxide paste;Site care;Offloading;Cleansed    Wound Cleansing Foam Cleanser/Washcloth    Periwound Protectant Viscopaste;Barrier Paste;Stoma Powder    Dressing Cleansing/Solutions Not Applicable    Dressing Options Viscopaste    Dressing Changed Changed    Dressing Status Clean;Dry;Intact    Dressing Change/Treatment Frequency Daily, and As Needed    NEXT Dressing Change/Treatment Date 02/28/22    NEXT Weekly Photo (Inpatient Only) 03/06/22    Pressure Injury Stage 3    Non-staged Wound Description Not applicable    Shape Irregular    Wound Odor None    Pulses N/A    Exposed Structures PATTI    WOUND NURSE ONLY - Time Spent with Patient (mins) 60      Vascular:    CAM:   No results found.    Lab Values:    Lab Results   Component Value Date/Time    WBC 5.7 02/27/2022 09:25 AM    RBC 3.15 (L) 02/27/2022 09:25 AM    HEMOGLOBIN 9.1 (L) 02/27/2022 09:25 AM    HEMATOCRIT 30.8 (L) 02/27/2022 09:25 AM      Culture Results show:  No results found for this or any previous visit (from the past 720 hour(s)).    Pain Level/Medicated:  Pt agitated and wanting to leave AMMD ENEDINA and RN at bedside to discuss with patient.      INTERVENTIONS BY WOUND TEAM:  Chart and images reviewed. Discussed with bedside RN. All areas of concern (based on picture review, LDA review and discussion with bedside RN) have been thoroughly assessed. Documentation of areas based on significant findings. This RN in to assess patient. Performed standard wound care which includes appropriate positioning, dressing removal and non-selective debridement. Pictures and measurements obtained weekly if/when required.  Preparation for Dressing removal: NA  Non-selectively Debrided with:  soap and water and gauze.  Sharp debridement: n/a  Debra wound: Cleansed with soap and water, Prepped with barrier paste  Primary Dressing: Stoma powder, barrier paste, viscopaste  Secondary (Outer) Dressing: offloading May  apply mepilex if pt able to maintain dressing.    Interdisciplinary consultation: Patient, Bedside RN (Edy)    EVALUATION / RATIONALE FOR TREATMENT:  Most Recent Date:  2/27/22: Wound team received new consult regarding wound. Area appears significantly improved. Continued with POC due to incontinence and confusion. Pt unsteady and high fall risk as well.    2/21/22: MASD noted, patient is incontinent and was soiled upon arrival.  Cleaned patient and changed linens.  Barrier cream applied, some discoloration was noted to area near Coccyx, patient is at high risk for development of DTI.  Low air loss bed ordered.       Goals: Steady decrease in wound area and depth weekly.    WOUND TEAM PLAN OF CARE ([X] for frequency of wound follow up,): X  Nursing to follow orders written for wound care. Contact wound team if area fails to progress, deteriorates or with any questions/concerns  Dressing changes by wound team:                   Follow up 3 times weekly:                NPWT change 3 times weekly:     Follow up 1-2 times weekly:      Follow up Bi-Monthly:                   Follow up as needed:   PLease re-consult if area worsens.    Other (explain):     NURSING PLAN OF CARE ORDERS (X):  Dressing changes: See Dressing Care orders: X  Skin care: See Skin Care orders: X  RN Prevention Protocol: X  Rectal tube care: See Rectal Tube Care orders:   Other orders:    RSKIN:   CURRENTLY IN PLACE (X), APPLIED THIS VISIT (A), ORDERED (O):   Q shift Forrest:  X  Q shift pressure point assessments:  X    Surface/Positioning X  Pressure redistribution mattress     X       Low Airloss          Bariatric foam      Bariatric LAURA     Waffle cushion        Waffle Overlay      X    Reposition q 2 hours X     TAPs Turning system     Z Xiang Pillow     Offloading/Redistribution contraindicated  Sacral Mepilex (Silicone dressing)     Heel Mepilex (Silicone dressing)         Heel float boots (Prevalon boot)             Float Heels off Bed  with Pillows           Respiratory Room air  Silicone O2 tubing         Gray Foam Ear protectors     Cannula fixation Device (Tender )          High flow offloading Clip    Elastic head band offloading device      Anchorfast                                                         Trach with Optifoam split foam             Containment/Moisture Prevention X    Rectal tube or BMS    Purwick/Condom Cath     X   Ahumada Catheter    Barrier wipes       X    Barrier paste     X  Antifungal tx      Interdry        Mobilization PATTI      Up to chair        Ambulate      PT/OT      Nutrition PO      Dietician        Diabetes Education      PO     TF     TPN     NPO   # days     Other        Anticipated discharge plans: PATTI  LTACH:        SNF/Rehab:                  Home Health Care:           Outpatient Wound Center:            Self/Family Care:        Other:                  Vac Discharge Needs:   Not Applicable Pt not on a wound vac:   X    Regular Vac while inpatient, alternative dressing at DC:        Regular Vac in use and continued at DC:            Reg. Vac w/ Skin Sub/Biologic in use. Will need to be changed 2x wkly:      Veraflo Vac while inpatient, ok to transition to Regular Vac on Discharge:           Veraflo Vac while inpatient, will need to remain on Veraflo Vac upon discharge:

## 2022-02-27 NOTE — PROGRESS NOTES
Report received from nightshift RN, assumed care of pt. Pt A&Ox3, in no apparent distress, reports no pain. Tele box on, rhythm verified. Plan of care discussed with pt, labs and chart reviewed. Bed locked and in lowest position, call light and belongings within reach. No further needs at this time, will continue to monitor.

## 2022-02-27 NOTE — CARE PLAN
The patient is Stable - Low risk of patient condition declining or worsening    Shift Goals  Clinical Goals: safety  Patient Goals: rest and discharge  Family Goals: arnie    Progress made toward(s) clinical / shift goals:      Problem: Skin Integrity  Goal: Skin integrity is maintained or improved  Outcome: Progressing       Patient is not progressing towards the following goals:

## 2022-02-27 NOTE — CARE PLAN
Problem: Skin Integrity  Goal: Skin integrity is maintained or improved  Outcome: Progressing   The patient is Watcher - Medium risk of patient condition declining or worsening    Shift Goals  Clinical Goals: safety  Patient Goals: sleep  Family Goals: na    Progress made toward(s) clinical / shift goals:  yes    Patient is not progressing towards the following goals:

## 2022-02-27 NOTE — PROGRESS NOTES
Hospital Medicine Daily Progress Note    Date of Service  2/27/2022    Chief Complaint  Jaymie Peacock is a 66 y.o. female admitted 2/18/2022 with altered mental status    Hospital Course  66-year-old female with history of hypertension and smoking presented 2/18 with altered mental status patient has been living by herself and was able to take care of herself however today her neighbors did check on her and she was sluggish, denied any symptoms with no pain however patient states she was not eating well and no appetite, patient was skinny with malnutrition and dehydrated on admission, labs showed YAMIL, with hypokalemia and hypomagnesemia, also hypotension, IV fluid was started with some improvement in her mentation.  Patient lives by herself and her family lives far away, patient did not want me to call family.  The patient apparently with chronic alcoholism, states she lives alone, appears to have longer period of poor p.o. intake, anorexia, still quite confused and poor historian.     2/20 the patient slowly improving, her mentation is still limited, ANO times 1-2 initially but she can be reoriented, noted to have dark stools with positive occult blood, poor p.o. intake, and patient refuses regular meals, drinks fluids, follow-up H&H without further drop in hemoglobin, electrolytes being replaced, renal function improving, significant malnutrition, U tox negative    2/19 electrolytes replaced, will recheck.  Concerning troponin at 248, repeat 473. Values may be inflated in the setting of acute renal failure. EKG without evidence of acute ischemia. Empiric full dose ASA, high intensity statin, beta blocker ordered. Transferred for telemetry monitoring in the setting of probable NSTEMI. Cardiology consulted and Dr. Norris evaluating, appreciate assistance.  ARF improving with IV fluids.    2/22/2022  I had an extensive conversation with patient's niece (next of kin) Tana regarding patient's clinical condition which  "involved NSTEMI, acute renal failure, altered mental status, and gastrointestinal bleed. Discussed code status as well as hospice. Tana reporting that she has been noticing a decline in her and regarding her mentation prior to being in the hospital.  Tana voiced understanding and would like to discuss this with other family members as well as talk to her aunt prior to making decision. Will discuss on AM.    2/24/2022  Patient reporting today that she would like to pursued medical management to attempt to improve current heart condition.  She stated \"I do not want to die, I want to take medicines and I want to live \".  I had an extensive conversation with patient and nidawit Bonilla for which patient and on a understand patient's poor prognosis but will like to continue with medical management.  Patient reporting that she would like to remain as DNR/DNI.    Interval Problem Update  Patient seen and examined today  Patient angry this morning, wanting to go home  Blood pressure stable today  Hemoglobin and renal function improving  Patient euvolemic  Pending SNF    I have personally seen and examined the patient at bedside. I discussed the plan of care with patient, bedside RN, charge RN and cardiology.    Consultants/Specialty  cardiology and GI    Code Status  DNAR/DNI    Disposition  Patient is medically cleared for discharge.   Anticipate discharge to SNF    Review of Systems  Review of Systems   Constitutional: Positive for malaise/fatigue. Negative for chills, diaphoresis and fever.   HENT: Negative.    Eyes: Positive for redness.   Respiratory: Negative.  Negative for shortness of breath.    Cardiovascular: Negative.  Negative for chest pain and leg swelling.   Gastrointestinal: Negative.  Negative for abdominal pain, nausea and vomiting.   Genitourinary: Negative.    Musculoskeletal: Negative.    Skin: Negative.    Neurological: Negative.  Negative for dizziness.   Endo/Heme/Allergies: Negative.  "   Psychiatric/Behavioral: Positive for memory loss and substance abuse. The patient is nervous/anxious.    All other systems reviewed and are negative.       Physical Exam  Temp:  [36 °C (96.8 °F)-36.6 °C (97.8 °F)] 36 °C (96.8 °F)  Pulse:  [] 149  Resp:  [16-18] 17  BP: ()/(68-87) 110/73  SpO2:  [98 %-99 %] 98 %    Physical Exam  Constitutional:       Appearance: Normal appearance. She is ill-appearing.      Comments: Frail and cachectic   HENT:      Head: Normocephalic and atraumatic.      Mouth/Throat:      Mouth: Mucous membranes are dry.   Eyes:      Extraocular Movements: Extraocular movements intact.      Pupils: Pupils are equal, round, and reactive to light.   Cardiovascular:      Rate and Rhythm: Normal rate and regular rhythm.   Pulmonary:      Effort: No respiratory distress.   Abdominal:      General: Abdomen is flat. Bowel sounds are normal. There is no distension.      Palpations: Abdomen is soft.      Tenderness: There is no abdominal tenderness. There is no guarding or rebound.   Musculoskeletal:      Cervical back: Normal range of motion and neck supple.      Right lower leg: No edema.      Left lower leg: No edema.   Skin:     General: Skin is warm and dry.   Neurological:      Mental Status: She is alert and oriented to person, place, and time.      Cranial Nerves: No cranial nerve deficit.   Psychiatric:         Mood and Affect: Mood normal.         Behavior: Behavior normal.         Thought Content: Thought content normal.         Judgment: Judgment normal.         Fluids  No intake or output data in the 24 hours ending 02/27/22 1450    Laboratory  Recent Labs     02/24/22  1812 02/25/22 0527 02/27/22  0925   WBC 4.9 4.5* 5.7   RBC 2.35* 3.00* 3.15*   HEMOGLOBIN 6.9* 8.6* 9.1*   HEMATOCRIT 22.2* 26.1* 30.8*   MCV 94.5 87.0 97.8   MCH 29.4 28.7 28.9   MCHC 31.1* 33.0* 29.5*   RDW 55.2* 56.9* 65.7*   PLATELETCT 197 183 251   MPV 11.0 10.9 10.3     Recent Labs     02/25/22 0527  02/26/22  0415 02/27/22  0925   SODIUM 142 140 140   POTASSIUM 3.8 3.7 3.7   CHLORIDE 111 109 110   CO2 21 22 16*   GLUCOSE 78 74 98   BUN 42* 36* 33*   CREATININE 1.41* 1.45* 1.37   CALCIUM 8.2* 8.5 8.6                   Imaging  CT-HEAD W/O   Final Result      1. No CT evidence of acute infarct, hemorrhage or mass.   2. Moderate global parenchymal atrophy. Chronic small vessel ischemic changes.      EC-ECHOCARDIOGRAM COMPLETE W/ CONT   Final Result      US-RENAL   Final Result      1.  No hydronephrosis.   2.  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      DX-CHEST-PORTABLE (1 VIEW)   Final Result      1.  Emphysema.   2.  Small linear scar or residual subsegmental atelectatic change at the right medial lung base where there was previously extensive consolidation and cavitation. (See CT of the chest 5/22/2021)   3.  No acute infiltrates.         Assessment/Plan  * Acute systolic heart failure (HCC)- (present on admission)  Assessment & Plan  Poor prognosis  Off of comfort care as of 2/24/2022 2/19/2022 echocardiogram suggestive of stress-induced cardiomyopathy with ejection fraction 35%  Patient euvolemic  Start carvedilol  Monitor    NSTEMI (non-ST elevation myocardial infarction) (HCC)- (present on admission)  Assessment & Plan  2/19/2022 echocardiogram suggestive of stress-induced cardiomyopathy with ejection fraction 35%  No chest pain and troponin was trending down  Cardiology signed off as patient not a candidate for catheterization as she is too weak and high risk  Pending repeat labs    Protein-calorie malnutrition, severe (HCC)- (present on admission)  Assessment & Plan  Supplements  Continue mirtazapine  Dietary following, appreciate recommendations    YAMIL (acute kidney injury) (HCC)- (present on admission)  Assessment & Plan  Creatinine trending down  Patient euvolemic  Encourage p.o. intake  Avoid nephrotoxin  Renal dose  "meds  Labs in a.m.    Alcohol abuse- (present on admission)  Assessment & Plan  Recent history of heavy use.   Patient reports quitting \"a couple of weeks\" prior to admission.  Continue thiamine supplementation.  Will Check vitamin B12.    Hypomagnesemia- (present on admission)  Assessment & Plan  Replete as necessary    Encephalopathy acute- (present on admission)  Assessment & Plan  Resolved    Emphysema lung (HCC)  Assessment & Plan  History of  Monitor    GI bleed  Assessment & Plan  Resolved    Hypokalemia- (present on admission)  Assessment & Plan  Replete as necessary    Hypotension- (present on admission)  Assessment & Plan  Resolved    VTE prophylaxis: Heparin prophylaxis    I have performed a physical exam and reviewed and updated ROS and Plan today (2/27/2022). In review of yesterday's note (2/26/2022), there are no changes except as documented above.          "

## 2022-02-28 VITALS
OXYGEN SATURATION: 95 % | BODY MASS INDEX: 19.24 KG/M2 | SYSTOLIC BLOOD PRESSURE: 114 MMHG | HEIGHT: 66 IN | RESPIRATION RATE: 17 BRPM | TEMPERATURE: 97.7 F | HEART RATE: 85 BPM | DIASTOLIC BLOOD PRESSURE: 74 MMHG | WEIGHT: 119.71 LBS

## 2022-02-28 PROCEDURE — 700111 HCHG RX REV CODE 636 W/ 250 OVERRIDE (IP): Performed by: STUDENT IN AN ORGANIZED HEALTH CARE EDUCATION/TRAINING PROGRAM

## 2022-02-28 PROCEDURE — 97116 GAIT TRAINING THERAPY: CPT

## 2022-02-28 PROCEDURE — 90662 IIV NO PRSV INCREASED AG IM: CPT | Performed by: STUDENT IN AN ORGANIZED HEALTH CARE EDUCATION/TRAINING PROGRAM

## 2022-02-28 PROCEDURE — 97110 THERAPEUTIC EXERCISES: CPT

## 2022-02-28 PROCEDURE — A9270 NON-COVERED ITEM OR SERVICE: HCPCS | Performed by: STUDENT IN AN ORGANIZED HEALTH CARE EDUCATION/TRAINING PROGRAM

## 2022-02-28 PROCEDURE — 99239 HOSP IP/OBS DSCHRG MGMT >30: CPT | Performed by: STUDENT IN AN ORGANIZED HEALTH CARE EDUCATION/TRAINING PROGRAM

## 2022-02-28 PROCEDURE — 97535 SELF CARE MNGMENT TRAINING: CPT

## 2022-02-28 PROCEDURE — 3E02340 INTRODUCTION OF INFLUENZA VACCINE INTO MUSCLE, PERCUTANEOUS APPROACH: ICD-10-PCS | Performed by: STUDENT IN AN ORGANIZED HEALTH CARE EDUCATION/TRAINING PROGRAM

## 2022-02-28 PROCEDURE — 700102 HCHG RX REV CODE 250 W/ 637 OVERRIDE(OP): Performed by: STUDENT IN AN ORGANIZED HEALTH CARE EDUCATION/TRAINING PROGRAM

## 2022-02-28 PROCEDURE — 90471 IMMUNIZATION ADMIN: CPT

## 2022-02-28 RX ORDER — LANOLIN ALCOHOL/MO/W.PET/CERES
100 CREAM (GRAM) TOPICAL DAILY
Qty: 30 TABLET | Refills: 0 | Status: SHIPPED
Start: 2022-03-01 | End: 2022-03-27

## 2022-02-28 RX ORDER — MIRTAZAPINE 15 MG/1
15 TABLET, FILM COATED ORAL
Qty: 30 TABLET | Refills: 0 | Status: SHIPPED
Start: 2022-02-28 | End: 2022-03-27

## 2022-02-28 RX ORDER — ASPIRIN 81 MG/1
81 TABLET, CHEWABLE ORAL DAILY
Qty: 100 TABLET | Refills: 0 | Status: SHIPPED
Start: 2022-02-28 | End: 2022-03-28

## 2022-02-28 RX ORDER — OMEPRAZOLE 20 MG/1
20 CAPSULE, DELAYED RELEASE ORAL DAILY
Qty: 30 CAPSULE | Refills: 0 | Status: SHIPPED
Start: 2022-03-01 | End: 2022-03-27

## 2022-02-28 RX ORDER — CARVEDILOL 3.12 MG/1
3.12 TABLET ORAL 2 TIMES DAILY WITH MEALS
Qty: 60 TABLET | Refills: 0 | Status: SHIPPED
Start: 2022-02-28 | End: 2022-03-27

## 2022-02-28 RX ADMIN — Medication 100 MG: at 04:39

## 2022-02-28 RX ADMIN — ASPIRIN 81 MG: 81 TABLET, CHEWABLE ORAL at 16:30

## 2022-02-28 RX ADMIN — OMEPRAZOLE 20 MG: 20 CAPSULE, DELAYED RELEASE ORAL at 04:39

## 2022-02-28 RX ADMIN — HEPARIN SODIUM 5000 UNITS: 5000 INJECTION, SOLUTION INTRAVENOUS; SUBCUTANEOUS at 04:39

## 2022-02-28 RX ADMIN — INFLUENZA A VIRUS A/VICTORIA/2570/2019 IVR-215 (H1N1) ANTIGEN (FORMALDEHYDE INACTIVATED), INFLUENZA A VIRUS A/TASMANIA/503/2020 IVR-221 (H3N2) ANTIGEN (FORMALDEHYDE INACTIVATED), INFLUENZA B VIRUS B/PHUKET/3073/2013 ANTIGEN (FORMALDEHYDE INACTIVATED), AND INFLUENZA B VIRUS B/WASHINGTON/02/2019 ANTIGEN (FORMALDEHYDE INACTIVATED) 0.7 ML: 60; 60; 60; 60 INJECTION, SUSPENSION INTRAMUSCULAR at 14:00

## 2022-02-28 RX ADMIN — CYANOCOBALAMIN TAB 500 MCG 1000 MCG: 500 TAB at 04:39

## 2022-02-28 ASSESSMENT — GAIT ASSESSMENTS
GAIT LEVEL OF ASSIST: CONTACT GUARD ASSIST
DISTANCE (FEET): 50
DEVIATION: DECREASED BASE OF SUPPORT;SHUFFLED GAIT;DECREASED TOE OFF;DECREASED HEEL STRIKE
ASSISTIVE DEVICE: FRONT WHEEL WALKER

## 2022-02-28 ASSESSMENT — COGNITIVE AND FUNCTIONAL STATUS - GENERAL
MOVING TO AND FROM BED TO CHAIR: A LITTLE
MOVING FROM LYING ON BACK TO SITTING ON SIDE OF FLAT BED: A LITTLE
STANDING UP FROM CHAIR USING ARMS: A LITTLE
SUGGESTED CMS G CODE MODIFIER MOBILITY: CK
MOBILITY SCORE: 18
DRESSING REGULAR LOWER BODY CLOTHING: A LITTLE
HELP NEEDED FOR BATHING: A LITTLE
SUGGESTED CMS G CODE MODIFIER DAILY ACTIVITY: CK
TOILETING: A LITTLE
PERSONAL GROOMING: A LITTLE
DAILY ACTIVITIY SCORE: 19
DRESSING REGULAR UPPER BODY CLOTHING: A LITTLE
CLIMB 3 TO 5 STEPS WITH RAILING: A LOT
WALKING IN HOSPITAL ROOM: A LITTLE

## 2022-02-28 NOTE — PROGRESS NOTES
Received report from NOC shift RN. Patient is A&Ox3, disoriented to time, VSS, no signs of distress. Bed alarm on, all questions and concerns answered, bed in lowest and locked position, call light in reach, will continue to monitor.

## 2022-02-28 NOTE — DISCHARGE PLANNING
DC Transport Scheduled    Received request at: 1318    Transport Company Scheduled:  [employer]    Scheduled Date: 02/28/22  Scheduled Time: 1630      Destination: [address]    Notified care team of scheduled transport via [employer].     If there are any changes needed to the DC transportation scheduled, please contact [facility] at ext. [phone] between the hours of 7852-3272 Mon-Fri. If outside those hours, contact the ED Case Manager at ext. [phone].

## 2022-02-28 NOTE — DISCHARGE PLANNING
Spoke To: Chica  Agency/Facility Name: [facility] [place]  Plan or Request: no available beds for [facility]. Possible bed for [facility] today.    Spoke To: Chica  Agency/Facility Name: [facility]  Plan or Request: bed available today, transport at 1600.

## 2022-02-28 NOTE — PROGRESS NOTES
Called to give report to [facility]. RN from [facility] will call back for report.

## 2022-02-28 NOTE — PROGRESS NOTES
"      Spiritual Care Note    Patient Information     Patient's Name: Jaymie Peacock   MRN: [id number]    YOB: 1955   Age and Gender: 66 y.o. female   Service Area:    Room (and Bed): [place] [address]   Ethnicity or Nationality: White    Primary Language: English   Latter-day/Spiritual preference: Not able or decline to answer   Place of Residence: [place]   Family/Friends/Others Present:    Clinical Team Present:    Medical Diagnosis(-es)/Procedure(s):    Code Status: DNAR/DNI    Date of Admission: 2/18/2022   Length of Stay: 10 days        Spiritual Care Provider Information:  JACKY Gilmore for Sarwat Foote, Volunteer   [phone]   [email or web address]  Total time : 10 minutes    Spiritual Screen Results:    Gen Nursing        Palliative Care  PC Latter-day/Spiritual Screening  Is your spiritual health or inner well-being important to you as you cope with your medical condition?: Yes  Would you like to receive a visit from our Spiritual Care team or your own Druze or spiritual leader?: Yes      Encounter/Request Information  Encounter/Request Type   Visited With: Patient  Nature of the Visit: Initial,On shift  General Visit: Yes  Referral From/ Origin of Request: Epic nursing  Referral To: Other /SCP    Religous Needs/Values       Spiritual Assessment   Spiritual Care Encounters  Observations/Symptoms: Accepting,Thankfulness  Interacton/Conversation: Met w/pt who displayed good spirits. Pt stated that her friends & family visit her regularly and that she needs no further spiritual pt. at this time.  Plan: Visit Upon Request    Notes:  Per notes provided by Chaplain Foote: \"Met w/pt who displayed good spirits. Pt stated that her friends & family visit her regularly and that she needs no further spiritual support at this time.\"          "

## 2022-02-28 NOTE — HEART FAILURE PROGRAM
Discharge Order Quality Check-Up - HFrEF    Patient has the below appointment which is appropriate for anticipated discharge to [facility] today..    HF Education Documented? no    Meds to beds eligible? Going to SNF    Dr. Sellers and Bedside RN, Leah bojorquez regarding all missing items below.    Where we stand right now with HFrEF MEASURES per review of most recent notes and discharge med rec:    • Evidence Based Beta Blocker (bisoprolol, carvedilol, or toprol xl), for EF of 40% or less: carvedilol  • JANEE - I, for EF of 40% or less ARNI is preferred If not cost prohibitive for patient: MISSING  • SGLT2 inhibitor with proven ASCVD, HF, or DKD benefit (canagliflozin, dapagliflozin, or empagliflozin) If not cost prohibitive for patient  • Aldosterone antagonist, for EF of 35% or less: MISSING  • Anticoagulation for atrial arrhythmia: n/a  • Glycemic control for DM + HF: n/a  • Lipid lowering medication for DM + HF: n/a  • Hydralazine Hydrochloride/Isosorbide Dinitrate: n/a  • Pneumococcal vaccine: 2020  • Influenza vaccine for current season: 2/28/22  • Smoking cessation counseling documented: MISSING  • Device screening: n/a reduced EF is new  • Referral to disease management program specializing in heart failure care done below      Thank you, Adela, Cardio RN Navigator [id number]    What's Next    What's Next   Mar21 Heart Failure New Patient with Nurse Practitioner JERSON Larsen  Monday Mar 21, 2022 1:30 PM   [facility]  [address] [place] [address]   [place] [id number]  [phone]       Continuing Care

## 2022-02-28 NOTE — THERAPY
"Physical Therapy   Daily Treatment     Patient Name: Jaymie Peacock  Age:  66 y.o., Sex:  female  Medical Record #: [id number]  Today's Date: 2/28/2022     Precautions  Precautions: Fall Risk    Assessment    Pt seen for PT session today with focus on BLE strength and gait training. Pt confused by direct able  to task. Pt tolerating increased ambulation distances with FWW. Main barrier is confusion and decreased insight into situation. Pt will benefit from continued inpatient as well as post acute PT.    Plan    Continue current treatment plan.    DC Equipment Recommendations: Unable to determine at this time  Discharge Recommendations: Recommend post-acute placement for additional physical therapy services prior to discharge home           Objective     02/28/22 0952   Cognition    Level of Consciousness Alert   Safety Awareness Impulsive   Attention Impaired   Comments Able to direct to task with cues. Pt perseverative on finding her friend \"Irwin\". Thinks he should be out in the hallway. Required constant reassurance Irwin was not here.   Sitting Lower Body Exercises   Long Arc Quad 2 sets of 10;Bilateral   Marching 2 sets of 10;Reciprocal   Sit to Stand   (x5 from chair.)   Standing Lower Body Exercises   Hip Flexion 2 sets of 10;Bilateral   Marching 2 sets of 10   Comments w/ FWW   Balance   Sitting Balance (Static) Good   Sitting Balance (Dynamic) Fair +   Standing Balance (Static) Fair +   Standing Balance (Dynamic) Fair   Weight Shift Sitting Fair   Weight Shift Standing Fair   Skilled Intervention Sequencing;Verbal Cuing   Gait Analysis   Gait Level Of Assist Contact Guard Assist   Assistive Device Front Wheel Walker   Distance (Feet) 50   # of Times Distance was Traveled 2   Deviation Decreased Base Of Support;Shuffled Gait;Decreased Toe Off;Decreased Heel Strike   # of Stairs Climbed 0   Weight Bearing Status full   Skilled Intervention Tactile Cuing;Verbal Cuing;Sequencing   Bed Mobility    Comments up in " chair pre and post session.   Functional Mobility   Sit to Stand Supervised   Bed, Chair, Wheelchair Transfer Supervised   Transfer Method Stand Step   Skilled Intervention Verbal Cuing   Short Term Goals    Short Term Goal # 1 Pt will demo supine<>sit eob w/ hob flat and no bed rails spv in 6 visits for independence w/ bed mobility.   Goal Outcome # 1   (NT)   Short Term Goal # 2 Pt will demo stand step txr eob<>chair w/ fww spv in 6 visits for functional mobility tasks.   Goal Outcome # 2 Goal met   Short Term Goal # 3 Pt will demo gait distance >150' using FWW SPV in 6 visits for household ambulation   Goal Outcome # 3 Progressing as expected   Short Term Goal # 4 Pt will negotiate 8 steps with min A within 6 visits in order to progress toward home   Goal Outcome # 4   (NT)   Education Group   Role of Physical Therapist Patient Response Family;Acceptance;No Learning Evidence;Explanation   Exercises - Seated Patient Response Patient;Acceptance;Explanation;No Learning Evidence

## 2022-02-28 NOTE — THERAPY
Occupational Therapy  Daily Treatment     Patient Name: Jaymie Peacock  Age:  66 y.o., Sex:  female  Medical Record #: [id number]  Today's Date: 2/28/2022     Precautions: Fall Risk    Assessment    Pt progressing as expected with therapy. Pt demonstrated min A with toielting, min A with G/H, SPV with LB dressing and max verbal cues for re-direction back to task. She's primarily limited by poor insight, confusion, poor safety awareness, impaired balance. Will continue to follow for skilled OT.    Plan    Continue current treatment plan.    DC Equipment Recommendations: (P) Unable to determine at this time  Discharge Recommendations: (P) Recommend post-acute placement for additional occupational therapy services prior to discharge home       02/28/22 1050   Cognition    Level of Consciousness Alert   Safety Awareness Impulsive   New Learning Impaired   Attention Impaired   Comments Continues to demonstrate confusion and poor safety awanress   Balance   Sitting Balance (Static) Good   Sitting Balance (Dynamic) Fair +   Standing Balance (Static) Fair +   Standing Balance (Dynamic) Fair   Weight Shift Sitting Fair   Weight Shift Standing Fair   Skilled Intervention Verbal Cuing;Tactile Cuing;Sequencing   Bed Mobility    Supine to Sit Supervised   Sit to Supine Supervised   Scooting Supervised   Rolling Supervised   Skilled Intervention Verbal Cuing   Activities of Daily Living   Grooming Contact Guard Assist;Standing   Lower Body Dressing Supervision   Toileting Minimal Assist   Skilled Intervention Verbal Cuing;Tactile Cuing;Sequencing   How much help from another person does the patient currently need...   6 Clicks Daily Activity Score 19   Functional Mobility   Sit to Stand Contact Guard Assist   Bed, Chair, Wheelchair Transfer Contact Guard Assist   Toilet Transfers Contact Guard Assist   Transfer Method Stand Step   Mobility room and bathroom distances with FWW   Activity Tolerance   Sitting in Chair 5 min on toilet    Sitting Edge of Bed 6 min   Standing 7 min   Patient / Family Goals   Patient / Family Goal #1 to go home   Short Term Goals   Short Term Goal # 1 Pt will demo FB dressing SPV   Goal Outcome # 1 Progressing as expected   Short Term Goal # 2 Pt will complete toileting SPV   Goal Outcome # 2 Progressing as expected   Short Term Goal # 3 Pt will tolerate 5 min standing G/H SPV   Goal Outcome # 3 Goal met, new goal added   Short Term Goal # 3 B Pt will demo 10 min standing G/H SPV   Education Group   Role of Occupational Therapist Patient Response Patient;Acceptance;Explanation   Anticipated Discharge Equipment and Recommendations   DC Equipment Recommendations Unable to determine at this time   Discharge Recommendations Recommend post-acute placement for additional occupational therapy services prior to discharge home

## 2022-02-28 NOTE — DISCHARGE SUMMARY
Discharge Summary    CHIEF COMPLAINT ON ADMISSION  Chief Complaint   Patient presents with   • ALOC     Pt noted by friends to be disoriented. AOx3, disoriented to situation   • Failure to Thrive     Pt noted to be found in bed, not eating, or drinking. Pt disheveled       Reason for Admission  EMS     CODE STATUS  DNAR/DNI    HPI & HOSPITAL COURSE  66-year-old female with history of hypertension, smoking lung emphysema, and malnutrition was admitted on 2/18/2022 for her mental status, acute renal failure and NSTEMI.  Follow-up echocardiogram showing severely reduced left ventricular systolic function with ejection fraction 35% with basal sparing in a pattern of stress-induced cardiomyopathy (Takotsubo cardiomyopathy). Cardiology following for which patient was started on heparin drip and aspirin but urgent left heart catheterization was not pursued as patient was too high risk and is concluded to pursue such once renal function improve.  Patient's hospital course was complicated by melena shortly after initiation of heparin and aspirin for which patient received 1 unit PRBC secondary to symptomatic anemia and positive occult blood.  Gastroenterology was consulted for which patient was started on intravenous Protonix.  At the time, due to her recent NSTEMI and acute heart failure, gastroenterology recommended not proceeding with endoscopic procedures and treated medically.  Gastroenterology recommended to pursued EGD/colonoscopy in the outpatient setting. Ultimately cardiology signed off recommending medical management.  Due to patient's multiorgan failure and clinical demise.  Palliative care was consulted.  After multiple discussion with patient's next of kin (Nena Piper), [place] made decision to transition patient to comfort care.  Shortly after transitioning patient to comfort care, patient's altered mental status resolved.  After conversation with the patient for which she can make medical decisions, she decided  that she wanted to live and she want to pursue medical management which include taking medicines for heart failure.  Patient voiced that she would like to continue to be DNR/DNI.  She was started on carvedilol and her p.o. intake as well as and function improved slowly.  Her acute renal failure resolved.  Patient was evaluated by physical therapy and Occupational Therapy for which patient is to be discharged to a skilled nursing facility for continuity of therapy.  Stable patient with chronic condition is to be discharged to skilled nursing facility for continued care.    Therefore, she is discharged in guarded and stable condition to skilled nursing facility.    The patient met 2-midnight criteria for an inpatient stay at the time of discharge.      FOLLOW UP ITEMS POST DISCHARGE  Skilled nursing facility to continue therapy for strengthening  Skilled nursing facility to assure that patient has follow-up with cardiology and gastroenterology as an outpatient prior to discharge    DISCHARGE DIAGNOSES  Principal Problem:    Acute systolic heart failure (HCC) POA: Yes  Active Problems:    YAMIL (acute kidney injury) ([facility]) POA: Yes    Protein-calorie malnutrition, severe (HCC) POA: Yes    NSTEMI (non-ST elevation myocardial infarction) ([facility]) POA: Yes    Alcohol abuse POA: Yes    Hypomagnesemia POA: Yes    Hypotension POA: Yes    Hypokalemia POA: Yes    GI bleed POA: No    Emphysema lung (HCC) POA: No    Encephalopathy acute POA: Yes  Resolved Problems:    * No resolved hospital problems. *      FOLLOW UP  No future appointments.  [facility]  [address] [facility] [address]  [phone]          MEDICATIONS ON DISCHARGE     Medication List      ASK your doctor about these medications      Instructions   losartan-hydrochlorothiazide 100-12.5 MG per tablet  Commonly known as: HYZAAR   Take 1 tablet by mouth every day.  Dose: 1 Tablet     meloxicam 15 MG tablet  Commonly known as: MOBIC   Take 15  mg by mouth every day. Indications: Joint Damage causing Pain and Loss of Function  Dose: 15 mg     simvastatin 20 MG Tabs  Commonly known as: ZOCOR   Take 20 mg by mouth every day.  Dose: 20 mg            Allergies  No Known Allergies    DIET  Orders Placed This Encounter   Procedures   • Diet Order Diet: Regular     Standing Status:   Standing     Number of Occurrences:   1     Order Specific Question:   Diet:     Answer:   Regular [1]       ACTIVITY  As tolerated.  Weight bearing as tolerated    LINES, DRAINS, AND WOUNDS  This is an automated list. Peripheral IVs will be removed prior to discharge.  Peripheral IV 02/27/22 22 G Anterior;Right Wrist (Active)   Site Assessment Clean;Dry;Intact 02/28/22 0800   Dressing Type Transparent Film 02/28/22 0800   Line Status Saline locked 02/28/22 0800   Dressing Status Clean;Dry;Intact 02/28/22 0800   Dressing Intervention N/A 02/28/22 0800   Infiltration Grading ([facility]) 0 02/28/22 0800   Phlebitis Scale ([facility] Only) 0 02/28/22 0800       Wound 02/19/22 Pressure Injury Sacrum POA (Active)   Wound Image   02/24/22 1630   Site Assessment PATTI 02/28/22 0800   Periwound Assessment Clean;Dry;Intact;Scar tissue 02/28/22 0800   Margins Attached edges;Defined edges 02/28/22 0800   Closure Adhesive bandage 02/28/22 0800   Drainage Amount None 02/27/22 1100   Treatments Zinc - oxide paste;Site care;Offloading;Cleansed 02/27/22 1100   Wound Cleansing Foam Cleanser/Washcloth 02/27/22 1100   Periwound Protectant Viscopaste;Barrier Paste;Stoma Powder 02/27/22 1100   Dressing Cleansing/Solutions Not Applicable 02/27/22 1100   Dressing Options Viscopaste 02/27/22 1100   Dressing Changed Observed 02/27/22 2000   Dressing Status Clean;Dry;Intact 02/27/22 2000   Dressing Change/Treatment Frequency Daily, and As Needed 02/27/22 2000   NEXT Dressing Change/Treatment Date 02/28/22 02/27/22 1100   NEXT Weekly Photo (Inpatient Only) 03/06/22 02/27/22 1100   Pressure Injury Stage 3 02/27/22  1100   Non-staged Wound Description Not applicable 02/27/22 1100   Shape Irregular 02/27/22 1100   Wound Odor None 02/27/22 1100   Pulses N/A 02/21/22 1600   Exposed Structures PATTI 02/27/22 1100   WOUND NURSE ONLY - Time Spent with Patient (mins) 60 02/27/22 1100       Peripheral IV 02/27/22 22 G Anterior;Right Wrist (Active)   Site Assessment Clean;Dry;Intact 02/28/22 0800   Dressing Type Transparent Film 02/28/22 0800   Line Status Saline locked 02/28/22 0800   Dressing Status Clean;Dry;Intact 02/28/22 0800   Dressing Intervention N/A 02/28/22 0800   [facility]fil[facility] Grading [facility]) 0 02/28/22 0800   Phlebitis Scale ([facility] Only) 0 02/28/22 0800               MENTAL STATUS ON TRANSFER  AAOX4     CONSULTATIONS  Cardiology  Gastroenterology    PROCEDURES  None    LABORATORY  Lab Results   Component Value Date    SODIUM 140 02/27/2022    POTASSIUM 3.7 02/27/2022    CHLORIDE 110 02/27/2022    CO2 16 (L) 02/27/2022    GLUCOSE 98 02/27/2022    BUN 33 (H) 02/27/2022    CREATININE 1.37 02/27/2022        Lab Results   Component Value Date    WBC 5.7 02/27/2022    HEMOGLOBIN 9.1 (L) 02/27/2022    HEMATOCRIT 30.8 (L) 02/27/2022    PLATELETCT 251 02/27/2022        Total time of the discharge process exceeds 36 minutes.

## 2022-02-28 NOTE — CARE PLAN
The patient is Stable - Low risk of patient condition declining or worsening    Shift Goals  Clinical Goals: DC planning  Patient Goals: rest  Family Goals: PATTI      Problem: Skin Integrity  Goal: Skin integrity is maintained or improved  Outcome: Progressing

## 2022-02-28 NOTE — DISCHARGE PLANNING
HTH/SCP TCN chart review completed. Collaborated with  Kinjal and Luci. Per CM, [facility] accepted but [facility] might decline- so I talked to [facility] and explained the importance of SNF rehab- [facility] agreed to [facility]. [facility].Patient seen at bedside, up in a chair about to work with the Therapist. TCN will continue to follow and collaborate with discharge planning team as additional post acute needs arise. Thank you.      Previously completed:  - Orders received for: PT and OT - recommended post acute placement  - Choice forms: SNF -( [facility] accepted)  - [facility] introduced (Y), referral ( not sent)* tentative plan for SNF

## 2022-02-28 NOTE — DISCHARGE INSTRUCTIONS
Discharge Instructions    Discharged to other by medical transportation with escort. Discharged via wheelchair, hospital escort: Yes.  Special equipment needed: Not Applicable    Be sure to schedule a follow-up appointment with your primary care doctor or any specialists as instructed.     Discharge Plan:   Diet Plan: Discussed  Activity Level: Discussed  Confirmed Follow up Appointment: Appointment Scheduled  Confirmed Symptoms Management: Discussed  Medication Reconciliation Updated: Yes  Influenza Vaccine Indication: Patient Refuses    I understand that a diet low in cholesterol, fat, and sodium is recommended for good health. Unless I have been given specific instructions below for another diet, I accept this instruction as my diet prescription.   Other diet: Regular    Special Instructions: None    · Is patient discharged on Warfarin / Coumadin?   No     Depression / Suicide Risk    As you are discharged from this [facility] facility, it is important to learn how to keep safe from harming yourself.    Recognize the warning signs:  · Abrupt changes in personality, positive or negative- including increase in energy   · Giving away possessions  · Change in eating patterns- significant weight changes-  positive or negative  · Change in sleeping patterns- unable to sleep or sleeping all the time   · Unwillingness or inability to communicate  · Depression  · Unusual sadness, discouragement and loneliness  · Talk of wanting to die  · Neglect of personal appearance   · Rebelliousness- reckless behavior  · Withdrawal from people/activities they love  · Confusion- inability to concentrate     If you or a loved one observes any of these behaviors or has concerns about self-harm, here's what you can do:  · Talk about it- your feelings and reasons for harming yourself  · Remove any means that you might use to hurt yourself (examples: pills, rope, extension cords, firearm)  · Get professional help from the community  (Mental Health, Substance Abuse, psychological counseling)  · Do not be alone:Call your Safe Contact- someone whom you trust who will be there for you.  · Call your local CRISIS HOTLINE 094-9855 or 664-758-5167  · Call your local Children's Mobile Crisis Response Team [place] (432) 589-5744 or www.[employer]  · Call the toll free National Suicide Prevention Hotlines   · National Suicide Prevention Lifeline 162-875-ORJB (0834)  · [employer] Line Network 800-SUICIDE (017-8615)      Heart Failure, Diagnosis    Heart failure means that your heart is not able to pump blood in the right way. This makes it hard for your body to work well. Heart failure is usually a long-term (chronic) condition. You must take good care of yourself and follow your treatment plan from your doctor.  What are the causes?  This condition may be caused by:  · High blood pressure.  · Build up of cholesterol and fat in the arteries.  · Heart attack. This injures the heart muscle.  · Heart valves that do not open and close properly.  · Damage of the heart muscle. This is also called cardiomyopathy.  · Lung disease.  · Abnormal heart rhythms.  What increases the risk?  The risk of heart failure goes up as a person ages. This condition is also more likely to develop in people who:  · Are overweight.  · Are male.  · Smoke or chew tobacco.  · Abuse alcohol or illegal drugs.  · Have taken medicines that can damage the heart.  · Have diabetes.  · Have abnormal heart rhythms.  · Have thyroid problems.  · Have low blood counts (anemia).  What are the signs or symptoms?  Symptoms of this condition include:  · Shortness of breath.  · Coughing.  · Swelling of the feet, ankles, legs, or belly.  · Losing weight for no reason.  · Trouble breathing.  · Waking from sleep because of the need to sit up and get more air.  · Rapid heartbeat.  · Being very tired.  · Feeling dizzy, or feeling like you may pass out (faint).  · Having no desire to  eat.  · Feeling like you may vomit (nauseous).  · Peeing (urinating) more at night.  · Feeling confused.  How is this treated?         This condition may be treated with:  · Medicines. These can be given to treat blood pressure and to make the heart muscles stronger.  · Changes in your daily life. These may include eating a healthy diet, staying at a healthy body weight, quitting tobacco and illegal drug use, or doing exercises.  · Surgery. Surgery can be done to open blocked valves, or to put devices in the heart, such as pacemakers.  · A donor heart (heart transplant). You will receive a healthy heart from a donor.  Follow these instructions at home:  · Treat other conditions as told by your doctor. These may include high blood pressure, diabetes, thyroid disease, or abnormal heart rhythms.  · Learn as much as you can about heart failure.  · Get support as you need it.  · Keep all follow-up visits as told by your doctor. This is important.  Summary  · Heart failure means that your heart is not able to pump blood in the right way.  · This condition is caused by high blood pressure, heart attack, or damage of the heart muscle.  · Symptoms of this condition include shortness of breath and swelling of the feet, ankles, legs, or belly. You may also feel very tired or feel like you may vomit.  · You may be treated with medicines, surgery, or changes in your daily life.  · Treat other health conditions as told by your doctor.  This information is not intended to replace advice given to you by your health care provider. Make sure you discuss any questions you have with your health care provider.  Document Released: 09/26/2009 Document Revised: 03/06/2020 Document Reviewed: 03/06/2020  Elsevier Patient Education © 2020 [employer]        Failure to Thrive, Adult  Failure to thrive is a group of symptoms that affect adults, including the elderly. These symptoms include eating too little and losing weight. People who have  this may not want to be with friends, or they may not want to eat or drink. This condition is not a normal part of getting older.  What are the causes?  · A disease, such as dementia, diabetes, cancer, or lung disease.  · A health problem, such as a vitamin deficiency or a heart problem.  · A disorder, such as sadness (depression).  · A disability.  · Medicines.  · Having tooth or mouth problems.  · Neglect or being treated badly.  · In some cases, the cause may not be known.  What are the signs or symptoms?  · Loss of more than 5% of your body weight.  · Being more tired than normal after an activity.  · Having trouble getting up after sitting.  · Not feeling hungry.  · Not getting out of bed.  · Not wanting to do your normal activities.  · Sadness.  · Getting infections often.  · Bedsores.  · Taking a long time to get better after an infection, injury, or a surgery.  · Weakness.  How is this treated?  Treatment for this condition depends on the cause. It may be treated by:  · Treating a disease or disorder that is causing symptoms.  · Having talk therapy or taking medicine to treat sadness.  · Eating better, such as eating more often or taking nutritional supplements.  · Changing or stopping a medicine.  · Having physical or occupational therapy.  It often takes a team of doctors to find the right treatment.  Follow these instructions at home:    · Take over-the-counter and prescription medicines only as told by your doctor.  · Eat a healthy diet. Make sure that you eat enough. Ask your doctor how much you should eat.  · Be active. Do exercises that make you stronger (strength training). A physical therapist can help to set up a program that fits you.  · Make sure that you are safe at home.  · Have a plan for what to do if you cannot make decisions for yourself.  Contact a doctor if you:  · Are not able to eat well.  · Are not able to move around.  · Feel very sad.  · Feel very hopeless.  Get help right away  if:  · You think about ending your life.  · You cannot eat or drink.  · You do not get out of bed.  · Staying at home is not safe.  · You have a fever.  Summary  · Failure to thrive is a group of symptoms that affect adults, including the elderly.  · Symptoms include eating too little and losing weight.  · Take all medicines only as told by your doctor.  · Eat a healthy diet. Make sure that you eat enough. Ask your doctor how much you should eat.  · Be active. Do exercises that make you stronger. A physical therapist can help to set up a program that is right for you.  This information is not intended to replace advice given to you by your health care provider. Make sure you discuss any questions you have with your health care provider.  Document Released: 12/06/2012 Document Revised: 08/20/2019 Document Reviewed: 08/20/2019  Elsevier Patient Education © 2020 [employer] Inc.

## 2022-03-01 NOTE — DISCHARGE PLANNING
note:  RN called because the pt was suppose to be picked up at 430pm and no [facility] for .    Addendum: @ 1803  CM called [facility] and talked to Abdon and she confirmed that they picked up pt already and the  stated that they are almost in the drop off location.    ELROY Melenedz confirmed that pt has been picked up.

## 2022-03-01 NOTE — PROGRESS NOTES
Patient is A&Ox3. Discharge instructions. Personal belongings in possession with patient. PIV and tele monitor removed. Copy of discharge instructions in patient chart, signed and reviewed. Patient verbalizes the understanding of the discharge instructions. Questions and concerns addressed prior to leaving the unit.  Transported via wheelchair by [facility]. Patient discharged to [facility].

## 2022-03-16 ENCOUNTER — HOME HEALTH ADMISSION (OUTPATIENT)
Dept: HOME HEALTH SERVICES | Facility: HOME HEALTHCARE | Age: 67
End: 2022-03-16
Payer: MEDICARE

## 2022-03-19 ENCOUNTER — HOME CARE VISIT (OUTPATIENT)
Dept: HOME HEALTH SERVICES | Facility: HOME HEALTHCARE | Age: 67
End: 2022-03-19
Payer: MEDICARE

## 2022-03-19 PROCEDURE — 665001 SOC-HOME HEALTH

## 2022-03-19 PROCEDURE — G0493 RN CARE EA 15 MIN HH/HOSPICE: HCPCS

## 2022-03-19 ASSESSMENT — ENCOUNTER SYMPTOMS
PAIN: 1
PERSON REPORTING PAIN: PATIENT

## 2022-03-19 ASSESSMENT — FIBROSIS 4 INDEX: FIB4 SCORE: 1.52

## 2022-03-20 ENCOUNTER — HOME CARE VISIT (OUTPATIENT)
Dept: HOME HEALTH SERVICES | Facility: HOME HEALTHCARE | Age: 67
End: 2022-03-20
Payer: MEDICARE

## 2022-03-20 VITALS
TEMPERATURE: 98.5 F | HEIGHT: 66 IN | BODY MASS INDEX: 15.56 KG/M2 | DIASTOLIC BLOOD PRESSURE: 62 MMHG | RESPIRATION RATE: 18 BRPM | OXYGEN SATURATION: 98 % | SYSTOLIC BLOOD PRESSURE: 102 MMHG | HEART RATE: 89 BPM | WEIGHT: 96.8 LBS

## 2022-03-20 SDOH — ECONOMIC STABILITY: HOUSING INSECURITY: HOME SAFETY: REVIEWED FALL RISKS DT NARROW CLUTTERED PATHWAYS, UNEVEN FLOORING

## 2022-03-20 ASSESSMENT — ENCOUNTER SYMPTOMS
SHORTNESS OF BREATH: T
LOWEST PAIN SEVERITY IN PAST 24 HOURS: 0/10
HIGHEST PAIN SEVERITY IN PAST 24 HOURS: 0/10
VOMITING: DENIES
PAIN SEVERITY GOAL: 0/10
SUBJECTIVE PAIN PROGRESSION: UNCHANGED

## 2022-03-20 ASSESSMENT — PATIENT HEALTH QUESTIONNAIRE - PHQ9
CLINICAL INTERPRETATION OF PHQ2 SCORE: 0
2. FEELING DOWN, DEPRESSED, IRRITABLE, OR HOPELESS: 00
1. LITTLE INTEREST OR PLEASURE IN DOING THINGS: 00

## 2022-03-20 NOTE — Clinical Note
Primary DX/skilled need: CHF,Muscle weakness, Takotsubo Syndrome, Emphysema. SN to observe,assess,teach,train and monitor medications and disease conditions.   SN [id number]  Zip Code: [address]  Disciplines ordered: PT,OT,SN  Insurance Authorization:[facility]  Certification Period: 3/19/22-5/17/22  Special Considerations: Pt is requesting BSC for toilet frame, safety, also assist with [employer] for transportation to MD office, please f/u.

## 2022-03-20 NOTE — Clinical Note
Pt was admitted for [facility] services; Medications reconciled; 1 Major food to drug interaction; Simvastatin and grapefruit juice. This RN placed call to [facility] to confirm with RN only 3 medications ordered upon DC. Pt has a follow up appointment with Cardiology 3/21/22. and Medication profile provided for pt's home binder.  Thanks, ELROY Layton

## 2022-03-21 ENCOUNTER — DOCUMENTATION (OUTPATIENT)
Dept: MEDICAL GROUP | Facility: PHYSICIAN GROUP | Age: 67
End: 2022-03-21

## 2022-03-21 ENCOUNTER — APPOINTMENT (OUTPATIENT)
Dept: CARDIOLOGY | Facility: MEDICAL CENTER | Age: 67
End: 2022-03-21
Payer: MEDICARE

## 2022-03-21 NOTE — CASE COMMUNICATION
CM noted.   ----- Message -----  From: Carlene Floyd R.N.  Sent: 3/20/2022   7:13 AM PDT  To: Leena Franz R.N.      Primary DX/skilled need: CHF,Muscle weakness, Takotsubo Syndrome, Emphysema. SN to observe,assess,teach,train and monitor medications and disease conditions.   SN [id number]  Zip Code: [address]  Disciplines ordered: PT,OT,SN  Insurance Authorization:[facility]  Certification Period: 3/19/22-5/17/22  Special Consideratio ns: Pt is requesting BSC for toilet frame, safety, also assist with [employer] for transportation to MD office, please f/u.

## 2022-03-21 NOTE — PROGRESS NOTES
No chief complaint on file.      Vidhya Peacock is a 66 y.o. female who presents today     Past Medical History:   Diagnosis Date   • Hypertension    • Osteoporosis    • Smoking      Past Surgical History:   Procedure Laterality Date   • US-NEEDLE CORE BX-BREAST PANEL Right      No family history on file.  Social History     Socioeconomic History   • Marital status: Unknown     Spouse name: Not on file   • Number of children: Not on file   • Years of education: Not on file   • Highest education level: Not on file   Occupational History   • Not on file   Tobacco Use   • Smoking status: Former Smoker     Packs/day: 0.50     Types: Cigarettes   • Smokeless tobacco: Never Used   Vaping Use   • Vaping Use: Never used   Substance and Sexual Activity   • Alcohol use: Not Currently     Comment: Stopped 12 days ago   • Drug use: Not Currently   • Sexual activity: Not on file   Other Topics Concern   • Not on file   Social History Narrative   • Not on file     Social Determinants of Health     Financial Resource Strain: Not on file   Food Insecurity: Not on file   Transportation Needs: Not on file   Physical Activity: Not on file   Stress: Not on file   Social Connections: Not on file   Intimate Partner Violence: Not on file   Housing Stability: Not on file     No Known Allergies  Outpatient Encounter Medications as of 3/21/2022   Medication Sig Dispense Refill   • losartan-hydrochlorothiazide (HYZAAR) 100-12.5 MG per tablet Take 1 Tablet by mouth every day. Indications: High Blood Pressure Disorder     • meloxicam (MOBIC) 15 MG tablet Take 15 mg by mouth every day. Indications: Joint Damage causing Pain and Loss of Function     • aspirin (ASA) 81 MG Chew Tab chewable tablet Chew 1 Tablet every day. 100 Tablet 0   • carvedilol (COREG) 3.125 MG Tab Take 1 Tablet by mouth 2 times a day with meals. 60 Tablet 0   • cyanocobalamin (VITAMIN B12) 1000 MCG Tab Take 1 Tablet by mouth every day. 30 Tablet 3   •  mirtazapine (REMERON) 15 MG Tab Take 1 Tablet by mouth at bedtime. 30 Tablet 0   • omeprazole (PRILOSEC) 20 MG delayed-release capsule Take 1 Capsule by mouth every day. 30 Capsule 0   • thiamine (THIAMINE) 100 MG tablet Take 1 Tablet by mouth every day. 30 Tablet 0   • simvastatin (ZOCOR) 20 MG Tab Take 20 mg by mouth every day. Indications: Ischemic Heart Disease       No facility-administered encounter medications on file as of 3/21/2022.     ROS           Objective     There were no vitals taken for this visit.    Physical Exam           Assessment & Plan     No diagnosis found.    Medical Decision Making: Today's Assessment/Status/Plan:

## 2022-03-21 NOTE — CASE COMMUNICATION
Thank you.  ----- Message -----  From: Carlene Floyd R.N.  Sent: 3/20/2022   7:13 AM PDT  To: Leena Franz R.N.      Pt was [facility]; Medications reconciled; 1 Major food to drug interaction; Simvastatin and grapefruit juice. This RN placed call to [facility] to confirm with RN only 3 medications ordered upon DC. Pt has a follow up appointment with Cardiology 3/21/22. and Medication profile pr ovided for pt's home binder.  Thanks, ELROY Layton

## 2022-03-21 NOTE — PROGRESS NOTES
Medication chart review for [facility] services    PCP:  Toña Tejada P.A.-C.  [address] [address]  [place] [address]  Fax: [phone]    Current medication list     Current Outpatient Medications:   •  losartan-hydrochlorothiazide, 1 Tablet, Oral, DAILY  •  meloxicam, 15 mg, Oral, DAILY  •  aspirin, 81 mg, Oral, DAILY  •  carvedilol, 3.125 mg, Oral, BID WITH MEALS  •  cyanocobalamin, 1,000 mcg, Oral, DAILY  •  mirtazapine, 15 mg, Oral, QHS  •  omeprazole, 20 mg, Oral, DAILY  •  thiamine, 100 mg, Oral, DAILY  •  simvastatin, 20 mg, Oral, QDAY    No Known Allergies    Labs     Lab Results   Component Value Date/Time    SODIUM 140 02/27/2022 09:25 AM    POTASSIUM 3.7 02/27/2022 09:25 AM    CHLORIDE 110 02/27/2022 09:25 AM    CO2 16 (L) 02/27/2022 09:25 AM    GLUCOSE 98 02/27/2022 09:25 AM    BUN 33 (H) 02/27/2022 09:25 AM    CREATININE 1.37 02/27/2022 09:25 AM     Lab Results   Component Value Date/Time    ALKPHOSPHAT 110 (H) 02/24/2022 06:12 PM    ASTSGOT 18 02/24/2022 06:12 PM    ALTSGPT 10 02/24/2022 06:12 PM    TBILIRUBIN 0.2 02/24/2022 06:12 PM    INR 1.04 02/19/2022 12:49 PM    ALBUMIN 2.5 (L) 02/24/2022 06:12 PM    ALBUMIN 4.21 09/15/2020 02:55 PM        Assessment and Plan:   • Received referral from [facility]. Medications reviewed.       Rodriguez Gold, PharmD, MS, BCACP, C  [facility]  Phone [phone] fax [phone]    This note was created using voice recognition software (Dragon). The accuracy of the dictation is limited by the abilities of the software. I have reviewed the note prior to signing, however some errors in grammar and context are still possible. If you have any questions related to this note please do not hesitate to contact our office.

## 2022-03-22 ENCOUNTER — HOME CARE VISIT (OUTPATIENT)
Dept: HOME HEALTH SERVICES | Facility: HOME HEALTHCARE | Age: 67
End: 2022-03-22
Payer: MEDICARE

## 2022-03-22 VITALS
HEART RATE: 90 BPM | OXYGEN SATURATION: 96 % | RESPIRATION RATE: 18 BRPM | SYSTOLIC BLOOD PRESSURE: 94 MMHG | DIASTOLIC BLOOD PRESSURE: 54 MMHG | TEMPERATURE: 97.6 F

## 2022-03-22 PROCEDURE — G0495 RN CARE TRAIN/EDU IN HH: HCPCS

## 2022-03-22 PROCEDURE — A6250 SKIN SEAL PROTECT MOISTURIZR: HCPCS

## 2022-03-22 PROCEDURE — A6216 NON-STERILE GAUZE<=16 SQ IN: HCPCS

## 2022-03-22 ASSESSMENT — ENCOUNTER SYMPTOMS
NAUSEA: DENIES
PAIN: 1
SHORTNESS OF BREATH: 1
PAIN LOCATION: NECK
PAIN LOCATION - PAIN FREQUENCY: WITH ACTIVITY
PAIN LOCATION - EXACERBATING FACTORS: STANDING
PERSON REPORTING PAIN: PATIENT
PAIN LOCATION - RELIEVING FACTORS: SITTING, REST
SUBJECTIVE PAIN PROGRESSION: WAXING AND WANING
PAIN LOCATION - PAIN SEVERITY: 6/10
LOWEST PAIN SEVERITY IN PAST 24 HOURS: 0/10
PAIN SEVERITY GOAL: 0/10
VOMITING: DENIES
HIGHEST PAIN SEVERITY IN PAST 24 HOURS: 6/10
DYSPNEA ACTIVITY LEVEL: AFTER AMBULATING 10 - 20 FT
FATIGUE: 1
MUSCLE WEAKNESS: 1

## 2022-03-22 ASSESSMENT — ACTIVITIES OF DAILY LIVING (ADL): TRANSPORTATION COMMENTS: WALKER FOR AMBULATION

## 2022-03-22 NOTE — Clinical Note
Report:  Heart failure education provided per nursing care plan. Patient in green zone, however, Patient does not have a scale at home, does not have a BP cuff. Patient instructed on need and purpose of obtaining a scale and BP cuff in order to monitor for s/sx of heart failure exacerbation for early intervention. Patient would not commit to purchasing items, SN to bring home scale at next visit. Patient instructed on use of Na and salt in diet, instructed on salf free seasoning to reduce risk of heart failure exacerbation. SN to provide heart failure booklet at next visit for further education. Meds not on plan of care addressed at SN visit, updated med list in Epic, patient to obtain ASA from pharmacy, SN to review at next visit. Wound care completed to coccyx, dressing was off, patient stated it fell off. Instructed on purpose for dressing to remain in place to allow healing, and to call home health if unable to re-apply. Wound bed covered in dry eschar, wound care completed, patient denies any discomfort. Further instructions provided per POC. Patient states understanding. Plan for the next visit: Per POC, provide home scale, heart failure booklet.

## 2022-03-23 ENCOUNTER — HOME CARE VISIT (OUTPATIENT)
Dept: HOME HEALTH SERVICES | Facility: HOME HEALTHCARE | Age: 67
End: 2022-03-23
Payer: MEDICARE

## 2022-03-23 VITALS
HEART RATE: 69 BPM | OXYGEN SATURATION: 92 % | TEMPERATURE: 98 F | DIASTOLIC BLOOD PRESSURE: 62 MMHG | RESPIRATION RATE: 18 BRPM | SYSTOLIC BLOOD PRESSURE: 104 MMHG

## 2022-03-23 PROCEDURE — G0151 HHCP-SERV OF PT,EA 15 MIN: HCPCS

## 2022-03-24 ASSESSMENT — ACTIVITIES OF DAILY LIVING (ADL)
TOILETING: SUPERVISION
AMBULATION ASSISTANCE ON FLAT SURFACES: 1
AMBULATION ASSISTANCE: 1
AMBULATION ASSISTANCE: SUPERVISION
PHYSICAL TRANSFERS ASSESSED: 1
CURRENT_FUNCTION: SUPERVISION
TOILETING: 1
AMBULATION_DISTANCE/DURATION_TOLERATED: 40FT

## 2022-03-24 ASSESSMENT — ENCOUNTER SYMPTOMS
PERSON REPORTING PAIN: PATIENT
DENIES PAIN: 1
SEVERE DYSPNEA: 1

## 2022-03-25 ENCOUNTER — HOME CARE VISIT (OUTPATIENT)
Dept: HOME HEALTH SERVICES | Facility: HOME HEALTHCARE | Age: 67
End: 2022-03-25
Payer: MEDICARE

## 2022-03-25 PROCEDURE — G0299 HHS/HOSPICE OF RN EA 15 MIN: HCPCS

## 2022-03-25 ASSESSMENT — BALANCE ASSESSMENTS
ATTEMPTS TO ARISE: 1 - ABLE, REQUIRES MORE THAN ONE ATTEMPT
SITTING BALANCE: 0 - LEANS OR SLIDES IN CHAIR
BALANCE SCORE: 7
ARISES: 1 - ABLE, USES ARMS TO HELP
NUDGED: 1 - STAGGERS, GRABS, CATCHES SELF
EYES CLOSED AT MAXIMUM POSITION NUDGED: 0 - UNSTEADY
STANDING BALANCE: 1 - STEADY BUT WIDE STANCE AND USES CANE OR OTHER SUPPORT
IMMEDIATE STANDING BALANCE FIRST 5 SECONDS: 1 - STEADY BUT USES WALKER OR OTHER SUPPORT
SITTING DOWN: 1 - USES ARMS OR NOT SMOOTH MOTION
TURNING 360 DEGREES STEPS: 0 - DISCONTINUOUS STEPS
ARISING SCORE: 1
NUDGED SCORE: 1

## 2022-03-25 ASSESSMENT — GAIT ASSESSMENTS
TRUNK: 1 - NO SWAY BUT FLEXION OF KNEES OR BACK OR SPREADS ARMS WHILE WALKING
STEP CONTINUITY: 1 - STEPS APPEAR CONTINUOUS
TRUNK SCORE: 1
WALKING STANCE: 0 - HEELS APART
BALANCE AND GAIT SCORE: 13
STEP SYMMETRY: 1 - RIGHT AND LEFT STEP LENGTH APPEAR EQUAL
INITIATION OF GAIT IMMEDIATELY AFTER GO: 0 - ANY HESITANCY OR MULTIPLE ATTEMPTS TO START
PATH SCORE: 1
GAIT SCORE: 6
PATH: 1 - MILD/MODERATE DEVIATION OR USES WALKING AID

## 2022-03-25 ASSESSMENT — FIBROSIS 4 INDEX: FIB4 SCORE: 1.52

## 2022-03-28 ENCOUNTER — HOME CARE VISIT (OUTPATIENT)
Dept: HOME HEALTH SERVICES | Facility: HOME HEALTHCARE | Age: 67
End: 2022-03-28
Payer: MEDICARE

## 2022-03-28 ENCOUNTER — APPOINTMENT (OUTPATIENT)
Dept: RADIOLOGY | Facility: MEDICAL CENTER | Age: 67
DRG: 640 | End: 2022-03-28
Attending: EMERGENCY MEDICINE
Payer: MEDICARE

## 2022-03-28 ENCOUNTER — HOSPITAL ENCOUNTER (INPATIENT)
Facility: MEDICAL CENTER | Age: 67
LOS: 7 days | DRG: 640 | End: 2022-04-04
Attending: EMERGENCY MEDICINE | Admitting: STUDENT IN AN ORGANIZED HEALTH CARE EDUCATION/TRAINING PROGRAM
Payer: MEDICARE

## 2022-03-28 VITALS
OXYGEN SATURATION: 94 % | SYSTOLIC BLOOD PRESSURE: 122 MMHG | BODY MASS INDEX: 14.46 KG/M2 | DIASTOLIC BLOOD PRESSURE: 64 MMHG | RESPIRATION RATE: 16 BRPM | WEIGHT: 89.56 LBS | HEART RATE: 95 BPM | TEMPERATURE: 98.3 F

## 2022-03-28 DIAGNOSIS — R19.7 DIARRHEA, UNSPECIFIED TYPE: ICD-10-CM

## 2022-03-28 DIAGNOSIS — I95.9 HYPOTENSION, UNSPECIFIED HYPOTENSION TYPE: ICD-10-CM

## 2022-03-28 DIAGNOSIS — N17.9 ACUTE KIDNEY INJURY (HCC): ICD-10-CM

## 2022-03-28 DIAGNOSIS — D64.9 ANEMIA, UNSPECIFIED TYPE: ICD-10-CM

## 2022-03-28 PROBLEM — E86.0 DEHYDRATION DETERMINED BY EXAMINATION: Status: ACTIVE | Noted: 2022-03-28

## 2022-03-28 LAB
ALBUMIN SERPL BCP-MCNC: 2.8 G/DL (ref 3.2–4.9)
ALBUMIN/GLOB SERPL: 1 G/DL
ALP SERPL-CCNC: 181 U/L (ref 30–99)
ALT SERPL-CCNC: 22 U/L (ref 2–50)
ANION GAP SERPL CALC-SCNC: 14 MMOL/L (ref 7–16)
AST SERPL-CCNC: 46 U/L (ref 12–45)
BASOPHILS # BLD AUTO: 0.3 % (ref 0–1.8)
BASOPHILS # BLD: 0.04 K/UL (ref 0–0.12)
BILIRUB SERPL-MCNC: 0.4 MG/DL (ref 0.1–1.5)
BUN SERPL-MCNC: 21 MG/DL (ref 8–22)
CALCIUM SERPL-MCNC: 7.7 MG/DL (ref 8.5–10.5)
CHLORIDE SERPL-SCNC: 100 MMOL/L (ref 96–112)
CO2 SERPL-SCNC: 16 MMOL/L (ref 20–33)
CREAT SERPL-MCNC: 2.52 MG/DL (ref 0.5–1.4)
EOSINOPHIL # BLD AUTO: 0.01 K/UL (ref 0–0.51)
EOSINOPHIL NFR BLD: 0.1 % (ref 0–6.9)
ERYTHROCYTE [DISTWIDTH] IN BLOOD BY AUTOMATED COUNT: 64.5 FL (ref 35.9–50)
GFR SERPLBLD CREATININE-BSD FMLA CKD-EPI: 20 ML/MIN/1.73 M 2
GLOBULIN SER CALC-MCNC: 2.7 G/DL (ref 1.9–3.5)
GLUCOSE SERPL-MCNC: 87 MG/DL (ref 65–99)
HCT VFR BLD AUTO: 22.7 % (ref 37–47)
HEMOCCULT STL QL: NEGATIVE
HGB BLD-MCNC: 7.3 G/DL (ref 12–16)
IMM GRANULOCYTES # BLD AUTO: 0.11 K/UL (ref 0–0.11)
IMM GRANULOCYTES NFR BLD AUTO: 0.9 % (ref 0–0.9)
LACTATE BLD-SCNC: 1.1 MMOL/L (ref 0.5–2)
LYMPHOCYTES # BLD AUTO: 1.44 K/UL (ref 1–4.8)
LYMPHOCYTES NFR BLD: 12.2 % (ref 22–41)
MCH RBC QN AUTO: 28.4 PG (ref 27–33)
MCHC RBC AUTO-ENTMCNC: 32.2 G/DL (ref 33.6–35)
MCV RBC AUTO: 88.3 FL (ref 81.4–97.8)
MONOCYTES # BLD AUTO: 0.69 K/UL (ref 0–0.85)
MONOCYTES NFR BLD AUTO: 5.8 % (ref 0–13.4)
NEUTROPHILS # BLD AUTO: 9.54 K/UL (ref 2–7.15)
NEUTROPHILS NFR BLD: 80.7 % (ref 44–72)
NRBC # BLD AUTO: 0 K/UL
NRBC BLD-RTO: 0 /100 WBC
PLATELET # BLD AUTO: 219 K/UL (ref 164–446)
PMV BLD AUTO: 9.8 FL (ref 9–12.9)
POTASSIUM SERPL-SCNC: 4.3 MMOL/L (ref 3.6–5.5)
PROT SERPL-MCNC: 5.5 G/DL (ref 6–8.2)
RBC # BLD AUTO: 2.57 M/UL (ref 4.2–5.4)
SODIUM SERPL-SCNC: 130 MMOL/L (ref 135–145)
TROPONIN T SERPL-MCNC: 35 NG/L (ref 6–19)
WBC # BLD AUTO: 11.8 K/UL (ref 4.8–10.8)

## 2022-03-28 PROCEDURE — 83630 LACTOFERRIN FECAL (QUAL): CPT

## 2022-03-28 PROCEDURE — 82705 FATS/LIPIDS FECES QUAL: CPT

## 2022-03-28 PROCEDURE — 82533 TOTAL CORTISOL: CPT

## 2022-03-28 PROCEDURE — 84100 ASSAY OF PHOSPHORUS: CPT

## 2022-03-28 PROCEDURE — 83605 ASSAY OF LACTIC ACID: CPT

## 2022-03-28 PROCEDURE — 99285 EMERGENCY DEPT VISIT HI MDM: CPT

## 2022-03-28 PROCEDURE — 71045 X-RAY EXAM CHEST 1 VIEW: CPT

## 2022-03-28 PROCEDURE — 87493 C DIFF AMPLIFIED PROBE: CPT

## 2022-03-28 PROCEDURE — 84484 ASSAY OF TROPONIN QUANT: CPT

## 2022-03-28 PROCEDURE — 87040 BLOOD CULTURE FOR BACTERIA: CPT

## 2022-03-28 PROCEDURE — 36415 COLL VENOUS BLD VENIPUNCTURE: CPT

## 2022-03-28 PROCEDURE — 83735 ASSAY OF MAGNESIUM: CPT

## 2022-03-28 PROCEDURE — 85025 COMPLETE CBC W/AUTO DIFF WBC: CPT

## 2022-03-28 PROCEDURE — 700105 HCHG RX REV CODE 258: Performed by: EMERGENCY MEDICINE

## 2022-03-28 PROCEDURE — 82550 ASSAY OF CK (CPK): CPT

## 2022-03-28 PROCEDURE — 99291 CRITICAL CARE FIRST HOUR: CPT | Performed by: STUDENT IN AN ORGANIZED HEALTH CARE EDUCATION/TRAINING PROGRAM

## 2022-03-28 PROCEDURE — 770000 HCHG ROOM/CARE - INTERMEDIATE ICU *

## 2022-03-28 PROCEDURE — 80053 COMPREHEN METABOLIC PANEL: CPT

## 2022-03-28 PROCEDURE — 99406 BEHAV CHNG SMOKING 3-10 MIN: CPT

## 2022-03-28 PROCEDURE — 82272 OCCULT BLD FECES 1-3 TESTS: CPT

## 2022-03-28 RX ORDER — SODIUM CHLORIDE, SODIUM LACTATE, POTASSIUM CHLORIDE, AND CALCIUM CHLORIDE .6; .31; .03; .02 G/100ML; G/100ML; G/100ML; G/100ML
30 INJECTION, SOLUTION INTRAVENOUS ONCE
Status: COMPLETED | OUTPATIENT
Start: 2022-03-28 | End: 2022-03-28

## 2022-03-28 RX ORDER — SIMVASTATIN 20 MG
20 TABLET ORAL EVERY EVENING
Status: DISCONTINUED | OUTPATIENT
Start: 2022-03-29 | End: 2022-04-04 | Stop reason: HOSPADM

## 2022-03-28 RX ORDER — NOREPINEPHRINE BITARTRATE 0.03 MG/ML
0-30 INJECTION, SOLUTION INTRAVENOUS CONTINUOUS
Status: DISCONTINUED | OUTPATIENT
Start: 2022-03-28 | End: 2022-03-28

## 2022-03-28 RX ORDER — OMEPRAZOLE 20 MG/1
40 CAPSULE, DELAYED RELEASE ORAL DAILY
Refills: 0 | Status: DISCONTINUED | OUTPATIENT
Start: 2022-03-29 | End: 2022-04-04 | Stop reason: HOSPADM

## 2022-03-28 RX ORDER — ONDANSETRON 4 MG/1
4 TABLET, ORALLY DISINTEGRATING ORAL EVERY 4 HOURS PRN
Status: DISCONTINUED | OUTPATIENT
Start: 2022-03-28 | End: 2022-04-04 | Stop reason: HOSPADM

## 2022-03-28 RX ORDER — OMEPRAZOLE 20 MG/1
20 CAPSULE, DELAYED RELEASE ORAL DAILY
Status: SHIPPED | COMMUNITY
Start: 2022-03-28 | End: 2022-03-28

## 2022-03-28 RX ORDER — IBUPROFEN 200 MG
800 TABLET ORAL EVERY 6 HOURS PRN
COMMUNITY
End: 2022-04-11

## 2022-03-28 RX ORDER — ONDANSETRON 2 MG/ML
4 INJECTION INTRAMUSCULAR; INTRAVENOUS EVERY 4 HOURS PRN
Status: DISCONTINUED | OUTPATIENT
Start: 2022-03-28 | End: 2022-04-04 | Stop reason: HOSPADM

## 2022-03-28 RX ORDER — ACETAMINOPHEN 325 MG/1
650 TABLET ORAL EVERY 6 HOURS PRN
Status: DISCONTINUED | OUTPATIENT
Start: 2022-03-28 | End: 2022-04-03

## 2022-03-28 RX ORDER — SODIUM CHLORIDE, SODIUM LACTATE, POTASSIUM CHLORIDE, CALCIUM CHLORIDE 600; 310; 30; 20 MG/100ML; MG/100ML; MG/100ML; MG/100ML
INJECTION, SOLUTION INTRAVENOUS CONTINUOUS
Status: ACTIVE | OUTPATIENT
Start: 2022-03-29 | End: 2022-03-29

## 2022-03-28 RX ADMIN — SODIUM CHLORIDE, POTASSIUM CHLORIDE, SODIUM LACTATE AND CALCIUM CHLORIDE 1170 ML: 600; 310; 30; 20 INJECTION, SOLUTION INTRAVENOUS at 19:42

## 2022-03-28 ASSESSMENT — FIBROSIS 4 INDEX: FIB4 SCORE: 1.52

## 2022-03-28 ASSESSMENT — ENCOUNTER SYMPTOMS
VOMITING: NO
PERSON REPORTING PAIN: PATIENT
NAUSEA: NO
DENIES PAIN: 1
MUSCLE WEAKNESS: 1

## 2022-03-28 ASSESSMENT — ACTIVITIES OF DAILY LIVING (ADL): OASIS_M1830: 03

## 2022-03-28 NOTE — CASE COMMUNICATION
Quality Review Completed for 3/19 OASIS by JACKY Chowdhury, RN on 3/28/2022:  Edits completed by JACKY Chowdhury RN:  1.  changed to less than daily per EMR reports of recent pain  2.  changed to #3, min exertion per PT eval on 3/23.  changed date for PT collaboration  3.  changed to #3 per narrative supervision for bathing  4.  changed to #3 per narrative for supervision with ambulation  5.MAHC-10 checked yes to incontine nce bringing score to 8 for fall risks  6.  changed to 3 per guidelines when ambulation is supervised  7. [id number] changed to #4 Supervision in D, E, F, I, J. #88 not attempted in K, per PT only ambulated 40 safely. Discharge goal changed to #4 for M, L, N, O.   8. Added exercise prescribed,  to Activities Permitted, incontinence to Functional Limitations, ambulate only w/assist to Safety Measures and F2F to 485 forms  9.  hebert ed to 2 per PT eval and ordered a commode for patient safety

## 2022-03-29 ENCOUNTER — HOME CARE VISIT (OUTPATIENT)
Dept: HOME HEALTH SERVICES | Facility: HOME HEALTHCARE | Age: 67
End: 2022-03-29
Payer: MEDICARE

## 2022-03-29 PROBLEM — E27.40 ADRENAL INSUFFICIENCY (HCC): Status: ACTIVE | Noted: 2022-03-29

## 2022-03-29 PROBLEM — D64.9 ANEMIA: Status: ACTIVE | Noted: 2022-03-29

## 2022-03-29 PROBLEM — I50.20 HFREF (HEART FAILURE WITH REDUCED EJECTION FRACTION) (HCC): Status: ACTIVE | Noted: 2022-03-29

## 2022-03-29 LAB
ALBUMIN SERPL BCP-MCNC: 3 G/DL (ref 3.2–4.9)
ALBUMIN/GLOB SERPL: 1 G/DL
ALP SERPL-CCNC: 211 U/L (ref 30–99)
ALT SERPL-CCNC: 20 U/L (ref 2–50)
ANION GAP SERPL CALC-SCNC: 14 MMOL/L (ref 7–16)
APPEARANCE UR: ABNORMAL
AST SERPL-CCNC: 37 U/L (ref 12–45)
BACTERIA #/AREA URNS HPF: ABNORMAL /HPF
BASOPHILS # BLD AUTO: 0.2 % (ref 0–1.8)
BASOPHILS # BLD: 0.02 K/UL (ref 0–0.12)
BILIRUB SERPL-MCNC: 0.4 MG/DL (ref 0.1–1.5)
BILIRUB UR QL STRIP.AUTO: NEGATIVE
BUN SERPL-MCNC: 19 MG/DL (ref 8–22)
C DIFF DNA SPEC QL NAA+PROBE: NEGATIVE
C DIFF TOX GENS STL QL NAA+PROBE: NEGATIVE
CALCIUM SERPL-MCNC: 8.6 MG/DL (ref 8.5–10.5)
CHLORIDE SERPL-SCNC: 101 MMOL/L (ref 96–112)
CK SERPL-CCNC: 24 U/L (ref 0–154)
CO2 SERPL-SCNC: 17 MMOL/L (ref 20–33)
COLOR UR: ABNORMAL
CORTIS SERPL-MCNC: 12.5 UG/DL (ref 0–23)
CREAT SERPL-MCNC: 2.05 MG/DL (ref 0.5–1.4)
CREAT UR-MCNC: 184.97 MG/DL
EKG IMPRESSION: NORMAL
EOSINOPHIL # BLD AUTO: 0 K/UL (ref 0–0.51)
EOSINOPHIL NFR BLD: 0 % (ref 0–6.9)
EPI CELLS #/AREA URNS HPF: ABNORMAL /HPF
ERYTHROCYTE [DISTWIDTH] IN BLOOD BY AUTOMATED COUNT: 64.8 FL (ref 35.9–50)
GFR SERPLBLD CREATININE-BSD FMLA CKD-EPI: 26 ML/MIN/1.73 M 2
GLOBULIN SER CALC-MCNC: 3 G/DL (ref 1.9–3.5)
GLUCOSE BLD STRIP.AUTO-MCNC: 89 MG/DL (ref 65–99)
GLUCOSE SERPL-MCNC: 136 MG/DL (ref 65–99)
GLUCOSE UR STRIP.AUTO-MCNC: NEGATIVE MG/DL
HCT VFR BLD AUTO: 23.2 % (ref 37–47)
HGB BLD-MCNC: 7.5 G/DL (ref 12–16)
HYALINE CASTS #/AREA URNS LPF: ABNORMAL /LPF
IMM GRANULOCYTES # BLD AUTO: 0.1 K/UL (ref 0–0.11)
IMM GRANULOCYTES NFR BLD AUTO: 1 % (ref 0–0.9)
KETONES UR STRIP.AUTO-MCNC: ABNORMAL MG/DL
LEUKOCYTE ESTERASE UR QL STRIP.AUTO: ABNORMAL
LYMPHOCYTES # BLD AUTO: 0.75 K/UL (ref 1–4.8)
LYMPHOCYTES NFR BLD: 7.4 % (ref 22–41)
MAGNESIUM SERPL-MCNC: 1 MG/DL (ref 1.5–2.5)
MAGNESIUM SERPL-MCNC: 1.8 MG/DL (ref 1.5–2.5)
MCH RBC QN AUTO: 28.4 PG (ref 27–33)
MCHC RBC AUTO-ENTMCNC: 32.3 G/DL (ref 33.6–35)
MCV RBC AUTO: 87.9 FL (ref 81.4–97.8)
MICRO URNS: ABNORMAL
MONOCYTES # BLD AUTO: 0.11 K/UL (ref 0–0.85)
MONOCYTES NFR BLD AUTO: 1.1 % (ref 0–13.4)
NEUTROPHILS # BLD AUTO: 9.09 K/UL (ref 2–7.15)
NEUTROPHILS NFR BLD: 90.3 % (ref 44–72)
NITRITE UR QL STRIP.AUTO: NEGATIVE
NRBC # BLD AUTO: 0 K/UL
NRBC BLD-RTO: 0 /100 WBC
PH UR STRIP.AUTO: 5 [PH] (ref 5–8)
PHOSPHATE SERPL-MCNC: 4.3 MG/DL (ref 2.5–4.5)
PLATELET # BLD AUTO: 272 K/UL (ref 164–446)
PMV BLD AUTO: 10 FL (ref 9–12.9)
POTASSIUM SERPL-SCNC: 4.4 MMOL/L (ref 3.6–5.5)
PROCALCITONIN SERPL-MCNC: 0.24 NG/ML
PROT SERPL-MCNC: 6 G/DL (ref 6–8.2)
PROT UR QL STRIP: 30 MG/DL
RBC # BLD AUTO: 2.64 M/UL (ref 4.2–5.4)
RBC # URNS HPF: ABNORMAL /HPF
RBC UR QL AUTO: ABNORMAL
SODIUM SERPL-SCNC: 132 MMOL/L (ref 135–145)
SODIUM UR-SCNC: 25 MMOL/L
SP GR UR STRIP.AUTO: 1.01
UROBILINOGEN UR STRIP.AUTO-MCNC: 0.2 MG/DL
WBC # BLD AUTO: 10.1 K/UL (ref 4.8–10.8)
WBC #/AREA URNS HPF: ABNORMAL /HPF

## 2022-03-29 PROCEDURE — 83735 ASSAY OF MAGNESIUM: CPT

## 2022-03-29 PROCEDURE — 84145 PROCALCITONIN (PCT): CPT

## 2022-03-29 PROCEDURE — 93005 ELECTROCARDIOGRAM TRACING: CPT | Performed by: STUDENT IN AN ORGANIZED HEALTH CARE EDUCATION/TRAINING PROGRAM

## 2022-03-29 PROCEDURE — 81001 URINALYSIS AUTO W/SCOPE: CPT

## 2022-03-29 PROCEDURE — 82962 GLUCOSE BLOOD TEST: CPT

## 2022-03-29 PROCEDURE — 84300 ASSAY OF URINE SODIUM: CPT

## 2022-03-29 PROCEDURE — 85025 COMPLETE CBC W/AUTO DIFF WBC: CPT

## 2022-03-29 PROCEDURE — 700105 HCHG RX REV CODE 258: Performed by: HOSPITALIST

## 2022-03-29 PROCEDURE — 97602 WOUND(S) CARE NON-SELECTIVE: CPT

## 2022-03-29 PROCEDURE — 99233 SBSQ HOSP IP/OBS HIGH 50: CPT | Performed by: HOSPITALIST

## 2022-03-29 PROCEDURE — 87077 CULTURE AEROBIC IDENTIFY: CPT

## 2022-03-29 PROCEDURE — 700102 HCHG RX REV CODE 250 W/ 637 OVERRIDE(OP): Performed by: HOSPITALIST

## 2022-03-29 PROCEDURE — 700102 HCHG RX REV CODE 250 W/ 637 OVERRIDE(OP): Performed by: STUDENT IN AN ORGANIZED HEALTH CARE EDUCATION/TRAINING PROGRAM

## 2022-03-29 PROCEDURE — 700111 HCHG RX REV CODE 636 W/ 250 OVERRIDE (IP): Performed by: STUDENT IN AN ORGANIZED HEALTH CARE EDUCATION/TRAINING PROGRAM

## 2022-03-29 PROCEDURE — 87086 URINE CULTURE/COLONY COUNT: CPT

## 2022-03-29 PROCEDURE — A9270 NON-COVERED ITEM OR SERVICE: HCPCS | Performed by: HOSPITALIST

## 2022-03-29 PROCEDURE — 96374 THER/PROPH/DIAG INJ IV PUSH: CPT

## 2022-03-29 PROCEDURE — 80053 COMPREHEN METABOLIC PANEL: CPT

## 2022-03-29 PROCEDURE — A9270 NON-COVERED ITEM OR SERVICE: HCPCS | Performed by: STUDENT IN AN ORGANIZED HEALTH CARE EDUCATION/TRAINING PROGRAM

## 2022-03-29 PROCEDURE — 770000 HCHG ROOM/CARE - INTERMEDIATE ICU *

## 2022-03-29 PROCEDURE — 700105 HCHG RX REV CODE 258: Performed by: STUDENT IN AN ORGANIZED HEALTH CARE EDUCATION/TRAINING PROGRAM

## 2022-03-29 PROCEDURE — 93010 ELECTROCARDIOGRAM REPORT: CPT | Performed by: INTERNAL MEDICINE

## 2022-03-29 PROCEDURE — 87186 SC STD MICRODIL/AGAR DIL: CPT

## 2022-03-29 PROCEDURE — 82570 ASSAY OF URINE CREATININE: CPT

## 2022-03-29 RX ORDER — IPRATROPIUM BROMIDE AND ALBUTEROL SULFATE 2.5; .5 MG/3ML; MG/3ML
3 SOLUTION RESPIRATORY (INHALATION)
Status: DISCONTINUED | OUTPATIENT
Start: 2022-03-29 | End: 2022-04-04 | Stop reason: HOSPADM

## 2022-03-29 RX ORDER — CALCIUM GLUCONATE 20 MG/ML
1 INJECTION, SOLUTION INTRAVENOUS ONCE
Status: COMPLETED | OUTPATIENT
Start: 2022-03-29 | End: 2022-03-29

## 2022-03-29 RX ORDER — DIPHENOXYLATE HYDROCHLORIDE AND ATROPINE SULFATE 2.5; .025 MG/1; MG/1
1 TABLET ORAL 3 TIMES DAILY
Status: DISCONTINUED | OUTPATIENT
Start: 2022-03-29 | End: 2022-04-01

## 2022-03-29 RX ORDER — SODIUM CHLORIDE, SODIUM LACTATE, POTASSIUM CHLORIDE, CALCIUM CHLORIDE 600; 310; 30; 20 MG/100ML; MG/100ML; MG/100ML; MG/100ML
INJECTION, SOLUTION INTRAVENOUS CONTINUOUS
Status: ACTIVE | OUTPATIENT
Start: 2022-03-29 | End: 2022-03-29

## 2022-03-29 RX ORDER — MIRTAZAPINE 15 MG/1
15 TABLET, FILM COATED ORAL NIGHTLY
Status: DISCONTINUED | OUTPATIENT
Start: 2022-03-29 | End: 2022-04-04 | Stop reason: HOSPADM

## 2022-03-29 RX ORDER — SODIUM CHLORIDE, SODIUM LACTATE, POTASSIUM CHLORIDE, AND CALCIUM CHLORIDE .6; .31; .03; .02 G/100ML; G/100ML; G/100ML; G/100ML
500 INJECTION, SOLUTION INTRAVENOUS ONCE
Status: COMPLETED | OUTPATIENT
Start: 2022-03-29 | End: 2022-03-29

## 2022-03-29 RX ORDER — MAGNESIUM SULFATE HEPTAHYDRATE 40 MG/ML
2 INJECTION, SOLUTION INTRAVENOUS ONCE
Status: COMPLETED | OUTPATIENT
Start: 2022-03-29 | End: 2022-03-29

## 2022-03-29 RX ORDER — MIDODRINE HYDROCHLORIDE 5 MG/1
10 TABLET ORAL
Status: DISCONTINUED | OUTPATIENT
Start: 2022-03-29 | End: 2022-03-29

## 2022-03-29 RX ORDER — MIDODRINE HYDROCHLORIDE 5 MG/1
10 TABLET ORAL
Status: DISCONTINUED | OUTPATIENT
Start: 2022-03-29 | End: 2022-03-31

## 2022-03-29 RX ORDER — SODIUM CHLORIDE, SODIUM LACTATE, POTASSIUM CHLORIDE, AND CALCIUM CHLORIDE .6; .31; .03; .02 G/100ML; G/100ML; G/100ML; G/100ML
1000 INJECTION, SOLUTION INTRAVENOUS ONCE
Status: COMPLETED | OUTPATIENT
Start: 2022-03-29 | End: 2022-03-29

## 2022-03-29 RX ADMIN — DIPHENOXYLATE HYDROCHLORIDE AND ATROPINE SULFATE 1 TABLET: 2.5; .025 TABLET ORAL at 20:42

## 2022-03-29 RX ADMIN — MIDODRINE HYDROCHLORIDE 10 MG: 5 TABLET ORAL at 05:02

## 2022-03-29 RX ADMIN — OMEPRAZOLE 40 MG: 20 CAPSULE, DELAYED RELEASE ORAL at 05:02

## 2022-03-29 RX ADMIN — SODIUM CHLORIDE, POTASSIUM CHLORIDE, SODIUM LACTATE AND CALCIUM CHLORIDE: 600; 310; 30; 20 INJECTION, SOLUTION INTRAVENOUS at 10:45

## 2022-03-29 RX ADMIN — MIDODRINE HYDROCHLORIDE 10 MG: 5 TABLET ORAL at 17:01

## 2022-03-29 RX ADMIN — MIRTAZAPINE 15 MG: 15 TABLET, FILM COATED ORAL at 20:42

## 2022-03-29 RX ADMIN — HYDROCORTISONE SODIUM SUCCINATE 50 MG: 100 INJECTION, POWDER, FOR SOLUTION INTRAMUSCULAR; INTRAVENOUS at 17:01

## 2022-03-29 RX ADMIN — HYDROCORTISONE SODIUM SUCCINATE 50 MG: 100 INJECTION, POWDER, FOR SOLUTION INTRAMUSCULAR; INTRAVENOUS at 09:05

## 2022-03-29 RX ADMIN — SODIUM CHLORIDE, POTASSIUM CHLORIDE, SODIUM LACTATE AND CALCIUM CHLORIDE 1000 ML: 600; 310; 30; 20 INJECTION, SOLUTION INTRAVENOUS at 04:00

## 2022-03-29 RX ADMIN — MIDODRINE HYDROCHLORIDE 10 MG: 5 TABLET ORAL at 11:05

## 2022-03-29 RX ADMIN — HYDROCORTISONE SODIUM SUCCINATE 100 MG: 100 INJECTION, POWDER, FOR SOLUTION INTRAMUSCULAR; INTRAVENOUS at 01:06

## 2022-03-29 RX ADMIN — MAGNESIUM SULFATE HEPTAHYDRATE 2 G: 40 INJECTION, SOLUTION INTRAVENOUS at 02:24

## 2022-03-29 RX ADMIN — SODIUM CHLORIDE, POTASSIUM CHLORIDE, SODIUM LACTATE AND CALCIUM CHLORIDE 500 ML: 600; 310; 30; 20 INJECTION, SOLUTION INTRAVENOUS at 09:45

## 2022-03-29 RX ADMIN — SIMVASTATIN 20 MG: 20 TABLET, FILM COATED ORAL at 17:01

## 2022-03-29 RX ADMIN — SODIUM CHLORIDE, POTASSIUM CHLORIDE, SODIUM LACTATE AND CALCIUM CHLORIDE: 600; 310; 30; 20 INJECTION, SOLUTION INTRAVENOUS at 00:15

## 2022-03-29 RX ADMIN — SODIUM CHLORIDE, POTASSIUM CHLORIDE, SODIUM LACTATE AND CALCIUM CHLORIDE: 600; 310; 30; 20 INJECTION, SOLUTION INTRAVENOUS at 02:24

## 2022-03-29 RX ADMIN — HYDROCORTISONE SODIUM SUCCINATE 50 MG: 100 INJECTION, POWDER, FOR SOLUTION INTRAMUSCULAR; INTRAVENOUS at 23:22

## 2022-03-29 RX ADMIN — CALCIUM GLUCONATE 1 G: 20 INJECTION, SOLUTION INTRAVENOUS at 05:02

## 2022-03-29 RX ADMIN — DIPHENOXYLATE HYDROCHLORIDE AND ATROPINE SULFATE 1 TABLET: 2.5; .025 TABLET ORAL at 17:01

## 2022-03-29 ASSESSMENT — LIFESTYLE VARIABLES
AVERAGE NUMBER OF DAYS PER WEEK YOU HAVE A DRINK CONTAINING ALCOHOL: 7
SUBSTANCE_ABUSE: 1
CONSUMPTION TOTAL: NEGATIVE
DOES PATIENT WANT TO STOP DRINKING: NO
ALCOHOL_USE: NO
HAVE YOU EVER FELT YOU SHOULD CUT DOWN ON YOUR DRINKING: NO
HAVE PEOPLE ANNOYED YOU BY CRITICIZING YOUR DRINKING: NO
EVER HAD A DRINK FIRST THING IN THE MORNING TO STEADY YOUR NERVES TO GET RID OF A HANGOVER: NO
TOTAL SCORE: 0
HOW MANY TIMES IN THE PAST YEAR HAVE YOU HAD 5 OR MORE DRINKS IN A DAY: 0
TOTAL SCORE: 0
ON A TYPICAL DAY WHEN YOU DRINK ALCOHOL HOW MANY DRINKS DO YOU HAVE: 1
EVER FELT BAD OR GUILTY ABOUT YOUR DRINKING: NO
TOTAL SCORE: 0

## 2022-03-29 ASSESSMENT — ENCOUNTER SYMPTOMS
WEIGHT LOSS: 1
HEARTBURN: 0
EYES NEGATIVE: 1
FALLS: 0
COUGH: 0
HEADACHES: 0
SPUTUM PRODUCTION: 0
PHOTOPHOBIA: 0
PALPITATIONS: 0
NERVOUS/ANXIOUS: 1
BACK PAIN: 0
DEPRESSION: 0
VOMITING: 0
SENSORY CHANGE: 0
FOCAL WEAKNESS: 0
FEVER: 0
NAUSEA: 0
DIZZINESS: 0
SPEECH CHANGE: 0
EYE PAIN: 0
MYALGIAS: 0
ABDOMINAL PAIN: 0
NEUROLOGICAL NEGATIVE: 1
CARDIOVASCULAR NEGATIVE: 1
RESPIRATORY NEGATIVE: 1
NERVOUS/ANXIOUS: 0
MUSCULOSKELETAL NEGATIVE: 1
WEAKNESS: 0
NECK PAIN: 0
DIARRHEA: 1
POLYDIPSIA: 0
BRUISES/BLEEDS EASILY: 0
CHILLS: 0
SHORTNESS OF BREATH: 0

## 2022-03-29 ASSESSMENT — PAIN DESCRIPTION - PAIN TYPE
TYPE: ACUTE PAIN

## 2022-03-29 ASSESSMENT — FIBROSIS 4 INDEX: FIB4 SCORE: 3

## 2022-03-29 NOTE — ED NOTES
Break RN: 2nd set blood cultures drawn, PIV placed in right AC. Left forearm EMS PIV infiltrated and dc.

## 2022-03-29 NOTE — ASSESSMENT & PLAN NOTE
1 shot of liquor daily.  Monitor for signs of withdrawal.  Certainly advised to stop in her current overall condition

## 2022-03-29 NOTE — CARE PLAN
The patient is Watcher - Medium risk of patient condition declining or worsening         Progress made toward(s) clinical / shift goals:    Problem: Knowledge Deficit - Standard  Goal: Patient and family/care givers will demonstrate understanding of plan of care, disease process/condition, diagnostic tests and medications  Outcome: Progressing     Problem: Fall Risk  Goal: Patient will remain free from falls  Outcome: Progressing       Patient is not progressing towards the following goals:

## 2022-03-29 NOTE — CASE COMMUNICATION
I agree with these changes  ----- Message -----  From: Alycia Chowdhury R.N.  Sent: 3/28/2022   9:54 AM PDT  To: Carlene Floyd R.N.      Quality Review Completed for 3/19 OASIS by JACKY Chowdhury, RN on 3/28/2022:  Edits completed by JACKY Chowdhury, RN:  1.  changed to less than daily per EMR reports of recent pain  2.  changed to #3, min exertion per PT eval on 3/23.  changed date for PT collaboration  3.  changed to #3 p er narrative supervision for bathing  4.  changed to #3 per narrative for supervision with ambulation  5.MAHC-10 checked yes to incontinence bringing score to 8 for fall risks  6.  changed to 3 per guidelines when ambulation is supervised  7. [id number] changed to #4 Supervision in D, E, F, I, J. #88 not attempted in K, per PT only ambulated 40 safely. Discharge goal changed to #4 for M, L, N, O.   8. Added exercise prescribed,  t o Activities Permitted, incontinence to Functional Limitations, ambulate only w/assist to Safety Measures and F2F to 485 forms  9.  changed to 2 per PT eval and ordered a commode for patient safety

## 2022-03-29 NOTE — PROGRESS NOTES
Hospital Medicine Daily Progress Note    Date of Service  3/29/2022    Chief Complaint  Jaymie Peacock is a 66 y.o. female admitted 3/28/2022 with hypotension, poor p.o. intake and diarrhea    Hospital Course  Jaymie Peacock is a 66 y.o. female current smoker with hypertension, emphysema, severe malnutrition, recent admission for acute renal failure and NSTEMI thought to be secondary to stress-induced cardiomyopathy reduced EF of 35% who presented 3/28/2022 with hypotension found by home health nurse.  Patient was recently admitted from February 18 to February 28, 2022 where she presented with failure to thrive and encephalopathy.  Prolonged admission with diagnoses of above, please refer to discharge summary from February 28 for full details.  She was evaluated for her new onset heart failure left heart cath was deferred due to high risk nature and poor renal function, was instructed to follow-up with cardiology outpatient.  She did have melena that developed and was on IV Protonix, GI was consulted ultimately recommended EGD/colonoscopy in the outpatient setting since GI bleeding stopped.  She was comfort care at one point however her encephalopathy improved and she stated she would like to continue treatment and she was made DNR/DNI, she had improvement of her renal function and encephalopathy, ultimately discharged to skilled nursing facility for continued therapy.  Patient was discharged and has been home with home health.  She reports poor p.o. intake noting that she does not have an appetite and does not want to cook anything for herself, she eats mostly cereal.  She endorses diarrhea 4+ bowel movements per day usually small-volume and watery but now becoming more voluminous and watery, has had chronic diarrhea however this change has been in the past couple weeks. She denies any fevers or chills, denies any cough or dyspnea, no chest pain or palpitations, no abdominal pain nausea or vomiting, denies melena  hematochezia hematuria or hematemesis.  During home health visit today her blood pressure was in the 70s, she does take losartan hydrochlorothiazide and she has been compliant and given hypertension she presented for evaluation.    Interval Problem Update  Patient seen and examined today.  Data, Medication data reviewed.  Case discussed with nursing as available.  Plan of Care reviewed with patient and notified of changes.  3/29 the patient today is improved somewhat, her blood pressure remains soft, she is without acute complaints, she denies abdominal pain, she does have diarrhea still, poor p.o. intake present, her laboratory data for this morning is partially pending, renal function is improved, sodium is low at 132, her albumin at 3.0, occult blood is negative, C. difficile negative, chest x-ray negative    I have personally seen and examined the patient at bedside. I discussed the plan of care with patient.    Consultants/Specialty  critical care    Code Status  DNAR/DNI    Disposition  Patient is not medically cleared for discharge.   Anticipate discharge to to home with close outpatient follow-up.  I have placed the appropriate orders for post-discharge needs.    Review of Systems  Review of Systems   Constitutional: Positive for malaise/fatigue and weight loss. Negative for chills and fever.   HENT: Negative.    Eyes: Negative.    Respiratory: Negative.  Negative for cough.    Cardiovascular: Negative.  Negative for chest pain and palpitations.   Gastrointestinal: Positive for diarrhea. Negative for heartburn, nausea and vomiting.   Genitourinary: Negative.  Negative for dysuria and frequency.   Musculoskeletal: Negative.  Negative for back pain and neck pain.   Skin: Negative.  Negative for itching and rash.   Neurological: Negative.  Negative for dizziness, focal weakness, weakness and headaches.   Endo/Heme/Allergies: Negative.  Negative for polydipsia. Does not bruise/bleed easily.    Psychiatric/Behavioral: Positive for substance abuse. Negative for depression. The patient is nervous/anxious.         Physical Exam  Temp:  [35.8 °C (96.4 °F)-36.5 °C (97.7 °F)] 35.8 °C (96.4 °F)  Pulse:  [54-97] 54  Resp:  [0-20] 16  BP: (71-98)/(48-66) 92/66  SpO2:  [88 %-99 %] 98 %    Physical Exam  Vitals and nursing note reviewed.   Constitutional:       Appearance: She is well-developed and underweight. She is ill-appearing. She is not diaphoretic.      Interventions: Nasal cannula in place.   HENT:      Head: Normocephalic and atraumatic.      Comments: Temporal muscle wasting     Nose: Nose normal.   Eyes:      Conjunctiva/sclera: Conjunctivae normal.      Pupils: Pupils are equal, round, and reactive to light.   Neck:      Thyroid: No thyromegaly.      Vascular: No JVD.   Cardiovascular:      Rate and Rhythm: Normal rate and regular rhythm.      Heart sounds: Normal heart sounds.     No friction rub. No gallop.   Pulmonary:      Effort: Pulmonary effort is normal.      Breath sounds: Normal breath sounds. No wheezing or rales.   Abdominal:      General: Bowel sounds are normal. There is no distension.      Palpations: Abdomen is soft. There is no mass.      Tenderness: There is no abdominal tenderness. There is no guarding or rebound.   Musculoskeletal:         General: No tenderness. Normal range of motion.      Cervical back: Normal range of motion and neck supple.      Comments: Generalized muscle wasting   Lymphadenopathy:      Cervical: No cervical adenopathy.   Skin:     General: Skin is warm and dry.      Coloration: Skin is pale.   Neurological:      Mental Status: She is oriented to person, place, and time. She is lethargic.      Cranial Nerves: No cranial nerve deficit.   Psychiatric:         Behavior: Behavior normal.         Fluids    Intake/Output Summary (Last 24 hours) at 3/29/2022 0922  Last data filed at 3/29/2022 0800  Gross per 24 hour   Intake 1310.83 ml   Output --   Net 1310.83 ml        Laboratory  Recent Labs     03/28/22 2020   WBC 11.8*   RBC 2.57*   HEMOGLOBIN 7.3*   HEMATOCRIT 22.7*   MCV 88.3   MCH 28.4   MCHC 32.2*   RDW 64.5*   PLATELETCT 219   MPV 9.8     Recent Labs     03/28/22 2020   SODIUM 130*   POTASSIUM 4.3   CHLORIDE 100   CO2 16*   GLUCOSE 87   BUN 21   CREATININE 2.52*   CALCIUM 7.7*                   Imaging  DX-CHEST-PORTABLE (1 VIEW)   Final Result      1.  There is no acute cardiopulmonary process.           Assessment/Plan  * Dehydration determined by examination- (present on admission)  Assessment & Plan  Poor PO intake with ongoing large volume watery diarrhea.  IVF    Adrenal insufficiency (HCC)- (present on admission)  Assessment & Plan  Hypotensive with dehydration and ongoing diarrheal illness, random spot cortisol checked and was 12.5.  Hydrocortisone 100 mg stat x1  Hydrocortisone 50 mg every 6 hours for now.  Wean as tolerated    Anemia- (present on admission)  Assessment & Plan  Recent GI bleed, FOBT negative in ED.  No reports of melena, hematochezia, hematemesis, hematuria, no other hemorrhage or recent trauma per patient.   Reticulocyte panel last month shows hypoproliferation.  Platelets within normal limits, leukocytosis present, no recent history of lymphopenia.  Given recent history of GI bleed as well as an apparent hemoglobin drop will hold chemical DVT prophylaxis at this time, if no evidence of bleeding and stable hemoglobin should consider starting on chemical DVT prophylaxis      HFrEF (heart failure with reduced ejection fraction) (HCC)- (present on admission)  Assessment & Plan  TTE on February 19, 2022 shows left ventricular ejection fraction 35% and basilar sparing in a pattern of stress-induced cardiomyopathy.  Continue statin.  Did not undergo cardiac cath during last hospitalization due to YAMIL, was discharged and instructed to follow-up with cardiology.  Presenting again with YAMIL and hypotension.  Monitor for signs of volume overload  given she is receiving IV fluid for hypotension, dehydration, diarrhea.    Emphysema lung (HCC)- (present on admission)  Assessment & Plan  Not in acute exacerbation.  Breathing treatments as needed    Hypomagnesemia- (present on admission)  Assessment & Plan  IV replacement, recheck levels    Protein-calorie malnutrition, severe (HCC)- (present on admission)  Assessment & Plan  Supplements ordered, nutrition consult.  Continue mirtazapine    Diarrhea- (present on admission)  Assessment & Plan  4+ watery BM's per day, has had multiple large volume watery BM's in ED.  Afebrile, mild leukocytosis, nontoxic, will hold off an antibiotics for now.  Recent exposure to abx, negative C. Difficile  Check stool for white cells, would benefit from GI evaluation  IVF  Monitor and replace electrolytes.      Hypotension- (present on admission)  Assessment & Plan  Holding home antihypertensives, takes losartan-HCTZ (50-6.25 qd) and reports compliance.  Cortisol 12.5, started on stress dose steroids for relative adrenal insufficiency.  Continue IV fluids, wean as tolerated  Consider repeating echo  Echo from 1 month ago shows HFrEF EF 35% and likely Takotsubo cardiomyopathy.    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  IVF  Renal dose meds and avoid nephrotoxins  Monitor I&O's  Follow renal function      Alcohol abuse- (present on admission)  Assessment & Plan  1 shot of liquor daily.  Monitor for signs of withdrawal.  Certainly advised to stop in her current overall condition      Smoking- (present on admission)  Assessment & Plan  Cessation advised       Plan  Continue with supportive care  Reduce IV fluids  Continue blood pressure support and adrenal support  Further investigation the patient's home environment, as the patient failed  recent discharge and discharged from a skilled nursing facility chest less than a week ago  Consider psychiatry eval  See orders  Medically complex high risk patient  Close laboratory follow-up      VTE prophylaxis: enoxaparin ppx pending follow-up blood work    I have performed a physical exam and reviewed and updated ROS and Plan today (3/29/2022). In review of yesterday's note (3/28/2022), there are no changes except as documented above.      Please note that this dictation was created using voice recognition software. I have made every reasonable attempt to correct obvious errors, but I expect that there are errors of grammar and possibly context that I did not discover before finalizing the note.

## 2022-03-29 NOTE — CARE PLAN
Problem: Nutritional:  Goal: Achieve adequate nutritional intake  Description: Patient will consume ~50% or > of meals w/ yogurt/smoothie intake  Outcome: Not Progressing

## 2022-03-29 NOTE — DISCHARGE PLANNING
[facility] TCN chart review completed. Collaborated with Halima FERNANDES and bedside RN prior to meeting with the pt. The most current review of medical record, knowledge of pt's PLOF and social support, LACE+ score of 72, no current 6 clicks (note pt awaiting more medically stable with re: current low BP prior to exiting bed per chart review)     Pt seen at bedside. Introduced TCN program. Provided education regarding differences in post acute resources including IRF, SNF and HH. Discussed [facility] plan benefits including Meds to Beds and [facility] transitional care services. Pt verbalizes understanding. Noted last admission, pt dc'd to SNF and eventually returned to home with HH services and [facility]. She reports she will likely refuse placement post acute this visit and wants to dc back to home with resumption of HH and [facility] services. Discussed concerns with pt and did note that she is awaiting therapy evaluations once medically appropriate. Pt reports she was able to function/mobilize at home though had become increasingly fatigued/weak (appears in part 2' to medical co-morbidities) and believes she will be able to return home once medically managed. As part of reason for readmit appears to be some degree of self care (possible volition issues after the passing of her spouse last year?), TCN sent email to [facility] and esperanza FERNANDES for possible follow up/assistance with pt post dc. Pt awaiting PT and OT consults once medically stable for additional recs/dc planning needs and no choice obtained at this time.  TCN will continue to follow and collaborate with discharge planning team as additional post acute needs arise. Thank you.

## 2022-03-29 NOTE — DISCHARGE PLANNING
Care Transition Team Assessment  NOK relative Nena germain in [place] @ [phone].    Lsw attended rounds, and Md needs an assessment as pt has been readmitted w/ same issues. Lsw and MD spoke to pt's transport support and friend of 40 years, Irwin Ashby @ [phone]. He reports he p/u pt from [facility] and she is now back in hospital after spending one week at home w/ HH.    Pt's friend states pt's spouse passed away nearly a year ago.    Lsw spoke to DONAVAN ADLER who reports pt's last service was HH w/ [facility]. They reported pt consumes only cereal for every meal.  Pt also has in home MD w/ [facility]. Donavan ADLER indicates he will request a CM to support pt at home. Pt told Donavan ADLER she would not want to go to SNF again as she believes she should be able to safely go home from here.         Information Source  Orientation Level: Oriented X4  Information Given By:  (friend of 40yrs and transport provider, Irwin [phone])  Informant's Name: Irwin Ashby  Who is responsible for making decisions for patient? : Patient    Readmission Evaluation  Is this a readmission?: Yes - unplanned readmission  Why do you think you were readmitted?: pt is not taking care of self at home not eating or taking meds    Elopement Risk  Legal Hold: No  Ambulatory or Self Mobile in Wheelchair: Yes  Disoriented: No  Psychiatric Symptoms: None  History of Wandering: No  Elopement this Admit: No  Vocalizing Wanting to Leave: No  Displays Behaviors, Body Language Wanting to Leave: No-Not at Risk for Elopement  Elopement Risk: Not at Risk for Elopement    Interdisciplinary Discharge Planning  Lives with - Patient's Self Care Capacity:  (lives alone w/ support from Irwin and neighbors across street)  Patient or legal guardian wants to designate a caregiver: No  Support Systems:  (Irwin and neighbors)  Do You Take your Prescribed Medications Regularly: No  Prior Services:  (1 week ago released from SNF [facility],  prior to admit)  Assistance Needed:   (reportedly only eats cereal for each meal)    Discharge Preparedness  What are your discharge supports?:  (Irwin for transport and neighbors for socialization)  Difficulity with IADLs:  (transport from Irwin)         Finances  Prescription Coverage: Yes    Vision / Hearing Impairment  Vision Impairment : No  Hearing Impairment : No              Domestic Abuse  Have you ever been the victim of abuse or violence?: No  Physical Abuse or Sexual Abuse: No  Verbal Abuse or Emotional Abuse: No  Possible Abuse/Neglect Reported to:: Not Applicable    Psychological Assessment  History of Psychiatric Problems:  (assessment needed to address if there is grief/depression)    Discharge Risks or Barriers  Discharge risks or barriers?:  (poor diet, appetite, etc.)  Patient risk factors:  (spouse passed away nearly a year ago)    Anticipated Discharge Information  Discharge Disposition: Disch to IP rehab facility or distinct part unit (62)

## 2022-03-29 NOTE — CASE COMMUNICATION
ON CALL RN NOTE:    Asked by supervisor to visit patient on the afternoon of 3/28/22 for re-evaluation of hypotension. Arrived at patient's home where she was resting in her chair in the living room watching TV. Patient alert and oriented x4, however she is cachectic and frail upon general exam. Unable to obtain BP,  bpm and thready. Patient appears to be extremely malnourished and reports eating 1 piece of fruit daily and has los t 5 lbs since last home health visit three days ago on 3/25/22 and has lost over ten pounds since SOC on 3/19/22. Patient allowing EMS to be called, who were also unable to palpate a pulse or BP. Advised patient that she needs to go to ED for evaluation of organ failure and extreme malnutritiion. Patient was VERY RELUCTANT and over 30 minutes was spent trying to convince her of the very real chance of death if she did not go immediatell y. Patient finally agreed and EMS took her to [facility].     Compliance and safety in home, possible EPS referral will need to be considered before patient can return home with home health as she is unable to provide food or care for herself in her current state. Consider hospice consult.     Jacob Bridges RN  [phone]

## 2022-03-29 NOTE — Clinical Note
Let me tell you, she was a tough sell to get in that ambulance! I had to use all the tricks and so did the paramedic. :)    Jacob    ----- Message -----  From: BEV Leone  Sent: 3/29/2022  12:37 PM PDT  To: Tam Bridges R.N.      Thank you! I have been following her admission. I agree. I believe she should be considered for SNF before DC from hospital and if does return home agree with EPS.  ----- Message -----  From: Tam Bridges R.N.  Sent: 3/29/2022   7:01 AM PDT  To: BEV Leone    ON CALL RN NOTE:    Asked by supervisor to visit patient on the afternoon of 3/28/22 for re-evaluation of hypotension. Arrived at patient's home where she was resting in her chair in the living room watching TV. Patient alert and oriented x4, however she is cachectic and frail upon general exam. Unable to obtain BP,  bpm and thready. Patient appears to be extremely malnourished and reports eating 1 piece of fruit daily and has lost 5 lbs since last home health visit three days ago on 3/25/22 and has lost over ten pounds since SOC on 3/19/22. Patient allowing EMS to be called, who were also unable to palpate a pulse or BP. Advised patient that she needs to go to ED for evaluation of organ failure and extreme malnutritiion. Patient was VERY RELUCTANT and over 30 minutes was spent trying to convince her of the very real chance of death if she did not go immediatelly. Patient finally agreed and EMS took her to [facility].     Compliance and safety in home, possible EPS referral will need to be considered before patient can return home with home health as she is unable to provide food or care for herself in her current state. Consider hospice consult.     Jacob Bridges RN  [phone]

## 2022-03-29 NOTE — DIETARY
"Nutrition services: Day 1 of admit.  Jaymie Peacock is a 66 y.o. female with admitting DX of dehydration determined by examination.   Pt noted with a low BMI (14.45).  Pt appears thin.  RD was able to speak with pt at bedside to discuss appetite/intake and wt hx.  Pt reports she typically does not have much of an appetite, eating 1-2 meals/day, mainly eating cereal since she does not cook much herself.  Overtime this has lead to wt loss and weakness/fatigue - she reports losing about an average of 15 lbs the last few (~2) months.  She has recurring diarrhea and occasional nausea, denies emesis or abdominal pain.  Pt tried to drink oral supplements at home but was not a fan, and is not drinking Boost provided inpatient.  We discussed nutrient dense foods/snacks.  Pt agreeable to Chobani smoothies and yogurt at this time.  Lunch tray at bedside with 25-50% consumed.  We also reviewed some additional preferred foods.  Pt is on service with [facility] and is hoping to see the dietitian when she is discharged home, if home care is resumed.  Pt with no further needs or questions at this time.     Assessment:  Height: 167.6 cm (5' 6\")  Weight: 40.6 kg (89 lb 8.1 oz) - via bed scale.   Body mass index is 14.45 kg/m²., BMI classification: severely underweight.   Diet/Intake: Regular.      Evaluation:   1. Pt noted with HFrEF, anemia, adrenal insufficiency, hypomagnesemia, lung emphysema, smoking, EtOH, YAMIL, and hypotension.  Additional PMHx reviewed.  2. Pt with a severe 14.4% wt loss over the past 2 months, which is considered severe.  Muscle wasting noted.  Wt loss has been progressive per home health notes.  3. Current clinical picture and MD progress notes reviewed.  4. No staged wounds or edema noted.  5. Labs: Sodium: 132, Glucose: 136, Creat.: 2.05, Albumin: 3.0.  6. Meds: Solu-cortef, Remeron (started today), Midodrine, LR.   7. LBM: 3/29.     Malnutrition Risk: Agree with severe malnutrition in the context " of starvation/ acute/chronic disease related malnutrition related to lack of appetite, ongoing diarrhea, and lung emphysema as evidenced by a severe 14% wt loss over the past 2 months and <50% of estimated nutritional needs for >5 days.  +Muscle loss.     Recommendations/Plan:  1. Chobani smoothies TID, additional greek yogurts, preferred foods.    2. Encourage intake of meals.  Continue to document % consumed under ADLs.   3. Continue Remeron.    4. Monitor weight.  5. Nutrition rep will continue to see patient for ongoing meal and snack preferences.     RD follows.

## 2022-03-29 NOTE — H&P
Hospital Medicine History & Physical Note    Date of Service  3/28/2022    Primary Care Physician  Toña Tejada P.A.-C.    Consultants  None    Code Status  DNAR/DNI    Chief Complaint  Chief Complaint   Patient presents with   • Hypotension     BIBA for hypotension. Pt complains of diarrhea X1 month. EMS BP 59/45. Pt A&O 4 awake and alert       History of Presenting Illness  Jaymie Peacock is a 66 y.o. female current smoker with hypertension, emphysema, severe malnutrition, recent admission for acute renal failure and NSTEMI thought to be secondary to stress-induced cardiomyopathy reduced EF of 35% who presented 3/28/2022 with hypotension found by home health nurse.  Patient was recently admitted from February 18 to February 28, 2022 where she presented with failure to thrive and encephalopathy.  Prolonged admission with diagnoses of above, please refer to discharge summary from February 28 for full details.  She was evaluated for her new onset heart failure left heart cath was deferred due to high risk nature and poor renal function, was instructed to follow-up with cardiology outpatient.  She did have melena that developed and was on IV Protonix, GI was consulted ultimately recommended EGD/colonoscopy in the outpatient setting since GI bleeding stopped.  She was comfort care at one point however her encephalopathy improved and she stated she would like to continue treatment and she was made DNR/DNI, she had improvement of her renal function and encephalopathy, ultimately discharged to skilled nursing facility for continued therapy.    Patient was discharged and has been home with home health.  She reports poor p.o. intake noting that she does not have an appetite and does not want to cook anything for herself, she eats mostly cereal.  She endorses diarrhea 4+ bowel movements per day usually small-volume and watery but now becoming more voluminous and watery, has had chronic diarrhea however this change has  been in the past couple weeks. She denies any fevers or chills, denies any cough or dyspnea, no chest pain or palpitations, no abdominal pain nausea or vomiting, denies melena hematochezia hematuria or hematemesis.  During home health visit today her blood pressure was in the 70s, she does take losartan hydrochlorothiazide and she has been compliant and given hypertension she presented for evaluation.    In the ED she is afebrile, normal pulse, respiratory rate ranges from 10-20, she has been hypotensive systolic is ranged from the 70s to low 90s, she had normal room air oxygen saturations, normal mentation.  Initial work-up showed leukocytosis 11.8, anemia 7.3 dropped from 9.1 1-month ago, sodium 130, bicarb 16 (was sixteen 1 month ago) serum creatinine up to 2.52 up from 1.37, hypocalcemia 7.7 hypomagnesemia 1.0 AST 46 lactic acid 1.1, troponin T 35, fecal occult blood negative, cortisol 12.5.  Patient was given IV fluid and intensivist was contacted for possible ICU admission, patient was evaluated and felt stable for IMCU, referred to hospitalist for admission.    I discussed the plan of care with patient, bedside RN and pharmacy.    Review of Systems  Review of Systems   Constitutional: Negative for chills and fever.   HENT: Negative for congestion and nosebleeds.    Eyes: Negative for photophobia and pain.   Respiratory: Negative for sputum production and shortness of breath.    Cardiovascular: Negative for chest pain and palpitations.   Gastrointestinal: Positive for diarrhea. Negative for abdominal pain, nausea and vomiting.   Genitourinary: Negative for dysuria and urgency.   Musculoskeletal: Negative for falls and myalgias.   Neurological: Negative for sensory change, speech change and focal weakness.   Psychiatric/Behavioral: Positive for substance abuse. The patient is not nervous/anxious.        Past Medical History   has a past medical history of Hypertension, Osteoporosis, and Smoking.    Surgical  History   has a past surgical history that includes us-needle core bx-breast panel (Right).     Family History  family history is not on file.   Family history reviewed with patient. There is no family history that is pertinent to the chief complaint.     Social History   reports that she has quit smoking. Her smoking use included cigarettes. She smoked 0.50 packs per day. She has never used smokeless tobacco. She reports current alcohol use of about 0.6 oz of alcohol per week. She reports previous drug use.    Allergies  No Known Allergies    Medications  Prior to Admission Medications   Prescriptions Last Dose Informant Patient Reported? Taking?   ibuprofen (MOTRIN) 200 MG Tab PRN at PRN Patient Yes Yes   Sig: Take 800 mg by mouth every 6 hours as needed. 4 tablets = 800 mg   lactobacillus rhamnosus (CULTURELLE) Cap capsule Not started at Not started Patient No No   Sig: Take 1 Capsule by mouth every morning with breakfast.   losartan-hydrochlorothiazide (HYZAAR) 100-12.5 MG per tablet 3/28/2022 at AM Patient Yes No   Sig: Take 0.5 Tablets by mouth every day. Indications: High Blood Pressure Disorder   meloxicam (MOBIC) 15 MG tablet 3/28/2022 at Noon Patient Yes No   Sig: Take 1 Tablet by mouth 1 time a day as needed. Indications: Joint Damage causing Pain and Loss of Function   mirtazapine (REMERON) 15 MG Tab Not started at Not started Patient No No   Sig: Take 1 Tablet by mouth every evening.   omeprazole (PRILOSEC) 40 MG delayed-release capsule Not started at Not started Patient No No   Sig: Take 1 Capsule by mouth every day for 30 days.   simvastatin (ZOCOR) 20 MG Tab 3/27/2022 at PM Patient Yes No   Sig: Take 20 mg by mouth every day. Indications: Ischemic Heart Disease      Facility-Administered Medications: None       Physical Exam  Temp:  [36.3 °C (97.4 °F)-36.5 °C (97.7 °F)] 36.3 °C (97.4 °F)  Pulse:  [75-97] 76  Resp:  [9-20] 12  BP: (71-98)/(48-62) 78/52  SpO2:  [88 %-99 %] 98 %  Blood Pressure : (!)  92/57   Temperature: 36.3 °C (97.4 °F)   Pulse: 78   Respiration: 12   Pulse Oximetry: 96 %       Physical Exam  Vitals and nursing note reviewed.   Constitutional:       General: She is not in acute distress.     Appearance: She is not toxic-appearing.      Comments: Frail thin 66-year-old female appears older than stated age, appears chronically ill, alert and conversant able to speak full sentences, no acute distress, nontoxic-appearing   HENT:      Head: Normocephalic and atraumatic.      Nose: Nose normal. No rhinorrhea.      Mouth/Throat:      Mouth: Mucous membranes are dry.      Pharynx: Oropharynx is clear.   Eyes:      General: No scleral icterus.     Extraocular Movements: Extraocular movements intact.      Conjunctiva/sclera: Conjunctivae normal.      Pupils: Pupils are equal, round, and reactive to light.   Cardiovascular:      Rate and Rhythm: Normal rate and regular rhythm.      Pulses: Normal pulses.      Heart sounds: No murmur heard.  Pulmonary:      Effort: Pulmonary effort is normal. No respiratory distress.      Breath sounds: Normal breath sounds. No wheezing, rhonchi or rales.   Abdominal:      General: Bowel sounds are normal.      Palpations: Abdomen is soft.      Tenderness: There is no abdominal tenderness. There is no guarding or rebound.   Musculoskeletal:         General: No tenderness. Normal range of motion.      Cervical back: Normal range of motion and neck supple. No rigidity or tenderness.      Right lower leg: No edema.      Left lower leg: No edema.   Skin:     General: Skin is warm and dry.      Capillary Refill: Capillary refill takes less than 2 seconds.   Neurological:      General: No focal deficit present.      Mental Status: She is alert and oriented to person, place, and time. Mental status is at baseline.      Cranial Nerves: No cranial nerve deficit.      Sensory: No sensory deficit.      Motor: No weakness.      Coordination: Coordination normal.   Psychiatric:          Mood and Affect: Mood normal.         Behavior: Behavior normal.         Thought Content: Thought content normal.         Judgment: Judgment normal.         Laboratory:  Recent Labs     03/28/22 2020   WBC 11.8*   RBC 2.57*   HEMOGLOBIN 7.3*   HEMATOCRIT 22.7*   MCV 88.3   MCH 28.4   MCHC 32.2*   RDW 64.5*   PLATELETCT 219   MPV 9.8     Recent Labs     03/28/22 2020   SODIUM 130*   POTASSIUM 4.3   CHLORIDE 100   CO2 16*   GLUCOSE 87   BUN 21   CREATININE 2.52*   CALCIUM 7.7*     Recent Labs     03/28/22 2020   ALTSGPT 22   ASTSGOT 46*   ALKPHOSPHAT 181*   TBILIRUBIN 0.4   GLUCOSE 87         No results for input(s): NTPROBNP in the last 72 hours.      Recent Labs     03/28/22 2020   TROPONINT 35*       Imaging:  DX-CHEST-PORTABLE (1 VIEW)   Final Result      1.  There is no acute cardiopulmonary process.          X-Ray:  I have personally reviewed the images and compared with prior images. and My impression is: No acute cardiopulmonary processes, age-indeterminate left inferior lateral rib fracture at the 10th rib level  EKG: Ordered by me and pending    Assessment/Plan:  I anticipate this patient will require at least two midnights for appropriate medical management, necessitating inpatient admission.    * Dehydration determined by examination- (present on admission)  Assessment & Plan  Poor PO intake with ongoing large volume watery diarrhea.  IVF    Adrenal insufficiency (HCC)- (present on admission)  Assessment & Plan  Hypotensive with dehydration and ongoing diarrheal illness, random spot cortisol checked and was 12.5.  Hydrocortisone 100 mg stat x1  Hydrocortisone 50 mg every 6 hours for now.    Anemia- (present on admission)  Assessment & Plan  Recent GI bleed, FOBT negative in ED.  No reports of melena, hematochezia, hematemesis, hematuria, no other hemorrhage or recent trauma per patient.   Iron studies one month ago show iron replete.  Reticulocyte panel last month shows hypoproliferation.  Platelets  within normal limits, leukocytosis present, no recent history of lymphopenia.  Given recent history of GI bleed as well as an apparent hemoglobin drop will hold chemical DVT prophylaxis at this time, if no evidence of bleeding and stable hemoglobin should consider starting on chemical DVT prophylaxis      HFrEF (heart failure with reduced ejection fraction) (HCC)- (present on admission)  Assessment & Plan  TTE on February 19, 2022 shows left ventricular ejection fraction 35% and basilar sparing in a pattern of stress-induced cardiomyopathy.  Continue statin.  Did not undergo cardiac cath during last hospitalization due to YAMIL, was discharged and instructed to follow-up with cardiology.  Presenting again with YAMIL and hypotension.  Monitor for signs of volume overload given she is receiving IV fluid for hypotension, dehydration, diarrhea.    Emphysema lung (HCC)- (present on admission)  Assessment & Plan  Not in acute exacerbation.  Breathing treatments as needed    Hypomagnesemia- (present on admission)  Assessment & Plan  IV replacement, recheck levels    Protein-calorie malnutrition, severe (HCC)- (present on admission)  Assessment & Plan  Supplements ordered, nutrition consult.  Continue mirtazapine    Diarrhea- (present on admission)  Assessment & Plan  4+ watery BM's per day, has had multiple large volume watery BM's in ED.  Afebrile, mild leukocytosis, nontoxic, will hold off an antibiotics for now.  Recent exposure to abx, Check CDiff.  IVF  Monitor and replace electrolytes.      Hypotension- (present on admission)  Assessment & Plan  Holding home antihypertensives, takes losartan-HCTZ (50-6.25 qd) and reports compliance.  Cortisol 12.5, started on stress dose steroids for relative adrenal insufficiency.  Continue IV fluids.  Point-of-care ultrasound of IVC was quite collapsible suggesting patient can tolerate more fluid, continue IV fluid at this time.  Lactic acid 1.1.  Worsening YAMIL.  Echo from 1 month ago  shows HFrEF EF 35% and likely Takotsubo cardiomyopathy.    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  U/a & CPK  Rule out post obstruction  IVF  Renal dose meds and avoid nephrotoxins  Monitor I&O's  Follow renal function      Alcohol abuse- (present on admission)  Assessment & Plan  1 shot of liquor daily.  Monitor for signs of withdrawal.      Smoking- (present on admission)  Assessment & Plan  Spent 4 minutes on tobacco cessation counseling including nicotine patches, gum, and dangers of smoking.        VTE prophylaxis: SCDs/TEDs     Patient is critically ill.   The patient continues to have: hypotension  The vital organ system that is affected is the: cardiovascular, renal  If untreated there is a high chance of deterioration into: shock And eventually death.   The critical care that I am providing today is: evaluating hypotension with multiple POCUS abdominal exams and providing IV fluid with frequent rechecks of BP and monitoring for initiation of vasopressors, starting stress dose steroids.  The critical that has been undertaken is medically complex.   There has been no overlap in critical care time.   Critical Care Time not including procedures: 35 minutes

## 2022-03-29 NOTE — ASSESSMENT & PLAN NOTE
Supplements ordered, nutrition consult.  Continue mirtazapine, Marinol  Patient denies depression, no overt features for FTT

## 2022-03-29 NOTE — PROGRESS NOTES
Emergency department evaluation for possible admission to the intensive care unit.    This is a 66-year-old woman with a history of hypertension, and emphysema who was last admitted on 2/18/2022 with NSTEMI, YAMIL and altered mental status.  During her evaluation she was found to have an LVEF of 35% which was attributed to a Tocco Subu cardiomyopathy.  She was initially started on a heparin infusion pending a left heart cath.  However she developed melena and required 1 unit of packed red blood cells for symptomatic anemia.  Gastroenterology was consulted and recommended Protonix and outpatient EGD.  She was discharged to a skilled nursing facility, and is now living independently with home health visiting her at home.  Today the home health nurse noted that she was hypotensive.      She was transported to [facility] for evaluation.  In the emergency department she is afebrile with a heart rate of 91 bpm, BP 72/53 RR-12.  She denied any symptoms of lightheadedness dizziness chest pain shortness of breath abdominal pain.  She did state that she is had several small diarrhea episodes each morning for the past several mornings.  She denies blood in her stool. When prompted she does state that she is thirsty.    In the emergency department she received 1 L of LR.  Her blood pressure responded briskly, and upon my exam her MAP is 86 mmHg.  She is alert and oriented skin is pink warm and dry with moderate skin turgor.  I do note that she has some conjunctival pallor.  Heart sounds are regular lung sounds are clear abdomen is soft nontender nondistended.    Labs reviewed.  Of note, she does not have a leukocytosis.  Her hemoglobin has dropped from 9.1-7.3 over the last month.  Hemoccult is recommended.  Her creatinine is 2.52 up from 1.37.      She does not currently require intensive care unit admission.  I discussed the case with Dr. Cruz the on-call hospitalist Who will assume care of this  patient.    Critical care team will remain available if she has a clinical decline.    Tye Coyne MD  Critical care medicine

## 2022-03-29 NOTE — PROGRESS NOTES
4 Eyes Skin Assessment Completed by ELROY Sims and Blas RN.    Head WDL  Ears WDL  Nose WDL  Mouth WDL  Neck WDL  Breast/Chest WDL  Shoulder Blades WDL  Spine WDL  (R) Arm/Elbow/Hand WDL  (L) Arm/Elbow/Hand WDL  Abdomen WDL  Groin WDL  Scrotum/Coccyx/Buttocks Redness and Blanching , sacral wound  (R) Leg WDL  (L) Leg WDL  (R) Heel/Foot/Toe WDL  (L) Heel/Foot/Toe WDL          Devices In Places ECG, Blood Pressure Cuff and Pulse Ox      Interventions In Place Sacral Mepilex, Pillows and Low Air Loss Mattress    Possible Skin Injury Yes    Pictures Uploaded Into Epic Yes  Wound Consult Placed Yes  RN Wound Prevention Protocol Ordered Yes

## 2022-03-29 NOTE — ASSESSMENT & PLAN NOTE
TTE on February 19, 2022 shows left ventricular ejection fraction 35% and basilar sparing in a pattern of stress-induced cardiomyopathy, now improved to EF 55  Continue statin

## 2022-03-29 NOTE — WOUND TEAM
[facility]  Inpatient Services  Initial Wound and Skin Care Evaluation    Admission Date: 3/28/2022     Last order of IP CONSULT TO WOUND CARE was found on 3/29/2022 from Hospital Encounter on 3/28/2022     HPI, PMH, SH: Reviewed    Past Surgical History:   Procedure Laterality Date   • US-NEEDLE CORE BX-BREAST PANEL Right      Social History     Tobacco Use   • Smoking status: Former Smoker     Packs/day: 0.50     Types: Cigarettes   • Smokeless tobacco: Never Used   Substance Use Topics   • Alcohol use: Yes     Alcohol/week: 0.6 oz     Types: 1 Shots of liquor per week     Chief Complaint   Patient presents with   • Hypotension     BIBA for hypotension. Pt complains of diarrhea X1 month. EMS BP 59/45. Pt A&O 4 awake and alert     Diagnosis: Dehydration determined by examination [E86.0]    Unit where seen by Wound Team: [id number]     WOUND CONSULT/FOLLOW UP RELATED TO:  coccyx     WOUND HISTORY:  Pressure related injury to coccyx, has been treated by home health RN and wound team.     WOUND ASSESSMENT/LDA  Wound 03/19/22 Pressure Injury Sacrum (Active)   Wound Image    03/29/22 1546   Site Assessment Red;Pink 03/29/22 1546   Periwound Assessment Clean;Dry;Intact 03/29/22 1546   Margins Defined edges;Attached edges 03/29/22 1546   Wound Length (cm) 0.5 cm 03/29/22 1546   Wound Width (cm) 0.5 cm 03/29/22 1546   Wound Depth (cm) 0.1 cm 03/29/22 1546   Wound Surface Area (cm^2) 0.25 cm^2 03/29/22 1546   Wound Volume (cm^3) 0.025 cm^3 03/29/22 1546   Pressure Injury Stage 2 03/29/22 1546   Closure Secondary intention 03/29/22 1546   Drainage Amount Scant 03/29/22 1546   Drainage Description Serous 03/29/22 1546   Treatments Cleansed;Site care;Offloading 03/29/22 1546   Wound Cleansing Normal Saline Irrigation 03/29/22 1546   Periwound Protectant Skin Protectant Wipes to Periwound 03/29/22 1546   Dressing Options Mepilex 03/29/22 1546   Dressing Cleansing/Solutions Not Applicable 03/29/22 1546   Dressing  Changed New 03/29/22 1546   Dressing Status Clean;Dry;Intact 03/29/22 1546   Dressing Change/Treatment Frequency Every 72 hrs, and As Needed 03/29/22 1546   NEXT Dressing Change/Treatment Date 04/01/22 03/29/22 1546   NEXT Weekly Photo (Inpatient Only) 04/05/22 03/29/22 1546   WOUND NURSE ONLY - Time Spent with Patient (mins) 30 03/29/22 1546   Number of days: 10       Wound 03/29/22 Pressure Injury Coccyx POA (Active)   Site Assessment Pink;Red 03/29/22 1500   Periwound Assessment Clean;Dry;Intact 03/29/22 1500   Margins Defined edges;Attached edges 03/29/22 1500   Closure Secondary intention 03/29/22 1500   Drainage Amount Scant 03/29/22 1500   Drainage Description Serous 03/29/22 1500   Treatments Cleansed;Irrigation;Site care;Offloading 03/29/22 1500   Wound Cleansing Approved Wound Cleanser 03/29/22 1500   Periwound Protectant Skin Protectant Wipes to Periwound 03/29/22 1500   Dressing Cleansing/Solutions Not Applicable 03/29/22 1500   Dressing Options Mepilex 03/29/22 1500   Dressing Changed New 03/29/22 1500   Dressing Status Clean;Dry;Intact 03/29/22 1500   Dressing Change/Treatment Frequency Every 72 hrs, and As Needed 03/29/22 1500   NEXT Dressing Change/Treatment Date 04/01/22 03/29/22 1500   Pressure Injury Stage 2 03/29/22 1500   Wound Length (cm) 0.5 cm 03/29/22 1500   Wound Width (cm) 0.5 cm 03/29/22 1500   Wound Depth (cm) 0.1 cm 03/29/22 1500   Wound Surface Area (cm^2) 0.25 cm^2 03/29/22 1500   Wound Volume (cm^3) 0.025 cm^3 03/29/22 1500   Shape oval 03/29/22 1500   Wound Odor None 03/29/22 1500   Exposed Structures None 03/29/22 1500   WOUND NURSE ONLY - Time Spent with Patient (mins) 30 03/29/22 1500   Number of days: 0        Vascular:    CAM:   No results found.    Lab Values:    Lab Results   Component Value Date/Time    WBC 10.1 03/29/2022 01:40 PM    RBC 2.64 (L) 03/29/2022 01:40 PM    HEMOGLOBIN 7.5 (L) 03/29/2022 01:40 PM    HEMATOCRIT 23.2 (L) 03/29/2022 01:40 PM        Culture Results  show:  No results found for this or any previous visit (from the past 720 hour(s)).    Pain Level/Medicated:  0/10    INTERVENTIONS BY WOUND TEAM:  Chart and images reviewed. Discussed with bedside RN. All areas of concern (based on picture review, LDA review and discussion with bedside RN) have been thoroughly assessed. Documentation of areas based on significant findings. This RN in to assess patient. Performed standard wound care which includes appropriate positioning, dressing removal and non-selective debridement. Pictures and measurements obtained weekly if/when required.  Preparation for Dressing removal: ERICKSON  Non-selectively Debrided with:  NS and gauze.  Sharp debridement: NA  Debra wound: Cleansed with NS and gauze, Prepped with no sting skin prep  Primary Dressing: mepilex sacral    Interdisciplinary consultation: Patient, Bedside RN    EVALUATION / RATIONALE FOR TREATMENT:  Most Recent Date:  3/29/22: pink and moist tissue, nearly resolved.  Continue with offloading efforts with sacral mepilex.      Goals: Steady decrease in wound area and depth weekly.    WOUND TEAM PLAN OF CARE ([X] for frequency of wound follow up,):   Nursing to follow orders written for wound care. Contact wound team if area fails to progress, deteriorates or with any questions/concerns  Dressing changes by wound team:                   Follow up 3 times weekly:                NPWT change 3 times weekly:     Follow up 1-2 times weekly:      Follow up Bi-Monthly:                   Follow up as needed:   x  Other (explain):     NURSING PLAN OF CARE ORDERS (X):  Dressing changes: See Dressing Care orders: x  Skin care: See Skin Care orders: x  RN Prevention Protocol: x  Rectal tube care: See Rectal Tube Care orders:   Other orders:    RSKIN:   CURRENTLY IN PLACE (X), APPLIED THIS VISIT (A), ORDERED (O):   Q shift Forrest:  X  Q shift pressure point assessments:  X    Surface/Positioning   Pressure redistribution mattress            Low  Airloss        ICU LAURA  Bariatric foam      Bariatric LAURA     Waffle cushion        Waffle Overlay  x        Reposition q 2 hours      TAPs Turning system     Z Xiang Pillow     Offloading/Redistribution   Sacral Mepilex (Silicone dressing)   A  Heel Mepilex (Silicone dressing)       x  Heel float boots (Prevalon boot)             Float Heels off Bed with Pillows      x     Respiratory NA  Silicone O2 tubing         Gray Foam Ear protectors     Cannula fixation Device (Tender )          High flow offloading Clip    Elastic head band offloading device      Anchorfast                                                         Trach with Optifoam split foam             Containment/Moisture Prevention NA    Rectal tube or BMS    Purwick/Condom Cath        Ahumada Catheter    Barrier wipes           Barrier paste       Antifungal tx      Interdry        Mobilization NA      Up to chair        Ambulate      PT/OT      Nutrition       Dietician        Diabetes Education      PO  x   TF     TPN     NPO   # days     Other        Anticipated discharge plans: TBD medical clearance.   LTACH:        SNF/Rehab:                  Home Health Care:           Outpatient Wound Center:            Self/Family Care:        Other:                  Vac Discharge Needs: NA  Not Applicable Pt not on a wound vac:     x  Regular Vac while inpatient, alternative dressing at DC:        Regular Vac in use and continued at DC:            Reg. Vac w/ Skin Sub/Biologic in use. Will need to be changed 2x wkly:      Veraflo Vac while inpatient, ok to transition to Regular Vac on Discharge:           Veraflo Vac while inpatient, will need to remain on Veraflo Vac upon discharge:

## 2022-03-29 NOTE — ASSESSMENT & PLAN NOTE
Recent GI bleed, FOBT negative in ED.  No reports of melena, hematochezia, hematemesis, hematuria, no other hemorrhage or recent trauma per patient.   Reticulocyte count not improved, low normal EPO level  Transfused 1 unit of red cells  Has remained stable while in the medical floor  Monitor

## 2022-03-29 NOTE — ED PROVIDER NOTES
ED Provider  Scribed for Rell Humphreys D.O. by Jonelle Guzman. 3/28/2022  6:52 PM    Means of arrival: EMS  History obtained from: Patient  History limited by: None    CHIEF COMPLAINT  Chief Complaint   Patient presents with    Hypotension     BIBA for hypotension. Pt complains of diarrhea X1 month. EMS BP 59/45. Pt A&O 4 awake and alert         HPI  Jaymie Peacock is a 66 y.o. female who presents for hypotension onset prior to arrival. She has an at-home nurse who checked her blood pressure today and resulted too low to detect. She says her systolic blood pressure is typically in the 70s. She is additionally experiencing diarrhea, weight loss, and decreased appetite onset 1 month ago. She denies loss of consciousness, dizziness, light headedness, fever, chills, hematochezia, vaginal bleeding, nausea, and vomiting. She currently states she feels completely normal except for the episodes of diarrhea she has been experiencing. She has a history of hypertension and takes blood pressure medications. She reports taking them as prescribed. She also has an EF of 35%. She has an appointment scheduled this week to see her cardiologist.     REVIEW OF SYSTEMS  See HPI for further details. All other systems are negative.     PAST MEDICAL HISTORY   has a past medical history of Hypertension, Osteoporosis, and Smoking.    SOCIAL HISTORY  Social History     Tobacco Use    Smoking status: Former Smoker     Packs/day: 0.50     Types: Cigarettes    Smokeless tobacco: Never Used   Vaping Use    Vaping Use: Never used   Substance and Sexual Activity    Alcohol use: Yes     Alcohol/week: 0.6 oz     Types: 1 Shots of liquor per week    Drug use: Not Currently     SURGICAL HISTORY   has a past surgical history that includes us-needle core bx-breast panel (Right).    CURRENT MEDICATIONS  Current Outpatient Medications   Medication Instructions    lactobacillus rhamnosus (CULTURELLE) Cap capsule 1 Capsule, Oral, EVERY MORNING WITH  "BREAKFAST    losartan-hydrochlorothiazide (HYZAAR) 100-12.5 MG per tablet 0.5 Tablets, Oral, DAILY    meloxicam (MOBIC) 15 mg, Oral, 1 TIME DAILY PRN    mirtazapine (REMERON) 15 mg, Oral, NIGHTLY    omeprazole (PRILOSEC) 40 mg, Oral, DAILY    simvastatin (ZOCOR) 20 mg, Oral, Every Day (QD)       ALLERGIES  No Known Allergies    PHYSICAL EXAM  VITAL SIGNS: BP (!) 71/48   Pulse 97   Temp 36.3 °C (97.4 °F) (Temporal)   Resp 19   Ht 1.676 m (5' 6\")   Wt 39 kg (86 lb)   SpO2 95%   BMI 13.88 kg/m²   Constitutional: Alert in no apparent distress. Frail.   HENT: No signs of trauma, mucous membranes are moist  Eyes: Conjunctiva normal, Non-icteric.   Neck: Normal range of motion, No tenderness, Supple.  Lymphatic: No lymphadenopathy noted.   Cardiovascular: Regular rate and rhythm, no murmurs.   Thorax & Lungs: Normal breath sounds, No respiratory distress, No wheezing, No chest tenderness.   Abdomen: Bowel sounds normal, Soft, No tenderness, No masses, No pulsatile masses. No peritoneal signs.  Skin: Warm, Dry, normal color.   Back: No bony tenderness, No CVA tenderness.   Extremities: No edema, No tenderness, No cyanosis. No peripheral edema.   Musculoskeletal: Good range of motion in all major joints. No tenderness to palpation or major deformities noted.   Neurologic: Alert and oriented x4, Normal motor function, Normal sensory function, No focal deficits noted.   Psychiatric: Affect normal, Judgment normal, Mood normal.     DIAGNOSTIC STUDIES / PROCEDURES    EKG  12 Lead EKG interpreted by me shown below.      LABS  Results for orders placed or performed during the hospital encounter of 03/28/22   LACTIC ACID   Result Value Ref Range    Lactic Acid 1.1 0.5 - 2.0 mmol/L   CBC WITH DIFFERENTIAL   Result Value Ref Range    WBC 11.8 (H) 4.8 - 10.8 K/uL    RBC 2.57 (L) 4.20 - 5.40 M/uL    Hemoglobin 7.3 (L) 12.0 - 16.0 g/dL    Hematocrit 22.7 (L) 37.0 - 47.0 %    MCV 88.3 81.4 - 97.8 fL    MCH 28.4 27.0 - 33.0 pg    " MCHC 32.2 (L) 33.6 - 35.0 g/dL    RDW 64.5 (H) 35.9 - 50.0 fL    Platelet Count 219 164 - 446 K/uL    MPV 9.8 9.0 - 12.9 fL    Neutrophils-Polys 80.70 (H) 44.00 - 72.00 %    Lymphocytes 12.20 (L) 22.00 - 41.00 %    Monocytes 5.80 0.00 - 13.40 %    Eosinophils 0.10 0.00 - 6.90 %    Basophils 0.30 0.00 - 1.80 %    Immature Granulocytes 0.90 0.00 - 0.90 %    Nucleated RBC 0.00 /100 WBC    Neutrophils (Absolute) 9.54 (H) 2.00 - 7.15 K/uL    Lymphs (Absolute) 1.44 1.00 - 4.80 K/uL    Monos (Absolute) 0.69 0.00 - 0.85 K/uL    Eos (Absolute) 0.01 0.00 - 0.51 K/uL    Baso (Absolute) 0.04 0.00 - 0.12 K/uL    Immature Granulocytes (abs) 0.11 0.00 - 0.11 K/uL    NRBC (Absolute) 0.00 K/uL   COMP METABOLIC PANEL   Result Value Ref Range    Sodium 130 (L) 135 - 145 mmol/L    Potassium 4.3 3.6 - 5.5 mmol/L    Chloride 100 96 - 112 mmol/L    Co2 16 (L) 20 - 33 mmol/L    Anion Gap 14.0 7.0 - 16.0    Glucose 87 65 - 99 mg/dL    Bun 21 8 - 22 mg/dL    Creatinine 2.52 (H) 0.50 - 1.40 mg/dL    Calcium 7.7 (L) 8.5 - 10.5 mg/dL    AST(SGOT) 46 (H) 12 - 45 U/L    ALT(SGPT) 22 2 - 50 U/L    Alkaline Phosphatase 181 (H) 30 - 99 U/L    Total Bilirubin 0.4 0.1 - 1.5 mg/dL    Albumin 2.8 (L) 3.2 - 4.9 g/dL    Total Protein 5.5 (L) 6.0 - 8.2 g/dL    Globulin 2.7 1.9 - 3.5 g/dL    A-G Ratio 1.0 g/dL   TROPONIN   Result Value Ref Range    Troponin T 35 (H) 6 - 19 ng/L   ESTIMATED GFR   Result Value Ref Range    GFR (CKD-EPI) 20 (A) >60 mL/min/1.73 m 2       All labs reviewed by me.    RADIOLOGY  DX-CHEST-PORTABLE (1 VIEW)   Final Result      1.  There is no acute cardiopulmonary process.        The radiologist's interpretations of all radiological studies have been reviewed by me.    Films have been independently by me      COURSE  Pertinent Labs & Imaging studies reviewed. (See chart for details)    6:52 PM - Patient seen and examined at bedside. Discussed plan of care. The patient will be resuscitated with LR bolus. Ordered for DX-Chest, Lactic  Acid, CBC with diff, CMP, Urinalysis, Urine Culture, and Blood Culture to evaluate her symptoms.    HYDRATION: Based on the patient's presentation of Hypotension the patient was given IV fluids. IV Hydration was used because oral hydration was not adequate alone. Upon recheck following hydration, the patient was not improved.    MEDICAL DECISION MAKING  This is a 66 y.o. female who presents who appears very well, has no complaints of dizziness lightheadedness or concerns of sepsis but is found to have a very low blood pressure.  She does have a intermittent episodes of loose stools that seems to happen once a day.    Her blood pressure was low, IV fluids were initiated and sepsis protocols were initiated.  There is no signs of sepsis lactic acid level is normal.  But with sepsis bolus of fluid at 30 cc/kg she still remains with a systolic blood pressure approximately 80.  She is very slight of frame, and her previous blood pressure was recorded around normal 102.    Creatinine is a 2.52 which is worse than it was 1 month ago.  This is concerning for probable dehydration may be related to the diarrheal stools that she is having.  Additional IV fluids were given.  Her systolic blood pressure is now 88 and this is improving.  I spoke with the intensivist and the patient is stable for the stepdown unit, call is pending to the stepdown unit intensivist.    Pressures are not as started now as her blood pressure has been begun to start to improve with fluids.  She did have a bowel movement here which was liquid and this was sent down to evaluate for C. difficile.    Critical care time 30 minutes      DISPOSITION:  Patient will be hospitalized by hospitalist in guarded condition.      CRITICAL CARE  The very real possibilty of a deterioration of this patient's condition required the highest level of my preparedness for sudden, emergent intervention.  I provided critical care services, which included medication orders,  frequent reevaluations of the patient's condition and response to treatment, ordering and reviewing test results, and discussing the case with various consultants.  The critical care time associated with the care of the patient was 30 minutes minutes. Review chart for interventions. This time is exclusive of any other billable procedures.     FINAL IMPRESSION  1. Hypotension, unspecified hypotension type    2. Anemia, unspecified type    3. Diarrhea, unspecified type         I, Jonelle Guzman (Melvin), am scribing for, and in the presence of, Rell Humphreys D.O..    Electronically signed by: Jonelle Guzman (Scribe), 3/28/2022    I, Rell Humphreys D.O. personally performed the services described in this documentation, as scribed by Jonelle Guzman in my presence, and it is both accurate and complete.    The note accurately reflects work and decisions made by me.  Rell Humphreys D.O.  3/28/2022  10:51 PM

## 2022-03-30 ENCOUNTER — HOME CARE VISIT (OUTPATIENT)
Dept: HOME HEALTH SERVICES | Facility: HOME HEALTHCARE | Age: 67
End: 2022-03-30
Payer: MEDICARE

## 2022-03-30 ENCOUNTER — APPOINTMENT (OUTPATIENT)
Dept: RADIOLOGY | Facility: MEDICAL CENTER | Age: 67
DRG: 640 | End: 2022-03-30
Attending: HOSPITALIST
Payer: MEDICARE

## 2022-03-30 PROBLEM — N30.00 ACUTE CYSTITIS WITHOUT HEMATURIA: Status: ACTIVE | Noted: 2022-03-30

## 2022-03-30 LAB
ALBUMIN SERPL BCP-MCNC: 2.1 G/DL (ref 3.2–4.9)
ALBUMIN/GLOB SERPL: 0.8 G/DL
ALP SERPL-CCNC: 150 U/L (ref 30–99)
ALT SERPL-CCNC: 12 U/L (ref 2–50)
ANION GAP SERPL CALC-SCNC: 14 MMOL/L (ref 7–16)
AST SERPL-CCNC: 17 U/L (ref 12–45)
BILIRUB SERPL-MCNC: 0.3 MG/DL (ref 0.1–1.5)
BUN SERPL-MCNC: 21 MG/DL (ref 8–22)
CALCIUM SERPL-MCNC: 8.2 MG/DL (ref 8.5–10.5)
CHLORIDE SERPL-SCNC: 103 MMOL/L (ref 96–112)
CO2 SERPL-SCNC: 15 MMOL/L (ref 20–33)
CREAT SERPL-MCNC: 2.14 MG/DL (ref 0.5–1.4)
D DIMER PPP IA.FEU-MCNC: 1.03 UG/ML (FEU) (ref 0–0.5)
ERYTHROCYTE [DISTWIDTH] IN BLOOD BY AUTOMATED COUNT: 65.1 FL (ref 35.9–50)
GFR SERPLBLD CREATININE-BSD FMLA CKD-EPI: 25 ML/MIN/1.73 M 2
GLOBULIN SER CALC-MCNC: 2.5 G/DL (ref 1.9–3.5)
GLUCOSE SERPL-MCNC: 129 MG/DL (ref 65–99)
HCT VFR BLD AUTO: 20.6 % (ref 37–47)
HGB BLD-MCNC: 6.7 G/DL (ref 12–16)
HGB RETIC QN AUTO: 38.8 PG/CELL (ref 29–35)
IMM RETICS NFR: 38.6 % (ref 9.3–17.4)
LACTATE BLD-SCNC: 1.1 MMOL/L (ref 0.5–2)
LACTATE BLD-SCNC: 1.2 MMOL/L (ref 0.5–2)
LACTATE BLD-SCNC: 1.7 MMOL/L (ref 0.5–2)
MAGNESIUM SERPL-MCNC: 1.5 MG/DL (ref 1.5–2.5)
MCH RBC QN AUTO: 28.8 PG (ref 27–33)
MCHC RBC AUTO-ENTMCNC: 32.5 G/DL (ref 33.6–35)
MCV RBC AUTO: 88.4 FL (ref 81.4–97.8)
NT-PROBNP SERPL IA-MCNC: 3701 PG/ML (ref 0–125)
PHOSPHATE SERPL-MCNC: 3.4 MG/DL (ref 2.5–4.5)
PLATELET # BLD AUTO: 234 K/UL (ref 164–446)
PMV BLD AUTO: 11 FL (ref 9–12.9)
POTASSIUM SERPL-SCNC: 4.4 MMOL/L (ref 3.6–5.5)
PROT SERPL-MCNC: 4.6 G/DL (ref 6–8.2)
RBC # BLD AUTO: 2.33 M/UL (ref 4.2–5.4)
RETICS # AUTO: 0.08 M/UL (ref 0.04–0.06)
RETICS/RBC NFR: 3.5 % (ref 0.8–2.1)
SODIUM SERPL-SCNC: 132 MMOL/L (ref 135–145)
WBC # BLD AUTO: 11.1 K/UL (ref 4.8–10.8)

## 2022-03-30 PROCEDURE — 85027 COMPLETE CBC AUTOMATED: CPT

## 2022-03-30 PROCEDURE — 87186 SC STD MICRODIL/AGAR DIL: CPT

## 2022-03-30 PROCEDURE — 700101 HCHG RX REV CODE 250: Performed by: STUDENT IN AN ORGANIZED HEALTH CARE EDUCATION/TRAINING PROGRAM

## 2022-03-30 PROCEDURE — 97162 PT EVAL MOD COMPLEX 30 MIN: CPT

## 2022-03-30 PROCEDURE — 84100 ASSAY OF PHOSPHORUS: CPT

## 2022-03-30 PROCEDURE — A9270 NON-COVERED ITEM OR SERVICE: HCPCS | Performed by: STUDENT IN AN ORGANIZED HEALTH CARE EDUCATION/TRAINING PROGRAM

## 2022-03-30 PROCEDURE — 85046 RETICYTE/HGB CONCENTRATE: CPT

## 2022-03-30 PROCEDURE — 83880 ASSAY OF NATRIURETIC PEPTIDE: CPT

## 2022-03-30 PROCEDURE — 87077 CULTURE AEROBIC IDENTIFY: CPT

## 2022-03-30 PROCEDURE — 87040 BLOOD CULTURE FOR BACTERIA: CPT

## 2022-03-30 PROCEDURE — 700111 HCHG RX REV CODE 636 W/ 250 OVERRIDE (IP): Performed by: STUDENT IN AN ORGANIZED HEALTH CARE EDUCATION/TRAINING PROGRAM

## 2022-03-30 PROCEDURE — 700102 HCHG RX REV CODE 250 W/ 637 OVERRIDE(OP): Performed by: HOSPITALIST

## 2022-03-30 PROCEDURE — 83735 ASSAY OF MAGNESIUM: CPT

## 2022-03-30 PROCEDURE — 700102 HCHG RX REV CODE 250 W/ 637 OVERRIDE(OP): Performed by: STUDENT IN AN ORGANIZED HEALTH CARE EDUCATION/TRAINING PROGRAM

## 2022-03-30 PROCEDURE — 99233 SBSQ HOSP IP/OBS HIGH 50: CPT | Performed by: HOSPITALIST

## 2022-03-30 PROCEDURE — A9270 NON-COVERED ITEM OR SERVICE: HCPCS | Performed by: HOSPITALIST

## 2022-03-30 PROCEDURE — 87086 URINE CULTURE/COLONY COUNT: CPT

## 2022-03-30 PROCEDURE — 700111 HCHG RX REV CODE 636 W/ 250 OVERRIDE (IP): Performed by: HOSPITALIST

## 2022-03-30 PROCEDURE — 85379 FIBRIN DEGRADATION QUANT: CPT

## 2022-03-30 PROCEDURE — 82668 ASSAY OF ERYTHROPOIETIN: CPT

## 2022-03-30 PROCEDURE — 770000 HCHG ROOM/CARE - INTERMEDIATE ICU *

## 2022-03-30 PROCEDURE — 80053 COMPREHEN METABOLIC PANEL: CPT

## 2022-03-30 PROCEDURE — 97165 OT EVAL LOW COMPLEX 30 MIN: CPT

## 2022-03-30 PROCEDURE — 700105 HCHG RX REV CODE 258: Performed by: HOSPITALIST

## 2022-03-30 PROCEDURE — 700105 HCHG RX REV CODE 258: Performed by: STUDENT IN AN ORGANIZED HEALTH CARE EDUCATION/TRAINING PROGRAM

## 2022-03-30 PROCEDURE — 83605 ASSAY OF LACTIC ACID: CPT

## 2022-03-30 RX ORDER — MAGNESIUM SULFATE HEPTAHYDRATE 40 MG/ML
2 INJECTION, SOLUTION INTRAVENOUS ONCE
Status: COMPLETED | OUTPATIENT
Start: 2022-03-30 | End: 2022-03-30

## 2022-03-30 RX ORDER — SODIUM CHLORIDE, SODIUM LACTATE, POTASSIUM CHLORIDE, AND CALCIUM CHLORIDE .6; .31; .03; .02 G/100ML; G/100ML; G/100ML; G/100ML
500 INJECTION, SOLUTION INTRAVENOUS ONCE
Status: COMPLETED | OUTPATIENT
Start: 2022-03-30 | End: 2022-03-30

## 2022-03-30 RX ORDER — DRONABINOL 5 MG/1
5 CAPSULE ORAL
Status: DISCONTINUED | OUTPATIENT
Start: 2022-03-30 | End: 2022-04-02

## 2022-03-30 RX ORDER — SODIUM CHLORIDE 9 MG/ML
500 INJECTION, SOLUTION INTRAVENOUS ONCE
Status: COMPLETED | OUTPATIENT
Start: 2022-03-30 | End: 2022-03-30

## 2022-03-30 RX ORDER — SODIUM CHLORIDE 9 MG/ML
INJECTION, SOLUTION INTRAVENOUS CONTINUOUS
Status: DISCONTINUED | OUTPATIENT
Start: 2022-03-30 | End: 2022-04-01

## 2022-03-30 RX ADMIN — OMEPRAZOLE 40 MG: 20 CAPSULE, DELAYED RELEASE ORAL at 05:01

## 2022-03-30 RX ADMIN — MIDODRINE HYDROCHLORIDE 10 MG: 5 TABLET ORAL at 17:02

## 2022-03-30 RX ADMIN — SODIUM CHLORIDE, POTASSIUM CHLORIDE, SODIUM LACTATE AND CALCIUM CHLORIDE 500 ML: 600; 310; 30; 20 INJECTION, SOLUTION INTRAVENOUS at 05:26

## 2022-03-30 RX ADMIN — SIMVASTATIN 20 MG: 20 TABLET, FILM COATED ORAL at 17:02

## 2022-03-30 RX ADMIN — DRONABINOL 5 MG: 5 CAPSULE ORAL at 17:02

## 2022-03-30 RX ADMIN — SODIUM CHLORIDE 500 ML: 9 INJECTION, SOLUTION INTRAVENOUS at 13:15

## 2022-03-30 RX ADMIN — DIPHENOXYLATE HYDROCHLORIDE AND ATROPINE SULFATE 1 TABLET: 2.5; .025 TABLET ORAL at 07:55

## 2022-03-30 RX ADMIN — SODIUM CHLORIDE 500 ML: 9 INJECTION, SOLUTION INTRAVENOUS at 09:45

## 2022-03-30 RX ADMIN — DIPHENOXYLATE HYDROCHLORIDE AND ATROPINE SULFATE 1 TABLET: 2.5; .025 TABLET ORAL at 22:10

## 2022-03-30 RX ADMIN — SODIUM CHLORIDE: 9 INJECTION, SOLUTION INTRAVENOUS at 08:45

## 2022-03-30 RX ADMIN — MIRTAZAPINE 15 MG: 15 TABLET, FILM COATED ORAL at 22:10

## 2022-03-30 RX ADMIN — MAGNESIUM SULFATE HEPTAHYDRATE 2 G: 40 INJECTION, SOLUTION INTRAVENOUS at 11:08

## 2022-03-30 RX ADMIN — MIDODRINE HYDROCHLORIDE 10 MG: 5 TABLET ORAL at 07:55

## 2022-03-30 RX ADMIN — HYDROCORTISONE SODIUM SUCCINATE 50 MG: 100 INJECTION, POWDER, FOR SOLUTION INTRAMUSCULAR; INTRAVENOUS at 11:08

## 2022-03-30 RX ADMIN — WATER 1 G: 1000 INJECTION, SOLUTION INTRAVENOUS at 07:55

## 2022-03-30 RX ADMIN — MIDODRINE HYDROCHLORIDE 10 MG: 5 TABLET ORAL at 11:07

## 2022-03-30 RX ADMIN — HYDROCORTISONE SODIUM SUCCINATE 50 MG: 100 INJECTION, POWDER, FOR SOLUTION INTRAMUSCULAR; INTRAVENOUS at 05:01

## 2022-03-30 RX ADMIN — HYDROCORTISONE SODIUM SUCCINATE 50 MG: 100 INJECTION, POWDER, FOR SOLUTION INTRAMUSCULAR; INTRAVENOUS at 17:02

## 2022-03-30 ASSESSMENT — ENCOUNTER SYMPTOMS
BRUISES/BLEEDS EASILY: 0
MUSCULOSKELETAL NEGATIVE: 1
NEUROLOGICAL NEGATIVE: 1
DEPRESSION: 0
EYES NEGATIVE: 1
FOCAL WEAKNESS: 0
HEADACHES: 0
VOMITING: 0
DIZZINESS: 0
COUGH: 0
HEARTBURN: 0
PALPITATIONS: 0
NAUSEA: 0
DIARRHEA: 1
WEIGHT LOSS: 1
BACK PAIN: 0
CARDIOVASCULAR NEGATIVE: 1
RESPIRATORY NEGATIVE: 1
POLYDIPSIA: 0
FEVER: 0
CHILLS: 0
WEAKNESS: 0
NERVOUS/ANXIOUS: 1
NECK PAIN: 0

## 2022-03-30 ASSESSMENT — COGNITIVE AND FUNCTIONAL STATUS - GENERAL
MOBILITY SCORE: 20
STANDING UP FROM CHAIR USING ARMS: A LITTLE
TOILETING: A LITTLE
DAILY ACTIVITIY SCORE: 19
WALKING IN HOSPITAL ROOM: A LITTLE
PERSONAL GROOMING: A LITTLE
MOVING FROM LYING ON BACK TO SITTING ON SIDE OF FLAT BED: A LITTLE
CLIMB 3 TO 5 STEPS WITH RAILING: A LITTLE
SUGGESTED CMS G CODE MODIFIER MOBILITY: CJ
SUGGESTED CMS G CODE MODIFIER DAILY ACTIVITY: CK
DRESSING REGULAR LOWER BODY CLOTHING: A LITTLE
DRESSING REGULAR UPPER BODY CLOTHING: A LITTLE
HELP NEEDED FOR BATHING: A LITTLE

## 2022-03-30 ASSESSMENT — GAIT ASSESSMENTS
DEVIATION: BRADYKINETIC;SHUFFLED GAIT;DECREASED BASE OF SUPPORT
ASSISTIVE DEVICE: FRONT WHEEL WALKER
DISTANCE (FEET): 15
GAIT LEVEL OF ASSIST: CONTACT GUARD ASSIST

## 2022-03-30 ASSESSMENT — FIBROSIS 4 INDEX
FIB4 SCORE: 1.41
FIB4 SCORE: 1.41

## 2022-03-30 ASSESSMENT — ACTIVITIES OF DAILY LIVING (ADL): TOILETING: INDEPENDENT

## 2022-03-30 ASSESSMENT — LIFESTYLE VARIABLES: SUBSTANCE_ABUSE: 1

## 2022-03-30 NOTE — PROGRESS NOTES
Hospital Medicine Daily Progress Note    Date of Service  3/30/2022    Chief Complaint  Jaymie Peacock is a 66 y.o. female admitted 3/28/2022 with hypotension, poor p.o. intake and diarrhea    Hospital Course  Jaymie Peacock is a 66 y.o. female current smoker with hypertension, emphysema, severe malnutrition, recent admission for acute renal failure and NSTEMI thought to be secondary to stress-induced cardiomyopathy reduced EF of 35% who presented 3/28/2022 with hypotension found by home health nurse.  Patient was recently admitted from February 18 to February 28, 2022 where she presented with failure to thrive and encephalopathy.  Prolonged admission with diagnoses of above, please refer to discharge summary from February 28 for full details.  She was evaluated for her new onset heart failure left heart cath was deferred due to high risk nature and poor renal function, was instructed to follow-up with cardiology outpatient.  She did have melena that developed and was on IV Protonix, GI was consulted ultimately recommended EGD/colonoscopy in the outpatient setting since GI bleeding stopped.  She was comfort care at one point however her encephalopathy improved and she stated she would like to continue treatment and she was made DNR/DNI, she had improvement of her renal function and encephalopathy, ultimately discharged to skilled nursing facility for continued therapy.  Patient was discharged and has been home with home health.  She reports poor p.o. intake noting that she does not have an appetite and does not want to cook anything for herself, she eats mostly cereal.  She endorses diarrhea 4+ bowel movements per day usually small-volume and watery but now becoming more voluminous and watery, has had chronic diarrhea however this change has been in the past couple weeks. She denies any fevers or chills, denies any cough or dyspnea, no chest pain or palpitations, no abdominal pain nausea or vomiting, denies melena  hematochezia hematuria or hematemesis.  During home health visit today her blood pressure was in the 70s, she does take losartan hydrochlorothiazide and she has been compliant and given hypertension she presented for evaluation.    Interval Problem Update  Patient seen and examined today.  Data, Medication data reviewed.  Case discussed with nursing as available.  Plan of Care reviewed with patient and notified of changes.      3/30 the patient feels fairly unchanged, she does not appear to be symptomatic despite significantly low blood pressures, no further significant diarrhea, poor urine output, decreasing hemoglobin without signs of bleeding, ongoing renal insufficiency, overnight fluids were stopped for some reason, lactic acid negative, urine sodium fairly low, D-dimer slightly elevated without other significant findings supporting possible thrombosis, not a candidate for anticoagulation secondary to critical and low hemoglobin, positive urine analysis, although the patient made very minimal urine output and no prior complaints.  Culture preliminary positive for E. coli  3/29 the patient today is improved somewhat, her blood pressure remains soft, she is without acute complaints, she denies abdominal pain, she does have diarrhea still, poor p.o. intake present, her laboratory data for this morning is partially pending, renal function is improved, sodium is low at 132, her albumin at 3.0, occult blood is negative, C. difficile negative, chest x-ray negative    I have personally seen and examined the patient at bedside. I discussed the plan of care with patient.    Consultants/Specialty  critical care    Code Status  DNAR/DNI    Disposition  Patient is not medically cleared for discharge.   Anticipate discharge to to home with close outpatient follow-up.  I have placed the appropriate orders for post-discharge needs.    Review of Systems  Review of Systems   Constitutional: Positive for malaise/fatigue and  weight loss. Negative for chills and fever.   HENT: Negative.    Eyes: Negative.    Respiratory: Negative.  Negative for cough.    Cardiovascular: Negative.  Negative for chest pain and palpitations.   Gastrointestinal: Positive for diarrhea. Negative for heartburn, nausea and vomiting.   Genitourinary: Negative.  Negative for dysuria and frequency.   Musculoskeletal: Negative.  Negative for back pain and neck pain.   Skin: Negative.  Negative for itching and rash.   Neurological: Negative.  Negative for dizziness, focal weakness, weakness and headaches.   Endo/Heme/Allergies: Negative.  Negative for polydipsia. Does not bruise/bleed easily.   Psychiatric/Behavioral: Positive for substance abuse. Negative for depression. The patient is nervous/anxious.         Physical Exam  Temp:  [35.1 °C (95.2 °F)-36.3 °C (97.3 °F)] 35.7 °C (96.3 °F)  Pulse:  [47-87] 72  Resp:  [3-20] 10  BP: ()/(43-66) 82/52  SpO2:  [80 %-100 %] 97 %    Physical Exam  Vitals and nursing note reviewed.   Constitutional:       Appearance: She is well-developed and underweight. She is ill-appearing. She is not diaphoretic.      Interventions: Nasal cannula in place.   HENT:      Head: Normocephalic and atraumatic.      Comments: Temporal muscle wasting     Nose: Nose normal.   Eyes:      Conjunctiva/sclera: Conjunctivae normal.      Pupils: Pupils are equal, round, and reactive to light.   Neck:      Thyroid: No thyromegaly.      Vascular: No JVD.   Cardiovascular:      Rate and Rhythm: Normal rate and regular rhythm.      Heart sounds: Normal heart sounds.     No friction rub. No gallop.   Pulmonary:      Effort: Pulmonary effort is normal.      Breath sounds: Normal breath sounds. No wheezing or rales.   Abdominal:      General: Bowel sounds are normal. There is no distension.      Palpations: Abdomen is soft. There is no mass.      Tenderness: There is no abdominal tenderness. There is no guarding or rebound.   Musculoskeletal:          General: No tenderness. Normal range of motion.      Cervical back: Normal range of motion and neck supple.      Comments: Generalized muscle wasting   Lymphadenopathy:      Cervical: No cervical adenopathy.   Skin:     General: Skin is warm and dry.      Coloration: Skin is pale.   Neurological:      Mental Status: She is oriented to person, place, and time. She is lethargic.      Cranial Nerves: No cranial nerve deficit.   Psychiatric:         Behavior: Behavior normal.         Fluids    Intake/Output Summary (Last 24 hours) at 3/30/2022 0738  Last data filed at 3/29/2022 1200  Gross per 24 hour   Intake 2273.33 ml   Output --   Net 2273.33 ml       Laboratory  Recent Labs     03/28/22  2020 03/29/22  1340   WBC 11.8* 10.1   RBC 2.57* 2.64*   HEMOGLOBIN 7.3* 7.5*   HEMATOCRIT 22.7* 23.2*   MCV 88.3 87.9   MCH 28.4 28.4   MCHC 32.2* 32.3*   RDW 64.5* 64.8*   PLATELETCT 219 272   MPV 9.8 10.0     Recent Labs     03/28/22  2020 03/29/22  1125   SODIUM 130* 132*   POTASSIUM 4.3 4.4   CHLORIDE 100 101   CO2 16* 17*   GLUCOSE 87 136*   BUN 21 19   CREATININE 2.52* 2.05*   CALCIUM 7.7* 8.6                   Imaging  DX-CHEST-PORTABLE (1 VIEW)   Final Result      1.  There is no acute cardiopulmonary process.           Assessment/Plan  * Dehydration determined by examination- (present on admission)  Assessment & Plan  Poor PO intake with ongoing large volume watery diarrhea.  IVF    Acute cystitis without hematuria  Assessment & Plan  Was initially not overtly symptomatic, poor urine output though  Empiric antibiotics, await cultures, preliminary positive for E. coli    Adrenal insufficiency (HCC)- (present on admission)  Assessment & Plan  Hypotensive with dehydration and ongoing diarrheal illness, random spot cortisol checked and was 12.5.  Hydrocortisone 50 mg every 6 hours for now.  Wean as tolerated    Anemia- (present on admission)  Assessment & Plan  Recent GI bleed, FOBT negative in ED.  No reports of melena,  hematochezia, hematemesis, hematuria, no other hemorrhage or recent trauma per patient.   Reticulocyte panel last month shows hypoproliferation, improved now  Platelets within normal limits, leukocytosis present, no recent history of lymphopenia.  Given recent history of GI bleed as well as an apparent hemoglobin drop will hold chemical DVT prophylaxis at this time, if no evidence of bleeding and stable hemoglobin should consider starting on chemical DVT prophylaxis  We will recheck FOBT, consider GI consult at this time and hopefully endoscopy      HFrEF (heart failure with reduced ejection fraction) (HCC)- (present on admission)  Assessment & Plan  TTE on February 19, 2022 shows left ventricular ejection fraction 35% and basilar sparing in a pattern of stress-induced cardiomyopathy.  Continue statin.  Did not undergo cardiac cath during last hospitalization due to YAMIL, was discharged and instructed to follow-up with cardiology.  Presenting again with YAMIL and hypotension.  Monitor for signs of volume overload given she is receiving IV fluid for hypotension, dehydration, diarrhea.    Emphysema lung (HCC)- (present on admission)  Assessment & Plan  Not in acute exacerbation.  Breathing treatments as needed    Hypomagnesemia- (present on admission)  Assessment & Plan  IV replacement, recheck levels    Protein-calorie malnutrition, severe (HCC)- (present on admission)  Assessment & Plan  Supplements ordered, nutrition consult.  Continue mirtazapine, Marinol  Patient denies depression, no overt features for FTT    Diarrhea- (present on admission)  Assessment & Plan  4+ watery BM's per day, has had multiple large volume watery BM's in ED.  Afebrile, mild leukocytosis, nontoxic, will hold off an antibiotics for now.  Recent exposure to abx, negative C. Difficile  Check stool for white cells, would benefit from GI evaluation  IVF  Monitor and replace electrolytes.      Hypotension- (present on admission)  Assessment &  Plan  Holding home antihypertensives, takes losartan-HCTZ (50-6.25 qd) and reports compliance.  Cortisol 12.5, started on stress dose steroids for relative adrenal insufficiency.  Continue IV fluids, wean as tolerated  Consider repeating echo  Echo from 1 month ago shows HFrEF EF 35% and likely Takotsubo cardiomyopathy.    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  IVF  Renal dose meds and avoid nephrotoxins  Monitor I&O's  Ongoing fluid challenge, question ATN  Follow renal function      Alcohol abuse- (present on admission)  Assessment & Plan  1 shot of liquor daily.  Monitor for signs of withdrawal.  Certainly advised to stop in her current overall condition      Smoking- (present on admission)  Assessment & Plan  Cessation advised       Plan  Empiric antibiotics  Monitor blood counts closely, FOBT checks  Continue with supportive care  Continue IV fluids  Continue blood pressure support and adrenal support  Further investigation the patient's home environment, as the patient failed recent discharge and discharged from a skilled nursing facility chest less than a week ago  Consider psychiatry eval, although the patient at this time does not endorse any mood disturbance prohibiting her from adequate nutrition  See orders  Medically complex high risk patient  Close laboratory follow-up      VTE prophylaxis: enoxaparin ppx pending follow-up blood work    I have performed a physical exam and reviewed and updated ROS and Plan today (3/30/2022). In review of yesterday's note (3/29/2022), there are no changes except as documented above.      Please note that this dictation was created using voice recognition software. I have made every reasonable attempt to correct obvious errors, but I expect that there are errors of grammar  and possibly context that I did not discover before finalizing the note.

## 2022-03-30 NOTE — PROGRESS NOTES
"0500 pt became hypotensive with SBP in 60's, PLR conducted ant pts blood pressure elevated to 80's. Dr. Oakley notified. MD at bedside. MD updated on acute hypotension and worsening hypothermia despite interventions of warm blankets. MD updated on no urine output overnight. Bladder scan revealed 155ml. MD ordered 500ml LR bolus. Trendelenburg position and  Warming blanket along with abx and additional lab work.    BP (!) 82/52   Pulse 72   Temp (!) 35.4 °C (95.7 °F)   Resp (!) 10   Ht 1.676 m (5' 6\")   Wt 40.6 kg (89 lb 8.1 oz)   SpO2 97%   BMI 14.45 kg/m²      SR- SB with occasional arrhythmia and pac            "

## 2022-03-30 NOTE — DISCHARGE PLANNING
[facility] TCN chart review completed. Collaborated with Halima FERNANDES and OT. Noted pt's BP in supine at time of note was 66/45 (which may limit pt's ability to participate with PT and OT at this time; note per OT, PT and OT will continue to monitor for appropriate parameters to initiate OOB activity). See prior TCN note for dc planning concerns/considerations (as pt will likely refuse SNF/rehab, though is agreeable to resumption of HH and [facility] services) TCN will continue to follow and collaborate with discharge planning team as additional post acute needs arise. Thank you.    Previously completed:  - Orders received for: PT and OT-> awaiting more stable BP  - Choice forms: none currently (awaiting therpay recs/medical stability/final POC)   - [facility] introduced (Y), referral (sent).

## 2022-03-30 NOTE — HEART FAILURE PROGRAM
Per Dr. Griffin's note 3/29/22, pateint is dehydrated from poor PO intake and diarrhea.    Per this same note HF is not decompensated and pt needs to be monitored for fluid overload while receiving tx for dehydration. HF checklist and appointment not indicated in the setting of compensated HF.    Thank you, Adela Cardio RN Navigator [phone]

## 2022-03-30 NOTE — PROGRESS NOTES
VAT reached out to bedside RN as patient creatinine 2.14, and per policy would need nephrology approval before we can place midline. Bedside RN will contact MD and let VAT RN know.

## 2022-03-30 NOTE — THERAPY
"Occupational Therapy   Initial Evaluation     Patient Name: Jaymie Peacock  Age:  66 y.o., Sex:  female  Medical Record #: [id number]  Today's Date: 3/30/2022     Precautions  Precautions: (P) Fall Risk    Assessment  Patient is 66 y.o. female who presents to [facility] for hypotension, poor PO intake, and diarrhea. Pt primarily limited by low BP during session, BP supine in bed 80/47, sitting EOB 79/52, after mobilizing seated EOB 68/52. Pt able to perform seated ADLs at SPV level, functional mobility w/ FWW performed w/ SBA. Will continue to follow while in house to assess pt's functional status w/ standing ADLs. Pt reports she does not take medications daily and only takes them when home health is there. Pt has had multiple re-admits for failure to thrive. Would optimally benefit from long term placement, JOHANNA, Group home, or moving in w/ kids.     Plan    Recommend Occupational Therapy 3 times per week until therapy goals are met for the following treatments:  Adaptive Equipment, Neuro Re-Education / Balance, Self Care/Activities of Daily Living, Therapeutic Activities, and Therapeutic Exercises.    DC Equipment Recommendations: (P) None  Discharge Recommendations: (P) Recommend home health for continued occupational therapy services     Subjective    \"I am from [facility], I had half my hip replaced at the [facility]!\"     Objective       03/30/22 1627   Total Time Spent   Total Time Spent (Mins) 30   Charge Group   OT Evaluation OT Evaluation Low   Initial Contact Note    Initial Contact Note Order Received and Verified, Occupational Therapy Evaluation in Progress with Full Report to Follow.   Prior Living Situation   Prior Services Skilled Home Health Services   Housing / Facility 1 Story House   Bathroom Set up Walk In Shower;Shower Chair   Equipment Owned 4-Wheel Walker;Tub / Shower Seat   Lives with - Patient's Self Care Capacity Alone and Unable to Care For Self   Comments Pt w/ supportive neighbors, " noted multiple re-admits in the past year for failure to thrive, friend from [place] in town who can assist   Prior Level of ADL Function   Self Feeding Independent   Grooming / Hygiene Independent   Bathing Independent   Dressing Independent   Toileting Independent   Prior Level of IADL Function   Medication Management Independent   Laundry Independent   Kitchen Mobility Independent   Finances Independent   Home Management Independent   Shopping Independent   Prior Level Of Mobility Independent With Device in Home   Driving / Transportation Driving Independent   Precautions   Precautions Fall Risk   Vitals   O2 (LPM) 0   O2 Delivery Device None - Room Air   Vitals Comments BP supine: 80/47, sitting 79/52, sitting after mobility 68/52. Pt asymptomatic throughout   Pain 0 - 10 Group   Therapist Pain Assessment Post Activity Pain Same as Prior to Activity;Nurse Notified  (no c/o pain during session)   Cognition    Cognition / Consciousness WDL   Level of Consciousness Alert   Comments pleasent and cooperative   Active ROM Upper Body   Active ROM Upper Body  WDL   Strength Upper Body   Upper Body Strength  WDL   Coordination Upper Body   Coordination WDL   Balance Assessment   Sitting Balance (Static) Good   Sitting Balance (Dynamic) Fair +   Standing Balance (Static) Fair   Standing Balance (Dynamic) Fair -   Weight Shift Sitting Good   Weight Shift Standing Fair   Comments w/ FWW   Bed Mobility    Supine to Sit Supervised   Sit to Supine Supervised   Scooting Supervised   ADL Assessment   Grooming Supervision;Seated   Upper Body Dressing Supervision   Lower Body Dressing Supervision   Toileting   (holloway in place)   Comments grooming performed in sitting due to low BP   How much help from another person does the patient currently need...   Putting on and taking off regular lower body clothing? 3   Bathing (including washing, rinsing, and drying)? 3   Toileting, which includes using a toilet, bedpan, or urinal? 3    Putting on and taking off regular upper body clothing? 3   Taking care of personal grooming such as brushing teeth? 3   Eating meals? 4   6 Clicks Daily Activity Score 19   Functional Mobility   Sit to Stand Standby Assist   Mobility mobilized from bed to bathroom distance w/ FWW   Activity Tolerance   Sitting in Chair NT   Sitting Edge of Bed 8 min   Standing 3 min total   Patient / Family Goals   Patient / Family Goal #1 To go home   Short Term Goals   Short Term Goal # 1 Pt will demo standing grooming w/ SPV   Short Term Goal # 2 Pt will demo toilet txf w/ SPV   Education Group   Role of Occupational Therapist Patient Response Patient;Acceptance;Explanation;Demonstration;Verbal Demonstration;Action Demonstration   Problem List   Problem List Decreased Active Daily Living Skills;Decreased Homemaking Skills;Decreased Activity Tolerance;Decreased Functional Mobility;Impaired Postural Control / Balance   Anticipated Discharge Equipment and Recommendations   DC Equipment Recommendations None   Discharge Recommendations Recommend home health for continued occupational therapy services   Interdisciplinary Plan of Care Collaboration   IDT Collaboration with  Nursing   Patient Position at End of Therapy Call Light within Reach;Tray Table within Reach;Phone within Reach;In Bed;Bed Alarm On   Collaboration Comments report given   Session Information   Date / Session Number  3/30, 1 (1/3, 4/5)   Priority 2

## 2022-03-31 ENCOUNTER — APPOINTMENT (OUTPATIENT)
Dept: RADIOLOGY | Facility: MEDICAL CENTER | Age: 67
DRG: 640 | End: 2022-03-31
Attending: HOSPITALIST
Payer: MEDICARE

## 2022-03-31 ENCOUNTER — HOME CARE VISIT (OUTPATIENT)
Dept: HOME HEALTH SERVICES | Facility: HOME HEALTHCARE | Age: 67
End: 2022-03-31
Payer: MEDICARE

## 2022-03-31 ENCOUNTER — APPOINTMENT (OUTPATIENT)
Dept: CARDIOLOGY | Facility: MEDICAL CENTER | Age: 67
DRG: 640 | End: 2022-03-31
Attending: HOSPITALIST
Payer: MEDICARE

## 2022-03-31 LAB
ABO GROUP BLD: NORMAL
ALBUMIN SERPL BCP-MCNC: 2.1 G/DL (ref 3.2–4.9)
ALBUMIN/GLOB SERPL: 1 G/DL
ALP SERPL-CCNC: 135 U/L (ref 30–99)
ALT SERPL-CCNC: 12 U/L (ref 2–50)
ANION GAP SERPL CALC-SCNC: 12 MMOL/L (ref 7–16)
AST SERPL-CCNC: 13 U/L (ref 12–45)
BACTERIA UR CULT: ABNORMAL
BACTERIA UR CULT: ABNORMAL
BARCODED ABORH UBTYP: 6200
BARCODED PRD CODE UBPRD: NORMAL
BARCODED UNIT NUM UBUNT: NORMAL
BASOPHILS # BLD AUTO: 0.1 % (ref 0–1.8)
BASOPHILS # BLD: 0.01 K/UL (ref 0–0.12)
BILIRUB SERPL-MCNC: <0.2 MG/DL (ref 0.1–1.5)
BLD GP AB SCN SERPL QL: NORMAL
BUN SERPL-MCNC: 21 MG/DL (ref 8–22)
CALCIUM SERPL-MCNC: 7.5 MG/DL (ref 8.5–10.5)
CHLORIDE SERPL-SCNC: 105 MMOL/L (ref 96–112)
CO2 SERPL-SCNC: 14 MMOL/L (ref 20–33)
COMPONENT R 8504R: NORMAL
CREAT SERPL-MCNC: 2.79 MG/DL (ref 0.5–1.4)
EOSINOPHIL # BLD AUTO: 0 K/UL (ref 0–0.51)
EOSINOPHIL NFR BLD: 0 % (ref 0–6.9)
EPO SERPL-ACNC: 5 MU/ML (ref 4–27)
ERYTHROCYTE [DISTWIDTH] IN BLOOD BY AUTOMATED COUNT: 57.7 FL (ref 35.9–50)
ERYTHROCYTE [DISTWIDTH] IN BLOOD BY AUTOMATED COUNT: 66.5 FL (ref 35.9–50)
GFR SERPLBLD CREATININE-BSD FMLA CKD-EPI: 18 ML/MIN/1.73 M 2
GLOBULIN SER CALC-MCNC: 2.2 G/DL (ref 1.9–3.5)
GLUCOSE SERPL-MCNC: 142 MG/DL (ref 65–99)
HCT VFR BLD AUTO: 19.6 % (ref 37–47)
HCT VFR BLD AUTO: 29.5 % (ref 37–47)
HGB BLD-MCNC: 6.4 G/DL (ref 12–16)
HGB BLD-MCNC: 9.8 G/DL (ref 12–16)
IMM GRANULOCYTES # BLD AUTO: 0.13 K/UL (ref 0–0.11)
IMM GRANULOCYTES NFR BLD AUTO: 1.1 % (ref 0–0.9)
LV EJECT FRACT  99904: 55
LV EJECT FRACT MOD 4C 99902: 44.52
LYMPHOCYTES # BLD AUTO: 0.5 K/UL (ref 1–4.8)
LYMPHOCYTES NFR BLD: 4.3 % (ref 22–41)
MAGNESIUM SERPL-MCNC: 2 MG/DL (ref 1.5–2.5)
MCH RBC QN AUTO: 29.1 PG (ref 27–33)
MCH RBC QN AUTO: 29.2 PG (ref 27–33)
MCHC RBC AUTO-ENTMCNC: 32.7 G/DL (ref 33.6–35)
MCHC RBC AUTO-ENTMCNC: 33.2 G/DL (ref 33.6–35)
MCV RBC AUTO: 87.8 FL (ref 81.4–97.8)
MCV RBC AUTO: 89.1 FL (ref 81.4–97.8)
MONOCYTES # BLD AUTO: 0.54 K/UL (ref 0–0.85)
MONOCYTES NFR BLD AUTO: 4.6 % (ref 0–13.4)
NEUTROPHILS # BLD AUTO: 10.57 K/UL (ref 2–7.15)
NEUTROPHILS NFR BLD: 89.9 % (ref 44–72)
NRBC # BLD AUTO: 0 K/UL
NRBC BLD-RTO: 0 /100 WBC
PHOSPHATE SERPL-MCNC: 3.7 MG/DL (ref 2.5–4.5)
PLATELET # BLD AUTO: 272 K/UL (ref 164–446)
PLATELET # BLD AUTO: 308 K/UL (ref 164–446)
PMV BLD AUTO: 9.8 FL (ref 9–12.9)
PMV BLD AUTO: 9.8 FL (ref 9–12.9)
POTASSIUM SERPL-SCNC: 4.5 MMOL/L (ref 3.6–5.5)
PRODUCT TYPE UPROD: NORMAL
PROT SERPL-MCNC: 4.3 G/DL (ref 6–8.2)
RBC # BLD AUTO: 2.2 M/UL (ref 4.2–5.4)
RBC # BLD AUTO: 3.36 M/UL (ref 4.2–5.4)
RH BLD: NORMAL
SIGNIFICANT IND 70042: ABNORMAL
SITE SITE: ABNORMAL
SODIUM SERPL-SCNC: 131 MMOL/L (ref 135–145)
SOURCE SOURCE: ABNORMAL
UNIT STATUS USTAT: NORMAL
WBC # BLD AUTO: 11.6 K/UL (ref 4.8–10.8)
WBC # BLD AUTO: 11.8 K/UL (ref 4.8–10.8)

## 2022-03-31 PROCEDURE — 86923 COMPATIBILITY TEST ELECTRIC: CPT

## 2022-03-31 PROCEDURE — 700111 HCHG RX REV CODE 636 W/ 250 OVERRIDE (IP): Performed by: HOSPITALIST

## 2022-03-31 PROCEDURE — A9270 NON-COVERED ITEM OR SERVICE: HCPCS | Performed by: HOSPITALIST

## 2022-03-31 PROCEDURE — 700111 HCHG RX REV CODE 636 W/ 250 OVERRIDE (IP): Performed by: STUDENT IN AN ORGANIZED HEALTH CARE EDUCATION/TRAINING PROGRAM

## 2022-03-31 PROCEDURE — 93306 TTE W/DOPPLER COMPLETE: CPT

## 2022-03-31 PROCEDURE — 93306 TTE W/DOPPLER COMPLETE: CPT | Mod: 26 | Performed by: INTERNAL MEDICINE

## 2022-03-31 PROCEDURE — 86900 BLOOD TYPING SEROLOGIC ABO: CPT

## 2022-03-31 PROCEDURE — 700102 HCHG RX REV CODE 250 W/ 637 OVERRIDE(OP): Performed by: HOSPITALIST

## 2022-03-31 PROCEDURE — 86901 BLOOD TYPING SEROLOGIC RH(D): CPT

## 2022-03-31 PROCEDURE — 85025 COMPLETE CBC W/AUTO DIFF WBC: CPT

## 2022-03-31 PROCEDURE — 770000 HCHG ROOM/CARE - INTERMEDIATE ICU *

## 2022-03-31 PROCEDURE — 80053 COMPREHEN METABOLIC PANEL: CPT

## 2022-03-31 PROCEDURE — 30233N1 TRANSFUSION OF NONAUTOLOGOUS RED BLOOD CELLS INTO PERIPHERAL VEIN, PERCUTANEOUS APPROACH: ICD-10-PCS | Performed by: HOSPITALIST

## 2022-03-31 PROCEDURE — A9270 NON-COVERED ITEM OR SERVICE: HCPCS | Performed by: STUDENT IN AN ORGANIZED HEALTH CARE EDUCATION/TRAINING PROGRAM

## 2022-03-31 PROCEDURE — 700105 HCHG RX REV CODE 258: Performed by: STUDENT IN AN ORGANIZED HEALTH CARE EDUCATION/TRAINING PROGRAM

## 2022-03-31 PROCEDURE — 36415 COLL VENOUS BLD VENIPUNCTURE: CPT

## 2022-03-31 PROCEDURE — 700101 HCHG RX REV CODE 250: Performed by: STUDENT IN AN ORGANIZED HEALTH CARE EDUCATION/TRAINING PROGRAM

## 2022-03-31 PROCEDURE — 85027 COMPLETE CBC AUTOMATED: CPT

## 2022-03-31 PROCEDURE — 99233 SBSQ HOSP IP/OBS HIGH 50: CPT | Performed by: HOSPITALIST

## 2022-03-31 PROCEDURE — 84100 ASSAY OF PHOSPHORUS: CPT

## 2022-03-31 PROCEDURE — 99223 1ST HOSP IP/OBS HIGH 75: CPT | Performed by: INTERNAL MEDICINE

## 2022-03-31 PROCEDURE — 83735 ASSAY OF MAGNESIUM: CPT

## 2022-03-31 PROCEDURE — 700102 HCHG RX REV CODE 250 W/ 637 OVERRIDE(OP): Performed by: STUDENT IN AN ORGANIZED HEALTH CARE EDUCATION/TRAINING PROGRAM

## 2022-03-31 PROCEDURE — 86850 RBC ANTIBODY SCREEN: CPT

## 2022-03-31 PROCEDURE — 36430 TRANSFUSION BLD/BLD COMPNT: CPT

## 2022-03-31 PROCEDURE — 700105 HCHG RX REV CODE 258: Performed by: HOSPITALIST

## 2022-03-31 PROCEDURE — P9016 RBC LEUKOCYTES REDUCED: HCPCS

## 2022-03-31 RX ORDER — MIDODRINE HYDROCHLORIDE 5 MG/1
15 TABLET ORAL
Status: DISCONTINUED | OUTPATIENT
Start: 2022-03-31 | End: 2022-04-01

## 2022-03-31 RX ORDER — NOREPINEPHRINE BITARTRATE 0.03 MG/ML
0-30 INJECTION, SOLUTION INTRAVENOUS CONTINUOUS
Status: DISCONTINUED | OUTPATIENT
Start: 2022-03-31 | End: 2022-04-01

## 2022-03-31 RX ADMIN — HYDROCORTISONE SODIUM SUCCINATE 50 MG: 100 INJECTION, POWDER, FOR SOLUTION INTRAMUSCULAR; INTRAVENOUS at 06:14

## 2022-03-31 RX ADMIN — SODIUM BICARBONATE: 84 INJECTION, SOLUTION INTRAVENOUS at 14:00

## 2022-03-31 RX ADMIN — MIRTAZAPINE 15 MG: 15 TABLET, FILM COATED ORAL at 20:16

## 2022-03-31 RX ADMIN — HYDROCORTISONE SODIUM SUCCINATE 50 MG: 100 INJECTION, POWDER, FOR SOLUTION INTRAMUSCULAR; INTRAVENOUS at 17:21

## 2022-03-31 RX ADMIN — DIPHENOXYLATE HYDROCHLORIDE AND ATROPINE SULFATE 1 TABLET: 2.5; .025 TABLET ORAL at 16:20

## 2022-03-31 RX ADMIN — MIDODRINE HYDROCHLORIDE 15 MG: 5 TABLET ORAL at 17:21

## 2022-03-31 RX ADMIN — HYDROCORTISONE SODIUM SUCCINATE 50 MG: 100 INJECTION, POWDER, FOR SOLUTION INTRAMUSCULAR; INTRAVENOUS at 11:25

## 2022-03-31 RX ADMIN — SODIUM CHLORIDE: 9 INJECTION, SOLUTION INTRAVENOUS at 07:08

## 2022-03-31 RX ADMIN — MIDODRINE HYDROCHLORIDE 15 MG: 5 TABLET ORAL at 11:25

## 2022-03-31 RX ADMIN — DIPHENOXYLATE HYDROCHLORIDE AND ATROPINE SULFATE 1 TABLET: 2.5; .025 TABLET ORAL at 07:33

## 2022-03-31 RX ADMIN — DRONABINOL 5 MG: 5 CAPSULE ORAL at 16:20

## 2022-03-31 RX ADMIN — HYDROCORTISONE SODIUM SUCCINATE 50 MG: 100 INJECTION, POWDER, FOR SOLUTION INTRAMUSCULAR; INTRAVENOUS at 00:11

## 2022-03-31 RX ADMIN — SIMVASTATIN 20 MG: 20 TABLET, FILM COATED ORAL at 17:21

## 2022-03-31 RX ADMIN — DIPHENOXYLATE HYDROCHLORIDE AND ATROPINE SULFATE 1 TABLET: 2.5; .025 TABLET ORAL at 20:15

## 2022-03-31 RX ADMIN — OMEPRAZOLE 40 MG: 20 CAPSULE, DELAYED RELEASE ORAL at 06:13

## 2022-03-31 RX ADMIN — SODIUM CHLORIDE: 9 INJECTION, SOLUTION INTRAVENOUS at 00:12

## 2022-03-31 RX ADMIN — NOREPINEPHRINE BITARTRATE 5 MCG/MIN: 1 INJECTION, SOLUTION, CONCENTRATE INTRAVENOUS at 02:54

## 2022-03-31 RX ADMIN — MIDODRINE HYDROCHLORIDE 10 MG: 5 TABLET ORAL at 07:33

## 2022-03-31 RX ADMIN — DRONABINOL 5 MG: 5 CAPSULE ORAL at 11:25

## 2022-03-31 RX ADMIN — WATER 1 G: 1000 INJECTION, SOLUTION INTRAVENOUS at 06:14

## 2022-03-31 ASSESSMENT — ENCOUNTER SYMPTOMS
NECK PAIN: 0
DIZZINESS: 0
NAUSEA: 0
EYES NEGATIVE: 1
NERVOUS/ANXIOUS: 1
DIARRHEA: 1
DEPRESSION: 0
PALPITATIONS: 0
WEIGHT LOSS: 1
HEADACHES: 0
CHILLS: 0
BRUISES/BLEEDS EASILY: 0
RESPIRATORY NEGATIVE: 1
FEVER: 0
POLYDIPSIA: 0
HEARTBURN: 0
WEAKNESS: 0
MUSCULOSKELETAL NEGATIVE: 1
CARDIOVASCULAR NEGATIVE: 1
FOCAL WEAKNESS: 0
COUGH: 0
NEUROLOGICAL NEGATIVE: 1
VOMITING: 0
BACK PAIN: 0

## 2022-03-31 ASSESSMENT — LIFESTYLE VARIABLES: SUBSTANCE_ABUSE: 1

## 2022-03-31 NOTE — PROGRESS NOTES
Nursing Note    Neuro: Alert/Oriented x4. CARMEN.     Cardio: SR-SB, BP Soft. MAP >55 Goal per Dr. Oakley. Later changed to keep map >65 or sbp >90 with pressors.      Respiratory: Room Air, Clear BS    GI: Regular Diet, Active BS.    : Ahumada, Poor UOP.    Activity: Bedrest r/t bp.    Lines/Tubes/Drains: PIV, Ahumada.    Skin: Stage II coccyx       Gtt: NS 150ml/hr.    Shift Summary:     2325 Dr. Oakley Notified of poor UOP. NS gtt increased to 150ml/hr. 0200 Dr. Oakley notified of ongoing low blood pressure and low urine output. Starting pressors to keep map >65 or sbp >90

## 2022-03-31 NOTE — CASE COMMUNICATION
Quality Review Completed for Transfer 3/30 OASIS by JACKY Chowdhury RN on 3/31/2022:  Edits completed by JACKY Chowdhury RN:  1.  is 3/28, date of admission

## 2022-03-31 NOTE — PROGRESS NOTES
Hospital Medicine Daily Progress Note    Date of Service  3/31/2022    Chief Complaint  Jaymie Peacock is a 66 y.o. female admitted 3/28/2022 with hypotension, poor p.o. intake and diarrhea    Hospital Course  Jaymie Peacock is a 66 y.o. female current smoker with hypertension, emphysema, severe malnutrition, recent admission for acute renal failure and NSTEMI thought to be secondary to stress-induced cardiomyopathy reduced EF of 35% who presented 3/28/2022 with hypotension found by home health nurse.  Patient was recently admitted from February 18 to February 28, 2022 where she presented with failure to thrive and encephalopathy.  Prolonged admission with diagnoses of above, please refer to discharge summary from February 28 for full details.  She was evaluated for her new onset heart failure left heart cath was deferred due to high risk nature and poor renal function, was instructed to follow-up with cardiology outpatient.  She did have melena that developed and was on IV Protonix, GI was consulted ultimately recommended EGD/colonoscopy in the outpatient setting since GI bleeding stopped.  She was comfort care at one point however her encephalopathy improved and she stated she would like to continue treatment and she was made DNR/DNI, she had improvement of her renal function and encephalopathy, ultimately discharged to skilled nursing facility for continued therapy.  Patient was discharged and has been home with home health.  She reports poor p.o. intake noting that she does not have an appetite and does not want to cook anything for herself, she eats mostly cereal.  She endorses diarrhea 4+ bowel movements per day usually small-volume and watery but now becoming more voluminous and watery, has had chronic diarrhea however this change has been in the past couple weeks. She denies any fevers or chills, denies any cough or dyspnea, no chest pain or palpitations, no abdominal pain nausea or vomiting, denies melena  hematochezia hematuria or hematemesis.  During home health visit today her blood pressure was in the 70s, she does take losartan hydrochlorothiazide and she has been compliant and given hypertension she presented for evaluation.    Interval Problem Update  Patient seen and examined today.  Data, Medication data reviewed.  Case discussed with nursing as available.  Plan of Care reviewed with patient and notified of changes.  3/31 the patient with ongoing hypertension despite significant volume resuscitation, low-dose peripheral pressors started overnight, with improvement, the patient without significant new complaints, she denies fevers or chills, Ahumada is in place, urine output has improved, fluid balance is positive by 5 L, renal function has worsened slightly, consulting nephrology, urine culture positive for E. coli, the patient previously was asymptomatic.  Further decrease in hemoglobin, transfusing, improved retake count, checking stool for occult blood again.  Starting bicarb drip  3/30 the patient feels fairly unchanged, she does not appear to be symptomatic despite significantly low blood pressures, no further significant diarrhea, poor urine output, decreasing hemoglobin without signs of bleeding, ongoing renal insufficiency, overnight fluids were stopped for some reason, lactic acid negative, urine sodium fairly low, D-dimer slightly elevated without other significant findings supporting possible thrombosis, not a candidate for anticoagulation secondary to critical and low hemoglobin, positive urine analysis, although the patient made very minimal urine output and no prior complaints.  Culture preliminary positive for E. coli  3/29 the patient today is improved somewhat, her blood pressure remains soft, she is without acute complaints, she denies abdominal pain, she does have diarrhea still, poor p.o. intake present, her laboratory data for this morning is partially pending, renal function is improved,  sodium is low at 132, her albumin at 3.0, occult blood is negative, C. difficile negative, chest x-ray negative    I have personally seen and examined the patient at bedside. I discussed the plan of care with patient.    Consultants/Specialty  critical care, nephrology    Code Status  DNAR/DNI    Disposition  Patient is not medically cleared for discharge.   Anticipate discharge to to home with close outpatient follow-up.  I have placed the appropriate orders for post-discharge needs.    Review of Systems  Review of Systems   Constitutional: Positive for malaise/fatigue and weight loss. Negative for chills and fever.   HENT: Negative.    Eyes: Negative.    Respiratory: Negative.  Negative for cough.    Cardiovascular: Negative.  Negative for chest pain and palpitations.   Gastrointestinal: Positive for diarrhea. Negative for heartburn, nausea and vomiting.   Genitourinary: Negative.  Negative for dysuria and frequency.   Musculoskeletal: Negative.  Negative for back pain and neck pain.   Skin: Negative.  Negative for itching and rash.   Neurological: Negative.  Negative for dizziness, focal weakness, weakness and headaches.   Endo/Heme/Allergies: Negative.  Negative for polydipsia. Does not bruise/bleed easily.   Psychiatric/Behavioral: Positive for substance abuse. Negative for depression. The patient is nervous/anxious.         Physical Exam  Temp:  [36.4 °C (97.5 °F)-36.5 °C (97.7 °F)] 36.5 °C (97.7 °F)  Pulse:  [48-97] 64  Resp:  [0-23] 16  BP: ()/(42-70) 117/70  SpO2:  [92 %-98 %] 96 %    Physical Exam  Vitals and nursing note reviewed.   Constitutional:       Appearance: She is well-developed and underweight. She is ill-appearing. She is not diaphoretic.      Interventions: Nasal cannula in place.   HENT:      Head: Normocephalic and atraumatic.      Comments: Temporal wasting     Nose: Nose normal.   Eyes:      Conjunctiva/sclera: Conjunctivae normal.      Pupils: Pupils are equal, round, and reactive  to light.   Neck:      Thyroid: No thyromegaly.      Vascular: No JVD.   Cardiovascular:      Rate and Rhythm: Normal rate and regular rhythm.      Heart sounds: Normal heart sounds.     No friction rub. No gallop.   Pulmonary:      Effort: Pulmonary effort is normal.      Breath sounds: Normal breath sounds. No wheezing or rales.   Abdominal:      General: Bowel sounds are normal. There is no distension.      Palpations: Abdomen is soft. There is no mass.      Tenderness: There is no abdominal tenderness. There is no guarding or rebound.   Musculoskeletal:         General: No tenderness. Normal range of motion.      Cervical back: Normal range of motion and neck supple.      Comments: Generalized muscle wasting   Lymphadenopathy:      Cervical: No cervical adenopathy.   Skin:     General: Skin is warm and dry.      Coloration: Skin is pale.   Neurological:      Mental Status: She is oriented to person, place, and time. She is lethargic.      Cranial Nerves: No cranial nerve deficit.   Psychiatric:         Behavior: Behavior normal.         Fluids    Intake/Output Summary (Last 24 hours) at 3/31/2022 1340  Last data filed at 3/31/2022 1300  Gross per 24 hour   Intake 2680 ml   Output 440 ml   Net 2240 ml       Laboratory  Recent Labs     03/29/22  1340 03/30/22  0719 03/31/22  0250   WBC 10.1 11.1* 11.6*   RBC 2.64* 2.33* 2.20*   HEMOGLOBIN 7.5* 6.7* 6.4*   HEMATOCRIT 23.2* 20.6* 19.6*   MCV 87.9 88.4 89.1   MCH 28.4 28.8 29.1   MCHC 32.3* 32.5* 32.7*   RDW 64.8* 65.1* 66.5*   PLATELETCT 272 234 272   MPV 10.0 11.0 9.8     Recent Labs     03/29/22  1125 03/30/22  0732 03/31/22  0250   SODIUM 132* 132* 131*   POTASSIUM 4.4 4.4 4.5   CHLORIDE 101 103 105   CO2 17* 15* 14*   GLUCOSE 136* 129* 142*   BUN 19 21 21   CREATININE 2.05* 2.14* 2.79*   CALCIUM 8.6 8.2* 7.5*                   Imaging  DX-CHEST-PORTABLE (1 VIEW)   Final Result      1.  There is no acute cardiopulmonary process.      EC-ECHOCARDIOGRAM COMPLETE  W/O CONT    (Results Pending)        Assessment/Plan  * Dehydration determined by examination- (present on admission)  Assessment & Plan  Poor PO intake with ongoing large volume watery diarrhea.  IVF    Acute cystitis without hematuria  Assessment & Plan  Was initially not overtly symptomatic, poor urine output though  Empiric antibiotics,  positive for E. coli    Adrenal insufficiency (HCC)- (present on admission)  Assessment & Plan  Hypotensive with dehydration and ongoing diarrheal illness, random spot cortisol checked and was 12.5.  Hydrocortisone 50 mg every 6 hours for now.  Wean as tolerated    Anemia- (present on admission)  Assessment & Plan  Recent GI bleed, FOBT negative in ED.  No reports of melena, hematochezia, hematemesis, hematuria, no other hemorrhage or recent trauma per patient.   Reticulocyte panel last month shows hypoproliferation, improved now  Platelets within normal limits, leukocytosis present, no recent history of lymphopenia.  Given recent history of GI bleed as well as an apparent hemoglobin drop will hold chemical DVT prophylaxis at this time, if no evidence of bleeding and stable hemoglobin should consider starting on chemical DVT prophylaxis  We will recheck FOBT, consider GI consult at this time and hopefully endoscopy  Transfuse 1 unit of red cells      HFrEF (heart failure with reduced ejection fraction) (HCC)- (present on admission)  Assessment & Plan  TTE on February 19, 2022 shows left ventricular ejection fraction 35% and basilar sparing in a pattern of stress-induced cardiomyopathy.  Continue statin.  Did not undergo cardiac cath during last hospitalization due to YAMIL, was discharged and instructed to follow-up with cardiology.  Presenting again with YAMIL and hypotension.  Monitor for signs of volume overload given she is receiving IV fluid for hypotension, dehydration, diarrhea.    Emphysema lung (HCC)- (present on admission)  Assessment & Plan  Not in acute  exacerbation.  Breathing treatments as needed    Hypomagnesemia- (present on admission)  Assessment & Plan  IV replacement, recheck levels    Protein-calorie malnutrition, severe (HCC)- (present on admission)  Assessment & Plan  Supplements ordered, nutrition consult.  Continue mirtazapine, Marinol  Patient denies depression, no overt features for FTT    Diarrhea- (present on admission)  Assessment & Plan  4+ watery BM's per day, has had multiple large volume watery BM's in ED.  Afebrile, mild leukocytosis, nontoxic, will hold off an antibiotics for now.  Recent exposure to abx, negative C. Difficile  Check stool for white cells, would benefit from GI evaluation  IVF  Monitor and replace electrolytes.      Hypotension- (present on admission)  Assessment & Plan  Holding home antihypertensives, takes losartan-HCTZ (50-6.25 qd) and reports compliance.  Cortisol 12.5, started on stress dose steroids for relative adrenal insufficiency.  Continue IV fluids, wean as tolerated  Consider repeating echo  Echo from 1 month ago shows HFrEF EF 35% and likely Takotsubo cardiomyopathy.    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  IVF  Renal dose meds and avoid nephrotoxins  Monitor I&O's  Ongoing fluid challenge, question ATN  Follow renal function  Nephrology consult, starting bicarb      Alcohol abuse- (present on admission)  Assessment & Plan  1 shot of liquor daily.  Monitor for signs of withdrawal.  Certainly advised to stop in her current overall condition      Smoking- (present on admission)  Assessment & Plan  Cessation advised       Plan  IV fluids, bicarb drip, nephrology consult  Transfusion of red cells  Continue with C3  Monitor blood counts closely, FOBT checks  Continue with supportive care  Continue blood pressure  support and adrenal support  Further investigation the patient's home environment, as the patient failed recent discharge and discharged from a skilled nursing facility in less than a week ago  See follow-up  note  See orders  Medically complex high risk patient  Close laboratory follow-up      VTE prophylaxis: enoxaparin ppx pending follow-up blood work    I have performed a physical exam and reviewed and updated ROS and Plan today (3/31/2022). In review of yesterday's note (3/30/2022), there are no changes except as documented above.      Please note that this dictation was created using voice recognition software. I have made every reasonable attempt to correct obvious errors, but I expect that there are errors of grammar and possibly context that I did not discover before finalizing the note.

## 2022-03-31 NOTE — CONSULTS
DATE OF SERVICE:  03/31/2022     NEPHROLOGY CONSULTATION     Consult at the request of Meek Griffin MD     REASON FOR CONSULTATION:  To evaluate the patient with acute kidney injury on   chronic kidney disease stage III.     HISTORY OF PRESENT ILLNESS:  The patient is a 66-year-old with multiple   medical problems including severe emphysema, hypertension, malnutrition, also   recently admitted to the hospital with non-ST elevated myocardial infarction,   also with CHF, EF of 35%. At that time hospital course was complicated with   acute kidney injury. Upon discharge, the patient recovered creatinine level to   1.45. Readmitted to the hospital on 03/28/2022.  At that time, creatinine   level was 2.5.  Currently up to 2.79.  Also, noticed metabolic acidosis. Found   urinary tract infection with positive for Escherichia coli.  Currently, the   patient is doing better, has no complaints. Noticed low blood pressure   90s/60s.  Urine output at 430 mL over the past 24 hours.  Denies any shortness   of breath or chest pain. No nausea or vomiting.     REVIEW OF SYSTEMS:  GENERAL:  Positive fatigue.  No fever, chills. Poor p.o. intake.  HEENT:  No congestion, no nosebleeds, no sore throat.  EYES:  No double or blurry vision, no eye pain.  NECK:  No pain, no stiffness.  RESPIRATORY:  No shortness of breath, no cough, no hemoptysis.  CARDIOVASCULAR:  No chest pain, no palpitations, no edema.  GASTROINTESTINAL:  No abdominal pain, nausea, or vomiting.  GENITOURINARY:  With Ahumada catheter.  No dysuria, no flank pain.  All other systems reviewed, all negative.     PAST MEDICAL HISTORY:  Hypertension, chronic kidney disease, acute kidney   injury, myocardial infarction, sepsis, hypertension, congestive heart failure.     PAST SURGICAL HISTORY:  Breast biopsy.     FAMILY HISTORY:  The patient does not recall any history of kidney disease.     SOCIAL HISTORY:  Quit smoking.  No alcohol or drug use.     ALLERGIES:  No known  drug allergies.     OUTPATIENT MEDICATIONS:  Reviewed.     PHYSICAL EXAMINATION:  VITAL SIGNS:  Blood pressure 95/54, heart rate 57, temperature 36.5 Celsius.  GENERAL:  Well-developed, cachectic female, in no acute distress.  HEENT:  Normocephalic, atraumatic.  Pupils equal, round, reactive to light.    Extraocular movements intact.  Nares patent.  Oropharynx clear, moist mucosa,   no erythema or exudate.  NECK:  Supple.  No lymphadenopathy, no thyromegaly appreciated.  LUNGS:  Clear to auscultation bilaterally.  No rales, wheezes, no rhonchi.  HEART:  Regular rhythm, no rub or gallop.  ABDOMEN:  Soft, nontender, nondistended.  Bowel sounds present.  No palpable   mass.  EXTREMITIES:  No cyanosis, clubbing, no edema.  SKIN:  Dry, no erythema or rash.  NEUROLOGIC:  Alert, oriented x3. No focal deficit.  Cranial nerves II through   XII grossly intact.     LABORATORY RESULTS:  Reviewed, revealed white blood count 11.6, hemoglobin   6.4, platelets 72.  Sodium 131, potassium 4.5, CO2 of 14, BUN 21, creatinine   2.79.     Albumin 2.1.  Lactic acid 1.7.     DIAGNOSTIC DATA:  Renal ultrasound from February, no hydronephrosis, chronic   hyperechoic lesion unchanged.     ASSESSMENT AND PLAN:  The patient is a 66-year-old female with multiple   medical problems, episodes of acute kidney injury on chronic kidney disease   stage IIIB, readmitted to the hospital with hypotension, not feeling well,   poor p.o. intake, and acute kidney injury, metabolic acidosis.  1.  Acute kidney injury likely secondary to acute tubular necrosis from renal   hypoperfusion from hypotension.  Continue IV fluids with bicarbonate drip.   Awaiting scheduled blood transfusion. To monitor.  2.  Electrolytes: Noticed mild hyponatremia.  To monitor.  3.  Hypotension, on Levophed also scheduled blood transfusion, to monitor.    Keep mean arterial pressure above 65-70.  4.  Metabolic acidosis.  Continue bicarbonate drip.  5.  Urinary tract infection.   Continue antibiotics, adjust to renal doses.  6.  Anemia.  Scheduled blood transfusion. To monitor.     RECOMMENDATIONS:  1.  No need for emergent dialysis.  2.  Continue bicarbonate drip.  3.  Continue blood transfusion.  4.  Monitor closely hemoglobin level, basic metabolic panel.  5.  Encourage p.o. intake.  6.  Monitor nutritional status.  7.  Avoid nephrotoxic agents.     Follow the patient closely.     Thank you for the consult.        ______________________________  MD RAFI [place]/ADIN/ISI    DD:  03/31/2022 14:45  DT:  03/31/2022 15:15    Job#:  [id number]

## 2022-03-31 NOTE — DISCHARGE PLANNING
Care Transition Team Discharge Planning    Anticipated Discharge Disposition: d/c home w/ HH    Action: Toniw spoke to INs VITOR who states he collected HH choice from pt   [facility]    The choice form was faxed to DPA, and she is aware of pending fax for HH    LSw advised pt states she will need both HH for therapy and [facility] to manage her meds at home. Pt mentioned she only takes meds w/ MD is in her home visiting.    Derrick provided resource [employer] to pt. It is a program which calls seniors at home w/ an automated VM reminding them to take their meds each time they need to take meds daily.        Barriers to Discharge: medical clearance    Plan: Lsw will continue to follow, and assist w/ d/c planning.

## 2022-03-31 NOTE — DISCHARGE PLANNING
"HTH/SCP TCN chart review completed. Collaborated with Halima FERNANDES. Appreciate collaboration. Patient seen at bedside and obtained HH choice and gave to Halima FERNANDES (post PT and OT recs for HH). Noted per PT evaluation: \"pt reports a disinterest in self care tasks, stating home health does her meds (daily) as well as her meals; functionally, mobilizing at a level she could return home however likely needs a long term plan as pt's limitations are medical and likely to reoccur given current demonstration of behaviors, will follow to optimize, pt mentions moving in with family in [place] which would be optimal for cueing for self care tasks\". Note dc plan has been escalated to [facility] CM and SW as well to monitor/contact post dc. She also has GSC established PTA and should resume post dc to home as well to assist with aforementioned issues. GSC aware and monitoring as well. TCN will continue to follow and collaborate with discharge planning team as additional post acute needs arise. Thank you.    Previously completed:  - Orders received for: PT and OT -> rec HH (see PT and OT notes from 3/30)  - Choice forms: HH   - GSC introduced (Y), referral (sent).   "

## 2022-03-31 NOTE — THERAPY
Physical Therapy   Initial Evaluation     Patient Name: Jaymie Peacock  Age:  66 y.o., Sex:  female  Medical Record #: [id number]  Today's Date: 3/30/2022     Precautions  Precautions: Fall Risk    Assessment  Pt presents with impaired activity tolerance and hemodynamic response associated with c/c of hypotension brought in by ambulance in setting of recent admits with failure to thrive/encephlopathy. Pt is most limited by higher level cognition and BP response, initially holding in 80s/40s, very thready, needing manual read with 10ft ambulation to 68/52 with HR of 145; pt reports a disinterest in self care tasks, stating home health does her meds (daily) as well as her meals; functionally, mobilizing at a level she could return home however likely needs a long term plan as pt's limitations are medical and likely to reoccur given current demonstration of behaviors, will follow to optimize, pt mentions moving in with family in [place] which would be optimal for cueing for self care tasks, defer to OT for long term recs.     Plan    Recommend Physical Therapy 3 times per week until therapy goals are met for the following treatments:  Bed Mobility, Equipment, Gait Training, Manual Therapy, Neuro Re-Education / Balance, Self Care/Home Evaluation, Sensory Integration Techniques, Stair Training, Therapeutic Activities, and Therapeutic Exercises               Abridged Subjective/Objective     03/30/22 1645   Prior Living Situation   Prior Services Skilled Home Health Services   Housing / Facility 1 Story House   Steps Into Home 1  ('lip' then a step; neighbor always with her)   Equipment Owned 4-Wheel Walker;Tub / Shower Seat   Lives with - Patient's Self Care Capacity Alone and Unable to Care For Self   Comments supportive neighbors and appearence of family in [place]; falls frequently   Prior Level of Functional Mobility   Bed Mobility Independent   Transfer Status Independent   Ambulation Independent   Distance  Ambulation (Feet)   (houshoeld)   Assistive Devices Used Front-Wheel Walker   Cognition    Cognition / Consciousness X   Level of Consciousness Alert   Comments pleasant and cooperative; denies taking daily meds at home, reports [facility] does it when they come (will discuss with TCN to ensure this occurs); states she would like to go home to hang out with her  but corrects herself during social hx that he  in ; stating active bleed on arm (noted on taylor) is just cranberry juice; otherwise cognition not tested past conversation;   Passive ROM Lower Body   Passive ROM Lower Body WDL   Strength Lower Body   Lower Body Strength  X   Comments cannot tolerate max resist at knees/hips; reports baseline drop foot from 2021 but can hold neutral in left ankle;   Sensation Lower Body   Lower Extremity Sensation   X   Comments occasional tingling/numnbess in feet from 2021   Balance Assessment   Sitting Balance (Static) Good   Sitting Balance (Dynamic) Fair +   Standing Balance (Static) Fair   Standing Balance (Dynamic) Fair -   Weight Shift Sitting Good   Weight Shift Standing Fair   Comments B UE support in sitting/standing, no loss of balance in moving environemnt   Gait Analysis   Gait Level Of Assist Contact Guard Assist   Assistive Device Front Wheel Walker   Distance (Feet) 15   # of Times Distance was Traveled 2   Deviation Bradykinetic;Shuffled Gait;Decreased Base Of Support   Weight Bearing Status full   Vision Deficits Impacting Mobility wearing glasses   Comments distance limited by therapist 2/2 BP/HR response   Bed Mobility    Supine to Sit Modified Independent  (raised HOB, do not anticipate an issue with flat)   Sit to Supine Modified Independent   Functional Mobility   Sit to Stand Supervised  (with FWW)   Edema / Skin Assessment   Edema / Skin  X   Comments diffuse skin breakdown throughout body   Patient / Family Goals    Patient / Family Goal #1 to improve activity tolerance    Short Term Goals    Short Term Goal # 1 Pt will ambulate x 150ft with FWW and supervision within 6 visits to ensure return to baseline.   Short Term Goal # 2 Pt will ascend/descend 1 step with B UE support and supervision within 6 vists to enter/exit home.   Education Group   Role of Physical Therapist Patient Response Patient;Acceptance;Explanation;Demonstration;Action Demonstration;Verbal Demonstration   Additional Comments not skipping meals, pt reports she does not like to cook;   Problem List    Problems Impaired Ambulation;Functional Strength Deficit;Impaired Balance;Decreased Activity Tolerance;Safety Awareness Deficits / Cognition;Motor Planning / Sequencing

## 2022-04-01 LAB
ANION GAP SERPL CALC-SCNC: 12 MMOL/L (ref 7–16)
BACTERIA UR CULT: ABNORMAL
BACTERIA UR CULT: ABNORMAL
BASOPHILS # BLD AUTO: 0.1 % (ref 0–1.8)
BASOPHILS # BLD: 0.01 K/UL (ref 0–0.12)
BUN SERPL-MCNC: 21 MG/DL (ref 8–22)
CALCIUM SERPL-MCNC: 7.5 MG/DL (ref 8.5–10.5)
CHLORIDE SERPL-SCNC: 108 MMOL/L (ref 96–112)
CO2 SERPL-SCNC: 14 MMOL/L (ref 20–33)
CREAT SERPL-MCNC: 2.5 MG/DL (ref 0.5–1.4)
EOSINOPHIL # BLD AUTO: 0 K/UL (ref 0–0.51)
EOSINOPHIL NFR BLD: 0 % (ref 0–6.9)
ERYTHROCYTE [DISTWIDTH] IN BLOOD BY AUTOMATED COUNT: 59.5 FL (ref 35.9–50)
GFR SERPLBLD CREATININE-BSD FMLA CKD-EPI: 21 ML/MIN/1.73 M 2
GLUCOSE SERPL-MCNC: 150 MG/DL (ref 65–99)
HCT VFR BLD AUTO: 28.3 % (ref 37–47)
HGB BLD-MCNC: 9.4 G/DL (ref 12–16)
IMM GRANULOCYTES # BLD AUTO: 0.15 K/UL (ref 0–0.11)
IMM GRANULOCYTES NFR BLD AUTO: 1.5 % (ref 0–0.9)
LYMPHOCYTES # BLD AUTO: 0.45 K/UL (ref 1–4.8)
LYMPHOCYTES NFR BLD: 4.6 % (ref 22–41)
MAGNESIUM SERPL-MCNC: 1.8 MG/DL (ref 1.5–2.5)
MCH RBC QN AUTO: 29.5 PG (ref 27–33)
MCHC RBC AUTO-ENTMCNC: 33.2 G/DL (ref 33.6–35)
MCV RBC AUTO: 88.7 FL (ref 81.4–97.8)
MONOCYTES # BLD AUTO: 0.44 K/UL (ref 0–0.85)
MONOCYTES NFR BLD AUTO: 4.5 % (ref 0–13.4)
NEUTROPHILS # BLD AUTO: 8.73 K/UL (ref 2–7.15)
NEUTROPHILS NFR BLD: 89.3 % (ref 44–72)
NRBC # BLD AUTO: 0 K/UL
NRBC BLD-RTO: 0 /100 WBC
PHOSPHATE SERPL-MCNC: 4.3 MG/DL (ref 2.5–4.5)
PLATELET # BLD AUTO: 281 K/UL (ref 164–446)
PMV BLD AUTO: 9.8 FL (ref 9–12.9)
POTASSIUM SERPL-SCNC: 4.1 MMOL/L (ref 3.6–5.5)
RBC # BLD AUTO: 3.19 M/UL (ref 4.2–5.4)
SIGNIFICANT IND 70042: ABNORMAL
SITE SITE: ABNORMAL
SODIUM SERPL-SCNC: 134 MMOL/L (ref 135–145)
SOURCE SOURCE: ABNORMAL
WBC # BLD AUTO: 9.8 K/UL (ref 4.8–10.8)

## 2022-04-01 PROCEDURE — 700105 HCHG RX REV CODE 258: Performed by: HOSPITALIST

## 2022-04-01 PROCEDURE — A9270 NON-COVERED ITEM OR SERVICE: HCPCS | Performed by: STUDENT IN AN ORGANIZED HEALTH CARE EDUCATION/TRAINING PROGRAM

## 2022-04-01 PROCEDURE — 770001 HCHG ROOM/CARE - MED/SURG/GYN PRIV*

## 2022-04-01 PROCEDURE — 99233 SBSQ HOSP IP/OBS HIGH 50: CPT | Performed by: HOSPITALIST

## 2022-04-01 PROCEDURE — 700111 HCHG RX REV CODE 636 W/ 250 OVERRIDE (IP): Performed by: HOSPITALIST

## 2022-04-01 PROCEDURE — 83735 ASSAY OF MAGNESIUM: CPT

## 2022-04-01 PROCEDURE — 99233 SBSQ HOSP IP/OBS HIGH 50: CPT | Performed by: INTERNAL MEDICINE

## 2022-04-01 PROCEDURE — 85025 COMPLETE CBC W/AUTO DIFF WBC: CPT

## 2022-04-01 PROCEDURE — 700102 HCHG RX REV CODE 250 W/ 637 OVERRIDE(OP): Performed by: STUDENT IN AN ORGANIZED HEALTH CARE EDUCATION/TRAINING PROGRAM

## 2022-04-01 PROCEDURE — A9270 NON-COVERED ITEM OR SERVICE: HCPCS | Performed by: HOSPITALIST

## 2022-04-01 PROCEDURE — 700111 HCHG RX REV CODE 636 W/ 250 OVERRIDE (IP): Performed by: STUDENT IN AN ORGANIZED HEALTH CARE EDUCATION/TRAINING PROGRAM

## 2022-04-01 PROCEDURE — 700102 HCHG RX REV CODE 250 W/ 637 OVERRIDE(OP): Performed by: HOSPITALIST

## 2022-04-01 PROCEDURE — 84100 ASSAY OF PHOSPHORUS: CPT

## 2022-04-01 PROCEDURE — 80048 BASIC METABOLIC PNL TOTAL CA: CPT

## 2022-04-01 RX ORDER — SODIUM BICARBONATE IN D5W 150/1000ML
PLASTIC BAG, INJECTION (ML) INTRAVENOUS CONTINUOUS
Status: DISCONTINUED | OUTPATIENT
Start: 2022-04-01 | End: 2022-04-02

## 2022-04-01 RX ORDER — DIPHENOXYLATE HYDROCHLORIDE AND ATROPINE SULFATE 2.5; .025 MG/1; MG/1
1 TABLET ORAL 4 TIMES DAILY PRN
Status: DISCONTINUED | OUTPATIENT
Start: 2022-04-01 | End: 2022-04-04 | Stop reason: HOSPADM

## 2022-04-01 RX ORDER — MIDODRINE HYDROCHLORIDE 5 MG/1
10 TABLET ORAL
Status: DISCONTINUED | OUTPATIENT
Start: 2022-04-01 | End: 2022-04-02

## 2022-04-01 RX ADMIN — SODIUM BICARBONATE: 84 INJECTION, SOLUTION INTRAVENOUS at 11:24

## 2022-04-01 RX ADMIN — DRONABINOL 5 MG: 5 CAPSULE ORAL at 11:24

## 2022-04-01 RX ADMIN — MIDODRINE HYDROCHLORIDE 15 MG: 5 TABLET ORAL at 08:03

## 2022-04-01 RX ADMIN — DIPHENOXYLATE HYDROCHLORIDE AND ATROPINE SULFATE 1 TABLET: 2.5; .025 TABLET ORAL at 08:03

## 2022-04-01 RX ADMIN — HYDROCORTISONE SODIUM SUCCINATE 25 MG: 100 INJECTION, POWDER, FOR SOLUTION INTRAMUSCULAR; INTRAVENOUS at 14:54

## 2022-04-01 RX ADMIN — HYDROCORTISONE SODIUM SUCCINATE 50 MG: 100 INJECTION, POWDER, FOR SOLUTION INTRAMUSCULAR; INTRAVENOUS at 00:00

## 2022-04-01 RX ADMIN — SIMVASTATIN 20 MG: 20 TABLET, FILM COATED ORAL at 18:21

## 2022-04-01 RX ADMIN — OMEPRAZOLE 40 MG: 20 CAPSULE, DELAYED RELEASE ORAL at 05:20

## 2022-04-01 RX ADMIN — MIDODRINE HYDROCHLORIDE 10 MG: 5 TABLET ORAL at 18:21

## 2022-04-01 RX ADMIN — MIRTAZAPINE 15 MG: 15 TABLET, FILM COATED ORAL at 21:42

## 2022-04-01 RX ADMIN — HYDROCORTISONE SODIUM SUCCINATE 25 MG: 100 INJECTION, POWDER, FOR SOLUTION INTRAMUSCULAR; INTRAVENOUS at 21:42

## 2022-04-01 RX ADMIN — HYDROCORTISONE SODIUM SUCCINATE 50 MG: 100 INJECTION, POWDER, FOR SOLUTION INTRAMUSCULAR; INTRAVENOUS at 05:19

## 2022-04-01 RX ADMIN — SODIUM BICARBONATE: 84 INJECTION, SOLUTION INTRAVENOUS at 01:08

## 2022-04-01 RX ADMIN — DRONABINOL 5 MG: 5 CAPSULE ORAL at 18:21

## 2022-04-01 RX ADMIN — MIDODRINE HYDROCHLORIDE 10 MG: 5 TABLET ORAL at 11:24

## 2022-04-01 RX ADMIN — WATER 1 G: 1000 INJECTION, SOLUTION INTRAVENOUS at 05:20

## 2022-04-01 ASSESSMENT — ENCOUNTER SYMPTOMS
FEVER: 0
NEUROLOGICAL NEGATIVE: 1
BRUISES/BLEEDS EASILY: 0
WHEEZING: 0
NAUSEA: 0
BACK PAIN: 0
FLANK PAIN: 0
SINUS PAIN: 0
CARDIOVASCULAR NEGATIVE: 1
DIZZINESS: 0
NERVOUS/ANXIOUS: 1
NECK PAIN: 0
WEIGHT LOSS: 0
MUSCULOSKELETAL NEGATIVE: 1
PALPITATIONS: 0
WEAKNESS: 0
HEMOPTYSIS: 0
FOCAL WEAKNESS: 0
DEPRESSION: 0
CHILLS: 0
COUGH: 0
RESPIRATORY NEGATIVE: 1
SHORTNESS OF BREATH: 0
VOMITING: 0
HEADACHES: 0
WEIGHT LOSS: 1
POLYDIPSIA: 0
EYES NEGATIVE: 1
ORTHOPNEA: 0
ABDOMINAL PAIN: 0
HEARTBURN: 0

## 2022-04-01 ASSESSMENT — PAIN DESCRIPTION - PAIN TYPE: TYPE: ACUTE PAIN

## 2022-04-01 ASSESSMENT — LIFESTYLE VARIABLES: SUBSTANCE_ABUSE: 1

## 2022-04-01 NOTE — DISCHARGE PLANNING
"TCN following. HTH/SCP chart review completed. Per Hospitalist notes 4/1/2022 \" Patient is not medically cleared for discharge. Anticipate discharge to to home with close outpatient follow-up.\". Patient  Seen at bedside,very pleasant.       Previously completed:  - Orders received for: PT and OT   - Choice forms:    - GSC introduced (Y), referral (sent).            "

## 2022-04-01 NOTE — CASE COMMUNICATION
SE noted. Thank you.   ----- Message -----  From: Tam Bridges R.N.  Sent: 3/29/2022   7:01 AM PDT  To: Leena Franz R.N.      ON CALL RN NOTE:    Asked by supervisor to visit patient on the afternoon of 3/28/22 for re-evaluation of hypotension. Arrived at patient's home where she was resting in her chair in the living room watching TV. Patient alert and oriented x4, however she is cachectic and frail upon general exam. Unable to obtain  BP,  bpm and thready. Patient appears to be extremely malnourished and reports eating 1 piece of fruit daily and has lost 5 lbs since last home health visit three days ago on 3/25/22 and has lost over ten pounds since SOC on 3/19/22. Patient allowing EMS to be called, who were also unable to palpate a pulse or BP. Advised patient that she needs to go to ED for evaluation of organ failure and extreme malnutritiion. Patient was ROSAS Y RELUCTANT and over 30 minutes was spent trying to convince her of the very real chance of death if she did not go immediatelly. Patient finally agreed and EMS took her to [facility].     Compliance and safety in home, possible EPS referral will need to be considered before patient can return home with home health as she is unable to provide food or care for herself in her current state. Consider hospice consult.      Jacob Bridges RN  [phone]

## 2022-04-01 NOTE — DISCHARGE PLANNING
TCN following. HTH/SCP chart review completed. No new TCN needs identified at this time. See prior TCN note (3/31) for details with re: current dc plan.    Previously completed:  - Orders received for: PT and OT -> rec HH (see PT and OT notes from 3/30)  - Choice forms: HH   - GSC introduced (Y), referral (sent).

## 2022-04-01 NOTE — CASE COMMUNICATION
CM noted.   ----- Message -----  From: Ashley Ferrera R.N.  Sent: 3/29/2022   4:54 PM PDT  To: Leena Franz R.N.      Pt went to ED last night and is at [facility].

## 2022-04-01 NOTE — CARE PLAN
The patient is Watcher - Medium risk of patient condition declining or worsening    Shift Goals  Clinical Goals: Monitor hemodynamics,  Patient Goals: comfort, sleep  Family Goals: PATTI    Progress made toward(s) clinical / shift goals:      Hemodynamically stable overnight, frequently repositioned, afebrile, education provided regarding future nursing plan of care, no complaints of pain, slept majority of night.

## 2022-04-01 NOTE — CASE COMMUNICATION
CM noted.   ----- Message -----  From: Johana Bazan, PT  Sent: 3/30/2022   5:36 AM PDT  To: Leena Franz R.N.      Pt was admitted to Hospital 3/28/22 all HH services are on Hold

## 2022-04-01 NOTE — PROGRESS NOTES
Nephrology Daily Progress Note    Date of Service  4/1/2022    Chief Complaint  66 y.o. female with CKD IIIb, CHF, MI, HTN,recent YAMIL,  readmitted 3/28/2022 with  Hypotension, volume depletion poor po intake. YAMIL, metabolic acidosis, UTI    Interval Problem Update  4/1 -no acute events, states doing better, no complaints   cc/24 H  creat level slightly better  Review of Systems  Review of Systems   Constitutional: Positive for malaise/fatigue. Negative for chills, fever and weight loss.   HENT: Negative for congestion, hearing loss and sinus pain.    Eyes: Negative.    Respiratory: Negative for cough, hemoptysis, shortness of breath and wheezing.    Cardiovascular: Negative for chest pain, palpitations, orthopnea and leg swelling.   Gastrointestinal: Negative for abdominal pain, nausea and vomiting.   Genitourinary: Negative for dysuria and flank pain.        Ahumada catheter   Skin: Negative.    All other systems reviewed and are negative.       Physical Exam  Temp:  [36 °C (96.8 °F)-36.8 °C (98.3 °F)] 36 °C (96.8 °F)  Pulse:  [51-72] 69  Resp:  [7-25] 19  BP: ()/(54-80) 93/60  SpO2:  [93 %-98 %] 95 %    Physical Exam  Vitals and nursing note reviewed.   Constitutional:       General: She is not in acute distress.     Appearance: She is well-developed. She is cachectic. She is not diaphoretic.   HENT:      Head: Normocephalic and atraumatic.      Nose: Nose normal.      Mouth/Throat:      Mouth: Mucous membranes are moist.      Pharynx: Oropharynx is clear.   Eyes:      General: No scleral icterus.     Extraocular Movements: Extraocular movements intact.      Conjunctiva/sclera: Conjunctivae normal.      Pupils: Pupils are equal, round, and reactive to light.   Cardiovascular:      Rate and Rhythm: Normal rate and regular rhythm.      Pulses: Normal pulses.      Heart sounds: Normal heart sounds.     No friction rub. No gallop.   Pulmonary:      Effort: Pulmonary effort is normal. No respiratory  distress.      Breath sounds: Normal breath sounds. No wheezing or rales.   Abdominal:      General: Bowel sounds are normal. There is no distension.      Palpations: Abdomen is soft. There is no mass.      Tenderness: There is no abdominal tenderness. There is no right CVA tenderness or left CVA tenderness.   Musculoskeletal:      Cervical back: Normal range of motion and neck supple.      Right lower leg: No edema.      Left lower leg: No edema.   Skin:     General: Skin is warm and dry.      Coloration: Skin is pale.      Findings: No erythema or rash.   Neurological:      General: No focal deficit present.      Mental Status: She is alert and oriented to person, place, and time.      Cranial Nerves: No cranial nerve deficit.      Coordination: Coordination normal.   Psychiatric:         Mood and Affect: Mood normal.         Behavior: Behavior normal.         Thought Content: Thought content normal.         Judgment: Judgment normal.         Fluids    Intake/Output Summary (Last 24 hours) at 4/1/2022 1147  Last data filed at 4/1/2022 1000  Gross per 24 hour   Intake 830 ml   Output 600 ml   Net 230 ml       Laboratory  Recent Labs     03/31/22  0250 03/31/22  1712 04/01/22  0220   WBC 11.6* 11.8* 9.8   RBC 2.20* 3.36* 3.19*   HEMOGLOBIN 6.4* 9.8* 9.4*   HEMATOCRIT 19.6* 29.5* 28.3*   MCV 89.1 87.8 88.7   MCH 29.1 29.2 29.5   MCHC 32.7* 33.2* 33.2*   RDW 66.5* 57.7* 59.5*   PLATELETCT 272 308 281   MPV 9.8 9.8 9.8     Recent Labs     03/30/22  0732 03/31/22  0250 04/01/22  0220   SODIUM 132* 131* 134*   POTASSIUM 4.4 4.5 4.1   CHLORIDE 103 105 108   CO2 15* 14* 14*   GLUCOSE 129* 142* 150*   BUN 21 21 21   CREATININE 2.14* 2.79* 2.50*   CALCIUM 8.2* 7.5* 7.5*         Recent Labs     03/30/22  0732   NTPROBNP 3701*           Imaging  reviewed    Assessment/Plan  1.YAMIL/CKD III b -to monitor -continue IVF  2.Hypotension: continue IVF -keep MAP> 65  3.Electrolytes: hyponatremia better  4.Anemia: Hb improved post  transfusion  5.Metabolic acidosis -continue bicarb gtt  6.Volume:continue IVF    Recs: no need for emergent dialysis             Continue bicarb gtt             Daily labs             encourage po intake             All meds to renal doses             Will follow             D/w

## 2022-04-01 NOTE — PROGRESS NOTES
Patient transferred to [place] [id number]. Settled in room S632.  Patient is alert and oriented and able to make her needs known. 2 RN skin check completed with Charge RN. Plan of care discussed with patient and call light is within reach.

## 2022-04-01 NOTE — CASE COMMUNICATION
CM noted.   ----- Message -----  From: Crescencio King MS,OTR/L  Sent: 3/30/2022   2:37 PM PDT  To: Leena Franz R.N.      Occupational therapy evaluation was not initiated secondary patient admitted to hospital

## 2022-04-01 NOTE — PROGRESS NOTES
Hospital Medicine Daily Progress Note    Date of Service  4/1/2022    Chief Complaint  Jaymie Peacock is a 66 y.o. female admitted 3/28/2022 with hypotension, poor p.o. intake and diarrhea    Hospital Course  Jaymie Peacock is a 66 y.o. female current smoker with hypertension, emphysema, severe malnutrition, recent admission for acute renal failure and NSTEMI thought to be secondary to stress-induced cardiomyopathy reduced EF of 35% who presented 3/28/2022 with hypotension found by home health nurse.  Patient was recently admitted from February 18 to February 28, 2022 where she presented with failure to thrive and encephalopathy.  Prolonged admission with diagnoses of above, please refer to discharge summary from February 28 for full details.  She was evaluated for her new onset heart failure left heart cath was deferred due to high risk nature and poor renal function, was instructed to follow-up with cardiology outpatient.  She did have melena that developed and was on IV Protonix, GI was consulted ultimately recommended EGD/colonoscopy in the outpatient setting since GI bleeding stopped.  She was comfort care at one point however her encephalopathy improved and she stated she would like to continue treatment and she was made DNR/DNI, she had improvement of her renal function and encephalopathy, ultimately discharged to skilled nursing facility for continued therapy.  Patient was discharged and has been home with home health.  She reports poor p.o. intake noting that she does not have an appetite and does not want to cook anything for herself, she eats mostly cereal.  She endorses diarrhea 4+ bowel movements per day usually small-volume and watery but now becoming more voluminous and watery, has had chronic diarrhea however this change has been in the past couple weeks. She denies any fevers or chills, denies any cough or dyspnea, no chest pain or palpitations, no abdominal pain nausea or vomiting, denies melena  hematochezia hematuria or hematemesis.  During home health visit today her blood pressure was in the 70s, she does take losartan hydrochlorothiazide and she has been compliant and given hypertension she presented for evaluation.    Interval Problem Update  Patient seen and examined today.  Data, Medication data reviewed.  Case discussed with nursing as available.  Plan of Care reviewed with patient and notified of changes.  4/1 clinic feels overall better, afebrile, heart rate in the 70s, on room air, blood pressure is improving to the 1 teens to 120s, no further diarrhea currently, improved p.o. intake, hemoglobin holding after transfusion, renal function slightly better, not normalized though, ongoing IV fluids, echocardiogram with recovered EF  3/31 the patient with ongoing hypertension despite significant volume resuscitation, low-dose peripheral pressors started overnight, with improvement, the patient without significant new complaints, she denies fevers or chills, [place] is in place, urine output has improved, fluid balance is positive by 5 L, renal function has worsened slightly, consulting nephrology, urine culture positive for E. coli, the patient previously was asymptomatic.  Further decrease in hemoglobin, transfusing, improved retake count, checking stool for occult blood again.  Starting bicarb drip  3/30 the patient feels fairly unchanged, she does not appear to be symptomatic despite significantly low blood pressures, no further significant diarrhea, poor urine output, decreasing hemoglobin without signs of bleeding, ongoing renal insufficiency, overnight fluids were stopped for some reason, lactic acid negative, urine sodium fairly low, D-dimer slightly elevated without other significant findings supporting possible thrombosis, not a candidate for anticoagulation secondary to critical and low hemoglobin, positive urine analysis, although the patient made very minimal urine output and no prior  complaints.  Culture preliminary positive for E. coli  3/29 the patient today is improved somewhat, her blood pressure remains soft, she is without acute complaints, she denies abdominal pain, she does have diarrhea still, poor p.o. intake present, her laboratory data for this morning is partially pending, renal function is improved, sodium is low at 132, her albumin at 3.0, occult blood is negative, C. difficile negative, chest x-ray negative    I have personally seen and examined the patient at bedside. I discussed the plan of care with patient.    Consultants/Specialty  critical care, nephrology    Code Status  DNAR/DNI    Disposition  Patient is not medically cleared for discharge.   Anticipate discharge to to home with close outpatient follow-up.  I have placed the appropriate orders for post-discharge needs.    Review of Systems  Review of Systems   Constitutional: Positive for malaise/fatigue and weight loss. Negative for chills and fever.   HENT: Negative.    Eyes: Negative.    Respiratory: Negative.  Negative for cough.    Cardiovascular: Negative.  Negative for chest pain and palpitations.   Gastrointestinal: Negative for heartburn, nausea and vomiting.   Genitourinary: Negative.  Negative for dysuria and frequency.   Musculoskeletal: Negative.  Negative for back pain and neck pain.   Skin: Negative.  Negative for itching and rash.   Neurological: Negative.  Negative for dizziness, focal weakness, weakness and headaches.   Endo/Heme/Allergies: Negative.  Negative for polydipsia. Does not bruise/bleed easily.   Psychiatric/Behavioral: Positive for substance abuse. Negative for depression. The patient is nervous/anxious.         Physical Exam  Temp:  [36 °C (96.8 °F)-36.8 °C (98.3 °F)] 36 °C (96.8 °F)  Pulse:  [48-78] 57  Resp:  [7-25] 9  BP: ()/(54-80) 126/78  SpO2:  [93 %-98 %] 95 %    Physical Exam  Vitals and nursing note reviewed.   Constitutional:       Appearance: She is well-developed and  underweight. She is ill-appearing. She is not diaphoretic.      Interventions: Nasal cannula in place.   HENT:      Head: Normocephalic and atraumatic.      Comments: Temporal wasting     Nose: Nose normal.   Eyes:      Conjunctiva/sclera: Conjunctivae normal.      Pupils: Pupils are equal, round, and reactive to light.   Neck:      Thyroid: No thyromegaly.      Vascular: No JVD.   Cardiovascular:      Rate and Rhythm: Normal rate and regular rhythm.      Heart sounds: Normal heart sounds.     No friction rub. No gallop.   Pulmonary:      Effort: Pulmonary effort is normal.      Breath sounds: Normal breath sounds. No wheezing or rales.   Abdominal:      General: Bowel sounds are normal. There is no distension.      Palpations: Abdomen is soft. There is no mass.      Tenderness: There is no abdominal tenderness. There is no guarding or rebound.   Musculoskeletal:         General: No tenderness. Normal range of motion.      Cervical back: Normal range of motion and neck supple.      Comments: Generalized muscle wasting   Lymphadenopathy:      Cervical: No cervical adenopathy.   Skin:     General: Skin is warm and dry.      Coloration: Skin is pale.   Neurological:      Mental Status: She is oriented to person, place, and time. She is lethargic.      Cranial Nerves: No cranial nerve deficit.   Psychiatric:         Behavior: Behavior normal.         Fluids    Intake/Output Summary (Last 24 hours) at 4/1/2022 0819  Last data filed at 4/1/2022 0400  Gross per 24 hour   Intake 590 ml   Output 565 ml   Net 25 ml       Laboratory  Recent Labs     03/31/22  0250 03/31/22  1712 04/01/22  0220   WBC 11.6* 11.8* 9.8   RBC 2.20* 3.36* 3.19*   HEMOGLOBIN 6.4* 9.8* 9.4*   HEMATOCRIT 19.6* 29.5* 28.3*   MCV 89.1 87.8 88.7   MCH 29.1 29.2 29.5   MCHC 32.7* 33.2* 33.2*   RDW 66.5* 57.7* 59.5*   PLATELETCT 272 308 281   MPV 9.8 9.8 9.8     Recent Labs     03/30/22  0732 03/31/22  0250 04/01/22  0220   SODIUM 132* 131* 134*   POTASSIUM  4.4 4.5 4.1   CHLORIDE 103 105 108   CO2 15* 14* 14*   GLUCOSE 129* 142* 150*   BUN 21 21 21   CREATININE 2.14* 2.79* 2.50*   CALCIUM 8.2* 7.5* 7.5*                   Imaging  EC-ECHOCARDIOGRAM COMPLETE W/O CONT   Final Result      DX-CHEST-PORTABLE (1 VIEW)   Final Result      1.  There is no acute cardiopulmonary process.           Assessment/Plan  * Dehydration determined by examination- (present on admission)  Assessment & Plan  Poor PO intake with ongoing large volume watery diarrhea.  IVF    Acute cystitis without hematuria  Assessment & Plan  Was initially not overtly symptomatic, poor urine output though  Empiric antibiotics,  positive for E. coli    Adrenal insufficiency (HCC)- (present on admission)  Assessment & Plan  Hypotensive with dehydration and ongoing diarrheal illness, random spot cortisol checked and was 12.5.  Hydrocortisone, weaning      Anemia- (present on admission)  Assessment & Plan  Recent GI bleed, FOBT negative in ED.  No reports of melena, hematochezia, hematemesis, hematuria, no other hemorrhage or recent trauma per patient.   Reticulocyte count not improved, low normal EPO level    We will recheck FOBT, consider GI consult at this time and hopefully endoscopy  Transfused 1 unit of red cells      HFrEF (heart failure with reduced ejection fraction) ([facility])- (present on admission)  Assessment & Plan  TTE on February 19, 2022 shows left ventricular ejection fraction 35% and basilar sparing in a pattern of stress-induced cardiomyopathy, not improved to EF 55  Continue statin.      Emphysema lung (HCC)- (present on admission)  Assessment & Plan  Not in acute exacerbation.  Breathing treatments as needed    Hypomagnesemia- (present on admission)  Assessment & Plan  IV replacement, recheck levels    Protein-calorie malnutrition, severe (HCC)- (present on admission)  Assessment & Plan  Supplements ordered, nutrition consult.  Continue mirtazapine, Marinol  Patient denies depression, no overt  features for FTT    Diarrhea- (present on admission)  Assessment & Plan  4+ watery BM's per day, has had multiple large volume watery BM's in ED.  Afebrile, mild leukocytosis, nontoxic, will hold off an antibiotics for now.  Recent exposure to abx, negative C. Difficile  Check stool for white cells, would benefit from GI evaluation  IVF  Monitor and replace electrolytes.      Hypotension- (present on admission)  Assessment & Plan  Holding home antihypertensives, takes losartan-HCTZ (50-6.25 qd) and reports compliance.  Cortisol 12.5, started on stress dose steroids for relative adrenal insufficiency.  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  IVF  Renal dose meds and avoid nephrotoxins  Monitor I&O's  Ongoing fluid challenge, question ATN  Follow renal function  Nephrology consult, bicarb infusion, close monitoring    Alcohol abuse- (present on admission)  Assessment & Plan  1 shot of liquor daily.  Monitor for signs of withdrawal.  Certainly advised to stop in her current overall condition      Smoking- (present on admission)  Assessment & Plan  Cessation advised       Plan  IV fluids, bicarb drip, nephrology consult  Monitor for blood loss  Encourage p.o. intake  Continue with C3  Wean steroids  Continue with supportive care  Continue blood pressure support and adrenal support  See orders  Medically complex high risk patient  Close laboratory follow-up      VTE prophylaxis: enoxaparin ppx pending follow-up blood work    I have performed a physical exam and reviewed and updated ROS and Plan today (4/1/2022). In review of yesterday's note (3/31/2022), there are no changes except as documented above.      Please note that this dictation was created  using voice recognition software. I have made every reasonable attempt to correct obvious errors, but I expect that there are errors of grammar and possibly context that I did not discover before finalizing the note.

## 2022-04-01 NOTE — CARE PLAN
Problem: Nutritional:  Goal: Achieve adequate nutritional intake  Description: Patient will consume ~50% or > of meals w/ yogurt/smoothie intake  Outcome: Progressing     Admit day 4.  PO intake is showing improvement per ADLs, now 50-75% when recorded. However, pt has D/c'd supplemental [employer] smoothies and Greek yogurt.  Meds include remeron, marinol, lomotil, and solu-cortef.  Weight up due to fluids.     RD following.

## 2022-04-01 NOTE — PROGRESS NOTES
4 Eyes Skin Assessment Completed by Anastasia CRUZ RN and Alvaro Rice RN.    Head WDL  Ears WDL  Nose WDL  Mouth WDL  Neck WDL  Breast/Chest WDL  Shoulder Blades WDL  Spine WDL  (R) Arm/Elbow/Hand Right arm scab, redness   (L) Arm/Elbow/Hand WDL  Abdomen WDL  Groin WDL  Scrotum/Coccyx/Buttocks Redness and Blanching, Wound to sacrum   (R) Leg WDL  (L) Leg WDL  (R) Heel/Foot/Toe WDL  (L) Heel/Foot/Toe WDL      Devices In Places PIV x 2       Interventions In Place Sacral Mepilex and Pressure Redistribution Mattress    Possible Skin Injury Yes    Pictures Uploaded Into Epic Yes, previously uploaded   Wound Consult Placed N/A  RN Wound Prevention Protocol Ordered Yes

## 2022-04-01 NOTE — CARE PLAN
The patient is Watcher - Medium risk of patient condition declining or worsening         1U prbcs for low Hgb. Waiting for follow up stool sample to be collected

## 2022-04-02 LAB
ANION GAP SERPL CALC-SCNC: 11 MMOL/L (ref 7–16)
BACTERIA BLD CULT: NORMAL
BACTERIA BLD CULT: NORMAL
BUN SERPL-MCNC: 22 MG/DL (ref 8–22)
CALCIUM SERPL-MCNC: 7.7 MG/DL (ref 8.5–10.5)
CHLORIDE SERPL-SCNC: 102 MMOL/L (ref 96–112)
CO2 SERPL-SCNC: 21 MMOL/L (ref 20–33)
CREAT SERPL-MCNC: 2.32 MG/DL (ref 0.5–1.4)
ERYTHROCYTE [DISTWIDTH] IN BLOOD BY AUTOMATED COUNT: 61.9 FL (ref 35.9–50)
GFR SERPLBLD CREATININE-BSD FMLA CKD-EPI: 22 ML/MIN/1.73 M 2
GLUCOSE SERPL-MCNC: 151 MG/DL (ref 65–99)
HCT VFR BLD AUTO: 31.1 % (ref 37–47)
HGB BLD-MCNC: 10.4 G/DL (ref 12–16)
MAGNESIUM SERPL-MCNC: 1.7 MG/DL (ref 1.5–2.5)
MCH RBC QN AUTO: 29.2 PG (ref 27–33)
MCHC RBC AUTO-ENTMCNC: 33.4 G/DL (ref 33.6–35)
MCV RBC AUTO: 87.4 FL (ref 81.4–97.8)
PHOSPHATE SERPL-MCNC: 3.5 MG/DL (ref 2.5–4.5)
PLATELET # BLD AUTO: 288 K/UL (ref 164–446)
PMV BLD AUTO: 9.6 FL (ref 9–12.9)
POTASSIUM SERPL-SCNC: 3.5 MMOL/L (ref 3.6–5.5)
RBC # BLD AUTO: 3.56 M/UL (ref 4.2–5.4)
SIGNIFICANT IND 70042: NORMAL
SIGNIFICANT IND 70042: NORMAL
SITE SITE: NORMAL
SITE SITE: NORMAL
SODIUM SERPL-SCNC: 134 MMOL/L (ref 135–145)
SOURCE SOURCE: NORMAL
SOURCE SOURCE: NORMAL
WBC # BLD AUTO: 9.8 K/UL (ref 4.8–10.8)

## 2022-04-02 PROCEDURE — 36415 COLL VENOUS BLD VENIPUNCTURE: CPT

## 2022-04-02 PROCEDURE — 700111 HCHG RX REV CODE 636 W/ 250 OVERRIDE (IP): Performed by: STUDENT IN AN ORGANIZED HEALTH CARE EDUCATION/TRAINING PROGRAM

## 2022-04-02 PROCEDURE — 84100 ASSAY OF PHOSPHORUS: CPT

## 2022-04-02 PROCEDURE — 85027 COMPLETE CBC AUTOMATED: CPT

## 2022-04-02 PROCEDURE — 99233 SBSQ HOSP IP/OBS HIGH 50: CPT | Performed by: STUDENT IN AN ORGANIZED HEALTH CARE EDUCATION/TRAINING PROGRAM

## 2022-04-02 PROCEDURE — 83735 ASSAY OF MAGNESIUM: CPT

## 2022-04-02 PROCEDURE — 700102 HCHG RX REV CODE 250 W/ 637 OVERRIDE(OP): Performed by: HOSPITALIST

## 2022-04-02 PROCEDURE — 700102 HCHG RX REV CODE 250 W/ 637 OVERRIDE(OP): Performed by: STUDENT IN AN ORGANIZED HEALTH CARE EDUCATION/TRAINING PROGRAM

## 2022-04-02 PROCEDURE — A9270 NON-COVERED ITEM OR SERVICE: HCPCS | Performed by: HOSPITALIST

## 2022-04-02 PROCEDURE — A9270 NON-COVERED ITEM OR SERVICE: HCPCS | Performed by: STUDENT IN AN ORGANIZED HEALTH CARE EDUCATION/TRAINING PROGRAM

## 2022-04-02 PROCEDURE — 700105 HCHG RX REV CODE 258: Performed by: STUDENT IN AN ORGANIZED HEALTH CARE EDUCATION/TRAINING PROGRAM

## 2022-04-02 PROCEDURE — 80048 BASIC METABOLIC PNL TOTAL CA: CPT

## 2022-04-02 PROCEDURE — 770001 HCHG ROOM/CARE - MED/SURG/GYN PRIV*

## 2022-04-02 PROCEDURE — 99232 SBSQ HOSP IP/OBS MODERATE 35: CPT | Performed by: INTERNAL MEDICINE

## 2022-04-02 PROCEDURE — 700111 HCHG RX REV CODE 636 W/ 250 OVERRIDE (IP): Performed by: HOSPITALIST

## 2022-04-02 PROCEDURE — 700105 HCHG RX REV CODE 258: Performed by: HOSPITALIST

## 2022-04-02 RX ORDER — SODIUM BICARBONATE IN D5W 150/1000ML
PLASTIC BAG, INJECTION (ML) INTRAVENOUS CONTINUOUS
Status: DISCONTINUED | OUTPATIENT
Start: 2022-04-02 | End: 2022-04-03

## 2022-04-02 RX ORDER — MAGNESIUM SULFATE HEPTAHYDRATE 40 MG/ML
2 INJECTION, SOLUTION INTRAVENOUS ONCE
Status: COMPLETED | OUTPATIENT
Start: 2022-04-02 | End: 2022-04-02

## 2022-04-02 RX ORDER — MIDODRINE HYDROCHLORIDE 5 MG/1
5 TABLET ORAL
Status: DISCONTINUED | OUTPATIENT
Start: 2022-04-02 | End: 2022-04-04

## 2022-04-02 RX ORDER — POTASSIUM CHLORIDE 20 MEQ/1
40 TABLET, EXTENDED RELEASE ORAL ONCE
Status: COMPLETED | OUTPATIENT
Start: 2022-04-02 | End: 2022-04-02

## 2022-04-02 RX ORDER — CALCIUM CARBONATE 500 MG/1
500 TABLET, CHEWABLE ORAL
Status: DISCONTINUED | OUTPATIENT
Start: 2022-04-02 | End: 2022-04-03

## 2022-04-02 RX ADMIN — MIDODRINE HYDROCHLORIDE 10 MG: 5 TABLET ORAL at 12:30

## 2022-04-02 RX ADMIN — POTASSIUM CHLORIDE 40 MEQ: 1500 TABLET, EXTENDED RELEASE ORAL at 10:26

## 2022-04-02 RX ADMIN — SODIUM BICARBONATE: 84 INJECTION, SOLUTION INTRAVENOUS at 06:17

## 2022-04-02 RX ADMIN — SIMVASTATIN 20 MG: 20 TABLET, FILM COATED ORAL at 17:00

## 2022-04-02 RX ADMIN — SODIUM BICARBONATE: 84 INJECTION, SOLUTION INTRAVENOUS at 16:56

## 2022-04-02 RX ADMIN — MIRTAZAPINE 15 MG: 15 TABLET, FILM COATED ORAL at 21:32

## 2022-04-02 RX ADMIN — MAGNESIUM SULFATE HEPTAHYDRATE 2 G: 40 INJECTION, SOLUTION INTRAVENOUS at 14:36

## 2022-04-02 RX ADMIN — MIDODRINE HYDROCHLORIDE 5 MG: 5 TABLET ORAL at 17:01

## 2022-04-02 RX ADMIN — CALCIUM CARBONATE 500 MG: 500 TABLET, CHEWABLE ORAL at 12:30

## 2022-04-02 RX ADMIN — CALCIUM CARBONATE 500 MG: 500 TABLET, CHEWABLE ORAL at 17:01

## 2022-04-02 RX ADMIN — HYDROCORTISONE SODIUM SUCCINATE 25 MG: 100 INJECTION, POWDER, FOR SOLUTION INTRAMUSCULAR; INTRAVENOUS at 05:30

## 2022-04-02 RX ADMIN — MIDODRINE HYDROCHLORIDE 10 MG: 5 TABLET ORAL at 08:15

## 2022-04-02 ASSESSMENT — ENCOUNTER SYMPTOMS
NERVOUS/ANXIOUS: 1
WEIGHT LOSS: 0
ORTHOPNEA: 0
HEARTBURN: 0
NECK PAIN: 0
EYES NEGATIVE: 1
MYALGIAS: 0
HEADACHES: 0
CHILLS: 0
BRUISES/BLEEDS EASILY: 0
DEPRESSION: 0
SINUS PAIN: 0
SHORTNESS OF BREATH: 0
NEUROLOGICAL NEGATIVE: 1
CARDIOVASCULAR NEGATIVE: 1
DIZZINESS: 0
POLYDIPSIA: 0
PALPITATIONS: 0
RESPIRATORY NEGATIVE: 1
HEMOPTYSIS: 0
FEVER: 0
WEIGHT LOSS: 1
NAUSEA: 0
FOCAL WEAKNESS: 0
WHEEZING: 0
WEAKNESS: 0
DIARRHEA: 0
COUGH: 0
BACK PAIN: 0
ABDOMINAL PAIN: 0
MUSCULOSKELETAL NEGATIVE: 1
VOMITING: 0
FLANK PAIN: 0

## 2022-04-02 ASSESSMENT — LIFESTYLE VARIABLES: SUBSTANCE_ABUSE: 1

## 2022-04-02 ASSESSMENT — COGNITIVE AND FUNCTIONAL STATUS - GENERAL
PERSONAL GROOMING: A LITTLE
WALKING IN HOSPITAL ROOM: A LITTLE
SUGGESTED CMS G CODE MODIFIER DAILY ACTIVITY: CK
SUGGESTED CMS G CODE MODIFIER MOBILITY: CK
STANDING UP FROM CHAIR USING ARMS: A LITTLE
DRESSING REGULAR LOWER BODY CLOTHING: A LOT
MOVING FROM LYING ON BACK TO SITTING ON SIDE OF FLAT BED: A LITTLE
TURNING FROM BACK TO SIDE WHILE IN FLAT BAD: A LITTLE
CLIMB 3 TO 5 STEPS WITH RAILING: A LOT
HELP NEEDED FOR BATHING: A LITTLE
TOILETING: A LITTLE
DRESSING REGULAR UPPER BODY CLOTHING: A LITTLE
MOVING TO AND FROM BED TO CHAIR: A LITTLE
MOBILITY SCORE: 17
DAILY ACTIVITIY SCORE: 18

## 2022-04-02 ASSESSMENT — PATIENT HEALTH QUESTIONNAIRE - PHQ9
1. LITTLE INTEREST OR PLEASURE IN DOING THINGS: NOT AT ALL
SUM OF ALL RESPONSES TO PHQ9 QUESTIONS 1 AND 2: 0
2. FEELING DOWN, DEPRESSED, IRRITABLE, OR HOPELESS: NOT AT ALL

## 2022-04-02 NOTE — CARE PLAN
The patient is Watcher - Medium risk of patient condition declining or worsening    Shift Goals  Clinical Goals: monitor hemodynamics  Patient Goals: comfort, sleep  Family Goals: N/A    Progress made toward(s) clinical / shift goals: POC/medications discussed with pt. All fall precautions in place for patient. Pt refused bed alarm but pt called appropriately. Wound protocols, turns, and wound care in place to maintain skin integrity for the sacrum      Problem: Knowledge Deficit - Standard  Goal: Patient and family/care givers will demonstrate understanding of plan of care, disease process/condition, diagnostic tests and medications  Outcome: Progressing     Problem: Fall Risk  Goal: Patient will remain free from falls  Outcome: Progressing     Problem: Skin Integrity  Goal: Skin integrity is maintained or improved  Outcome: Progressing

## 2022-04-02 NOTE — PROGRESS NOTES
Received report from NOC RN, pt care assumed. VS stable on RA. No signs of acute distress. Call light within reach, pt is aaox4 and denies having pain at this time.

## 2022-04-02 NOTE — PROGRESS NOTES
Nephrology Daily Progress Note    Date of Service  4/2/2022    Chief Complaint  66 y.o. female with CKD IIIb, CHF, MI, HTN,recent YAMIL,  readmitted 3/28/2022 with  Hypotension, volume depletion poor po intake. YAMIL, metabolic acidosis, UTI    Interval Problem Update  4/1 -no acute events, states doing better, no complaints   cc/24 H  creat level slightly better  4/2 -doing well, asking when can go home  No complaints  Creat improving  Acidosis corrected    Review of Systems  Review of Systems   Constitutional: Positive for malaise/fatigue. Negative for chills, fever and weight loss.   HENT: Negative for congestion, hearing loss and sinus pain.    Eyes: Negative.    Respiratory: Negative for cough, hemoptysis, shortness of breath and wheezing.    Cardiovascular: Negative for chest pain, palpitations, orthopnea and leg swelling.   Gastrointestinal: Negative for abdominal pain, diarrhea, nausea and vomiting.   Genitourinary: Negative for dysuria, flank pain, frequency, hematuria and urgency.   Musculoskeletal: Negative for back pain, joint pain, myalgias and neck pain.   Skin: Negative.    All other systems reviewed and are negative.       Physical Exam  Temp:  [36.1 °C (97 °F)-37.3 °C (99.1 °F)] 36.7 °C (98.1 °F)  Pulse:  [59-75] 75  Resp:  [14-18] 14  BP: (100-148)/(70-88) 115/79  SpO2:  [92 %-99 %] 92 %    Physical Exam  Constitutional:       General: She is not in acute distress.     Appearance: She is well-developed. She is cachectic. She is not diaphoretic.   HENT:      Head: Normocephalic and atraumatic.      Nose: Nose normal.      Mouth/Throat:      Mouth: Mucous membranes are moist.      Pharynx: Oropharynx is clear.   Eyes:      Extraocular Movements: Extraocular movements intact.      Conjunctiva/sclera: Conjunctivae normal.      Pupils: Pupils are equal, round, and reactive to light.   Cardiovascular:      Rate and Rhythm: Normal rate and regular rhythm.      Pulses: Normal pulses.      Heart sounds:  Normal heart sounds.     No friction rub. No gallop.   Pulmonary:      Effort: Pulmonary effort is normal. No respiratory distress.      Breath sounds: Normal breath sounds. No wheezing, rhonchi or rales.   Abdominal:      General: Bowel sounds are normal. There is no distension.      Palpations: Abdomen is soft. There is no mass.      Tenderness: There is no abdominal tenderness. There is no right CVA tenderness or left CVA tenderness.   Musculoskeletal:      Cervical back: Normal range of motion and neck supple.      Right lower leg: No edema.      Left lower leg: No edema.   Skin:     General: Skin is warm and dry.      Coloration: Skin is pale.      Findings: No erythema or rash.   Neurological:      General: No focal deficit present.      Mental Status: She is alert and oriented to person, place, and time.      Cranial Nerves: No cranial nerve deficit.      Coordination: Coordination normal.   Psychiatric:         Mood and Affect: Mood normal.         Thought Content: Thought content normal.         Judgment: Judgment normal.         Fluids    Intake/Output Summary (Last 24 hours) at 4/2/2022 1146  Last data filed at 4/2/2022 0003  Gross per 24 hour   Intake 100 ml   Output 31 ml   Net 69 ml       Laboratory  Recent Labs     03/31/22  1712 04/01/22  0220 04/02/22  0230   WBC 11.8* 9.8 9.8   RBC 3.36* 3.19* 3.56*   HEMOGLOBIN 9.8* 9.4* 10.4*   HEMATOCRIT 29.5* 28.3* 31.1*   MCV 87.8 88.7 87.4   MCH 29.2 29.5 29.2   MCHC 33.2* 33.2* 33.4*   RDW 57.7* 59.5* 61.9*   PLATELETCT 308 281 288   MPV 9.8 9.8 9.6     Recent Labs     03/31/22  0250 04/01/22  0220 04/02/22  0230   SODIUM 131* 134* 134*   POTASSIUM 4.5 4.1 3.5*   CHLORIDE 105 108 102   CO2 14* 14* 21   GLUCOSE 142* 150* 151*   BUN 21 21 22   CREATININE 2.79* 2.50* 2.32*   CALCIUM 7.5* 7.5* 7.7*         No results for input(s): NTPROBNP in the last 72 hours.        Imaging  reviewed    Assessment/Plan  1.YAMIL/CKD III b -improving -to monitor -continue  IVF  2.Hypotension: BP under better control -continue IVF -keep MAP> 65  3.Electrolytes: hyponatremia mild  4.Anemia: Hb improved post transfusion  5.Metabolic acidosis -corrected with bicarb gtt  6.Volume:continue IVF    Recs: no need for emergent dialysis             Continue current treatment             Daily labs             encourage po intake             All meds to renal doses             Will follow             D/w

## 2022-04-02 NOTE — PROGRESS NOTES
Hospital Medicine Daily Progress Note    Date of Service  4/2/2022    Chief Complaint  Jaymie Peacock is a 66 y.o. female admitted 3/28/2022 with hypotension, poor p.o. intake and diarrhea    Hospital Course  Jaymie Peacock is a 66 y.o. female current smoker with hypertension, emphysema, severe malnutrition, recent admission for acute renal failure and NSTEMI thought to be secondary to stress-induced cardiomyopathy reduced EF of 35% who presented 3/28/2022 with hypotension found by home health nurse.  Patient was recently admitted from February 18 to February 28, 2022 where she presented with failure to thrive and encephalopathy.  Prolonged admission with diagnoses of above, please refer to discharge summary from February 28 for full details.  She was evaluated for her new onset heart failure left heart cath was deferred due to high risk nature and poor renal function, was instructed to follow-up with cardiology outpatient.  She did have melena that developed and was on IV Protonix, GI was consulted ultimately recommended EGD/colonoscopy in the outpatient setting since GI bleeding stopped.  She was comfort care at one point however her encephalopathy improved and she stated she would like to continue treatment and she was made DNR/DNI, she had improvement of her renal function and encephalopathy, ultimately discharged to skilled nursing facility for continued therapy.  Patient was discharged and has been home with home health.  She reports poor p.o. intake noting that she does not have an appetite and does not want to cook anything for herself, she eats mostly cereal.  She endorses diarrhea 4+ bowel movements per day usually small-volume and watery but now becoming more voluminous and watery, has had chronic diarrhea however this change has been in the past couple weeks. She denies any fevers or chills, denies any cough or dyspnea, no chest pain or palpitations, no abdominal pain nausea or vomiting, denies melena  hematochezia hematuria or hematemesis.  During home health visit today her blood pressure was in the 70s, she does take losartan hydrochlorothiazide and she has been compliant and given hypertension she presented for evaluation.    Data, Medication data reviewed.  Case discussed with nursing as available.  Plan of Care reviewed with patient and notified of changes.  4/1 clinic feels overall better, afebrile, heart rate in the 70s, on room air, blood pressure is improving to the 1 teens to 120s, no further diarrhea currently, improved p.o. intake, hemoglobin holding after transfusion, renal function slightly better, not normalized though, ongoing IV fluids, echocardiogram with recovered EF  3/31 the patient with ongoing hypertension despite significant volume resuscitation, low-dose peripheral pressors started overnight, with improvement, the patient without significant new complaints, she denies fevers or chills, [place] is in place, urine output has improved, fluid balance is positive by 5 L, renal function has worsened slightly, consulting nephrology, urine culture positive for E. coli, the patient previously was asymptomatic.  Further decrease in hemoglobin, transfusing, improved retake count, checking stool for occult blood again.  Starting bicarb drip  3/30 the patient feels fairly unchanged, she does not appear to be symptomatic despite significantly low blood pressures, no further significant diarrhea, poor urine output, decreasing hemoglobin without signs of bleeding, ongoing renal insufficiency, overnight fluids were stopped for some reason, lactic acid negative, urine sodium fairly low, D-dimer slightly elevated without other significant findings supporting possible thrombosis, not a candidate for anticoagulation secondary to critical and low hemoglobin, positive urine analysis, although the patient made very minimal urine output and no prior complaints.  Culture preliminary positive for E. coli  3/29 the  patient today is improved somewhat, her blood pressure remains soft, she is without acute complaints, she denies abdominal pain, she does have diarrhea still, poor p.o. intake present, her laboratory data for this morning is partially pending, renal function is improved, sodium is low at 132, her albumin at 3.0, occult blood is negative, C. difficile negative, chest x-ray negative    Interval Problem Update  Patient seen and examined today  Patient reporting marked improvement and inquiring about going home  Patient still very weak  Stable vitals, discontinue stress steroids and titrate midodrine  CBC stable  Creatinine trending down slowly  Acidosis resolved, titrate bicarb drip  3/31 echocardiogram with ejection fraction 55  Pending PT/OT evaluation  Labs on AM    I have personally seen and examined the patient at bedside. I discussed the plan of care with patient.    Consultants/Specialty  critical care, nephrology    Code Status  DNAR/DNI    Disposition  Patient is not medically cleared for discharge.   Anticipate discharge to to home with close outpatient follow-up.  I have placed the appropriate orders for post-discharge needs.    Review of Systems  Review of Systems   Constitutional: Positive for malaise/fatigue and weight loss. Negative for chills and fever.   HENT: Negative.    Eyes: Negative.    Respiratory: Negative.  Negative for cough.    Cardiovascular: Negative.  Negative for chest pain and palpitations.   Gastrointestinal: Negative for heartburn, nausea and vomiting.   Genitourinary: Negative.  Negative for dysuria and frequency.   Musculoskeletal: Negative.  Negative for back pain and neck pain.   Skin: Negative.  Negative for itching and rash.   Neurological: Negative.  Negative for dizziness, focal weakness, weakness and headaches.   Endo/Heme/Allergies: Negative.  Negative for polydipsia. Does not bruise/bleed easily.   Psychiatric/Behavioral: Positive for substance abuse. Negative for  depression. The patient is nervous/anxious.         Physical Exam  Temp:  [36.1 °C (97 °F)-37.3 °C (99.1 °F)] 36.7 °C (98.1 °F)  Pulse:  [59-75] 75  Resp:  [14-18] 14  BP: (115-148)/(79-88) 115/79  SpO2:  [92 %-99 %] 92 %    Physical Exam  Vitals and nursing note reviewed.   Constitutional:       Appearance: She is well-developed and underweight. She is ill-appearing. She is not diaphoretic.      Interventions: Nasal cannula in place.   HENT:      Head: Normocephalic and atraumatic.      Comments: Temporal wasting     Nose: Nose normal.   Eyes:      Conjunctiva/sclera: Conjunctivae normal.      Pupils: Pupils are equal, round, and reactive to light.   Neck:      Thyroid: No thyromegaly.      Vascular: No JVD.   Cardiovascular:      Rate and Rhythm: Normal rate and regular rhythm.      Heart sounds: Normal heart sounds.     No friction rub. No gallop.   Pulmonary:      Effort: Pulmonary effort is normal.      Breath sounds: Normal breath sounds. No wheezing or rales.   Abdominal:      General: Bowel sounds are normal. There is no distension.      Palpations: Abdomen is soft. There is no mass.      Tenderness: There is no abdominal tenderness. There is no guarding or rebound.   Musculoskeletal:         General: No tenderness. Normal range of motion.      Cervical back: Normal range of motion and neck supple.      Comments: Generalized muscle wasting   Lymphadenopathy:      Cervical: No cervical adenopathy.   Skin:     General: Skin is warm and dry.      Coloration: Skin is pale.   Neurological:      Mental Status: She is oriented to person, place, and time. She is lethargic.      Cranial Nerves: No cranial nerve deficit.   Psychiatric:         Behavior: Behavior normal.         Fluids    Intake/Output Summary (Last 24 hours) at 4/2/2022 1414  Last data filed at 4/2/2022 0003  Gross per 24 hour   Intake 100 ml   Output 1 ml   Net 99 ml       Laboratory  Recent Labs     03/31/22  1712 04/01/22  0220 04/02/22  0230   WBC  11.8* 9.8 9.8   RBC 3.36* 3.19* 3.56*   HEMOGLOBIN 9.8* 9.4* 10.4*   HEMATOCRIT 29.5* 28.3* 31.1*   MCV 87.8 88.7 87.4   MCH 29.2 29.5 29.2   MCHC 33.2* 33.2* 33.4*   RDW 57.7* 59.5* 61.9*   PLATELETCT 308 281 288   MPV 9.8 9.8 9.6     Recent Labs     03/31/22  0250 04/01/22  0220 04/02/22  0230   SODIUM 131* 134* 134*   POTASSIUM 4.5 4.1 3.5*   CHLORIDE 105 108 102   CO2 14* 14* 21   GLUCOSE 142* 150* 151*   BUN 21 21 22   CREATININE 2.79* 2.50* 2.32*   CALCIUM 7.5* 7.5* 7.7*                   Imaging  EC-ECHOCARDIOGRAM COMPLETE W/O CONT   Final Result      DX-CHEST-PORTABLE (1 VIEW)   Final Result      1.  There is no acute cardiopulmonary process.           Assessment/Plan  * Dehydration determined by examination- (present on admission)  Assessment & Plan  Encourage p.o. intake  Resolved    Protein-calorie malnutrition, severe (HCC)- (present on admission)  Assessment & Plan  Supplements ordered, nutrition consult.  Continue mirtazapine, Marinol  Patient denies depression, no overt features for FTT    Anemia- (present on admission)  Assessment & Plan  Recent GI bleed, FOBT negative in ED.  No reports of melena, hematochezia, hematemesis, hematuria, no other hemorrhage or recent trauma per patient.   Reticulocyte count not improved, low normal EPO level  We will recheck FOBT, consider GI consult at this time and hopefully endoscopy  Transfused 1 unit of red cells  Monitor    Hypotension- (present on admission)  Assessment & Plan  Discontinue stress steroids  Titrate midodrine  Resolved    Alcohol abuse- (present on admission)  Assessment & Plan  1 shot of liquor daily.  Monitor for signs of withdrawal.  Certainly advised to stop in her current overall condition    HFrEF (heart failure with reduced ejection fraction) (HCC)- (present on admission)  Assessment & Plan  TTE on February 19, 2022 shows left ventricular ejection fraction 35% and basilar sparing in a pattern of stress-induced cardiomyopathy, not improved  to EF 55  Continue statin    Hypomagnesemia- (present on admission)  Assessment & Plan  IV replacement, recheck levels    Diarrhea- (present on admission)  Assessment & Plan  4+ watery BM's per day, has had multiple large volume watery BM's in ED.  Afebrile, mild leukocytosis, nontoxic, will hold off an antibiotics for now.  Recent exposure to abx, negative C. Difficile  Check stool for white cells, would benefit from GI evaluation  IVF  Monitor and replace electrolytes.    Acute kidney injury (HCC)- (present on admission)  Assessment & Plan  Creatinine trending down slowly  Renal dose meds and avoid nephrotoxins  Monitor I&O's  Ongoing fluid challenge, question ATN  Nephrology consult, appreciate recommendations    Smoking- (present on admission)  Assessment & Plan  Cessation advised    Acute cystitis without hematuria  Assessment & Plan  Complete antibiotics  Resolved    Emphysema lung (HCC)- (present on admission)  Assessment & Plan  Not in acute exacerbation.  Breathing treatments as needed     Plan  IV fluids, bicarb drip, nephrology consult  Monitor for blood loss  Encourage p.o. intake  Continue with C3  Wean steroids  Continue with supportive care  Continue blood pressure support and adrenal support  See orders  Medically complex high risk patient  Close laboratory follow-up      VTE prophylaxis: enoxaparin ppx pending follow-up blood work    I have performed a physical exam and reviewed and updated ROS and Plan today (4/2/2022). In review of yesterday's note (4/1/2022), there are no changes except as documented above.      Please note that this dictation was created using voice recognition software. I have made every reasonable attempt to correct obvious errors, but I expect that there are errors of grammar and possibly context that I did not discover before finalizing the note.

## 2022-04-02 NOTE — DOCUMENTATION QUERY
[facility]                                                                       Query Response Note      PATIENT:               VALENTINE ALANIZ  ACCT #:                  [id number]  MRN:                     [id number]  :                      1955  ADMIT DATE:       3/28/2022 6:17 PM  DISCH DATE:          RESPONDING  PROVIDER #:        [id number]           QUERY TEXT:    Chronic Kidney Disease stage IIIb (CKD) is documented by the nephrologist in the consult and PN's.  Please specify if CKD currently exists and the disease stage    CKD Stage 3b GFR between 30-44  CKD Stage 4 GFR between 15 and 29       The patient's clinical indicators include:  Prior to arrival 2022 to 2022 GFR range = most values were between 34 - 40  Current encounter GFR range = 18-26    During previous encounters only YAMIL documented    3/31 Neph Consult - A/P: episodes of YAMIL on CKD stage IIIB     Treatment - IV Sodium bicarbonate gtt; IV LR bolus & infusion, NS bolus x2; IV Norepinephrine; PO Midodrine;     Risk factors - YAMIL, dehydration, hypotension, protein calorie malnutrition    Thank you,  Janeth OLGUINN  Clinical   Connect via WebTV  Options provided:   -- Chronic kidney disease Stage 3b   -- Chronic kidney disease Stage 4   -- Chronic kidney disease Stage 3 unspecified   -- YAMIL only   -- Other explanation   -- Unable to determine      Query created by: Janeth Kumar on 2022 1:11 PM    RESPONSE TEXT:    Chronic kidney disease Stage 3 unspecified          Electronically signed by:  CLAUDINE MARQUES MD 2022 9:37 AM

## 2022-04-03 LAB
ALBUMIN SERPL BCP-MCNC: 1.9 G/DL (ref 3.2–4.9)
ANISOCYTOSIS BLD QL SMEAR: ABNORMAL
BASOPHILS # BLD AUTO: 0.2 % (ref 0–1.8)
BASOPHILS # BLD: 0.02 K/UL (ref 0–0.12)
BUN SERPL-MCNC: 21 MG/DL (ref 8–22)
BURR CELLS BLD QL SMEAR: NORMAL
CALCIUM SERPL-MCNC: 7.8 MG/DL (ref 8.5–10.5)
CHLORIDE SERPL-SCNC: 101 MMOL/L (ref 96–112)
CO2 SERPL-SCNC: 26 MMOL/L (ref 20–33)
COMMENT 1642: NORMAL
CREAT SERPL-MCNC: 1.97 MG/DL (ref 0.5–1.4)
EOSINOPHIL # BLD AUTO: 0.01 K/UL (ref 0–0.51)
EOSINOPHIL NFR BLD: 0.1 % (ref 0–6.9)
ERYTHROCYTE [DISTWIDTH] IN BLOOD BY AUTOMATED COUNT: 68.5 FL (ref 35.9–50)
FAT STL QL: NORMAL
GFR SERPLBLD CREATININE-BSD FMLA CKD-EPI: 27 ML/MIN/1.73 M 2
GLUCOSE SERPL-MCNC: 78 MG/DL (ref 65–99)
HCT VFR BLD AUTO: 29.6 % (ref 37–47)
HGB BLD-MCNC: 9.6 G/DL (ref 12–16)
IMM GRANULOCYTES # BLD AUTO: 0.07 K/UL (ref 0–0.11)
IMM GRANULOCYTES NFR BLD AUTO: 0.8 % (ref 0–0.9)
LACTOFERRIN STL QL IA: NEGATIVE
LYMPHOCYTES # BLD AUTO: 1.27 K/UL (ref 1–4.8)
LYMPHOCYTES NFR BLD: 15.1 % (ref 22–41)
MACROCYTES BLD QL SMEAR: ABNORMAL
MAGNESIUM SERPL-MCNC: 1.7 MG/DL (ref 1.5–2.5)
MCH RBC QN AUTO: 29.4 PG (ref 27–33)
MCHC RBC AUTO-ENTMCNC: 32.4 G/DL (ref 33.6–35)
MCV RBC AUTO: 90.8 FL (ref 81.4–97.8)
MONOCYTES # BLD AUTO: 0.92 K/UL (ref 0–0.85)
MONOCYTES NFR BLD AUTO: 11 % (ref 0–13.4)
MORPHOLOGY BLD-IMP: NORMAL
NEUTRAL FAT STL QL: NORMAL
NEUTROPHILS # BLD AUTO: 6.11 K/UL (ref 2–7.15)
NEUTROPHILS NFR BLD: 72.8 % (ref 44–72)
NRBC # BLD AUTO: 0 K/UL
NRBC BLD-RTO: 0 /100 WBC
OVALOCYTES BLD QL SMEAR: NORMAL
PHOSPHATE SERPL-MCNC: 2.7 MG/DL (ref 2.5–4.5)
PLATELET # BLD AUTO: 193 K/UL (ref 164–446)
PLATELET BLD QL SMEAR: NORMAL
PMV BLD AUTO: 10.2 FL (ref 9–12.9)
POIKILOCYTOSIS BLD QL SMEAR: NORMAL
POLYCHROMASIA BLD QL SMEAR: NORMAL
POTASSIUM SERPL-SCNC: 3.1 MMOL/L (ref 3.6–5.5)
RBC # BLD AUTO: 3.26 M/UL (ref 4.2–5.4)
RBC BLD AUTO: PRESENT
SODIUM SERPL-SCNC: 137 MMOL/L (ref 135–145)
WBC # BLD AUTO: 8.4 K/UL (ref 4.8–10.8)

## 2022-04-03 PROCEDURE — 700102 HCHG RX REV CODE 250 W/ 637 OVERRIDE(OP): Performed by: STUDENT IN AN ORGANIZED HEALTH CARE EDUCATION/TRAINING PROGRAM

## 2022-04-03 PROCEDURE — 700105 HCHG RX REV CODE 258: Performed by: STUDENT IN AN ORGANIZED HEALTH CARE EDUCATION/TRAINING PROGRAM

## 2022-04-03 PROCEDURE — 36415 COLL VENOUS BLD VENIPUNCTURE: CPT

## 2022-04-03 PROCEDURE — 770001 HCHG ROOM/CARE - MED/SURG/GYN PRIV*

## 2022-04-03 PROCEDURE — A9270 NON-COVERED ITEM OR SERVICE: HCPCS | Performed by: STUDENT IN AN ORGANIZED HEALTH CARE EDUCATION/TRAINING PROGRAM

## 2022-04-03 PROCEDURE — 99233 SBSQ HOSP IP/OBS HIGH 50: CPT | Performed by: STUDENT IN AN ORGANIZED HEALTH CARE EDUCATION/TRAINING PROGRAM

## 2022-04-03 PROCEDURE — 99232 SBSQ HOSP IP/OBS MODERATE 35: CPT | Performed by: INTERNAL MEDICINE

## 2022-04-03 PROCEDURE — 80069 RENAL FUNCTION PANEL: CPT

## 2022-04-03 PROCEDURE — 83735 ASSAY OF MAGNESIUM: CPT

## 2022-04-03 PROCEDURE — 85025 COMPLETE CBC W/AUTO DIFF WBC: CPT

## 2022-04-03 RX ORDER — POTASSIUM CHLORIDE 20 MEQ/1
40 TABLET, EXTENDED RELEASE ORAL ONCE
Status: COMPLETED | OUTPATIENT
Start: 2022-04-03 | End: 2022-04-03

## 2022-04-03 RX ORDER — ACETAMINOPHEN 500 MG
1000 TABLET ORAL EVERY 8 HOURS PRN
Status: DISCONTINUED | OUTPATIENT
Start: 2022-04-03 | End: 2022-04-04 | Stop reason: HOSPADM

## 2022-04-03 RX ORDER — SODIUM CHLORIDE, SODIUM LACTATE, POTASSIUM CHLORIDE, CALCIUM CHLORIDE 600; 310; 30; 20 MG/100ML; MG/100ML; MG/100ML; MG/100ML
INJECTION, SOLUTION INTRAVENOUS CONTINUOUS
Status: DISCONTINUED | OUTPATIENT
Start: 2022-04-03 | End: 2022-04-04

## 2022-04-03 RX ADMIN — SODIUM CHLORIDE, POTASSIUM CHLORIDE, SODIUM LACTATE AND CALCIUM CHLORIDE: 600; 310; 30; 20 INJECTION, SOLUTION INTRAVENOUS at 18:13

## 2022-04-03 RX ADMIN — MIDODRINE HYDROCHLORIDE 5 MG: 5 TABLET ORAL at 18:09

## 2022-04-03 RX ADMIN — MIDODRINE HYDROCHLORIDE 5 MG: 5 TABLET ORAL at 08:21

## 2022-04-03 RX ADMIN — OMEPRAZOLE 40 MG: 20 CAPSULE, DELAYED RELEASE ORAL at 05:06

## 2022-04-03 RX ADMIN — SODIUM CHLORIDE, POTASSIUM CHLORIDE, SODIUM LACTATE AND CALCIUM CHLORIDE: 600; 310; 30; 20 INJECTION, SOLUTION INTRAVENOUS at 08:22

## 2022-04-03 RX ADMIN — POTASSIUM CHLORIDE 40 MEQ: 1500 TABLET, EXTENDED RELEASE ORAL at 08:21

## 2022-04-03 RX ADMIN — MIDODRINE HYDROCHLORIDE 5 MG: 5 TABLET ORAL at 13:20

## 2022-04-03 RX ADMIN — SIMVASTATIN 20 MG: 20 TABLET, FILM COATED ORAL at 18:10

## 2022-04-03 RX ADMIN — MIRTAZAPINE 15 MG: 15 TABLET, FILM COATED ORAL at 20:10

## 2022-04-03 RX ADMIN — SODIUM CHLORIDE, POTASSIUM CHLORIDE, SODIUM LACTATE AND CALCIUM CHLORIDE: 600; 310; 30; 20 INJECTION, SOLUTION INTRAVENOUS at 22:34

## 2022-04-03 ASSESSMENT — ENCOUNTER SYMPTOMS
FLANK PAIN: 0
FOCAL WEAKNESS: 0
PALPITATIONS: 0
NERVOUS/ANXIOUS: 1
BRUISES/BLEEDS EASILY: 0
ABDOMINAL PAIN: 0
WEIGHT LOSS: 1
FEVER: 0
HEARTBURN: 0
SHORTNESS OF BREATH: 0
CARDIOVASCULAR NEGATIVE: 1
SINUS PAIN: 0
DIZZINESS: 0
NAUSEA: 0
ORTHOPNEA: 0
MUSCULOSKELETAL NEGATIVE: 1
DEPRESSION: 0
POLYDIPSIA: 0
HEMOPTYSIS: 0
NECK PAIN: 0
BACK PAIN: 0
WEAKNESS: 0
EYES NEGATIVE: 1
WEIGHT LOSS: 0
RESPIRATORY NEGATIVE: 1
DIARRHEA: 0
VOMITING: 0
NEUROLOGICAL NEGATIVE: 1
CHILLS: 0
WHEEZING: 0
COUGH: 0
HEADACHES: 0

## 2022-04-03 ASSESSMENT — LIFESTYLE VARIABLES: SUBSTANCE_ABUSE: 1

## 2022-04-03 NOTE — PROGRESS NOTES
Assumed care of pt and received bedside report. Pt is A&O x 4, denies pain, bedside table and personal items within reach, bed locked in low position. PIV found to be painful and dressing was tight on pt's forearm. Attempted to replace dressing but PIV found to be infiltrated. Will place new PIV.

## 2022-04-03 NOTE — CARE PLAN
The patient is Stable - Low risk of patient condition declining or worsening    Shift Goals  Clinical Goals: Rest and safety  Patient Goals: rest  Family Goals: N/A    Progress made toward(s) clinical / shift goals:    Problem: Knowledge Deficit - Standard  Goal: Patient and family/care givers will demonstrate understanding of plan of care, disease process/condition, diagnostic tests and medications  Outcome: Progressing     Problem: Fall Risk  Goal: Patient will remain free from falls  Outcome: Progressing     Problem: Skin Integrity  Goal: Skin integrity is maintained or improved  Outcome: Progressing       Patient is not progressing towards the following goals:

## 2022-04-03 NOTE — PROGRESS NOTES
Report received at bedside, pt care assumed. Pt aaox4 but sometimes forgetful, no signs of distress noted at this time. Patient resting comfortably in bed. POC discussed with pt and verbalizes no questions. Pt c/o of no pain. Pt denies any additional needs at this time. Bed in lowest position, pt educated on fall risk and verbalized understanding, call light within reach, bed alarm plugged in and on.

## 2022-04-03 NOTE — PROGRESS NOTES
Nephrology Daily Progress Note    Date of Service  4/3/2022    Chief Complaint  66 y.o. female with CKD IIIb, CHF, MI, HTN,recent YAMIL,  readmitted 3/28/2022 with  Hypotension, volume depletion poor po intake. YAMIL, metabolic acidosis, UTI    Interval Problem Update  4/1 -no acute events, states doing better, no complaints   cc/24 H  creat level slightly better  4/2 -doing well, asking when can go home  No complaints  Creat improving  Acidosis corrected  4/3 -doing well, no complaints  Creat level improving  Review of Systems  Review of Systems   Constitutional: Negative for chills, fever, malaise/fatigue and weight loss.   HENT: Negative for congestion, hearing loss and sinus pain.    Eyes: Negative.    Respiratory: Negative for cough, hemoptysis, shortness of breath and wheezing.    Cardiovascular: Negative for chest pain, palpitations, orthopnea and leg swelling.   Gastrointestinal: Negative for abdominal pain, diarrhea, nausea and vomiting.   Genitourinary: Negative for dysuria, flank pain, frequency, hematuria and urgency.   Skin: Negative.    All other systems reviewed and are negative.       Physical Exam  Temp:  [35.9 °C (96.6 °F)-36.8 °C (98.2 °F)] 35.9 °C (96.6 °F)  Pulse:  [70-85] 80  Resp:  [15-18] 18  BP: ()/(55-70) 105/68  SpO2:  [88 %-96 %] 88 %    Physical Exam  Vitals and nursing note reviewed.   Constitutional:       General: She is not in acute distress.     Appearance: Normal appearance. She is well-developed. She is not diaphoretic.   HENT:      Head: Normocephalic and atraumatic.      Nose: Nose normal.      Mouth/Throat:      Mouth: Mucous membranes are moist.      Pharynx: Oropharynx is clear.   Eyes:      Extraocular Movements: Extraocular movements intact.      Conjunctiva/sclera: Conjunctivae normal.      Pupils: Pupils are equal, round, and reactive to light.   Cardiovascular:      Rate and Rhythm: Normal rate and regular rhythm.      Pulses: Normal pulses.      Heart sounds:  Normal heart sounds.   Pulmonary:      Effort: Pulmonary effort is normal. No respiratory distress.      Breath sounds: Normal breath sounds. No wheezing or rales.   Abdominal:      General: Bowel sounds are normal. There is no distension.      Palpations: Abdomen is soft. There is no mass.      Tenderness: There is no abdominal tenderness. There is no right CVA tenderness, left CVA tenderness or guarding.   Musculoskeletal:      Cervical back: Normal range of motion and neck supple.      Right lower leg: No edema.      Left lower leg: No edema.   Skin:     General: Skin is warm and dry.      Coloration: Skin is pale.      Findings: No erythema or rash.   Neurological:      Mental Status: She is alert and oriented to person, place, and time.      Cranial Nerves: No cranial nerve deficit.      Coordination: Coordination normal.   Psychiatric:         Mood and Affect: Mood normal.         Behavior: Behavior normal.         Thought Content: Thought content normal.         Judgment: Judgment normal.         Fluids    Intake/Output Summary (Last 24 hours) at 4/3/2022 1020  Last data filed at 4/2/2022 1300  Gross per 24 hour   Intake 360 ml   Output --   Net 360 ml       Laboratory  Recent Labs     04/01/22 0220 04/02/22 0230 04/03/22  0452   WBC 9.8 9.8 8.4   RBC 3.19* 3.56* 3.26*   HEMOGLOBIN 9.4* 10.4* 9.6*   HEMATOCRIT 28.3* 31.1* 29.6*   MCV 88.7 87.4 90.8   MCH 29.5 29.2 29.4   MCHC 33.2* 33.4* 32.4*   RDW 59.5* 61.9* 68.5*   PLATELETCT 281 288 193   MPV 9.8 9.6 10.2     Recent Labs     04/01/22 0220 04/02/22  0230 04/03/22  0452   SODIUM 134* 134* 137   POTASSIUM 4.1 3.5* 3.1*   CHLORIDE 108 102 101   CO2 14* 21 26   GLUCOSE 150* 151* 78   BUN 21 22 21   CREATININE 2.50* 2.32* 1.97*   CALCIUM 7.5* 7.7* 7.8*         No results for input(s): NTPROBNP in the last 72 hours.        Imaging  reviewed    Assessment/Plan  1.YAMIL/CKD III b -improving -to monitor -continue IVF  2.Hypotension: BP under better control  -continue IVF -keep MAP> 65  3.Electrolytes: hyponatremia corrected  4.Anemia: Hb improved post transfusion -stable  5.Metabolic acidosis -corrected with bicarb gtt  6.Volume:continue IVF    Recs: no need for emergent dialysis             Continue current treatment             Daily labs             encourage po intake             All meds to renal doses             Will follow

## 2022-04-03 NOTE — CARE PLAN
The patient is Stable - Low risk of patient condition declining or worsening    Shift Goals  Clinical Goals: Pt skin will remain intact  Patient Goals: rest  Family Goals: PATTI    Progress made toward(s) clinical / shift goals:  Pt skin remained intact throughout shift. Pt able to turn self in bed with standby assist. Pt was able to rest throughout shift. Calls appropriately.     Problem: Knowledge Deficit - Standard  Goal: Patient and family/care givers will demonstrate understanding of plan of care, disease process/condition, diagnostic tests and medications  4/3/2022 1652 by Adela Otero R.N.  Outcome: Progressing  4/3/2022 1548 by Adela Otero R.N.  Outcome: Progressing     Problem: Fall Risk  Goal: Patient will remain free from falls  4/3/2022 1652 by Adela Otero R.N.  Outcome: Progressing  4/3/2022 1548 by ELIF DingN.  Outcome: Progressing     Problem: Skin Integrity  Goal: Skin integrity is maintained or improved  4/3/2022 1652 by Adela Otero R.N.  Outcome: Progressing  4/3/2022 1548 by Adela Otero R.N.  Outcome: Progressing

## 2022-04-03 NOTE — CARE PLAN
Problem: Knowledge Deficit - Standard  Goal: Patient and family/care givers will demonstrate understanding of plan of care, disease process/condition, diagnostic tests and medications  Outcome: Progressing     Problem: Fall Risk  Goal: Patient will remain free from falls  Outcome: Progressing   The patient is Stable - Low risk of patient condition declining or worsening    Shift Goals  Clinical Goals: pt will ambulate today.  Patient Goals: comfort, sleep  Family Goals: N/A    Progress made toward(s) clinical / shift goals:  Pt is eager to go home however she is reminded of the current plan of care and is agreeable to plan. Bed-alarm placed for safety and close to nurse station.     Patient is not progressing towards the following goals:

## 2022-04-04 VITALS
HEIGHT: 66 IN | TEMPERATURE: 97 F | RESPIRATION RATE: 17 BRPM | BODY MASS INDEX: 18.14 KG/M2 | OXYGEN SATURATION: 92 % | HEART RATE: 99 BPM | DIASTOLIC BLOOD PRESSURE: 65 MMHG | WEIGHT: 112.88 LBS | SYSTOLIC BLOOD PRESSURE: 101 MMHG

## 2022-04-04 LAB
ALBUMIN SERPL BCP-MCNC: 2.1 G/DL (ref 3.2–4.9)
BACTERIA BLD CULT: NORMAL
BACTERIA BLD CULT: NORMAL
BASOPHILS # BLD AUTO: 0.2 % (ref 0–1.8)
BASOPHILS # BLD: 0.02 K/UL (ref 0–0.12)
BUN SERPL-MCNC: 19 MG/DL (ref 8–22)
CALCIUM SERPL-MCNC: 7.8 MG/DL (ref 8.5–10.5)
CHLORIDE SERPL-SCNC: 105 MMOL/L (ref 96–112)
CO2 SERPL-SCNC: 27 MMOL/L (ref 20–33)
CREAT SERPL-MCNC: 1.73 MG/DL (ref 0.5–1.4)
EOSINOPHIL # BLD AUTO: 0.08 K/UL (ref 0–0.51)
EOSINOPHIL NFR BLD: 0.9 % (ref 0–6.9)
ERYTHROCYTE [DISTWIDTH] IN BLOOD BY AUTOMATED COUNT: 69 FL (ref 35.9–50)
GFR SERPLBLD CREATININE-BSD FMLA CKD-EPI: 32 ML/MIN/1.73 M 2
GLUCOSE SERPL-MCNC: 81 MG/DL (ref 65–99)
HCT VFR BLD AUTO: 30 % (ref 37–47)
HGB BLD-MCNC: 10 G/DL (ref 12–16)
IMM GRANULOCYTES # BLD AUTO: 0.1 K/UL (ref 0–0.11)
IMM GRANULOCYTES NFR BLD AUTO: 1.1 % (ref 0–0.9)
LYMPHOCYTES # BLD AUTO: 1.29 K/UL (ref 1–4.8)
LYMPHOCYTES NFR BLD: 14.3 % (ref 22–41)
MCH RBC QN AUTO: 29.4 PG (ref 27–33)
MCHC RBC AUTO-ENTMCNC: 33.3 G/DL (ref 33.6–35)
MCV RBC AUTO: 88.2 FL (ref 81.4–97.8)
MONOCYTES # BLD AUTO: 1 K/UL (ref 0–0.85)
MONOCYTES NFR BLD AUTO: 11 % (ref 0–13.4)
MORPHOLOGY BLD-IMP: NORMAL
NEUTROPHILS # BLD AUTO: 6.56 K/UL (ref 2–7.15)
NEUTROPHILS NFR BLD: 72.5 % (ref 44–72)
NRBC # BLD AUTO: 0 K/UL
NRBC BLD-RTO: 0 /100 WBC
PHOSPHATE SERPL-MCNC: 2.1 MG/DL (ref 2.5–4.5)
PLATELET # BLD AUTO: 223 K/UL (ref 164–446)
PMV BLD AUTO: 9.6 FL (ref 9–12.9)
POTASSIUM SERPL-SCNC: 4.1 MMOL/L (ref 3.6–5.5)
RBC # BLD AUTO: 3.4 M/UL (ref 4.2–5.4)
SIGNIFICANT IND 70042: NORMAL
SIGNIFICANT IND 70042: NORMAL
SITE SITE: NORMAL
SITE SITE: NORMAL
SODIUM SERPL-SCNC: 139 MMOL/L (ref 135–145)
SOURCE SOURCE: NORMAL
SOURCE SOURCE: NORMAL
WBC # BLD AUTO: 9.1 K/UL (ref 4.8–10.8)

## 2022-04-04 PROCEDURE — 80069 RENAL FUNCTION PANEL: CPT

## 2022-04-04 PROCEDURE — 700105 HCHG RX REV CODE 258: Performed by: STUDENT IN AN ORGANIZED HEALTH CARE EDUCATION/TRAINING PROGRAM

## 2022-04-04 PROCEDURE — 36415 COLL VENOUS BLD VENIPUNCTURE: CPT

## 2022-04-04 PROCEDURE — 700102 HCHG RX REV CODE 250 W/ 637 OVERRIDE(OP): Performed by: STUDENT IN AN ORGANIZED HEALTH CARE EDUCATION/TRAINING PROGRAM

## 2022-04-04 PROCEDURE — 85025 COMPLETE CBC W/AUTO DIFF WBC: CPT

## 2022-04-04 PROCEDURE — 97535 SELF CARE MNGMENT TRAINING: CPT | Mod: CO

## 2022-04-04 PROCEDURE — 99232 SBSQ HOSP IP/OBS MODERATE 35: CPT | Performed by: INTERNAL MEDICINE

## 2022-04-04 PROCEDURE — 99239 HOSP IP/OBS DSCHRG MGMT >30: CPT | Performed by: STUDENT IN AN ORGANIZED HEALTH CARE EDUCATION/TRAINING PROGRAM

## 2022-04-04 PROCEDURE — A9270 NON-COVERED ITEM OR SERVICE: HCPCS | Performed by: STUDENT IN AN ORGANIZED HEALTH CARE EDUCATION/TRAINING PROGRAM

## 2022-04-04 RX ADMIN — OMEPRAZOLE 40 MG: 20 CAPSULE, DELAYED RELEASE ORAL at 04:05

## 2022-04-04 RX ADMIN — SODIUM CHLORIDE, POTASSIUM CHLORIDE, SODIUM LACTATE AND CALCIUM CHLORIDE: 600; 310; 30; 20 INJECTION, SOLUTION INTRAVENOUS at 10:06

## 2022-04-04 ASSESSMENT — COGNITIVE AND FUNCTIONAL STATUS - GENERAL
TOILETING: A LITTLE
HELP NEEDED FOR BATHING: A LITTLE
PERSONAL GROOMING: A LITTLE
DAILY ACTIVITIY SCORE: 18
DRESSING REGULAR LOWER BODY CLOTHING: A LOT
SUGGESTED CMS G CODE MODIFIER DAILY ACTIVITY: CK
DRESSING REGULAR UPPER BODY CLOTHING: A LITTLE

## 2022-04-04 ASSESSMENT — ENCOUNTER SYMPTOMS
NAUSEA: 0
COUGH: 0
EYES NEGATIVE: 1
VOMITING: 0
FEVER: 0
RESPIRATORY NEGATIVE: 1
DEPRESSION: 0
NEUROLOGICAL NEGATIVE: 1
HEADACHES: 0
HEARTBURN: 0
BRUISES/BLEEDS EASILY: 0
FOCAL WEAKNESS: 0
POLYDIPSIA: 0
SHORTNESS OF BREATH: 0
WEAKNESS: 0
DIZZINESS: 0
WEIGHT LOSS: 1
NERVOUS/ANXIOUS: 1
CHILLS: 0
MUSCULOSKELETAL NEGATIVE: 1
BACK PAIN: 0
CARDIOVASCULAR NEGATIVE: 1
PALPITATIONS: 0
NECK PAIN: 0

## 2022-04-04 ASSESSMENT — LIFESTYLE VARIABLES: SUBSTANCE_ABUSE: 1

## 2022-04-04 NOTE — FACE TO FACE
Face to Face Supporting Documentation - Home Health    The encounter with this patient was in whole or in part the primary reason for home health admission.    Date of encounter:   Patient:                    MRN:                       YOB: 2023  Jaymie Peacock  [id number]  1955     Home health to see patient for:  Skilled Nursing care for assessment, interventions & education, Physical Therapy evaluation and treatment and Occupational therapy evaluation and treatment    Skilled need for:  Exacerbation of Chronic Disease State general weakness    Skilled nursing interventions to include:  Comment: PT/OT    Homebound status evidenced by:  Needs the assistance of another person in order to leave the home. Leaving home requires a considerable and taxing effort. There is a normal inability to leave the home.    Community Physician to provide follow up care: Toña Tejada P.A.-C.     Optional Interventions? No      I certify the face to face encounter for this home health care referral meets the CMS requirements and the encounter/clinical assessment with the patient was, in whole, or in part, for the medical condition(s) listed above, which is the primary reason for home health care. Based on my clinical findings: the service(s) are medically necessary, support the need for home health care, and the homebound criteria are met.  I certify that this patient has had a face to face encounter by myself.  Parker Sellers M.D. - NPI: [id number]

## 2022-04-04 NOTE — DISCHARGE PLANNING
Received Choice form at 0461  Agency/Facility Name: [facility]  Referral sent per Choice form @ 7220

## 2022-04-04 NOTE — PROGRESS NOTES
Nephrology Daily Progress Note    Date of Service  4/4/2022    Chief Complaint  66 y.o. female with CKD IIIb, CHF, MI, HTN,recent YAMIL,  readmitted 3/28/2022 with  Hypotension, volume depletion poor po intake. YAMIL, metabolic acidosis, UTI    Interval Problem Update  4/1 -no acute events, states doing better, no complaints   cc/24 H  creat level slightly better  4/2 -doing well, asking when can go home  No complaints  Creat improving  Acidosis corrected  4/3 -doing well, no complaints  Creat level improving  4/4 pt is doing better, able to tolerate PO intake  Mild CALDERA  Review of Systems  Review of Systems   Constitutional: Negative for chills, fever and malaise/fatigue.   Respiratory: Negative for cough and shortness of breath.    Cardiovascular: Negative for chest pain and leg swelling.        CALDERA   Gastrointestinal: Negative for nausea and vomiting.   Genitourinary: Negative for dysuria, frequency and urgency.        Physical Exam  Temp:  [36.1 °C (97 °F)-36.7 °C (98.1 °F)] 36.7 °C (98.1 °F)  Pulse:  [] 87  Resp:  [14-20] 14  BP: (106-121)/(61-78) 107/77  SpO2:  [91 %-92 %] 92 %    Physical Exam  Vitals and nursing note reviewed.   Constitutional:       General: She is not in acute distress.     Appearance: Normal appearance. She is well-developed. She is not diaphoretic.   HENT:      Head: Normocephalic and atraumatic.      Right Ear: External ear normal.      Left Ear: External ear normal.      Nose: Nose normal.   Eyes:      General: No scleral icterus.        Right eye: No discharge.         Left eye: No discharge.      Conjunctiva/sclera: Conjunctivae normal.   Cardiovascular:      Rate and Rhythm: Normal rate and regular rhythm.      Heart sounds: No murmur heard.  Pulmonary:      Effort: Pulmonary effort is normal. No respiratory distress.      Breath sounds: Normal breath sounds.   Musculoskeletal:         General: No tenderness.      Right lower leg: No edema.      Left lower leg: No edema.    Skin:     General: Skin is warm and dry.      Findings: No erythema.   Neurological:      General: No focal deficit present.      Mental Status: She is alert and oriented to person, place, and time.      Cranial Nerves: No cranial nerve deficit.   Psychiatric:         Mood and Affect: Mood normal.         Behavior: Behavior normal.         Fluids    Intake/Output Summary (Last 24 hours) at 4/4/2022 1439  Last data filed at 4/3/2022 2233  Gross per 24 hour   Intake 1385.83 ml   Output --   Net 1385.83 ml       Laboratory  Recent Labs     04/02/22  0230 04/03/22  0452 04/04/22  0127   WBC 9.8 8.4 9.1   RBC 3.56* 3.26* 3.40*   HEMOGLOBIN 10.4* 9.6* 10.0*   HEMATOCRIT 31.1* 29.6* 30.0*   MCV 87.4 90.8 88.2   MCH 29.2 29.4 29.4   MCHC 33.4* 32.4* 33.3*   RDW 61.9* 68.5* 69.0*   PLATELETCT 288 193 223   MPV 9.6 10.2 9.6     Recent Labs     04/02/22  0230 04/03/22  0452 04/04/22  0127   SODIUM 134* 137 139   POTASSIUM 3.5* 3.1* 4.1   CHLORIDE 102 101 105   CO2 21 26 27   GLUCOSE 151* 78 81   BUN 22 21 19   CREATININE 2.32* 1.97* 1.73*   CALCIUM 7.7* 7.8* 7.8*         No results for input(s): NTPROBNP in the last 72 hours.        Imaging  EC-ECHOCARDIOGRAM COMPLETE W/O CONT   Final Result      DX-CHEST-PORTABLE (1 VIEW)   Final Result      1.  There is no acute cardiopulmonary process.            Assessment/Plan  1.YAMIL/CKD III b: improving  2.Hypotension: BP is better  3.Electrolytes: good  4.Anemia.  5.Metabolic acidosis :better  6.Volume:mild overload    no need for HD  Renal diet  Daily labs  Renal dose all meds  Avoid nephrotoxins  D/C IVF  OK to D/Cfrom renal point

## 2022-04-04 NOTE — CASE COMMUNICATION
SE noted. Thank you.  ----- Message -----  From: Gisselle Trinidad R.N.  Sent: 3/22/2022   1:29 PM PDT  To: Leena Franz R.N.      Report:  Heart failure education provided per nursing care plan. Patient in green zone, however, Patient does not have a scale at home, does not have a BP cuff. Patient instructed on need and purpose of obtaining a scale and BP cuff in order to monitor for s/sx of heart failure exacerbation for early intervent ion. Patient would not commit to purchasing items, SN to bring home scale at next visit. Patient instructed on use of Na and salt in diet, instructed on salf free seasoning to reduce risk of heart failure exacerbation. SN to provide heart failure booklet at next visit for further education. Meds not on plan of care addressed at SN visit, updated med list in Epic, patient to obtain ASA from pharmacy, SN to review at next visit. Wound car e completed to coccyx, dressing was off, patient stated it fell off. Instructed on purpose for dressing to remain in place to allow healing, and to call home health if unable to re-apply. Wound bed covered in dry eschar, wound care completed, patient denies any discomfort. Further instructions provided per POC. Patient states understanding. Plan for the next visit: Per POC, provide home scale, heart failure booklet.

## 2022-04-04 NOTE — DISCHARGE SUMMARY
Discharge Summary    CHIEF COMPLAINT ON ADMISSION  Chief Complaint   Patient presents with   • Hypotension     BIBA for hypotension. Pt complains of diarrhea X1 month. EMS BP 59/45. Pt A&O 4 awake and alert       Reason for Admission  ems     Admission Date  3/28/2022    CODE STATUS  DNAR/DNI    HPI & HOSPITAL COURSE  Jaymie Peacock is a 66 y.o. female current smoker with hypertension, emphysema, severe malnutrition, recent admission for acute renal failure and NSTEMI thought to be secondary to stress-induced cardiomyopathy reduced EF of 35%  was admitted to the MICU on 3/20/2022 for iron depletion, poor p.o. intake, hypotension, YAIML and metabolic acidosis requiring bicarbonate drip.  Repeat echocardiogram with improvement of ejection fraction, now at 55%.  Nephrology following. Urine cultures with E. coli for which she completed a Rocephin antibiotic regimen.  Electrolytes were repleted and patient requiring norepinephrine, stress steroids and midodrine for pressure support which were ultimately titrated off.  She was ultimately transferred to the medical floor. Bicarbonate drip was ultimately discontinued as metabolic acidosis resolved and patient's acute renal failure resolved with aggressive IV hydration.  Patient was ablated by physical therapy and Occupational Therapy recommended that patient continue with home health for continued therapy.  Stable patient with chronic condition was discharged home and instructed to return to emergency department symptoms were to worsen.  All results and plan of action were discussed with the patient for her she voiced understanding and agreement with the primary care team.  Patient was instructed to return to emergency department symptoms were to worsen.     Therefore, she is discharged in good and stable condition to home with organized home healthcare and close outpatient follow-up.    The patient met 2-midnight criteria for an inpatient stay at the time of  discharge.    Discharge Date  4/4/2022    FOLLOW UP ITEMS POST DISCHARGE  Primary care provider follow post hospital discharge care    DISCHARGE DIAGNOSES  Principal Problem:    Acute kidney injury (HCC) POA: Yes  Active Problems:    Protein-calorie malnutrition, severe (HCC) POA: Yes    Alcohol abuse POA: Yes    HFrEF (heart failure with reduced ejection fraction) (HCC) POA: Yes    Anemia POA: Yes    Smoking POA: Yes    Hypotension POA: Yes    Diarrhea POA: Yes    Hypomagnesemia POA: Yes    Emphysema lung (HCC) POA: Yes    Dehydration determined by examination POA: Yes    Acute cystitis without hematuria POA: Unknown  Resolved Problems:    * No resolved hospital problems. *      FOLLOW UP  No future appointments.  No follow-up provider specified.    MEDICATIONS ON DISCHARGE     Medication List      CONTINUE taking these medications      Instructions   ibuprofen 200 MG Tabs  Commonly known as: MOTRIN   Take 800 mg by mouth every 6 hours as needed. 4 tablets = 800 mg  Dose: 800 mg     meloxicam 15 MG tablet  Commonly known as: MOBIC   Doctor's comments: osteoporosis  Take 1 Tablet by mouth 1 time a day as needed. Indications: Joint Damage causing Pain and Loss of Function  Dose: 15 mg     mirtazapine 15 MG Tabs  Commonly known as: Remeron   Take 1 Tablet by mouth every evening.  Dose: 15 mg     omeprazole 40 MG delayed-release capsule  Commonly known as: PRILOSEC   Take 1 Capsule by mouth every day for 30 days.  Dose: 40 mg     simvastatin 20 MG Tabs  Commonly known as: ZOCOR   Take 20 mg by mouth every day. Indications: Ischemic Heart Disease  Dose: 20 mg        STOP taking these medications    lactobacillus rhamnosus Caps capsule     losartan-hydrochlorothiazide 100-12.5 MG per tablet  Commonly known as: HYZAAR            Allergies  No Known Allergies    DIET  Orders Placed This Encounter   Procedures   • Diet Order Diet: Renal; Miscellaneous modifications: (optional): Lactose Free     Standing Status:   Standing      Number of Occurrences:   1     Order Specific Question:   Diet:     Answer:   Renal [8]     Order Specific Question:   Miscellaneous modifications: (optional)     Answer:   Lactose Free [5]       ACTIVITY  As tolerated.  Weight bearing as tolerated    CONSULTATIONS  Nephrology    PROCEDURES  None    LABORATORY  Lab Results   Component Value Date    SODIUM 139 04/04/2022    POTASSIUM 4.1 04/04/2022    CHLORIDE 105 04/04/2022    CO2 27 04/04/2022    GLUCOSE 81 04/04/2022    BUN 19 04/04/2022    CREATININE 1.73 (H) 04/04/2022        Lab Results   Component Value Date    WBC 9.1 04/04/2022    HEMOGLOBIN 10.0 (L) 04/04/2022    HEMATOCRIT 30.0 (L) 04/04/2022    PLATELETCT 223 04/04/2022        Total time of the discharge process exceeds 32 minutes.

## 2022-04-04 NOTE — CARE PLAN
The patient is Stable - Low risk of patient condition declining or worsening    Shift Goals  Clinical Goals: Pt skin will remain intact  Patient Goals: rest  Family Goals: PATTI    Progress made toward(s) clinical / shift goals:  yes    Patient is not progressing towards the following goals:      Problem: Knowledge Deficit - Standard  Goal: Patient and family/care givers will demonstrate understanding of plan of care, disease process/condition, diagnostic tests and medications  Outcome: Progressing     Problem: Fall Risk  Goal: Patient will remain free from falls  Outcome: Progressing     Problem: Skin Integrity  Goal: Skin integrity is maintained or improved  Outcome: Progressing

## 2022-04-04 NOTE — CASE COMMUNICATION
I agree with this change.   ----- Message -----  From: Alycia Chowdhury R.N.  Sent: 3/31/2022  12:47 PM PDT  To: Leena Franz R.N.      Quality Review Completed for Transfer 3/30 OASIS by JACKY Chowdhury RN on 3/31/2022:  Edits completed by JACKY Chowdhury RN:  1.  is 3/28, date of admission

## 2022-04-04 NOTE — PROGRESS NOTES
[facility] Medicine Daily Progress Note    Date of Service  4/4/2022    Chief Complaint  Jaymie Peacock is a 66 y.o. female admitted 3/28/2022 with hypotension, poor p.o. intake and diarrhea    Hospital Course  Jaymie Peacock is a 66 y.o. female current smoker with hypertension, emphysema, severe malnutrition, recent admission for acute renal failure and NSTEMI thought to be secondary to stress-induced cardiomyopathy reduced EF of 35%  was admitted to the MICU on 3/20/2022 for iron depletion, poor p.o. intake, hypotension, YAMIL and metabolic acidosis requiring bicarbonate drip.  Repeat echocardiogram with improvement of ejection fraction, now at 55%.  Nephrology following. Urine cultures with E. coli for which she completed a Rocephin antibiotic regimen.  Electrolytes were repleted and patient requiring norepinephrine, stress steroids and midodrine for pressure support which were ultimately titrated off.  She was ultimately transferred to the medical floor. Bicarbonate drip was ultimately discontinued as metabolic acidosis resolved and patient's acute renal failure resolved with aggressive IV hydration.     Interval Problem Update  Patient seen and examined today  In no acute distress, happy  Creatinine trending down slowly  Pending PT/OT re-evaluation  Up to chair for all meals  Labs on AM    I have personally seen and examined the patient at bedside. I discussed the plan of care with patient.    Consultants/Specialty  critical care, nephrology    Code Status  DNAR/DNI    Disposition  Patient is not medically cleared for discharge.   Anticipate discharge to to home with close outpatient follow-up.  I have placed the appropriate orders for post-discharge needs.    Review of Systems  Review of Systems   Constitutional: Positive for malaise/fatigue and weight loss. Negative for chills and fever.   HENT: Negative.    Eyes: Negative.    Respiratory: Negative.  Negative for cough.    Cardiovascular: Negative.  Negative  for chest pain and palpitations.   Gastrointestinal: Negative for heartburn, nausea and vomiting.   Genitourinary: Negative.  Negative for dysuria and frequency.   Musculoskeletal: Negative.  Negative for back pain and neck pain.   Skin: Negative.  Negative for itching and rash.   Neurological: Negative.  Negative for dizziness, focal weakness, weakness and headaches.   Endo/Heme/Allergies: Negative.  Negative for polydipsia. Does not bruise/bleed easily.   Psychiatric/Behavioral: Positive for substance abuse. Negative for depression. The patient is nervous/anxious.         Physical Exam  Temp:  [36.1 °C (97 °F)-36.7 °C (98.1 °F)] 36.7 °C (98.1 °F)  Pulse:  [] 87  Resp:  [14-20] 14  BP: (106-121)/(61-78) 107/77  SpO2:  [91 %-92 %] 92 %    Physical Exam  Vitals and nursing note reviewed.   Constitutional:       Appearance: She is well-developed and underweight. She is ill-appearing. She is not diaphoretic.      Interventions: Nasal cannula in place.   HENT:      Head: Normocephalic and atraumatic.      Comments: Temporal wasting     Nose: Nose normal.   Eyes:      Conjunctiva/sclera: Conjunctivae normal.      Pupils: Pupils are equal, round, and reactive to light.   Neck:      Thyroid: No thyromegaly.      Vascular: No JVD.   Cardiovascular:      Rate and Rhythm: Normal rate and regular rhythm.      Heart sounds: Normal heart sounds.     No friction rub. No gallop.   Pulmonary:      Effort: Pulmonary effort is normal.      Breath sounds: Normal breath sounds. No wheezing or rales.   Abdominal:      General: Bowel sounds are normal. There is no distension.      Palpations: Abdomen is soft. There is no mass.      Tenderness: There is no abdominal tenderness. There is no guarding or rebound.   Musculoskeletal:         General: No tenderness. Normal range of motion.      Cervical back: Normal range of motion and neck supple.      Comments: Generalized muscle wasting   Lymphadenopathy:      Cervical: No cervical  adenopathy.   Skin:     General: Skin is warm and dry.      Coloration: Skin is pale.   Neurological:      Mental Status: She is oriented to person, place, and time. She is lethargic.      Cranial Nerves: No cranial nerve deficit.   Psychiatric:         Behavior: Behavior normal.         Fluids    Intake/Output Summary (Last 24 hours) at 4/4/2022 1322  Last data filed at 4/3/2022 2233  Gross per 24 hour   Intake 1385.83 ml   Output --   Net 1385.83 ml       Laboratory  Recent Labs     04/02/22  0230 04/03/22  0452 04/04/22  0127   WBC 9.8 8.4 9.1   RBC 3.56* 3.26* 3.40*   HEMOGLOBIN 10.4* 9.6* 10.0*   HEMATOCRIT 31.1* 29.6* 30.0*   MCV 87.4 90.8 88.2   MCH 29.2 29.4 29.4   MCHC 33.4* 32.4* 33.3*   RDW 61.9* 68.5* 69.0*   PLATELETCT 288 193 223   MPV 9.6 10.2 9.6     Recent Labs     04/02/22 0230 04/03/22  0452 04/04/22  0127   SODIUM 134* 137 139   POTASSIUM 3.5* 3.1* 4.1   CHLORIDE 102 101 105   CO2 21 26 27   GLUCOSE 151* 78 81   BUN 22 21 19   CREATININE 2.32* 1.97* 1.73*   CALCIUM 7.7* 7.8* 7.8*                   Imaging  EC-ECHOCARDIOGRAM COMPLETE W/O CONT   Final Result      DX-CHEST-PORTABLE (1 VIEW)   Final Result      1.  There is no acute cardiopulmonary process.           Assessment/Plan  * Acute kidney injury (HCC)- (present on admission)  Assessment & Plan  Resolved    Protein-calorie malnutrition, severe (HCC)- (present on admission)  Assessment & Plan  Supplements ordered, nutrition consult.  Continue mirtazapine, Marinol  Patient denies depression, no overt features for FTT    Anemia- (present on admission)  Assessment & Plan  Recent GI bleed, FOBT negative in ED.  No reports of melena, hematochezia, hematemesis, hematuria, no other hemorrhage or recent trauma per patient.   Reticulocyte count not improved, low normal EPO level  Transfused 1 unit of red cells  Has remained stable while in the medical floor  Monitor    HFrEF (heart failure with reduced ejection fraction) (HCC)- (present on  admission)  Assessment & Plan  TTE on February 19, 2022 shows left ventricular ejection fraction 35% and basilar sparing in a pattern of stress-induced cardiomyopathy, now improved to EF 55  Continue statin    Alcohol abuse- (present on admission)  Assessment & Plan  1 shot of liquor daily.  Monitor for signs of withdrawal.  Certainly advised to stop in her current overall condition    Acute cystitis without hematuria  Assessment & Plan  Complete antibiotics  Resolved    Dehydration determined by examination- (present on admission)  Assessment & Plan  Encourage p.o. intake  Resolved    Emphysema lung (HCC)- (present on admission)  Assessment & Plan  Not in acute exacerbation.  Breathing treatments as needed    Hypomagnesemia- (present on admission)  Assessment & Plan  Resolved    Diarrhea- (present on admission)  Assessment & Plan  Resolved    Hypotension- (present on admission)  Assessment & Plan  Resolved    Smoking- (present on admission)  Assessment & Plan  Cessation advised     Plan  IV fluids, bicarb drip, nephrology consult  Monitor for blood loss  Encourage p.o. intake  Continue with C3  Wean steroids  Continue with supportive care  Continue blood pressure support and adrenal support  See orders  Medically complex high risk patient  Close laboratory follow-up      VTE prophylaxis: enoxaparin ppx pending follow-up blood work    I have performed a physical exam and reviewed and updated ROS and Plan today (4/4/2022). In review of yesterday's note (4/3/2022), there are no changes except as documented above.      Please note that this dictation was created using voice recognition software. I have made every reasonable attempt to correct obvious errors, but I expect that there are errors of grammar and possibly context that I did not discover before finalizing the note.

## 2022-04-04 NOTE — DISCHARGE PLANNING
ATTN: Case Management  RE: Referral for Home Health    As of 4/4/22, we have accepted the Home Health referral for the patient listed above.    A [facility] clinician will be out to see the patient within 48 hours. If you have any questions or concerns regarding the patient's transition to Home Health, please do not hesitate to contact us at [phone].      We look forward to collaborating with you,  [facility] Health Team

## 2022-04-05 NOTE — DISCHARGE PLANNING
Patient D/C to home with home health, family friend will be picking her up, no new prescriptions at this time. IV taken out, belongings collected, taken out in wheelchair.

## 2022-04-05 NOTE — DISCHARGE PLANNING
Agency/Facility Name: [facility]   Outcome: Per chart note, referral is accepted.

## 2022-04-11 ENCOUNTER — HOME CARE VISIT (OUTPATIENT)
Dept: HOME HEALTH SERVICES | Facility: HOME HEALTHCARE | Age: 67
End: 2022-04-11
Payer: MEDICARE

## 2022-04-11 ENCOUNTER — APPOINTMENT (OUTPATIENT)
Dept: RADIOLOGY | Facility: MEDICAL CENTER | Age: 67
End: 2022-04-11
Attending: INTERNAL MEDICINE
Payer: MEDICARE

## 2022-04-11 ENCOUNTER — HOSPITAL ENCOUNTER (OUTPATIENT)
Facility: MEDICAL CENTER | Age: 67
End: 2022-04-12
Attending: EMERGENCY MEDICINE | Admitting: INTERNAL MEDICINE
Payer: MEDICARE

## 2022-04-11 DIAGNOSIS — R60.1 ANASARCA: ICD-10-CM

## 2022-04-11 DIAGNOSIS — I50.21 ACUTE SYSTOLIC HEART FAILURE (HCC): ICD-10-CM

## 2022-04-11 DIAGNOSIS — Z91.148 NONCOMPLIANCE WITH MEDICATION REGIMEN: ICD-10-CM

## 2022-04-11 PROBLEM — E87.29 HIGH ANION GAP METABOLIC ACIDOSIS: Status: ACTIVE | Noted: 2022-04-11

## 2022-04-11 PROBLEM — R79.89 ELEVATED TROPONIN: Status: ACTIVE | Noted: 2022-04-11

## 2022-04-11 PROBLEM — I42.0 DILATED CARDIOMYOPATHY (HCC): Status: ACTIVE | Noted: 2022-04-11

## 2022-04-11 LAB
ALBUMIN SERPL BCP-MCNC: 2.7 G/DL (ref 3.2–4.9)
ALBUMIN/GLOB SERPL: 1 G/DL
ALP SERPL-CCNC: 222 U/L (ref 30–99)
ALT SERPL-CCNC: 20 U/L (ref 2–50)
ANION GAP SERPL CALC-SCNC: 15 MMOL/L (ref 7–16)
ANISOCYTOSIS BLD QL SMEAR: ABNORMAL
AST SERPL-CCNC: 23 U/L (ref 12–45)
BASOPHILS # BLD AUTO: 0.9 % (ref 0–1.8)
BASOPHILS # BLD: 0.08 K/UL (ref 0–0.12)
BILIRUB SERPL-MCNC: 0.5 MG/DL (ref 0.1–1.5)
BUN SERPL-MCNC: 5 MG/DL (ref 8–22)
CALCIUM SERPL-MCNC: 8.1 MG/DL (ref 8.5–10.5)
CHLORIDE SERPL-SCNC: 106 MMOL/L (ref 96–112)
CO2 SERPL-SCNC: 19 MMOL/L (ref 20–33)
CREAT SERPL-MCNC: 1.01 MG/DL (ref 0.5–1.4)
EOSINOPHIL # BLD AUTO: 0.08 K/UL (ref 0–0.51)
EOSINOPHIL NFR BLD: 0.9 % (ref 0–6.9)
ERYTHROCYTE [DISTWIDTH] IN BLOOD BY AUTOMATED COUNT: 76.4 FL (ref 35.9–50)
GFR SERPLBLD CREATININE-BSD FMLA CKD-EPI: 61 ML/MIN/1.73 M 2
GLOBULIN SER CALC-MCNC: 2.8 G/DL (ref 1.9–3.5)
GLUCOSE SERPL-MCNC: 79 MG/DL (ref 65–99)
HAV IGM SERPL QL IA: NORMAL
HBV CORE IGM SER QL: NORMAL
HBV SURFACE AG SER QL: NORMAL
HCT VFR BLD AUTO: 29 % (ref 37–47)
HCV AB SER QL: NORMAL
HGB BLD-MCNC: 9.3 G/DL (ref 12–16)
HIV 1+2 AB+HIV1 P24 AG SERPL QL IA: NORMAL
LYMPHOCYTES # BLD AUTO: 0.87 K/UL (ref 1–4.8)
LYMPHOCYTES NFR BLD: 9.6 % (ref 22–41)
MACROCYTES BLD QL SMEAR: ABNORMAL
MANUAL DIFF BLD: NORMAL
MCH RBC QN AUTO: 30.4 PG (ref 27–33)
MCHC RBC AUTO-ENTMCNC: 32.1 G/DL (ref 33.6–35)
MCV RBC AUTO: 94.8 FL (ref 81.4–97.8)
MICROCYTES BLD QL SMEAR: ABNORMAL
MONOCYTES # BLD AUTO: 0.15 K/UL (ref 0–0.85)
MONOCYTES NFR BLD AUTO: 1.7 % (ref 0–13.4)
MORPHOLOGY BLD-IMP: NORMAL
NEUTROPHILS # BLD AUTO: 7.91 K/UL (ref 2–7.15)
NEUTROPHILS NFR BLD: 86.9 % (ref 44–72)
NRBC # BLD AUTO: 0 K/UL
NRBC BLD-RTO: 0 /100 WBC
NT-PROBNP SERPL IA-MCNC: 2858 PG/ML (ref 0–125)
OVALOCYTES BLD QL SMEAR: NORMAL
PLATELET # BLD AUTO: 337 K/UL (ref 164–446)
PLATELET BLD QL SMEAR: NORMAL
PMV BLD AUTO: 9.5 FL (ref 9–12.9)
POIKILOCYTOSIS BLD QL SMEAR: NORMAL
POTASSIUM SERPL-SCNC: 3.7 MMOL/L (ref 3.6–5.5)
PROT SERPL-MCNC: 5.5 G/DL (ref 6–8.2)
RBC # BLD AUTO: 3.06 M/UL (ref 4.2–5.4)
RBC BLD AUTO: PRESENT
SODIUM SERPL-SCNC: 140 MMOL/L (ref 135–145)
TROPONIN T SERPL-MCNC: 26 NG/L (ref 6–19)
WBC # BLD AUTO: 9.1 K/UL (ref 4.8–10.8)

## 2022-04-11 PROCEDURE — G0378 HOSPITAL OBSERVATION PER HR: HCPCS

## 2022-04-11 PROCEDURE — 36415 COLL VENOUS BLD VENIPUNCTURE: CPT

## 2022-04-11 PROCEDURE — 99220 PR INITIAL OBSERVATION CARE,LEVL III: CPT | Mod: GC | Performed by: INTERNAL MEDICINE

## 2022-04-11 PROCEDURE — 71046 X-RAY EXAM CHEST 2 VIEWS: CPT

## 2022-04-11 PROCEDURE — 84484 ASSAY OF TROPONIN QUANT: CPT

## 2022-04-11 PROCEDURE — 85025 COMPLETE CBC W/AUTO DIFF WBC: CPT

## 2022-04-11 PROCEDURE — 83880 ASSAY OF NATRIURETIC PEPTIDE: CPT

## 2022-04-11 PROCEDURE — 700111 HCHG RX REV CODE 636 W/ 250 OVERRIDE (IP): Performed by: INTERNAL MEDICINE

## 2022-04-11 PROCEDURE — 80074 ACUTE HEPATITIS PANEL: CPT

## 2022-04-11 PROCEDURE — 85007 BL SMEAR W/DIFF WBC COUNT: CPT

## 2022-04-11 PROCEDURE — 80053 COMPREHEN METABOLIC PANEL: CPT

## 2022-04-11 PROCEDURE — A9270 NON-COVERED ITEM OR SERVICE: HCPCS | Performed by: INTERNAL MEDICINE

## 2022-04-11 PROCEDURE — 700111 HCHG RX REV CODE 636 W/ 250 OVERRIDE (IP): Performed by: EMERGENCY MEDICINE

## 2022-04-11 PROCEDURE — 99285 EMERGENCY DEPT VISIT HI MDM: CPT

## 2022-04-11 PROCEDURE — 96374 THER/PROPH/DIAG INJ IV PUSH: CPT

## 2022-04-11 PROCEDURE — 96376 TX/PRO/DX INJ SAME DRUG ADON: CPT

## 2022-04-11 PROCEDURE — 87389 HIV-1 AG W/HIV-1&-2 AB AG IA: CPT

## 2022-04-11 PROCEDURE — 700102 HCHG RX REV CODE 250 W/ 637 OVERRIDE(OP): Performed by: INTERNAL MEDICINE

## 2022-04-11 RX ORDER — FUROSEMIDE 10 MG/ML
40 INJECTION INTRAMUSCULAR; INTRAVENOUS ONCE
Status: COMPLETED | OUTPATIENT
Start: 2022-04-11 | End: 2022-04-11

## 2022-04-11 RX ORDER — ATORVASTATIN CALCIUM 40 MG/1
40 TABLET, FILM COATED ORAL EVERY EVENING
Status: DISCONTINUED | OUTPATIENT
Start: 2022-04-11 | End: 2022-04-12 | Stop reason: HOSPADM

## 2022-04-11 RX ORDER — DEXTROSE MONOHYDRATE 25 G/50ML
25 INJECTION, SOLUTION INTRAVENOUS
Status: DISCONTINUED | OUTPATIENT
Start: 2022-04-11 | End: 2022-04-12 | Stop reason: HOSPADM

## 2022-04-11 RX ORDER — LOSARTAN POTASSIUM AND HYDROCHLOROTHIAZIDE 12.5; 1 MG/1; MG/1
1 TABLET ORAL DAILY
Status: SHIPPED | COMMUNITY
End: 2022-04-11

## 2022-04-11 RX ADMIN — ATORVASTATIN CALCIUM 40 MG: 40 TABLET, FILM COATED ORAL at 18:08

## 2022-04-11 RX ADMIN — FUROSEMIDE 40 MG: 20 INJECTION, SOLUTION INTRAMUSCULAR; INTRAVENOUS at 18:00

## 2022-04-11 RX ADMIN — ASPIRIN 81 MG: 81 TABLET, COATED ORAL at 18:08

## 2022-04-11 RX ADMIN — FUROSEMIDE 40 MG: 10 INJECTION, SOLUTION INTRAMUSCULAR; INTRAVENOUS at 13:05

## 2022-04-11 ASSESSMENT — ENCOUNTER SYMPTOMS
FOCAL WEAKNESS: 0
ORTHOPNEA: 0
SINUS PAIN: 0
WEIGHT LOSS: 0
STRIDOR: 0
DOUBLE VISION: 0
SORE THROAT: 0
VOMITING: 0
SENSORY CHANGE: 0
BLOOD IN STOOL: 0
NAUSEA: 0
DIZZINESS: 0
FALLS: 0
FEVER: 0
HEMOPTYSIS: 0
COUGH: 0
SPUTUM PRODUCTION: 0
SHORTNESS OF BREATH: 1
BLURRED VISION: 0
HEADACHES: 0
PND: 0
PALPITATIONS: 0
DIARRHEA: 0
ABDOMINAL PAIN: 0
CHILLS: 0
CONSTIPATION: 0
DEPRESSION: 0

## 2022-04-11 ASSESSMENT — PATIENT HEALTH QUESTIONNAIRE - PHQ9
SUM OF ALL RESPONSES TO PHQ9 QUESTIONS 1 AND 2: 0
1. LITTLE INTEREST OR PLEASURE IN DOING THINGS: NOT AT ALL
2. FEELING DOWN, DEPRESSED, IRRITABLE, OR HOPELESS: NOT AT ALL
1. LITTLE INTEREST OR PLEASURE IN DOING THINGS: NOT AT ALL
2. FEELING DOWN, DEPRESSED, IRRITABLE, OR HOPELESS: NOT AT ALL
1. LITTLE INTEREST OR PLEASURE IN DOING THINGS: NOT AT ALL
SUM OF ALL RESPONSES TO PHQ9 QUESTIONS 1 AND 2: 0
2. FEELING DOWN, DEPRESSED, IRRITABLE, OR HOPELESS: NOT AT ALL
SUM OF ALL RESPONSES TO PHQ9 QUESTIONS 1 AND 2: 0

## 2022-04-11 ASSESSMENT — PAIN DESCRIPTION - PAIN TYPE
TYPE: ACUTE PAIN

## 2022-04-11 ASSESSMENT — LIFESTYLE VARIABLES
ALCOHOL_USE: YES
HAVE PEOPLE ANNOYED YOU BY CRITICIZING YOUR DRINKING: NO
AVERAGE NUMBER OF DAYS PER WEEK YOU HAVE A DRINK CONTAINING ALCOHOL: 3
HAVE YOU EVER FELT YOU SHOULD CUT DOWN ON YOUR DRINKING: NO
HOW MANY TIMES IN THE PAST YEAR HAVE YOU HAD 5 OR MORE DRINKS IN A DAY: 0
EVER FELT BAD OR GUILTY ABOUT YOUR DRINKING: NO
TOTAL SCORE: 0
TOTAL SCORE: 0
EVER HAD A DRINK FIRST THING IN THE MORNING TO STEADY YOUR NERVES TO GET RID OF A HANGOVER: NO
CONSUMPTION TOTAL: NEGATIVE
TOTAL SCORE: 0
DOES PATIENT WANT TO STOP DRINKING: CANNOT ASSESS
ON A TYPICAL DAY WHEN YOU DRINK ALCOHOL HOW MANY DRINKS DO YOU HAVE: 2
DO YOU DRINK ALCOHOL: NO

## 2022-04-11 ASSESSMENT — FIBROSIS 4 INDEX
FIB4 SCORE: 1.02
FIB4 SCORE: 1.02

## 2022-04-11 NOTE — CASE COMMUNICATION
Briana: Please send to Dr. Tejada:  HOME CARE SERVICES NOT RESUMED:  SN arrived at patient's home for resumption of home care services. Patient's weight 127.6lb, her at home baseline weight is approximately 95#, , RR 24, anasarca noted with weeping from arms and legs due to edema. Patient reports has only been taking omeprazole daily, no other meds. States saw Dr. Tejada Friday, was prescribed two new meds but has not obtained  from pharmacy. Patient reports a fall as well when she was leaving to go to the doctor's office, reports no injury.

## 2022-04-11 NOTE — ED TRIAGE NOTES
Chief Complaint   Patient presents with   • Weight Gain   • Arm Swelling   • Leg Swelling     Pt bib ems from home, pt was recently d/c from hospital for heart and kidney failure. Pt unable to  couple of her medications.  Home health nurse seen pt today noted 30lbs weight gain.   Pt denies chest pain and or sob.

## 2022-04-11 NOTE — PROGRESS NOTES
Report received, pt care assumed. Pt transfer to unit via wheelchair with RN. Pt sent with all belongings (1 pt belonging bag and FWW). Tele monitor on. Pt provided with call light and instructed on use. Call light and personal belongings within reach of pt.

## 2022-04-11 NOTE — ED NOTES
ERP to bedside. Pt to room changed to gown. Pt has not been taking medications since discharge d/t unable to drive to get.

## 2022-04-11 NOTE — PROGRESS NOTES
4 Eyes Skin Assessment Completed by ELROY Ryder and ELROY Almendarez.    Head WDL  Ears WDL  Nose WDL  Mouth WDL  Neck WDL  Breast/Chest Discoloration  Shoulder Blades WDL  Spine Bruising  (R) Arm/Elbow/Hand Swelling  (L) Arm/Elbow/Hand Swelling  Abdomen WDL  Groin WDL  Scrotum/Coccyx/Buttocks WDL  (R) Leg Redness and Swelling  (L) Leg Redness and Swelling  (R) Heel/Foot/Toe Swelling  (L) Heel/Foot/Toe Swelling          Devices In Places Pulse Ox      Interventions In Place Pillows    Possible Skin Injury No    Pictures Uploaded Into Epic N/A  Wound Consult Placed N/A  RN Wound Prevention Protocol Ordered No

## 2022-04-11 NOTE — H&P
History & Physical Note    Date of Admission: 4/11/2022  Admission Status: Observation-Outpatient  Attending: Daniel Knowles M.D.   Senior Resident: Dr. Nils Victor MD  Contact Number: [phone]    Chief Complaint: Weight gain , edema      History of Present Illness (HPI):   Jaymie is a 66 y.o. female who presented 4/11/2022 with past medical history chronic tobacco use recently diagnosed possible Takotsubo/stress-induced cardiomyopathy in February 2022 , Without any acute changes on EKG and possibly with demand ischemia related elevation of troponin, with work-up of coronary artery disease deferred at the time  in view of acute kidney injury, history of GI bleed with heparin initiated at the same time( with scopes deferred given hemodynamic stability], who is again recently admitted in March for YAMIL, hypotension which improved with resuscitation and discontinuation of losartan hydrochlorothiazide that patient was on at home presents on this admission after home health nurse whose first day was today noticed that patient had gained significant amount of weight and noticed anasarca prompting her to go to the ER. Additionally chart review shows history of alochol use disorder      patient has noticed the swelling, however denies any shortness of breath, orthopnea, PND, wheezing, cough, congestion, fevers or chills, taking excessive salt in diet or any chest pain, dizziness.  She has noticed slight decrease in urin output     112>125 - 13 lbs gain as per chart since last admission.   Diuresing well so far with Lasix 40 mg.   Patient states that she changed her mind regarding her code status and wants to be full code.      Review of Systems:   Review of Systems   Constitutional: Negative for chills, fever, malaise/fatigue and weight loss.   HENT: Negative for ear discharge, hearing loss, sinus pain and sore throat.    Eyes: Negative for blurred vision and double vision.   Respiratory: Positive for shortness of  breath (with exertion not worse than before ). Negative for cough, hemoptysis, sputum production and stridor.    Cardiovascular: Positive for leg swelling. Negative for chest pain, palpitations, orthopnea and PND.   Gastrointestinal: Negative for abdominal pain, blood in stool, constipation, diarrhea, melena, nausea and vomiting.   Genitourinary: Positive for frequency (with lasix). Negative for dysuria and urgency.   Musculoskeletal: Negative for falls.   Skin: Negative for itching and rash.   Neurological: Negative for dizziness, sensory change, focal weakness and headaches.   Psychiatric/Behavioral: Negative for depression.         Past Medical History:   Past Medical History was reviewed with patient.   has a past medical history of Hypertension, Osteoporosis, and Smoking.    Past Surgical History: Past Surgical History was reviewed with patient.   has a past surgical history that includes us-needle core bx-breast panel (Right).    Medications: Medications have been reviewed with patient.  Prior to Admission Medications   Prescriptions Last Dose Informant Patient Reported? Taking?   meloxicam (MOBIC) 15 MG tablet 3/28/2022 at Unknown time Patient Yes No   Sig: Take 1 Tablet by mouth 1 time a day as needed. Indications: Joint Damage causing Pain and Loss of Function   mirtazapine (REMERON) 15 MG Tab unknown at Unknown time Patient No No   Sig: Take 1 Tablet by mouth every evening.   omeprazole (PRILOSEC) 40 MG delayed-release capsule 4/11/2022 at Unknown time Patient No No   Sig: Take 1 Capsule by mouth every day for 30 days.   simvastatin (ZOCOR) 20 MG Tab 3/27/2022 at Unknown time Patient Yes No   Sig: Take 20 mg by mouth every day. Indications: Ischemic Heart Disease      Facility-Administered Medications: None        Allergies: Allergies have been reviewed with patient.  No Known Allergies    Family History: Denies any cardiopulmonary problems in dad or mom        Social History:   Tobacco: 8 cigarettes a day  for 25 years   Alcohol: 2 cocktails once in a while as per patient    Recreational drugs (illegal and prescription):  None   Employment: NA  Activity Level: Ambulatory    Living situation:  Home   Recent travel:  NA  Primary Care Provider: iván Tejada P.A.-C.  Other (stressors, spirituality, exposures):  none  Physical Exam:     Vitals:  Temp:  [36.4 °C (97.5 °F)-36.5 °C (97.7 °F)] 36.5 °C (97.7 °F)  Pulse:  [] 111  Resp:  [16-20] 18  BP: (122-143)/(75-84) 143/80  SpO2:  [92 %-95 %] 95 %    Physical Exam  Constitutional:       General: She is not in acute distress.     Appearance: She is not ill-appearing or toxic-appearing.   HENT:      Right Ear: External ear normal.      Left Ear: External ear normal.      Mouth/Throat:      Mouth: Mucous membranes are moist.   Eyes:      General:         Right eye: No discharge.         Left eye: No discharge.      Pupils: Pupils are equal, round, and reactive to light.   Cardiovascular:      Rate and Rhythm: Normal rate.      Pulses: Normal pulses.      Heart sounds: No murmur heard.  Pulmonary:      Effort: Pulmonary effort is normal. No respiratory distress.      Breath sounds: No wheezing or rales.   Abdominal:      General: Abdomen is flat. There is no distension.      Tenderness: There is no abdominal tenderness.   Musculoskeletal:         General: Swelling (upper and lower extremities upto hip and sacrum- 3+) present. Normal range of motion.      Cervical back: Normal range of motion.   Skin:     General: Skin is warm.      Capillary Refill: Capillary refill takes less than 2 seconds.   Neurological:      General: No focal deficit present.      Mental Status: She is alert.   Psychiatric:         Mood and Affect: Mood normal.         Labs:   Recent Labs     04/11/22  1154   WBC 9.1   RBC 3.06*   HEMOGLOBIN 9.3*   HEMATOCRIT 29.0*   MCV 94.8   MCH 30.4   RDW 76.4*   PLATELETCT 337   MPV 9.5   NEUTSPOLYS 86.90*   LYMPHOCYTES 9.60*   MONOCYTES 1.70    EOSINOPHILS 0.90   BASOPHILS 0.90   RBCMORPHOLO Present     Recent Labs     04/11/22  1154   SODIUM 140   POTASSIUM 3.7   CHLORIDE 106   CO2 19*   GLUCOSE 79   BUN 5*     Recent Labs     04/11/22  1154   ALBUMIN 2.7*   TBILIRUBIN 0.5   ALKPHOSPHAT 222*   TOTPROTEIN 5.5*   ALTSGPT 20   ASTSGOT 23   CREATININE 1.01       EKG:none on file yet     Imaging:   DX-CHEST-2 VIEWS    (Results Pending)       Previous Data Review: reviewed    Problem Representation:   * Anasarca- (present on admission)  Assessment & Plan  Likely secondary to protein calorie malnutrition   Will need to rule out nephrotic syndrome. YAMIL improved since last admission    Another differential includes heart failure exacerbation, however right sided volume over load> last echo recent from march 30 , Ef of 55. rvsp of 30, patient continues to smoke.  Liver function within normal limits     Plan :  UA   Protein creatinine ratio  Hep panel and HIV   If positive for proteinuria may consider autoimmune work up   Cautious diuresis given right sided findings.   Strict I/Os   Daily weights   Albumin infusion if needed.         Dilated cardiomyopathy (HCC)  Assessment & Plan  EF improved   Diurese as tolerated   May need CAD work up eventually when volume status optimised, possibly outpatient   Aspirin   Statin   Lipid panel for tomorrow.   Hold of beta blocker for room for diuresis and possibly decompensated state    High anion gap metabolic acidosis  Assessment & Plan  Secondary YAMIL  Continue to monitor with improvement of renal function   Lactic acid measurement if worsening.   Sugar not elevated.            Normocytic anemia  Assessment & Plan  Stable.   History of GI bleed   Work up outpatient if remains stable    Elevated troponin  Assessment & Plan  In the absence of chest pain, better than previous value, also in the setting of kidney injury    No ekg on file yet for this admission.   Will have patient on aspirin and statin, hold off beta blocker for  now leave room for diuresis and given possible diagnosis of COPD

## 2022-04-11 NOTE — ED PROVIDER NOTES
ED Provider Note    Scribed for Abdirahman Sanchez M.D. by Nisreen Allen. 4/11/2022  11:42 AM    Primary care provider: Toña Tejada P.A.-C.  Means of arrival: EMS  History obtained from: Patient and EMS  History limited by: None    CHIEF COMPLAINT  Chief Complaint   Patient presents with    Weight Gain    Arm Swelling    Leg Swelling       HPI  Jaymie Peacock is a 66 y.o. female who presents to the Emergency Department via EMS for evaluation of acute weight gain onset about one week ago. Patient was recently discharged from the hospital for heart and kidney failure. Patient states that when she was supposed to  her medication after being discharged, but only picked up her Omeprazole medication, as her Losartan or Hydrochlorothiazide was not ready at the time. Patient has a home health nurse, who notes that patient has gained 30 lbs, prompting her to call EMS and present the patient to the ED today for further evaluation. Patient has associated bilateral arm swelling, bilateral leg swelling, and decreased ability to walk secondary to her swelling. Denies any fevers, chest pain, or shortness of breath. No alleviating or exacerbating factors reported. No known drug allergies.     REVIEW OF SYSTEMS  Pertinent positives include bilateral arm swelling, bilateral leg swelling, and decreased ability to walk secondary to her swelling.   Pertinent negatives include no fevers, chest pain, or shortness of breath.    All other systems reviewed and negative. See HPI for further details.     PAST MEDICAL HISTORY   has a past medical history of Hypertension, Osteoporosis, and Smoking.    SURGICAL HISTORY   has a past surgical history that includes us-needle core bx-breast panel (Right).    SOCIAL HISTORY  Social History     Tobacco Use    Smoking status: Former Smoker     Packs/day: 0.50     Types: Cigarettes    Smokeless tobacco: Never Used   Vaping Use    Vaping Use: Never used   Substance Use Topics    Alcohol  "use: Yes     Alcohol/week: 0.6 oz     Types: 1 Shots of liquor per week    Drug use: Not Currently      Social History     Substance and Sexual Activity   Drug Use Not Currently       FAMILY HISTORY  No family history reported.     CURRENT MEDICATIONS  Home Medications       Reviewed by Delicia Floyd R.N. (Registered Nurse) on 04/11/22 at 1146  Med List Status: Complete     Medication Last Dose Status   ibuprofen (MOTRIN) 200 MG Tab  Active   losartan-hydrochlorothiazide (HYZAAR) 100-12.5 MG per tablet  Active   meloxicam (MOBIC) 15 MG tablet  Active   mirtazapine (REMERON) 15 MG Tab  Active   omeprazole (PRILOSEC) 40 MG delayed-release capsule  Active   simvastatin (ZOCOR) 20 MG Tab  Active                    ALLERGIES  No Known Allergies    PHYSICAL EXAM  VITAL SIGNS: /83   Pulse (!) 107   Temp 36.4 °C (97.5 °F) (Temporal)   Resp 16   Ht 1.676 m (5' 6\")   SpO2 95%   BMI 18.22 kg/m²     Nursing note and vitals reviewed.  Constitutional: Well-developed and well-nourished. No distress.   HENT: Head is normocephalic and atraumatic. Oropharynx is clear and moist without exudate or erythema.   Eyes: Pupils are equal, round, and reactive to light. Conjunctiva are normal.   Cardiovascular: Tachycardic. Normal regular rhythm. No murmur heard. Normal radial pulses.  Pulmonary/Chest: Breath sounds normal. No wheezes or rales.   Abdominal: Soft and non-tender. No distention    Musculoskeletal: Extremities exhibit normal range of motion. Anasarca with 3+ edema to the bilateral lower extremities.   Neurological: Awake, alert and oriented to person, place, and time. No focal deficits noted.  Skin: Skin is warm and dry. No rash.   Psychiatric: Normal mood and affect. Appropriate for clinical situation.    DIAGNOSTIC STUDIES / PROCEDURES    LABS  Results for orders placed or performed during the hospital encounter of 04/11/22   CBC WITH DIFFERENTIAL   Result Value Ref Range    WBC 9.1 4.8 - 10.8 K/uL    RBC 3.06 " (L) 4.20 - 5.40 M/uL    Hemoglobin 9.3 (L) 12.0 - 16.0 g/dL    Hematocrit 29.0 (L) 37.0 - 47.0 %    MCV 94.8 81.4 - 97.8 fL    MCH 30.4 27.0 - 33.0 pg    MCHC 32.1 (L) 33.6 - 35.0 g/dL    RDW 76.4 (H) 35.9 - 50.0 fL    Platelet Count 337 164 - 446 K/uL    MPV 9.5 9.0 - 12.9 fL    Nucleated RBC 0.00 /100 WBC    NRBC (Absolute) 0.00 K/uL   COMP METABOLIC PANEL   Result Value Ref Range    Sodium 140 135 - 145 mmol/L    Potassium 3.7 3.6 - 5.5 mmol/L    Chloride 106 96 - 112 mmol/L    Co2 19 (L) 20 - 33 mmol/L    Anion Gap 15.0 7.0 - 16.0    Glucose 79 65 - 99 mg/dL    Bun 5 (L) 8 - 22 mg/dL    Creatinine 1.01 0.50 - 1.40 mg/dL    Calcium 8.1 (L) 8.5 - 10.5 mg/dL    AST(SGOT) 23 12 - 45 U/L    ALT(SGPT) 20 2 - 50 U/L    Alkaline Phosphatase 222 (H) 30 - 99 U/L    Total Bilirubin 0.5 0.1 - 1.5 mg/dL    Albumin 2.7 (L) 3.2 - 4.9 g/dL    Total Protein 5.5 (L) 6.0 - 8.2 g/dL    Globulin 2.8 1.9 - 3.5 g/dL    A-G Ratio 1.0 g/dL   TROPONIN   Result Value Ref Range    Troponin T 26 (H) 6 - 19 ng/L   proBrain Natriuretic Peptide, NT   Result Value Ref Range    NT-proBNP 2858 (H) 0 - 125 pg/mL   ESTIMATED GFR   Result Value Ref Range    GFR (CKD-EPI) 61 >60 mL/min/1.73 m 2     All labs reviewed by me.    COURSE & MEDICAL DECISION MAKING  Nursing notes, VS, PMSFHx reviewed in chart.     Review of past medical records shows the patient was discharged from the hospital one week ago after being admitted for renal failure.      11:42 AM - Patient seen and examined at bedside. After my exam, I explained to the patient my plan of care, which includes obtaining lab work for further evaluation. I also informed her of the possibility of admission. Patient understands and verbalizes agreement to plan of care. Ordered CBC with diff, CMP, Troponin, and proBrain Natriuretic Peptide to evaluate her symptoms. The differential diagnoses include but are not limited to: congestive heart failure vs renal failure vs medication noncompliance      12:49 AM - Patient was reevaluated at bedside. Patient will be treated with Lasix 440 mg for her symptoms.     12:52 PM - Paged Hospitalist    1:03 PM - Hospitalist responded. I discussed the patient's case and the above findings with [facility] who agrees to evaluate the patient for hospitalization.     1:15 PM - Patient was reevaluated at bedside. I updated the patient on her results and the plan for admission. Patient understands and verbalizes agreement to plan of care.     DISPOSITION:  Patient will be hospitalized by [facility] in guarded condition.    FINAL IMPRESSION  1. Anasarca    2. Noncompliance with medication regimen          Nisreen HORTON (Scribe), am scribing for, and in the presence of, Abdirahman Sanchez M.D..    Electronically signed by: Nisreen Allen (Scribzachery), 4/11/2022    Abdirahman HORTON M.D. personally performed the services described in this documentation, as scribed by Nisreen Allen in my presence, and it is both accurate and complete.    C    The note accurately reflects work and decisions made by me.  Abdirahman Sanchez M.D.  4/11/2022  2:49 PM

## 2022-04-11 NOTE — CASE COMMUNICATION
Addendum: Complete post fall assessment not completed due to SN visit not completed due to [facility] transport for ED treatment.

## 2022-04-12 VITALS
WEIGHT: 121.91 LBS | OXYGEN SATURATION: 94 % | TEMPERATURE: 97.6 F | DIASTOLIC BLOOD PRESSURE: 68 MMHG | HEIGHT: 66 IN | SYSTOLIC BLOOD PRESSURE: 114 MMHG | RESPIRATION RATE: 17 BRPM | BODY MASS INDEX: 19.59 KG/M2 | HEART RATE: 93 BPM

## 2022-04-12 LAB
ALBUMIN SERPL BCP-MCNC: 2.3 G/DL (ref 3.2–4.9)
ALBUMIN/GLOB SERPL: 1.2 G/DL
ALP SERPL-CCNC: 181 U/L (ref 30–99)
ALT SERPL-CCNC: 15 U/L (ref 2–50)
ANION GAP SERPL CALC-SCNC: 10 MMOL/L (ref 7–16)
AST SERPL-CCNC: 18 U/L (ref 12–45)
BASOPHILS # BLD AUTO: 1 % (ref 0–1.8)
BASOPHILS # BLD: 0.06 K/UL (ref 0–0.12)
BILIRUB SERPL-MCNC: 0.7 MG/DL (ref 0.1–1.5)
BUN SERPL-MCNC: 6 MG/DL (ref 8–22)
CALCIUM SERPL-MCNC: 7.6 MG/DL (ref 8.5–10.5)
CHLORIDE SERPL-SCNC: 107 MMOL/L (ref 96–112)
CHOLEST SERPL-MCNC: 119 MG/DL (ref 100–199)
CO2 SERPL-SCNC: 25 MMOL/L (ref 20–33)
CREAT SERPL-MCNC: 1.03 MG/DL (ref 0.5–1.4)
EOSINOPHIL # BLD AUTO: 0.07 K/UL (ref 0–0.51)
EOSINOPHIL NFR BLD: 1.2 % (ref 0–6.9)
ERYTHROCYTE [DISTWIDTH] IN BLOOD BY AUTOMATED COUNT: 71.4 FL (ref 35.9–50)
GFR SERPLBLD CREATININE-BSD FMLA CKD-EPI: 59 ML/MIN/1.73 M 2
GLOBULIN SER CALC-MCNC: 2 G/DL (ref 1.9–3.5)
GLUCOSE SERPL-MCNC: 78 MG/DL (ref 65–99)
HCT VFR BLD AUTO: 21.5 % (ref 37–47)
HDLC SERPL-MCNC: 69 MG/DL
HGB BLD-MCNC: 7.2 G/DL (ref 12–16)
HGB BLD-MCNC: 7.5 G/DL (ref 12–16)
IMM GRANULOCYTES # BLD AUTO: 0.02 K/UL (ref 0–0.11)
IMM GRANULOCYTES NFR BLD AUTO: 0.3 % (ref 0–0.9)
LDLC SERPL CALC-MCNC: 40 MG/DL
LYMPHOCYTES # BLD AUTO: 1.34 K/UL (ref 1–4.8)
LYMPHOCYTES NFR BLD: 23 % (ref 22–41)
MCH RBC QN AUTO: 30.6 PG (ref 27–33)
MCHC RBC AUTO-ENTMCNC: 33.5 G/DL (ref 33.6–35)
MCV RBC AUTO: 91.5 FL (ref 81.4–97.8)
MONOCYTES # BLD AUTO: 0.46 K/UL (ref 0–0.85)
MONOCYTES NFR BLD AUTO: 7.9 % (ref 0–13.4)
NEUTROPHILS # BLD AUTO: 3.88 K/UL (ref 2–7.15)
NEUTROPHILS NFR BLD: 66.6 % (ref 44–72)
NRBC # BLD AUTO: 0 K/UL
NRBC BLD-RTO: 0 /100 WBC
PLATELET # BLD AUTO: 263 K/UL (ref 164–446)
PMV BLD AUTO: 9.4 FL (ref 9–12.9)
POTASSIUM SERPL-SCNC: 3.1 MMOL/L (ref 3.6–5.5)
PROT SERPL-MCNC: 4.3 G/DL (ref 6–8.2)
RBC # BLD AUTO: 2.35 M/UL (ref 4.2–5.4)
SODIUM SERPL-SCNC: 142 MMOL/L (ref 135–145)
TRIGL SERPL-MCNC: 51 MG/DL (ref 0–149)
WBC # BLD AUTO: 5.8 K/UL (ref 4.8–10.8)

## 2022-04-12 PROCEDURE — 85018 HEMOGLOBIN: CPT | Mod: XU

## 2022-04-12 PROCEDURE — 80061 LIPID PANEL: CPT

## 2022-04-12 PROCEDURE — 85025 COMPLETE CBC W/AUTO DIFF WBC: CPT

## 2022-04-12 PROCEDURE — 80053 COMPREHEN METABOLIC PANEL: CPT

## 2022-04-12 PROCEDURE — G0378 HOSPITAL OBSERVATION PER HR: HCPCS

## 2022-04-12 PROCEDURE — 99217 PR OBSERVATION CARE DISCHARGE: CPT | Performed by: STUDENT IN AN ORGANIZED HEALTH CARE EDUCATION/TRAINING PROGRAM

## 2022-04-12 PROCEDURE — 700102 HCHG RX REV CODE 250 W/ 637 OVERRIDE(OP): Performed by: STUDENT IN AN ORGANIZED HEALTH CARE EDUCATION/TRAINING PROGRAM

## 2022-04-12 PROCEDURE — A9270 NON-COVERED ITEM OR SERVICE: HCPCS | Performed by: STUDENT IN AN ORGANIZED HEALTH CARE EDUCATION/TRAINING PROGRAM

## 2022-04-12 RX ORDER — POTASSIUM CHLORIDE 20 MEQ/1
40 TABLET, EXTENDED RELEASE ORAL ONCE
Status: COMPLETED | OUTPATIENT
Start: 2022-04-12 | End: 2022-04-12

## 2022-04-12 RX ORDER — FUROSEMIDE 20 MG/1
20 TABLET ORAL DAILY
Qty: 30 TABLET | Refills: 0 | Status: SHIPPED | OUTPATIENT
Start: 2022-04-12 | End: 2022-04-15 | Stop reason: SDUPTHER

## 2022-04-12 RX ADMIN — POTASSIUM CHLORIDE 40 MEQ: 20 TABLET, EXTENDED RELEASE ORAL at 08:30

## 2022-04-12 ASSESSMENT — PAIN DESCRIPTION - PAIN TYPE
TYPE: ACUTE PAIN
TYPE: ACUTE PAIN

## 2022-04-12 ASSESSMENT — FIBROSIS 4 INDEX: FIB4 SCORE: 1.18

## 2022-04-12 NOTE — PROGRESS NOTES
Report received from ELROY Denise. I have assumed care of this patient. Patient has been assessed and plan of care has been discussed. Patient verbalizes understanding of plan and denies any further needs at this time. Patient is now resting in bed, bed is in the lowest position and locked, and the call light is within reach.

## 2022-04-12 NOTE — DISCHARGE PLANNING
HTH/SCP TCN chart review completed. Collaborated with Joyce SAEZ prior to meeting with the pt. The most current review of medical record, knowledge of pt's PLOF and social support, LACE+ score of 72, no current 6 clicks score available. Would note that pt reports to this TCN that she was essentially up I to bedside commode 2' to urination from medication all night. She reports she feels functionally close to her baseline to this TCN as well.     Pt seen at bedside. Pt is aware of TCN program and dc planning from prior admits. Provided education regarding differences in post acute resources including IRF, SNF and HH. Discussed HTH/SCP plan benefits including Meds to Beds and [facility] transitional care services. She is agreeable to resume HH and [facility] services at time of dc. [place]CN [place] [facility] and per Joyce SAEZ she does not need choice for [facility] as will just be continuation of services as pt is observation status. Pt verbalizes understanding with dc plan. Note as prior admits, pt reports she will decline placement in any account as she prefers home. Discussed dc planning concerns with pt given readmits. No additional provider consults needed at this time and no choice given aforementioned. Pt reports current plan to dc to home and resume prior services as noted above. TCN will continue to follow and collaborate with discharge planning team as additional post acute needs arise. Thank you.

## 2022-04-12 NOTE — PROGRESS NOTES
1440 arrive to [place] lounge via wheelchair. A/O, no complaints. DC instructions reviewed w/ pt, verbalized understanding. Medication from home returned to pt.     1505 dc home with friend in stable condition.

## 2022-04-12 NOTE — CASE COMMUNICATION
CM noted.   ----- Message -----  From: Gisselle Trinidad R.N.  Sent: 4/11/2022  10:29 AM PDT  To: TROY Wilcox: Please send to Dr. Tejada:  HOME CARE SERVICES NOT RESUMED:  SN arrived at patient's home for resumption of home care services. Patient's weight 127.6lb, her at home baseline weight is approximately 95#, , RR 24, anasarca noted with weeping from arms and legs due to edema. Patient reports has only been  taking omeprazole daily, no other meds. States saw Dr. Tejada Friday, was prescribed two new meds but has not obtained from pharmacy. Patient reports a fall as well when she was leaving to go to the doctor's office, reports no injury.

## 2022-04-12 NOTE — CASE COMMUNICATION
CM noted. Thank you.   ----- Message -----  From: Gisselle Trinidad R.N.  Sent: 4/11/2022  10:33 AM PDT  To: Leena Franz R.N.      Addendum: Complete post fall assessment not completed due to SN visit not completed due to [facility] transport for ED treatment.

## 2022-04-12 NOTE — ASSESSMENT & PLAN NOTE
In the absence of chest pain, better than previous value, also in the setting of kidney injury    No ekg on file yet for this admission.   Will have patient on aspirin and statin, hold off beta blocker for now leave room for diuresis and given possible diagnosis of COPD

## 2022-04-12 NOTE — RESPIRATORY CARE
COPD EDUCATION by COPD CLINICAL EDUCATOR  4/12/2022 at 8:45 AM by Dipti Villagran RRT     Patient reviewed by COPD education team. Patient does not have an official diagnosis or history of COPD, has never had a PFT, doesn't take respiratory medications, and quit smoking. Therefore the patient does not qualify for the COPD program.

## 2022-04-12 NOTE — ASSESSMENT & PLAN NOTE
Likely secondary to protein calorie malnutrition   Will need to rule out nephrotic syndrome. YAMIL improved since last admission    Another differential includes heart failure exacerbation, however right sided volume over load> last echo recent from march 30 , Ef of 55. rvsp of 30, patient continues to smoke.  Liver function within normal limits     Plan :  UA   Protein creatinine ratio  Hep panel and HIV   If positive for proteinuria may consider autoimmune work up   Cautious diuresis given right sided findings.   Strict I/Os   Daily weights   Albumin infusion if needed.

## 2022-04-12 NOTE — CASE COMMUNICATION
Forwarding this response from [facility] to you. See below.    ----- Message -----  From: BEV Leone  Sent: 4/11/2022   4:03 PM PDT  To: Gisselle Trinidad R.N.      Pt refused to answer door for TCM visit and then went to PCP for care. We are offering her [facility] services   ----- Message -----  From: Gisselle Trinidad R.N.  Sent: 4/11/2022  10:29 AM PDT  To: BEV Leone: Please send to Dr. Tejada:  HOME CARE SE RVICES NOT RESUMED:  SN arrived at patient's home for resumption of home care services. Patient's weight 127.6lb, her at home baseline weight is approximately 95#, , RR 24, anasarca noted with weeping from arms and legs due to edema. Patient reports has only been taking omeprazole daily, no other meds. States saw Dr. Tejada Friday, was prescribed two new meds but has not obtained from pharmacy. Patient reports a fall as well when  she was leaving to go to the doctor's office, reports no injury.

## 2022-04-12 NOTE — DISCHARGE INSTRUCTIONS
Discharge Instructions    Discharged to home by car with relative. Discharged via wheelchair, hospital escort: Yes.  Special equipment needed: Not Applicable    Be sure to schedule a follow-up appointment with your primary care doctor or any specialists as instructed.     Discharge Plan:   Diet Plan: Discussed  Activity Level: Discussed  Confirmed Follow up Appointment: Patient to Call and Schedule Appointment  Confirmed Symptoms Management: Discussed  Medication Reconciliation Updated: Yes    I understand that a diet low in cholesterol, fat, and sodium is recommended for good health. Unless I have been given specific instructions below for another diet, I accept this instruction as my diet prescription.   Other diet:     Special Instructions: None    · Is patient discharged on Warfarin / Coumadin?   No     Depression / Suicide Risk    As you are discharged from this [facility] facility, it is important to learn how to keep safe from harming yourself.    Recognize the warning signs:  · Abrupt changes in personality, positive or negative- including increase in energy   · Giving away possessions  · Change in eating patterns- significant weight changes-  positive or negative  · Change in sleeping patterns- unable to sleep or sleeping all the time   · Unwillingness or inability to communicate  · Depression  · Unusual sadness, discouragement and loneliness  · Talk of wanting to die  · Neglect of personal appearance   · Rebelliousness- reckless behavior  · Withdrawal from people/activities they love  · Confusion- inability to concentrate     If you or a loved one observes any of these behaviors or has concerns about self-harm, here's what you can do:  · Talk about it- your feelings and reasons for harming yourself  · Remove any means that you might use to hurt yourself (examples: pills, rope, extension cords, firearm)  · Get professional help from the community (Mental Health, Substance Abuse, psychological  counseling)  · Do not be alone:Call your Safe Contact- someone whom you trust who will be there for you.  · Call your local CRISIS HOTLINE 055-9694 or 982-849-9665  · Call your local Children's Mobile Crisis Response Team [place] (784) 709-5588 or www.OneCubicle  · Call the toll free National Suicide Prevention Hotlines   · National Suicide Prevention Lifeline 978-091-LQYS (2664)  · St[employer] Line Network 800-SUICIDE (105-4952)    Follow up with primary care physician, recheck CBC, BMP in 2 days.   go to ER if she noticed any bleeding signs.     Please check weight daily, if you gain two or three pounds in a 24-hour period or more than five pounds in a week, please increase the dose of lasix by 20 mg and call the PCP or cardiologist for further evaluation.     Please take the medications as instructed    Go to the local Emergency Department if you have any worsening condition.       Peripheral Edema    Peripheral edema is swelling that is caused by a buildup of fluid. Peripheral edema most often affects the lower legs, ankles, and feet. It can also develop in the arms, hands, and face. The area of the body that has peripheral edema will look swollen. It may also feel heavy or warm. Your clothes may start to feel tight. Pressing on the area may make a temporary dent in your skin. You may not be able to move your swollen arm or leg as much as usual.  There are many causes of peripheral edema. It can happen because of a complication of other conditions such as congestive heart failure, kidney disease, or a problem with your blood circulation. It also can be a side effect of certain medicines or because of an infection. It often happens to women during pregnancy. Sometimes, the cause is not known.  Follow these instructions at home:  Managing pain, stiffness, and swelling    · Raise (elevate) your legs while you are sitting or lying down.  · Move around often to prevent stiffness and to lessen  swelling.  · Do not sit or stand for long periods of time.  · Wear support stockings as told by your health care provider.  Medicines  · Take over-the-counter and prescription medicines only as told by your health care provider.  · Your health care provider may prescribe medicine to help your body get rid of excess water (diuretic).  General instructions  · Pay attention to any changes in your symptoms.  · Follow instructions from your health care provider about limiting salt (sodium) in your diet. Sometimes, eating less salt may reduce swelling.  · Moisturize skin daily to help prevent skin from cracking and draining.  · Keep all follow-up visits as told by your health care provider. This is important.  Contact a health care provider if you have:  · A fever.  · Edema that starts suddenly or is getting worse, especially if you are pregnant or have a medical condition.  · Swelling in only one leg.  · Increased swelling, redness, or pain in one or both of your legs.  · Drainage or sores at the area where you have edema.  Get help right away if you:  · Develop shortness of breath, especially when you are lying down.  · Have pain in your chest or abdomen.  · Feel weak.  · Feel faint.  Summary  · Peripheral edema is swelling that is caused by a buildup of fluid. Peripheral edema most often affects the lower legs, ankles, and feet.  · Move around often to prevent stiffness and to lessen swelling. Do not sit or stand for long periods of time.  · Pay attention to any changes in your symptoms.  · Contact a health care provider if you have edema that starts suddenly or is getting worse, especially if you are pregnant or have a medical condition.  · Get help right away if you develop shortness of breath, especially when lying down.  This information is not intended to replace advice given to you by your health care provider. Make sure you discuss any questions you have with your health care provider.  Document Released:  01/25/2006 Document Revised: 09/11/2019 Document Reviewed: 09/11/2019  Elsevier Patient Education © 2020 [employer] Inc.

## 2022-04-12 NOTE — ASSESSMENT & PLAN NOTE
Secondary YAMIL  Continue to monitor with improvement of renal function   Lactic acid measurement if worsening.   Sugar not elevated.

## 2022-04-12 NOTE — ASSESSMENT & PLAN NOTE
EF improved   Diurese as tolerated   May need CAD work up eventually when volume status optimised, possibly outpatient   Aspirin   Statin   Lipid panel for tomorrow.   Hold of beta blocker for room for diuresis and possibly decompensated state

## 2022-04-12 NOTE — CARE PLAN
The patient is Stable - Low risk of patient condition declining or worsening    Shift Goals: monitor Edema   Clinical Goals: monitor Edema  Patient Goals: Pain control/Rest  Family Goals: NA    Progress made toward(s) clinical / shift goals:  Pt awake alert and oriented. Respirations are even and unlabored. Initial assessment complete. Pt has no complaints of pain or distress at this time. BSC at bedside. Bed wheels locked. Rails up x3. Patient resting comfortably in bed at this time. Will continue to monitor    Patient is not progressing towards the following goals:NA      Problem: Knowledge Deficit - Standard  Goal: Patient and family/care givers will demonstrate understanding of plan of care, disease process/condition, diagnostic tests and medications  4/11/2022 2145 by Howie Del Real R.N.  Outcome: Progressing  4/11/2022 2145 by Howie Del Real R.N.  Outcome: Progressing     Problem: Fall Risk  Goal: Patient will remain free from falls  4/11/2022 2145 by Howie Del Real R.N.  Outcome: Progressing  4/11/2022 2145 by oHwie Del Real R.N.  Outcome: Progressing     Problem: Pain - Standard  Goal: Alleviation of pain or a reduction in pain to the patient’s comfort goal  Outcome: Progressing

## 2022-04-12 NOTE — DISCHARGE SUMMARY
Discharge Summary    CHIEF COMPLAINT ON ADMISSION  Chief Complaint   Patient presents with   • Weight Gain   • Arm Swelling   • Leg Swelling       Reason for Admission  ems      Admission Date  4/11/2022    CODE STATUS  Full Code    HPI & HOSPITAL COURSE  Jaymie is a 66 y.o. female who presented 4/11/2022 with past medical history chronic tobacco use recently diagnosed possible Takotsubo/stress-induced cardiomyopathy in February 2022 , Without any acute changes on EKG and possibly with demand ischemia related elevation of troponin, with work-up of coronary artery disease deferred at the time  in view of acute kidney injury, history of GI bleed with heparin initiated at the same time( with scopes deferred given hemodynamic stability], who is again recently admitted in March for YAMIL, hypotension which improved with resuscitation and discontinuation of losartan hydrochlorothiazide that patient was on at home presents on this admission after home health nurse whose first day was today noticed that patient had gained significant amount of weight and noticed anasarca prompting her to go to the ER. Additionally chart review shows history of alochol use disorder. Patient states that she changed her mind regarding her code status and wants to be full code.     Patient was noticed weight gain 13 lbs since last admission.  She has been treated with IV diuretics.  Will continue low-dose lasix 20mg po at discharge.    Patient was found hemoglobin dropped from 9 to 7.2, repeated Hb 7.5.  Patient had denies any bleeding signs, normal bowel movement and stool yesterday, no nausea vomiting, normal urine.  Denies a history of GI bleeding.  Her colonoscopy 2016 was unremarkable.  Her recent iron study was normal. she is on PPI.  Unclear the reason of her hemoglobin drop.  she was advised to follow-up with primary care physician and repeat CBC in 2 days.  She was advised to go to ER if she noticed any bleeding signs.         Therefore,  she is discharged in good and stable condition to home with organized home healthcare and close outpatient follow-up.    The patient recovered much more quickly than anticipated on admission.    Discharge Date  4/12/2022    FOLLOW UP ITEMS POST DISCHARGE  Follow up with primary care physician, recheck CBC, BMP in 2 days.   go to ER if she noticed any bleeding signs.     Please check weight daily, if you gain two or three pounds in a 24-hour period or more than five pounds in a week, please increase the dose of lasix by 20 mg and call the PCP or cardiologist for further evaluation.     Please take the medications as instructed    Go to the local Emergency Department if you have any worsening condition.       DISCHARGE DIAGNOSES  Principal Problem:    Anasarca POA: Yes  Active Problems:    Normocytic anemia POA: Unknown    High anion gap metabolic acidosis POA: Unknown    Dilated cardiomyopathy (HCC) POA: Unknown    Elevated troponin POA: Unknown  Resolved Problems:    * No resolved hospital problems. *      FOLLOW UP  No future appointments.  Toña Tejada P.A.-C.  [address] [address]  [phone]    In 2 days  recheck lab, cbc, bmp.       MEDICATIONS ON DISCHARGE     Medication List      START taking these medications      Instructions   furosemide 20 MG Tabs  Commonly known as: LASIX   Take 1 Tablet by mouth every day for 30 days.  Dose: 20 mg        CONTINUE taking these medications      Instructions   meloxicam 15 MG tablet  Commonly known as: MOBIC   Doctor's comments: osteoporosis  Take 1 Tablet by mouth 1 time a day as needed. Indications: Joint Damage causing Pain and Loss of Function  Dose: 15 mg     mirtazapine 15 MG Tabs  Commonly known as: Remeron   Take 1 Tablet by mouth every evening.  Dose: 15 mg     omeprazole 40 MG delayed-release capsule  Commonly known as: PRILOSEC   Take 1 Capsule by mouth every day for 30 days.  Dose: 40 mg     simvastatin 20 MG Tabs  Commonly known  as: ZOCOR   Take 20 mg by mouth every day. Indications: Ischemic Heart Disease  Dose: 20 mg            Allergies  No Known Allergies    DIET  Orders Placed This Encounter   Procedures   • Diet Order Diet: Cardiac     Standing Status:   Standing     Number of Occurrences:   1     Order Specific Question:   Diet:     Answer:   Cardiac [6]       ACTIVITY  As tolerated.  Weight bearing as tolerated    CONSULTATIONS      PROCEDURES      LABORATORY  Lab Results   Component Value Date    SODIUM 142 04/12/2022    POTASSIUM 3.1 (L) 04/12/2022    CHLORIDE 107 04/12/2022    CO2 25 04/12/2022    GLUCOSE 78 04/12/2022    BUN 6 (L) 04/12/2022    CREATININE 1.03 04/12/2022        Lab Results   Component Value Date    WBC 5.8 04/12/2022    HEMOGLOBIN 7.5 (L) 04/12/2022    HEMATOCRIT 21.5 (L) 04/12/2022    PLATELETCT 263 04/12/2022        Total time of the discharge process exceeds 32 minutes.

## 2022-04-12 NOTE — CASE COMMUNICATION
Thank you for the updates.     ----- Message -----  From: Gisselle Trinidad R.N.  Sent: 4/12/2022   7:51 AM PDT  To: Gaby Julio R.N., Leena Franz R.N.  Subject: FW:                                                Forwarding this response from [facility] to you. See below.    ----- Message -----  From: BEV Leone  Sent: 4/11/2022   4:03 PM PDT  To: Gisselle Trinidad R.N.      Pt refused to answer door for TCM visit and then went  to PCP for care. We are offering her [facility] services   ----- Message -----  From: Gisselle Trinidad R.N.  Sent: 4/11/2022  10:29 AM PDT  To: BEV Leone: Please send to Dr. Tejada:  HOME CARE SERVICES NOT RESUMED:  SN arrived at patient's home for resumption of home care services. Patient's weight 127.6lb, her at home baseline weight is approximately 95#, , RR 24, anasarca noted with weeping from arms and  legs due to edema. Patient reports has only been taking omeprazole daily, no other meds. States saw Dr. Tejada Friday, was prescribed two new meds but has not obtained from pharmacy. Patient reports a fall as well when she was leaving to go to the doctor's office, reports no injury.

## 2022-04-12 NOTE — DOCUMENTATION QUERY
[facility]                                                                       Query Response Note      PATIENT:               VALENTINE ALANIZ  ACCT #:                  [id number]  MRN:                     [id number]  :                      1955  ADMIT DATE:       3/28/2022 6:17 PM  DISCH DATE:        2022 5:30 PM  RESPONDING  PROVIDER #:        [id number]           QUERY TEXT:    Stage 2 Pressure ulcers is documented as follows by the Wound Care RN in the Medical Record.       Please specify if you:    NOTE:  If an appropriate response is not listed below, please respond with a new note.      The patient's Clinical Indicators include:  3/29 Wound Consult ordered    WOUND CONSULT/FOLLOW UP RELATED TO:  coccyx     WOUND HISTORY:  Pressure related injury to coccyx, has been treated by home health RN and wound team    Pressure Injury Sacrum Pressure Injury Stage 2(POA)  Pressure Injury Coccyx  Stage 2(POA)     EVALUATION -pink and moist tissue, nearly resolved.  Continue with offloading efforts with sacral mepilex    Goals: Steady decrease in wound area and depth weekly        Thank you  Shannan Harris [facility]  Coding  1  [email or web address]  Options provided:   -- Agree with Wound Care RN assessment   -- Disagree with Wound Care RN assessment   -- Unable to determine      Query created by: Shannan Harris on 2022 8:28 AM    RESPONSE TEXT:    Agree with Wound Care RN assessment          Electronically signed by:  FRANCISCO COTTON MD 2022 7:31 PM

## 2022-04-12 NOTE — DISCHARGE PLANNING
Patient to be D/C'd. Patient on service with [facility]. Called and spoke with Frieda with [facility] and patient will be seen and no need for new referral as patient was OBS.

## 2022-04-14 ENCOUNTER — HOME CARE VISIT (OUTPATIENT)
Dept: HOME HEALTH SERVICES | Facility: HOME HEALTHCARE | Age: 67
End: 2022-04-14
Payer: MEDICARE

## 2022-04-14 ENCOUNTER — DOCUMENTATION (OUTPATIENT)
Dept: MEDICAL GROUP | Facility: PHYSICIAN GROUP | Age: 67
End: 2022-04-14
Payer: MEDICARE

## 2022-04-14 VITALS
BODY MASS INDEX: 18.16 KG/M2 | HEIGHT: 66 IN | WEIGHT: 113 LBS | HEART RATE: 137 BPM | RESPIRATION RATE: 18 BRPM | SYSTOLIC BLOOD PRESSURE: 102 MMHG | TEMPERATURE: 98.2 F | DIASTOLIC BLOOD PRESSURE: 74 MMHG | OXYGEN SATURATION: 98 %

## 2022-04-14 PROCEDURE — G0493 RN CARE EA 15 MIN HH/HOSPICE: HCPCS

## 2022-04-14 ASSESSMENT — ENCOUNTER SYMPTOMS
SHORTNESS OF BREATH: 1
MENTAL STATUS CHANGE: 0
DENIES PAIN: 1
PERSON REPORTING PAIN: PATIENT
DYSPNEA ACTIVITY LEVEL: AFTER AMBULATING 10 - 20 FT
FATIGUE: 1

## 2022-04-14 ASSESSMENT — ACTIVITIES OF DAILY LIVING (ADL): OASIS_M1830: 03

## 2022-04-14 ASSESSMENT — PATIENT HEALTH QUESTIONNAIRE - PHQ9
1. LITTLE INTEREST OR PLEASURE IN DOING THINGS: 00
2. FEELING DOWN, DEPRESSED, IRRITABLE, OR HOPELESS: 00
CLINICAL INTERPRETATION OF PHQ2 SCORE: 0

## 2022-04-14 ASSESSMENT — FIBROSIS 4 INDEX: FIB4 SCORE: 1.18

## 2022-04-14 NOTE — PROGRESS NOTES
Medication chart review for [facility] services        Current medication list     Current Outpatient Medications:   •  POTASSIUM CHLORIDE PO, 10 mEq, Oral, DAILY  •  furosemide, 20 mg, Oral, DAILY  •  mirtazapine, 15 mg, Oral, Nightly  •  omeprazole, 40 mg, Oral, DAILY  •  simvastatin, 20 mg, Oral, QDAY    Pt recently discharged from:   [facility], discharge summary date 4/12/2022  Discharge medication summary:    Medication List           START taking these medications      Instructions   furosemide 20 MG Tabs  Commonly known as: LASIX    Take 1 Tablet by mouth every day for 30 days.  Dose: 20 mg                  CONTINUE taking these medications      Instructions   meloxicam 15 MG tablet  Commonly known as: MOBIC    Doctor's comments: osteoporosis  Take 1 Tablet by mouth 1 time a day as needed. Indications: Joint Damage causing Pain and Loss of Function  Dose: 15 mg      mirtazapine 15 MG Tabs  Commonly known as: Remeron    Take 1 Tablet by mouth every evening.  Dose: 15 mg      omeprazole 40 MG delayed-release capsule  Commonly known as: PRILOSEC    Take 1 Capsule by mouth every day for 30 days.  Dose: 40 mg      simvastatin 20 MG Tabs  Commonly known as: ZOCOR    Take 20 mg by mouth every day. Indications: Ischemic Heart Disease  Dose: 20 mg          No Known Allergies      Medications/supplements on DC summary but not on home med list   n/a    Medications/supplements on home med list but not DC summary    n/a      Labs     Lab Results   Component Value Date/Time    SODIUM 142 04/12/2022 03:56 AM    POTASSIUM 3.1 (L) 04/12/2022 03:56 AM    CHLORIDE 107 04/12/2022 03:56 AM    CO2 25 04/12/2022 03:56 AM    GLUCOSE 78 04/12/2022 03:56 AM    BUN 6 (L) 04/12/2022 03:56 AM    CREATININE 1.03 04/12/2022 03:56 AM     Lab Results   Component Value Date/Time    ALKPHOSPHAT 181 (H) 04/12/2022 03:56 AM    ASTSGOT 18 04/12/2022 03:56 AM    ALTSGPT 15 04/12/2022 03:56 AM    TBILIRUBIN 0.7 04/12/2022 03:56  AM    INR 1.04 02/19/2022 12:49 PM    ALBUMIN 2.3 (L) 04/12/2022 03:56 AM    ALBUMIN 4.21 09/15/2020 02:55 PM        Assessment and Plan:   • Received referral from [facility]. Medications reviewed, and compared with discharge summary if available.        CC   Toña Tejada P.A.-C.  [address] [address]  Fax: [phone]    Rodriguez Gold, PharmD, MS, BCACP, [facility]  Phone [phone] fax [phone]    This note was created using voice recognition software (Dragon). The accuracy of the dictation is limited by the abilities of the software. I have reviewed the note prior to signing, however some errors in grammar and context are still possible. If you have any questions related to this note please do not hesitate to contact our office.

## 2022-04-14 NOTE — Clinical Note
Primary dx/Skilled need:heart failure  SN frequency:1w1, 2w4, 1w1, 3 prn heart failure protocol  Zip code:[id number]  Disciplines ordered:SN, PT, refused OT  Certification period:3/19/2022-5/17/2022  Special considerations:resumption of care

## 2022-04-15 PROBLEM — R00.0 TACHYCARDIA: Status: ACTIVE | Noted: 2022-04-15

## 2022-04-15 PROBLEM — Z91.199 NONCOMPLIANCE: Status: ACTIVE | Noted: 2022-04-15

## 2022-04-18 ENCOUNTER — HOME CARE VISIT (OUTPATIENT)
Dept: HOME HEALTH SERVICES | Facility: HOME HEALTHCARE | Age: 67
End: 2022-04-18
Payer: MEDICARE

## 2022-04-18 ENCOUNTER — HOSPITAL ENCOUNTER (EMERGENCY)
Facility: MEDICAL CENTER | Age: 67
DRG: 175 | End: 2022-04-18
Payer: MEDICARE

## 2022-04-18 VITALS
OXYGEN SATURATION: 94 % | RESPIRATION RATE: 20 BRPM | WEIGHT: 99 LBS | SYSTOLIC BLOOD PRESSURE: 122 MMHG | HEIGHT: 66 IN | HEART RATE: 131 BPM | BODY MASS INDEX: 15.91 KG/M2 | TEMPERATURE: 98.4 F | DIASTOLIC BLOOD PRESSURE: 84 MMHG

## 2022-04-18 PROCEDURE — 302449 STATCHG TRIAGE ONLY (STATISTIC)

## 2022-04-18 PROCEDURE — G0299 HHS/HOSPICE OF RN EA 15 MIN: HCPCS

## 2022-04-18 PROCEDURE — 93005 ELECTROCARDIOGRAM TRACING: CPT

## 2022-04-18 PROCEDURE — 665001 SOC-HOME HEALTH

## 2022-04-18 ASSESSMENT — FIBROSIS 4 INDEX
FIB4 SCORE: 1.18
FIB4 SCORE: 1.75

## 2022-04-18 NOTE — CASE COMMUNICATION
I agree with these changes.  ----- Message -----  From: Katie Garcia R.N.  Sent: 4/18/2022   2:09 PM PDT  To: Margareth Gomez R.N.      Quality Review for 4.14.22 NO OASIS performed on by ROSIO Garcia RN on 4.18.2022:    Edits completed by ROSIO Garcia RN:  1. Changed  to 4.14.22 per [facility]OC order   2. Changed  to 4.4.22 per Epic  3. Added #6 to , pt conts to smoke and drink  4. Changed Flu to yes and answered #3 per Epic immu nizations  5. Changed  to 04 per narrative  6. Changed  to 2, pt was reporting pain in her back prior to the hospital stay   7. Changed  to 3 per narrative pt needs supervision with ambulation  8. Changed  to 3 per ambulation score   9. Removed F2F data

## 2022-04-18 NOTE — ED TRIAGE NOTES
Sent here by pcp for elevated heart rate, currently 131, no cp, no major sob.  Vitals otherwise stable.  Patient states this has been ongoing.

## 2022-04-18 NOTE — CASE COMMUNICATION
Quality Review for 4.14.22 [facility] OASIS performed on by ROSIO Garcia RN on 4.18.2022:    Edits completed by ROSIO Garcia RN:  1. Changed  to 4.14.22 per [facility] order   2. Changed  to 4.4.22 per Epic  3. Added #6 to , pt conts to smoke and drink  4. Changed Flu to yes and answered #3 per Epic immunizations  5. Changed  to 04 per narrative  6. Changed  to 2, pt was reporting pain in her back prior to the hospital stay   7. C hanged  to 3 per narrative pt needs supervision with ambulation  8. Changed  to 3 per ambulation score   9. Removed F2F data

## 2022-04-19 ENCOUNTER — HOSPITAL ENCOUNTER (INPATIENT)
Facility: MEDICAL CENTER | Age: 67
LOS: 3 days | DRG: 175 | End: 2022-04-22
Attending: EMERGENCY MEDICINE | Admitting: HOSPITALIST
Payer: MEDICARE

## 2022-04-19 ENCOUNTER — APPOINTMENT (OUTPATIENT)
Dept: RADIOLOGY | Facility: MEDICAL CENTER | Age: 67
DRG: 175 | End: 2022-04-19
Attending: EMERGENCY MEDICINE
Payer: MEDICARE

## 2022-04-19 DIAGNOSIS — I82.4Y2 ACUTE DEEP VEIN THROMBOSIS (DVT) OF PROXIMAL VEIN OF LEFT LOWER EXTREMITY (HCC): ICD-10-CM

## 2022-04-19 DIAGNOSIS — I26.99 PULMONARY EMBOLISM AND INFARCTION (HCC): ICD-10-CM

## 2022-04-19 DIAGNOSIS — D64.9 NORMOCYTIC ANEMIA: ICD-10-CM

## 2022-04-19 DIAGNOSIS — I26.99 OTHER ACUTE PULMONARY EMBOLISM WITHOUT ACUTE COR PULMONALE (HCC): ICD-10-CM

## 2022-04-19 DIAGNOSIS — S32.010A COMPRESSION FRACTURE OF L1 VERTEBRA, INITIAL ENCOUNTER (HCC): ICD-10-CM

## 2022-04-19 DIAGNOSIS — E63.9 NUTRITIONAL DEFICIENCY: ICD-10-CM

## 2022-04-19 DIAGNOSIS — Z78.9 FREQUENT HOSPITAL ADMISSIONS: ICD-10-CM

## 2022-04-19 PROBLEM — I82.402 ACUTE DEEP VEIN THROMBOSIS (DVT) OF LEFT LOWER EXTREMITY (HCC): Status: ACTIVE | Noted: 2022-04-19

## 2022-04-19 PROBLEM — R74.8 ALKALINE PHOSPHATASE ELEVATION: Status: ACTIVE | Noted: 2022-04-19

## 2022-04-19 PROBLEM — E04.1 THYROID NODULE: Status: ACTIVE | Noted: 2022-04-19

## 2022-04-19 LAB
ABO GROUP BLD: NORMAL
ALBUMIN SERPL BCP-MCNC: 3.7 G/DL (ref 3.2–4.9)
ALBUMIN/GLOB SERPL: 1.3 G/DL
ALP SERPL-CCNC: 304 U/L (ref 30–99)
ALT SERPL-CCNC: 19 U/L (ref 2–50)
ANION GAP SERPL CALC-SCNC: 17 MMOL/L (ref 7–16)
ANISOCYTOSIS BLD QL SMEAR: ABNORMAL
AST SERPL-CCNC: 33 U/L (ref 12–45)
BASOPHILS # BLD AUTO: 0.9 % (ref 0–1.8)
BASOPHILS # BLD: 0.06 K/UL (ref 0–0.12)
BILIRUB SERPL-MCNC: 0.9 MG/DL (ref 0.1–1.5)
BLD GP AB SCN SERPL QL: NORMAL
BUN SERPL-MCNC: 9 MG/DL (ref 8–22)
BURR CELLS BLD QL SMEAR: NORMAL
CALCIUM SERPL-MCNC: 8.6 MG/DL (ref 8.5–10.5)
CHLORIDE SERPL-SCNC: 99 MMOL/L (ref 96–112)
CO2 SERPL-SCNC: 25 MMOL/L (ref 20–33)
CREAT SERPL-MCNC: 1.16 MG/DL (ref 0.5–1.4)
D DIMER PPP IA.FEU-MCNC: 3.96 UG/ML (FEU) (ref 0–0.5)
EKG IMPRESSION: NORMAL
EKG IMPRESSION: NORMAL
EOSINOPHIL # BLD AUTO: 0 K/UL (ref 0–0.51)
EOSINOPHIL NFR BLD: 0 % (ref 0–6.9)
ERYTHROCYTE [DISTWIDTH] IN BLOOD BY AUTOMATED COUNT: 80.1 FL (ref 35.9–50)
GFR SERPLBLD CREATININE-BSD FMLA CKD-EPI: 52 ML/MIN/1.73 M 2
GLOBULIN SER CALC-MCNC: 2.9 G/DL (ref 1.9–3.5)
GLUCOSE SERPL-MCNC: 108 MG/DL (ref 65–99)
HCT VFR BLD AUTO: 30.6 % (ref 37–47)
HGB BLD-MCNC: 10.4 G/DL (ref 12–16)
LACTATE BLD-SCNC: 3 MMOL/L (ref 0.5–2)
LIPASE SERPL-CCNC: 25 U/L (ref 11–82)
LYMPHOCYTES # BLD AUTO: 0.55 K/UL (ref 1–4.8)
LYMPHOCYTES NFR BLD: 8.8 % (ref 22–41)
MACROCYTES BLD QL SMEAR: ABNORMAL
MANUAL DIFF BLD: NORMAL
MCH RBC QN AUTO: 32.3 PG (ref 27–33)
MCHC RBC AUTO-ENTMCNC: 34 G/DL (ref 33.6–35)
MCV RBC AUTO: 95 FL (ref 81.4–97.8)
MONOCYTES # BLD AUTO: 0.28 K/UL (ref 0–0.85)
MONOCYTES NFR BLD AUTO: 4.4 % (ref 0–13.4)
MORPHOLOGY BLD-IMP: NORMAL
NEUTROPHILS # BLD AUTO: 5.41 K/UL (ref 2–7.15)
NEUTROPHILS NFR BLD: 85.9 % (ref 44–72)
NRBC # BLD AUTO: 0 K/UL
NRBC BLD-RTO: 0 /100 WBC
NT-PROBNP SERPL IA-MCNC: 1193 PG/ML (ref 0–125)
PLATELET # BLD AUTO: 290 K/UL (ref 164–446)
PLATELET BLD QL SMEAR: NORMAL
PMV BLD AUTO: 10.7 FL (ref 9–12.9)
POIKILOCYTOSIS BLD QL SMEAR: NORMAL
POTASSIUM SERPL-SCNC: 4 MMOL/L (ref 3.6–5.5)
PROT SERPL-MCNC: 6.6 G/DL (ref 6–8.2)
RBC # BLD AUTO: 3.22 M/UL (ref 4.2–5.4)
RBC BLD AUTO: PRESENT
RH BLD: NORMAL
SODIUM SERPL-SCNC: 141 MMOL/L (ref 135–145)
TROPONIN T SERPL-MCNC: 33 NG/L (ref 6–19)
WBC # BLD AUTO: 6.3 K/UL (ref 4.8–10.8)

## 2022-04-19 PROCEDURE — 71045 X-RAY EXAM CHEST 1 VIEW: CPT

## 2022-04-19 PROCEDURE — 99285 EMERGENCY DEPT VISIT HI MDM: CPT

## 2022-04-19 PROCEDURE — 93005 ELECTROCARDIOGRAM TRACING: CPT

## 2022-04-19 PROCEDURE — 770020 HCHG ROOM/CARE - TELE (206)

## 2022-04-19 PROCEDURE — 85007 BL SMEAR W/DIFF WBC COUNT: CPT

## 2022-04-19 PROCEDURE — 36415 COLL VENOUS BLD VENIPUNCTURE: CPT

## 2022-04-19 PROCEDURE — 700111 HCHG RX REV CODE 636 W/ 250 OVERRIDE (IP): Performed by: EMERGENCY MEDICINE

## 2022-04-19 PROCEDURE — 93970 EXTREMITY STUDY: CPT

## 2022-04-19 PROCEDURE — 700111 HCHG RX REV CODE 636 W/ 250 OVERRIDE (IP): Performed by: HOSPITALIST

## 2022-04-19 PROCEDURE — 93970 EXTREMITY STUDY: CPT | Mod: 26 | Performed by: INTERNAL MEDICINE

## 2022-04-19 PROCEDURE — 85379 FIBRIN DEGRADATION QUANT: CPT

## 2022-04-19 PROCEDURE — 71275 CT ANGIOGRAPHY CHEST: CPT | Mod: ME

## 2022-04-19 PROCEDURE — 700102 HCHG RX REV CODE 250 W/ 637 OVERRIDE(OP): Performed by: HOSPITALIST

## 2022-04-19 PROCEDURE — 86850 RBC ANTIBODY SCREEN: CPT

## 2022-04-19 PROCEDURE — 85025 COMPLETE CBC W/AUTO DIFF WBC: CPT

## 2022-04-19 PROCEDURE — A9270 NON-COVERED ITEM OR SERVICE: HCPCS | Performed by: HOSPITALIST

## 2022-04-19 PROCEDURE — 83880 ASSAY OF NATRIURETIC PEPTIDE: CPT

## 2022-04-19 PROCEDURE — 700117 HCHG RX CONTRAST REV CODE 255: Performed by: EMERGENCY MEDICINE

## 2022-04-19 PROCEDURE — 83690 ASSAY OF LIPASE: CPT

## 2022-04-19 PROCEDURE — 87040 BLOOD CULTURE FOR BACTERIA: CPT

## 2022-04-19 PROCEDURE — 96372 THER/PROPH/DIAG INJ SC/IM: CPT

## 2022-04-19 PROCEDURE — 99223 1ST HOSP IP/OBS HIGH 75: CPT | Mod: AI,GC | Performed by: HOSPITALIST

## 2022-04-19 PROCEDURE — 86901 BLOOD TYPING SEROLOGIC RH(D): CPT

## 2022-04-19 PROCEDURE — 86900 BLOOD TYPING SEROLOGIC ABO: CPT

## 2022-04-19 PROCEDURE — 93005 ELECTROCARDIOGRAM TRACING: CPT | Performed by: EMERGENCY MEDICINE

## 2022-04-19 PROCEDURE — 83605 ASSAY OF LACTIC ACID: CPT

## 2022-04-19 PROCEDURE — 84484 ASSAY OF TROPONIN QUANT: CPT

## 2022-04-19 PROCEDURE — 80053 COMPREHEN METABOLIC PANEL: CPT

## 2022-04-19 RX ORDER — MIRTAZAPINE 15 MG/1
15 TABLET, FILM COATED ORAL NIGHTLY
Status: DISCONTINUED | OUTPATIENT
Start: 2022-04-19 | End: 2022-04-22 | Stop reason: HOSPADM

## 2022-04-19 RX ORDER — POLYETHYLENE GLYCOL 3350 17 G/17G
1 POWDER, FOR SOLUTION ORAL
Status: DISCONTINUED | OUTPATIENT
Start: 2022-04-19 | End: 2022-04-22 | Stop reason: HOSPADM

## 2022-04-19 RX ORDER — BISACODYL 10 MG
10 SUPPOSITORY, RECTAL RECTAL
Status: DISCONTINUED | OUTPATIENT
Start: 2022-04-19 | End: 2022-04-22 | Stop reason: HOSPADM

## 2022-04-19 RX ORDER — NICOTINE 21 MG/24HR
21 PATCH, TRANSDERMAL 24 HOURS TRANSDERMAL
Status: DISCONTINUED | OUTPATIENT
Start: 2022-04-19 | End: 2022-04-22 | Stop reason: HOSPADM

## 2022-04-19 RX ORDER — POTASSIUM CHLORIDE 750 MG/1
10 TABLET, FILM COATED, EXTENDED RELEASE ORAL DAILY
Status: DISCONTINUED | OUTPATIENT
Start: 2022-04-19 | End: 2022-04-20

## 2022-04-19 RX ORDER — FUROSEMIDE 20 MG/1
20 TABLET ORAL DAILY
Status: DISCONTINUED | OUTPATIENT
Start: 2022-04-19 | End: 2022-04-22 | Stop reason: HOSPADM

## 2022-04-19 RX ORDER — SIMVASTATIN 20 MG
20 TABLET ORAL EVERY EVENING
Status: DISCONTINUED | OUTPATIENT
Start: 2022-04-19 | End: 2022-04-22 | Stop reason: HOSPADM

## 2022-04-19 RX ORDER — ACETAMINOPHEN 325 MG/1
650 TABLET ORAL EVERY 6 HOURS PRN
Status: DISCONTINUED | OUTPATIENT
Start: 2022-04-19 | End: 2022-04-22 | Stop reason: HOSPADM

## 2022-04-19 RX ORDER — AMOXICILLIN 250 MG
2 CAPSULE ORAL 2 TIMES DAILY
Status: DISCONTINUED | OUTPATIENT
Start: 2022-04-19 | End: 2022-04-22 | Stop reason: HOSPADM

## 2022-04-19 RX ADMIN — POTASSIUM CHLORIDE 10 MEQ: 750 TABLET, FILM COATED, EXTENDED RELEASE ORAL at 17:00

## 2022-04-19 RX ADMIN — ENOXAPARIN SODIUM 40 MG: 40 INJECTION SUBCUTANEOUS at 21:12

## 2022-04-19 RX ADMIN — MIRTAZAPINE 15 MG: 15 TABLET, FILM COATED ORAL at 21:13

## 2022-04-19 RX ADMIN — IOHEXOL 50 ML: 350 INJECTION, SOLUTION INTRAVENOUS at 13:01

## 2022-04-19 RX ADMIN — SIMVASTATIN 20 MG: 20 TABLET, FILM COATED ORAL at 17:23

## 2022-04-19 RX ADMIN — ENOXAPARIN SODIUM 40 MG: 40 INJECTION SUBCUTANEOUS at 13:38

## 2022-04-19 ASSESSMENT — COGNITIVE AND FUNCTIONAL STATUS - GENERAL
SUGGESTED CMS G CODE MODIFIER DAILY ACTIVITY: CK
MOBILITY SCORE: 22
DRESSING REGULAR LOWER BODY CLOTHING: A LITTLE
HELP NEEDED FOR BATHING: A LITTLE
DRESSING REGULAR UPPER BODY CLOTHING: A LITTLE
EATING MEALS: A LITTLE
PERSONAL GROOMING: A LITTLE
SUGGESTED CMS G CODE MODIFIER MOBILITY: CJ
DAILY ACTIVITIY SCORE: 18
WALKING IN HOSPITAL ROOM: A LITTLE
CLIMB 3 TO 5 STEPS WITH RAILING: A LITTLE
TOILETING: A LITTLE

## 2022-04-19 ASSESSMENT — LIFESTYLE VARIABLES
AVERAGE NUMBER OF DAYS PER WEEK YOU HAVE A DRINK CONTAINING ALCOHOL: 3
EVER HAD A DRINK FIRST THING IN THE MORNING TO STEADY YOUR NERVES TO GET RID OF A HANGOVER: NO
ALCOHOL_USE: YES
TOTAL SCORE: 0
HAVE PEOPLE ANNOYED YOU BY CRITICIZING YOUR DRINKING: NO
TOTAL SCORE: 0
DOES PATIENT WANT TO STOP DRINKING: NO
TOTAL SCORE: 0
CONSUMPTION TOTAL: NEGATIVE
HOW MANY TIMES IN THE PAST YEAR HAVE YOU HAD 5 OR MORE DRINKS IN A DAY: 0
ON A TYPICAL DAY WHEN YOU DRINK ALCOHOL HOW MANY DRINKS DO YOU HAVE: 2
DOES PATIENT WANT TO STOP DRINKING: NO
HAVE YOU EVER FELT YOU SHOULD CUT DOWN ON YOUR DRINKING: NO
EVER FELT BAD OR GUILTY ABOUT YOUR DRINKING: NO
DO YOU DRINK ALCOHOL: NO

## 2022-04-19 ASSESSMENT — PATIENT HEALTH QUESTIONNAIRE - PHQ9
SUM OF ALL RESPONSES TO PHQ9 QUESTIONS 1 AND 2: 0
2. FEELING DOWN, DEPRESSED, IRRITABLE, OR HOPELESS: NOT AT ALL
1. LITTLE INTEREST OR PLEASURE IN DOING THINGS: NOT AT ALL

## 2022-04-19 ASSESSMENT — FIBROSIS 4 INDEX
FIB4 SCORE: 1.75
FIB4 SCORE: 1.75
FIB4 SCORE: 1.18

## 2022-04-19 NOTE — ED NOTES
Med rec completed per patient at bedside and [facility] on [facility] ([phone]).  Allergies reviewed with patient. NKDA.  No outpatient antibiotics in the last 30 days.    Per [facility], patient never picked up mirtazapine and prescription is on hold. Potassium is filled and ready for  at pharmacy.  Patient states not using omeprazole at home. Omeprazole removed from med rec.

## 2022-04-19 NOTE — ASSESSMENT & PLAN NOTE
CTA PE study showed Multiple bilateral thyroid nodules, recommend dedicated ultrasound of the thyroid if not already performed.    Outpatient follow up

## 2022-04-19 NOTE — ED PROVIDER NOTES
"ED Provider Note    Scribed for Yoly Tapia M.D. by Jerome Santana. 4/19/2022  10:40 AM    Primary care provider: Toña Tejada P.A.-C.  Means of arrival: Walk in  History obtained from: Patient  History limited by: None    CHIEF COMPLAINT  Chief Complaint   Patient presents with   • Tachycardia     Pt reports that she was sent here by her primary care doctor after seeing her yesterday and receiving an EKG, she was told to come to the ER to get a medication for her rapid heart rate.        HPI  Jaymie Peacock is a 66 y.o. female who presents to the Emergency Department for evaluation of moderate tachycardia onset 1 day ago. The patient notes an extensive past medical history with her most recent hospitalization occurring a few days ago. Following this most recent discharge the patient reports sudden upper and lower extremity swelling, her upper extremity swelling has markedly improved. The patient was seen by her PCP yesterday who noted that her \"heart was racing\" after an EKG and instructed the patient to visit the ED to get medication, which prompted her visit today. Associated symptoms include lower extremity swelling and lower extremity pain. The patient denies any black tarry stools, shortness of breath, chest pain, abdominal pain, headache, back pain, cough, rhinorrhea, sore throat, fever, hemoptysis, vomiting, or diarrhea. She has been compliant with daily Lasix for approximately 10 days and was planning to  her prescription before coming to the ED today. The patient does not medicate with any blood thinners. She admits to daily alcohol consumption estimated at a few cocktails with her neighbor, her most recent consumption was yesterday at approximately 3:00 PM. No alleviating or exacerbating factors noted.     REVIEW OF SYSTEMS  Pertinent positives include tachycardia, lower extremity swelling and lower extremity pain .   Pertinent negatives include no black tarry stools, shortness of breath, " "chest pain, abdominal pain, headache, back pain, cough, rhinorrhea, sore throat, fever, hemoptysis, vomiting, or diarrhea.   See HPI for further details. All other systems are negative. C    PAST MEDICAL HISTORY  Past Medical History:   Diagnosis Date   • Hypertension    • Osteoporosis    • Smoking        FAMILY HISTORY  No family history pertinent.    SOCIAL HISTORY  Social History     Tobacco Use   • Smoking status: Former Smoker     Packs/day: 0.50     Types: Cigarettes   • Smokeless tobacco: Never Used   Vaping Use   • Vaping Use: Never used   Substance Use Topics   • Alcohol use: Yes     Alcohol/week: 0.6 oz     Types: 1 Shots of liquor per week   • Drug use: Not Currently      Social History     Substance and Sexual Activity   Drug Use Not Currently       SURGICAL HISTORY  Past Surgical History:   Procedure Laterality Date   • US-NEEDLE CORE BX-BREAST PANEL Right        CURRENT MEDICATIONS  Home Medications     Reviewed by Paola Gee (Pharmacy Tech) on 04/19/22 at 1351  Med List Status: Complete   Medication Last Dose Status   furosemide (LASIX) 20 MG Tab 4/18/2022 Active   mirtazapine (REMERON) 15 MG Tab 4/18/2022 Active   potassium chloride ER (KLOR-CON) 10 MEQ tablet New RX Active   simvastatin (ZOCOR) 20 MG Tab 4/18/2022 Active                ALLERGIES  No Known Allergies    PHYSICAL EXAM  VITAL SIGNS: /85   Pulse (!) 127   Temp 36.2 °C (97.2 °F) (Temporal)   Resp 16   Ht 1.676 m (5' 6\")   Wt 45.2 kg (99 lb 10.4 oz)   SpO2 94%   BMI 16.08 kg/m²      Constitutional: Well developed, well nourished; No acute distress; Chronically ill-appearing, Pale and slightly jaundice in color   HENT: Normocephalic, atraumatic; Bilateral external ears normal; Dry mucus membranes; No erythema or exudates in the posterior oropharynx.   Eyes: PERRL, EOMI, Conjunctiva normal. No discharge.   Neck:  Supple, nontender midline; No stridor; No nuchal rigidity.   Lymphatic: No cervical lymphadenopathy noted. "   Cardiovascular: Regular rate and rhythm without murmurs, rubs, or gallop.   Thorax & Lungs: No respiratory distress, decreased breath sounds throughout, clear to auscultation bilaterally without wheezing, rales or rhonchi. Spider angioma across chest. Nontender chest wall. No crepitus or subcutaneous air  Abdomen: Soft, nontender, bowel sounds normal. No obvious masses; No pulsatile masses; no rebound, guarding, or peritoneal signs.   Rectal: Rectal: stool guaiac: negative, brown stool  Skin: Good color; warm and dry without rash or petechia.  Back: Nontender, No CVA tenderness.   Extremities: Distal radial, dorsalis pedis, posterior tibial pulses are equal bilaterally; 2-3+ pitting edema bilaterally left greater than right; tenderness to the left calf.  No cyanosis, No clubbing.   Musculoskeletal: Good range of motion in all major joints. No tenderness to palpation or major deformities noted.   Neurologic: Alert & oriented x 4, clear speech.     EKG  12 Lead EKG interpreted by me as shown below    Results for orders placed or performed during the hospital encounter of 22   EKG   Result Value Ref Range    Report       [facility] Emergency Dept.    Test Date:  2022  Pt Name:    VALENTINE ALANIZ                Department: ER  MRN:        [id number]                      Room:  Gender:     Female                       Technician: [id number]  :        1955                   Requested By:ER TRIAGE PROTOCOL  Order #:    [id number]                    Reading MD:    Measurements  Intervals                                Axis  Rate:       110                          P:          71  UT:         149                          QRS:        64  QRSD:       78                           T:          44  QT:         332  QTc:        450    Interpretive Statements  Sinus tachycardia  Ventricular premature complex  Aberrant complex  Probable left atrial enlargement  Low voltage, extremity and precordial  leads  Compared to ECG 2022 15:22:35  Ventricular premature complex(es) now present  Aberrant conduction of supraventricular beat(s) now present  Prolonged QT interval no longer present     EKG (NOW)   Result Value Ref Range    Report       [facility] Emergency Dept.    Test Date:  2022  Pt Name:    VALENTINE ALANIZ                Department: ER  MRN:        [id number]                      Room:       [facility]  Gender:     Female                       Technician: [id number]  :        1955                   Requested By:BURAK FUNEZ  Order #:    [id number]                    Reading MD:    Measurements  Intervals                                Axis  Rate:       110                          P:          71  OH:         149                          QRS:        64  QRSD:       78                           T:          44  QT:         332  QTc:        450    Interpretive Statements  Sinus tachycardia  Ventricular premature complex  Aberrant complex  Probable left atrial enlargement  Low voltage, extremity and precordial leads  Compared to ECG 2022 15:22:35  Ventricular premature complex(es) now present  Aberrant conduction of supraventricular beat(s) now present  Prolonged QT interval no longer present           LABS/RADIOLOGY/PROCEDURES  Results for orders placed or performed during the hospital encounter of 22   CBC WITH DIFFERENTIAL   Result Value Ref Range    WBC 6.3 4.8 - 10.8 K/uL    RBC 3.22 (L) 4.20 - 5.40 M/uL    Hemoglobin 10.4 (L) 12.0 - 16.0 g/dL    Hematocrit 30.6 (L) 37.0 - 47.0 %    MCV 95.0 81.4 - 97.8 fL    MCH 32.3 27.0 - 33.0 pg    MCHC 34.0 33.6 - 35.0 g/dL    RDW 80.1 (H) 35.9 - 50.0 fL    Platelet Count 290 164 - 446 K/uL    MPV 10.7 9.0 - 12.9 fL    Neutrophils-Polys 85.90 (H) 44.00 - 72.00 %    Lymphocytes 8.80 (L) 22.00 - 41.00 %    Monocytes 4.40 0.00 - 13.40 %    Eosinophils 0.00 0.00 - 6.90 %    Basophils 0.90 0.00 - 1.80 %    Nucleated RBC 0.00 /100 WBC     Neutrophils (Absolute) 5.41 2.00 - 7.15 K/uL    Lymphs (Absolute) 0.55 (L) 1.00 - 4.80 K/uL    Monos (Absolute) 0.28 0.00 - 0.85 K/uL    Eos (Absolute) 0.00 0.00 - 0.51 K/uL    Baso (Absolute) 0.06 0.00 - 0.12 K/uL    NRBC (Absolute) 0.00 K/uL    Anisocytosis 1+     Macrocytosis 1+    COMP METABOLIC PANEL   Result Value Ref Range    Sodium 141 135 - 145 mmol/L    Potassium 4.0 3.6 - 5.5 mmol/L    Chloride 99 96 - 112 mmol/L    Co2 25 20 - 33 mmol/L    Anion Gap 17.0 (H) 7.0 - 16.0    Glucose 108 (H) 65 - 99 mg/dL    Bun 9 8 - 22 mg/dL    Creatinine 1.16 0.50 - 1.40 mg/dL    Calcium 8.6 8.5 - 10.5 mg/dL    AST(SGOT) 33 12 - 45 U/L    ALT(SGPT) 19 2 - 50 U/L    Alkaline Phosphatase 304 (H) 30 - 99 U/L    Total Bilirubin 0.9 0.1 - 1.5 mg/dL    Albumin 3.7 3.2 - 4.9 g/dL    Total Protein 6.6 6.0 - 8.2 g/dL    Globulin 2.9 1.9 - 3.5 g/dL    A-G Ratio 1.3 g/dL   LIPASE   Result Value Ref Range    Lipase 25 11 - 82 U/L   TROPONIN   Result Value Ref Range    Troponin T 33 (H) 6 - 19 ng/L   LACTIC ACID   Result Value Ref Range    Lactic Acid 3.0 (H) 0.5 - 2.0 mmol/L   COD (ADULT)   Result Value Ref Range    ABO Grouping Only A     Rh Grouping Only POS     Antibody Screen-Cod NEG    D-DIMER   Result Value Ref Range    D-Dimer Screen 3.96 (H) 0.00 - 0.50 ug/mL (FEU)   proBrain Natriuretic Peptide, NT   Result Value Ref Range    NT-proBNP 1193 (H) 0 - 125 pg/mL   DIFFERENTIAL MANUAL   Result Value Ref Range    Manual Diff Status PERFORMED    PERIPHERAL SMEAR REVIEW   Result Value Ref Range    Peripheral Smear Review see below    PLATELET ESTIMATE   Result Value Ref Range    Plt Estimation Normal    MORPHOLOGY   Result Value Ref Range    RBC Morphology Present     Poikilocytosis 1+     Echinocytes 1+    ESTIMATED GFR   Result Value Ref Range    GFR (CKD-EPI) 52 (A) >60 mL/min/1.73 m 2   EKG   Result Value Ref Range    Report       [facility] Emergency Dept.    Test Date:  2022-04-19  Pt Name:    VALENTINE ALANIZ                 Department: ER  MRN:        [id number]                      Room:  Gender:     Female                       Technician: [id number]  :        1955                   Requested By:ER TRIAGE PROTOCOL  Order #:    [id number]                    Reading MD:    Measurements  Intervals                                Axis  Rate:       110                          P:          71  AZ:         149                          QRS:        64  QRSD:       78                           T:          44  QT:         332  QTc:        450    Interpretive Statements  Sinus tachycardia  Ventricular premature complex  Aberrant complex  Probable left atrial enlargement  Low voltage, extremity and precordial leads  Compared to ECG 2022 15:22:35  Ventricular premature complex(es) now present  Aberrant conduction of supraventricular beat(s) now present  Prolonged QT interval no longer present     EKG (NOW)   Result Value Ref Range    Report       [facility] Emergency Dept.    Test Date:  2022  Pt Name:    VALENTINE ALANIZ                Department: ER  MRN:        [id number]                      Room:       T702  Gender:     Female                       Technician: [id number]  :        1955                   Requested By:BURAK FUNEZ  Order #:    [id number]                    Reading MD:    Measurements  Intervals                                Axis  Rate:       110                          P:          71  AZ:         149                          QRS:        64  QRSD:       78                           T:          44  QT:         332  QTc:        450    Interpretive Statements  Sinus tachycardia  Ventricular premature complex  Aberrant complex  Probable left atrial enlargement  Low voltage, extremity and precordial leads  Compared to ECG 2022 15:22:35  Ventricular premature complex(es) now present  Aberrant conduction of supraventricular beat(s) now present  Prolonged QT interval no longer present          CT-CTA CHEST PULMONARY ARTERY W/ RECONS   Final Result      1.  There are peripheral, linear nonocclusive pulmonary embolus in the right main and upper lobar pulmonary arteries. Occlusive thrombus in the segmental and subsegmental branches in the posterior right lower lobe.   2.  Normal RV/ LV ratio.   3.  New compression fracture of L1 vertebral body.   4.  Multiple bilateral thyroid nodules, recommend dedicated ultrasound of the thyroid if not already performed.            Findings were discussed with BURAK TAPIA on 4/19/2022 1:13 PM.         US-EXTREMITY VENOUS LOWER BILAT   Final Result      DX-CHEST-PORTABLE (1 VIEW)   Final Result      Resolution of small pleural effusions.       Vascular Laboratory   CONCLUSIONS   Subacute, occlusive deep venous thrombus in the left popliteal, peroneal    and proximal posterior tibial veins.       Findings communicated to Dr Tapia.         COURSE & MEDICAL DECISION MAKING  Pertinent Labs & Imaging studies reviewed. (See chart for details)    Reviewed patient's old medical records which showed the patient was discharged after hospitalization from February 18th-28th for NSTEMI and renal failure. She had what they thought was Takosubo cardiomyopathy and ended up with a GI bleed during this hospitalization. The patient has never undergone a cardiac cath due to her being high risk. GI did not scope the patient an recommended outpatient scoping instead. She had a palliative consult, the patient voiced she wanted to be DNR and DNI. The patient was admitted again at the end of March for hypotension, YAMIL, and metabolic acidosis. Had urine cultures with E.coli. Most recently the patient was admitted a few days ago, she voiced to be full code. The patient was then discharged with a hemoglobin of 7.5. Her heart rate yesterday was at 137-155 during a home health visit, the patient refused to go to the hospital and signed AMA with [facility].     10:40 AM - Patient seen and examined at  "bedside. Discussed plan of care, including labs and imaging. Patient agrees to the plan of care. Ordered for labs and imaging to evaluate her symptoms.     11:22 AM - Ordered further labs to evaluate the patient.    11:58 AM - Ordered further imaging to evaluate the patient.    12:45 PM - Ordered Lovenox 40 mg to treat the patient.    1:17 PM - Paged Hospitalist    1:24 PM - Patient was reevaluated at bedside. Discussed lab and radiology results with the patient. Explained that we would be proceeding with a plan for admission    1:26 PM - Rectal exam performed by me, as detailed above.    1:40 PM - Paged Hospitalist again.    1:58 PM - I discussed the patient's case and the above findings with Dr. Cao (Hospitalist) who agreed to evaluate the patient for hospitalization.      Patient presents to the ER at the request of her primary care physician who was concerned about her tachycardia.  She was found to be tachycardic in the 120s to 130s yesterday.  EMS was called but the patient refused transport.  Today she came down to the ER to \"get some medicine for her tachycardia.\"  Patient denies any shortness of breath or chest pain.  She does, however, complain of swelling to bilateral legs, left greater than right, which began shortly after getting home from the hospital 6 days ago.  She is minimally tachycardic here in the ER.  Initial EKG showed a heart rate of 110.  She came down into the 90s here in the ER.  O2 sat is in the mid 90s on room air.  She is not in any respiratory distress.  Ultrasound of the lower extremities reveal occlusive DVT of the left lower extremity.  CT scan of the chest was performed given patient's tachycardia and she was found to have pulmonary emboli.  She has a normal RV /LV ratio.  At this time she does not need EKOS.  Patient was given Lovenox here in the ER.  She will need to be hospitalized for pulmonary embolism with tachycardia.  I spoke with Dr. Cao, hospitalist on-call, he " will kindly evaluate the patient hospitalization.    CRITICAL CARE  The very real possibilty of a deterioration of this patient's condition required the highest level of my preparedness for sudden, emergent intervention.  I provided critical care services, which included medication orders, frequent reevaluations of the patient's condition and response to treatment, ordering and reviewing test results, and discussing the case with various consultants.  The critical care time associated with the care of the patient was 35 minutes. Review chart for interventions. This time is exclusive of any other billable procedures.       DISPOSITION:  Patient will be hospitalized by Dr. Cao in guarded condition.      FINAL IMPRESSION  1. Other acute pulmonary embolism without acute cor pulmonale (HCC) Acute   2. Acute deep vein thrombosis (DVT) of proximal vein of left lower extremity (HCC) Acute     The critical care time associated with the care of the patient was 35 minutes     Jerome HORTON (Scribe), am scribing for, and in the presence of, Yoly Tapia M.D..    Electronically signed by: Jerome Santana (Scribe), 4/19/2022    Yoly HORTON M.D. personally performed the services described in this documentation, as scribed by Jerome Santana in my presence, and it is both accurate and complete.    This dictation has been created using voice recognition software. The accuracy of the dictation is limited by the abilities of the software. I expect there may be some errors of grammar and possibly content. I made every attempt to manually correct the errors within my dictation. However, errors related to voice recognition software may still exist and should be interpreted within the appropriate context.    The note accurately reflects work and decisions made by me.  Yoly Tapia M.D.  4/19/2022  6:20 PM

## 2022-04-19 NOTE — ASSESSMENT & PLAN NOTE
Unknown at this time if it is provoked or unprovoked, will likely need further evaluation for possible malignancy  -CTA of the chest (4/19): nonocclusive pulmonary embolus in the right main and upper lobar pulmonary arteries. Occlusive thrombus in the segmental and subsegmental branches in the posterior right lower lobe  -Hemodynamically stable, continue telemetry monitoring  -On heparin gtt, will transition to Xarelto after EGD/Colonoscopy

## 2022-04-19 NOTE — ASSESSMENT & PLAN NOTE
Drink 1-3 beers a day, worked as    -Alcohol cessation counseling provided  -monitor for withdrawal

## 2022-04-19 NOTE — ASSESSMENT & PLAN NOTE
-Physical exam: Decrease subclavian fat pads  -Nutrition consult and optimize nutrition   -Supplements  -Multivitamins  -watch for refeeding syndrome

## 2022-04-19 NOTE — PROGRESS NOTES
4 Eyes Skin Assessment Completed by ELROY Thrasher and ELROY Courtney.    Head WDL  Ears WDL  Nose WDL  Mouth WDL  Neck WDL  Breast/Chest WDL  Shoulder Blades WDL  Spine WDL  (R) Arm/Elbow/Hand WDL  (L) Arm/Elbow/Hand WDL  Abdomen WDL  Groin WDL  Scrotum/Coccyx/Buttocks Scar  (R) Leg Redness, Swelling and Edema  (L) Leg Redness, Swelling and Edema  (R) Heel/Foot/Toe Swelling  (L) Heel/Foot/Toe Swelling          Devices In Places Tele Box and Pulse Ox      Interventions In Place Low Air Loss Mattress    Possible Skin Injury No    Pictures Uploaded Into Epic N/A  Wound Consult Placed N/A  RN Wound Prevention Protocol Ordered No

## 2022-04-19 NOTE — ED TRIAGE NOTES
"Chief Complaint   Patient presents with   • Tachycardia     Pt reports that she was sent here by her primary care doctor after seeing her yesterday and receiving an EKG, she was told to come to the ER to get a medication for her rapid heart rate.      /85   Pulse (!) 127   Temp 36.2 °C (97.2 °F) (Temporal)   Resp 16   Ht 1.676 m (5' 6\")   Wt 45.2 kg (99 lb 10.4 oz)   SpO2 94%   BMI 16.08 kg/m²     Pt is ambulatory in and out of triage. Appropriate PPE worn throughout entire encounter. Pt placed back in the lobby and educated about triage process.  EKG performed in triage.   "

## 2022-04-19 NOTE — H&P
HOSPITAL MEDICINE HISTORY AND PHYSICAL     PATIENT ID:  NAME:  Jaymie Peacock  MRN:               [id number]  YOB: 1955    Date of Admission:   4/19/2022     Attending:   Dr. Cao     Resident:   Em Gómez PGY 3       CC:    Tachycardia       HPI:   Jaymie Peacock is a 66 y.o. female with past medical history of HTN, Emphysema, osteoporosis, Tobacco abuse, stress induced  cardiomyopathy in feb 2022 with EF of 35%, recent hospitalization in April 11, 2022 for weight gain, arm and leg swelling w/ low hemoglobin, was sent to the ED by PCP for tachycardia of 140-160's. Patient mentioned that she was seen in the PCP office yesterday and she was found to have tachycardia. She was advised to come to the ED. Patient denies any chest pain or SOB. She does reports swelling of the left lower extremity since last hospitalization. She denies any nausea, vomiting, fever or chills. Patient denies any recent long traveling. In the ED Patient had CTA of the chest which showed nonocclusive pulmonary embolus in the right main and upper lobar pulmonary arteries. Occlusive thrombus in the segmental and subsegmental branches in the posterior right lower lobe. Duplex US of the lower extremities showed Subacute, occlusive deep venous thrombus in the popliteal, peroneal and proximal posterior tibial veins. Occult blood was neg. Patient was started on IV Lovenox. Hospitalist were consulted and patient was admitted to the hospital for further management.       ERCourse:  In ED patient /85, Pulse 127, Temp 36.2, O2 94%.   CBC showed hemoglobin 10.4 and hematocrit 30.6  CMP showed AG of 17, glucose 108, alp 304,   lactic acid 3.0  Troponin 33  Pro BNP of 1193      REVIEW OF SYSTEMS:   Ten systems reviewed and were negative except as noted in the HPI.                PAST MEDICAL HISTORY:  Reviewed   HTN  Stress induced cardiomyopathy  Past Medical History:   Diagnosis Date   • Hypertension    • Osteoporosis    • Smoking   "      PAST SURGICAL HISTORY:  Past Surgical History:   Procedure Laterality Date   • US-NEEDLE CORE BX-BREAST PANEL Right        FAMILY HISTORY:  Mother with hx of heart failure  Brother with hx of heart problem    SOCIAL HISTORY:   Smoke 1/2 pack of cigarette for 25 years  Alcohol abuse 1-3 beer   She was a    Lives alone       ALLERGIES:  No Known Allergies     OUTPATIENT MEDICATIONS:  No current facility-administered medications for this encounter.    Current Outpatient Medications:   •  furosemide (LASIX) 20 MG Tab, Take 1 Tablet by mouth every day for 30 days., Disp: 30 Tablet, Rfl: 0  •  potassium chloride ER (KLOR-CON) 10 MEQ tablet, Take 1 Tablet by mouth every day for 30 days., Disp: 30 Tablet, Rfl: 0  •  mirtazapine (REMERON) 15 MG Tab, Take 1 Tablet by mouth every evening., Disp: 30 Tablet, Rfl: 0  •  simvastatin (ZOCOR) 20 MG Tab, Take 20 mg by mouth every evening. Indications: Ischemic Heart Disease, Disp: , Rfl:     PHYSICAL EXAM:  Vitals:    22 1009 22 1100 22 1105 22 1200   BP: 115/85 126/75 119/74 121/76   Pulse: (!) 127 (!) 106 97 91   Resp: 16 (!) 40 (!) 11 17   Temp: 36.2 °C (97.2 °F)      TempSrc: Temporal      SpO2: 94% 99% 97% 96%   Weight: 45.2 kg (99 lb 10.4 oz)      Height: 1.676 m (5' 6\")      , Temp (24hrs), Av.6 °C (97.8 °F), Min:36.2 °C (97.2 °F), Max:36.9 °C (98.4 °F)  , Pulse Oximetry: 96 %    ROS:  Constitutional: in no apparent distress  Heart: denies any chest pain, or palpation, reports heart racing   Lungs: Denies any SOB or wheezing   Abdomen: no nausea, vomiting, fever or chills. Denies diarrhea or constipation   Extremities: Reports left lower extremities swelling   Skin: Rashes on the chest from most recent hospitalization, no erythema or induration     General: Pt resting in NAD, cooperative   Skin:  Pink, warm and dry.  No rashes  HEENT: Normocephalic atraumatic  Neck:  Supple without lymphadenopathy or rigidity. No JVD   Lungs:  " "Symmetrical.  CTAB with no W/R/R.  Good air movement   Cardiovascular:  S1/S2 RRR without M/R/G.  Abdomen:  Abdomen is soft NT/ND. +BS. No masses noted.  Extremities:  Full range of motion. No gross deformities noted. 2+ pulses in all extremities. Swelling and tenderness of the left lower extremity. Mild erythema but no induration   CNS:  Muscle tone is normal. Cranial nerves II-XII grossly intact.          LAB TESTS:   Recent Labs     04/19/22  1055   WBC 6.3   RBC 3.22*   HEMOGLOBIN 10.4*   HEMATOCRIT 30.6*   MCV 95.0   MCH 32.3   RDW 80.1*   PLATELETCT 290   MPV 10.7   NEUTSPOLYS 85.90*   LYMPHOCYTES 8.80*   MONOCYTES 4.40   EOSINOPHILS 0.00   BASOPHILS 0.90   RBCMORPHOLO Present         Recent Labs     04/19/22  1055   SODIUM 141   POTASSIUM 4.0   CHLORIDE 99   CO2 25   BUN 9   CREATININE 1.16   CALCIUM 8.6   ALBUMIN 3.7       CULTURES:   Results     Procedure Component Value Units Date/Time    BLOOD CULTURE [[id number]] Collected: 04/19/22 1128    Order Status: Sent Specimen: Blood from Peripheral Updated: 04/19/22 1151    Narrative:      Per Hospital Policy: Only change Specimen Src: to \"Line\" if  specified by physician order.    BLOOD CULTURE [[id number]] Collected: 04/19/22 1128    Order Status: Sent Specimen: Blood from Peripheral Updated: 04/19/22 1150    Narrative:      Per Hospital Policy: Only change Specimen Src: to \"Line\" if  specified by physician order.    Urine Culture (NEW) [[id number]]     Order Status: Sent Specimen: Urine, Clean Catch     URINALYSIS (UA) [[id number]]     Order Status: Sent Specimen: Urine, Clean Catch           IMAGES:  CT-CTA CHEST PULMONARY ARTERY W/ RECONS   Final Result      1.  There are peripheral, linear nonocclusive pulmonary embolus in the right main and upper lobar pulmonary arteries. Occlusive thrombus in the segmental and subsegmental branches in the posterior right lower lobe.   2.  Normal RV/ LV ratio.   3.  New compression fracture of L1 vertebral body.   4.  " Multiple bilateral thyroid nodules, recommend dedicated ultrasound of the thyroid if not already performed.            Findings were discussed with BURAK FUNEZ on 4/19/2022 1:13 PM.         US-EXTREMITY VENOUS LOWER BILAT   Final Result      DX-CHEST-PORTABLE (1 VIEW)   Final Result      Resolution of small pleural effusions.            CONSULTS:   None    ASSESSMENT/PLAN:   * Pulmonary embolism and infarction (HCC)  Assessment & Plan  Presented with Tachycardia   CTA of the chest which showed nonocclusive pulmonary embolus in the right main and upper lobar pulmonary arteries. Occlusive thrombus in the segmental and subsegmental branches in the posterior right lower lobe    Will start pt on Lovenox   Currently on room air  Hemodynamically stable  Monitor on telemetry     Acute deep vein thrombosis (DVT) of left lower extremity (HCC)  Assessment & Plan  Presented w/ tachycardia and Left lower extremity swelling    Duplex US of the lower extremities showed Subacute, occlusive deep venous thrombus in the popliteal, peroneal and proximal posterior tibial veins      Will start pt on Lovenox   Currently on room air  Hemodynamically stable  Monitor on telemetry       Elevated troponin  Assessment & Plan  Mildly Elevated troponin 33 likely type 2 MI from PE   Asymptomatic with no chest pain    Hx of stress induced cardiomyopathy in feb 2022 with EF of 35%   Most recent Echo 3/31/2022 with EF of 55%     Continue monitor   PRN stat EKG for chest pain           High anion gap metabolic acidosis  Assessment & Plan  AG of 17 and Lactic acid of 3.0    Likely from PE and DVT     Continue Lovenox  Adequate hydration and encourages ambulation   IV fluids     Normocytic anemia  Assessment & Plan  Denies any hx of melena or hematochezia   Had dropped in Hemoglobin from 9 to 7.5 on most recent hospitalization   Denies a history of GI bleeding.    Her colonoscopy 2016 was unremarkable.    Her recent iron study was normal.    Recent H/H  is 10/30  Denies melena or hematochezia  Monitor for GI bleed since patient is on Lovenox for DVT and PE    Severe protein-calorie malnutrition ([place]: less than 60% of standard weight) ([facility])  Assessment & Plan  Nutrition consult and optimize nutrition     Compression fracture of first lumbar vertebra (HCC)  Assessment & Plan  Hx of osteoporosis   CTA PE study showed New compression fracture of L1 vertebral body    Pain medication Prn   Outpatient follow     Thyroid nodule  Assessment & Plan  CTA PE study showed Multiple bilateral thyroid nodules, recommend dedicated ultrasound of the thyroid if not already performed.    Outpatient follow up     Alcohol abuse  Assessment & Plan  Drink 1-3 beer's a day   Worked as      Alcohol cessation counseling provided     Smoking  Assessment & Plan  Smoke 1/2 pack for 25 years  Nicotine replacement therapy     Smoking cessation counseling provided       Em Gómez PGY 3

## 2022-04-19 NOTE — ASSESSMENT & PLAN NOTE
Hx of osteoporosis   CTA PE study showed New compression fracture of L1 vertebral body    Pain medication Prn   Outpatient follow

## 2022-04-19 NOTE — ASSESSMENT & PLAN NOTE
Denies any hx of melena or hematochezia or GI bleed  -Had previous discussion of outpatient GI workup  -2/2022: Fe 63, %sat 47, ferritin 955, B12 955, TSH 0.9, fT4 1.06; 6/21 Folate 4.3    -Hb: 10.4 --> 8.4-->8.2  -4/21 EGD and colonoscopy

## 2022-04-19 NOTE — ASSESSMENT & PLAN NOTE
Likely 2/2 from oxygen mismatch from PE. Asymptomatic with no chest pain. Hx of stress induced cardiomyopathy in feb 2022 with EF of 35%. Most recent Echo 3/31/2022 with EF of 55%   -Troponin: 33 --> 28  -EKG: Sinus tachycardia, PVCs

## 2022-04-19 NOTE — ASSESSMENT & PLAN NOTE
-Duplex US of the lower extremities (4/19): showed subacute, occlusive deep venous thrombus in the popliteal, peroneal and proximal posterior tibial veins  -Therapeutic heparin

## 2022-04-19 NOTE — CARE PLAN
The patient is Stable - Low risk of patient condition declining or worsening    Shift Goals  Clinical Goals: monitor oxygen, safety  Patient Goals: rest    Progress made toward(s) clinical / shift goals:  Patient educated on safety, patient sating 90s on room air, awaiting further orders by MD

## 2022-04-20 ENCOUNTER — HOME CARE VISIT (OUTPATIENT)
Dept: HOME HEALTH SERVICES | Facility: HOME HEALTHCARE | Age: 67
End: 2022-04-20
Payer: MEDICARE

## 2022-04-20 VITALS
SYSTOLIC BLOOD PRESSURE: 128 MMHG | WEIGHT: 110 LBS | DIASTOLIC BLOOD PRESSURE: 70 MMHG | TEMPERATURE: 99.3 F | BODY MASS INDEX: 17.75 KG/M2 | RESPIRATION RATE: 18 BRPM | HEART RATE: 115 BPM | OXYGEN SATURATION: 95 %

## 2022-04-20 PROBLEM — Z78.9 FREQUENT HOSPITAL ADMISSIONS: Status: ACTIVE | Noted: 2022-04-20

## 2022-04-20 PROBLEM — E63.9 NUTRITIONAL DEFICIENCY: Status: ACTIVE | Noted: 2022-04-20

## 2022-04-20 LAB
ALBUMIN SERPL BCP-MCNC: 2.7 G/DL (ref 3.2–4.9)
ALBUMIN/GLOB SERPL: 1.2 G/DL
ALP SERPL-CCNC: 209 U/L (ref 30–99)
ALT SERPL-CCNC: 12 U/L (ref 2–50)
ANION GAP SERPL CALC-SCNC: 10 MMOL/L (ref 7–16)
ANION GAP SERPL CALC-SCNC: 12 MMOL/L (ref 7–16)
ANISOCYTOSIS BLD QL SMEAR: ABNORMAL
APPEARANCE UR: CLEAR
APTT PPP: 34.4 SEC (ref 24.7–36)
AST SERPL-CCNC: 23 U/L (ref 12–45)
BACTERIA #/AREA URNS HPF: ABNORMAL /HPF
BASOPHILS # BLD AUTO: 0 % (ref 0–1.8)
BASOPHILS # BLD: 0 K/UL (ref 0–0.12)
BILIRUB SERPL-MCNC: 0.6 MG/DL (ref 0.1–1.5)
BILIRUB UR QL STRIP.AUTO: NEGATIVE
BUN SERPL-MCNC: 8 MG/DL (ref 8–22)
BUN SERPL-MCNC: 8 MG/DL (ref 8–22)
CALCIUM SERPL-MCNC: 7.6 MG/DL (ref 8.5–10.5)
CALCIUM SERPL-MCNC: 7.8 MG/DL (ref 8.5–10.5)
CHLORIDE SERPL-SCNC: 102 MMOL/L (ref 96–112)
CHLORIDE SERPL-SCNC: 103 MMOL/L (ref 96–112)
CO2 SERPL-SCNC: 25 MMOL/L (ref 20–33)
CO2 SERPL-SCNC: 25 MMOL/L (ref 20–33)
COLOR UR: YELLOW
CREAT SERPL-MCNC: 0.9 MG/DL (ref 0.5–1.4)
CREAT SERPL-MCNC: 1.02 MG/DL (ref 0.5–1.4)
EOSINOPHIL # BLD AUTO: 0.07 K/UL (ref 0–0.51)
EOSINOPHIL NFR BLD: 1.8 % (ref 0–6.9)
EPI CELLS #/AREA URNS HPF: ABNORMAL /HPF
ERYTHROCYTE [DISTWIDTH] IN BLOOD BY AUTOMATED COUNT: 81.4 FL (ref 35.9–50)
GFR SERPLBLD CREATININE-BSD FMLA CKD-EPI: 60 ML/MIN/1.73 M 2
GFR SERPLBLD CREATININE-BSD FMLA CKD-EPI: 70 ML/MIN/1.73 M 2
GLOBULIN SER CALC-MCNC: 2.3 G/DL (ref 1.9–3.5)
GLUCOSE SERPL-MCNC: 69 MG/DL (ref 65–99)
GLUCOSE SERPL-MCNC: 95 MG/DL (ref 65–99)
GLUCOSE UR STRIP.AUTO-MCNC: NEGATIVE MG/DL
HCT VFR BLD AUTO: 25.5 % (ref 37–47)
HGB BLD-MCNC: 8.4 G/DL (ref 12–16)
HYALINE CASTS #/AREA URNS LPF: ABNORMAL /LPF
INR PPP: 1.02 (ref 0.87–1.13)
KETONES UR STRIP.AUTO-MCNC: NEGATIVE MG/DL
LACTATE BLD-SCNC: 1.1 MMOL/L (ref 0.5–2)
LEUKOCYTE ESTERASE UR QL STRIP.AUTO: ABNORMAL
LYMPHOCYTES # BLD AUTO: 1.01 K/UL (ref 1–4.8)
LYMPHOCYTES NFR BLD: 24.6 % (ref 22–41)
MACROCYTES BLD QL SMEAR: ABNORMAL
MAGNESIUM SERPL-MCNC: 1 MG/DL (ref 1.5–2.5)
MAGNESIUM SERPL-MCNC: 1.1 MG/DL (ref 1.5–2.5)
MANUAL DIFF BLD: NORMAL
MCH RBC QN AUTO: 31.9 PG (ref 27–33)
MCHC RBC AUTO-ENTMCNC: 32.9 G/DL (ref 33.6–35)
MCV RBC AUTO: 97 FL (ref 81.4–97.8)
MICRO URNS: ABNORMAL
MONOCYTES # BLD AUTO: 0.18 K/UL (ref 0–0.85)
MONOCYTES NFR BLD AUTO: 4.5 % (ref 0–13.4)
MORPHOLOGY BLD-IMP: NORMAL
NEUTROPHILS # BLD AUTO: 2.83 K/UL (ref 2–7.15)
NEUTROPHILS NFR BLD: 69.1 % (ref 44–72)
NITRITE UR QL STRIP.AUTO: NEGATIVE
NRBC # BLD AUTO: 0 K/UL
NRBC BLD-RTO: 0 /100 WBC
OVALOCYTES BLD QL SMEAR: NORMAL
PH UR STRIP.AUTO: 8 [PH] (ref 5–8)
PLATELET # BLD AUTO: 231 K/UL (ref 164–446)
PLATELET BLD QL SMEAR: NORMAL
PMV BLD AUTO: 9.6 FL (ref 9–12.9)
POIKILOCYTOSIS BLD QL SMEAR: NORMAL
POTASSIUM SERPL-SCNC: 3.3 MMOL/L (ref 3.6–5.5)
POTASSIUM SERPL-SCNC: 4 MMOL/L (ref 3.6–5.5)
PROT SERPL-MCNC: 5 G/DL (ref 6–8.2)
PROT UR QL STRIP: NEGATIVE MG/DL
PROTHROMBIN TIME: 13.1 SEC (ref 12–14.6)
RBC # BLD AUTO: 2.63 M/UL (ref 4.2–5.4)
RBC # URNS HPF: ABNORMAL /HPF
RBC BLD AUTO: PRESENT
RBC UR QL AUTO: NEGATIVE
SODIUM SERPL-SCNC: 138 MMOL/L (ref 135–145)
SODIUM SERPL-SCNC: 139 MMOL/L (ref 135–145)
SP GR UR STRIP.AUTO: 1.01
TROPONIN T SERPL-MCNC: 28 NG/L (ref 6–19)
UFH PPP CHRO-ACNC: 0.47 IU/ML
UROBILINOGEN UR STRIP.AUTO-MCNC: 0.2 MG/DL
WBC # BLD AUTO: 4.1 K/UL (ref 4.8–10.8)
WBC #/AREA URNS HPF: ABNORMAL /HPF

## 2022-04-20 PROCEDURE — 85730 THROMBOPLASTIN TIME PARTIAL: CPT

## 2022-04-20 PROCEDURE — 83735 ASSAY OF MAGNESIUM: CPT

## 2022-04-20 PROCEDURE — 700102 HCHG RX REV CODE 250 W/ 637 OVERRIDE(OP): Performed by: NURSE PRACTITIONER

## 2022-04-20 PROCEDURE — 85007 BL SMEAR W/DIFF WBC COUNT: CPT

## 2022-04-20 PROCEDURE — 700102 HCHG RX REV CODE 250 W/ 637 OVERRIDE(OP)

## 2022-04-20 PROCEDURE — 700102 HCHG RX REV CODE 250 W/ 637 OVERRIDE(OP): Performed by: HOSPITALIST

## 2022-04-20 PROCEDURE — A9270 NON-COVERED ITEM OR SERVICE: HCPCS | Performed by: HOSPITALIST

## 2022-04-20 PROCEDURE — A9270 NON-COVERED ITEM OR SERVICE: HCPCS

## 2022-04-20 PROCEDURE — 85520 HEPARIN ASSAY: CPT

## 2022-04-20 PROCEDURE — 87086 URINE CULTURE/COLONY COUNT: CPT

## 2022-04-20 PROCEDURE — 83605 ASSAY OF LACTIC ACID: CPT

## 2022-04-20 PROCEDURE — 80053 COMPREHEN METABOLIC PANEL: CPT

## 2022-04-20 PROCEDURE — 700111 HCHG RX REV CODE 636 W/ 250 OVERRIDE (IP): Performed by: HOSPITALIST

## 2022-04-20 PROCEDURE — 36415 COLL VENOUS BLD VENIPUNCTURE: CPT

## 2022-04-20 PROCEDURE — 81001 URINALYSIS AUTO W/SCOPE: CPT

## 2022-04-20 PROCEDURE — 85025 COMPLETE CBC W/AUTO DIFF WBC: CPT

## 2022-04-20 PROCEDURE — 85610 PROTHROMBIN TIME: CPT

## 2022-04-20 PROCEDURE — 770020 HCHG ROOM/CARE - TELE (206)

## 2022-04-20 PROCEDURE — 84484 ASSAY OF TROPONIN QUANT: CPT

## 2022-04-20 PROCEDURE — 80048 BASIC METABOLIC PNL TOTAL CA: CPT

## 2022-04-20 PROCEDURE — 700111 HCHG RX REV CODE 636 W/ 250 OVERRIDE (IP)

## 2022-04-20 PROCEDURE — 99233 SBSQ HOSP IP/OBS HIGH 50: CPT | Mod: GC | Performed by: INTERNAL MEDICINE

## 2022-04-20 PROCEDURE — A9270 NON-COVERED ITEM OR SERVICE: HCPCS | Performed by: NURSE PRACTITIONER

## 2022-04-20 RX ORDER — MAGNESIUM SULFATE HEPTAHYDRATE 40 MG/ML
2 INJECTION, SOLUTION INTRAVENOUS ONCE
Status: COMPLETED | OUTPATIENT
Start: 2022-04-20 | End: 2022-04-20

## 2022-04-20 RX ORDER — POTASSIUM CHLORIDE 20 MEQ/1
40 TABLET, EXTENDED RELEASE ORAL ONCE
Status: COMPLETED | OUTPATIENT
Start: 2022-04-20 | End: 2022-04-20

## 2022-04-20 RX ORDER — PEG-3350, SODIUM SULFATE, SODIUM CHLORIDE, POTASSIUM CHLORIDE, SODIUM ASCORBATE AND ASCORBIC ACID 7.5-2.691G
100 KIT ORAL 2 TIMES DAILY
Status: COMPLETED | OUTPATIENT
Start: 2022-04-20 | End: 2022-04-20

## 2022-04-20 RX ORDER — HEPARIN SODIUM 5000 [USP'U]/100ML
0-30 INJECTION, SOLUTION INTRAVENOUS CONTINUOUS
Status: DISCONTINUED | OUTPATIENT
Start: 2022-04-20 | End: 2022-04-22

## 2022-04-20 RX ADMIN — ENOXAPARIN SODIUM 40 MG: 40 INJECTION SUBCUTANEOUS at 05:19

## 2022-04-20 RX ADMIN — FUROSEMIDE 20 MG: 20 TABLET ORAL at 05:19

## 2022-04-20 RX ADMIN — POTASSIUM CHLORIDE 10 MEQ: 750 TABLET, FILM COATED, EXTENDED RELEASE ORAL at 05:19

## 2022-04-20 RX ADMIN — POLYETHYLENE GLYCOL 3350, SODIUM SULFATE, SODIUM CHLORIDE, POTASSIUM CHLORIDE, SODIUM ASCORBATE, AND ASCORBIC ACID 100 G: KIT at 20:42

## 2022-04-20 RX ADMIN — POTASSIUM CHLORIDE 40 MEQ: 20 TABLET, EXTENDED RELEASE ORAL at 09:35

## 2022-04-20 RX ADMIN — MIRTAZAPINE 15 MG: 15 TABLET, FILM COATED ORAL at 20:42

## 2022-04-20 RX ADMIN — MAGNESIUM SULFATE HEPTAHYDRATE 2 G: 40 INJECTION, SOLUTION INTRAVENOUS at 19:14

## 2022-04-20 RX ADMIN — SIMVASTATIN 20 MG: 20 TABLET, FILM COATED ORAL at 17:27

## 2022-04-20 RX ADMIN — HEPARIN SODIUM 18 UNITS/KG/HR: 5000 INJECTION, SOLUTION INTRAVENOUS at 17:55

## 2022-04-20 RX ADMIN — POLYETHYLENE GLYCOL 3350, SODIUM SULFATE, SODIUM CHLORIDE, POTASSIUM CHLORIDE, SODIUM ASCORBATE, AND ASCORBIC ACID 100 G: KIT at 16:11

## 2022-04-20 RX ADMIN — MAGNESIUM SULFATE HEPTAHYDRATE 2 G: 40 INJECTION, SOLUTION INTRAVENOUS at 16:00

## 2022-04-20 ASSESSMENT — ENCOUNTER SYMPTOMS
CHILLS: 0
EYE DISCHARGE: 0
WEAKNESS: 0
INSOMNIA: 0
HEADACHES: 0
MUSCLE WEAKNESS: 1
PND: 0
NAUSEA: 0
BACK PAIN: 0
COUGH: 0
POLYDIPSIA: 0
NECK PAIN: 0
DENIES PAIN: 1
EYE PAIN: 0
STRIDOR: 0
VOMITING: DENIES
MEMORY LOSS: 0
SORE THROAT: 0
EYE REDNESS: 0
SINUS PAIN: 0
FEVER: 0
VOMITING: 0
BLURRED VISION: 0
DIZZINESS: 0
PERSON REPORTING PAIN: PATIENT
CLAUDICATION: 0
ABDOMINAL PAIN: 0
WHEEZING: 0
NAUSEA: DENIES
SHORTNESS OF BREATH: 1
MYALGIAS: 0
DOUBLE VISION: 0
DIARRHEA: 0
WEAKNESS: 1
CONSTIPATION: 0
SHORTNESS OF BREATH: 0
BLOOD IN STOOL: 0

## 2022-04-20 ASSESSMENT — FIBROSIS 4 INDEX
FIB4 SCORE: 1.75
FIB4 SCORE: 1.93
FIB4 SCORE: 1.75

## 2022-04-20 ASSESSMENT — PAIN DESCRIPTION - PAIN TYPE
TYPE: ACUTE PAIN
TYPE: ACUTE PAIN

## 2022-04-20 NOTE — DISCHARGE PLANNING
*ATTN Discharge Planning team: This patient is currently on service with [facility]. Please submit a referral order and face-to-face prior to discharging the patient home. If you have any questions, contact our Transitional Care Specialist team at x [phone].

## 2022-04-20 NOTE — DISCHARGE PLANNING
[facility] TCN chart review completed. Collaborated with Jamal SAEZ prior to meeting with the pt. The most current review of medical record, knowledge of pt's PLOF and social support, LACE+ score of 75, 6 clicks scores of 22 mobility were considered.      Pt seen at bedside. Introduced TCN program. Provided education regarding differences in post acute resources including IRF, SNF and HH. Discussed [facility] plan benefits including Meds to Beds and [facility] transitional care services. Pt verbalizes understanding and is well aware of dc planning process from prior admits. Pt appears to be mobilizing at baseline per her report and chart. Spoke with Jamal SAEZ and noted pt gave verbal authorization to TCN that she would like to continue with [facility] and [facility]  services at time of dc. Jamal SAEZ to obtain FTF for resumption of HH services post dc. TCN sent email to [facility] and [facility] CM as well given concerns with readmissions.  Discussed possible use of geriatric consult to assist with dc planning/post dc concerns given hx of readmission and concerns with pt's GOC and current status, etc (TCN attempting to obtain geriatric consult at this time). Note as prior admits, pt will refuse placement (and would note readmission is 2' to medical issues including tachycardia, DVT, PE). TCN will continue to follow and collaborate with discharge planning team as additional post acute needs arise. Thank you.

## 2022-04-20 NOTE — CARE PLAN
The patient is Watcher - Medium risk of patient condition declining or worsening    Shift Goals  Clinical Goals: Monitor HR  Patient Goals: Go home    Progress made toward(s) clinical / shift goals:    Problem: Knowledge Deficit - Standard  Goal: Patient and family/care givers will demonstrate understanding of plan of care, disease process/condition, diagnostic tests and medications  Outcome: Progressing     Problem: Fall Risk  Goal: Patient will remain free from falls  Outcome: Progressing       Patient is not progressing towards the following goals:Not yet cleared for discharge

## 2022-04-20 NOTE — ASSESSMENT & PLAN NOTE
-CT-PE (4/18): Multiple bilateral thyroid nodules  -Previous TSH is within normal (last 2 months ago)  -Patient currently asymptomatic, likely recommend outpatient ultrasound and follow-up

## 2022-04-20 NOTE — RESPIRATORY CARE
COPD EDUCATION by COPD CLINICAL EDUCATOR  4/20/2022 at 8:12 AM by Dipti Villagran RRT     Patient reviewed by COPD education team. Patient does not have a diagnosis or history of COPD, is a non-smoker, and doesn't use any respiratory medications. Therefore the patient does not qualify for the COPD program.

## 2022-04-20 NOTE — PROGRESS NOTES
Monitor Summary:    Rhythm:  SR    HR: 71-83    Measurements: 14/07/40    Ectopy: r PVC              Normal Values  Rhythm SR  HR Range    Measurements 0.12-0.20 / 0.06-0.10  / 0.30-0.52

## 2022-04-20 NOTE — DIETARY
"Nutrition services: Day 1 of admit.  Jaymie Peacock is a 66 y.o. female with admitting DX of pulmonary embolism and infarction      Consult received for BMI <19 and consult received for low BMI. Met with pt at bedside. Per pt, she states she has a good appetite and ate >50% of her breakfast this morning. Pt reports she weighs herself often for [facility] and her weights fluctuate. Per pt, she has been trying to intentionally lose weight. She states she was eating well PTA. Pt reports that she has been eating less meats recently. We discussed adding supplements to help optimize PO intake but pt declined supplements, yogurt smoothies, and Greek yogurt. Pt states she plans on leaving today and declined to discuss any dietary preferences at today's RD visit.     Assessment:  Height: 167.6 cm (5' 6\")  Weight: 44.2 kg (97 lb 7.1 oz)- stand up scale  Body mass index is 15.73 kg/m²., BMI classification: underweight  Diet/Intake: Regular Diet    Evaluation:   1. Pt admitted for PE and infarction with nutritional deficiency, acute DVT, thyroid nodule, compression fracture of first lumbar vertebra, alk phos elevation, high anion gap metabolic acidosis, elevated troponin, normocytic anemia, severe protein-calorie malnutrition, smoking, alcohol abuse  2. PO intake 50-75% x1 meal per ADL's  3. Unable to perform full nutrition focused physical exam at today's visit. Upon visual exam, pt appeared to have muscle wasting.   4. Based on weight history in chart, pt's weights are variable and range from 84.6 lbs to 122.9 lbs.   5. Labs: potassium 3.3, albumin 2.7, alk phos 209  6. Meds: lasix, Remeron, Kdur, senna-docusate, bowel regimen  7. Skin: no staged wounds noted  8. GI: Last BM PTA    Malnutrition Risk: Unable to fully assess at this time.     Recommendations/Plan:  1. Continue current PO diet as tolerated  2. Encourage intake of meals  3. Document intake of all meals  as % taken in ADL's to provide interdisciplinary " communication across all shifts.   4. Monitor weight.  5. Nutrition Focused Physical Exam  6. Nutrition rep will continue to see patient for ongoing meal and snack preferences.     RD following.

## 2022-04-20 NOTE — PROGRESS NOTES
Assumed care at 1900, bedside report received from Anna Marie ABBASI. Pt. Is ST on the monitor. Initial assessment completed, orders reviewed, call light within reach, bed alarm not in use pt call approprietly, and hourly rounding in place. POC addressed with patient, no additional questions at this time.

## 2022-04-20 NOTE — ASSESSMENT & PLAN NOTE
Patient stated that she may have fallen couple of days ago  -CT-PE (4/18): New compression fracture of L1 vertebral body  -Likely recommend outpatient follow-up and consideration of possible bisphosphonate after medical stabilization  -pain management

## 2022-04-20 NOTE — PROGRESS NOTES
Daily Progress Note:     Date of Service: 4/20/2022  Primary Team: [facility] IM White Team   Attending: Daniel Knowles M.D.   Senior Resident: Dr. Ruiz  Intern: Dr. Garduno  Contact:  [phone]    ID:  Ms. Jaymie Peacock is a 66 y.o. female with past medical history of HTN, Emphysema, osteoporosis, Tobacco abuse, stress induced cardiomyopathy in feb 2022 with EF of 35% however with subsequent improvement of ejection fraction on 3/2022 echocardiogram.  Patient admitted for acute pulmonary embolism and further evaluation and management.    Subjective:  -No acute events overnight.  -Patient stated that she would like to go home today.  After extensive discussion with the patient and due to high risk of bounce back and currently complex medical condition, patient was agreeable to remaining inpatient.  Patient was also agreeable for possible evaluation by GI and if further studies or intervention needs to be done.  -Patient denied any chest pain or abdominal pain and states that she feels nearly back at baseline.    Consultants/Specialty:  GI    Review of Systems:    Review of Systems   Constitutional: Negative for chills and fever.   HENT: Negative for hearing loss.    Eyes: Negative for blurred vision and double vision.   Respiratory: Negative for cough and shortness of breath.    Cardiovascular: Positive for leg swelling. Negative for chest pain.   Gastrointestinal: Negative for abdominal pain, blood in stool, constipation, diarrhea, nausea and vomiting.   Genitourinary: Negative for dysuria.   Musculoskeletal: Negative for back pain.   Skin: Negative for rash.   Neurological: Negative for weakness and headaches.   Psychiatric/Behavioral: The patient does not have insomnia.        Objective Data:   Physical Exam:   Vitals:   Temp:  [36.1 °C (97 °F)-37.1 °C (98.8 °F)] 36.5 °C (97.7 °F)  Pulse:  [] 79  Resp:  [18] 18  BP: (106-110)/(63-77) 107/73  SpO2:  [95 %-98 %] 98 %    Physical Exam  Vitals and nursing note  reviewed.   Constitutional:       Appearance: Normal appearance. She is not toxic-appearing.   HENT:      Head: Normocephalic and atraumatic.      Right Ear: External ear normal.      Left Ear: External ear normal.      Nose: No rhinorrhea.      Mouth/Throat:      Mouth: Mucous membranes are moist.   Eyes:      Extraocular Movements: Extraocular movements intact.      Conjunctiva/sclera: Conjunctivae normal.   Cardiovascular:      Rate and Rhythm: Normal rate and regular rhythm.      Heart sounds: Murmur heard.     No gallop.      Comments: Possible 2/6 systolic murmur around mitral area  Pulmonary:      Breath sounds: Normal breath sounds. No wheezing or rales.   Abdominal:      General: Bowel sounds are normal. There is no distension.      Tenderness: There is no abdominal tenderness.   Musculoskeletal:      Cervical back: Neck supple.      Right lower leg: Edema present.      Left lower leg: Edema present.      Comments: B/l pitting edema, 2+ until knee   Skin:     Capillary Refill: Capillary refill takes 2 to 3 seconds.      Coloration: Skin is not pale.   Neurological:      Mental Status: She is oriented to person, place, and time.   Psychiatric:         Mood and Affect: Mood normal.         Labs:   Recent Labs     04/19/22  1055 04/20/22  0745   SODIUM 141 138   POTASSIUM 4.0 3.3*   CHLORIDE 99 103   CO2 25 25   GLUCOSE 108* 69   BUN 9 8     Recent Labs     04/19/22  1055 04/20/22  0745   ALBUMIN 3.7 2.7*   TBILIRUBIN 0.9 0.6   ALKPHOSPHAT 304* 209*   TOTPROTEIN 6.6 5.0*   ALTSGPT 19 12   ASTSGOT 33 23   CREATININE 1.16 0.90     Recent Labs     04/19/22  1055 04/20/22  0745   WBC 6.3 4.1*   RBC 3.22* 2.63*   HEMOGLOBIN 10.4* 8.4*   HEMATOCRIT 30.6* 25.5*   MCV 95.0 97.0   MCH 32.3 31.9   RDW 80.1* 81.4*   PLATELETCT 290 231   MPV 10.7 9.6   NEUTSPOLYS 85.90* 69.10   LYMPHOCYTES 8.80* 24.60   MONOCYTES 4.40 4.50   EOSINOPHILS 0.00 1.80   BASOPHILS 0.90 0.00   RBCMORPHOLO Present Present       Imaging:   CT-CTA  CHEST PULMONARY ARTERY W/ RECONS   Final Result      1.  There are peripheral, linear nonocclusive pulmonary embolus in the right main and upper lobar pulmonary arteries. Occlusive thrombus in the segmental and subsegmental branches in the posterior right lower lobe.   2.  Normal RV/ LV ratio.   3.  New compression fracture of L1 vertebral body.   4.  Multiple bilateral thyroid nodules, recommend dedicated ultrasound of the thyroid if not already performed.            Findings were discussed with BURAK FUNEZ on 4/19/2022 1:13 PM.         US-EXTREMITY VENOUS LOWER BILAT   Final Result      DX-CHEST-PORTABLE (1 VIEW)   Final Result      Resolution of small pleural effusions.          Problem Representation: Ms. Peacock is a 67 y/o F admitted for acute pulmonary embolism and DVTs.  Due to concern for possible secondary etiology causing pulmonary embolism and acute hemoglobin drop in the setting of previous positive FOBT, GI was consulted for EGD and colonoscopy.    * Pulmonary embolism and infarction (HCC)- (present on admission)  Assessment & Plan  Unknown at this time if it is provoked or unprovoked, will likely need further evaluation for possible malignancy  -CTA of the chest (4/19): nonocclusive pulmonary embolus in the right main and upper lobar pulmonary arteries. Occlusive thrombus in the segmental and subsegmental branches in the posterior right lower lobe  -Hemodynamically stable, continue telemetry monitoring  -After discussion of case with GI and pharmacy, as patient is likely to proceed to the OR on 4/21 for EGD and colonoscopy, will hold off on transitioning to apixaban. Will transition to heparin and after GI confirms timing, heparin will be held accordingly.  Due to high risk of decompensation if therapeutic blood thinners are held therefore heparin is being approached instead of continuation of Lovenox    Acute deep vein thrombosis (DVT) of left lower extremity (HCC)  Assessment & Plan  -Duplex US of  the lower extremities (): showed subacute, occlusive deep venous thrombus in the popliteal, peroneal and proximal posterior tibial veins  -Therapeutic heparin    Normocytic anemia  Assessment & Plan  Denies any hx of melena or hematochezia or GI bleed  -Had previous discussion of outpatient GI workup  -Iron labs on 2022 showed normal iron and elevated ferritin  -Hb: 10.4 --> 8.4  -Due to drop in Hb and previous positive FOBT, GI consulted. Proceeding to OR on  for possible EGD and colonscopy    Frequent hospital admissions  Assessment & Plan  -Possible Geriatrics consult on     Alkaline phosphatase elevation- (present on admission)  Assessment & Plan  Downtrending, unsure of etiology  -Alk phosp: 304 --> 209  -Can consider further work-up with GGT likely as outpatient as patient is asymptomatic    Compression fracture of first lumbar vertebra (HCC)  Assessment & Plan  Patient stated that she may have fallen couple of days ago  -CT-PE (): New compression fracture of L1 vertebral body  -Likely recommend outpatient follow-up and consideration of possible bisphosphonate after medical stabilization    Elevated troponin- (present on admission)  Assessment & Plan  Likely  from oxygen mismatch from PE. Asymptomatic with no chest pain. Hx of stress induced cardiomyopathy in 2022 with EF of 35%. Most recent Echo 3/31/2022 with EF of 55%   -Troponin: 33 --> 28  -EKG: Sinus tachycardia, PVCs    Thyroid nodule  Assessment & Plan  -CT-PE (): Multiple bilateral thyroid nodules  -Previous TSH is within normal (last 2 months ago)  -Patient currently asymptomatic, likely recommend outpatient ultrasound and follow-up    High anion gap metabolic acidosis- (present on admission)  Assessment & Plan  Likely from PE  -Lactic: 3 --> 1.1  -A --> 10  -CTM    Hypomagnesemia- (present on admission)  Assessment & Plan  Possibly related to decreased p.o. intake  -Continue to monitor and replete as  indicated    Severe protein-calorie malnutrition ([place]: less than 60% of standard weight) ([facility])  Assessment & Plan  -Physical exam: Decrease subclavian fat pads  -Nutrition consult and optimize nutrition     Hypokalemia- (present on admission)  Assessment & Plan  Possibly related to decreased p.o. intake  -Continue to monitor and replete as indicated    Alcohol abuse- (present on admission)  Assessment & Plan  Drink 1-3 beer's a day, worked as    -Alcohol cessation counseling provided    Smoking- (present on admission)  Assessment & Plan  Smoke 1/2 pack for 25 years  -Continue NRT   -Smoking cessation counseling provided     Code: Full  Diet: Regular  DVT Px: Therapeutic Lovenox  Dispo: Continue telemetry monitoring

## 2022-04-20 NOTE — CARE PLAN
The patient is Stable - Low risk of patient condition declining or worsening    Shift Goals  Clinical Goals: monitor O2, UA  Patient Goals: rest, sleep    Problem: Knowledge Deficit - Standard  Goal: Patient and family/care givers will demonstrate understanding of plan of care, disease process/condition, diagnostic tests and medications  Outcome: Progressing     Problem: Fall Risk  Goal: Patient will remain free from falls  Outcome: Progressing

## 2022-04-20 NOTE — SENIOR ADMIT NOTE
Gastroenterology Initial Consult Note               Author:  JERSON Grigsby Date & Time Created: 4/20/2022 1:19 PM       Patient ID:  Name:             Jaymie Peacock  YOB: 1955  Age:                 66 y.o.  female  MRN:               [id number]      Referring Provider:  Spencer Garduno MD and [facility] Team      Presenting Chief Complaint:  Anemia, previous occult blood positive stool, Pulmonary embolism      History of Present Illness:    She is a 66-year-old female patient seen in consultation for anemia, occult blood positive stool, pulmonary embolism.  The patient was admitted to the hospital on 4/19/2022 complaining of shortness of breath and tachycardia.  During his hospitalization she has been found to have DVT and pulmonary embolism.  She had a previous hospitalization in February 2022 with findings of heart failure with ejection fraction 35% and elevated troponin.  Repeat echocardiogram in March demonstrated improved ejection fraction of 55%.  At that time Dr. Fernandez was consulted with GI consultants for anemia and occult blood positive stool.  Plan was for outpatient EGD and colonoscopy, but patient did not follow-up with us.  During this hospitalization her hemoglobin went from 10.4 yesterday to 8.4 today.  She denies overt GI bleeding.  Her last colonoscopy was in 2016 which she reports was normal.      Review of Systems:  Review of Systems   Constitutional: Positive for malaise/fatigue. Negative for chills and fever.   HENT: Negative for sinus pain and sore throat.    Eyes: Negative for pain, discharge and redness.   Respiratory: Positive for shortness of breath. Negative for wheezing and stridor.    Cardiovascular: Negative for claudication, leg swelling and PND.   Gastrointestinal: Negative for abdominal pain, blood in stool, diarrhea, melena, nausea and vomiting.   Genitourinary: Negative for dysuria and hematuria.   Musculoskeletal: Negative for myalgias and neck pain.    Neurological: Positive for weakness. Negative for dizziness and headaches.   Endo/Heme/Allergies: Negative for environmental allergies and polydipsia.   Psychiatric/Behavioral: Negative for memory loss. The patient does not have insomnia.              Past Medical History:  Past Medical History:   Diagnosis Date   • Hypertension    • Osteoporosis    • Smoking      Active Hospital Problems    Diagnosis    • Nutritional deficiency [E63.9]    • Pulmonary embolism and infarction (HCC) [I26.99]    • Acute deep vein thrombosis (DVT) of left lower extremity (HCC) [I82.402]    • Thyroid nodule [E04.1]    • Compression fracture of first lumbar vertebra (HCC) [S32.010A]    • Alkaline phosphatase elevation [R74.8]    • Elevated troponin [R77.8]    • High anion gap metabolic acidosis [E87.2]    • Normocytic anemia [D64.9]    • Severe protein-calorie malnutrition ([place]: less than 60% of standard weight) ([facility]) [E43]    • Smoking [F17.200]    • Alcohol abuse [F10.10]          Past Surgical History:  Past Surgical History:   Procedure Laterality Date   • US-NEEDLE CORE BX-BREAST PANEL Right            Hospital Medications:  Current Facility-Administered Medications   Medication Dose Frequency Provider Last Rate Last Admin   • furosemide (LASIX) tablet 20 mg  20 mg DAILY Jose Ramon Cao M.D.   20 mg at 04/20/22 0519   • mirtazapine (Remeron) tablet 15 mg  15 mg Nightly Jose Ramon Cao M.D.   15 mg at 04/19/22 2113   • simvastatin (ZOCOR) tablet 20 mg  20 mg Q EVENING Jose Ramon Cao M.D.   20 mg at 04/19/22 1723   • senna-docusate (PERICOLACE or SENOKOT S) 8.6-50 MG per tablet 2 Tablet  2 Tablet BID Jose Ramon Cao M.D.        And   • polyethylene glycol/lytes (MIRALAX) PACKET 1 Packet  1 Packet QDAY PRN Jose Ramon Cao M.D.        And   • magnesium hydroxide (MILK OF MAGNESIA) suspension 30 mL  30 mL QDAY PRN Jose Ramon Cao M.D.        And   • bisacodyl (DULCOLAX) suppository 10 mg  10 mg QDAY PRN Jose Ramon Cao M.D.       •  enoxaparin (LOVENOX) inj 40 mg  1 mg/kg Q12HRS Jose Ramon Cao M.D.   40 mg at 04/20/22 0519   • acetaminophen (Tylenol) tablet 650 mg  650 mg Q6HRS PRN Jose Ramon Cao M.D.       • nicotine (NICODERM) 21 MG/24HR 21 mg  21 mg Daily-0600 Jose Ramon Cao M.D.       Last reviewed on 4/19/2022  1:51 PM by Paola Gee        Current Outpatient Medications:  Medications Prior to Admission   Medication Sig Dispense Refill Last Dose   • furosemide (LASIX) 20 MG Tab Take 1 Tablet by mouth every day for 30 days. 30 Tablet 0 4/18/2022 at 0700   • potassium chloride ER (KLOR-CON) 10 MEQ tablet Take 1 Tablet by mouth every day for 30 days. 30 Tablet 0 New RX at NOT PICKED UP   • mirtazapine (REMERON) 15 MG Tab Take 1 Tablet by mouth every evening. 30 Tablet 0 New RX at NOT PICKED UP   • simvastatin (ZOCOR) 20 MG Tab Take 20 mg by mouth every evening. Indications: Ischemic Heart Disease   4/18/2022 at 1900         Medication Allergies:  No Known Allergies      Family Medical History:  No family history on file.      Social History:  Social History     Socioeconomic History   • Marital status: Unknown     Spouse name: Not on file   • Number of children: Not on file   • Years of education: Not on file   • Highest education level: Not on file   Occupational History   • Not on file   Tobacco Use   • Smoking status: Former Smoker     Packs/day: 0.50     Types: Cigarettes   • Smokeless tobacco: Never Used   Vaping Use   • Vaping Use: Never used   Substance and Sexual Activity   • Alcohol use: Yes     Alcohol/week: 0.6 oz     Types: 1 Shots of liquor per week   • Drug use: Not Currently   • Sexual activity: Not on file   Other Topics Concern   • Not on file   Social History Narrative   • Not on file     Social Determinants of Health     Financial Resource Strain: Not on file   Food Insecurity: Not on file   Transportation Needs: Not on file   Physical Activity: Not on file   Stress: Not on file   Social Connections: Not on file  "  Intimate Partner Violence: Not on file   Housing Stability: Not on file         Vital signs:  Weight/BMI: Body mass index is 15.73 kg/m².  /74   Pulse 85   Temp 36.2 °C (97.2 °F) (Temporal)   Resp 18   Ht 1.676 m (5' 6\")   Wt 44.2 kg (97 lb 7.1 oz)   SpO2 95%   Vitals:    04/20/22 0658 04/20/22 0659 04/20/22 0735 04/20/22 1105   BP: 107/75   106/74   Pulse: 87   85   Resp: 18   18   Temp: 36.1 °C (97 °F)   36.2 °C (97.2 °F)   TempSrc: Temporal   Temporal   SpO2: 95%   95%   Weight:  48 kg (105 lb 13.1 oz) 44.2 kg (97 lb 7.1 oz)    Height:         Oxygen Therapy:  Pulse Oximetry: 95 %, O2 (LPM): 0, O2 Delivery Device: None - Room Air    Intake/Output Summary (Last 24 hours) at 4/20/2022 1319  Last data filed at 4/20/2022 0900  Gross per 24 hour   Intake 120 ml   Output 150 ml   Net -30 ml         Physical Exam:  Physical Exam  Vitals and nursing note reviewed.   Constitutional:       Appearance: She is ill-appearing.   HENT:      Head: Normocephalic and atraumatic.      Right Ear: External ear normal.      Left Ear: External ear normal.      Nose: Nose normal.      Mouth/Throat:      Mouth: Mucous membranes are moist.      Pharynx: Oropharynx is clear.   Eyes:      General: No scleral icterus.     Extraocular Movements: Extraocular movements intact.      Pupils: Pupils are equal, round, and reactive to light.   Cardiovascular:      Rate and Rhythm: Normal rate and regular rhythm.      Pulses: Normal pulses.      Heart sounds: Normal heart sounds.   Pulmonary:      Effort: Pulmonary effort is normal. No respiratory distress.      Breath sounds: Normal breath sounds. No wheezing.   Abdominal:      General: Abdomen is flat. Bowel sounds are normal. There is no distension.      Palpations: Abdomen is soft.      Tenderness: There is no abdominal tenderness.   Musculoskeletal:         General: No tenderness or deformity.      Cervical back: Neck supple.   Lymphadenopathy:      Cervical: No cervical " adenopathy.   Skin:     General: Skin is warm and dry.      Coloration: Skin is pale.   Neurological:      General: No focal deficit present.      Mental Status: She is alert and oriented to person, place, and time.   Psychiatric:         Thought Content: Thought content normal.         Judgment: Judgment normal.           Labs:  Recent Labs     04/19/22  1055 04/20/22  0745   SODIUM 141 138   POTASSIUM 4.0 3.3*   CHLORIDE 99 103   CO2 25 25   BUN 9 8   CREATININE 1.16 0.90   MAGNESIUM  --  1.0*   CALCIUM 8.6 7.6*     Recent Labs     04/19/22  1055 04/20/22  0745   ALTSGPT 19 12   ASTSGOT 33 23   ALKPHOSPHAT 304* 209*   TBILIRUBIN 0.9 0.6   LIPASE 25  --    GLUCOSE 108* 69     Recent Labs     04/19/22  1055 04/20/22  0745   WBC 6.3 4.1*   NEUTSPOLYS 85.90* 69.10   LYMPHOCYTES 8.80* 24.60   MONOCYTES 4.40 4.50   EOSINOPHILS 0.00 1.80   BASOPHILS 0.90 0.00   ASTSGOT 33 23   ALTSGPT 19 12   ALKPHOSPHAT 304* 209*   TBILIRUBIN 0.9 0.6     Recent Labs     04/19/22  1055 04/20/22  0745   RBC 3.22* 2.63*   HEMOGLOBIN 10.4* 8.4*   HEMATOCRIT 30.6* 25.5*   PLATELETCT 290 231     Recent Results (from the past 24 hour(s))   TROPONIN    Collection Time: 04/20/22  7:45 AM   Result Value Ref Range    Troponin T 28 (H) 6 - 19 ng/L   LACTIC ACID    Collection Time: 04/20/22  7:45 AM   Result Value Ref Range    Lactic Acid 1.1 0.5 - 2.0 mmol/L   Comp Metabolic Panel    Collection Time: 04/20/22  7:45 AM   Result Value Ref Range    Sodium 138 135 - 145 mmol/L    Potassium 3.3 (L) 3.6 - 5.5 mmol/L    Chloride 103 96 - 112 mmol/L    Co2 25 20 - 33 mmol/L    Anion Gap 10.0 7.0 - 16.0    Glucose 69 65 - 99 mg/dL    Bun 8 8 - 22 mg/dL    Creatinine 0.90 0.50 - 1.40 mg/dL    Calcium 7.6 (L) 8.5 - 10.5 mg/dL    AST(SGOT) 23 12 - 45 U/L    ALT(SGPT) 12 2 - 50 U/L    Alkaline Phosphatase 209 (H) 30 - 99 U/L    Total Bilirubin 0.6 0.1 - 1.5 mg/dL    Albumin 2.7 (L) 3.2 - 4.9 g/dL    Total Protein 5.0 (L) 6.0 - 8.2 g/dL    Globulin 2.3 1.9 - 3.5  g/dL    A-G Ratio 1.2 g/dL   CBC WITH DIFFERENTIAL    Collection Time: 04/20/22  7:45 AM   Result Value Ref Range    WBC 4.1 (L) 4.8 - 10.8 K/uL    RBC 2.63 (L) 4.20 - 5.40 M/uL    Hemoglobin 8.4 (L) 12.0 - 16.0 g/dL    Hematocrit 25.5 (L) 37.0 - 47.0 %    MCV 97.0 81.4 - 97.8 fL    MCH 31.9 27.0 - 33.0 pg    MCHC 32.9 (L) 33.6 - 35.0 g/dL    RDW 81.4 (H) 35.9 - 50.0 fL    Platelet Count 231 164 - 446 K/uL    MPV 9.6 9.0 - 12.9 fL    Neutrophils-Polys 69.10 44.00 - 72.00 %    Lymphocytes 24.60 22.00 - 41.00 %    Monocytes 4.50 0.00 - 13.40 %    Eosinophils 1.80 0.00 - 6.90 %    Basophils 0.00 0.00 - 1.80 %    Nucleated RBC 0.00 /100 WBC    Neutrophils (Absolute) 2.83 2.00 - 7.15 K/uL    Lymphs (Absolute) 1.01 1.00 - 4.80 K/uL    Monos (Absolute) 0.18 0.00 - 0.85 K/uL    Eos (Absolute) 0.07 0.00 - 0.51 K/uL    Baso (Absolute) 0.00 0.00 - 0.12 K/uL    NRBC (Absolute) 0.00 K/uL    Anisocytosis 1+     Macrocytosis 1+    ESTIMATED GFR    Collection Time: 04/20/22  7:45 AM   Result Value Ref Range    GFR (CKD-EPI) 70 >60 mL/min/1.73 m 2   DIFFERENTIAL MANUAL    Collection Time: 04/20/22  7:45 AM   Result Value Ref Range    Manual Diff Status PERFORMED    PERIPHERAL SMEAR REVIEW    Collection Time: 04/20/22  7:45 AM   Result Value Ref Range    Peripheral Smear Review see below    PLATELET ESTIMATE    Collection Time: 04/20/22  7:45 AM   Result Value Ref Range    Plt Estimation Normal    MORPHOLOGY    Collection Time: 04/20/22  7:45 AM   Result Value Ref Range    RBC Morphology Present     Poikilocytosis 1+     Ovalocytes 1+    MAGNESIUM    Collection Time: 04/20/22  7:45 AM   Result Value Ref Range    Magnesium 1.0 (L) 1.5 - 2.5 mg/dL   URINALYSIS (UA)    Collection Time: 04/20/22 10:07 AM    Specimen: Urine, Clean Catch   Result Value Ref Range    Color Yellow     Character Clear     Specific Gravity 1.009 <1.035    Ph 8.0 5.0 - 8.0    Glucose Negative Negative mg/dL    Ketones Negative Negative mg/dL    Protein Negative  Negative mg/dL    Bilirubin Negative Negative    Urobilinogen, Urine 0.2 Negative    Nitrite Negative Negative    Leukocyte Esterase Small (A) Negative    Occult Blood Negative Negative    Micro Urine Req Microscopic    URINE MICROSCOPIC (W/UA)    Collection Time: 04/20/22 10:07 AM   Result Value Ref Range    WBC 2-5 /hpf    RBC 0-2 /hpf    Bacteria Few (A) None /hpf    Epithelial Cells Few /hpf    Hyaline Cast 0-2 /lpf         Radiology Review:  CT-CTA CHEST PULMONARY ARTERY W/ RECONS   Final Result      1.  There are peripheral, linear nonocclusive pulmonary embolus in the right main and upper lobar pulmonary arteries. Occlusive thrombus in the segmental and subsegmental branches in the posterior right lower lobe.   2.  Normal RV/ LV ratio.   3.  New compression fracture of L1 vertebral body.   4.  Multiple bilateral thyroid nodules, recommend dedicated ultrasound of the thyroid if not already performed.            Findings were discussed with BURAK FUNEZ on 4/19/2022 1:13 PM.         US-EXTREMITY VENOUS LOWER BILAT   Final Result      DX-CHEST-PORTABLE (1 VIEW)   Final Result      Resolution of small pleural effusions.            MDM (Data Review):   -Records reviewed and summarized in current documentation  -I personally reviewed and interpreted the laboratory results  -I personally reviewed the radiology images        Medical Decision Making, by Problem:  Active Hospital Problems    Diagnosis    • Nutritional deficiency [E63.9]    • Pulmonary embolism and infarction (HCC) [I26.99]    • Acute deep vein thrombosis (DVT) of left lower extremity (HCC) [I82.402]    • Thyroid nodule [E04.1]    • Compression fracture of first lumbar vertebra (HCC) [S32.010A]    • Alkaline phosphatase elevation [R74.8]    • Elevated troponin [R77.8]    • High anion gap metabolic acidosis [E87.2]    • Normocytic anemia [D64.9]    • Severe protein-calorie malnutrition ([place]: less than 60% of standard weight) (HCC) [E43]    • Smoking  [F17.200]    • Alcohol abuse [F10.10]            Assessment/Recommendations:  Assessment:  -Normocytic anemia  -Occult blood positive stool on 2/20/2022  -DVT and pulmonary embolism  -Need for chronic anticoagulation  -Heart failure with most recent ejection fraction 55%  -Metabolic acidosis  -Alcohol use  -Tobacco use    Plan:  -Clear liquid diet today, n.p.o. after midnight  -MoviPrep this evening  -EGD and colonoscopy with possible biopsies with Dr. Rell Rivas at 3 PM tomorrow.    Risks, benefits, and alternatives of aforementioned procedures were discussed with patient. Consenting person(s) were given opportunities to ask questions and discuss other options.  Risks including but not limited to perforation, infection, bleeding, missed lesion(s), possible need for surgery(ies) and/or interventional radiology, possible need for repeat procedure(s) and/or additional testing, hospitalization possibly prolonged, cardiac and/or pulmonary event, aspiration, hypoxia, stroke, medication and/or anesthesia reaction, indefinite diagnosis, discomfort, unsuccessful and/or incomplete procedure, ineffective therapy and/or persistent symptoms, damage to adjacent organs and/or vascular structures, and other adverse events possibly life-threatening.  Interactive discussion was undertaken with Layman's terms. I answered questions in full and to satisfaction.  Consenting person(s) stated understanding and acceptance of these risks, and wished to proceed.  Informed consent was given in clear state of mind.    -Hold Lovenox  -Serial hemoglobin and hematocrit, transfuse for hemoglobin less than 7    JERSON Grigsby      Thank you for inviting me to participate in the care of this patient. Please do not hesitate to call GI consultants with additional questions/concerns or changes in the patient's clinical status at [phone].      Core Quality Measures   Reviewed items:  Labs, Medications and Radiology reports  reviewed

## 2022-04-20 NOTE — CASE COMMUNICATION
For Information Only    This Home Health RN saw pt on 4/18/22. Pt was sitting up in wheelchair and watching TV when thi RN arrived. -120/min at rest and irregular. Her HR was 147 last week according to the notes by Viviane Deleon PA-C. Her HR was likely to remain elevated throughout the entire weekend. Pt refused ED eval, said 911 was called last week and she signed waiver and would not go today despite nursing education on the po tential fatal consequence. She denied any chest pain, shortness of breath or dizziness.   Reviewed her medications. Pt admitted that she wasn't taking potassium or omeprazole. Educated pt on the importance of taking her meds as prescribed especially with the hx of hypokalemia and GIB.   Pt told this RN that she had scheduled appt with Toña Tejada PA-C in the afternoon at 2pm. She would discuss tachycardia, cardiology referral, and c urrent medications with provider.  Her friend Irwin was arriving by the end of this nursing visit to get her ready for her scheduled appt this afternoon. Pt agreed with follow-up nursing visit on Wed.  Received VM from Toña later that cardiology referral was sent. Toña also instructed pt to take potassium and omeprazole as prescribed, and continue to hold meloxicam.   Per pt's medical chart, she present to [facility] ED  for tachycardi a after her medical appt but left AMA without being seen by an ED provider. Pt returned to [facility] ED the next day (4/19/22) and is currently admitted due to pulmonary embolism.

## 2022-04-20 NOTE — PROGRESS NOTES
Received report and assumed care of patient. Patient is alert and oriented x4. Patient exhibits no signs of distress. Patient was updated on the plan of care for the day. Call light within reach, bed in low position, 2 side rails up. All fall precautions in place.

## 2022-04-21 ENCOUNTER — ANESTHESIA EVENT (OUTPATIENT)
Dept: SURGERY | Facility: MEDICAL CENTER | Age: 67
DRG: 175 | End: 2022-04-21
Payer: MEDICARE

## 2022-04-21 ENCOUNTER — HOME CARE VISIT (OUTPATIENT)
Dept: HOME HEALTH SERVICES | Facility: HOME HEALTHCARE | Age: 67
End: 2022-04-21
Payer: MEDICARE

## 2022-04-21 ENCOUNTER — ANESTHESIA (OUTPATIENT)
Dept: SURGERY | Facility: MEDICAL CENTER | Age: 67
DRG: 175 | End: 2022-04-21
Payer: MEDICARE

## 2022-04-21 LAB
ANION GAP SERPL CALC-SCNC: 11 MMOL/L (ref 7–16)
ANISOCYTOSIS BLD QL SMEAR: ABNORMAL
BASOPHILS # BLD AUTO: 0.9 % (ref 0–1.8)
BASOPHILS # BLD: 0.04 K/UL (ref 0–0.12)
BUN SERPL-MCNC: 5 MG/DL (ref 8–22)
CALCIUM SERPL-MCNC: 7.9 MG/DL (ref 8.5–10.5)
CHLORIDE SERPL-SCNC: 109 MMOL/L (ref 96–112)
CO2 SERPL-SCNC: 22 MMOL/L (ref 20–33)
CREAT SERPL-MCNC: 0.88 MG/DL (ref 0.5–1.4)
EOSINOPHIL # BLD AUTO: 0.04 K/UL (ref 0–0.51)
EOSINOPHIL NFR BLD: 0.9 % (ref 0–6.9)
ERYTHROCYTE [DISTWIDTH] IN BLOOD BY AUTOMATED COUNT: 82.8 FL (ref 35.9–50)
GFR SERPLBLD CREATININE-BSD FMLA CKD-EPI: 72 ML/MIN/1.73 M 2
GLUCOSE SERPL-MCNC: 82 MG/DL (ref 65–99)
HCT VFR BLD AUTO: 25.4 % (ref 37–47)
HGB BLD-MCNC: 8.2 G/DL (ref 12–16)
LYMPHOCYTES # BLD AUTO: 1.29 K/UL (ref 1–4.8)
LYMPHOCYTES NFR BLD: 26.3 % (ref 22–41)
MACROCYTES BLD QL SMEAR: ABNORMAL
MAGNESIUM SERPL-MCNC: 2.2 MG/DL (ref 1.5–2.5)
MANUAL DIFF BLD: NORMAL
MCH RBC QN AUTO: 31.9 PG (ref 27–33)
MCHC RBC AUTO-ENTMCNC: 32.3 G/DL (ref 33.6–35)
MCV RBC AUTO: 98.8 FL (ref 81.4–97.8)
MONOCYTES # BLD AUTO: 0.17 K/UL (ref 0–0.85)
MONOCYTES NFR BLD AUTO: 3.5 % (ref 0–13.4)
MORPHOLOGY BLD-IMP: NORMAL
NEUTROPHILS # BLD AUTO: 3.35 K/UL (ref 2–7.15)
NEUTROPHILS NFR BLD: 65.8 % (ref 44–72)
NEUTS BAND NFR BLD MANUAL: 2.6 % (ref 0–10)
NRBC # BLD AUTO: 0 K/UL
NRBC BLD-RTO: 0 /100 WBC
PATHOLOGY CONSULT NOTE: NORMAL
PLATELET # BLD AUTO: 244 K/UL (ref 164–446)
PLATELET BLD QL SMEAR: NORMAL
PMV BLD AUTO: 9.5 FL (ref 9–12.9)
POTASSIUM SERPL-SCNC: 3.8 MMOL/L (ref 3.6–5.5)
RBC # BLD AUTO: 2.57 M/UL (ref 4.2–5.4)
RBC BLD AUTO: PRESENT
SODIUM SERPL-SCNC: 142 MMOL/L (ref 135–145)
UFH PPP CHRO-ACNC: 0.76 IU/ML
UFH PPP CHRO-ACNC: 0.94 IU/ML
WBC # BLD AUTO: 4.9 K/UL (ref 4.8–10.8)

## 2022-04-21 PROCEDURE — 700105 HCHG RX REV CODE 258: Performed by: ANESTHESIOLOGY

## 2022-04-21 PROCEDURE — RXMED WILLOW AMBULATORY MEDICATION CHARGE: Performed by: INTERNAL MEDICINE

## 2022-04-21 PROCEDURE — A9270 NON-COVERED ITEM OR SERVICE: HCPCS | Performed by: INTERNAL MEDICINE

## 2022-04-21 PROCEDURE — 0W3P8ZZ CONTROL BLEEDING IN GASTROINTESTINAL TRACT, VIA NATURAL OR ARTIFICIAL OPENING ENDOSCOPIC: ICD-10-PCS | Performed by: INTERNAL MEDICINE

## 2022-04-21 PROCEDURE — 160009 HCHG ANES TIME/MIN: Performed by: INTERNAL MEDICINE

## 2022-04-21 PROCEDURE — 700111 HCHG RX REV CODE 636 W/ 250 OVERRIDE (IP): Performed by: ANESTHESIOLOGY

## 2022-04-21 PROCEDURE — 0DJD8ZZ INSPECTION OF LOWER INTESTINAL TRACT, VIA NATURAL OR ARTIFICIAL OPENING ENDOSCOPIC: ICD-10-PCS | Performed by: INTERNAL MEDICINE

## 2022-04-21 PROCEDURE — 770020 HCHG ROOM/CARE - TELE (206)

## 2022-04-21 PROCEDURE — 0DB68ZX EXCISION OF STOMACH, VIA NATURAL OR ARTIFICIAL OPENING ENDOSCOPIC, DIAGNOSTIC: ICD-10-PCS | Performed by: INTERNAL MEDICINE

## 2022-04-21 PROCEDURE — 160048 HCHG OR STATISTICAL LEVEL 1-5: Performed by: INTERNAL MEDICINE

## 2022-04-21 PROCEDURE — 88305 TISSUE EXAM BY PATHOLOGIST: CPT

## 2022-04-21 PROCEDURE — 160203 HCHG ENDO MINUTES - 1ST 30 MINS LEVEL 4: Performed by: INTERNAL MEDICINE

## 2022-04-21 PROCEDURE — 160002 HCHG RECOVERY MINUTES (STAT): Performed by: INTERNAL MEDICINE

## 2022-04-21 PROCEDURE — A9270 NON-COVERED ITEM OR SERVICE: HCPCS | Performed by: HOSPITALIST

## 2022-04-21 PROCEDURE — 85007 BL SMEAR W/DIFF WBC COUNT: CPT

## 2022-04-21 PROCEDURE — 99233 SBSQ HOSP IP/OBS HIGH 50: CPT | Mod: GC | Performed by: INTERNAL MEDICINE

## 2022-04-21 PROCEDURE — 700102 HCHG RX REV CODE 250 W/ 637 OVERRIDE(OP): Performed by: INTERNAL MEDICINE

## 2022-04-21 PROCEDURE — 0DB98ZX EXCISION OF DUODENUM, VIA NATURAL OR ARTIFICIAL OPENING ENDOSCOPIC, DIAGNOSTIC: ICD-10-PCS | Performed by: INTERNAL MEDICINE

## 2022-04-21 PROCEDURE — 160208 HCHG ENDO MINUTES - EA ADDL 1 MIN LEVEL 4: Performed by: INTERNAL MEDICINE

## 2022-04-21 PROCEDURE — 00813 ANES UPR LWR GI NDSC PX: CPT | Performed by: ANESTHESIOLOGY

## 2022-04-21 PROCEDURE — 80048 BASIC METABOLIC PNL TOTAL CA: CPT

## 2022-04-21 PROCEDURE — 88312 SPECIAL STAINS GROUP 1: CPT

## 2022-04-21 PROCEDURE — 85025 COMPLETE CBC W/AUTO DIFF WBC: CPT

## 2022-04-21 PROCEDURE — 160035 HCHG PACU - 1ST 60 MINS PHASE I: Performed by: INTERNAL MEDICINE

## 2022-04-21 PROCEDURE — 700102 HCHG RX REV CODE 250 W/ 637 OVERRIDE(OP): Performed by: HOSPITALIST

## 2022-04-21 PROCEDURE — 83735 ASSAY OF MAGNESIUM: CPT

## 2022-04-21 PROCEDURE — 85520 HEPARIN ASSAY: CPT

## 2022-04-21 RX ORDER — OMEPRAZOLE 20 MG/1
20 CAPSULE, DELAYED RELEASE ORAL DAILY
Status: DISCONTINUED | OUTPATIENT
Start: 2022-04-21 | End: 2022-04-22 | Stop reason: HOSPADM

## 2022-04-21 RX ORDER — HALOPERIDOL 5 MG/ML
1 INJECTION INTRAMUSCULAR
Status: DISCONTINUED | OUTPATIENT
Start: 2022-04-21 | End: 2022-04-21 | Stop reason: HOSPADM

## 2022-04-21 RX ORDER — DIPHENHYDRAMINE HYDROCHLORIDE 50 MG/ML
12.5 INJECTION INTRAMUSCULAR; INTRAVENOUS
Status: DISCONTINUED | OUTPATIENT
Start: 2022-04-21 | End: 2022-04-21 | Stop reason: HOSPADM

## 2022-04-21 RX ORDER — SODIUM CHLORIDE, SODIUM LACTATE, POTASSIUM CHLORIDE, CALCIUM CHLORIDE 600; 310; 30; 20 MG/100ML; MG/100ML; MG/100ML; MG/100ML
INJECTION, SOLUTION INTRAVENOUS CONTINUOUS
Status: DISCONTINUED | OUTPATIENT
Start: 2022-04-21 | End: 2022-04-21 | Stop reason: HOSPADM

## 2022-04-21 RX ORDER — LABETALOL HYDROCHLORIDE 5 MG/ML
5 INJECTION, SOLUTION INTRAVENOUS
Status: DISCONTINUED | OUTPATIENT
Start: 2022-04-21 | End: 2022-04-21 | Stop reason: HOSPADM

## 2022-04-21 RX ORDER — SODIUM CHLORIDE, SODIUM LACTATE, POTASSIUM CHLORIDE, CALCIUM CHLORIDE 600; 310; 30; 20 MG/100ML; MG/100ML; MG/100ML; MG/100ML
INJECTION, SOLUTION INTRAVENOUS
Status: DISCONTINUED | OUTPATIENT
Start: 2022-04-21 | End: 2022-04-21 | Stop reason: SURG

## 2022-04-21 RX ADMIN — MIRTAZAPINE 15 MG: 15 TABLET, FILM COATED ORAL at 20:38

## 2022-04-21 RX ADMIN — OMEPRAZOLE 20 MG: 20 CAPSULE, DELAYED RELEASE ORAL at 18:06

## 2022-04-21 RX ADMIN — SIMVASTATIN 20 MG: 20 TABLET, FILM COATED ORAL at 18:06

## 2022-04-21 RX ADMIN — PROPOFOL 120 MCG/KG/MIN: 10 INJECTION, EMULSION INTRAVENOUS at 14:43

## 2022-04-21 RX ADMIN — SODIUM CHLORIDE, POTASSIUM CHLORIDE, SODIUM LACTATE AND CALCIUM CHLORIDE: 600; 310; 30; 20 INJECTION, SOLUTION INTRAVENOUS at 14:51

## 2022-04-21 RX ADMIN — FUROSEMIDE 20 MG: 20 TABLET ORAL at 05:42

## 2022-04-21 ASSESSMENT — ENCOUNTER SYMPTOMS
VOMITING: 0
CONSTIPATION: 0
BACK PAIN: 0
SHORTNESS OF BREATH: 0
INSOMNIA: 0
DOUBLE VISION: 0
NAUSEA: 0
HEADACHES: 0
DIARRHEA: 1
COUGH: 0
ABDOMINAL PAIN: 0
WEAKNESS: 0
CHILLS: 0
BLOOD IN STOOL: 0
BLURRED VISION: 0
FEVER: 0

## 2022-04-21 ASSESSMENT — FIBROSIS 4 INDEX: FIB4 SCORE: 1.82

## 2022-04-21 ASSESSMENT — PAIN SCALES - GENERAL: PAIN_LEVEL: 0

## 2022-04-21 ASSESSMENT — PAIN DESCRIPTION - PAIN TYPE
TYPE: ACUTE PAIN
TYPE: ACUTE PAIN
TYPE: SURGICAL PAIN

## 2022-04-21 NOTE — FACE TO FACE
Face to Face Supporting Documentation - Home Health    The encounter with this patient was in whole or in part the primary reason for home health admission.    Date of encounter:   Patient:                    MRN:                       YOB: 2023  Jaymie Peacock  [id number]  1955     Home health to see patient for:  Physical Therapy evaluation and treatment    Skilled need for:  New Onset Medical Diagnosis Pulmonary embolism    Skilled nursing interventions to include:  Comment: NA    Homebound status evidenced by:  Need the aid of supportive devices such as crutches, canes, wheelchairs or walkers. Leaving home requires a considerable and taxing effort. There is a normal inability to leave the home.    Community Physician to provide follow up care: Toña Tejada P.A.-C.     Optional Interventions? Yes, Details: Resumption of home health services      I certify the face to face encounter for this home health care referral meets the CMS requirements and the encounter/clinical assessment with the patient was, in whole, or in part, for the medical condition(s) listed above, which is the primary reason for home health care. Based on my clinical findings: the service(s) are medically necessary, support the need for home health care, and the homebound criteria are met.  I certify that this patient has had a face to face encounter by myself.  Crystal Ruiz M.D. - NPI: [id number]

## 2022-04-21 NOTE — CARE PLAN
Problem: Knowledge Deficit - Standard  Goal: Patient and family/care givers will demonstrate understanding of plan of care, disease process/condition, diagnostic tests and medications  Outcome: Progressing     Problem: Fall Risk  Goal: Patient will remain free from falls  Outcome: Progressing   The patient is Watcher - Medium risk of patient condition declining or worsening    Shift Goals  Clinical Goals: HR, movprep  Patient Goals: go home    Progress made toward(s) clinical / shift goals:  Moviprep conitnued     Patient is not progressing towards the following goals:

## 2022-04-21 NOTE — DISCHARGE PLANNING
Patient has been discharged from [facility] and will not be re-admitted.  Patient requires a higher level of care and is NOT safe currently at home by herself per clinical supervisory team.     Thank you so much!

## 2022-04-21 NOTE — PROCEDURES
DATE OF PROCEDURE:  04/21/2022     PROCEDURES:  Upper endoscopy with biopsies and APC therapy.     :  Rell Griffin MD     INDICATIONS:  The patient is a 66-year-old female who has had some chronic   anemia and Hemoccult positive stools, who was recently diagnosed with   pulmonary embolus and needs anticoagulation.  She has not had recent   endoscopic evaluation.  Colonoscopy last in 2016 was normal.     INFORMED CONSENT:  Written informed consent was obtained from the patient   after thorough explanation of indications, benefits, and risks of the   procedure, which included but was not limited to bleeding, infection,   perforation, adverse reaction to medications and missed lesions.     MEDICATIONS GIVEN:  Monitored anesthesia care with propofol.     Continuous telemetry, BP, pulse oximetry, and capnography were performed by   the anesthesiologist throughout the procedure.     A midsize flexible upper endoscope was used for the procedure.     FINDINGS:  The patient was placed in left lateral decubitus position.  After   anesthesia was achieved, the endoscope was passed in the posterior pharynx   under direct visualization, advanced to the second portion of the duodenum   without difficulty.  The patient tolerated the procedure well with following   findings:  1.  Esophagus:  Normal.  2.  Stomach:  She had a few small scattered erosions in the gastric antrum.    Random gastric biopsies obtained for H. pylori.  3.  Duodenum:  She had 2 small nonbleeding AVMs, one in the duodenal bulb and   the second in the second portion of the duodenum.  These were ablated with APC   therapy without difficulty.  Random duodenal biopsies obtained for celiac   disease.     IMPRESSION:  1.  Two small duodenal AVMs, ablated with APC therapy.  2.  Mild erosive antral gastritis.     PLAN:  1.  Await biopsies.  2.  PPI daily.  3.  Proceed with colonoscopy.        ______________________________  RELL GRIFFIN  MD DAVEY/CARLOS A    DD:  04/21/2022 15:44  DT:  04/21/2022 16:43    Job#:  [id number]

## 2022-04-21 NOTE — OR NURSING
1533- Pt arrived to PACU, report received.  Pt on 4L mask with OPA in place.      1545- OPA d/c, transitioned to RA    1557- Pt tolerating sips of water at this time.      1559- Dr. Rivas to bedside to update pt    1602- Report called to ELROY Parsons.  All questions answered at this time.     1606- Pt transferred out of PACU and back to tele 7 room with RN and cardiac monitor attached. Glasses with patient during transport.

## 2022-04-21 NOTE — DISCHARGE PLANNING
Care Transition Team Assessment    LSW completed chart review to obtain the information used in this assessment. Pt lives alone in a single story house with two steps to enter. Pt is independent with ADLs and IADLs, however is noncompliant with medication management. Pt's support system includes supportive neighbors/friend. Pt has a walker and shower chair at home. Pt has a history of etoh. No MH concerns. Pt is a frequent re-admit and refuses SNF placement. Pt's NOK is her niece, Nena Piper.    Information Source  Orientation Level: Oriented X4  Information Given By: Other (Comments) (chart review)  Who is responsible for making decisions for patient? : Patient    Readmission Evaluation  Is this a readmission?: Yes - unplanned readmission    Elopement Risk  Legal Hold: No  Ambulatory or Self Mobile in Wheelchair: Yes  Disoriented: No  Psychiatric Symptoms: None  History of Wandering: No  Elopement this Admit: No  Vocalizing Wanting to Leave: No  Displays Behaviors, Body Language Wanting to Leave: No-Not at Risk for Elopement  Elopement Risk: Not at Risk for Elopement    Interdisciplinary Discharge Planning  Patient or legal guardian wants to designate a caregiver: No  Durable Medical Equipment: Walker,Other - Specify (shower chair)    Discharge Preparedness  What is your plan after discharge?: Home health care  What are your discharge supports?: Other (comment) (neighbor)  Prior Functional Level: Ambulatory,Independent with Activities of Daily Living,Independent with Medication Management  Difficulity with ADLs: None  Difficulity with IADLs: Cooking,Managing medication    Functional Assesment  Prior Functional Level: Ambulatory,Independent with Activities of Daily Living,Independent with Medication Management    Finances  Financial Barriers to Discharge: No  Prescription Coverage: Yes    Vision / Hearing Impairment  Vision Impairment : Yes  Right Eye Vision: Wears Glasses  Left Eye Vision: Wears Glasses  Hearing  Impairment : No    Domestic Abuse  Have you ever been the victim of abuse or violence?: No  Physical Abuse or Sexual Abuse: No  Verbal Abuse or Emotional Abuse: No  Possible Abuse/Neglect Reported to:: Not Applicable    Psychological Assessment  History of Substance Abuse: Alcohol  History of Psychiatric Problems: No  Non-compliant with Treatment: No  Newly Diagnosed Illness: No    Discharge Risks or Barriers  Discharge risks or barriers?: Lives alone, no community support,Non-adherence to medication or treatment,Substance abuse  Patient risk factors: Lack of outside supports,Lives alone and no community support,Noncompliance,Readmission,Substance abuse    Anticipated Discharge Information  Discharge Disposition: D/T to home under [facility] in anticipation of covered skilled care (06)

## 2022-04-21 NOTE — ANESTHESIA TIME REPORT
Anesthesia Start and Stop Event Times     Date Time Event    4/21/2022 1441 Ready for Procedure     1443 Anesthesia Start     1535 Anesthesia Stop        Responsible Staff  04/21/22    Name Role Begin End    Reinier Neal D.O. Anesth 1443 1535        Overtime Reason:  overtime    Comments:

## 2022-04-21 NOTE — ASSESSMENT & PLAN NOTE
Downtrending, unsure of etiology  -Alk phosp: 304 --> 209  -Can consider further work-up with GGT likely as outpatient as patient is asymptomatic

## 2022-04-21 NOTE — DISCHARGE PLANNING
Received Choice form at 1225  Agency/Facility Name: [facility]   Referral sent per Choice form @ [phone]  Agency[facility] Name: [facility]   Spoke To: Adolfo   Outcome: Referral still under review. Adolfo to contact [facility] with updated referral status.     [id number]  [facility] Name: [facility]  Spoke To: Adolfo   Outcome: Referral not received by [facility] resent referral.

## 2022-04-21 NOTE — ANESTHESIA PREPROCEDURE EVALUATION
Case: [id number] Date/Time: 04/21/22 1445    Procedures:       GASTROSCOPY      COLONOSCOPY    Location: [facility] ROOM 26 / SURGERY SAME DAY [facility]    Surgeons: Rell Rivas M.D.          Relevant Problems   PULMONARY   (positive) Emphysema lung (HCC)   (positive) Necrotizing pneumonia (HCC)      CARDIAC   (positive) Acute deep vein thrombosis (DVT) of left lower extremity (HCC)   (positive) NSTEMI (non-ST elevation myocardial infarction) (HCC)         (positive) YAMIL (acute kidney injury) (HCC)   (positive) Acute kidney injury (HCC)       Physical Exam    Airway   Mallampati: II  TM distance: >3 FB  Neck ROM: full       Cardiovascular - normal exam  Rhythm: regular  Rate: normal  (-) murmur     Dental - normal exam           Pulmonary - normal exam  Breath sounds clear to auscultation     Abdominal    Neurological - normal exam                 Anesthesia Plan    ASA 3       Plan - MAC               Induction: intravenous      Pertinent diagnostic labs and testing reviewed    Informed Consent:    Anesthetic plan and risks discussed with patient.    Use of blood products discussed with: patient whom consented to blood products.

## 2022-04-21 NOTE — PROGRESS NOTES
Daily Progress Note:     Date of Service: 4/21/2022  Primary Team: [facility] IM White Team   Attending: Daniel Knowles M.D.   Senior Resident: Dr. Ruiz  Intern: Dr. Garduno  Contact:  [phone]    ID:  Ms. Jaymie Peacock is a 66 y.o. female with past medical history of HTN, COPD, osteoporosis, Tobacco abuse, stress induced cardiomyopathy 2/2022 with EF of 35%( with improvement 3/22 LVEF 55%.  Patient admitted for acute pulmonary embolism and further evaluation and management.    Subjective:  Patient unable to sleep last night due to bowel prep, leading to multiple bowel movements.  She denies chest pain, shortness of breath or lightheadedness.    Interval events:  -EGD and colonoscopy today    Consultants/Specialty:  GI    Review of Systems:    Review of Systems   Constitutional: Negative for chills and fever.   HENT: Negative for hearing loss.    Eyes: Negative for blurred vision and double vision.   Respiratory: Negative for cough and shortness of breath.    Cardiovascular: Positive for leg swelling (left leg). Negative for chest pain.   Gastrointestinal: Positive for diarrhea (loose BMs after colon prep). Negative for abdominal pain, blood in stool, constipation, nausea and vomiting.   Genitourinary: Negative for dysuria.   Musculoskeletal: Negative for back pain.   Skin: Negative for rash.   Neurological: Negative for weakness and headaches.   Psychiatric/Behavioral: The patient does not have insomnia.        Objective Data:   Physical Exam:   Vitals:   Temp:  [36.2 °C (97.2 °F)-36.7 °C (98.1 °F)] 36.5 °C (97.7 °F)  Pulse:  [72-90] 90  Resp:  [16-18] 16  BP: (109-130)/(73-82) 130/81  SpO2:  [96 %-98 %] 98 %    Physical Exam  Vitals and nursing note reviewed.   Constitutional:       Appearance: Normal appearance. She is not toxic-appearing.   HENT:      Head: Normocephalic and atraumatic.      Right Ear: External ear normal.      Left Ear: External ear normal.      Nose: No rhinorrhea.      Mouth/Throat:      Mouth:  Mucous membranes are moist.   Eyes:      Extraocular Movements: Extraocular movements intact.      Conjunctiva/sclera: Conjunctivae normal.   Cardiovascular:      Rate and Rhythm: Normal rate and regular rhythm.      Heart sounds: Murmur heard.     No gallop.      Comments: Possible 2/6 systolic murmur around mitral area  Pulmonary:      Breath sounds: Normal breath sounds. No wheezing or rales.   Abdominal:      General: Bowel sounds are normal. There is no distension.      Tenderness: There is no abdominal tenderness.   Musculoskeletal:      Cervical back: Neck supple.      Right lower leg: Edema (1+) present.      Left lower leg: No edema.      Comments: RLE: no evidence of phlegmasia cerulea dolens   Skin:     Capillary Refill: Capillary refill takes less than 2 seconds.      Coloration: Skin is not pale.   Neurological:      Mental Status: She is oriented to person, place, and time.   Psychiatric:         Mood and Affect: Mood normal.         Thought Content: Thought content does not include homicidal or suicidal ideation.         Labs:   Recent Labs     04/20/22  0745 04/20/22  1716 04/21/22  0249   SODIUM 138 139 142   POTASSIUM 3.3* 4.0 3.8   CHLORIDE 103 102 109   CO2 25 25 22   GLUCOSE 69 95 82   BUN 8 8 5*     Recent Labs     04/19/22  1055 04/20/22  0745 04/20/22  1716 04/21/22  0249   ALBUMIN 3.7 2.7*  --   --    TBILIRUBIN 0.9 0.6  --   --    ALKPHOSPHAT 304* 209*  --   --    TOTPROTEIN 6.6 5.0*  --   --    ALTSGPT 19 12  --   --    ASTSGOT 33 23  --   --    CREATININE 1.16 0.90 1.02 0.88     Recent Labs     04/19/22  1055 04/20/22  0745 04/21/22  0249   WBC 6.3 4.1* 4.9   RBC 3.22* 2.63* 2.57*   HEMOGLOBIN 10.4* 8.4* 8.2*   HEMATOCRIT 30.6* 25.5* 25.4*   MCV 95.0 97.0 98.8*   MCH 32.3 31.9 31.9   RDW 80.1* 81.4* 82.8*   PLATELETCT 290 231 244   MPV 10.7 9.6 9.5   NEUTSPOLYS 85.90* 69.10 65.80   LYMPHOCYTES 8.80* 24.60 26.30   MONOCYTES 4.40 4.50 3.50   EOSINOPHILS 0.00 1.80 0.90   BASOPHILS 0.90 0.00  0.90   RBCMORPHOLO Present Present Present       Imaging:   CT-CTA CHEST PULMONARY ARTERY W/ RECONS   Final Result      1.  There are peripheral, linear nonocclusive pulmonary embolus in the right main and upper lobar pulmonary arteries. Occlusive thrombus in the segmental and subsegmental branches in the posterior right lower lobe.   2.  Normal RV/ LV ratio.   3.  New compression fracture of L1 vertebral body.   4.  Multiple bilateral thyroid nodules, recommend dedicated ultrasound of the thyroid if not already performed.            Findings were discussed with BURAK FUNEZ on 4/19/2022 1:13 PM.         US-EXTREMITY VENOUS LOWER BILAT   Final Result      DX-CHEST-PORTABLE (1 VIEW)   Final Result      Resolution of small pleural effusions.          Problem Representation: Ms. Peacock is a 65 y/o F admitted for acute pulmonary embolism and DVTs.  Due to concern for possible secondary etiology causing pulmonary embolism and acute hemoglobin drop in the setting of previous positive FOBT, GI was consulted for EGD and colonoscopy.    * Pulmonary embolism and infarction (HCC)- (present on admission)  Assessment & Plan  Unknown at this time if it is provoked or unprovoked, will likely need further evaluation for possible malignancy  -CTA of the chest (4/19): nonocclusive pulmonary embolus in the right main and upper lobar pulmonary arteries. Occlusive thrombus in the segmental and subsegmental branches in the posterior right lower lobe  -Hemodynamically stable, continue telemetry monitoring  -On heparin gtt, will transition to Xarelto after EGD/Colonoscopy    Acute deep vein thrombosis (DVT) of left lower extremity (HCC)- (present on admission)  Assessment & Plan  -Duplex US of the lower extremities (4/19): showed subacute, occlusive deep venous thrombus in the popliteal, peroneal and proximal posterior tibial veins  -Therapeutic heparin    Normocytic anemia- (present on admission)  Assessment & Plan  Denies any hx of  melena or hematochezia or GI bleed  -Had previous discussion of outpatient GI workup  -2/2022: Fe 63, %sat 47, ferritin 955, B12 955, TSH 0.9, fT4 1.06; 6/21 Folate 4.3    -Hb: 10.4 --> 8.4-->8.2  -4/21 EGD and colonoscopy     Severe protein-calorie malnutrition ([place]: less than 60% of standard weight) (HCC)- (present on admission)  Assessment & Plan  -Physical exam: Decrease subclavian fat pads  -Nutrition consult and optimize nutrition   -Supplements  -Multivitamins  -watch for refeeding syndrome    Thyroid nodule- (present on admission)  Assessment & Plan  -CT-PE (4/18): Multiple bilateral thyroid nodules  -Previous TSH is within normal (last 2 months ago)  -Patient currently asymptomatic, likely recommend outpatient ultrasound and follow-up    Smoking- (present on admission)  Assessment & Plan  Smoke 1/2 pack for 25 years  -Continue NRT   -Smoking cessation counseling provided     Frequent hospital admissions- (present on admission)  Assessment & Plan  -Will consider Geriatrics consult after procedure    Nutritional deficiency- (present on admission)  Assessment & Plan  -Nutrition consult  -Supplements  -Multivitamins    Alkaline phosphatase elevation- (present on admission)  Assessment & Plan  Downtrending, unsure of etiology  -Alk phosp: 304 --> 209  -Can consider further work-up with GGT likely as outpatient as patient is asymptomatic    Compression fracture of first lumbar vertebra (HCC)- (present on admission)  Assessment & Plan  Patient stated that she may have fallen couple of days ago  -CT-PE (4/18): New compression fracture of L1 vertebral body  -Likely recommend outpatient follow-up and consideration of possible bisphosphonate after medical stabilization  -pain management    Elevated troponin- (present on admission)  Assessment & Plan  Likely 2/2 from oxygen mismatch from PE. Asymptomatic with no chest pain. Hx of stress induced cardiomyopathy in feb 2022 with EF of 35%. Most recent Echo 3/31/2022  with EF of 55%   -Troponin: 33 --> 28  -EKG: Sinus tachycardia, PVCs    High anion gap metabolic acidosis- (present on admission)  Assessment & Plan  Likely from PE  -Lactic: 3 --> 1.1  -A --> 10    RESOLVED  -CTM    Hypomagnesemia- (present on admission)  Assessment & Plan  Possibly related to decreased p.o. intake  -Continue to monitor and replete as indicated    Hypokalemia- (present on admission)  Assessment & Plan  Possibly related to decreased p.o. intake  -Continue to monitor and replete as indicated    Alcohol abuse- (present on admission)  Assessment & Plan  Drink 1-3 beer's a day, worked as    -Alcohol cessation counseling provided  -monitor for withdrawal    Code: Full  Diet: Regular  DVT Px: Heparin gtt  Dispo: Inpatient for ongoing medical management

## 2022-04-21 NOTE — ANESTHESIA POSTPROCEDURE EVALUATION
Patient: Jaymie Pecaock    Procedure Summary     Date: 04/21/22 Room / Location: [facility] ROOM 26 / SURGERY SAME DAY [facility]    Anesthesia Start: 1443 Anesthesia Stop: 1535    Procedures:       GASTROSCOPY (Esophagus)      COLONOSCOPY (Anus)      GASTROSCOPY, WITH BIOPSY (Esophagus)      GASTROSCOPY, WITH ARGON PLASMA COAGULATION (Esophagus) Diagnosis: (gastritis, duodenal AVM's, normal colon)    Surgeons: Rell Rivas M.D. Responsible Provider: Reinier Neal D.O.    Anesthesia Type: MAC ASA Status: 3          Final Anesthesia Type: MAC  Last vitals  BP   Blood Pressure : 130/81    Temp   36.5 °C (97.7 °F)    Pulse   90   Resp   16    SpO2   98 %      Anesthesia Post Evaluation    Patient location during evaluation: PACU  Patient participation: complete - patient participated  Level of consciousness: awake and alert  Pain score: 0    Airway patency: patent  Anesthetic complications: no  Cardiovascular status: hemodynamically stable  Respiratory status: acceptable  Hydration status: euvolemic    PONV: none          No complications documented.     Nurse Pain Score: 0 (NPRS)

## 2022-04-21 NOTE — DISCHARGE PLANNING
Anticipated Discharge Disposition: Home with HH    Action: [facility] has preemptively declined to take back the patient.  RN SE met with the patient at bedside to discuss the discharge plan.  Patient has her heart set on discharging home, she consented to sending the HH referral to [facility].  Choice form faxed to [facility].    Barriers to Discharge: Medical clearance    Plan: Case coordination to continue to follow up with medical team to discuss discharge barriers.

## 2022-04-21 NOTE — DISCHARGE PLANNING
HTH/SCP TCN chart review completed. Collaborated with Jamal SAEZ. No new TCN needs identified at this time. Noted pt awaiting EGD/colonoscopy today with GI. As prior, plan remains to dc to home home with resumption of HH services and [facility] once medically cleared. Pt in agreement with dc plan and note pt will likely refuse SNF/rehab placement in any case (pt is well known to this TCN). Her 6 clicks mobility remain 22 as well. Noted sent VOALTE prior to MD Garduno to obtain Geriatric consult to assist with long term planning given hx of readmissions, etc (though has not been placed per chart review; spoke with Jamal SAEZ who will discuss Geriatric consult during rounds today). TCN will continue to follow and collaborate with discharge planning team as additional post acute needs arise. Thank you.    Previously completed:  - Choice forms: HH    - [facility] introduced (Y), referral (sent).

## 2022-04-21 NOTE — PROGRESS NOTES
0700- Received report and assumed care of patient. Patient is alert and oriented x4. Patient is in no signs of distress. Patient was updated on the plan of care for the day. Call light within reach, bed in low position, 2 side rails up.     1100- Heparin drip stopped as ordered by MD d/t procedure scheduled for 1500

## 2022-04-21 NOTE — OR SURGEON
Immediate Post OP Note    PreOp Diagnosis: Anemia, hemoccult positive stool      PostOp Diagnosis: Duodenal AVM x2, mild erosive gastritis, nl colonoscopy      Procedure(s):  GASTROSCOPY - Wound Class: Clean Contaminated  COLONOSCOPY - Wound Class: Clean Contaminated  GASTROSCOPY, WITH BIOPSY - Wound Class: Clean Contaminated  GASTROSCOPY, WITH ARGON PLASMA COAGULATION - Wound Class: Clean Contaminated    Surgeon(s):  Rell Rivas M.D.    Anesthesiologist/Type of Anesthesia:  Anesthesiologist: Reinier Neal D.O./General    Surgical Staff:  Circulator: Anastasia Dickerson R.N.  Endoscopy Technician: Irene Gomez; Brenton Rondon  Endoscopy Nurse: Argenis Gross R.N.    Specimens removed if any:  ID Type Source Tests Collected by Time Destination   A : bxs Tissue Duodenum PATHOLOGY SPECIMEN Rell Rivas M.D. 4/21/2022  2:59 PM    B : bxs Tissue Gastric PATHOLOGY SPECIMEN Rell Rivas M.D. 4/21/2022  2:59 PM        Estimated Blood Loss: None    Findings:   1. Few scattered antral erosions.  Biopsies for H pylori  2. 2 small duodenal AVMs, ablated with APC  3. Normal colonoscopy but retained food debris in cecum and hepatic flexure area    Complications: None        4/21/2022 3:35 PM Rell Rivas M.D.

## 2022-04-21 NOTE — ANESTHESIA PROCEDURE NOTES
Airway  Performed by: Reinier Neal D.O.  Authorized by: Reinier Neal D.O.     Indications for Airway Management:  Anesthesia      Mask Difficulty Assessment:  0 - not attempted  Final Airway Type:  Mask

## 2022-04-22 ENCOUNTER — PHARMACY VISIT (OUTPATIENT)
Dept: PHARMACY | Facility: MEDICAL CENTER | Age: 67
End: 2022-04-22
Payer: COMMERCIAL

## 2022-04-22 VITALS
HEART RATE: 99 BPM | HEIGHT: 66 IN | WEIGHT: 106.92 LBS | SYSTOLIC BLOOD PRESSURE: 110 MMHG | OXYGEN SATURATION: 97 % | TEMPERATURE: 99.2 F | BODY MASS INDEX: 17.18 KG/M2 | RESPIRATION RATE: 16 BRPM | DIASTOLIC BLOOD PRESSURE: 76 MMHG

## 2022-04-22 LAB
BACTERIA UR CULT: NORMAL
ERYTHROCYTE [DISTWIDTH] IN BLOOD BY AUTOMATED COUNT: 83.3 FL (ref 35.9–50)
HCT VFR BLD AUTO: 24.1 % (ref 37–47)
HGB BLD-MCNC: 7.7 G/DL (ref 12–16)
MCH RBC QN AUTO: 31.7 PG (ref 27–33)
MCHC RBC AUTO-ENTMCNC: 32 G/DL (ref 33.6–35)
MCV RBC AUTO: 99.2 FL (ref 81.4–97.8)
PLATELET # BLD AUTO: 278 K/UL (ref 164–446)
PMV BLD AUTO: 9.7 FL (ref 9–12.9)
RBC # BLD AUTO: 2.43 M/UL (ref 4.2–5.4)
SIGNIFICANT IND 70042: NORMAL
SITE SITE: NORMAL
SOURCE SOURCE: NORMAL
UFH PPP CHRO-ACNC: 0.21 IU/ML
WBC # BLD AUTO: 4.7 K/UL (ref 4.8–10.8)

## 2022-04-22 PROCEDURE — 85520 HEPARIN ASSAY: CPT

## 2022-04-22 PROCEDURE — A9270 NON-COVERED ITEM OR SERVICE: HCPCS | Performed by: HOSPITALIST

## 2022-04-22 PROCEDURE — A9270 NON-COVERED ITEM OR SERVICE: HCPCS

## 2022-04-22 PROCEDURE — 700102 HCHG RX REV CODE 250 W/ 637 OVERRIDE(OP): Performed by: HOSPITALIST

## 2022-04-22 PROCEDURE — 85027 COMPLETE CBC AUTOMATED: CPT

## 2022-04-22 PROCEDURE — RXMED WILLOW AMBULATORY MEDICATION CHARGE

## 2022-04-22 PROCEDURE — A9270 NON-COVERED ITEM OR SERVICE: HCPCS | Performed by: INTERNAL MEDICINE

## 2022-04-22 PROCEDURE — 700102 HCHG RX REV CODE 250 W/ 637 OVERRIDE(OP): Performed by: INTERNAL MEDICINE

## 2022-04-22 PROCEDURE — 94760 N-INVAS EAR/PLS OXIMETRY 1: CPT

## 2022-04-22 PROCEDURE — 700102 HCHG RX REV CODE 250 W/ 637 OVERRIDE(OP)

## 2022-04-22 PROCEDURE — 99239 HOSP IP/OBS DSCHRG MGMT >30: CPT | Mod: GC | Performed by: INTERNAL MEDICINE

## 2022-04-22 RX ORDER — OMEPRAZOLE 20 MG/1
20 CAPSULE, DELAYED RELEASE ORAL DAILY
Qty: 30 CAPSULE | Refills: 0 | Status: ON HOLD | OUTPATIENT
Start: 2022-04-23 | End: 2022-08-02 | Stop reason: SDUPTHER

## 2022-04-22 RX ADMIN — OMEPRAZOLE 20 MG: 20 CAPSULE, DELAYED RELEASE ORAL at 05:22

## 2022-04-22 RX ADMIN — RIVAROXABAN 15 MG: 15 TABLET, FILM COATED ORAL at 09:43

## 2022-04-22 RX ADMIN — FUROSEMIDE 20 MG: 20 TABLET ORAL at 05:22

## 2022-04-22 ASSESSMENT — COGNITIVE AND FUNCTIONAL STATUS - GENERAL
EATING MEALS: A LITTLE
SUGGESTED CMS G CODE MODIFIER MOBILITY: CJ
SUGGESTED CMS G CODE MODIFIER DAILY ACTIVITY: CK
MOBILITY SCORE: 21
TOILETING: A LITTLE
DAILY ACTIVITIY SCORE: 18
DRESSING REGULAR LOWER BODY CLOTHING: A LITTLE
WALKING IN HOSPITAL ROOM: A LITTLE
HELP NEEDED FOR BATHING: A LITTLE
CLIMB 3 TO 5 STEPS WITH RAILING: A LOT
PERSONAL GROOMING: A LITTLE
DRESSING REGULAR UPPER BODY CLOTHING: A LITTLE

## 2022-04-22 NOTE — PROGRESS NOTES
Report received, pt care assumed, tele box on and rate verified. VSS and pt is on room air. Pt aaox4, no signs of distress noted at this time. POC discussed with pt and verbalizes no questions. Pt c/o of no pain at this time Pt denies any additional needs at this time. Bed in lowest position, pt educated on fall risk and verbalized understanding, call light within reach, will continue with plan of care.

## 2022-04-22 NOTE — DISCHARGE PLANNING
Agency/Facility Name: [facility]   Spoke To: Adolfo   Outcome: Referral still under review, Adolfo to contact with referral updates.

## 2022-04-22 NOTE — DISCHARGE INSTRUCTIONS
Discharge Instructions    Discharged to home by car with relative. Discharged via wheelchair, hospital escort: Yes.  Special equipment needed: Not Applicable    Be sure to schedule a follow-up appointment with your primary care doctor or any specialists as instructed.     Discharge Plan:   Diet Plan: Discussed  Activity Level: Discussed  Confirmed Follow up Appointment: Patient to Call and Schedule Appointment  Confirmed Symptoms Management: Discussed  Medication Reconciliation Updated: Yes    I understand that a diet low in cholesterol, fat, and sodium is recommended for good health. Unless I have been given specific instructions below for another diet, I accept this instruction as my diet prescription.   Other diet:     Special Instructions:     Pulmonary Embolism    A pulmonary embolism (PE) is a sudden blockage or decrease of blood flow in one or both lungs. Most blockages come from a blood clot that forms in the vein of a lower leg, thigh, or arm (deep vein thrombosis, DVT) and travels to the lungs. A clot is blood that has thickened into a gel or solid. PE is a dangerous and life-threatening condition that needs to be treated right away.  What are the causes?  This condition is usually caused by a blood clot that forms in a vein and moves to the lungs. In rare cases, it may be caused by air, fat, part of a tumor, or other tissue that moves through the veins and into the lungs.  What increases the risk?  The following factors may make you more likely to develop this condition:  · Experiencing a traumatic injury, such as breaking a hip or leg.  · Having:  ? A spinal cord injury.  ? Orthopedic surgery, especially hip or knee replacement.  ? Any major surgery.  ? A stroke.  ? DVT.  ? Blood clots or blood clotting disease.  ? Long-term (chronic) lung or heart disease.  ? Cancer treated with chemotherapy.  ? A central venous catheter.  · Taking medicines that contain estrogen. These include birth control pills and  hormone replacement therapy.  · Being:  ? Pregnant.  ? In the period of time after your baby is delivered (postpartum).  ? Older than age 60.  ? Overweight.  ? A smoker, especially if you have other risks.  What are the signs or symptoms?  Symptoms of this condition usually start suddenly and include:  · Shortness of breath during activity or at rest.  · Coughing, coughing up blood, or coughing up blood-tinged mucus.  · Chest pain that is often worse with deep breaths.  · Rapid or irregular heartbeat.  · Feeling light-headed or dizzy.  · Fainting.  · Feeling anxious.  · Fever.  · Sweating.  · Pain and swelling in a leg. This is a symptom of DVT, which can lead to PE.  How is this diagnosed?  This condition may be diagnosed based on:  · Your medical history.  · A physical exam.  · Blood tests.  · CT pulmonary angiogram. This test checks blood flow in and around your lungs.  · Ventilation-perfusion scan, also called a lung VQ scan. This test measures air flow and blood flow to the lungs.  · An ultrasound of the legs.  How is this treated?  Treatment for this condition depends on many factors, such as the cause of your PE, your risk for bleeding or developing more clots, and other medical conditions you have. Treatment aims to remove, dissolve, or stop blood clots from forming or growing larger. Treatment may include:  · Medicines, such as:  ? Blood thinning medicines (anticoagulants) to stop clots from forming.  ? Medicines that dissolve clots (thrombolytics).  · Procedures, such as:  ? Using a flexible tube to remove a blood clot (embolectomy) or to deliver medicine to destroy it (catheter-directed thrombolysis).  ? Inserting a filter into a large vein that carries blood to the heart (inferior vena cava). This filter (vena cava filter) catches blood clots before they reach the lungs.  ? Surgery to remove the clot (surgical embolectomy). This is rare.  You may need a combination of immediate, long-term (up to 3 months  after diagnosis), and extended (more than 3 months after diagnosis) treatments. Your treatment may continue for several months (maintenance therapy). You and your health care provider will work together to choose the treatment program that is best for you.  Follow these instructions at home:  Medicines  · Take over-the-counter and prescription medicines only as told by your health care provider.  · If you are taking an anticoagulant medicine:  ? Take the medicine every day at the same time each day.  ? Understand what foods and drugs interact with your medicine.  ? Understand the side effects of this medicine, including excessive bruising or bleeding. Ask your health care provider or pharmacist about other side effects.  General instructions  · Wear a medical alert bracelet or carry a medical alert card that says you have had a PE and lists what medicines you take.  · Ask your health care provider when you may return to your normal activities. Avoid sitting or lying for a long time without moving.  · Maintain a healthy weight. Ask your health care provider what weight is healthy for you.  · Do not use any products that contain nicotine or tobacco, such as cigarettes, e-cigarettes, and chewing tobacco. If you need help quitting, ask your health care provider.  · Talk with your health care provider about any travel plans. It is important to make sure that you are still able to take your medicine while on trips.  · Keep all follow-up visits as told by your health care provider. This is important.  Contact a health care provider if:  · You missed a dose of your blood thinner medicine.  Get help right away if:  · You have:  ? New or increased pain, swelling, warmth, or redness in an arm or leg.  ? Numbness or tingling in an arm or leg.  ? Shortness of breath during activity or at rest.  ? A fever.  ? Chest pain.  ? A rapid or irregular heartbeat.  ? A severe headache.  ? Vision changes.  ? A serious fall or accident, or  you hit your head.  ? Stomach (abdominal) pain.  ? Blood in your vomit, stool, or urine.  ? A cut that will not stop bleeding.  · You cough up blood.  · You feel light-headed or dizzy.  · You cannot move your arms or legs.  · You are confused or have memory loss.  These symptoms may represent a serious problem that is an emergency. Do not wait to see if the symptoms will go away. Get medical help right away. Call your local emergency services (911 in the [place] Do not drive yourself to the hospital.  Summary  · A pulmonary embolism (PE) is a sudden blockage or decrease of blood flow in one or both lungs. PE is a dangerous and life-threatening condition that needs to be treated right away.  · Treatments for this condition usually include medicines to thin your blood (anticoagulants) or medicines to break apart blood clots (thrombolytics).  · If you are given blood thinners, it is important to take the medicine every day at the same time each day.  · Understand what foods and drugs interact with any medicines that you are taking.  · If you have signs of PE or DVT, call your local emergency services (911 in the [place]  This information is not intended to replace advice given to you by your health care provider. Make sure you discuss any questions you have with your health care provider.  Document Released: 12/15/2001 Document Revised: 09/25/2019 Document Reviewed: 09/25/2019  [employer] Patient Education © 2020 [employer]    Omeprazole capsules (sprinkle caps) - Rx  What is this medicine?  OMEPRAZOLE (oh ME pray zol) prevents the production of acid in the stomach. It is used to treat gastroesophageal reflux disease (GERD), ulcers, certain bacteria in the stomach, inflammation of the esophagus, and Zollinger-Vuong Syndrome. It is also used to treat other conditions that cause too much stomach acid.  This medicine may be used for other purposes; ask your health care provider or pharmacist if you have  questions.  COMMON BRAND NAME(S): Prilosec  What should I tell my health care provider before I take this medicine?  They need to know if you have any of these conditions:  · liver disease  · low levels of magnesium in the blood  · lupus  · an unusual or allergic reaction to omeprazole, other medicines, foods, dyes, or preservatives  · pregnant or trying to get pregnant  · breast-feeding  How should I use this medicine?  Take this medicine by mouth with a glass of water. Follow the directions on the prescription label. Do not cut, crush or chew this medicine. Swallow the capsules whole. You may open the capsule and put the contents in 1 tablespoon of applesauce. Swallow the medicine and applesauce right away. Do not chew the medicine or applesauce. Take this medicine before a meal. Take your medicine at regular intervals. Do not take your medicine more often than directed. Do not stop taking except on your doctor's advice.  A special MedGuide will be given to you by the pharmacist with each prescription and refill. Be sure to read this information carefully each time.  Talk to your pediatrician regarding the use of this medicine in children. While this drug may be prescribed for children as young as 2 years for selected conditions, precautions do apply.  Overdosage: If you think you have taken too much of this medicine contact a poison control center or emergency room at once.  NOTE: This medicine is only for you. Do not share this medicine with others.  What if I miss a dose?  If you miss a dose, take it as soon as you can. If it is almost time for your next dose, take only that dose. Do not take double or extra doses.  What may interact with this medicine?  Do not take this medicine with any of the following medications:  · atazanavir  · clopidogrel  · nelfinavir  · rilpivirine  This medicine may also interact with the following medications:  · antifungals like itraconazole, ketoconazole, and  voriconazole  · certain antivirals for HIV or hepatitis  · certain medicines that treat or prevent blood clots like warfarin  · cilostazol  · citalopram  · cyclosporine  · dasatinib  · digoxin  · disulfiram  · diuretics  · erlotinib  · iron supplements  · medicines for anxiety, panic, and sleep like diazepam  · medicines for seizures like carbamazepine, phenobarbital, phenytoin  · methotrexate  · mycophenolate mofetil  · nilotinib  · rifampin  · Amira's wort  · tacrolimus  · vitamin B12  This list may not describe all possible interactions. Give your health care provider a list of all the medicines, herbs, non-prescription drugs, or dietary supplements you use. Also tell them if you smoke, drink alcohol, or use illegal drugs. Some items may interact with your medicine.  What should I watch for while using this medicine?  Visit your healthcare professional for regular checks on your progress. Tell your healthcare professional if your symptoms do not start to get better or if they get worse. You may need blood work done while taking this medicine.  This medicine may cause a decrease in vitamin B12. You should make sure that you get enough vitamin B12 while you are taking this medicine. Discuss the foods you eat and the vitamins you take with your health care professional.  What side effects may I notice from receiving this medicine?  Side effects that you should report to your doctor or health care professional as soon as possible:  · allergic reactions like skin rash, itching or hives, swelling of the face, lips, or tongue  · bone pain  · breathing problems  · fever or sore throat  · joint pain  · rash on cheeks or arms that gets worse in the sun  · redness, blistering, peeling, or loosening of the skin, including inside the mouth  · severe diarrhea  · signs and symptoms of kidney injury like trouble passing urine or change in the amount of urine  · signs and symptoms of low magnesium like muscle cramps; muscle  pain; muscle weakness; tremors; seizures; or fast, irregular heartbeat  · stomach polyps  · unusual bleeding or bruising  Side effects that usually do not require medical attention (report to your doctor or health care professional if they continue or are bothersome):  · diarrhea  · dry mouth  · gas  · headache  · nausea  · stomach pain  This list may not describe all possible side effects. Call your doctor for medical advice about side effects. You may report side effects to FDA at 1-948-UHR-7869.  Where should I keep my medicine?  Keep out of the reach of children.  Store at room temperature between 15 and 30 degrees C (59 and 86 degrees F). Protect from light and moisture. Throw away any unused medicine after the expiration date.  NOTE: This sheet is a summary. It may not cover all possible information. If you have questions about this medicine, talk to your doctor, pharmacist, or health care provider.  © 2020 [employer] (2019-10-09 13:30:14)    Rivaroxaban oral tablets  What is this medicine?  RIVAROXABAN (ri va CLAUDIA a ban) is an anticoagulant (blood thinner). It is used to treat blood clots in the lungs or in the veins. It is also used to prevent blood clots in the lungs or in the veins. It is also used to lower the chance of stroke in people with a medical condition called atrial fibrillation.  This medicine may be used for other purposes; ask your health care provider or pharmacist if you have questions.  COMMON BRAND NAME(S): Xarelto, Xarelto Starter Pack  What should I tell my health care provider before I take this medicine?  They need to know if you have any of these conditions:  · antiphospholipid antibody syndrome  · artificial heart valve  · bleeding disorders  · bleeding in the brain  · blood in your stools (black or tarry stools) or if you have blood in your vomit  · history of blood clots  · history of stomach bleeding  · kidney disease  · liver disease  · low blood counts, like low white  cell, platelet, or red cell counts  · recent or planned spinal or epidural procedure  · take medicines that treat or prevent blood clots  · an unusual or allergic reaction to rivaroxaban, other medicines, foods, dyes, or preservatives  · pregnant or trying to get pregnant  · breast-feeding  How should I use this medicine?  Take this medicine by mouth with a glass of water. Follow the directions on the prescription label. Take your medicine at regular intervals. Do not take it more often than directed. Do not stop taking except on your doctor's advice. Stopping this medicine may increase your risk of a blood clot. Be sure to refill your prescription before you run out of medicine.  If you are taking this medicine after hip or knee replacement surgery, take it with or without food. If you are taking this medicine for atrial fibrillation, take it with your evening meal. If you are taking this medicine to treat blood clots, take it with food at the same time each day. If you are unable to swallow your tablet, you may crush the tablet and mix it in applesauce. Then, immediately eat the applesauce. You should eat more food right after you eat the applesauce containing the crushed tablet.  Talk to your pediatrician regarding the use of this medicine in children. Special care may be needed.  Overdosage: If you think you have taken too much of this medicine contact a poison control center or emergency room at once.  NOTE: This medicine is only for you. Do not share this medicine with others.  What if I miss a dose?  If you take your medicine once a day and miss a dose, take the missed dose as soon as you remember. If it is almost time for your next dose, take only that dose. Do not take double or extra doses.  If you take your medicine twice a day and miss a dose, take the missed dose immediately. In this instance, 2 tablets may be taken at the same time. The next day you should take 1 tablet twice a day as directed.  What  may interact with this medicine?  Do not take this medicine with any of the following medications:  · defibrotide  This medicine may also interact with the following medications:  · aspirin and aspirin-like medicines  · certain antibiotics like erythromycin, azithromycin, and clarithromycin  · certain medicines for fungal infections like ketoconazole and itraconazole  · certain medicines for irregular heart beat like amiodarone, quinidine, dronedarone  · certain medicines for seizures like carbamazepine, phenytoin  · certain medicines that treat or prevent blood clots like warfarin, enoxaparin, and dalteparin  · conivaptan  · felodipine  · indinavir  · lopinavir; ritonavir  · NSAIDS, medicines for pain and inflammation, like ibuprofen or naproxen  · ranolazine  · rifampin  · ritonavir  · SNRIs, medicines for depression, like desvenlafaxine, duloxetine, levomilnacipran, venlafaxine  · SSRIs, medicines for depression, like citalopram, escitalopram, fluoxetine, fluvoxamine, paroxetine, sertraline  · Amira's wort  · verapamil  This list may not describe all possible interactions. Give your health care provider a list of all the medicines, herbs, non-prescription drugs, or dietary supplements you use. Also tell them if you smoke, drink alcohol, or use illegal drugs. Some items may interact with your medicine.  What should I watch for while using this medicine?  Visit your healthcare professional for regular checks on your progress. You may need blood work done while you are taking this medicine. Your condition will be monitored carefully while you are receiving this medicine. It is important not to miss any appointments.  Avoid sports and activities that might cause injury while you are using this medicine. Severe falls or injuries can cause unseen bleeding. Be careful when using sharp tools or knives. Consider using an electric razor. Take special care brushing or flossing your teeth. Report any injuries, bruising,  or red spots on the skin to your healthcare professional.  If you are going to need surgery or other procedure, tell your healthcare professional that you are taking this medicine.  Wear a medical ID bracelet or chain. Carry a card that describes your disease and details of your medicine and dosage times.  What side effects may I notice from receiving this medicine?  Side effects that you should report to your doctor or health care professional as soon as possible:  · allergic reactions like skin rash, itching or hives, swelling of the face, lips, or tongue  · back pain  · redness, blistering, peeling or loosening of the skin, including inside the mouth  · signs and symptoms of bleeding such as bloody or black, tarry stools; red or dark-brown urine; spitting up blood or brown material that looks like coffee grounds; red spots on the skin; unusual bruising or bleeding from the eye, gums, or nose  · signs and symptoms of a blood clot such as chest pain; shortness of breath; pain, swelling, or warmth in the leg  · signs and symptoms of a stroke such as changes in vision; confusion; trouble speaking or understanding; severe headaches; sudden numbness or weakness of the face, arm or leg; trouble walking; dizziness; loss of coordination  Side effects that usually do not require medical attention (report to your doctor or health care professional if they continue or are bothersome):  · dizziness  · muscle pain  This list may not describe all possible side effects. Call your doctor for medical advice about side effects. You may report side effects to FDA at 8-879-XTD-6074.  Where should I keep my medicine?  Keep out of the reach of children.  Store at room temperature between 15 and 30 degrees C (59 and 86 degrees F). Throw away any unused medicine after the expiration date.  NOTE: This sheet is a summary. It may not cover all possible information. If you have questions about this medicine, talk to your doctor, pharmacist,  or health care provider.  © 2020 [employer] (2020-03-16 09:45:59)      · Is patient discharged on Warfarin / Coumadin?   No     Depression / Suicide Risk    As you are discharged from this [facility] facility, it is important to learn how to keep safe from harming yourself.    Recognize the warning signs:  · Abrupt changes in personality, positive or negative- including increase in energy   · Giving away possessions  · Change in eating patterns- significant weight changes-  positive or negative  · Change in sleeping patterns- unable to sleep or sleeping all the time   · Unwillingness or inability to communicate  · Depression  · Unusual sadness, discouragement and loneliness  · Talk of wanting to die  · Neglect of personal appearance   · Rebelliousness- reckless behavior  · Withdrawal from people/activities they love  · Confusion- inability to concentrate     If you or a loved one observes any of these behaviors or has concerns about self-harm, here's what you can do:  · Talk about it- your feelings and reasons for harming yourself  · Remove any means that you might use to hurt yourself (examples: pills, rope, extension cords, firearm)  · Get professional help from the community (Mental Health, Substance Abuse, psychological counseling)  · Do not be alone:Call your Safe Contact- someone whom you trust who will be there for you.  · Call your local CRISIS HOTLINE 305-9150 or 140-623-0023  · Call your local Children's Mobile Crisis Response Team [place] (740) 951-4502 or www.[employer]  · Call the toll free National Suicide Prevention Hotlines   · National Suicide Prevention Lifeline 530-737-KQOE (2554)  · National Hope Line Network 800-SUICIDE (710-9920)

## 2022-04-22 NOTE — PROGRESS NOTES
Patient discharged home with family. All personal belongings collected. IV access removed. Discharge instructions discussed. Medications reviewed; new medication printed education provided and discussed. Meds to beds retrieved and given to patient. Follow up appointments to be scheduled by patient at earliest convenience. Patient discharged from discharge lounge without incident.

## 2022-04-22 NOTE — CARE PLAN
The patient is Watcher - Medium risk of patient condition declining or worsening    Shift Goals  Clinical Goals: Colonoscopy today  Patient Goals: Rest    Progress made toward(s) clinical / shift goals:    Problem: Knowledge Deficit - Standard  Goal: Patient and family/care givers will demonstrate understanding of plan of care, disease process/condition, diagnostic tests and medications  Outcome: Progressing     Problem: Fall Risk  Goal: Patient will remain free from falls  Outcome: Progressing       Patient is not progressing towards the following goals:

## 2022-04-22 NOTE — PROCEDURES
DATE OF PROCEDURE:  04/21/2022     PROCEDURE:  Colonoscopy.     :  Rell Rivas MD     INDICATIONS:  The patient is a 66-year-old with somewhat chronic anemia and   Hemoccult positive stools who was recently diagnosed with pulmonary embolus   and was started on anticoagulation therapy.  No recent endoscopic evaluation.    Last colonoscopy in 2016 was normal.     INFORMED CONSENT:  Written informed consent was obtained from the patient   after thorough explanation of indications, benefits, and risks of the   procedure, which included but was not limited to bleeding, infection,   perforation, adverse reaction to medications and missed lesions.     MEDICATIONS GIVEN:  Monitored anesthesia care with propofol.     Continuous telemetry, BP, pulse oximetry, and capnography were performed by   the anesthesiologist throughout the procedure.     Both an adult colonoscope and pediatric colonoscope was used for the   procedure.     FINDINGS:  The patient was placed in left lateral decubitus position.  After   anesthesia was achieved, a digital rectal exam was performed and found to be   normal.  Initially adult colonoscope was inserted into the rectum and advanced   to the level of the sigmoid colon. Due to suspected adhesions around the   sigmoid colon, I was unable to traverse the sigmoid colon with an adult   colonoscope.  The adult colonoscope was removed and pediatric colonoscope was   then used.  The pediatric colonoscope was inserted into the rectum and was   able to traverse the sigmoid colon, advanced to the cecum, which was   identified by the appendiceal orifice and ileocecal valve.  The terminal ileum   was intubated and found to be normal.  The colonoscope was then withdrawn   with careful inspection of mucosa.  There was some retained food fibrous   material within the cecum, which was unable to be completely removed and did   obscure the appendix.  We attempted to move the patient to move the  food, but   was unsuccessful.  No large lesions seen.  The remainder of the colonic mucosa   appeared normal throughout with the exception of some semi-solid material in   the hepatic flexure as well.  No polyps or large lesions seen.  Colonic mucosa   was normal.  Retroflexion of the rectum was performed and found to be normal.    The patient underwent a GoLYTELY prep, the results of which were mostly fair   as described in the cecum and hepatic flexure.  The patient tolerated the   procedure well.     IMPRESSION:  Normal colonoscopy.     PLAN:  1.  Advance diet.  2.  Okay to resume anticoagulation.  3.  If she has further anemia on anticoagulation, may need to consider capsule   endoscopy to assess the rest of her small bowel for additional AVMs that   could be contributing.  4.  GI to sign off.  Please call with questions.        ______________________________  MD PETAR BURR/ADIN/ISI    DD:  04/21/2022 15:48  DT:  04/21/2022 16:55    Job#:  [id number]

## 2022-04-22 NOTE — DISCHARGE PLANNING
RN SE received a call from Dayanna at [facility], patient has been accepted and can be started next week.

## 2022-04-22 NOTE — CARE PLAN
Problem: Knowledge Deficit - Standard  Goal: Patient and family/care givers will demonstrate understanding of plan of care, disease process/condition, diagnostic tests and medications  Outcome: Progressing     Problem: Fall Risk  Goal: Patient will remain free from falls  Outcome: Progressing   The patient is Watcher - Medium risk of patient condition declining or worsening    Shift Goals  Clinical Goals: heparin gtt, hemeodynamic stability  Patient Goals: rest    Progress made toward(s) clinical / shift goals:  Pt heparin gtt still running and xa draws per protocol     Patient is not progressing towards the following goals:

## 2022-04-22 NOTE — DISCHARGE SUMMARY
Discharge Summary    Date of Admission: 4/19/2022  Date of Discharge: 4/22/2022 at 09:10  Discharging Attending: Daniel Knowles M.D.   Discharging Senior Resident: Dr. Ruiz  Discharging Intern: Dr. Garduno    CHIEF COMPLAINT ON ADMISSION  Chief Complaint   Patient presents with   • Tachycardia     Pt reports that she was sent here by her primary care doctor after seeing her yesterday and receiving an EKG, she was told to come to the ER to get a medication for her rapid heart rate.      Admission Date  4/19/2022    CODE STATUS  Full Code    HPI & HOSPITAL COURSE  Ms. Jaymie Peacock is a 67 y/o female with past medical history of HTN, COPD, osteoporosis, tobacco abuse, stress induced cardiomyopathy 2/2022 with EF of 35% (with improvement of LVEF 55% on 3/2022).  Patient admitted for acute pulmonary embolism and further evaluation and management.     * Pulmonary embolism and infarction (HCC)- (present on admission)  Assessment & Plan  Unknown at this time if it is provoked or unprovoked, will likely need further evaluation for possible malignancy. Patient underwent EGD and colonoscopy without evidence of malignancy. Patient has had multiple hospital stays in the past year which could have contributed.  -CTA of the chest (4/19): nonocclusive pulmonary embolus in the right main and upper lobar pulmonary arteries. Occlusive thrombus in the segmental and subsegmental branches in the posterior right lower lobe  -Transitioned to Xarelto with initial loading doses. Anticipate at least 3-6 month course of DOAC, consider repeat imaging 3-6 months to determine continuation of DOAC    Normocytic anemia- (present on admission)  Assessment & Plan  Denies any hx of melena or hematochezia or GI bleed  -Had previous discussion of outpatient GI workup  -2/2022: Fe 63, %sat 47, ferritin 955, B12 955, TSH 0.9, fT4 1.06  -6/2021: Folate 4.3  -EGD (4/21/22): Two small duodenal AVMs, ablated with APC therapy  -Colonoscopy (4/21/22): Normal  colonoscopy but retained food debris in cecum and hepatic flexure area  -Per GI recs (4/21): If she has further anemia on anticoagulation, may need to consider capsule endoscopy to assess the rest of her small bowel for additional AVMs that could be contributing  -Likely outpatient repeat of iron, B12, folate labs  -Daily PPI     Acute deep vein thrombosis (DVT) of left lower extremity (HCC)- (present on admission)  Assessment & Plan  -Duplex US of the lower extremities (4/19): showed subacute, occlusive deep venous thrombus in the popliteal, peroneal and proximal posterior tibial veins  -Transitioned to Xarelto with initial loading dose     Thyroid nodule- (present on admission)  Assessment & Plan  -CT-PE (4/18): Multiple bilateral thyroid nodules  -Previous TSH is within normal limits (last 2/2022)  -Patient currently asymptomatic, recommend outpatient ultrasound and follow-up     Smoking- (present on admission)  Assessment & Plan  Smokes 1/2 pack for 25 years  -Smoking cessation counseling provided   -Declined NRT on discharge, patient stated that she has nicotine products at home     Alkaline phosphatase elevation- (present on admission)  Assessment & Plan  Downtrending, unsure of etiology  -Alk phosp: 304 --> 209  -Can consider further work-up with GGT as outpatient as patient is asymptomatic     Compression fracture of first lumbar vertebra (HCC)- (present on admission)  Assessment & Plan  Patient stated that she may have fallen couple of days ago  -CT-PE (4/18): New compression fracture of L1 vertebral body  -Likely recommend outpatient follow-up and consideration of possible bisphosphonate vs surgical evaluation after medical stabilization     Elevated troponin- (present on admission)  Assessment & Plan  Likely 2/2 from oxygen mismatch from PE. Asymptomatic with no chest pain. Hx of stress induced cardiomyopathy in feb 2022 with EF of 35%. Most recent Echo 3/31/2022 with EF of 55%   -Troponin: 33 -->  28  -EKG: Sinus tachycardia, PVCs     Alcohol abuse- (present on admission)  Assessment & Plan  Drink 1-3 beers a day, worked as    -Alcohol cessation counseling provided    Therefore, she is discharged in fair and stable condition to home with close outpatient follow-up.    The patient met 2-midnight criteria for an inpatient stay at the time of discharge.    Discharge Date  4/22/22    FOLLOW UP ITEMS POST DISCHARGE  -Repeat CBC to determine hemoglobin stability. Per GI recs (4/21): If she has further anemia on anticoagulation, may need to consider capsule endoscopy to assess the rest of her small bowel for additional AVMs that could be contributing.  -Thyroid ultrasound of incidental nodules identified on CT-PE  -Elevated alk phosphate enzymes, can consider further work-up with GGT as outpatient as patient was asymptomatic during inpatient stay  -Possible surgical evaluation versus initiation of bisphosphonates for compression fracture of L1  -Age appropriate cancer screening  -Repeat imaging of the lungs 3 to 6 months after discharge    DISCHARGE DIAGNOSES  Principal Problem:    Pulmonary embolism and infarction (HCC) POA: Yes  Active Problems:    Normocytic anemia POA: Yes    Acute deep vein thrombosis (DVT) of left lower extremity (HCC) POA: Yes    Elevated troponin POA: Yes    Compression fracture of first lumbar vertebra (HCC) POA: Yes    Alkaline phosphatase elevation POA: Yes    Nutritional deficiency POA: Yes    Frequent hospital admissions POA: Yes    Smoking POA: Yes    Alcohol abuse POA: Yes    Hypokalemia POA: Yes    Severe protein-calorie malnutrition ([place]: less than 60% of standard weight) (HCC) POA: Yes    Hypomagnesemia POA: Yes    High anion gap metabolic acidosis POA: Yes    Thyroid nodule POA: Yes  Resolved Problems:    * No resolved hospital problems. *      FOLLOW UP  No future appointments.  No follow-up provider specified.    MEDICATIONS ON DISCHARGE     Medication List      START  taking these medications      Instructions   omeprazole 20 MG delayed-release capsule  Start taking on: April 23, 2022  Commonly known as: PRILOSEC   Take 1 Capsule by mouth every day.  Dose: 20 mg     * Xarelto Starter Pack 15 & 20 MG Tbpk  Start taking on: April 21, 2022  Generic drug: Rivaroxaban   Take 15 mg by mouth 2 times a day with meals for 21 days, THEN 20 mg every day for 9 days.     * rivaroxaban 20 MG Tabs tablet  Start taking on: May 13, 2022  Commonly known as: Xarelto   Take 1 Tablet by mouth with dinner.  Dose: 20 mg         * This list has 2 medication(s) that are the same as other medications prescribed for you. Read the directions carefully, and ask your doctor or other care provider to review them with you.            CONTINUE taking these medications      Instructions   furosemide 20 MG Tabs  Commonly known as: LASIX   Take 1 Tablet by mouth every day for 30 days.  Dose: 20 mg     mirtazapine 15 MG Tabs  Commonly known as: Remeron   Take 1 Tablet by mouth every evening.  Dose: 15 mg     potassium chloride ER 10 MEQ tablet  Commonly known as: KLOR-CON   Take 1 Tablet by mouth every day for 30 days.  Dose: 10 mEq     simvastatin 20 MG Tabs  Commonly known as: ZOCOR   Take 20 mg by mouth every evening. Indications: Ischemic Heart Disease  Dose: 20 mg            Allergies  No Known Allergies    DIET  Orders Placed This Encounter   Procedures   • Diet Order Diet: Regular     Standing Status:   Standing     Number of Occurrences:   1     Order Specific Question:   Diet:     Answer:   Regular [1]       ACTIVITY  As tolerated.  Weight bearing as tolerated    CONSULTATIONS  Dr. Marcos with Gastroenterology consulted.  Treatment options were discussed and plan of care agreed upon.    PROCEDURES  EGD and colonscopy on 4/21/22    Physical Exam  Vitals and nursing note reviewed.   Constitutional:       Appearance: Normal appearance. She is not toxic-appearing.   HENT:      Head: Normocephalic and  atraumatic.      Right Ear: External ear normal.      Left Ear: External ear normal.      Nose: No rhinorrhea.      Mouth/Throat:      Mouth: Mucous membranes are moist.   Eyes:      Extraocular Movements: Extraocular movements intact.      Conjunctiva/sclera: Conjunctivae normal.   Cardiovascular:      Rate and Rhythm: Normal rate and regular rhythm.      Heart sounds: Murmur heard.     No gallop.      Comments: Possible 2/6 systolic murmur around mitral area  Pulmonary:      Breath sounds: Normal breath sounds. No wheezing or rales.   Abdominal:      General: Bowel sounds are normal. There is no distension.      Tenderness: There is no abdominal tenderness.   Musculoskeletal:      Cervical back: Neck supple.      Right lower leg: Edema (1+) present.      Left lower leg: No edema.      Comments: RLE: no evidence of phlegmasia cerulea dolens   Skin:     Capillary Refill: Capillary refill takes less than 2 seconds.      Coloration: Skin is not pale.   Neurological:      Mental Status: She is oriented to person, place, and time.   Psychiatric:         Mood and Affect: Mood normal.         Thought Content: Thought content does not include homicidal or suicidal ideation.       Time spent on discharge: 45 minutes    Please note that this dictation was created using voice recognition software. I have made every reasonable attempt to correct obvious errors, but there may be errors of grammar and possibly content that I did not discover before finalizing the note.

## 2022-04-23 NOTE — CASE COMMUNICATION
CM noted   ----- Message -----  From: Margareth Gomez R.N.  Sent: 4/14/2022  12:09 PM PDT  To: Leena Franz R.N.      Primary dx/Skilled need:heart failure  SN frequency:1w1, 2w4, 1w1, 3 prn heart failure protocol  Zip code:[id number]  Disciplines ordered:SN, PT, refused OT  Certification period:3/19/2022-5/17/2022  Special considerations:resumption of care

## 2022-04-23 NOTE — CASE COMMUNICATION
Patient is admitted to [facility] on 4/19 due to pulmonary embolism. She went to ED on 4/18 but left AMA. Returned to ED the next day for worsening symptoms and found to have PE. Multiple ED visit and hospitalization in the past two months.   Patient is discharged from [facility] services due to non-compliance with treatment plan and need for higher level of care effective immediately. Pt lives alone at home and doesn't have any consi stent caregiver. Both physical functionality and mentation have significantly declined. Pt needs assistance with ADLs. IADLs, and medication management. Has been a long time tobacco and alcohol abuser but refuses to cut down on use despite recent health deterioration.

## 2022-04-24 LAB
BACTERIA BLD CULT: NORMAL
BACTERIA BLD CULT: NORMAL
SIGNIFICANT IND 70042: NORMAL
SIGNIFICANT IND 70042: NORMAL
SITE SITE: NORMAL
SITE SITE: NORMAL
SOURCE SOURCE: NORMAL
SOURCE SOURCE: NORMAL

## 2022-04-25 ENCOUNTER — HOME CARE VISIT (OUTPATIENT)
Dept: HOME HEALTH SERVICES | Facility: HOME HEALTHCARE | Age: 67
End: 2022-04-25
Payer: MEDICARE

## 2022-04-25 ASSESSMENT — ENCOUNTER SYMPTOMS
DEPRESSION: 0
WEIGHT LOSS: 1
MUSCULOSKELETAL NEGATIVE: 1
BRUISES/BLEEDS EASILY: 0
HEARTBURN: 0
HEADACHES: 0
NECK PAIN: 0
WEAKNESS: 0
BACK PAIN: 0
NAUSEA: 0
NERVOUS/ANXIOUS: 1
DIZZINESS: 0
POLYDIPSIA: 0
RESPIRATORY NEGATIVE: 1
CHILLS: 0
COUGH: 0
EYES NEGATIVE: 1
PALPITATIONS: 0
FEVER: 0
CARDIOVASCULAR NEGATIVE: 1
VOMITING: 0
FOCAL WEAKNESS: 0
NEUROLOGICAL NEGATIVE: 1

## 2022-04-25 ASSESSMENT — LIFESTYLE VARIABLES: SUBSTANCE_ABUSE: 1

## 2022-04-25 NOTE — PROGRESS NOTES
[facility] Medicine Daily Progress Note    Date of Service  4/3/2022    Chief Complaint  Jaymie Peacock is a 66 y.o. female admitted 3/28/2022 with hypotension, poor p.o. intake and diarrhea    Hospital Course  Jaymie Peacock is a 66 y.o. female current smoker with hypertension, emphysema, severe malnutrition, recent admission for acute renal failure and NSTEMI thought to be secondary to stress-induced cardiomyopathy reduced EF of 35%  was admitted to the MICU on 3/20/2022 for iron depletion, poor p.o. intake, hypotension, YAMIL and metabolic acidosis requiring bicarbonate drip.  Repeat echocardiogram with improvement of ejection fraction, now at 55%.  Nephrology following. Urine cultures with E. coli for which she completed a Rocephin antibiotic regimen.  Electrolytes were repleted and patient requiring norepinephrine, stress steroids and midodrine for pressure support which were ultimately titrated off.  She was ultimately transferred to the medical floor. Bicarbonate drip was ultimately discontinued as metabolic acidosis resolved and patient's acute renal failure resolved with aggressive IV hydration.     Interval Problem Update  Patient seen and examined today  In no acute distress, happy  Creatinine trending down slowly  Metabolic acidosis resolved, DC bicarbonate drip  Pending PT/OT re-evaluation  Up to chair for all meals  Labs on AM    I have personally seen and examined the patient at bedside. I discussed the plan of care with patient.    Consultants/Specialty  critical care, nephrology    Code Status  Prior    Disposition  Patient is not medically cleared for discharge.   Anticipate discharge to to home with close outpatient follow-up.  I have placed the appropriate orders for post-discharge needs.    Review of Systems  Review of Systems   Constitutional: Positive for malaise/fatigue and weight loss. Negative for chills and fever.   HENT: Negative.    Eyes: Negative.    Respiratory: Negative.  Negative for  cough.    Cardiovascular: Negative.  Negative for chest pain and palpitations.   Gastrointestinal: Negative for heartburn, nausea and vomiting.   Genitourinary: Negative.  Negative for dysuria and frequency.   Musculoskeletal: Negative.  Negative for back pain and neck pain.   Skin: Negative.  Negative for itching and rash.   Neurological: Negative.  Negative for dizziness, focal weakness, weakness and headaches.   Endo/Heme/Allergies: Negative.  Negative for polydipsia. Does not bruise/bleed easily.   Psychiatric/Behavioral: Positive for substance abuse. Negative for depression. The patient is nervous/anxious.         Physical Exam       Physical Exam  Vitals and nursing note reviewed.   Constitutional:       Appearance: She is well-developed and underweight. She is ill-appearing. She is not diaphoretic.      Interventions: Nasal cannula in place.   HENT:      Head: Normocephalic and atraumatic.      Comments: Temporal wasting     Nose: Nose normal.   Eyes:      Conjunctiva/sclera: Conjunctivae normal.      Pupils: Pupils are equal, round, and reactive to light.   Neck:      Thyroid: No thyromegaly.      Vascular: No JVD.   Cardiovascular:      Rate and Rhythm: Normal rate and regular rhythm.      Heart sounds: Normal heart sounds.     No friction rub. No gallop.   Pulmonary:      Effort: Pulmonary effort is normal.      Breath sounds: Normal breath sounds. No wheezing or rales.   Abdominal:      General: Bowel sounds are normal. There is no distension.      Palpations: Abdomen is soft. There is no mass.      Tenderness: There is no abdominal tenderness. There is no guarding or rebound.   Musculoskeletal:         General: No tenderness. Normal range of motion.      Cervical back: Normal range of motion and neck supple.      Comments: Generalized muscle wasting   Lymphadenopathy:      Cervical: No cervical adenopathy.   Skin:     General: Skin is warm and dry.      Coloration: Skin is pale.   Neurological:       Mental Status: She is oriented to person, place, and time. She is lethargic.      Cranial Nerves: No cranial nerve deficit.   Psychiatric:         Behavior: Behavior normal.         Fluids  No intake or output data in the 24 hours ending 04/25/22 1034    Laboratory                        Imaging  CT-CTA CHEST PULMONARY ARTERY W/ RECONS   Final Result      1.  There are peripheral, linear nonocclusive pulmonary embolus in the right main and upper lobar pulmonary arteries. Occlusive thrombus in the segmental and subsegmental branches in the posterior right lower lobe.   2.  Normal RV/ LV ratio.   3.  New compression fracture of L1 vertebral body.   4.  Multiple bilateral thyroid nodules, recommend dedicated ultrasound of the thyroid if not already performed.            Findings were discussed with BURAK FUNEZ on 4/19/2022 1:13 PM.         US-EXTREMITY VENOUS LOWER BILAT   Final Result      DX-CHEST-PORTABLE (1 VIEW)   Final Result      Resolution of small pleural effusions.           Assessment/Plan  * Pulmonary embolism and infarction (HCC)- (present on admission)  Assessment & Plan  Unknown at this time if it is provoked or unprovoked, will likely need further evaluation for possible malignancy  -CTA of the chest (4/19): nonocclusive pulmonary embolus in the right main and upper lobar pulmonary arteries. Occlusive thrombus in the segmental and subsegmental branches in the posterior right lower lobe  -Hemodynamically stable, continue telemetry monitoring  -On heparin gtt, will transition to Xarelto after EGD/Colonoscopy    Severe protein-calorie malnutrition ([place]: less than 60% of standard weight) (HCC)- (present on admission)  Assessment & Plan  -Physical exam: Decrease subclavian fat pads  -Nutrition consult and optimize nutrition   -Supplements  -Multivitamins  -watch for refeeding syndrome    Normocytic anemia- (present on admission)  Assessment & Plan  Denies any hx of melena or hematochezia or GI bleed  -Had  previous discussion of outpatient GI workup  -2/2022: Fe 63, %sat 47, ferritin 955, B12 955, TSH 0.9, fT4 1.06; 6/21 Folate 4.3    -Hb: 10.4 --> 8.4-->8.2  -4/21 EGD and colonoscopy     Alcohol abuse- (present on admission)  Assessment & Plan  Drink 1-3 beers a day, worked as    -Alcohol cessation counseling provided  -monitor for withdrawal    Frequent hospital admissions- (present on admission)  Assessment & Plan  -Will consider Geriatrics consult after procedure    Nutritional deficiency- (present on admission)  Assessment & Plan  -Nutrition consult  -Supplements  -Multivitamins    Alkaline phosphatase elevation- (present on admission)  Assessment & Plan  Downtrending, unsure of etiology  -Alk phosp: 304 --> 209  -Can consider further work-up with GGT likely as outpatient as patient is asymptomatic    Compression fracture of first lumbar vertebra (HCC)- (present on admission)  Assessment & Plan  Patient stated that she may have fallen couple of days ago  -CT-PE (4/18): New compression fracture of L1 vertebral body  -Likely recommend outpatient follow-up and consideration of possible bisphosphonate after medical stabilization  -pain management    Thyroid nodule- (present on admission)  Assessment & Plan  -CT-PE (4/18): Multiple bilateral thyroid nodules  -Previous TSH is within normal (last 2 months ago)  -Patient currently asymptomatic, likely recommend outpatient ultrasound and follow-up    Acute deep vein thrombosis (DVT) of left lower extremity (HCC)- (present on admission)  Assessment & Plan  -Duplex US of the lower extremities (4/19): showed subacute, occlusive deep venous thrombus in the popliteal, peroneal and proximal posterior tibial veins  -Therapeutic heparin    Elevated troponin- (present on admission)  Assessment & Plan  Likely 2/2 from oxygen mismatch from PE. Asymptomatic with no chest pain. Hx of stress induced cardiomyopathy in feb 2022 with EF of 35%. Most recent Echo 3/31/2022 with EF  of 55%   -Troponin: 33 --> 28  -EKG: Sinus tachycardia, PVCs    High anion gap metabolic acidosis- (present on admission)  Assessment & Plan  Likely from PE  -Lactic: 3 --> 1.1  -A --> 10    RESOLVED  -CTM    Hypomagnesemia- (present on admission)  Assessment & Plan  Possibly related to decreased p.o. intake  -Continue to monitor and replete as indicated    Hypokalemia- (present on admission)  Assessment & Plan  Possibly related to decreased p.o. intake  -Continue to monitor and replete as indicated    Smoking- (present on admission)  Assessment & Plan  Smoke 1/2 pack for 25 years  -Continue NRT   -Smoking cessation counseling provided      Plan  IV fluids, bicarb drip, nephrology consult  Monitor for blood loss  Encourage p.o. intake  Continue with C3  Wean steroids  Continue with supportive care  Continue blood pressure support and adrenal support  See orders  Medically complex high risk patient  Close laboratory follow-up      VTE prophylaxis: enoxaparin ppx pending follow-up blood work    I have performed a physical exam and reviewed and updated ROS and Plan today (2022). In review of yesterday's note (2022), there are no changes except as documented above.      Please note that this dictation was created using voice recognition software. I have made every reasonable attempt to correct obvious errors, but I expect that there are errors of grammar and possibly context that I did not discover before finalizing the note.

## 2022-04-25 NOTE — CASE COMMUNICATION
Quality Review for 4.21.22 Transfer OASIS performed on by ROSIO Garcia RN on 4.25.2022:    Edits completed by ROSIO Garcia RN:  1. Changed  C to na per POC

## 2022-04-25 NOTE — CASE COMMUNICATION
I agree with these changes.   ----- Message -----  From: Katie Garcia R.N.  Sent: 4/25/2022   8:05 AM PDT  To: Leena Franz R.N.      Quality Review for 4.21.22 Transfer OASIS performed on by ROSIO Garcia RN on 4.25.2022:    Edits completed by ROSIO Garcia RN:  1. Changed  C to na per POC

## 2022-04-26 NOTE — DOCUMENTATION QUERY
[facility]                                                                       Query Response Note      PATIENT:               VALENTINE ALANIZ  ACCT #:                  [id number]  MRN:                     [id number]  :                      1955  ADMIT DATE:       2022 10:28 AM  DISCH DATE:        2022 11:04 AM  RESPONDING  PROVIDER #:        [id number]           QUERY TEXT:    Pt has documented Type 2 MI noted as ?rule out? or similar terminology such as suspected, probable, etc.  Please clarify status of this condition:    NOTE:  If an appropriate response is not listed below, please respond with a new note.       The patient's Clinical Indicators include:  Clinical indicators: Admitted with pulmonary embolism. H&P documents mildly elevated troponin 33 likely type 2 MI from PE.  Asymptomatic with no chest pain.   Progress Note 4/3:  elevated troponin likely 2/2 from oxygen mismatch from PE.      Treatment and Monitoring:   Troponin level 33, IV fluids,  EKG sinus tachycardia, no ST elevation Echo with EJ 55%.     Risk Factors:  Pulmonary embolism, HTN,  Osteoporosis, stress induced cardiomyopathy     Kanchan [email or web address]  Options provided:   -- Type 2 MI is ruled in   -- Type 2 MI is  ruled out   -- Unable to determine      Query created by: Kanchan Tristan on 2022 5:42 AM    RESPONSE TEXT:    Type 2 MI is ruled in          Electronically signed by:  MIKE PENNINGTON MD 2022 7:27 AM

## 2022-05-10 ENCOUNTER — HOSPITAL ENCOUNTER (INPATIENT)
Facility: MEDICAL CENTER | Age: 67
LOS: 3 days | DRG: 377 | End: 2022-05-13
Attending: EMERGENCY MEDICINE | Admitting: STUDENT IN AN ORGANIZED HEALTH CARE EDUCATION/TRAINING PROGRAM
Payer: MEDICARE

## 2022-05-10 ENCOUNTER — APPOINTMENT (OUTPATIENT)
Dept: RADIOLOGY | Facility: MEDICAL CENTER | Age: 67
DRG: 377 | End: 2022-05-10
Attending: EMERGENCY MEDICINE
Payer: MEDICARE

## 2022-05-10 DIAGNOSIS — D64.9 ANEMIA, UNSPECIFIED TYPE: ICD-10-CM

## 2022-05-10 DIAGNOSIS — D68.9 COAGULOPATHY (HCC): ICD-10-CM

## 2022-05-10 DIAGNOSIS — K92.2 UPPER GI BLEED: ICD-10-CM

## 2022-05-10 DIAGNOSIS — K92.2 ACUTE GI BLEEDING: ICD-10-CM

## 2022-05-10 PROBLEM — D53.9 MACROCYTIC ANEMIA: Status: ACTIVE | Noted: 2022-05-10

## 2022-05-10 PROBLEM — R79.1 ELEVATED INR: Status: ACTIVE | Noted: 2022-05-10

## 2022-05-10 LAB
ABO GROUP BLD: NORMAL
ALBUMIN SERPL BCP-MCNC: 3.3 G/DL (ref 3.2–4.9)
ALBUMIN/GLOB SERPL: 1.7 G/DL
ALP SERPL-CCNC: 97 U/L (ref 30–99)
ALT SERPL-CCNC: 13 U/L (ref 2–50)
ANION GAP SERPL CALC-SCNC: 18 MMOL/L (ref 7–16)
ANISOCYTOSIS BLD QL SMEAR: ABNORMAL
APTT PPP: 35.8 SEC (ref 24.7–36)
AST SERPL-CCNC: 24 U/L (ref 12–45)
BARCODED ABORH UBTYP: 600
BARCODED ABORH UBTYP: 6200
BARCODED PRD CODE UBPRD: NORMAL
BARCODED PRD CODE UBPRD: NORMAL
BARCODED UNIT NUM UBUNT: NORMAL
BARCODED UNIT NUM UBUNT: NORMAL
BASOPHILS # BLD AUTO: 1.7 % (ref 0–1.8)
BASOPHILS # BLD: 0.19 K/UL (ref 0–0.12)
BILIRUB SERPL-MCNC: 0.5 MG/DL (ref 0.1–1.5)
BLD GP AB SCN SERPL QL: NORMAL
BUN SERPL-MCNC: 68 MG/DL (ref 8–22)
CALCIUM SERPL-MCNC: 8.7 MG/DL (ref 8.5–10.5)
CHLORIDE SERPL-SCNC: 97 MMOL/L (ref 96–112)
CO2 SERPL-SCNC: 20 MMOL/L (ref 20–33)
COMPONENT R 8504R: NORMAL
COMPONENT R 8504R: NORMAL
CREAT SERPL-MCNC: 1.26 MG/DL (ref 0.5–1.4)
EKG IMPRESSION: NORMAL
EOSINOPHIL # BLD AUTO: 0 K/UL (ref 0–0.51)
EOSINOPHIL NFR BLD: 0 % (ref 0–6.9)
ERYTHROCYTE [DISTWIDTH] IN BLOOD BY AUTOMATED COUNT: 81.1 FL (ref 35.9–50)
GFR SERPLBLD CREATININE-BSD FMLA CKD-EPI: 47 ML/MIN/1.73 M 2
GLOBULIN SER CALC-MCNC: 2 G/DL (ref 1.9–3.5)
GLUCOSE SERPL-MCNC: 138 MG/DL (ref 65–99)
HCT VFR BLD AUTO: 18 % (ref 37–47)
HGB BLD-MCNC: 5.9 G/DL (ref 12–16)
INR PPP: 2.51 (ref 0.87–1.13)
INR PPP: 4.24 (ref 0.87–1.13)
LACTATE BLD-SCNC: 1.9 MMOL/L (ref 0.5–2)
LACTATE BLD-SCNC: 3.2 MMOL/L (ref 0.5–2)
LIPASE SERPL-CCNC: 29 U/L (ref 11–82)
LYMPHOCYTES # BLD AUTO: 0.78 K/UL (ref 1–4.8)
LYMPHOCYTES NFR BLD: 7 % (ref 22–41)
MACROCYTES BLD QL SMEAR: ABNORMAL
MANUAL DIFF BLD: NORMAL
MCH RBC QN AUTO: 35.5 PG (ref 27–33)
MCHC RBC AUTO-ENTMCNC: 32.8 G/DL (ref 33.6–35)
MCV RBC AUTO: 108.4 FL (ref 81.4–97.8)
MONOCYTES # BLD AUTO: 0.29 K/UL (ref 0–0.85)
MONOCYTES NFR BLD AUTO: 2.6 % (ref 0–13.4)
MORPHOLOGY BLD-IMP: NORMAL
NEUTROPHILS # BLD AUTO: 9.93 K/UL (ref 2–7.15)
NEUTROPHILS NFR BLD: 88.7 % (ref 44–72)
NRBC # BLD AUTO: 0 K/UL
NRBC BLD-RTO: 0 /100 WBC
OVALOCYTES BLD QL SMEAR: NORMAL
PLATELET # BLD AUTO: 256 K/UL (ref 164–446)
PLATELET BLD QL SMEAR: NORMAL
PMV BLD AUTO: 10.3 FL (ref 9–12.9)
POIKILOCYTOSIS BLD QL SMEAR: NORMAL
POTASSIUM SERPL-SCNC: 3.7 MMOL/L (ref 3.6–5.5)
PRODUCT TYPE UPROD: NORMAL
PRODUCT TYPE UPROD: NORMAL
PROT SERPL-MCNC: 5.3 G/DL (ref 6–8.2)
PROTHROMBIN TIME: 26.3 SEC (ref 12–14.6)
PROTHROMBIN TIME: 39.5 SEC (ref 12–14.6)
RBC # BLD AUTO: 1.66 M/UL (ref 4.2–5.4)
RBC BLD AUTO: PRESENT
RH BLD: NORMAL
SODIUM SERPL-SCNC: 135 MMOL/L (ref 135–145)
STOMATOCYTES BLD QL SMEAR: NORMAL
UNIT STATUS USTAT: NORMAL
UNIT STATUS USTAT: NORMAL
WBC # BLD AUTO: 11.2 K/UL (ref 4.8–10.8)

## 2022-05-10 PROCEDURE — 96374 THER/PROPH/DIAG INJ IV PUSH: CPT

## 2022-05-10 PROCEDURE — 93005 ELECTROCARDIOGRAM TRACING: CPT | Performed by: EMERGENCY MEDICINE

## 2022-05-10 PROCEDURE — C9113 INJ PANTOPRAZOLE SODIUM, VIA: HCPCS | Performed by: EMERGENCY MEDICINE

## 2022-05-10 PROCEDURE — A9270 NON-COVERED ITEM OR SERVICE: HCPCS | Performed by: STUDENT IN AN ORGANIZED HEALTH CARE EDUCATION/TRAINING PROGRAM

## 2022-05-10 PROCEDURE — 86850 RBC ANTIBODY SCREEN: CPT

## 2022-05-10 PROCEDURE — 85007 BL SMEAR W/DIFF WBC COUNT: CPT

## 2022-05-10 PROCEDURE — 700105 HCHG RX REV CODE 258: Performed by: EMERGENCY MEDICINE

## 2022-05-10 PROCEDURE — 700102 HCHG RX REV CODE 250 W/ 637 OVERRIDE(OP): Performed by: STUDENT IN AN ORGANIZED HEALTH CARE EDUCATION/TRAINING PROGRAM

## 2022-05-10 PROCEDURE — P9016 RBC LEUKOCYTES REDUCED: HCPCS

## 2022-05-10 PROCEDURE — 85730 THROMBOPLASTIN TIME PARTIAL: CPT

## 2022-05-10 PROCEDURE — 36415 COLL VENOUS BLD VENIPUNCTURE: CPT

## 2022-05-10 PROCEDURE — 86900 BLOOD TYPING SEROLOGIC ABO: CPT

## 2022-05-10 PROCEDURE — 99285 EMERGENCY DEPT VISIT HI MDM: CPT

## 2022-05-10 PROCEDURE — 36430 TRANSFUSION BLD/BLD COMPNT: CPT

## 2022-05-10 PROCEDURE — 86901 BLOOD TYPING SEROLOGIC RH(D): CPT

## 2022-05-10 PROCEDURE — 700105 HCHG RX REV CODE 258: Performed by: STUDENT IN AN ORGANIZED HEALTH CARE EDUCATION/TRAINING PROGRAM

## 2022-05-10 PROCEDURE — 85025 COMPLETE CBC W/AUTO DIFF WBC: CPT

## 2022-05-10 PROCEDURE — C9113 INJ PANTOPRAZOLE SODIUM, VIA: HCPCS | Performed by: STUDENT IN AN ORGANIZED HEALTH CARE EDUCATION/TRAINING PROGRAM

## 2022-05-10 PROCEDURE — 99223 1ST HOSP IP/OBS HIGH 75: CPT | Mod: AI | Performed by: STUDENT IN AN ORGANIZED HEALTH CARE EDUCATION/TRAINING PROGRAM

## 2022-05-10 PROCEDURE — 83690 ASSAY OF LIPASE: CPT

## 2022-05-10 PROCEDURE — 96376 TX/PRO/DX INJ SAME DRUG ADON: CPT

## 2022-05-10 PROCEDURE — 30233N1 TRANSFUSION OF NONAUTOLOGOUS RED BLOOD CELLS INTO PERIPHERAL VEIN, PERCUTANEOUS APPROACH: ICD-10-PCS | Performed by: STUDENT IN AN ORGANIZED HEALTH CARE EDUCATION/TRAINING PROGRAM

## 2022-05-10 PROCEDURE — 770001 HCHG ROOM/CARE - MED/SURG/GYN PRIV*

## 2022-05-10 PROCEDURE — 85610 PROTHROMBIN TIME: CPT

## 2022-05-10 PROCEDURE — 700111 HCHG RX REV CODE 636 W/ 250 OVERRIDE (IP): Performed by: STUDENT IN AN ORGANIZED HEALTH CARE EDUCATION/TRAINING PROGRAM

## 2022-05-10 PROCEDURE — 71045 X-RAY EXAM CHEST 1 VIEW: CPT

## 2022-05-10 PROCEDURE — 80053 COMPREHEN METABOLIC PANEL: CPT

## 2022-05-10 PROCEDURE — 700111 HCHG RX REV CODE 636 W/ 250 OVERRIDE (IP): Performed by: EMERGENCY MEDICINE

## 2022-05-10 PROCEDURE — 86923 COMPATIBILITY TEST ELECTRIC: CPT

## 2022-05-10 PROCEDURE — 83605 ASSAY OF LACTIC ACID: CPT

## 2022-05-10 PROCEDURE — 87040 BLOOD CULTURE FOR BACTERIA: CPT | Mod: 91

## 2022-05-10 RX ORDER — SODIUM CHLORIDE 9 MG/ML
INJECTION, SOLUTION INTRAVENOUS CONTINUOUS
Status: DISCONTINUED | OUTPATIENT
Start: 2022-05-10 | End: 2022-05-10

## 2022-05-10 RX ORDER — MIRTAZAPINE 15 MG/1
15 TABLET, FILM COATED ORAL NIGHTLY
Status: DISCONTINUED | OUTPATIENT
Start: 2022-05-10 | End: 2022-05-13 | Stop reason: HOSPADM

## 2022-05-10 RX ORDER — SODIUM CHLORIDE 9 MG/ML
INJECTION, SOLUTION INTRAVENOUS CONTINUOUS
Status: DISCONTINUED | OUTPATIENT
Start: 2022-05-10 | End: 2022-05-13 | Stop reason: HOSPADM

## 2022-05-10 RX ORDER — AMOXICILLIN 250 MG
2 CAPSULE ORAL 2 TIMES DAILY
Status: DISCONTINUED | OUTPATIENT
Start: 2022-05-10 | End: 2022-05-13 | Stop reason: HOSPADM

## 2022-05-10 RX ORDER — SIMVASTATIN 20 MG
20 TABLET ORAL EVERY EVENING
Status: DISCONTINUED | OUTPATIENT
Start: 2022-05-10 | End: 2022-05-13 | Stop reason: HOSPADM

## 2022-05-10 RX ORDER — POLYETHYLENE GLYCOL 3350 17 G/17G
1 POWDER, FOR SOLUTION ORAL
Status: DISCONTINUED | OUTPATIENT
Start: 2022-05-10 | End: 2022-05-13 | Stop reason: HOSPADM

## 2022-05-10 RX ORDER — SODIUM CHLORIDE, SODIUM LACTATE, POTASSIUM CHLORIDE, AND CALCIUM CHLORIDE .6; .31; .03; .02 G/100ML; G/100ML; G/100ML; G/100ML
1000 INJECTION, SOLUTION INTRAVENOUS ONCE
Status: COMPLETED | OUTPATIENT
Start: 2022-05-10 | End: 2022-05-10

## 2022-05-10 RX ORDER — ACETAMINOPHEN 325 MG/1
650 TABLET ORAL EVERY 6 HOURS PRN
Status: DISCONTINUED | OUTPATIENT
Start: 2022-05-10 | End: 2022-05-13 | Stop reason: HOSPADM

## 2022-05-10 RX ORDER — PANTOPRAZOLE SODIUM 40 MG/10ML
40 INJECTION, POWDER, LYOPHILIZED, FOR SOLUTION INTRAVENOUS 2 TIMES DAILY
Status: DISCONTINUED | OUTPATIENT
Start: 2022-05-10 | End: 2022-05-11

## 2022-05-10 RX ORDER — BISACODYL 10 MG
10 SUPPOSITORY, RECTAL RECTAL
Status: DISCONTINUED | OUTPATIENT
Start: 2022-05-10 | End: 2022-05-13 | Stop reason: HOSPADM

## 2022-05-10 RX ADMIN — PANTOPRAZOLE SODIUM 80 MG: 40 INJECTION, POWDER, FOR SOLUTION INTRAVENOUS at 11:23

## 2022-05-10 RX ADMIN — PANTOPRAZOLE SODIUM 40 MG: 40 INJECTION, POWDER, FOR SOLUTION INTRAVENOUS at 18:11

## 2022-05-10 RX ADMIN — MIRTAZAPINE 15 MG: 15 TABLET, FILM COATED ORAL at 20:17

## 2022-05-10 RX ADMIN — SIMVASTATIN 20 MG: 40 TABLET, FILM COATED ORAL at 18:15

## 2022-05-10 RX ADMIN — SODIUM CHLORIDE 1000 ML: 9 INJECTION, SOLUTION INTRAVENOUS at 16:05

## 2022-05-10 RX ADMIN — SODIUM CHLORIDE, POTASSIUM CHLORIDE, SODIUM LACTATE AND CALCIUM CHLORIDE 1000 ML: 600; 310; 30; 20 INJECTION, SOLUTION INTRAVENOUS at 11:25

## 2022-05-10 RX ADMIN — PROTHROMBIN, COAGULATION FACTOR VII HUMAN, COAGULATION FACTOR IX HUMAN, COAGULATION FACTOR X HUMAN, PROTEIN C, PROTEIN S HUMAN, AND WATER 1000 UNITS: KIT at 21:13

## 2022-05-10 ASSESSMENT — ENCOUNTER SYMPTOMS
BLURRED VISION: 0
NAUSEA: 0
FEVER: 0
DIARRHEA: 1
WEAKNESS: 1
MYALGIAS: 0
VOMITING: 0
FOCAL WEAKNESS: 0
WHEEZING: 0
NERVOUS/ANXIOUS: 0
BRUISES/BLEEDS EASILY: 1
EYE PAIN: 0
EYE REDNESS: 0
INSOMNIA: 0
SINUS PAIN: 0
BLOOD IN STOOL: 1
COUGH: 0
HEADACHES: 0
SHORTNESS OF BREATH: 0
CHILLS: 0
ABDOMINAL PAIN: 0
STRIDOR: 0
FALLS: 0
FLANK PAIN: 0
DOUBLE VISION: 0
DIZZINESS: 0
MEMORY LOSS: 0
EYE DISCHARGE: 0
SORE THROAT: 0

## 2022-05-10 ASSESSMENT — PATIENT HEALTH QUESTIONNAIRE - PHQ9
2. FEELING DOWN, DEPRESSED, IRRITABLE, OR HOPELESS: NOT AT ALL
SUM OF ALL RESPONSES TO PHQ9 QUESTIONS 1 AND 2: 0
1. LITTLE INTEREST OR PLEASURE IN DOING THINGS: NOT AT ALL

## 2022-05-10 ASSESSMENT — LIFESTYLE VARIABLES
TOTAL SCORE: 0
ON A TYPICAL DAY WHEN YOU DRINK ALCOHOL HOW MANY DRINKS DO YOU HAVE: 1
EVER HAD A DRINK FIRST THING IN THE MORNING TO STEADY YOUR NERVES TO GET RID OF A HANGOVER: NO
HAVE YOU EVER FELT YOU SHOULD CUT DOWN ON YOUR DRINKING: NO
AVERAGE NUMBER OF DAYS PER WEEK YOU HAVE A DRINK CONTAINING ALCOHOL: 1
CONSUMPTION TOTAL: NEGATIVE
HAVE PEOPLE ANNOYED YOU BY CRITICIZING YOUR DRINKING: NO
DOES PATIENT WANT TO STOP DRINKING: NO
TOTAL SCORE: 0
ALCOHOL_USE: YES
HOW MANY TIMES IN THE PAST YEAR HAVE YOU HAD 5 OR MORE DRINKS IN A DAY: 0
SUBSTANCE_ABUSE: 0
EVER FELT BAD OR GUILTY ABOUT YOUR DRINKING: NO
TOTAL SCORE: 0

## 2022-05-10 ASSESSMENT — FIBROSIS 4 INDEX
FIB4 SCORE: 1.74
FIB4 SCORE: 1.6

## 2022-05-10 ASSESSMENT — COGNITIVE AND FUNCTIONAL STATUS - GENERAL
DAILY ACTIVITIY SCORE: 21
SUGGESTED CMS G CODE MODIFIER DAILY ACTIVITY: CJ
SUGGESTED CMS G CODE MODIFIER MOBILITY: CJ
STANDING UP FROM CHAIR USING ARMS: A LITTLE
CLIMB 3 TO 5 STEPS WITH RAILING: A LITTLE
MOBILITY SCORE: 21
DRESSING REGULAR LOWER BODY CLOTHING: A LITTLE
HELP NEEDED FOR BATHING: A LITTLE
WALKING IN HOSPITAL ROOM: A LITTLE
TOILETING: A LITTLE

## 2022-05-10 ASSESSMENT — PAIN DESCRIPTION - PAIN TYPE: TYPE: ACUTE PAIN

## 2022-05-10 NOTE — ASSESSMENT & PLAN NOTE
Acute GI bleed, with elevated INR >4, unclear etiology, suspect this may be due to underlying ETOH use  Will repeat INR, if remains elevated will give Kcentra given acute bleed requiring transfusion   Monitor INR

## 2022-05-10 NOTE — ASSESSMENT & PLAN NOTE
Acute on chronic macrocytic anemia, suspect AOCD complicated by acute GI bleed   Vit B12 elevated   protonix   tranfusion as needed   GI consulted , s/p egd

## 2022-05-10 NOTE — RESPIRATORY CARE
COPD EDUCATION by COPD CLINICAL EDUCATOR  5/10/2022 at 2:25 PM by Autumn Lorenzana RRT     Patient reviewed by COPD education team. Patient does not have a history or diagnosis of COPD and is a quit smoking .  Therefore, patient does not qualify for the COPD program.

## 2022-05-10 NOTE — ED NOTES
Patient incontinent. Cleaned up with assist RN. Ice chips provided. No additional needs at this time.

## 2022-05-10 NOTE — CONSULTS
Gastroenterology Initial Consult Note               Author:  JERSON Grigsby Date & Time Created: 5/10/2022 12:48 PM       Patient ID:  Name:             Jaymie Peacock  YOB: 1955  Age:                 66 y.o.  female  MRN:               [id number]      Referring Provider:  Chanell Harrington MD      Presenting Chief Complaint:  Melena, Anemia      History of Present Illness:    She is a 66-year-old female patient seen in consultation for GI bleed and anemia.  The patient has history of recent DVT and pulmonary embolism on Xarelto.  She was also hospitalized last month and we were consulted at that time for anemia and occult blood positive stool.  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  Colonoscopy was normal, but there was food debris in the cecum and hepatic flexure obscuring visualization.  The patient did not have overt bleeding at that hospitalization and her hemoglobin stabilized.    The patient states that she had been doing mostly well until about a week ago when she started to get progressive weakness and fatigue.  Then, she developed black appearing loose stools yesterday.  Her last dose of Xarelto was yesterday.  She denies nausea, vomiting, fevers, chills. Denied further modifying factors, associated symptoms or timing issues.       Review of Systems:  Review of Systems   Constitutional: Positive for malaise/fatigue. Negative for chills and fever.   HENT: Negative for nosebleeds, sinus pain and sore throat.    Eyes: Negative for pain, discharge and redness.   Respiratory: Negative for cough, wheezing and stridor.    Cardiovascular: Positive for leg swelling (Left leg). Negative for chest pain.   Gastrointestinal: Positive for diarrhea and melena. Negative for nausea and vomiting.   Genitourinary: Negative for flank pain and hematuria.   Musculoskeletal: Negative for falls and joint  pain.   Skin: Negative for itching and rash.   Neurological: Positive for weakness. Negative for dizziness and focal weakness.   Endo/Heme/Allergies: Negative for environmental allergies. Bruises/bleeds easily.   Psychiatric/Behavioral: Negative for memory loss. The patient does not have insomnia.              Past Medical History:  Past Medical History:   Diagnosis Date   • Hypertension    • Osteoporosis    • Smoking      There are no active hospital problems to display for this patient.        Past Surgical History:  Past Surgical History:   Procedure Laterality Date   • GA UPPER GI ENDOSCOPY,DIAGNOSIS  4/21/2022    Procedure: GASTROSCOPY;  Surgeon: Rell Rivas M.D.;  Location: SURGERY SAME DAY [facility];  Service: Gastroenterology   • GA COLONOSCOPY,DIAGNOSTIC  4/21/2022    Procedure: COLONOSCOPY;  Surgeon: Rell Rivas M.D.;  Location: SURGERY SAME DAY [facility];  Service: Gastroenterology   • GA UPPER GI ENDOSCOPY,BIOPSY  4/21/2022    Procedure: GASTROSCOPY, WITH BIOPSY;  Surgeon: Rell Rivas M.D.;  Location: SURGERY SAME DAY [facility];  Service: Gastroenterology   • GA UPPER GI ENDOSCOPY,CTRL BLEED  4/21/2022    Procedure: GASTROSCOPY, WITH ARGON PLASMA COAGULATION;  Surgeon: Rell Rivas M.D.;  Location: SURGERY SAME DAY [facility];  Service: Gastroenterology   • US-NEEDLE CORE BX-BREAST PANEL Right            Hospital Medications:  Current Facility-Administered Medications   Medication Dose Frequency Provider Last Rate Last Admin   • NS infusion   Continuous Chanell Harrington M.D.       Last reviewed on 5/10/2022 11:19 AM by Paola Villegas        Current Outpatient Medications:  (Not in a hospital admission)        Medication Allergies:  No Known Allergies      Family Medical History:  No family history on file.      Social History:  Social History     Socioeconomic History   • Marital status: Unknown     Spouse name: Not on file   • Number of children: Not on file   •  "Years of education: Not on file   • Highest education level: Not on file   Occupational History   • Not on file   Tobacco Use   • Smoking status: Former Smoker     Packs/day: 0.50     Types: Cigarettes   • Smokeless tobacco: Never Used   Vaping Use   • Vaping Use: Never used   Substance and Sexual Activity   • Alcohol use: Yes     Alcohol/week: 0.6 oz     Types: 1 Shots of liquor per week   • Drug use: Not Currently   • Sexual activity: Not on file   Other Topics Concern   • Not on file   Social History Narrative   • Not on file     Social Determinants of Health     Financial Resource Strain: Not on file   Food Insecurity: Not on file   Transportation Needs: Not on file   Physical Activity: Not on file   Stress: Not on file   Social Connections: Not on file   Intimate Partner Violence: Not on file   Housing Stability: Not on file         Vital signs:  Weight/BMI: Body mass index is 17.43 kg/m².  /50   Pulse 97   Temp 36.7 °C (98.1 °F) (Temporal)   Resp 15   Ht 1.676 m (5' 6\")   Wt 49 kg (108 lb)   SpO2 98%   Vitals:    05/10/22 1201 05/10/22 1227 05/10/22 1235 05/10/22 1241   BP: 102/76 100/52 (!) 98/53 106/50   Pulse: (!) 108 (!) 101 (!) 104 97   Resp: 16 15 14 15   Temp:  36.9 °C (98.5 °F)  36.7 °C (98.1 °F)   TempSrc:    Temporal   SpO2: 98% 99%  98%   Weight:       Height:         Oxygen Therapy:  Pulse Oximetry: 98 %, O2 (LPM): 0, O2 Delivery Device: None - Room Air  No intake or output data in the 24 hours ending 05/10/22 1248      Physical Exam:  Physical Exam  Vitals and nursing note reviewed.   Constitutional:       Appearance: She is ill-appearing.   HENT:      Head: Normocephalic and atraumatic.      Right Ear: External ear normal.      Left Ear: External ear normal.      Nose: Nose normal.      Mouth/Throat:      Mouth: Mucous membranes are dry.      Pharynx: Oropharynx is clear.   Eyes:      General: No scleral icterus.     Extraocular Movements: Extraocular movements intact.      Pupils: " Pupils are equal, round, and reactive to light.   Cardiovascular:      Rate and Rhythm: Regular rhythm. Tachycardia present.      Pulses: Normal pulses.      Heart sounds: Normal heart sounds.   Pulmonary:      Effort: Pulmonary effort is normal.      Breath sounds: Wheezing (Expiratory ANA, LLL) present.      Comments: Decreased RLL  Abdominal:      General: Abdomen is flat. Bowel sounds are normal. There is no distension.      Palpations: Abdomen is soft.      Tenderness: There is no abdominal tenderness.   Musculoskeletal:      Cervical back: Neck supple.      Right lower leg: No edema.      Left lower leg: Edema present.   Lymphadenopathy:      Cervical: No cervical adenopathy.   Skin:     General: Skin is warm and dry.      Coloration: Skin is pale.   Neurological:      General: No focal deficit present.      Mental Status: She is alert and oriented to person, place, and time.   Psychiatric:         Thought Content: Thought content normal.         Judgment: Judgment normal.           Labs:  Recent Labs     05/10/22  1019   SODIUM 135   POTASSIUM 3.7   CHLORIDE 97   CO2 20   BUN 68*   CREATININE 1.26   CALCIUM 8.7     Recent Labs     05/10/22  1019   ALTSGPT 13   ASTSGOT 24   ALKPHOSPHAT 97   TBILIRUBIN 0.5   LIPASE 29   GLUCOSE 138*     Recent Labs     05/10/22  1019   WBC 11.2*   NEUTSPOLYS 88.70*   LYMPHOCYTES 7.00*   MONOCYTES 2.60   EOSINOPHILS 0.00   BASOPHILS 1.70   ASTSGOT 24   ALTSGPT 13   ALKPHOSPHAT 97   TBILIRUBIN 0.5     Recent Labs     05/10/22  1019   RBC 1.66*   HEMOGLOBIN 5.9*   HEMATOCRIT 18.0*   PLATELETCT 256   PROTHROMBTM 39.5*   APTT 35.8   INR 4.24*     Recent Results (from the past 24 hour(s))   COD (ADULT)    Collection Time: 05/10/22 10:19 AM   Result Value Ref Range    ABO Grouping Only A     Rh Grouping Only POS     Antibody Screen-Cod NEG     Component R       R99                 Red Cells, LR       [id number]   issued       05/10/22   12:17      Product Type R99     Dispense  Status issued     Unit Number (Barcoded) [id number]     Product Code (Barcoded) W1016B26     Blood Type (Barcoded) 0600    CBC WITH DIFFERENTIAL    Collection Time: 05/10/22 10:19 AM   Result Value Ref Range    WBC 11.2 (H) 4.8 - 10.8 K/uL    RBC 1.66 (L) 4.20 - 5.40 M/uL    Hemoglobin 5.9 (LL) 12.0 - 16.0 g/dL    Hematocrit 18.0 (L) 37.0 - 47.0 %    .4 (H) 81.4 - 97.8 fL    MCH 35.5 (H) 27.0 - 33.0 pg    MCHC 32.8 (L) 33.6 - 35.0 g/dL    RDW 81.1 (H) 35.9 - 50.0 fL    Platelet Count 256 164 - 446 K/uL    MPV 10.3 9.0 - 12.9 fL    Neutrophils-Polys 88.70 (H) 44.00 - 72.00 %    Lymphocytes 7.00 (L) 22.00 - 41.00 %    Monocytes 2.60 0.00 - 13.40 %    Eosinophils 0.00 0.00 - 6.90 %    Basophils 1.70 0.00 - 1.80 %    Nucleated RBC 0.00 /100 WBC    Neutrophils (Absolute) 9.93 (H) 2.00 - 7.15 K/uL    Lymphs (Absolute) 0.78 (L) 1.00 - 4.80 K/uL    Monos (Absolute) 0.29 0.00 - 0.85 K/uL    Eos (Absolute) 0.00 0.00 - 0.51 K/uL    Baso (Absolute) 0.19 (H) 0.00 - 0.12 K/uL    NRBC (Absolute) 0.00 K/uL    Anisocytosis 2+ (A)     Macrocytosis 1+    COMP METABOLIC PANEL    Collection Time: 05/10/22 10:19 AM   Result Value Ref Range    Sodium 135 135 - 145 mmol/L    Potassium 3.7 3.6 - 5.5 mmol/L    Chloride 97 96 - 112 mmol/L    Co2 20 20 - 33 mmol/L    Anion Gap 18.0 (H) 7.0 - 16.0    Glucose 138 (H) 65 - 99 mg/dL    Bun 68 (H) 8 - 22 mg/dL    Creatinine 1.26 0.50 - 1.40 mg/dL    Calcium 8.7 8.5 - 10.5 mg/dL    AST(SGOT) 24 12 - 45 U/L    ALT(SGPT) 13 2 - 50 U/L    Alkaline Phosphatase 97 30 - 99 U/L    Total Bilirubin 0.5 0.1 - 1.5 mg/dL    Albumin 3.3 3.2 - 4.9 g/dL    Total Protein 5.3 (L) 6.0 - 8.2 g/dL    Globulin 2.0 1.9 - 3.5 g/dL    A-G Ratio 1.7 g/dL   LIPASE    Collection Time: 05/10/22 10:19 AM   Result Value Ref Range    Lipase 29 11 - 82 U/L   PROTHROMBIN TIME    Collection Time: 05/10/22 10:19 AM   Result Value Ref Range    PT 39.5 (H) 12.0 - 14.6 sec    INR 4.24 (H) 0.87 - 1.13   APTT    Collection Time:  05/10/22 10:19 AM   Result Value Ref Range    APTT 35.8 24.7 - 36.0 sec   ESTIMATED GFR    Collection Time: 05/10/22 10:19 AM   Result Value Ref Range    GFR (CKD-EPI) 47 (A) >60 mL/min/1.73 m 2   DIFFERENTIAL MANUAL    Collection Time: 05/10/22 10:19 AM   Result Value Ref Range    Manual Diff Status PERFORMED    PERIPHERAL SMEAR REVIEW    Collection Time: 05/10/22 10:19 AM   Result Value Ref Range    Peripheral Smear Review see below    PLATELET ESTIMATE    Collection Time: 05/10/22 10:19 AM   Result Value Ref Range    Plt Estimation Normal    MORPHOLOGY    Collection Time: 05/10/22 10:19 AM   Result Value Ref Range    RBC Morphology Present     Poikilocytosis 2+     Ovalocytes 1+     Stomatocytes 2+    EKG (NOW)    Collection Time: 05/10/22 10:21 AM   Result Value Ref Range    Report       [facility] Emergency Dept.    Test Date:  2022-05-10  Pt Name:    VALENTINE ALANIZ                Department: ER  MRN:        [id number]                      Room:       [facility]  Gender:     Female                       Technician: [id number]  :        1955                   Requested By:CHANELL SRINIVASAN  Order #:    [id number]                    Reading MD: Chanell Srinivasan MD    Measurements  Intervals                                Axis  Rate:       115                          P:          73  NH:         156                          QRS:        81  QRSD:       82                           T:          47  QT:         324  QTc:        448    Interpretive Statements  SINUS TACHYCARDIA  BORDERLINE RIGHT AXIS DEVIATION  Normal intervals, no ectopy  No ST or T wave change  Compared to ECG 2022 10:20:09  No significant change from prior  Electronically Signed On 5- 10:49:16 PDT by Chanell Srinivasan MD     Lactic Acid    Collection Time: 05/10/22 11:50 AM   Result Value Ref Range    Lactic Acid 3.2 (H) 0.5 - 2.0 mmol/L         Radiology Review:  DX-CHEST-PORTABLE (1 VIEW)   Final Result      No acute  cardiopulmonary abnormality.            MDM (Data Review):   -Records reviewed and summarized in current documentation  -I personally reviewed and interpreted the laboratory results  -I personally reviewed the radiology images        Medical Decision Making, by Problem:  There are no active hospital problems to display for this patient.          Assessment/Recommendations:  66-year-old female patient with acute onset fatigue, weakness over the last week and 1 day of several bowel movements of black diarrhea consistent with melena.  Previous EGD and colonoscopy for anemia and occult blood positive stool on 4/20/2022 relatively unrevealing other than mild gastric antral erosions.  Biopsies of the stomach and duodenum negative for malignancy, celiac sprue, or H. pylori.  Patient's INR is 4.24 and last dose of Eliquis yesterday.  She does drink 2-3 alcoholic drinks a day.    Assessment:  -Melena suggestive of more of upper gastrointestinal bleeding.  No active bleeding was found on her last evaluation 3 weeks ago.  -Acute posthemorrhagic anemia  -Chronic anticoagulation with rivaroxaban last dose 5/9/2022  -Pulmonary embolism and deep vein thrombosis  -Daily alcohol use  -Elevated INR unclear as to the cause  -Heart failure with most recent ejection fraction 55%   -Leukocytosis  -Lactic acidosis  -Increased complexity of medical decision making secondary to above    Plan:  -Clear liquid diet today, n.p.o. after midnight  -Esophagogastroduodenoscopy and push enteroscopy with possible biopsies, hemostasis, other intervention with Dr. Yariel Pagan at 2:15 PM 5/11/2022.    Risks, benefits, and alternatives of aforementioned procedures were discussed with patient.  Consenting patient was given opportunities to ask questions and discuss other options.  Risks including but not limited to perforation, infection, bleeding, missed lesion(s), possible need for surgery(ies) and/or interventional radiology, possible need for repeat  procedure(s) and/or additional testing, hospitalization possibly prolonged, cardiac and/or pulmonary event, aspiration, hypoxia, stroke, medication and/or anesthesia reaction, indefinite diagnosis, discomfort, unsuccessful and/or incomplete procedure, ineffective therapy and/or persistent symptoms, damage to adjacent organs and/or vascular structures, and other adverse events possibly life-threatening.  Interactive discussion was undertaken with Layman's terms. I answered questions in full and to satisfaction.  Consenting person(s) stated understanding and acceptance of these risks, and wished to proceed.  Informed consent was given in clear state of mind.    -Recommend INR reversal with vitamin K or FFP to INR less than 1.5.  Consider Kcentra  -Recommend further blood resuscitation to increase hemoglobin to greater than 7 if possible  -Protonix 40 mg IV twice daily  -Hold anticoagulants and NSAIDs  -If a cause of bleeding is not seen may need to consider further evaluation such as CTA or bleeding scan versus capsule endoscopy  -Recommend alcohol cessation    JERSON Grigsby      Thank you for inviting me to participate in the care of this patient. Please do not hesitate to call GI consultants with additional questions/concerns or changes in the patient's clinical status at [phone].      Core Quality Measures   Reviewed items:  Labs, Medications and Radiology reports reviewed

## 2022-05-10 NOTE — ED NOTES
First unit of blood completed. Pt tolerated well. Respirations equal and unlabored. No current needs at this time.

## 2022-05-10 NOTE — ASSESSMENT & PLAN NOTE
Pt recently diagnosed with DVT and PE, on xarelto, here with GI bleed   Holding home xarelto, transfuse as needed   Will need to discuss resumption once patient GI bleed is stabilized  Monitor closely

## 2022-05-10 NOTE — ED NOTES
Pt tolerating transfusion well. Call light within reach. This RN present for first 15 min of transfusion.

## 2022-05-10 NOTE — ED NOTES
Tech from Lab called with critical result of Hbg of 5.9 at 1106. Critical lab result read back to .   Dr. Harrington notified of critical lab result at 1108.  Critical lab result read back by Dr. Harrington.    Patient continues to be on monitoring.

## 2022-05-10 NOTE — ED TRIAGE NOTES
Chief Complaint   Patient presents with   • Lower GI Bleed     Dark diarrhea since yesterday afternoon. Weakness x 2 days. Pt on Xarelto for Hx of PE.    • Shortness of Breath     SOB x 2 days.

## 2022-05-10 NOTE — ASSESSMENT & PLAN NOTE
Presents with melana, anemia and elevated BUN, suspect Upper GI, recent EGD with gastric antral erosions   Continue protonix   2 units in ER, monitor H/H Q8H, transfuse as needed for Hgb <7  GI consulted  S/p EGD   Restart xarelto

## 2022-05-10 NOTE — ED PROVIDER NOTES
ED Provider Note    Scribed for Chanell Harrington M.D. by Eliza Kan. 5/10/2022  10:12 AM    Means of arrival: Ambulance  History obtained from: Patient  History limited by: None      CHIEF COMPLAINT  Chief Complaint   Patient presents with   • Lower GI Bleed     Dark diarrhea since yesterday afternoon. Weakness x 2 days. Pt on Xarelto for Hx of PE.    • Shortness of Breath     SOB x 2 days.        HPI  Jaymie Peacock is a 66 y.o. female with a history of PE/DVT on Xarelto as well as history of anemia and GI bleed who presents to the Emergency Department for acute onset of dark black stool starting yesterday afternoon. She denies experiencing this before. She has associated abdominal discomfort and shortness of breath on exertion, but denies chest pain, dysuria, fever, cough, appetite changes, recent falls or trauma. She has abrasions on her right shin due to hitting her leg on a walker. She was started on Xarelto for her PE at the end of April. She has been regularly taking them however did not take any today. Patient was recently admitted to the hospital on 4/19. At this stay, she had an EGD and colonoscopy. She admits to drinking alcohol 3-4 times a week.     REVIEW OF SYSTEMS  Pertinent positive include melena, abdominal discomfort, and shortness of breath on exertion. Pertinent negative include chest pain, dysuria, fever, cough, appetite changes, recent falls or trauma. All other systems reviewed and are negative.    PAST MEDICAL HISTORY   has a past medical history of Hypertension, Osteoporosis, and Smoking.    SOCIAL HISTORY  Social History     Tobacco Use   • Smoking status: Former Smoker     Packs/day: 0.50     Types: Cigarettes   • Smokeless tobacco: Never Used   Vaping Use   • Vaping Use: Never used   Substance and Sexual Activity   • Alcohol use: Yes     Alcohol/week: 0.6 oz     Types: 1 Shots of liquor per week   • Drug use: Not Currently   • Sexual activity: None noted       SURGICAL HISTORY    "has a past surgical history that includes us-needle core bx-breast panel (Right); upper gi endoscopy,diagnosis (4/21/2022); colonoscopy,diagnostic (4/21/2022); upper gi endoscopy,biopsy (4/21/2022); and upper gi endoscopy,ctrl bleed (4/21/2022).    CURRENT MEDICATIONS  Home Medications     Reviewed by Paola Villegas (Pharmacy Tech) on 05/10/22 at 1119  Med List Status: Complete   Medication Last Dose Status   furosemide (LASIX) 20 MG Tab 5/9/2022 Active   mirtazapine (REMERON) 15 MG Tab 5/9/2022 Active   omeprazole (PRILOSEC) 20 MG delayed-release capsule 5/9/2022 Active   potassium chloride ER (KLOR-CON) 10 MEQ tablet 5/9/2022 Active   rivaroxaban (XARELTO) 15 MG Tab tablet 5/9/2022 Active   rivaroxaban (XARELTO) 20 MG Tab tablet NEW RX Active   simvastatin (ZOCOR) 20 MG Tab 5/9/2022 Active                ALLERGIES  No Known Allergies    PHYSICAL EXAM   VITAL SIGNS: /53   Pulse (!) 119   Resp (!) 22   Ht 1.676 m (5' 6\")   Wt 49 kg (108 lb)   SpO2 99%   BMI 17.43 kg/m²    Constitutional: Frail, nontoxic appearing, alert in no apparent distress.  HENT: Normocephalic, Atraumatic. Bilateral external ears normal. Nose normal.  Moist mucous membranes.  Oropharynx clear.  Eyes: Pupils are equal and reactive. Conjunctiva normal.   Neck: Supple, full range of motion  Heart: Tachycardic rate and regular rhythm.  No murmurs.    Lungs: No respiratory distress, normal work of breathing. Lungs clear to auscultation bilaterally.  Abdomen Soft, no distention.  No tenderness to palpation.  Rectal: Gross melena on exam. Positive FOBT.    Musculoskeletal: Atraumatic. No obvious deformities noted.  No lower extremity edema.  Skin: Warm, Dry.  No erythema, No rash.   Neurologic: Alert and oriented x3. Moving all extremities spontaneously without focal deficits.  Psychiatric: Affect normal, Mood normal, Appears appropriate and not intoxicated.    DIAGNOSTIC STUDIES    EKG  Results for orders placed or performed during " the hospital encounter of 05/10/22   EKG (NOW)   Result Value Ref Range    Report       [facility] Emergency Dept.    Test Date:  2022-05-10  Pt Name:    VALENTINE ALANIZ                Department: ER  MRN:        [id number]                      Room:        16  Gender:     Female                       Technician: [id number]  :        [id number]                   Requested By:CHANELL SRINIVASAN  Order #:    [id number]                    Reading MD: Chanell Srinivasan MD    Measurements  Intervals                                Axis  Rate:       115                          P:          73  PA:         156                          QRS:        81  QRSD:       82                           T:          47  QT:         324  QTc:        448    Interpretive Statements  SINUS TACHYCARDIA  BORDERLINE RIGHT AXIS DEVIATION  Normal intervals, no ectopy  No ST or T wave change  Compared to ECG 2022 10:20:09  No significant change from prior  Electronically Signed On 5- 10:49:16 PDT by Chanell Srinivasan MD            LABS  Personally reviewed by me  Labs Reviewed   CBC WITH DIFFERENTIAL - Abnormal; Notable for the following components:       Result Value    WBC 11.2 (*)     RBC 1.66 (*)     Hemoglobin 5.9 (*)     Hematocrit 18.0 (*)     .4 (*)     MCH 35.5 (*)     MCHC 32.8 (*)     RDW 81.1 (*)     Neutrophils-Polys 88.70 (*)     Lymphocytes 7.00 (*)     Neutrophils (Absolute) 9.93 (*)     Lymphs (Absolute) 0.78 (*)     Baso (Absolute) 0.19 (*)     Anisocytosis 2+ (*)     All other components within normal limits    Narrative:     Indicate which anticoagulants the patient is on:->UNKNOWN   COMP METABOLIC PANEL - Abnormal; Notable for the following components:    Anion Gap 18.0 (*)     Glucose 138 (*)     Bun 68 (*)     Total Protein 5.3 (*)     All other components within normal limits    Narrative:     Indicate which anticoagulants the patient is on:->UNKNOWN   PROTHROMBIN TIME - Abnormal; Notable for the  "following components:    PT 39.5 (*)     INR 4.24 (*)     All other components within normal limits    Narrative:     Indicate which anticoagulants the patient is on:->UNKNOWN   ESTIMATED GFR - Abnormal; Notable for the following components:    GFR (CKD-EPI) 47 (*)     All other components within normal limits    Narrative:     Indicate which anticoagulants the patient is on:->UNKNOWN   LACTIC ACID - Abnormal; Notable for the following components:    Lactic Acid 3.2 (*)     All other components within normal limits   COD (ADULT)   LIPASE    Narrative:     Indicate which anticoagulants the patient is on:->UNKNOWN   APTT    Narrative:     Indicate which anticoagulants the patient is on:->UNKNOWN   DIFFERENTIAL MANUAL    Narrative:     Indicate which anticoagulants the patient is on:->UNKNOWN   PERIPHERAL SMEAR REVIEW    Narrative:     Indicate which anticoagulants the patient is on:->UNKNOWN   PLATELET ESTIMATE    Narrative:     Indicate which anticoagulants the patient is on:->UNKNOWN   MORPHOLOGY    Narrative:     Indicate which anticoagulants the patient is on:->UNKNOWN   LACTIC ACID   LACTIC ACID   BLOOD CULTURE    Narrative:     1 of 2 for Blood Culture x 2 sites order. Per Hospital  Policy: Only change Specimen Src: to \"Line\" if specified by  physician order.   BLOOD CULTURE    Narrative:     2 of 2 blood culture x2  Sites order. Per Hospital Policy:  Only change Specimen Src: to \"Line\" if specified by physician  order.   PROTHROMBIN TIME   HEMOGLOBIN AND HEMATOCRIT   RELEASE RED BLOOD CELLS-ACTIVE BLEEDING   RELEASE RED BLOOD CELLS-ACTIVE BLEEDING   TRANSFUSE RBC ACTIVE BLEED-NURSING COMMUNICATION          RADIOLOGY  Personally reviewed by me  DX-CHEST-PORTABLE (1 VIEW)   Final Result      No acute cardiopulmonary abnormality.           ED COURSE  Vitals:    05/10/22 1356 05/10/22 1402 05/10/22 1417 05/10/22 1432   BP: (!) 96/72 102/74 104/56 105/58   Pulse: 87 88 86 96   Resp: 15 16 15 15   Temp: 36.9 °C (98.4 " °F) 36.9 °C (98.5 °F) 36.6 °C (97.9 °F) 36.8 °C (98.2 °F)   TempSrc: Temporal Temporal Temporal Temporal   SpO2: 100% 100% 100% 100%   Weight:       Height:             Medications administered:  IVF, PRBCs, protonix    Patient was given IV fluids for  Tachycardia.  IV hydration was used because oral hydration was not adequate alone.  Following fluid administration patient's were improved.    I have explained to the patient the risks and benefits of transfusion of blood products.  This includes, as appropriate, the risk of mild allergic reaction, hemolytic reaction, transfusion-associated lung injury, febrile reactions, circulatory or iron overload, and infection.    We discussed possible alternatives and their risks, including directed donation, autologous transfusion, and no transfusion, including IV or oral iron supplementation, as appropriate.  I believe the patient understands the risks and benefits and was able to express understanding.      Old records personally reviewed:  Patient had a few recent admissions for stress cardiomyopathy and anemia. The most recent admission was April 19 for an acute pulmonary embolism. She did have an EGD and colonoscopy during that stay. 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     10:12 AM Patient seen and examined at bedside. The patient presents with melena. Ordered for DX-Chest, COD, CBC w/ diff, CMP, Lipase, Prothrombin Time, APTT, and EKG to evaluate. Rectal exam performed by me at this time.      MEDICAL DECISION MAKING  Patient with recent history of admission for pulmonary embolism on Xarelto who now presents with 1 day history of melena and shortness of breath.  The patient has had a history of GI bleeds in the past and had a recent EGD and colonoscopy within the last month showing a duodenal AVM.  On arrival, she is borderline tachycardic with borderline low blood pressures however this did improve with some fluids.  Her  abdominal exam is completely benign.  She does have gross melena on rectal exam.  Labs are concerning for worsening anemia from her baseline therefore the patient was transfused with 2 units.  Labs also show elevated lactate and mild leukocytosis however I feel that this is more likely related to dehydration and anemia rather than sepsis.  She has no significant electrolyte abnormalities or kidney dysfunction.  She does have an elevated INR which could be related to both anticoagulation as well as alcohol abuse.  As the patient is somewhat stable at this time and recently had a significant pulmonary embolism, I will not plan for any acute reversal, rather holding further anticoagulation.    11:24 AM - I discussed the patient's case and the above findings with Dr. Pagan (GI) who recommends Clears today and NPO at midnight for a scope tomorrow. He is also requesting correction of coagulopathy.     1:00 - I discussed the patient's case and the above findings with Dr. Hallman (Hospitalist) who agrees to evaluate the patient for hospitalization.      CRITICAL CARE TIME  Upon my evaluation, this patient had a high probability of imminent or life-threatening deterioration due to GI bleeding causing severe anemia requiring blood transfusion which required my direct attention, intervention, and personal management.     I personally provided 34 minutes of total critical care time outside of time spent on separately billable/documented procedures. Time includes: review of laboratory data, review of radiology studies, discussion with consultants, discussion with family/patient, monitoring for potential decompensation.  Interventions were performed as documented above.           DISPOSITION:  Patient will be hospitalized by Dr. Hallman in guarded condition.     IMPRESSION  (K92.2) Upper GI bleed  (D64.9) Anemia, unspecified type  (D68.9) Coagulopathy (HCC)    Results, diagnoses, and treatment options were discussed with the  patient and/or family. Patient verbalized understanding of plan of care.       IEliza (Melvin), am scribing for, and in the presence of, Chanell Harrington M.D..    Electronically signed by: Eliza Kan (Melvin), 5/10/2022    Chanell HORTON M.D. personally performed the services described in this documentation, as scribed by Eliza Kan in my presence, and it is both accurate and complete.    The note accurately reflects work and decisions made by me.  Chanell Harrington M.D.  5/10/2022  3:13 PM

## 2022-05-11 ENCOUNTER — ANESTHESIA (OUTPATIENT)
Dept: SURGERY | Facility: MEDICAL CENTER | Age: 67
DRG: 377 | End: 2022-05-11
Payer: MEDICARE

## 2022-05-11 ENCOUNTER — ANESTHESIA EVENT (OUTPATIENT)
Dept: SURGERY | Facility: MEDICAL CENTER | Age: 67
DRG: 377 | End: 2022-05-11
Payer: MEDICARE

## 2022-05-11 LAB
ANION GAP SERPL CALC-SCNC: 9 MMOL/L (ref 7–16)
BUN SERPL-MCNC: 67 MG/DL (ref 8–22)
CALCIUM SERPL-MCNC: 7.8 MG/DL (ref 8.5–10.5)
CHLORIDE SERPL-SCNC: 105 MMOL/L (ref 96–112)
CO2 SERPL-SCNC: 22 MMOL/L (ref 20–33)
CREAT SERPL-MCNC: 0.97 MG/DL (ref 0.5–1.4)
ERYTHROCYTE [DISTWIDTH] IN BLOOD BY AUTOMATED COUNT: 61.6 FL (ref 35.9–50)
GFR SERPLBLD CREATININE-BSD FMLA CKD-EPI: 64 ML/MIN/1.73 M 2
GLUCOSE SERPL-MCNC: 76 MG/DL (ref 65–99)
HCT VFR BLD AUTO: 20.8 % (ref 37–47)
HCT VFR BLD AUTO: 22.1 % (ref 37–47)
HCT VFR BLD AUTO: 22.8 % (ref 37–47)
HCT VFR BLD AUTO: 22.8 % (ref 37–47)
HGB BLD-MCNC: 7.3 G/DL (ref 12–16)
HGB BLD-MCNC: 7.5 G/DL (ref 12–16)
HGB BLD-MCNC: 7.8 G/DL (ref 12–16)
HGB BLD-MCNC: 7.8 G/DL (ref 12–16)
INR PPP: 1.91 (ref 0.87–1.13)
INR PPP: 2.59 (ref 0.87–1.13)
MCH RBC QN AUTO: 34.9 PG (ref 27–33)
MCHC RBC AUTO-ENTMCNC: 35.1 G/DL (ref 33.6–35)
MCV RBC AUTO: 99.5 FL (ref 81.4–97.8)
PLATELET # BLD AUTO: 136 K/UL (ref 164–446)
PMV BLD AUTO: 10.2 FL (ref 9–12.9)
POTASSIUM SERPL-SCNC: 3.2 MMOL/L (ref 3.6–5.5)
PROTHROMBIN TIME: 21.3 SEC (ref 12–14.6)
PROTHROMBIN TIME: 27 SEC (ref 12–14.6)
RBC # BLD AUTO: 2.09 M/UL (ref 4.2–5.4)
SODIUM SERPL-SCNC: 136 MMOL/L (ref 135–145)
WBC # BLD AUTO: 8.7 K/UL (ref 4.8–10.8)

## 2022-05-11 PROCEDURE — 0W3P8ZZ CONTROL BLEEDING IN GASTROINTESTINAL TRACT, VIA NATURAL OR ARTIFICIAL OPENING ENDOSCOPIC: ICD-10-PCS | Performed by: INTERNAL MEDICINE

## 2022-05-11 PROCEDURE — 160002 HCHG RECOVERY MINUTES (STAT): Performed by: INTERNAL MEDICINE

## 2022-05-11 PROCEDURE — 00731 ANES UPR GI NDSC PX NOS: CPT | Performed by: ANESTHESIOLOGY

## 2022-05-11 PROCEDURE — 700102 HCHG RX REV CODE 250 W/ 637 OVERRIDE(OP): Performed by: INTERNAL MEDICINE

## 2022-05-11 PROCEDURE — 502240 HCHG MISC OR SUPPLY RC 0272: Performed by: INTERNAL MEDICINE

## 2022-05-11 PROCEDURE — 700111 HCHG RX REV CODE 636 W/ 250 OVERRIDE (IP): Performed by: STUDENT IN AN ORGANIZED HEALTH CARE EDUCATION/TRAINING PROGRAM

## 2022-05-11 PROCEDURE — 503078 HCHG PROBE ERBE: Performed by: INTERNAL MEDICINE

## 2022-05-11 PROCEDURE — 85027 COMPLETE CBC AUTOMATED: CPT

## 2022-05-11 PROCEDURE — 85610 PROTHROMBIN TIME: CPT

## 2022-05-11 PROCEDURE — 36415 COLL VENOUS BLD VENIPUNCTURE: CPT

## 2022-05-11 PROCEDURE — 99232 SBSQ HOSP IP/OBS MODERATE 35: CPT | Performed by: INTERNAL MEDICINE

## 2022-05-11 PROCEDURE — A9270 NON-COVERED ITEM OR SERVICE: HCPCS | Performed by: INTERNAL MEDICINE

## 2022-05-11 PROCEDURE — 700102 HCHG RX REV CODE 250 W/ 637 OVERRIDE(OP): Performed by: STUDENT IN AN ORGANIZED HEALTH CARE EDUCATION/TRAINING PROGRAM

## 2022-05-11 PROCEDURE — A9270 NON-COVERED ITEM OR SERVICE: HCPCS | Performed by: STUDENT IN AN ORGANIZED HEALTH CARE EDUCATION/TRAINING PROGRAM

## 2022-05-11 PROCEDURE — 160035 HCHG PACU - 1ST 60 MINS PHASE I: Performed by: INTERNAL MEDICINE

## 2022-05-11 PROCEDURE — 80048 BASIC METABOLIC PNL TOTAL CA: CPT

## 2022-05-11 PROCEDURE — A9270 NON-COVERED ITEM OR SERVICE: HCPCS | Performed by: NURSE PRACTITIONER

## 2022-05-11 PROCEDURE — 700102 HCHG RX REV CODE 250 W/ 637 OVERRIDE(OP): Performed by: NURSE PRACTITIONER

## 2022-05-11 PROCEDURE — 700111 HCHG RX REV CODE 636 W/ 250 OVERRIDE (IP): Performed by: ANESTHESIOLOGY

## 2022-05-11 PROCEDURE — 160203 HCHG ENDO MINUTES - 1ST 30 MINS LEVEL 4: Performed by: INTERNAL MEDICINE

## 2022-05-11 PROCEDURE — 700105 HCHG RX REV CODE 258: Performed by: INTERNAL MEDICINE

## 2022-05-11 PROCEDURE — 85014 HEMATOCRIT: CPT

## 2022-05-11 PROCEDURE — 160048 HCHG OR STATISTICAL LEVEL 1-5: Performed by: INTERNAL MEDICINE

## 2022-05-11 PROCEDURE — 770001 HCHG ROOM/CARE - MED/SURG/GYN PRIV*

## 2022-05-11 PROCEDURE — 700105 HCHG RX REV CODE 258: Performed by: STUDENT IN AN ORGANIZED HEALTH CARE EDUCATION/TRAINING PROGRAM

## 2022-05-11 PROCEDURE — C9113 INJ PANTOPRAZOLE SODIUM, VIA: HCPCS | Performed by: STUDENT IN AN ORGANIZED HEALTH CARE EDUCATION/TRAINING PROGRAM

## 2022-05-11 PROCEDURE — 85018 HEMOGLOBIN: CPT

## 2022-05-11 PROCEDURE — 700105 HCHG RX REV CODE 258: Performed by: ANESTHESIOLOGY

## 2022-05-11 PROCEDURE — 160036 HCHG PACU - EA ADDL 30 MINS PHASE I: Performed by: INTERNAL MEDICINE

## 2022-05-11 PROCEDURE — 160009 HCHG ANES TIME/MIN: Performed by: INTERNAL MEDICINE

## 2022-05-11 RX ORDER — PHENYLEPHRINE HCL IN 0.9% NACL 0.5 MG/5ML
SYRINGE (ML) INTRAVENOUS PRN
Status: DISCONTINUED | OUTPATIENT
Start: 2022-05-11 | End: 2022-05-11 | Stop reason: SURG

## 2022-05-11 RX ORDER — SUCRALFATE 1 G/1
1 TABLET ORAL
Status: DISCONTINUED | OUTPATIENT
Start: 2022-05-11 | End: 2022-05-13 | Stop reason: HOSPADM

## 2022-05-11 RX ORDER — POTASSIUM CHLORIDE 20 MEQ/1
40 TABLET, EXTENDED RELEASE ORAL 3 TIMES DAILY
Status: COMPLETED | OUTPATIENT
Start: 2022-05-11 | End: 2022-05-11

## 2022-05-11 RX ORDER — MIDAZOLAM HYDROCHLORIDE 1 MG/ML
INJECTION INTRAMUSCULAR; INTRAVENOUS PRN
Status: DISCONTINUED | OUTPATIENT
Start: 2022-05-11 | End: 2022-05-11 | Stop reason: SURG

## 2022-05-11 RX ORDER — ALBUTEROL SULFATE 2.5 MG/3ML
2.5 SOLUTION RESPIRATORY (INHALATION)
Status: DISCONTINUED | OUTPATIENT
Start: 2022-05-11 | End: 2022-05-11 | Stop reason: HOSPADM

## 2022-05-11 RX ORDER — SODIUM CHLORIDE 9 MG/ML
500 INJECTION, SOLUTION INTRAVENOUS ONCE
Status: COMPLETED | OUTPATIENT
Start: 2022-05-11 | End: 2022-05-11

## 2022-05-11 RX ORDER — ONDANSETRON 2 MG/ML
4 INJECTION INTRAMUSCULAR; INTRAVENOUS
Status: DISCONTINUED | OUTPATIENT
Start: 2022-05-11 | End: 2022-05-11 | Stop reason: HOSPADM

## 2022-05-11 RX ORDER — MIDODRINE HYDROCHLORIDE 5 MG/1
5 TABLET ORAL ONCE
Status: COMPLETED | OUTPATIENT
Start: 2022-05-11 | End: 2022-05-11

## 2022-05-11 RX ORDER — SODIUM CHLORIDE, SODIUM LACTATE, POTASSIUM CHLORIDE, CALCIUM CHLORIDE 600; 310; 30; 20 MG/100ML; MG/100ML; MG/100ML; MG/100ML
INJECTION, SOLUTION INTRAVENOUS CONTINUOUS
Status: DISCONTINUED | OUTPATIENT
Start: 2022-05-11 | End: 2022-05-11 | Stop reason: HOSPADM

## 2022-05-11 RX ORDER — SODIUM CHLORIDE, SODIUM LACTATE, POTASSIUM CHLORIDE, CALCIUM CHLORIDE 600; 310; 30; 20 MG/100ML; MG/100ML; MG/100ML; MG/100ML
INJECTION, SOLUTION INTRAVENOUS
Status: DISCONTINUED | OUTPATIENT
Start: 2022-05-11 | End: 2022-05-11 | Stop reason: SURG

## 2022-05-11 RX ORDER — MIDODRINE HYDROCHLORIDE 5 MG/1
5 TABLET ORAL
Status: DISCONTINUED | OUTPATIENT
Start: 2022-05-11 | End: 2022-05-13

## 2022-05-11 RX ORDER — SODIUM CHLORIDE, SODIUM LACTATE, POTASSIUM CHLORIDE, AND CALCIUM CHLORIDE .6; .31; .03; .02 G/100ML; G/100ML; G/100ML; G/100ML
1000 INJECTION, SOLUTION INTRAVENOUS ONCE
Status: COMPLETED | OUTPATIENT
Start: 2022-05-11 | End: 2022-05-11

## 2022-05-11 RX ADMIN — SODIUM CHLORIDE, POTASSIUM CHLORIDE, SODIUM LACTATE AND CALCIUM CHLORIDE 1000 ML: 600; 310; 30; 20 INJECTION, SOLUTION INTRAVENOUS at 15:25

## 2022-05-11 RX ADMIN — SODIUM CHLORIDE: 9 INJECTION, SOLUTION INTRAVENOUS at 05:10

## 2022-05-11 RX ADMIN — MIDODRINE HYDROCHLORIDE 5 MG: 5 TABLET ORAL at 18:26

## 2022-05-11 RX ADMIN — SODIUM CHLORIDE, POTASSIUM CHLORIDE, SODIUM LACTATE AND CALCIUM CHLORIDE: 600; 310; 30; 20 INJECTION, SOLUTION INTRAVENOUS at 11:01

## 2022-05-11 RX ADMIN — SODIUM CHLORIDE 500 ML: 9 INJECTION, SOLUTION INTRAVENOUS at 08:46

## 2022-05-11 RX ADMIN — POTASSIUM CHLORIDE 40 MEQ: 20 TABLET, EXTENDED RELEASE ORAL at 08:45

## 2022-05-11 RX ADMIN — PANTOPRAZOLE SODIUM 40 MG: 40 INJECTION, POWDER, FOR SOLUTION INTRAVENOUS at 05:11

## 2022-05-11 RX ADMIN — PROPOFOL 30 MG: 10 INJECTION, EMULSION INTRAVENOUS at 11:09

## 2022-05-11 RX ADMIN — RIVAROXABAN 15 MG: 15 TABLET, FILM COATED ORAL at 18:26

## 2022-05-11 RX ADMIN — MIDAZOLAM HYDROCHLORIDE 2 MG: 1 INJECTION, SOLUTION INTRAMUSCULAR; INTRAVENOUS at 11:04

## 2022-05-11 RX ADMIN — POTASSIUM CHLORIDE 40 MEQ: 20 TABLET, EXTENDED RELEASE ORAL at 13:34

## 2022-05-11 RX ADMIN — Medication 100 MCG: at 11:07

## 2022-05-11 RX ADMIN — SIMVASTATIN 20 MG: 40 TABLET, FILM COATED ORAL at 18:26

## 2022-05-11 RX ADMIN — PROPOFOL 20 MG: 10 INJECTION, EMULSION INTRAVENOUS at 11:16

## 2022-05-11 RX ADMIN — PROPOFOL 30 MG: 10 INJECTION, EMULSION INTRAVENOUS at 11:06

## 2022-05-11 RX ADMIN — PROPOFOL 20 MG: 10 INJECTION, EMULSION INTRAVENOUS at 11:14

## 2022-05-11 RX ADMIN — MIDODRINE HYDROCHLORIDE 5 MG: 5 TABLET ORAL at 23:22

## 2022-05-11 RX ADMIN — PROPOFOL 20 MG: 10 INJECTION, EMULSION INTRAVENOUS at 11:11

## 2022-05-11 RX ADMIN — SUCRALFATE 1 G: 1 TABLET ORAL at 13:33

## 2022-05-11 RX ADMIN — SUCRALFATE 1 G: 1 TABLET ORAL at 18:26

## 2022-05-11 RX ADMIN — MIRTAZAPINE 15 MG: 15 TABLET, FILM COATED ORAL at 22:22

## 2022-05-11 ASSESSMENT — PAIN DESCRIPTION - PAIN TYPE
TYPE: ACUTE PAIN
TYPE: ACUTE PAIN

## 2022-05-11 ASSESSMENT — ENCOUNTER SYMPTOMS
VOMITING: 0
ABDOMINAL PAIN: 0
HEADACHES: 0
CHILLS: 0
MYALGIAS: 0
BLOOD IN STOOL: 0
DIZZINESS: 0
SHORTNESS OF BREATH: 0
DEPRESSION: 0
NAUSEA: 0
FEVER: 0

## 2022-05-11 NOTE — PROGRESS NOTES
4 Eyes Skin Assessment Completed by ELROY Molina and ELROY Arce.    Head WDL  Ears WDL  Nose Jaundice  Mouth WDL  Neck WDL  Breast/Chest Redness, scattered bruising on chest  Shoulder Blades WDL  Spine WDL  (R) Arm/Elbow/Hand Redness and Bruising  (L) Arm/Elbow/Hand Redness and Bruising  Abdomen Bruising  Groin WDL  Scrotum/Coccyx/Buttocks Redness, blanching   (R) Leg Bruising and skin tear  (L) Leg Swelling and Edema  (R) Heel/Foot/Toe WDL  (L) Heel/Foot/Toe WDL          Devices In Places: 2 L PIV      Interventions In Place Pillows    Possible Skin Injury No    Pictures Uploaded Into Epic N/A  Wound Consult Placed N/A  RN Wound Prevention Protocol Ordered No

## 2022-05-11 NOTE — DISCHARGE PLANNING
Received Choice form at 1825  Agency/Facility Name: [facility]   Referral sent per Choice form @  [phone]

## 2022-05-11 NOTE — ANESTHESIA POSTPROCEDURE EVALUATION
Patient: Jaymie Peacock    Procedure Summary     Date: 05/11/22 Room / Location: [facility] ROOM 26 / SURGERY SAME DAY [facility]    Anesthesia Start: 1101 Anesthesia Stop: 1124    Procedures:       GASTROSCOPY, WITH PUSH ENTEROSCOPY (N/A Esophagus)      GASTROSCOPY, WITH ARGON PLASMA COAGULATION (N/A Esophagus) Diagnosis: (Angiodysplasia Duodenum)    Surgeons: Yariel Pagan M.D. Responsible Provider: Arnoldo De La Rosa M.D.    Anesthesia Type: general ASA Status: 3          Final Anesthesia Type: general  Last vitals  BP   Blood Pressure : 103/59    Temp   36.3 °C (97.3 °F)    Pulse   84   Resp   17    SpO2   97 %      Anesthesia Post Evaluation    Patient location during evaluation: PACU  Patient participation: complete - patient participated  Level of consciousness: awake and alert    Airway patency: patent  Anesthetic complications: no  Cardiovascular status: hemodynamically stable  Respiratory status: acceptable  Hydration status: euvolemic    PONV: none          No complications documented.     Nurse Pain Score: 0 (NPRS)

## 2022-05-11 NOTE — DIETARY
"Nutrition services: Day 1 of admit.  Jaymie Peacock is a 66 y.o. female with admitting DX of acute GI bleeding.    Consult received for wt loss (2-13 lbs in 6 months), poor PO per admit screen; BMI <19.  Met with pt at bedside. Pt resting in bed, appeared adequately nourished, though covered with blankets. Pt reports a good appetite, denied any recent wt loss. Discussed oral nutrition supplements with pt, pt declined stating she does not like the taste. She declined further snacks and prefers to receive only meal trays at this time. Wt appears stable per chart review.    Assessment:  Height: 167.6 cm (5' 6\")  Weight: 42.5 kg (93 lb 11.1 oz)  Body mass index is 15.12 kg/m²., BMI classification: underweight  Diet/Intake: Regular; PO <25% per ADL flow sheet    Malnutrition Risk: Does not meet criteria per ASPEN guidelines at this time.    Recommendations/Plan:  1. Encourage intake of meals.  2. Document intake of all meals as % taken in ADLs to provide interdisciplinary communication across all shifts.   3. Monitor weight.  4. Nutrition rep will continue to see patient for ongoing meal and snack preferences.     RD will follow per dept guidelines.        "

## 2022-05-11 NOTE — PROGRESS NOTES
Hospital Medicine Daily Progress Note    Date of Service  5/11/2022    Chief Complaint  Jaymie Peacock is a 66 y.o. female admitted 5/10/2022 with gi bleed.     Hospital Course  66 y.o. female with past medical history of hypertension, COPD, osteoporosis, tobacco abuse, stress induced cardiomyopathy (35 ->55% EF per recent echo), recent admission for PE and LLE DVT on xarelto as well as recent GI evaluation due to anemia and occult positive stool s/p EGD which showed gastric antral erosions who presented 5/10/2022 with increasing weakness/fatigue and black stools that started yesterday. Her last dose of xarelto was last night. In ER, pt was tachycardic with , RR 22, BP 91//55, saturating well on RA. Labs remarkable for WBC 11.2, H/H 5.9/18.0, , BUN/Cr 68/1.26, INR 4.24. she was given IV protonix and 2 units of PRBCs. GI was consulted and plan to do EGD tomorrow.     Interval Problem Update  BP low this morning otherwise vitals stable bolus running. Patient asymptomatic, denies dizziness, chest pain, or dyspnea. Hemoglobin 7.3 this morning after transfusion yesterday. Leukocytosis resoled. Mild thrombocytopenia. Potassium low, replacement ordered. Plan for EGD today. She reports blood in her stool has resolved.     I have personally seen and examined the patient at bedside. I discussed the plan of care with patient, bedside RN, charge RN and .    Consultants/Specialty  GI    Code Status  Full Code    Disposition  Patient is not medically cleared for discharge.   Anticipate discharge to to home with close outpatient follow-up.  I have placed the appropriate orders for post-discharge needs.    Review of Systems  Review of Systems   Constitutional: Positive for malaise/fatigue. Negative for chills and fever.   Respiratory: Negative for shortness of breath.    Cardiovascular: Negative for chest pain.   Gastrointestinal: Negative for abdominal pain, blood in stool, nausea and vomiting.    Genitourinary: Negative for dysuria.   Musculoskeletal: Negative for myalgias.   Skin: Negative for rash.   Neurological: Negative for dizziness and headaches.   Psychiatric/Behavioral: Negative for depression.   All other systems reviewed and are negative.       Physical Exam  Temp:  [36.6 °C (97.9 °F)-36.9 °C (98.5 °F)] 36.8 °C (98.2 °F)  Pulse:  [] 85  Resp:  [14-22] 20  BP: ()/(44-76) 80/52  SpO2:  [90 %-100 %] 97 %    Physical Exam  Vitals and nursing note reviewed.   Constitutional:       General: She is not in acute distress.  HENT:      Head: Normocephalic and atraumatic.   Eyes:      Extraocular Movements: Extraocular movements intact.      Conjunctiva/sclera: Conjunctivae normal.      Pupils: Pupils are equal, round, and reactive to light.   Cardiovascular:      Rate and Rhythm: Normal rate and regular rhythm.      Pulses: Normal pulses.      Heart sounds: Normal heart sounds.   Pulmonary:      Effort: Pulmonary effort is normal. No respiratory distress.      Breath sounds: Normal breath sounds.   Abdominal:      General: Abdomen is flat. There is no distension.      Palpations: Abdomen is soft.      Tenderness: There is no abdominal tenderness.   Musculoskeletal:         General: Normal range of motion.      Cervical back: Normal range of motion and neck supple.      Right lower leg: No edema.      Left lower leg: No edema.   Skin:     General: Skin is warm and dry.   Neurological:      General: No focal deficit present.      Mental Status: She is alert and oriented to person, place, and time.      Cranial Nerves: No cranial nerve deficit.   Psychiatric:         Mood and Affect: Mood normal.         Behavior: Behavior normal.         Fluids    Intake/Output Summary (Last 24 hours) at 5/11/2022 0931  Last data filed at 5/10/2022 1441  Gross per 24 hour   Intake 600 ml   Output --   Net 600 ml       Laboratory  Recent Labs     05/10/22  1019 05/11/22  0104 05/11/22  0849   WBC 11.2* 8.7  --     RBC 1.66* 2.09*  --    HEMOGLOBIN 5.9* 7.3* 7.5*   HEMATOCRIT 18.0* 20.8* 22.1*   .4* 99.5*  --    MCH 35.5* 34.9*  --    MCHC 32.8* 35.1*  --    RDW 81.1* 61.6*  --    PLATELETCT 256 136*  --    MPV 10.3 10.2  --      Recent Labs     05/10/22  1019 05/11/22  0104   SODIUM 135 136   POTASSIUM 3.7 3.2*   CHLORIDE 97 105   CO2 20 22   GLUCOSE 138* 76   BUN 68* 67*   CREATININE 1.26 0.97   CALCIUM 8.7 7.8*     Recent Labs     05/10/22  1019 05/10/22  1841 05/10/22  1957 05/11/22  0104   APTT 35.8  --   --   --    INR 4.24* 2.59* 2.51* 1.91*               Imaging  DX-CHEST-PORTABLE (1 VIEW)   Final Result      No acute cardiopulmonary abnormality.           Assessment/Plan  * Acute GI bleeding- (present on admission)  Assessment & Plan  Presents with melana, anemia and elevated BUN, suspect Upper GI, recent EGD with gastric antral erosions   Continue protonix   2 units in ER, monitor H/H Q8H, transfuse as needed for Hgb <7  GI consulted  NPO   EGD today   Holding all anticoagulation     Macrocytic anemia- (present on admission)  Assessment & Plan  Acute on chronic macrocytic anemia, suspect AOCD complicated by acute GI bleed   Vit B12 elevated   protonix   tranfusion as needed   GI consulted , EGD today    Elevated INR- (present on admission)  Assessment & Plan  Acute GI bleed, with elevated INR >4, unclear etiology, suspect this may be due to underlying ETOH use  Will repeat INR, if remains elevated will give Kcentra given acute bleed requiring transfusion   Monitor INR     Acute deep vein thrombosis (DVT) of left lower extremity (HCC)- (present on admission)  Assessment & Plan  Pt recently diagnosed with DVT and PE, on xarelto, here with GI bleed   Holding home xarelto, transfuse as needed   Will need to discuss resumption once patient GI bleed is stabilized  Monitor closely      Pulmonary embolism and infarction (HCC)- (present on admission)  Assessment & Plan  Recent PE, xarelto being held for acute GI  Bleed   Saturating well on RA, monitor respiratory status   Will need to discuss resumption of anticoagulation once GI bleed is stabilized        VTE prophylaxis: SCDs/TEDs and pharmacologic prophylaxis contraindicated due to GI bleed    I have performed a physical exam and reviewed and updated ROS and Plan today (5/11/2022). In review of yesterday's note (5/10/2022), there are no changes except as documented above.

## 2022-05-11 NOTE — CARE PLAN
The patient is Stable - Low risk of patient condition declining or worsening    Shift Goals  Clinical Goals: monitor VS, monitor Hgb  Patient Goals: rest  Family Goals: n/a    Progress made toward(s) clinical / shift goals:  Patient was educated on their condition and on importance of fall risk precautions.   Problem: Knowledge Deficit - Standard  Goal: Patient and family/care givers will demonstrate understanding of plan of care, disease process/condition, diagnostic tests and medications  Outcome: Progressing     Problem: Fall Risk  Goal: Patient will remain free from falls  Outcome: Progressing       Patient is not progressing towards the following goals:

## 2022-05-11 NOTE — ANESTHESIA PREPROCEDURE EVALUATION
Case: [id number] Date/Time: 05/11/22 1145    Procedure: GASTROSCOPY - UPPER ENTEROSCOPY    Pre-op diagnosis: Melena, Anemia    Location: [facility] ROOM 26 / SURGERY SAME DAY [facility]    Surgeons: Yariel Pagan M.D.      GI bleed s/p 2 units PRBC    Relevant Problems   PULMONARY   (positive) Emphysema lung (HCC)   (positive) Necrotizing pneumonia (HCC)      CARDIAC   (positive) Acute deep vein thrombosis (DVT) of left lower extremity (HCC)         (positive) YAMIL (acute kidney injury) (HCC)   (positive) Acute kidney injury (HCC)      Other   (positive) Acute GI bleeding   (positive) Pulmonary embolism and infarction (HCC)   (positive) Smoking   EF 55%  NSTEMI 2/2022  Hgb 7.5/22 this am    Physical Exam    Airway   Mallampati: II  TM distance: >3 FB  Neck ROM: full       Cardiovascular - normal exam  Rhythm: regular  Rate: normal  (-) murmur     Dental - normal exam           Pulmonary - normal exam  Breath sounds clear to auscultation     Abdominal    Neurological - normal exam                 Anesthesia Plan    ASA 3       Plan - general       Airway plan will be natural airway          Induction: intravenous    Postoperative Plan: Postoperative administration of opioids is intended.    Pertinent diagnostic labs and testing reviewed    Informed Consent:    Anesthetic plan and risks discussed with patient.    Use of blood products discussed with: patient whom consented to blood products.

## 2022-05-11 NOTE — FACE TO FACE
Face to Face Supporting Documentation - Home Health    The encounter with this patient was in whole or in part the primary reason for home health admission.    Date of encounter:   Patient:                    MRN:                       YOB: 2023  Jaymie Peacock  [id number]  1955     Home health to see patient for:  Skilled Nursing care for assessment, interventions & education, Home health aide, Physical Therapy evaluation and treatment and Occupational therapy evaluation and treatment    Skilled need for:  New Onset Medical Diagnosis GI bleed    Skilled nursing interventions to include:  Line/Drain/Airway education and care    Homebound status evidenced by:  Need the aid of supportive devices such as crutches, canes, wheelchairs or walkers. Leaving home requires a considerable and taxing effort. There is a normal inability to leave the home.    Community Physician to provide follow up care: Toña Tejada P.A.-C.     Optional Interventions? No      I certify the face to face encounter for this home health care referral meets the CMS requirements and the encounter/clinical assessment with the patient was, in whole, or in part, for the medical condition(s) listed above, which is the primary reason for home health care. Based on my clinical findings: the service(s) are medically necessary, support the need for home health care, and the homebound criteria are met.  I certify that this patient has had a face to face encounter by myself.  Zoraida Kirby D.O. - NPI: [id number]

## 2022-05-11 NOTE — PROGRESS NOTES
Report received from day shift ELROY Gross. Assumed care of patient at 1900, patient is A&O x4. Patient is a high Fall risk, bed locked and in lowest position, bed alarm on. Patient reports pain 0/10. Plan of care reviewed with patient, will implement and continue to monitor. Hourly rounding is in place and call light/belongings within reach.

## 2022-05-11 NOTE — OR NURSING
1121  RECEIVED PATIENT FROM OR.  REPORT FROM DR. WAGNER.  NO AIRWAY IN PLACE.  RESPIRATIONS ARE EVEN AND UNLABORED.  NO BLEEDING NOTED.    1215  PATIENT INCONTINENT OF BLACK LIQUID STOOL.      1233  TRANSFERRED TO [place] [address].  REPORT TO JARETH ABBASI.

## 2022-05-11 NOTE — CARE PLAN
The patient is Stable - Low risk of patient condition declining or worsening    Shift Goals  Clinical Goals: monitor Hbg  Patient Goals: rest  Family Goals: NA    Progress made toward(s) clinical / shift goals:  pt updated on poc. Fall precautions in place.       Problem: Knowledge Deficit - Standard  Goal: Patient and family/care givers will demonstrate understanding of plan of care, disease process/condition, diagnostic tests and medications  Outcome: Progressing     Problem: Fall Risk  Goal: Patient will remain free from falls  Outcome: Progressing       Patient is not progressing towards the following goals:

## 2022-05-11 NOTE — PROGRESS NOTES
Patient seen and examined before proceeding with upper enteroscopy with attempted hemostasis under anesthesia with possible biopsies, cautery, injection, hemoclipping and/or other intervention scheduled tomorrow. Risks, benefits, and alternatives of aforementioned procedures were discussed with patient in detail before proceeding.  Patient was given opportunities to ask questions and discuss other options.  Risks including but not limited to perforation, infection, bleeding, missed lesion(s), possible need for surgery(ies) and/or interventional radiology, possible need for repeat procedure(s) and/or additional testing, hospitalization possibly prolonged, cardiac and/or pulmonary event, aspiration, hypoxia, stroke, medication (s) and/or anesthesia reaction(s), indefinite diagnosis, discomfort/pain, unsuccessful and/or incomplete procedure, ineffective therapy, persistent symptoms or bleeding, damage to adjacent organs/structure and/or vascular such as splenic laceration, and other adverse event(s) possibly life-threatening.  Interactive discussion was undertaken with Layman's terms.  I answered questions in full and to satisfaction.  I gave opportunity to cancel, delay and/or reschedule if not completely comfortable with proceeding.  I made it clear that I would not be able to examine the entire small bowel.  Patient stated understanding and acceptance of these risks, and wished to proceed.   Informed consent was given in clear state of mind and paper permit was confirmed to have been signed before proceeding.

## 2022-05-11 NOTE — ANESTHESIA TIME REPORT
Anesthesia Start and Stop Event Times     Date Time Event    5/11/2022 1054 Ready for Procedure     1101 Anesthesia Start     1124 Anesthesia Stop        Responsible Staff  05/11/22    Name Role Begin End    Arnoldo De La Rosa M.D. Anesth 1101 1124        Overtime Reason:  no overtime (within assigned shift)    Comments:

## 2022-05-11 NOTE — ED NOTES
Pt resting on gurney. V/S stable on monitor, Respirations equal and unlabored. Pt denies any further needs at this time.

## 2022-05-11 NOTE — DISCHARGE PLANNING
[facility] TCN chart review completed. Collaborated with Vicky FERNANDES prior to meeting with the pt. The most current review of medical record, knowledge of pt's PLOF and social support, LACE+ score of 75, 6 clicks scores of 21 mobility were considered.      Attempted to see pt though pt was off floor for EGD. Pt is known to this TCN from prior admits. Discussed prior situation with admissions with Vicky FERNANDES. Would note pt has a hx of being declined by [facility] and had been on service with [facility] and [facility] services in past/prior to admit. Sent referral/update to [facility] and anticipate pt to resume services with [facility] at time of dc ([facility] reports will be in touch with [facility] re follow up post).  No additional provider requests at this time and no Choice obtained as pt not currently unavailable. Per Vicky may need choice and possible PT and OT consults for resumption of  services and will monitor for needs/choice (will not be medically cleared today in any account). Will also send updated to [facility] CM as pt has hx of re-admissions as well.TCN will continue to follow and collaborate with discharge planning team as additional post acute needs arise. Thank you.

## 2022-05-11 NOTE — PROCEDURES
Endoscopist: Yariel Pagan MD, [facility] [place] [facility]    Anesthesiologist (MAC): Arnoldo De La Rosa M.D.    Consent: Patient seen and examined before proceeding with upper enteroscopy with attempted hemostasis under anesthesia with possible biopsies, cautery, injection, hemoclipping and/or other intervention scheduled tomorrow. Risks, benefits, and alternatives of aforementioned procedures were discussed with patient in detail before proceeding.  Patient was given opportunities to ask questions and discuss other options.  Risks including but not limited to perforation, infection, bleeding, missed lesion(s), possible need for surgery(ies) and/or interventional radiology, possible need for repeat procedure(s) and/or additional testing, hospitalization possibly prolonged, cardiac and/or pulmonary event, aspiration, hypoxia, stroke, medication (s) and/or anesthesia reaction(s), indefinite diagnosis, discomfort/pain, unsuccessful and/or incomplete procedure, ineffective therapy, persistent symptoms or bleeding, damage to adjacent organs/structure and/or vascular such as splenic laceration, and other adverse event(s) possibly life-threatening.  Interactive discussion was undertaken with Layman's terms.  I answered questions in full and to satisfaction.  I gave opportunity to cancel, delay and/or reschedule if not completely comfortable with proceeding.  I made it clear that I would not be able to examine the entire small bowel.  Patient stated understanding and acceptance of these risks, and wished to proceed.   Informed consent was given in clear state of mind and paper permit was confirmed to have been signed before proceeding    Endoscopic procedures in detail:  Diagnostic flexible long variable-stiffness Olympus endoscope was inserted from mouth into mid-jejunum more than 60 cm post-pylorus, retroflexion was performed in the stomach.  5 angiodysplasias were treated with argon-plasma coagulation with good hemostasis.  I  suctioned insufflated air and stomach fluid contents upon removal.      Procedure times:  - In-room 11:01  - Start 11:09  - Completed 11:17  - Out of room per nursing records    Esophagogastroduodenoscopy Findings:  - Esophagus: endoscopically unremarkable.   - Stomach: endoscopically unremarkable.   - Duodenum: one 2mm duodenal bulb angiodysplasia and two distal duodenal angiodysplasias, treated with argon plasma coagulation  - Jejunum: two proximal jejunal angiodysplasias, treated with argon plasma coagulation.    Impression: Duodenal and jejunal angiodysplasias    Recommendations:   1.  Routine post-endoscopy anesthesia recovery care.  Transfer back to prior hospital room when recovery criteria are met.  Aspiration and fall precautions x 24 hours.   2.  Regular diet.  3.  Okay to restart single-agent novel oral anticoagulant with accepted re-bleeding risks, but avoid aspirin and NSAIDs.  4.  Follow-up with established GI Dr. Rivas in 3 months.  5. GIC signed off but please feel free to contact us with problems, questions, concerns and/or unanticipated changes in patient's clinical status.  6.  I spoke to patient and recovery nurse about impression, diagnosis and recommendations.  I answered questions in full and to satisfaction

## 2022-05-11 NOTE — DISCHARGE PLANNING
Anticipated Discharge Disposition: Home with HH    Action: LSW met with Pt to discuss d/c plan. Pt is agreeable to HH resumption with [facility]. LSW faxed HH choice. Pt is hoping to return home tomorrow and her friend Irwin will be her ride home.     Barriers to Discharge: Medical Clearance, Pt currently on O2 so will need to wean off new set up.      Plan: LSW to remain available for d/c planning needs.

## 2022-05-12 ENCOUNTER — APPOINTMENT (OUTPATIENT)
Dept: RADIOLOGY | Facility: MEDICAL CENTER | Age: 67
DRG: 377 | End: 2022-05-12
Attending: INTERNAL MEDICINE
Payer: MEDICARE

## 2022-05-12 LAB
ANION GAP SERPL CALC-SCNC: 8 MMOL/L (ref 7–16)
BUN SERPL-MCNC: 37 MG/DL (ref 8–22)
CALCIUM SERPL-MCNC: 7.9 MG/DL (ref 8.5–10.5)
CHLORIDE SERPL-SCNC: 114 MMOL/L (ref 96–112)
CO2 SERPL-SCNC: 20 MMOL/L (ref 20–33)
CORTIS SERPL-MCNC: 16.5 UG/DL (ref 0–23)
CREAT SERPL-MCNC: 0.87 MG/DL (ref 0.5–1.4)
ERYTHROCYTE [DISTWIDTH] IN BLOOD BY AUTOMATED COUNT: 69.4 FL (ref 35.9–50)
FERRITIN SERPL-MCNC: 820 NG/ML (ref 10–291)
GFR SERPLBLD CREATININE-BSD FMLA CKD-EPI: 73 ML/MIN/1.73 M 2
GLUCOSE SERPL-MCNC: 85 MG/DL (ref 65–99)
HCT VFR BLD AUTO: 21.3 % (ref 37–47)
HCT VFR BLD AUTO: 22.5 % (ref 37–47)
HCT VFR BLD AUTO: 23.7 % (ref 37–47)
HGB BLD-MCNC: 7.2 G/DL (ref 12–16)
HGB BLD-MCNC: 7.6 G/DL (ref 12–16)
HGB BLD-MCNC: 7.7 G/DL (ref 12–16)
IRON SATN MFR SERPL: 18 % (ref 15–55)
IRON SERPL-MCNC: 33 UG/DL (ref 40–170)
MCH RBC QN AUTO: 35.1 PG (ref 27–33)
MCHC RBC AUTO-ENTMCNC: 33.8 G/DL (ref 33.6–35)
MCV RBC AUTO: 103.9 FL (ref 81.4–97.8)
PLATELET # BLD AUTO: 136 K/UL (ref 164–446)
PMV BLD AUTO: 9.7 FL (ref 9–12.9)
POTASSIUM SERPL-SCNC: 4.3 MMOL/L (ref 3.6–5.5)
RBC # BLD AUTO: 2.05 M/UL (ref 4.2–5.4)
SODIUM SERPL-SCNC: 142 MMOL/L (ref 135–145)
TIBC SERPL-MCNC: 181 UG/DL (ref 250–450)
UIBC SERPL-MCNC: 148 UG/DL (ref 110–370)
WBC # BLD AUTO: 7.5 K/UL (ref 4.8–10.8)

## 2022-05-12 PROCEDURE — 700102 HCHG RX REV CODE 250 W/ 637 OVERRIDE(OP): Performed by: INTERNAL MEDICINE

## 2022-05-12 PROCEDURE — 700102 HCHG RX REV CODE 250 W/ 637 OVERRIDE(OP): Performed by: STUDENT IN AN ORGANIZED HEALTH CARE EDUCATION/TRAINING PROGRAM

## 2022-05-12 PROCEDURE — 83550 IRON BINDING TEST: CPT

## 2022-05-12 PROCEDURE — 36415 COLL VENOUS BLD VENIPUNCTURE: CPT

## 2022-05-12 PROCEDURE — 85014 HEMATOCRIT: CPT

## 2022-05-12 PROCEDURE — 82728 ASSAY OF FERRITIN: CPT

## 2022-05-12 PROCEDURE — 80048 BASIC METABOLIC PNL TOTAL CA: CPT

## 2022-05-12 PROCEDURE — 83540 ASSAY OF IRON: CPT

## 2022-05-12 PROCEDURE — A9270 NON-COVERED ITEM OR SERVICE: HCPCS | Performed by: STUDENT IN AN ORGANIZED HEALTH CARE EDUCATION/TRAINING PROGRAM

## 2022-05-12 PROCEDURE — 85018 HEMOGLOBIN: CPT

## 2022-05-12 PROCEDURE — A9270 NON-COVERED ITEM OR SERVICE: HCPCS | Performed by: INTERNAL MEDICINE

## 2022-05-12 PROCEDURE — 71045 X-RAY EXAM CHEST 1 VIEW: CPT

## 2022-05-12 PROCEDURE — 99232 SBSQ HOSP IP/OBS MODERATE 35: CPT | Performed by: INTERNAL MEDICINE

## 2022-05-12 PROCEDURE — 82533 TOTAL CORTISOL: CPT

## 2022-05-12 PROCEDURE — 770001 HCHG ROOM/CARE - MED/SURG/GYN PRIV*

## 2022-05-12 PROCEDURE — 85027 COMPLETE CBC AUTOMATED: CPT

## 2022-05-12 PROCEDURE — 700105 HCHG RX REV CODE 258: Performed by: INTERNAL MEDICINE

## 2022-05-12 RX ORDER — SODIUM CHLORIDE, SODIUM LACTATE, POTASSIUM CHLORIDE, AND CALCIUM CHLORIDE .6; .31; .03; .02 G/100ML; G/100ML; G/100ML; G/100ML
1000 INJECTION, SOLUTION INTRAVENOUS ONCE
Status: COMPLETED | OUTPATIENT
Start: 2022-05-12 | End: 2022-05-12

## 2022-05-12 RX ADMIN — MIDODRINE HYDROCHLORIDE 5 MG: 5 TABLET ORAL at 08:53

## 2022-05-12 RX ADMIN — SIMVASTATIN 20 MG: 40 TABLET, FILM COATED ORAL at 18:12

## 2022-05-12 RX ADMIN — SUCRALFATE 1 G: 1 TABLET ORAL at 18:12

## 2022-05-12 RX ADMIN — SUCRALFATE 1 G: 1 TABLET ORAL at 13:11

## 2022-05-12 RX ADMIN — MIDODRINE HYDROCHLORIDE 5 MG: 5 TABLET ORAL at 13:11

## 2022-05-12 RX ADMIN — SODIUM CHLORIDE, POTASSIUM CHLORIDE, SODIUM LACTATE AND CALCIUM CHLORIDE 1000 ML: 600; 310; 30; 20 INJECTION, SOLUTION INTRAVENOUS at 15:29

## 2022-05-12 RX ADMIN — RIVAROXABAN 15 MG: 15 TABLET, FILM COATED ORAL at 05:37

## 2022-05-12 RX ADMIN — SUCRALFATE 1 G: 1 TABLET ORAL at 08:53

## 2022-05-12 RX ADMIN — MIRTAZAPINE 15 MG: 15 TABLET, FILM COATED ORAL at 20:58

## 2022-05-12 RX ADMIN — MIDODRINE HYDROCHLORIDE 5 MG: 5 TABLET ORAL at 18:12

## 2022-05-12 RX ADMIN — RIVAROXABAN 15 MG: 15 TABLET, FILM COATED ORAL at 18:12

## 2022-05-12 ASSESSMENT — ENCOUNTER SYMPTOMS
SHORTNESS OF BREATH: 0
BLOOD IN STOOL: 0
DEPRESSION: 0
NAUSEA: 0
CHILLS: 0
DIZZINESS: 0
FEVER: 0
MYALGIAS: 0
HEADACHES: 0
VOMITING: 0
ABDOMINAL PAIN: 0

## 2022-05-12 ASSESSMENT — PAIN DESCRIPTION - PAIN TYPE
TYPE: ACUTE PAIN

## 2022-05-12 NOTE — CARE PLAN
Problem: Knowledge Deficit - Standard  Goal: Patient and family/care givers will demonstrate understanding of plan of care, disease process/condition, diagnostic tests and medications  Outcome: Progressing     Problem: Fall Risk  Goal: Patient will remain free from falls  Outcome: Progressing   The patient is Stable - Low risk of patient condition declining or worsening    Shift Goals  Clinical Goals: monitor blood pressure  Patient Goals: rest  Family Goals: n/a    Progress made toward(s) clinical / shift goals:  Patient remains free from falls. Patient BP map is above 65 with medication.     Patient is not progressing towards the following goals:

## 2022-05-12 NOTE — DISCHARGE PLANNING
ATTN: Case Management  RE: Referral for Home Health    Reason for referral denial: Patient is unsafe at home and needs a higher level of care. She is non compliant with care plan.               Unfortunately, we are not able to accept this referral for the reason listed above. If further clarity is needed, our Transitional Care Specialists are available to discuss any barriers to service at [phone].      We look forward to collaborating with you in the future,  [facility]

## 2022-05-12 NOTE — DISCHARGE PLANNING
Agency/Facility Name: [facility]   Spoke To: Arleth   Outcome: DPA left voicemail regarding patients referral status.       Agency/Facility Name: [facility]   Spoke To: Arleth  Outcome: Per  patient will need to be declined with [facility]  first before admission can be  considered for patient.      [phone] -Irwin is  Patients friend if needing to contact regarding any following questions. Patients phone is currently not set up at this time .

## 2022-05-12 NOTE — PROGRESS NOTES
Patient blood pressure is low. 77/50, RN pagemichael hospitalitis. One dose of 5mg midodrine ordered and continue to monitor.

## 2022-05-12 NOTE — DISCHARGE PLANNING
Case Management Discharge Planning    Admission Date: 5/10/2022  GMLOS: 4.4  ALOS: 2    6-Clicks ADL Score: 21  6-Clicks Mobility Score: 21      Anticipated Discharge Dispo:      DME Needed: No    Action(s) Taken: Updated Provider/Nurse on Discharge Plan    Escalations Completed: None    Medically Clear: No    Next Steps: f/u with medical and nursing teams regarding treatment plan, discharge needs, and barriers. F/u on HH choice and acceptance. Spoke with Hakan CANADA DPT with [facility] regarding HH placement.    Barriers to Discharge: Medical clearance and HH acceptance.    Is the patient up for discharge tomorrow: No

## 2022-05-12 NOTE — CARE PLAN
The patient is Stable - Low risk of patient condition declining or worsening    Shift Goals  Clinical Goals: monitor VS, rest  Patient Goals: rest, comfort  Family Goals: n/a    Progress made toward(s) clinical / shift goals:  Patient was educated on prevention of pressure ulcers and preventing falls.  Problem: Knowledge Deficit - Standard  Goal: Patient and family/care givers will demonstrate understanding of plan of care, disease process/condition, diagnostic tests and medications  Outcome: Progressing     Problem: Fall Risk  Goal: Patient will remain free from falls  Outcome: Progressing     Problem: Skin Integrity  Goal: Skin integrity is maintained or improved  Outcome: Progressing       Patient is not progressing towards the following goals:

## 2022-05-12 NOTE — PROGRESS NOTES
Patient's BP continuously low despite multiple attempts to bolus patient. MD notified, adding 5mg midodrine TID and continue to monitor.

## 2022-05-12 NOTE — DISCHARGE SUMMARY
Discharge Summary    CHIEF COMPLAINT ON ADMISSION  Chief Complaint   Patient presents with   • Lower GI Bleed     Dark diarrhea since yesterday afternoon. Weakness x 2 days. Pt on Xarelto for Hx of PE.    • Shortness of Breath     SOB x 2 days.        Reason for Admission  EMS B=16     Admission Date  5/10/2022    CODE STATUS  Full Code    HPI & HOSPITAL COURSE  This is a 66 y.o. female here with gi bleed    66 y.o. female with past medical history of hypertension, COPD, osteoporosis, tobacco abuse, stress induced cardiomyopathy (35 ->55% EF per recent echo), recent admission for PE and LLE DVT on xarelto as well as recent GI evaluation due to anemia and occult positive stool s/p EGD which showed gastric antral erosions who presented 5/10/2022 with increasing weakness/fatigue and black stools that started yesterday. Her last dose of xarelto was night prior to admission. In ER, pt was tachycardic with , RR 22, BP 91//55, saturating well on RA. Labs remarkable for WBC 11.2, H/H 5.9/18.0, , BUN/Cr 68/1.26, INR 4.24. she was given IV protonix and 2 units of PRBCs. GI was consulted, EGD showed 2 mm duodenal bulb angiodysplasia, 2 distal duodenal angiodysplasias, and 2 proximal jejunal angiodysplasias all treated with argon plasma coagulation. GI cleared patient for regular diet, ok to restart Xarelto and to follow up in GI clinic with Dr. Rivas in 3 months. Iron studies consistent with anemia of chronic disease, ferritin elevated. Patient briefly hypoxic, CXR showed no acute findings and she was weaned off supplemental O2. Patient stay complicated by hypotension likely hypovolumic from the bleeding and from dehydration, BP improved with IVF. She is to stop taking lasix for now until she can follow up with her primary care physician. Patient requesting to be discharged home with home health. She is to return to the ER if her condition worsens.      Therefore, she is discharged in fair and stable  condition to home with organized home healthcare and close outpatient follow-up.    The patient met 2-midnight criteria for an inpatient stay at the time of discharge.    Discharge Date  5/13/2022    FOLLOW UP ITEMS POST DISCHARGE  Follow up with GI in 3 months   Follow up with Primary care within the week.     DISCHARGE DIAGNOSES  Principal Problem:    Acute GI bleeding POA: Yes  Active Problems:    Hypotension POA: Yes    Pulmonary embolism and infarction (HCC) POA: Yes    Acute deep vein thrombosis (DVT) of left lower extremity (HCC) POA: Yes    Elevated INR POA: Yes    Macrocytic anemia POA: Yes  Resolved Problems:    * No resolved hospital problems. *      FOLLOW UP  No future appointments.  Rell Rivas M.D.  [address]  [facility] [address]  [phone]    Follow up in 3 month(s)      [address]  [facility] [address]  [phone]        Toña Tejada P.A.-C.  [address] [id number] [phone]    Follow up  Follow up with primary care in the next week for further management of your blood pressure.    Manuel Wills M.D.  [address] [id number] [phone]            MEDICATIONS ON DISCHARGE     Medication List      CHANGE how you take these medications      Instructions   Xarelto 15 MG Tabs tablet  What changed: Another medication with the same name was removed. Continue taking this medication, and follow the directions you see here.  Generic drug: rivaroxaban   Take 15 mg by mouth 2 times a day.  Dose: 15 mg        CONTINUE taking these medications      Instructions   mirtazapine 15 MG Tabs  Commonly known as: Remeron   Take 1 Tablet by mouth every evening.  Dose: 15 mg     omeprazole 20 MG delayed-release capsule  Commonly known as: PRILOSEC   Take 1 Capsule by mouth every day.  Dose: 20 mg     potassium chloride ER 10 MEQ tablet  Commonly known as: KLOR-CON   Take 1 Tablet by mouth every day for 30  days.  Dose: 10 mEq     simvastatin 20 MG Tabs  Commonly known as: ZOCOR   Take 20 mg by mouth every evening. Indications: Ischemic Heart Disease  Dose: 20 mg        STOP taking these medications    furosemide 20 MG Tabs  Commonly known as: LASIX            Allergies  No Known Allergies    DIET  Orders Placed This Encounter   Procedures   • Diet Order Diet: Regular     Standing Status:   Standing     Number of Occurrences:   1     Order Specific Question:   Diet:     Answer:   Regular [1]       ACTIVITY  As tolerated.  Weight bearing as tolerated    CONSULTATIONS  Gastroenterology     PROCEDURES  5/1/22   Procedures   SD SB ENDOSCOPY,W/CONTROL,BLEEDING [26557 (CPT®)]       LABORATORY  Lab Results   Component Value Date    SODIUM 142 05/12/2022    POTASSIUM 4.3 05/12/2022    CHLORIDE 114 (H) 05/12/2022    CO2 20 05/12/2022    GLUCOSE 85 05/12/2022    BUN 37 (H) 05/12/2022    CREATININE 0.87 05/12/2022        Lab Results   Component Value Date    WBC 7.1 05/13/2022    HEMOGLOBIN 7.9 (L) 05/13/2022    HEMATOCRIT 24.5 (L) 05/13/2022    PLATELETCT 149 (L) 05/13/2022        Total time of the discharge process exceeds 48 minutes.

## 2022-05-12 NOTE — PROGRESS NOTES
[facility] Medicine Daily Progress Note    Date of Service  5/12/2022    Chief Complaint  Jaymie Peacock is a 66 y.o. female admitted 5/10/2022 with gi bleed.     Hospital Course  66 y.o. female with past medical history of hypertension, COPD, osteoporosis, tobacco abuse, stress induced cardiomyopathy (35 ->55% EF per recent echo), recent admission for PE and LLE DVT on xarelto as well as recent GI evaluation due to anemia and occult positive stool s/p EGD which showed gastric antral erosions who presented 5/10/2022 with increasing weakness/fatigue and black stools that started yesterday. Her last dose of xarelto was last night. In ER, pt was tachycardic with , RR 22, BP 91//55, saturating well on RA. Labs remarkable for WBC 11.2, H/H 5.9/18.0, , BUN/Cr 68/1.26, INR 4.24. she was given IV protonix and 2 units of PRBCs. GI was consulted and plan to do EGD tomorrow.     Interval Problem Update  5/11 BP low this morning otherwise vitals stable bolus running. Patient asymptomatic, denies dizziness, chest pain, or dyspnea. Hemoglobin 7.3 this morning after transfusion yesterday. Leukocytosis resoled. Mild thrombocytopenia. Potassium low, replacement ordered. Plan for EGD today. She reports blood in her stool has resolved.     5/12 BP continued to be low despite multiple fluid boluses yesterday, started midodrine. This morning patient hypoxic satting 82%, now on 2 L NC. CXR pending. Hemoglobin stable. Cortisol wnl. EGD showed 2 mm duodenal bulb angiodysplasia, 2 distal duodenal angiodysplasias, and 2 proximal jejunal angiodysplasias all treated with argon plasma coagulation, GI cleared the patient for discharge. Patient has no complaints, wants to be dc, discussed that BP still low, IVF running.     I have personally seen and examined the patient at bedside. I discussed the plan of care with patient, bedside RN, charge RN and .    Consultants/Specialty  GI    Code Status  Full  Code    Disposition  Patient is not medically cleared for discharge.   Anticipate discharge to to home with close outpatient follow-up.  I have placed the appropriate orders for post-discharge needs.    Review of Systems  Review of Systems   Constitutional: Positive for malaise/fatigue. Negative for chills and fever.   Respiratory: Negative for shortness of breath.    Cardiovascular: Negative for chest pain.   Gastrointestinal: Negative for abdominal pain, blood in stool, nausea and vomiting.   Genitourinary: Negative for dysuria.   Musculoskeletal: Negative for myalgias.   Skin: Negative for rash.   Neurological: Negative for dizziness and headaches.   Psychiatric/Behavioral: Negative for depression.   All other systems reviewed and are negative.       Physical Exam  Temp:  [36.2 °C (97.1 °F)-36.6 °C (97.9 °F)] 36.4 °C (97.5 °F)  Pulse:  [67-88] 77  Resp:  [16-18] 18  BP: ()/(50-72) 113/72  SpO2:  [82 %-99 %] 99 %    Physical Exam  Vitals and nursing note reviewed.   Constitutional:       General: She is not in acute distress.  HENT:      Head: Normocephalic and atraumatic.   Eyes:      Extraocular Movements: Extraocular movements intact.      Conjunctiva/sclera: Conjunctivae normal.      Pupils: Pupils are equal, round, and reactive to light.   Cardiovascular:      Rate and Rhythm: Normal rate and regular rhythm.      Pulses: Normal pulses.      Heart sounds: Normal heart sounds.   Pulmonary:      Effort: Pulmonary effort is normal. No respiratory distress.      Breath sounds: Normal breath sounds.   Abdominal:      General: Abdomen is flat. There is no distension.      Palpations: Abdomen is soft.      Tenderness: There is no abdominal tenderness.   Musculoskeletal:         General: Normal range of motion.      Cervical back: Normal range of motion and neck supple.      Right lower leg: No edema.      Left lower leg: No edema.   Skin:     General: Skin is warm and dry.   Neurological:      General: No focal  deficit present.      Mental Status: She is alert and oriented to person, place, and time.      Cranial Nerves: No cranial nerve deficit.   Psychiatric:         Mood and Affect: Mood normal.         Behavior: Behavior normal.         Fluids    Intake/Output Summary (Last 24 hours) at 5/12/2022 1806  Last data filed at 5/12/2022 1435  Gross per 24 hour   Intake 720 ml   Output --   Net 720 ml       Laboratory  Recent Labs     05/10/22  1019 05/11/22  0104 05/11/22  0849 05/12/22  0118 05/12/22  0844 05/12/22  1644   WBC 11.2* 8.7  --  7.5  --   --    RBC 1.66* 2.09*  --  2.05*  --   --    HEMOGLOBIN 5.9* 7.3*   < > 7.2* 7.7* 7.6*   HEMATOCRIT 18.0* 20.8*   < > 21.3* 23.7* 22.5*   .4* 99.5*  --  103.9*  --   --    MCH 35.5* 34.9*  --  35.1*  --   --    MCHC 32.8* 35.1*  --  33.8  --   --    RDW 81.1* 61.6*  --  69.4*  --   --    PLATELETCT 256 136*  --  136*  --   --    MPV 10.3 10.2  --  9.7  --   --     < > = values in this interval not displayed.     Recent Labs     05/10/22  1019 05/11/22 0104 05/12/22  0118   SODIUM 135 136 142   POTASSIUM 3.7 3.2* 4.3   CHLORIDE 97 105 114*   CO2 20 22 20   GLUCOSE 138* 76 85   BUN 68* 67* 37*   CREATININE 1.26 0.97 0.87   CALCIUM 8.7 7.8* 7.9*     Recent Labs     05/10/22  1019 05/10/22  1841 05/10/22  1957 05/11/22  0104   APTT 35.8  --   --   --    INR 4.24* 2.59* 2.51* 1.91*               Imaging  DX-CHEST-PORTABLE (1 VIEW)   Final Result      Stable chest without acute/new abnormality.      DX-CHEST-PORTABLE (1 VIEW)   Final Result      No acute cardiopulmonary abnormality.           Assessment/Plan  * Acute GI bleeding- (present on admission)  Assessment & Plan  Presents with melana, anemia and elevated BUN, suspect Upper GI, recent EGD with gastric antral erosions   Continue protonix   2 units in ER, monitor H/H Q8H, transfuse as needed for Hgb <7  GI consulted  S/p EGD   Restart xarelto    Macrocytic anemia- (present on admission)  Assessment & Plan  Acute on  chronic macrocytic anemia, suspect AOCD complicated by acute GI bleed   Vit B12 elevated   protonix   tranfusion as needed   GI consulted , s/p egd    Elevated INR- (present on admission)  Assessment & Plan  Acute GI bleed, with elevated INR >4, unclear etiology, suspect this may be due to underlying ETOH use  Will repeat INR, if remains elevated will give Kcentra given acute bleed requiring transfusion   Monitor INR     Acute deep vein thrombosis (DVT) of left lower extremity (HCC)- (present on admission)  Assessment & Plan  Pt recently diagnosed with DVT and PE, on xarelto, here with GI bleed   Holding home xarelto, transfuse as needed   Will need to discuss resumption once patient GI bleed is stabilized  Monitor closely      Pulmonary embolism and infarction (HCC)- (present on admission)  Assessment & Plan  Recent PE, xarelto being held for acute GI Bleed   Saturating well on RA, monitor respiratory status   Will need to discuss resumption of anticoagulation once GI bleed is stabilized     Hypotension- (present on admission)  Assessment & Plan  Likely 2/2 hypovoluemia   Continue IVF   Continue midodrine        VTE prophylaxis: SCDs/TEDs and therapeutic anticoagulation with Xarelto    I have performed a physical exam and reviewed and updated ROS and Plan today (5/12/2022). In review of yesterday's note (5/11/2022), there are no changes except as documented above.

## 2022-05-12 NOTE — DISCHARGE PLANNING
[facility] TCN chart review completed. Collaborated with Mikaela SAEZ and Maine SAEZ. Patient seen at bedside. Pt reports feels much improved post GI cauterization yesterday. She reports ambulatory without physical assist as well and noted 6 clicks of 21.  Noted pt is agreeable to follow ups calling friend Irwin as she has been having issues with her phone (who was present and agreeable to receiving calls to assist with setting up appointments). His # is [phone]. Pt is agreeable to HH, [facility] and CM/SW involvement post dc to assist with resources and follow up at home. Updated [facility] and [facility] CM/SW with Irwin's # and information as noted above. Pt wanting to dc to home ASAP as well. Per S5 CM, awaiting denial from [facility] in order to re-establish with [facility] (pt has hx of renown  denial and note she had been denied by [facility] at time of writing this note-> CM aware to attempt [facility] next). TCN will continue to follow and collaborate with discharge planning team as additional post acute needs arise. Thank you.    Previously completed:  - Choice forms:HH.   - [facility] introduced (Y), referral (sent).

## 2022-05-13 VITALS
HEIGHT: 66 IN | OXYGEN SATURATION: 90 % | DIASTOLIC BLOOD PRESSURE: 79 MMHG | WEIGHT: 93.7 LBS | RESPIRATION RATE: 17 BRPM | BODY MASS INDEX: 15.06 KG/M2 | TEMPERATURE: 97.4 F | SYSTOLIC BLOOD PRESSURE: 111 MMHG | HEART RATE: 106 BPM

## 2022-05-13 LAB
ERYTHROCYTE [DISTWIDTH] IN BLOOD BY AUTOMATED COUNT: 71.1 FL (ref 35.9–50)
HCT VFR BLD AUTO: 21.4 % (ref 37–47)
HCT VFR BLD AUTO: 24.5 % (ref 37–47)
HGB BLD-MCNC: 7 G/DL (ref 12–16)
HGB BLD-MCNC: 7.9 G/DL (ref 12–16)
MCH RBC QN AUTO: 35 PG (ref 27–33)
MCHC RBC AUTO-ENTMCNC: 32.7 G/DL (ref 33.6–35)
MCV RBC AUTO: 107 FL (ref 81.4–97.8)
PLATELET # BLD AUTO: 149 K/UL (ref 164–446)
PMV BLD AUTO: 10.1 FL (ref 9–12.9)
RBC # BLD AUTO: 2 M/UL (ref 4.2–5.4)
WBC # BLD AUTO: 7.1 K/UL (ref 4.8–10.8)

## 2022-05-13 PROCEDURE — 99239 HOSP IP/OBS DSCHRG MGMT >30: CPT | Performed by: INTERNAL MEDICINE

## 2022-05-13 PROCEDURE — 85014 HEMATOCRIT: CPT

## 2022-05-13 PROCEDURE — 700102 HCHG RX REV CODE 250 W/ 637 OVERRIDE(OP): Performed by: INTERNAL MEDICINE

## 2022-05-13 PROCEDURE — 36415 COLL VENOUS BLD VENIPUNCTURE: CPT

## 2022-05-13 PROCEDURE — 85018 HEMOGLOBIN: CPT

## 2022-05-13 PROCEDURE — 85027 COMPLETE CBC AUTOMATED: CPT

## 2022-05-13 PROCEDURE — A9270 NON-COVERED ITEM OR SERVICE: HCPCS | Performed by: INTERNAL MEDICINE

## 2022-05-13 RX ADMIN — SUCRALFATE 1 G: 1 TABLET ORAL at 08:36

## 2022-05-13 ASSESSMENT — PAIN DESCRIPTION - PAIN TYPE: TYPE: ACUTE PAIN

## 2022-05-13 NOTE — PROGRESS NOTES
Assumed care of patient at 1900 from Waldemar Mckenna. Patient is A&O x4, states pain level is 0/10. Bed locked in lowest position with 2 rail up. Call light  in place, belongings at bedside.Hourly rounding is in place.

## 2022-05-13 NOTE — PROGRESS NOTES
Patient to be d/c home with friend, wheelchair, all questions and concerns answered, iv removed.  Talked to Dr. Kirby about duplicate xarelto listed on patient d/c form, d/c by Dr. Kirby, Patient instructed to only take the 15mg dose.  Removed from discharge packet.   Patient alert and aware

## 2022-05-13 NOTE — DISCHARGE INSTRUCTIONS
Discharge Instructions    Discharged to home by car with friend. Discharged via wheelchair, hospital escort: Refused.  Special equipment needed: Not Applicable    Be sure to schedule a follow-up appointment with your primary care doctor or any specialists as instructed.     Discharge Plan:   Diet Plan: Discussed  Activity Level: Discussed  Confirmed Follow up Appointment: Patient to Call and Schedule Appointment  Confirmed Symptoms Management: Discussed  Medication Reconciliation Updated: Yes    I understand that a diet low in cholesterol, fat, and sodium is recommended for good health. Unless I have been given specific instructions below for another diet, I accept this instruction as my diet prescription.   Other diet: Reg    Special Instructions:   Gastrointestinal Bleeding  Gastrointestinal (GI) bleeding is bleeding somewhere along the path that food travels through the body (digestive tract). This path is anywhere between the mouth and the opening of the butt (anus). You may have blood in your poop (stool) or have black poop. If you throw up (vomit), there may be blood in it.  This condition can be mild, serious, or even life-threatening. If you have a lot of bleeding, you may need to stay in the hospital.  What are the causes?  This condition may be caused by:  Irritation and swelling of the esophagus (esophagitis). The esophagus is part of the body that moves food from your mouth to your stomach.  Swollen veins in the butt (hemorrhoids).  Areas of painful tearing in the opening of the butt (anal fissures). These are often caused by passing hard poop.  Pouches that form on the colon over time (diverticulosis).  Irritation and swelling (diverticulitis) in areas where pouches have formed on the colon.  Growths (polyps) or cancer. Colon cancer often starts out as growths that are not cancer.  Irritation of the stomach lining (gastritis).  Sores (ulcers) in the stomach.  What increases the risk?  You are more  likely to develop this condition if you:  Have a certain type of infection in your stomach (Helicobacter pylori infection).  Take certain medicines.  Smoke.  Drink alcohol.  What are the signs or symptoms?  Common symptoms of this condition include:  Throwing up (vomiting) material that has bright red blood in it. It may look like coffee grounds.  Changes in your poop. The poop may:  Have red blood in it.  Be black, look like tar, and smell stronger than normal.  Be red.  Pain or cramping in the belly (abdomen).  How is this treated?  Treatment for this condition depends on the cause of the bleeding. For example:  Sometimes, the bleeding can be stopped during a procedure that is done to find the problem (endoscopy or colonoscopy).  Medicines can be used to:  Help control irritation, swelling, or infection.  Reduce acid in your stomach.  Certain problems can be treated with:  Creams.  Medicines that are put in the butt (suppositories).  Warm baths.  Surgery is sometimes needed.  If you lose a lot of blood, you may need a blood transfusion.  If bleeding is mild, you may be allowed to go home. If there is a lot of bleeding, you will need to stay in the hospital.  Follow these instructions at home:    Take over-the-counter and prescription medicines only as told by your doctor.  Eat foods that have a lot of fiber in them. These foods include beans, whole grains, and fresh fruits and vegetables. You can also try eating 1-3 prunes each day.  Drink enough fluid to keep your pee (urine) pale yellow.  Keep all follow-up visits as told by your doctor. This is important.  Contact a doctor if:  Your symptoms do not get better.  Get help right away if:  Your bleeding does not stop.  You feel dizzy or you pass out (faint).  You feel weak.  You have very bad cramps in your back or belly.  You pass large clumps of blood (clots) in your poop.  Your symptoms are getting worse.  You have chest pain or fast heartbeats.  Summary  GI  bleeding is bleeding somewhere along the path that food travels through the body (digestive tract).  This bleeding can be caused by many things. Treatment depends on the cause of the bleeding.  Take medicines only as told by your doctor.  Keep all follow-up visits as told by your doctor. This is important.  This information is not intended to replace advice given to you by your health care provider. Make sure you discuss any questions you have with your health care provider.  Document Released: 09/26/2009 Document Revised: 07/31/2019 Document Reviewed: 07/31/2019  [employer] Patient Education © 2020 [employer]    Is patient discharged on Warfarin / Coumadin?   No     Depression / Suicide Risk    As you are discharged from this [facility] facility, it is important to learn how to keep safe from harming yourself.    Recognize the warning signs:  Abrupt changes in personality, positive or negative- including increase in energy   Giving away possessions  Change in eating patterns- significant weight changes-  positive or negative  Change in sleeping patterns- unable to sleep or sleeping all the time   Unwillingness or inability to communicate  Depression  Unusual sadness, discouragement and loneliness  Talk of wanting to die  Neglect of personal appearance   Rebelliousness- reckless behavior  Withdrawal from people/activities they love  Confusion- inability to concentrate     If you or a loved one observes any of these behaviors or has concerns about self-harm, here's what you can do:  Talk about it- your feelings and reasons for harming yourself  Remove any means that you might use to hurt yourself (examples: pills, rope, extension cords, firearm)  Get professional help from the community (Mental Health, Substance Abuse, psychological counseling)  Do not be alone:Call your Safe Contact- someone whom you trust who will be there for you.  Call your local CRISIS HOTLINE 302-8754 or 436-319-3255  Call your local  Children's Mobile Crisis Response Team [place] (932) 096-4836 or www.[employer].Viigo  Call the toll free National Suicide Prevention Hotlines   National Suicide Prevention Lifeline 466-685-EOPD (6161)  [place] [facility] Line Network 800-SUICIDE (763-5693)

## 2022-05-13 NOTE — CARE PLAN
Problem: Knowledge Deficit - Standard  Goal: Patient and family/care givers will demonstrate understanding of plan of care, disease process/condition, diagnostic tests and medications  Outcome: Progressing     Problem: Fall Risk  Goal: Patient will remain free from falls  Outcome: Progressing   The patient is Stable - Low risk of patient condition declining or worsening    Shift Goals  Clinical Goals: Monitor vitals  Patient Goals: discharge  Family Goals: n/a    Progress made toward(s) clinical / shift goals:  Patient remains free from falls. Patient blood pressure is in normal range.     Patient is not progressing towards the following goals:

## 2022-06-01 NOTE — CONSULTS
Gastroenterology Initial Consult Note                Author:  JERSON Grigsby Date & Time Created: 4/20/2022 1:19 PM        Patient ID:  Name:             Jaymie Peacock  YOB: 1955  Age:                 66 y.o.  female  MRN:               [id number]        Referring Provider:  Spencer Garduno MD and [facility] Team        Presenting Chief Complaint:  Anemia, previous occult blood positive stool, Pulmonary embolism        History of Present Illness:    She is a 66-year-old female patient seen in consultation for anemia, occult blood positive stool, pulmonary embolism.  The patient was admitted to the hospital on 4/19/2022 complaining of shortness of breath and tachycardia.  During his hospitalization she has been found to have DVT and pulmonary embolism.  She had a previous hospitalization in February 2022 with findings of heart failure with ejection fraction 35% and elevated troponin.  Repeat echocardiogram in March demonstrated improved ejection fraction of 55%.  At that time Dr. Fernandez was consulted with GI consultants for anemia and occult blood positive stool.  Plan was for outpatient EGD and colonoscopy, but patient did not follow-up with us.  During this hospitalization her hemoglobin went from 10.4 yesterday to 8.4 today.  She denies overt GI bleeding.  Her last colonoscopy was in 2016 which she reports was normal.        Review of Systems:  Review of Systems   Constitutional: Positive for malaise/fatigue. Negative for chills and fever.   HENT: Negative for sinus pain and sore throat.    Eyes: Negative for pain, discharge and redness.   Respiratory: Positive for shortness of breath. Negative for wheezing and stridor.    Cardiovascular: Negative for claudication, leg swelling and PND.   Gastrointestinal: Negative for abdominal pain, blood in stool, diarrhea, melena, nausea and vomiting.   Genitourinary: Negative for dysuria and hematuria.   Musculoskeletal: Negative for myalgias and neck pain.  [Treatment Education] : treatment education   Neurological: Positive for weakness. Negative for dizziness and headaches.   Endo/Heme/Allergies: Negative for environmental allergies and polydipsia.   Psychiatric/Behavioral: Negative for memory loss. The patient does not have insomnia.                Past Medical History:  Past Medical History   Past Medical History:   Diagnosis Date   • Hypertension     • Osteoporosis     • Smoking                Active Hospital Problems     Diagnosis     • Nutritional deficiency [E63.9]     • Pulmonary embolism and infarction (HCC) [I26.99]     • Acute deep vein thrombosis (DVT) of left lower extremity ([facility]) [I82.402]     • Thyroid nodule [E04.1]     • Compression fracture of first lumbar vertebra ([facility]) [S32.010A]     • Alkaline phosphatase elevation [R74.8]     • Elevated troponin [R77.8]     • High anion gap metabolic acidosis [E87.2]     • Normocytic anemia [D64.9]     • Severe protein-calorie malnutrition ([place]: less than 60% of standard weight) ([facility]) [E43]     • Smoking [F17.200]     • Alcohol abuse [F10.10]              Past Surgical History:  Past Surgical History         Past Surgical History:   Procedure Laterality Date   • US-NEEDLE CORE BX-BREAST PANEL [facility] Medications:           Current Facility-Administered Medications   Medication Dose Frequency Provider Last Rate Last Admin   • furosemide (LASIX) tablet 20 mg  20 mg DAILY Jose Ramon Cao M.D.   20 mg at 04/20/22 0519   • mirtazapine (Remeron) tablet 15 mg  15 mg Nightly Jose Ramon Cao M.D.   15 mg at 04/19/22 2113   • simvastatin (ZOCOR) tablet 20 mg  20 mg Q EVENING Jose Ramon Cao M.D.   20 mg at 04/19/22 1723   • senna-docusate (PERICOLACE or SENOKOT S) 8.6-50 MG per tablet 2 Tablet  2 Tablet BID Jose Ramon Cao M.D.         And   • polyethylene glycol/lytes (MIRALAX) PACKET 1 Packet  1 Packet QDAY PRN Jose Ramon Cao M.D.         And   • magnesium hydroxide (MILK OF MAGNESIA) suspension 30 mL  30 mL QDAY PRN Jose Ramon Cao M.D.      [Medical Care Issues] : medical care issues     And   • bisacodyl (DULCOLAX) suppository 10 mg  10 mg QDAY PRN Jose Ramon Cao M.D.       • enoxaparin (LOVENOX) inj 40 mg  1 mg/kg Q12HRS Jose Ramon Cao M.D.   40 mg at 04/20/22 0519   • acetaminophen (Tylenol) tablet 650 mg  650 mg Q6HRS PRN Jose Ramon Cao M.D.       • nicotine (NICODERM) 21 MG/24HR 21 mg  21 mg Daily-0600 Jose Ramon Cao M.D.       Last reviewed on 4/19/2022  1:51 PM by Paola Gee           Current Outpatient Medications:  Prescriptions Prior to Admission           Medications Prior to Admission   Medication Sig Dispense Refill Last Dose   • furosemide (LASIX) 20 MG Tab Take 1 Tablet by mouth every day for 30 days. 30 Tablet 0 4/18/2022 at 0700   • potassium chloride ER (KLOR-CON) 10 MEQ tablet Take 1 Tablet by mouth every day for 30 days. 30 Tablet 0 New RX at NOT PICKED UP   • mirtazapine (REMERON) 15 MG Tab Take 1 Tablet by mouth every evening. 30 Tablet 0 New RX at NOT PICKED UP   • simvastatin (ZOCOR) 20 MG Tab Take 20 mg by mouth every evening. Indications: Ischemic Heart Disease     4/18/2022 at 1900               Medication Allergies:  No Known Allergies        Family Medical History:  Family History   No family history on file.           Social History:  Social History               Socioeconomic History   • Marital status: Unknown       Spouse name: Not on file   • Number of children: Not on file   • Years of education: Not on file   • Highest education level: Not on file   Occupational History   • Not on file   Tobacco Use   • Smoking status: Former Smoker       Packs/day: 0.50       Types: Cigarettes   • Smokeless tobacco: Never Used   Vaping Use   • Vaping Use: Never used   Substance and Sexual Activity   • Alcohol use: Yes       Alcohol/week: 0.6 oz       Types: 1 Shots of liquor per week   • Drug use: Not Currently   • Sexual activity: Not on file   Other Topics Concern   • Not on file   Social History Narrative   • Not on file      Social Determinants of Health  [FreeTextEntry1] : DM reversed!\par weight loss\par \par 39F with recurrent UTIs - most recently with E coli +ESBL\par The ESBL pattern of resistance was discussed.\par The importance of having urine cultures to inform pathogen directed care was reinforces.\par Prevention strategies for recurrent UTIs reviewed. Hydration, voiding after intercourse was encouraged. Fiber intake for its prebiotic benefit and probiotic use - specifically Kefir fermented milk drink with minimization of antibiotics may help to promote a more healthful gut microbiome and reduce population of uropathogens.\par \par No treatment indicated at present\par For the mild to moderate symptoms that she has had, FOSFOMYCIN (Monorul) 3 gms in water every 72 hours x 2 doses would be appropriate.\par In cases with moderate to severe symptoms, I have engaged [facility] to infuse Amikacin at home. A single dose suffices for cystitis. IM Amikacin in this office can be provided. The nephrotoxicity and ototoxicity with prolonged use is very unusual with single doses.\par For severe illness with fevers, chills, flank pain in addition to urinary complaints, prompt referral to an acute care hospital would be indicated so that prompt intravenous carbopenem antibiotics (E.g ERTAPENEM [Invanz] 1 gm IV daily) can be provided.\par \par Ms Moreno is welcome to follow up as needed "     Financial Resource Strain: Not on file   Food Insecurity: Not on file   Transportation Needs: Not on file   Physical Activity: Not on file   Stress: Not on file   Social Connections: Not on file   Intimate Partner Violence: Not on file   Housing Stability: Not on file               Vital signs:  Weight/BMI: Body mass index is 15.73 kg/m².  /74   Pulse 85   Temp 36.2 °C (97.2 °F) (Temporal)   Resp 18   Ht 1.676 m (5' 6\")   Wt 44.2 kg (97 lb 7.1 oz)   SpO2 95%   Vitals          Vitals:     04/20/22 0658 04/20/22 0659 04/20/22 0735 04/20/22 1105   BP: 107/75     106/74   Pulse: 87     85   Resp: 18     18   Temp: 36.1 °C (97 °F)     36.2 °C (97.2 °F)   TempSrc: Temporal     Temporal   SpO2: 95%     95%   Weight:   48 kg (105 lb 13.1 oz) 44.2 kg (97 lb 7.1 oz)     Height:                 Oxygen Therapy:  Pulse Oximetry: 95 %, O2 (LPM): 0, O2 Delivery Device: None - Room Air     Intake/Output Summary (Last 24 hours) at 4/20/2022 1319  Last data filed at 4/20/2022 0900      Gross per 24 hour   Intake 120 ml   Output 150 ml   Net -30 ml            Physical Exam:  Physical Exam  Vitals and nursing note reviewed.   Constitutional:       Appearance: She is ill-appearing.   HENT:      Head: Normocephalic and atraumatic.      Right Ear: External ear normal.      Left Ear: External ear normal.      Nose: Nose normal.      Mouth/Throat:      Mouth: Mucous membranes are moist.      Pharynx: Oropharynx is clear.   Eyes:      General: No scleral icterus.     Extraocular Movements: Extraocular movements intact.      Pupils: Pupils are equal, round, and reactive to light.   Cardiovascular:      Rate and Rhythm: Normal rate and regular rhythm.      Pulses: Normal pulses.      Heart sounds: Normal heart sounds.   Pulmonary:      Effort: Pulmonary effort is normal. No respiratory distress.      Breath sounds: Normal breath sounds. No wheezing.   Abdominal:      General: Abdomen is flat. Bowel sounds are normal. There is " no distension.      Palpations: Abdomen is soft.      Tenderness: There is no abdominal tenderness.   Musculoskeletal:         General: No tenderness or deformity.      Cervical back: Neck supple.   Lymphadenopathy:      Cervical: No cervical adenopathy.   Skin:     General: Skin is warm and dry.      Coloration: Skin is pale.   Neurological:      General: No focal deficit present.      Mental Status: She is alert and oriented to person, place, and time.   Psychiatric:         Thought Content: Thought content normal.         Judgment: Judgment normal.               Labs:       Recent Labs     04/19/22  1055 04/20/22  0745   SODIUM 141 138   POTASSIUM 4.0 3.3*   CHLORIDE 99 103   CO2 25 25   BUN 9 8   CREATININE 1.16 0.90   MAGNESIUM  --  1.0*   CALCIUM 8.6 7.6*           Recent Labs     04/19/22  1055 04/20/22  0745   ALTSGPT 19 12   ASTSGOT 33 23   ALKPHOSPHAT 304* 209*   TBILIRUBIN 0.9 0.6   LIPASE 25  --    GLUCOSE 108* 69           Recent Labs     04/19/22  1055 04/20/22  0745   WBC 6.3 4.1*   NEUTSPOLYS 85.90* 69.10   LYMPHOCYTES 8.80* 24.60   MONOCYTES 4.40 4.50   EOSINOPHILS 0.00 1.80   BASOPHILS 0.90 0.00   ASTSGOT 33 23   ALTSGPT 19 12   ALKPHOSPHAT 304* 209*   TBILIRUBIN 0.9 0.6           Recent Labs     04/19/22  1055 04/20/22  0745   RBC 3.22* 2.63*   HEMOGLOBIN 10.4* 8.4*   HEMATOCRIT 30.6* 25.5*   PLATELETCT 290 231      Recent Results         Recent Results (from the past 24 hour(s))   TROPONIN     Collection Time: 04/20/22  7:45 AM   Result Value Ref Range     Troponin T 28 (H) 6 - 19 ng/L   LACTIC ACID     Collection Time: 04/20/22  7:45 AM   Result Value Ref Range     Lactic Acid 1.1 0.5 - 2.0 mmol/L   Comp Metabolic Panel     Collection Time: 04/20/22  7:45 AM   Result Value Ref Range     Sodium 138 135 - 145 mmol/L     Potassium 3.3 (L) 3.6 - 5.5 mmol/L     Chloride 103 96 - 112 mmol/L     Co2 25 20 - 33 mmol/L     Anion Gap 10.0 7.0 - 16.0     Glucose 69 65 - 99 mg/dL     Bun 8 8 - 22 mg/dL      Creatinine 0.90 0.50 - 1.40 mg/dL     Calcium 7.6 (L) 8.5 - 10.5 mg/dL     AST(SGOT) 23 12 - 45 U/L     ALT(SGPT) 12 2 - 50 U/L     Alkaline Phosphatase 209 (H) 30 - 99 U/L     Total Bilirubin 0.6 0.1 - 1.5 mg/dL     Albumin 2.7 (L) 3.2 - 4.9 g/dL     Total Protein 5.0 (L) 6.0 - 8.2 g/dL     Globulin 2.3 1.9 - 3.5 g/dL     A-G Ratio 1.2 g/dL   CBC WITH DIFFERENTIAL     Collection Time: 04/20/22  7:45 AM   Result Value Ref Range     WBC 4.1 (L) 4.8 - 10.8 K/uL     RBC 2.63 (L) 4.20 - 5.40 M/uL     Hemoglobin 8.4 (L) 12.0 - 16.0 g/dL     Hematocrit 25.5 (L) 37.0 - 47.0 %     MCV 97.0 81.4 - 97.8 fL     MCH 31.9 27.0 - 33.0 pg     MCHC 32.9 (L) 33.6 - 35.0 g/dL     RDW 81.4 (H) 35.9 - 50.0 fL     Platelet Count 231 164 - 446 K/uL     MPV 9.6 9.0 - 12.9 fL     Neutrophils-Polys 69.10 44.00 - 72.00 %     Lymphocytes 24.60 22.00 - 41.00 %     Monocytes 4.50 0.00 - 13.40 %     Eosinophils 1.80 0.00 - 6.90 %     Basophils 0.00 0.00 - 1.80 %     Nucleated RBC 0.00 /100 WBC     Neutrophils (Absolute) 2.83 2.00 - 7.15 K/uL     Lymphs (Absolute) 1.01 1.00 - 4.80 K/uL     Monos (Absolute) 0.18 0.00 - 0.85 K/uL     Eos (Absolute) 0.07 0.00 - 0.51 K/uL     Baso (Absolute) 0.00 0.00 - 0.12 K/uL     NRBC (Absolute) 0.00 K/uL     Anisocytosis 1+       Macrocytosis 1+     ESTIMATED GFR     Collection Time: 04/20/22  7:45 AM   Result Value Ref Range     GFR (CKD-EPI) 70 >60 mL/min/1.73 m 2   DIFFERENTIAL MANUAL     Collection Time: 04/20/22  7:45 AM   Result Value Ref Range     Manual Diff Status PERFORMED     PERIPHERAL SMEAR REVIEW     Collection Time: 04/20/22  7:45 AM   Result Value Ref Range     Peripheral Smear Review see below     PLATELET ESTIMATE     Collection Time: 04/20/22  7:45 AM   Result Value Ref Range     Plt Estimation Normal     MORPHOLOGY     Collection Time: 04/20/22  7:45 AM   Result Value Ref Range     RBC Morphology Present       Poikilocytosis 1+       Ovalocytes 1+     MAGNESIUM     Collection Time: 04/20/22   7:45 AM   Result Value Ref Range     Magnesium 1.0 (L) 1.5 - 2.5 mg/dL   URINALYSIS (UA)     Collection Time: 04/20/22 10:07 AM     Specimen: Urine, Clean Catch   Result Value Ref Range     Color Yellow       Character Clear       Specific Gravity 1.009 <1.035     Ph 8.0 5.0 - 8.0     Glucose Negative Negative mg/dL     Ketones Negative Negative mg/dL     Protein Negative Negative mg/dL     Bilirubin Negative Negative     Urobilinogen, Urine 0.2 Negative     Nitrite Negative Negative     Leukocyte Esterase Small (A) Negative     Occult Blood Negative Negative     Micro Urine Req Microscopic     URINE MICROSCOPIC (W/UA)     Collection Time: 04/20/22 10:07 AM   Result Value Ref Range     WBC 2-5 /hpf     RBC 0-2 /hpf     Bacteria Few (A) None /hpf     Epithelial Cells Few /hpf     Hyaline Cast 0-2 /lpf               Radiology Review:  CT-CTA CHEST PULMONARY ARTERY W/ RECONS   Final Result       1.  There are peripheral, linear nonocclusive pulmonary embolus in the right main and upper lobar pulmonary arteries. Occlusive thrombus in the segmental and subsegmental branches in the posterior right lower lobe.   2.  Normal RV/ LV ratio.   3.  New compression fracture of L1 vertebral body.   4.  Multiple bilateral thyroid nodules, recommend dedicated ultrasound of the thyroid if not already performed.               Findings were discussed with BURAK FUNEZ on 4/19/2022 1:13 PM.           US-EXTREMITY VENOUS LOWER BILAT   Final Result       DX-CHEST-PORTABLE (1 VIEW)   Final Result       Resolution of small pleural effusions.                MDM (Data Review):   -Records reviewed and summarized in current documentation  -I personally reviewed and interpreted the laboratory results  -I personally reviewed the radiology images           Medical Decision Making, by Problem:       Active Hospital Problems     Diagnosis     • Nutritional deficiency [E63.9]     • Pulmonary embolism and infarction (HCC) [I26.99]     • Acute deep  vein thrombosis (DVT) of left lower extremity (HCC) [I82.402]     • Thyroid nodule [E04.1]     • Compression fracture of first lumbar vertebra (HCC) [S32.010A]     • Alkaline phosphatase elevation [R74.8]     • Elevated troponin [R77.8]     • High anion gap metabolic acidosis [E87.2]     • Normocytic anemia [D64.9]     • Severe protein-calorie malnutrition ([place]: less than 60% of standard weight) (HCC) [E43]     • Smoking [F17.200]     • Alcohol abuse [F10.10]                 Assessment/Recommendations:  Assessment:  -Normocytic anemia  -Occult blood positive stool on 2/20/2022  -DVT and pulmonary embolism  -Need for chronic anticoagulation  -Heart failure with most recent ejection fraction 55%  -Metabolic acidosis  -Alcohol use  -Tobacco use     Plan:  -Clear liquid diet today, n.p.o. after midnight  -MoviPrep this evening  -EGD and colonoscopy with possible biopsies with Dr. Rell Rivas at 3 PM tomorrow.     Risks, benefits, and alternatives of aforementioned procedures were discussed with patient. Consenting person(s) were given opportunities to ask questions and discuss other options.  Risks including but not limited to perforation, infection, bleeding, missed lesion(s), possible need for surgery(ies) and/or interventional radiology, possible need for repeat procedure(s) and/or additional testing, hospitalization possibly prolonged, cardiac and/or pulmonary event, aspiration, hypoxia, stroke, medication and/or anesthesia reaction, indefinite diagnosis, discomfort, unsuccessful and/or incomplete procedure, ineffective therapy and/or persistent symptoms, damage to adjacent organs and/or vascular structures, and other adverse events possibly life-threatening.  Interactive discussion was undertaken with Layman's terms. I answered questions in full and to satisfaction.  Consenting person(s) stated understanding and acceptance of these risks, and wished to proceed.  Informed consent was given in clear state of  mind.     -Hold Lovenox  -Serial hemoglobin and hematocrit, transfuse for hemoglobin less than 7     JERSON Grigsby        Thank you for inviting me to participate in the care of this patient. Please do not hesitate to call GI consultants with additional questions/concerns or changes in the patient's clinical status at [phone].

## 2022-06-12 ENCOUNTER — HOSPITAL ENCOUNTER (INPATIENT)
Facility: MEDICAL CENTER | Age: 67
LOS: 4 days | DRG: 378 | End: 2022-06-16
Attending: EMERGENCY MEDICINE | Admitting: STUDENT IN AN ORGANIZED HEALTH CARE EDUCATION/TRAINING PROGRAM
Payer: MEDICARE

## 2022-06-12 ENCOUNTER — APPOINTMENT (OUTPATIENT)
Dept: RADIOLOGY | Facility: MEDICAL CENTER | Age: 67
DRG: 378 | End: 2022-06-12
Attending: EMERGENCY MEDICINE
Payer: MEDICARE

## 2022-06-12 DIAGNOSIS — Z79.01 ANTICOAGULATED: ICD-10-CM

## 2022-06-12 DIAGNOSIS — K92.2 UPPER GI BLEED: ICD-10-CM

## 2022-06-12 DIAGNOSIS — K92.2 ACUTE GI BLEEDING: ICD-10-CM

## 2022-06-12 DIAGNOSIS — D64.9 ANEMIA, UNSPECIFIED TYPE: ICD-10-CM

## 2022-06-12 DIAGNOSIS — I82.402 ACUTE DEEP VEIN THROMBOSIS (DVT) OF LEFT LOWER EXTREMITY, UNSPECIFIED VEIN (HCC): ICD-10-CM

## 2022-06-12 DIAGNOSIS — F10.10 ALCOHOL ABUSE: ICD-10-CM

## 2022-06-12 LAB
ABO GROUP BLD: NORMAL
ALBUMIN SERPL BCP-MCNC: 3.3 G/DL (ref 3.2–4.9)
ALBUMIN/GLOB SERPL: 1.6 G/DL
ALP SERPL-CCNC: 97 U/L (ref 30–99)
ALT SERPL-CCNC: 9 U/L (ref 2–50)
ANION GAP SERPL CALC-SCNC: 16 MMOL/L (ref 7–16)
AST SERPL-CCNC: 28 U/L (ref 12–45)
BARCODED ABORH UBTYP: 6200
BARCODED ABORH UBTYP: 6200
BARCODED PRD CODE UBPRD: NORMAL
BARCODED PRD CODE UBPRD: NORMAL
BARCODED UNIT NUM UBUNT: NORMAL
BARCODED UNIT NUM UBUNT: NORMAL
BASOPHILS # BLD AUTO: 0.6 % (ref 0–1.8)
BASOPHILS # BLD: 0.04 K/UL (ref 0–0.12)
BILIRUB SERPL-MCNC: 0.4 MG/DL (ref 0.1–1.5)
BLD GP AB SCN SERPL QL: NORMAL
BUN SERPL-MCNC: 35 MG/DL (ref 8–22)
CALCIUM SERPL-MCNC: 8.5 MG/DL (ref 8.4–10.2)
CHLORIDE SERPL-SCNC: 106 MMOL/L (ref 96–112)
CO2 SERPL-SCNC: 17 MMOL/L (ref 20–33)
COMMENT 1642: NORMAL
COMPONENT R 8504R: NORMAL
COMPONENT R 8504R: NORMAL
CREAT SERPL-MCNC: 1.03 MG/DL (ref 0.5–1.4)
EOSINOPHIL # BLD AUTO: 0 K/UL (ref 0–0.51)
EOSINOPHIL NFR BLD: 0 % (ref 0–6.9)
ERYTHROCYTE [DISTWIDTH] IN BLOOD BY AUTOMATED COUNT: 69.4 FL (ref 35.9–50)
GFR SERPLBLD CREATININE-BSD FMLA CKD-EPI: 59 ML/MIN/1.73 M 2
GLOBULIN SER CALC-MCNC: 2.1 G/DL (ref 1.9–3.5)
GLUCOSE SERPL-MCNC: 119 MG/DL (ref 65–99)
HCT VFR BLD AUTO: 19.2 % (ref 37–47)
HGB BLD-MCNC: 5.3 G/DL (ref 12–16)
HGB BLD-MCNC: 6 G/DL (ref 12–16)
HGB BLD-MCNC: 6.3 G/DL (ref 12–16)
HGB RETIC QN AUTO: 41.2 PG/CELL (ref 29–35)
IMM GRANULOCYTES # BLD AUTO: 0.03 K/UL (ref 0–0.11)
IMM GRANULOCYTES NFR BLD AUTO: 0.4 % (ref 0–0.9)
IMM RETICS NFR: 42.1 % (ref 9.3–17.4)
INR PPP: 3.97 (ref 0.87–1.13)
LIPASE SERPL-CCNC: 43 U/L (ref 7–58)
LYMPHOCYTES # BLD AUTO: 0.63 K/UL (ref 1–4.8)
LYMPHOCYTES NFR BLD: 8.9 % (ref 22–41)
MAGNESIUM SERPL-MCNC: 1.3 MG/DL (ref 1.5–2.5)
MCH RBC QN AUTO: 35.5 PG (ref 27–33)
MCHC RBC AUTO-ENTMCNC: 31.3 G/DL (ref 33.6–35)
MCV RBC AUTO: 113.6 FL (ref 81.4–97.8)
MONOCYTES # BLD AUTO: 0.51 K/UL (ref 0–0.85)
MONOCYTES NFR BLD AUTO: 7.2 % (ref 0–13.4)
NEUTROPHILS # BLD AUTO: 5.9 K/UL (ref 2–7.15)
NEUTROPHILS NFR BLD: 82.9 % (ref 44–72)
NRBC # BLD AUTO: 0 K/UL
NRBC BLD-RTO: 0 /100 WBC
PLATELET # BLD AUTO: 207 K/UL (ref 164–446)
PMV BLD AUTO: 9.7 FL (ref 9–12.9)
POTASSIUM SERPL-SCNC: 4 MMOL/L (ref 3.6–5.5)
PRODUCT TYPE UPROD: NORMAL
PRODUCT TYPE UPROD: NORMAL
PROT SERPL-MCNC: 5.4 G/DL (ref 6–8.2)
PROTHROMBIN TIME: 36.1 SEC (ref 12–14.6)
RBC # BLD AUTO: 1.69 M/UL (ref 4.2–5.4)
RETICS # AUTO: 0.09 M/UL (ref 0.04–0.06)
RETICS/RBC NFR: 5.6 % (ref 0.8–2.1)
RH BLD: NORMAL
SODIUM SERPL-SCNC: 139 MMOL/L (ref 135–145)
UNIT STATUS USTAT: NORMAL
UNIT STATUS USTAT: NORMAL
WBC # BLD AUTO: 7.1 K/UL (ref 4.8–10.8)

## 2022-06-12 PROCEDURE — 700105 HCHG RX REV CODE 258: Performed by: NURSE PRACTITIONER

## 2022-06-12 PROCEDURE — 83550 IRON BINDING TEST: CPT

## 2022-06-12 PROCEDURE — 85610 PROTHROMBIN TIME: CPT

## 2022-06-12 PROCEDURE — C9113 INJ PANTOPRAZOLE SODIUM, VIA: HCPCS | Performed by: STUDENT IN AN ORGANIZED HEALTH CARE EDUCATION/TRAINING PROGRAM

## 2022-06-12 PROCEDURE — 86900 BLOOD TYPING SEROLOGIC ABO: CPT

## 2022-06-12 PROCEDURE — 700111 HCHG RX REV CODE 636 W/ 250 OVERRIDE (IP): Performed by: EMERGENCY MEDICINE

## 2022-06-12 PROCEDURE — 80053 COMPREHEN METABOLIC PANEL: CPT

## 2022-06-12 PROCEDURE — 700105 HCHG RX REV CODE 258: Performed by: EMERGENCY MEDICINE

## 2022-06-12 PROCEDURE — 83735 ASSAY OF MAGNESIUM: CPT

## 2022-06-12 PROCEDURE — C9113 INJ PANTOPRAZOLE SODIUM, VIA: HCPCS | Performed by: EMERGENCY MEDICINE

## 2022-06-12 PROCEDURE — 700111 HCHG RX REV CODE 636 W/ 250 OVERRIDE (IP): Performed by: STUDENT IN AN ORGANIZED HEALTH CARE EDUCATION/TRAINING PROGRAM

## 2022-06-12 PROCEDURE — 99291 CRITICAL CARE FIRST HOUR: CPT | Mod: AI | Performed by: STUDENT IN AN ORGANIZED HEALTH CARE EDUCATION/TRAINING PROGRAM

## 2022-06-12 PROCEDURE — 86923 COMPATIBILITY TEST ELECTRIC: CPT

## 2022-06-12 PROCEDURE — 99285 EMERGENCY DEPT VISIT HI MDM: CPT

## 2022-06-12 PROCEDURE — A9270 NON-COVERED ITEM OR SERVICE: HCPCS | Performed by: STUDENT IN AN ORGANIZED HEALTH CARE EDUCATION/TRAINING PROGRAM

## 2022-06-12 PROCEDURE — 770020 HCHG ROOM/CARE - TELE (206)

## 2022-06-12 PROCEDURE — 36430 TRANSFUSION BLD/BLD COMPNT: CPT

## 2022-06-12 PROCEDURE — 700105 HCHG RX REV CODE 258: Performed by: STUDENT IN AN ORGANIZED HEALTH CARE EDUCATION/TRAINING PROGRAM

## 2022-06-12 PROCEDURE — 85025 COMPLETE CBC W/AUTO DIFF WBC: CPT

## 2022-06-12 PROCEDURE — 86901 BLOOD TYPING SEROLOGIC RH(D): CPT

## 2022-06-12 PROCEDURE — 85046 RETICYTE/HGB CONCENTRATE: CPT

## 2022-06-12 PROCEDURE — 96374 THER/PROPH/DIAG INJ IV PUSH: CPT

## 2022-06-12 PROCEDURE — 36415 COLL VENOUS BLD VENIPUNCTURE: CPT

## 2022-06-12 PROCEDURE — 30233N1 TRANSFUSION OF NONAUTOLOGOUS RED BLOOD CELLS INTO PERIPHERAL VEIN, PERCUTANEOUS APPROACH: ICD-10-PCS | Performed by: STUDENT IN AN ORGANIZED HEALTH CARE EDUCATION/TRAINING PROGRAM

## 2022-06-12 PROCEDURE — 86850 RBC ANTIBODY SCREEN: CPT

## 2022-06-12 PROCEDURE — 85018 HEMOGLOBIN: CPT

## 2022-06-12 PROCEDURE — P9016 RBC LEUKOCYTES REDUCED: HCPCS | Mod: 91

## 2022-06-12 PROCEDURE — HZ2ZZZZ DETOXIFICATION SERVICES FOR SUBSTANCE ABUSE TREATMENT: ICD-10-PCS | Performed by: STUDENT IN AN ORGANIZED HEALTH CARE EDUCATION/TRAINING PROGRAM

## 2022-06-12 PROCEDURE — 83540 ASSAY OF IRON: CPT

## 2022-06-12 PROCEDURE — 94760 N-INVAS EAR/PLS OXIMETRY 1: CPT

## 2022-06-12 PROCEDURE — 700102 HCHG RX REV CODE 250 W/ 637 OVERRIDE(OP): Performed by: STUDENT IN AN ORGANIZED HEALTH CARE EDUCATION/TRAINING PROGRAM

## 2022-06-12 PROCEDURE — 700111 HCHG RX REV CODE 636 W/ 250 OVERRIDE (IP): Performed by: NURSE PRACTITIONER

## 2022-06-12 PROCEDURE — 71045 X-RAY EXAM CHEST 1 VIEW: CPT

## 2022-06-12 PROCEDURE — 83690 ASSAY OF LIPASE: CPT

## 2022-06-12 RX ORDER — SODIUM CHLORIDE, SODIUM LACTATE, POTASSIUM CHLORIDE, CALCIUM CHLORIDE 600; 310; 30; 20 MG/100ML; MG/100ML; MG/100ML; MG/100ML
INJECTION, SOLUTION INTRAVENOUS CONTINUOUS
Status: DISCONTINUED | OUTPATIENT
Start: 2022-06-12 | End: 2022-06-14

## 2022-06-12 RX ORDER — POLYETHYLENE GLYCOL 3350 17 G/17G
1 POWDER, FOR SOLUTION ORAL
Status: DISCONTINUED | OUTPATIENT
Start: 2022-06-12 | End: 2022-06-16 | Stop reason: HOSPADM

## 2022-06-12 RX ORDER — PANTOPRAZOLE SODIUM 40 MG/10ML
40 INJECTION, POWDER, LYOPHILIZED, FOR SOLUTION INTRAVENOUS 2 TIMES DAILY
Status: DISCONTINUED | OUTPATIENT
Start: 2022-06-12 | End: 2022-06-16 | Stop reason: HOSPADM

## 2022-06-12 RX ORDER — RIVAROXABAN 15 MG-20MG
15-20 KIT ORAL SEE ADMIN INSTRUCTIONS
Status: ON HOLD | COMMUNITY
End: 2022-06-16

## 2022-06-12 RX ORDER — MIRTAZAPINE 15 MG/1
15 TABLET, ORALLY DISINTEGRATING ORAL NIGHTLY
Status: DISCONTINUED | OUTPATIENT
Start: 2022-06-12 | End: 2022-06-16 | Stop reason: HOSPADM

## 2022-06-12 RX ORDER — BISACODYL 10 MG
10 SUPPOSITORY, RECTAL RECTAL
Status: DISCONTINUED | OUTPATIENT
Start: 2022-06-12 | End: 2022-06-16 | Stop reason: HOSPADM

## 2022-06-12 RX ORDER — SIMVASTATIN 20 MG
20 TABLET ORAL EVERY EVENING
Status: DISCONTINUED | OUTPATIENT
Start: 2022-06-12 | End: 2022-06-16 | Stop reason: HOSPADM

## 2022-06-12 RX ORDER — SODIUM CHLORIDE 9 MG/ML
1000 INJECTION, SOLUTION INTRAVENOUS ONCE
Status: COMPLETED | OUTPATIENT
Start: 2022-06-12 | End: 2022-06-12

## 2022-06-12 RX ORDER — ONDANSETRON 2 MG/ML
4 INJECTION INTRAMUSCULAR; INTRAVENOUS EVERY 4 HOURS PRN
Status: DISCONTINUED | OUTPATIENT
Start: 2022-06-12 | End: 2022-06-16 | Stop reason: HOSPADM

## 2022-06-12 RX ORDER — HYDRALAZINE HYDROCHLORIDE 20 MG/ML
10 INJECTION INTRAMUSCULAR; INTRAVENOUS EVERY 4 HOURS PRN
Status: DISCONTINUED | OUTPATIENT
Start: 2022-06-12 | End: 2022-06-16 | Stop reason: HOSPADM

## 2022-06-12 RX ORDER — PANTOPRAZOLE SODIUM 40 MG/10ML
80 INJECTION, POWDER, LYOPHILIZED, FOR SOLUTION INTRAVENOUS ONCE
Status: COMPLETED | OUTPATIENT
Start: 2022-06-12 | End: 2022-06-12

## 2022-06-12 RX ORDER — ONDANSETRON 2 MG/ML
4 INJECTION INTRAMUSCULAR; INTRAVENOUS ONCE
Status: DISCONTINUED | OUTPATIENT
Start: 2022-06-12 | End: 2022-06-12

## 2022-06-12 RX ORDER — ACETAMINOPHEN 325 MG/1
650 TABLET ORAL EVERY 6 HOURS PRN
Status: DISCONTINUED | OUTPATIENT
Start: 2022-06-12 | End: 2022-06-13

## 2022-06-12 RX ORDER — AMOXICILLIN 250 MG
2 CAPSULE ORAL 2 TIMES DAILY
Status: DISCONTINUED | OUTPATIENT
Start: 2022-06-12 | End: 2022-06-16 | Stop reason: HOSPADM

## 2022-06-12 RX ORDER — ONDANSETRON 4 MG/1
4 TABLET, ORALLY DISINTEGRATING ORAL EVERY 4 HOURS PRN
Status: DISCONTINUED | OUTPATIENT
Start: 2022-06-12 | End: 2022-06-16 | Stop reason: HOSPADM

## 2022-06-12 RX ADMIN — MIRTAZAPINE 15 MG: 15 TABLET, ORALLY DISINTEGRATING ORAL at 21:54

## 2022-06-12 RX ADMIN — SODIUM CHLORIDE 1000 ML: 9 INJECTION, SOLUTION INTRAVENOUS at 12:07

## 2022-06-12 RX ADMIN — SIMVASTATIN 20 MG: 20 TABLET, FILM COATED ORAL at 17:17

## 2022-06-12 RX ADMIN — PHYTONADIONE 10 MG: 10 INJECTION, EMULSION INTRAMUSCULAR; INTRAVENOUS; SUBCUTANEOUS at 17:44

## 2022-06-12 RX ADMIN — PANTOPRAZOLE SODIUM 40 MG: 40 INJECTION, POWDER, LYOPHILIZED, FOR SOLUTION INTRAVENOUS at 18:00

## 2022-06-12 RX ADMIN — SODIUM CHLORIDE, POTASSIUM CHLORIDE, SODIUM LACTATE AND CALCIUM CHLORIDE: 600; 310; 30; 20 INJECTION, SOLUTION INTRAVENOUS at 15:56

## 2022-06-12 RX ADMIN — PANTOPRAZOLE SODIUM 80 MG: 40 INJECTION, POWDER, LYOPHILIZED, FOR SOLUTION INTRAVENOUS at 12:07

## 2022-06-12 ASSESSMENT — ENCOUNTER SYMPTOMS
MEMORY LOSS: 0
BLOOD IN STOOL: 1
VOMITING: 1
WHEEZING: 0
FEVER: 0
EYES NEGATIVE: 1
CARDIOVASCULAR NEGATIVE: 1
SINUS PAIN: 0
WEAKNESS: 1
PSYCHIATRIC NEGATIVE: 1
CHILLS: 0
PALPITATIONS: 0
EYE DISCHARGE: 0
INSOMNIA: 0
FLANK PAIN: 0
FALLS: 0
MUSCULOSKELETAL NEGATIVE: 1
EYE PAIN: 0
DIZZINESS: 1
DIARRHEA: 1
RESPIRATORY NEGATIVE: 1
ABDOMINAL PAIN: 0
NAUSEA: 1
HEARTBURN: 1
COUGH: 0

## 2022-06-12 ASSESSMENT — COGNITIVE AND FUNCTIONAL STATUS - GENERAL
SUGGESTED CMS G CODE MODIFIER DAILY ACTIVITY: CH
MOBILITY SCORE: 22
WALKING IN HOSPITAL ROOM: A LITTLE
DAILY ACTIVITIY SCORE: 24
SUGGESTED CMS G CODE MODIFIER MOBILITY: CJ
CLIMB 3 TO 5 STEPS WITH RAILING: A LITTLE

## 2022-06-12 ASSESSMENT — LIFESTYLE VARIABLES
AVERAGE NUMBER OF DAYS PER WEEK YOU HAVE A DRINK CONTAINING ALCOHOL: 4
TOTAL SCORE: 0
HAVE YOU EVER FELT YOU SHOULD CUT DOWN ON YOUR DRINKING: NO
ON A TYPICAL DAY WHEN YOU DRINK ALCOHOL HOW MANY DRINKS DO YOU HAVE: 2
DOES PATIENT WANT TO TALK TO SOMEONE ABOUT QUITTING: NO
CONSUMPTION TOTAL: POSITIVE
TOTAL SCORE: 0
TOTAL SCORE: 0
EVER HAD A DRINK FIRST THING IN THE MORNING TO STEADY YOUR NERVES TO GET RID OF A HANGOVER: NO
HOW MANY TIMES IN THE PAST YEAR HAVE YOU HAD 5 OR MORE DRINKS IN A DAY: 0
EVER FELT BAD OR GUILTY ABOUT YOUR DRINKING: NO
DOES PATIENT WANT TO STOP DRINKING: YES
HAVE PEOPLE ANNOYED YOU BY CRITICIZING YOUR DRINKING: NO
ALCOHOL_USE: YES

## 2022-06-12 ASSESSMENT — PAIN DESCRIPTION - PAIN TYPE: TYPE: ACUTE PAIN

## 2022-06-12 ASSESSMENT — FIBROSIS 4 INDEX: FIB4 SCORE: 2.99

## 2022-06-12 NOTE — ASSESSMENT & PLAN NOTE
- Presenting with acute episode of melena and lightheadedness just prior admission, melanic stool today  - EGD ([facility]) during 5/10-5/13/2022 admission 2 mm duodenal bulb angiodysplasia, 2 distal duodenal angiodysplasias, and 2 proximal jejunal angiodysplasias all of which were treated with argon plasma coagulation  - Consideration for angiodysplasias versus Crystal-Payan tear versus peptic ulcer disease  - Holding home Xarelto for PTE and DVT in April, discussing with GI whether scoping will occur, and if not, whether to resume Xarelto or pursue IVC filter placement. Currently with plans to scope, unclear timeline - awaiting Xarelto washout  - IV PPI twice daily  - s/p 2u PRBC transfusion yesterday, continue to trend H/H  - TEG scan unremarkable, no need for additional blood products currently  - GI on board, appreciated   -Planning EGD today

## 2022-06-12 NOTE — ASSESSMENT & PLAN NOTE
History of alcoholism  Admits to having 2 alcoholic beverages prior to admission but reports that she is cut down on alcohol  No signs of withdrawal, monitor with [employer] bag  Labs on a.m.

## 2022-06-12 NOTE — ASSESSMENT & PLAN NOTE
Hold anticoagulation in the setting of acute GI bleed  Monitor  Would need 6 months of anticoagulation from 4/22 given her PTE and DVT seen on imaging at that time - will discuss with GI.

## 2022-06-12 NOTE — H&P
Hospital Medicine History & Physical Note    Date of Service  6/12/2022    Primary Care Physician  Toña Tejada P.A.-C.    Consultants  GI    Code Status  Full Code    Chief Complaint  Chief Complaint   Patient presents with   • Bloody Stools     Dark stool - hx gi bld       History of Presenting Illness  66-year-old alcoholic female with a past medical history of hypertension, COPD, osteoporosis, tobacco abuse, stress induced cardiomyopathy (35 ->55% EF per recent echo), recent admission for PE and LLE DVT on xarelto as well as recent GI evaluation due to anemia and occult positive stool s/p EGD ([facility]) during 5/10-5/13/2022 admission 2 mm duodenal bulb angiodysplasia, 2 distal duodenal angiodysplasias, and 2 proximal jejunal angiodysplasias all of which were treated with argon plasma coagulation and ultimately restarted on anticoagulation present emergency department on 6/12/2022 with melena that started this morning.  Patient reports that she noticed melena this morning and had nausea with one episode of dark emesis shortly after drinking Diet Coke.  Patient denies abdominal pain, hematochezia or hematemesis.  She reports compliance with Xarelto and all meds at home.  She does admit to having 2 cocktails last night but states that she has been cutting down on her drinking.  No orthostatic changes or loss of consciousness.    At the emergency department, vital signs with tachycardia in the 100.  Patient saturating well on room air.  CBC with macrocytic anemia with hemoglobin at 6, no leukocytosis.  Chemistry with elevated BUN and non-anion gap metabolic acidosis with bicarbonate at 17.  INR 3.97 and lipase low.  Chest x-ray with no acute cardiopulmonary abnormalities.  Patient received a 1 L bolus and one-time dose of Zofran and IV Protonix.  EDP discussed case with gastroenterology ([facility]), who will see patient and is in agreement with hospital admission. Patient was admitted for symptomatic anemia secondary to  gastrointestinal bleed which requires bowel rest, cardiac monitoring, blood transfusion, IV antiacid's and gastroenterology evaluation.    I discussed the plan of care with patient and bedside RN.    Review of Systems  Review of Systems   Constitutional: Positive for malaise/fatigue.   HENT: Negative.    Eyes: Negative.    Respiratory: Negative.    Cardiovascular: Negative.    Gastrointestinal: Positive for blood in stool, heartburn, nausea and vomiting. Negative for abdominal pain.   Genitourinary: Negative.    Musculoskeletal: Negative.    Skin: Negative.    Neurological: Positive for weakness.   Endo/Heme/Allergies: Negative.    Psychiatric/Behavioral: Negative.        Past Medical History   has a past medical history of DVT (deep venous thrombosis) (HCC), GI bleed, High cholesterol, Hypertension, Osteoporosis, Pulmonary embolism (HCC), and Smoking.    Surgical History   has a past surgical history that includes us-needle core bx-breast panel (Right); pr upper gi endoscopy,diagnosis (04/21/2022); pr colonoscopy,diagnostic (04/21/2022); pr upper gi endoscopy,biopsy (04/21/2022); pr upper gi endoscopy,ctrl bleed (04/21/2022); pr upper gi endoscopy,ctrl bleed (N/A, 05/11/2022); gastroscopy w/push enterscopy (N/A, 05/11/2022); and hip replacement, partial (Right).     Family History  Family history reviewed with patient. There is no family history that is pertinent to the chief complaint.     Social History   reports that she has been smoking cigarettes. She has been smoking about 0.50 packs per day. She has never used smokeless tobacco. She reports current alcohol use of about 0.6 oz of alcohol per week. She reports previous drug use.    Allergies  No Known Allergies    Medications  Prior to Admission Medications   Prescriptions Last Dose Informant Patient Reported? Taking?   mirtazapine (REMERON) 15 MG Tab 6/11/2022 at 1930 Patient No No   Sig: Take 1 Tablet by mouth every evening.   omeprazole (PRILOSEC) 20 MG  delayed-release capsule 6/12/2022 at 0800 Patient No No   Sig: Take 1 Capsule by mouth every day.   rivaroxaban (XARELTO) 15 MG Tab tablet 6/12/2022 at 0800 Patient Yes No   Sig: Take 15 mg by mouth 2 times a day.   simvastatin (ZOCOR) 20 MG Tab 6/11/2022 at 1930 Patient Yes No   Sig: Take 20 mg by mouth every evening. Indications: Ischemic Heart Disease      Facility-Administered Medications: None       Physical Exam  Temp:  [36.6 °C (97.9 °F)] 36.6 °C (97.9 °F)  Pulse:  [] 99  Resp:  [18] 18  BP: ()/(55-71) 100/55  SpO2:  [99 %-100 %] 99 %  Blood Pressure : 111/64   Temperature: 36.6 °C (97.9 °F)   Pulse: (!) 104   Respiration: 18   Pulse Oximetry: 100 %       Physical Exam  Constitutional:       Appearance: Normal appearance. She is ill-appearing.   HENT:      Head: Normocephalic and atraumatic.      Mouth/Throat:      Mouth: Mucous membranes are dry.   Eyes:      Extraocular Movements: Extraocular movements intact.      Pupils: Pupils are equal, round, and reactive to light.      Comments: Pale conjunctiva   Cardiovascular:      Rate and Rhythm: Normal rate and regular rhythm.      Pulses: Normal pulses.      Heart sounds: Normal heart sounds.   Pulmonary:      Effort: Pulmonary effort is normal.      Breath sounds: Normal breath sounds.   Abdominal:      General: Bowel sounds are normal. There is no distension.      Palpations: Abdomen is soft.      Tenderness: There is no abdominal tenderness. There is no guarding or rebound.   Musculoskeletal:         General: No swelling. Normal range of motion.      Cervical back: Normal range of motion and neck supple.   Skin:     General: Skin is warm.      Coloration: Skin is pale. Skin is not jaundiced.   Neurological:      General: No focal deficit present.      Mental Status: She is alert and oriented to person, place, and time. Mental status is at baseline.      Cranial Nerves: No cranial nerve deficit.   Psychiatric:         Mood and Affect: Mood normal.          Behavior: Behavior normal.         Thought Content: Thought content normal.         Judgment: Judgment normal.         Laboratory:  Recent Labs     06/12/22  1123   WBC 7.1   RBC 1.69*   HEMOGLOBIN 6.0*   HEMATOCRIT 19.2*   .6*   MCH 35.5*   MCHC 31.3*   RDW 69.4*   PLATELETCT 207   MPV 9.7     Recent Labs     06/12/22  1123   SODIUM 139   POTASSIUM 4.0   CHLORIDE 106   CO2 17*   GLUCOSE 119*   BUN 35*   CREATININE 1.03   CALCIUM 8.5     Recent Labs     06/12/22  1123   ALTSGPT 9   ASTSGOT 28   ALKPHOSPHAT 97   TBILIRUBIN 0.4   LIPASE 43   GLUCOSE 119*     Recent Labs     06/12/22  1123   INR 3.97*     No results for input(s): NTPROBNP in the last 72 hours.      No results for input(s): TROPONINT in the last 72 hours.    Imaging:  DX-CHEST-PORTABLE (1 VIEW)   Final Result      No acute cardiopulmonary abnormality.             X-Ray:  I have personally reviewed the images and compared with prior images.    Assessment/Plan:  Justification for Admission Status  I anticipate this patient will require at least two midnights for appropriate medical management, necessitating inpatient admission because of below    * Acute GI bleeding- (present on admission)  Assessment & Plan  Presenting with acute episode of melena and lightheadedness just prior admission  Significant history of GI bleed  EGD ([facility]) during 5/10-5/13/2022 admission 2 mm duodenal bulb angiodysplasia, 2 distal duodenal angiodysplasias, and 2 proximal jejunal angiodysplasias all of which were treated with argon plasma coagulation  Hemoglobin at emergency department at 6  Admits to having 2 cocktails yesterday  DDx: Acute gastritis, peptic ulcer disease, esophageal varices  Admit to telemetry  Hold anticoagulation for now  Clear liquid diet  IV PPI twice daily  Octreotide drip  PRBC transfusion  Iron studies  TEG  Frequent H&H, Prevacid transfusion if hemoglobin less than 7  Right upper quadrant ultrasound  Labs on a.m.    Alcohol abuse- (present  on admission)  Assessment & Plan  History of alcoholism  Admits to having 2 alcoholic beverages prior to admission but reports that she is cut down on alcohol  No signs of withdrawal, monitor  Rally bag  Labs on a.m.    Macrocytic anemia- (present on admission)  Assessment & Plan  Secondary to chronic alcoholism and acute blood loss anemia  Management as per above    Acute deep vein thrombosis (DVT) of left lower extremity (HCC)- (present on admission)  Assessment & Plan  Hold anticoagulation in the setting of acute GI bleed  Monitor      VTE prophylaxis: SCDs/TEDs     Patient is critically ill.   The patient continues to have: Severe symptomatic anemia secondary to gastrointestinal bleed  The vital organ system that is affected is the: GI  If untreated there is a high chance of deterioration into: Hemorrhagic shock and cardiac arrest  And eventually death.   The critical care that I am providing today is: Blood transfusion, monitoring hemoglobin, initiation of octreotide and Protonix drips.  The critical that has been undertaken is medically complex.   There has been no overlap in critical care time.   Critical Care Time not including procedures: 43

## 2022-06-12 NOTE — ASSESSMENT & PLAN NOTE
Secondary to chronic alcoholism and acute blood loss anemia  Management as per above  B12s checked in the past were elevated, will recheck labs  Retic count is appropriate  Check LDH, haptoglobin, fibrinogen

## 2022-06-12 NOTE — ED TRIAGE NOTES
Pt BIB [facility] from home for dark stool and emesis that started this morning. Pt has hx of GI bleed and alcohol abuse. Last drink of alcohol last night. Pt states her drinking has slowed down on account of being in the hospital so frequently. Pt A&Ox4. Requires walker to ambulate.    Takes xarleto for PE/DVT within past 3 months or so but pt is a poor historian.

## 2022-06-12 NOTE — ED NOTES
Med rec updated and complete, per pt   Allergies reviewed, per pt   Called [facility] @ [phone] to verify pts XARELTO strength, per pharmacy pt picked up XARELTO 15MG 1 tablet BID for 21 days on 4/22/2022. Per pharmacy reports that she has not picked up her 20MG XARELTO.  Called [facility] pharmacy @ [phone] per pharmacy reports that pts nurse picked up her XARELTO started packed on 4/22/2022.  Asked pt about her XARELTO, pt reports that she was discharged on 5/13/2022 and started her starter pack of XARELTO and is now on 20MG once a day

## 2022-06-12 NOTE — CONSULTS
Gastroenterology Initial Consult Note               Author:  JERSON Grigsby Date & Time Created: 6/12/2022 3:26 PM       Patient ID:  Name:             Jaymie Peacock  YOB: 1955  Age:                 66 y.o.  female  MRN:               [id number]      Referring Provider:  Parker Sellers MD      Presenting Chief Complaint:  Dark stools, Anemia      History of Present Illness:    She is a 66-year-old female patient seen in consultation for dark stools and anemia.  The patient presented to the hospital earlier today with complaints of dark black appearing stools which started this morning.  She also reports 1 episode of dark emesis shortly after drinking Diet Coke with some associated nausea.  She denies NSAID use.  She has a past medical history that includes pulmonary embolism and left lower extremity DVT currently on Eliquis last dose today, COPD, tobacco abuse, alcohol abuse last drink yesterday, stress-induced cardiomyopathy.  The patient has undergone evaluation for recurrent melena and anemia withEGD on 4/21/2022 by Dr. Rell Rivas with findings of 2 duodenal AVMs and mild erosive gastritis.  AVMs were treated with APC.  Colonoscopy was normal.  She then had another admission on May 10th 2022 with similar GI bleeding suspected.  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  No active bleeding was seen on any of her procedures.    On admission patient noted to have hemoglobin 6, hematocrit 19.2, .6, platelets are normal.  CMP with CO2 17, glucose 119, BUN 35, creatinine 1.03.  Magnesium 1.3, INR 3.97.  It is charted that patient stool occult blood positive in ER.      Review of Systems:  Review of Systems   Constitutional: Positive for malaise/fatigue. Negative for chills and fever.   HENT: Negative for congestion and sinus pain.    Eyes: Negative for pain and discharge.   Respiratory:  Negative for cough and wheezing.    Cardiovascular: Negative for chest pain and palpitations.   Gastrointestinal: Positive for diarrhea, melena, nausea and vomiting.   Genitourinary: Negative for flank pain and hematuria.   Musculoskeletal: Negative for falls and joint pain.   Skin: Negative for itching and rash.   Neurological: Positive for dizziness and weakness.   Psychiatric/Behavioral: Negative for memory loss. The patient does not have insomnia.    All other systems reviewed and are negative.            Past Medical History:  Past Medical History:   Diagnosis Date   • DVT (deep venous thrombosis) (HCC)    • GI bleed    • High cholesterol    • Hypertension    • Osteoporosis    • Pulmonary embolism (HCC)    • Smoking      Active Hospital Problems    Diagnosis    • Acute GI bleeding [K92.2]    • Macrocytic anemia [D53.9]    • Acute deep vein thrombosis (DVT) of left lower extremity (HCC) [I82.402]    • Alcohol abuse [F10.10]          Past Surgical History:  Past Surgical History:   Procedure Laterality Date   • MI UPPER GI ENDOSCOPY,CTRL BLEED N/A 05/11/2022    Procedure: GASTROSCOPY, WITH ARGON PLASMA COAGULATION;  Surgeon: Yariel Pagan M.D.;  Location: SURGERY SAME DAY [facility];  Service: Gastroenterology   • GASTROSCOPY W/PUSH ENTERSCOPY N/A 05/11/2022    Procedure: GASTROSCOPY, WITH PUSH ENTEROSCOPY;  Surgeon: Yariel Pagan M.D.;  Location: SURGERY SAME DAY [facility];  Service: Gastroenterology   • MI UPPER GI ENDOSCOPY,DIAGNOSIS  04/21/2022    Procedure: GASTROSCOPY;  Surgeon: Rell Rivas M.D.;  Location: SURGERY SAME [facility];  Service: Gastroenterology   • MI COLONOSCOPY,DIAGNOSTIC  04/21/2022    Procedure: COLONOSCOPY;  Surgeon: Rell Rivas M.D.;  Location: SURGERY SAME DAY [facility];  Service: Gastroenterology   • MI UPPER GI ENDOSCOPY,BIOPSY  04/21/2022    Procedure: GASTROSCOPY, WITH BIOPSY;  Surgeon: Rell Rivas M.D.;  Location: SURGERY SAME DAY [facility];  Service:  Gastroenterology   • AL UPPER GI ENDOSCOPY,CTRL BLEED  04/21/2022    Procedure: GASTROSCOPY, WITH ARGON PLASMA COAGULATION;  Surgeon: Rell Rivas M.D.;  Location: SURGERY SAME DAY [facility];  Service: Gastroenterology   • HIP REPLACEMENT, PARTIAL Right    • US-NEEDLE CORE BX-BREAST PANEL Right            Hospital Medications:  Current Facility-Administered Medications   Medication Dose Frequency Provider Last Rate Last Admin   • mirtazapine (Remeron) orally disintegrating tab 15 mg  15 mg Nightly Parkergeovanna Sellers M.D.       • simvastatin (ZOCOR) tablet 20 mg  20 mg Q EVENING Parkeraspen Sellers M.D.       • pantoprazole (Protonix) injection 40 mg  40 mg BID Parker Sellers M.D.       • [Held by provider] rivaroxaban (XARELTO) tablet 20 mg  20 mg PM MEAL Parker Sellers M.D.       • senna-docusate (PERICOLACE or SENOKOT S) 8.6-50 MG per tablet 2 Tablet  2 Tablet BID Parker Sellers M.D.        And   • polyethylene glycol/lytes (MIRALAX) PACKET 1 Packet  1 Packet QDAY PRN Parker Sellers M.D.        And   • magnesium hydroxide (MILK OF MAGNESIA) suspension 30 mL  30 mL QDAY PRN Parker Sellers M.D.        And   • bisacodyl (DULCOLAX) suppository 10 mg  10 mg QDAY PRN Parker Sellers M.D.       • lactated ringers infusion   Continuous Parker Sellers M.D.       • acetaminophen (Tylenol) tablet 650 mg  650 mg Q6HRS PRN Parker Sellers M.D.       • hydrALAZINE (APRESOLINE) injection 10 mg  10 mg Q4HRS PRN Parker Sellers M.D.       • ondansetron (ZOFRAN) syringe/vial injection 4 mg  4 mg Q4HRS PRN Parker Sellers M.D.       • ondansetron (ZOFRAN ODT) dispertab 4 mg  4 mg Q4HRS PRN Parker Sellers M.D.       • octreotide (SANDOSTATIN) 1,250 mcg in  mL Infusion  50 mcg/hr Continuous Parker Sellers M.D.       Last reviewed on 6/12/2022  2:23 PM by Soni Salazar,  PhT        Current Outpatient Medications:  Medications Prior to Admission   Medication Sig Dispense Refill Last Dose   • Rivaroxaban (XARELTO STARTER PACK) 15 & 20 MG Tablet Therapy Pack Take 15-20 mg by mouth see administration instructions. Pt started on 5/13/2022 15MG BID for 21 day course, and is now on 20MG and has 9 tablets left   6/12/2022 at 0800   • omeprazole (PRILOSEC) 20 MG delayed-release capsule Take 1 Capsule by mouth every day. 30 Capsule 0 6/12/2022 at 0800   • mirtazapine (REMERON) 15 MG Tab Take 1 Tablet by mouth every evening. 30 Tablet 0 6/11/2022 at 1930   • simvastatin (ZOCOR) 20 MG Tab Take 20 mg by mouth every evening. Indications: Ischemic Heart Disease   6/11/2022 at 1930         Medication Allergies:  No Known Allergies      Family Medical History:  History reviewed. No pertinent family history.      Social History:  Social History     Socioeconomic History   • Marital status: Unknown     Spouse name: Not on file   • Number of children: Not on file   • Years of education: Not on file   • Highest education level: Not on file   Occupational History   • Not on file   Tobacco Use   • Smoking status: Current Every Day Smoker     Packs/day: 0.50     Types: Cigarettes   • Smokeless tobacco: Never Used   Vaping Use   • Vaping Use: Never used   Substance and Sexual Activity   • Alcohol use: Yes     Alcohol/week: 0.6 oz     Types: 1 Shots of liquor per week   • Drug use: Not Currently   • Sexual activity: Not on file   Other Topics Concern   • Not on file   Social History Narrative   • Not on file     Social Determinants of Health     Financial Resource Strain: Not on file   Food Insecurity: Not on file   Transportation Needs: Not on file   Physical Activity: Not on file   Stress: Not on file   Social Connections: Not on file   Intimate Partner Violence: Not on file   Housing Stability: Not on file         Vital signs:  Weight/BMI: Body mass index is 16.14 kg/m².  /55   Pulse 99   Temp  "36.6 °C (97.9 °F) (Temporal)   Resp 18   Ht 1.676 m (5' 6\")   Wt 45.4 kg (100 lb)   SpO2 99%   Vitals:    06/12/22 1249 06/12/22 1250 06/12/22 1318 06/12/22 1448   BP: 109/66  (!) 95/65 100/55   Pulse:  100 94 99   Resp:       Temp:       TempSrc:       SpO2:  99% 99% 99%   Weight:       Height:         Oxygen Therapy:  Pulse Oximetry: 99 %, O2 Delivery Device: None - Room Air  No intake or output data in the 24 hours ending 06/12/22 1526      Physical Exam:  Physical Exam  Vitals and nursing note reviewed.   Constitutional:       Appearance: Normal appearance.   HENT:      Head: Normocephalic and atraumatic.      Right Ear: External ear normal.      Left Ear: External ear normal.      Nose: Nose normal. No congestion.      Mouth/Throat:      Mouth: Mucous membranes are moist.      Pharynx: Oropharynx is clear.   Eyes:      General: No scleral icterus.     Extraocular Movements: Extraocular movements intact.      Pupils: Pupils are equal, round, and reactive to light.   Cardiovascular:      Rate and Rhythm: Normal rate and regular rhythm.      Pulses: Normal pulses.      Heart sounds: Normal heart sounds. No murmur heard.  Pulmonary:      Effort: Pulmonary effort is normal. No respiratory distress.      Breath sounds: Normal breath sounds.   Abdominal:      General: Abdomen is flat. Bowel sounds are normal. There is no distension.      Palpations: Abdomen is soft.      Tenderness: There is no abdominal tenderness.   Musculoskeletal:      Cervical back: Neck supple.      Right lower leg: No edema.      Left lower leg: No edema.   Lymphadenopathy:      Cervical: No cervical adenopathy.   Skin:     General: Skin is warm and dry.      Coloration: Skin is pale. Skin is not jaundiced.   Neurological:      General: No focal deficit present.      Mental Status: She is alert and oriented to person, place, and time.   Psychiatric:         Thought Content: Thought content normal.         Judgment: Judgment normal. "           Labs:  Recent Labs     06/12/22  1123 06/12/22  1207   SODIUM 139  --    POTASSIUM 4.0  --    CHLORIDE 106  --    CO2 17*  --    BUN 35*  --    CREATININE 1.03  --    MAGNESIUM  --  1.3*   CALCIUM 8.5  --      Recent Labs     06/12/22  1123   ALTSGPT 9   ASTSGOT 28   ALKPHOSPHAT 97   TBILIRUBIN 0.4   LIPASE 43   GLUCOSE 119*     Recent Labs     06/12/22  1123   WBC 7.1   NEUTSPOLYS 82.90*   LYMPHOCYTES 8.90*   MONOCYTES 7.20   EOSINOPHILS 0.00   BASOPHILS 0.60   ASTSGOT 28   ALTSGPT 9   ALKPHOSPHAT 97   TBILIRUBIN 0.4     Recent Labs     06/12/22  1123   RBC 1.69*   HEMOGLOBIN 6.0*   HEMATOCRIT 19.2*   PLATELETCT 207   PROTHROMBTM 36.1*   INR 3.97*     Recent Results (from the past 24 hour(s))   CBC WITH DIFFERENTIAL    Collection Time: 06/12/22 11:23 AM   Result Value Ref Range    WBC 7.1 4.8 - 10.8 K/uL    RBC 1.69 (L) 4.20 - 5.40 M/uL    Hemoglobin 6.0 (L) 12.0 - 16.0 g/dL    Hematocrit 19.2 (L) 37.0 - 47.0 %    .6 (H) 81.4 - 97.8 fL    MCH 35.5 (H) 27.0 - 33.0 pg    MCHC 31.3 (L) 33.6 - 35.0 g/dL    RDW 69.4 (H) 35.9 - 50.0 fL    Platelet Count 207 164 - 446 K/uL    MPV 9.7 9.0 - 12.9 fL    Neutrophils-Polys 82.90 (H) 44.00 - 72.00 %    Lymphocytes 8.90 (L) 22.00 - 41.00 %    Monocytes 7.20 0.00 - 13.40 %    Eosinophils 0.00 0.00 - 6.90 %    Basophils 0.60 0.00 - 1.80 %    Immature Granulocytes 0.40 0.00 - 0.90 %    Nucleated RBC 0.00 /100 WBC    Neutrophils (Absolute) 5.90 2.00 - 7.15 K/uL    Lymphs (Absolute) 0.63 (L) 1.00 - 4.80 K/uL    Monos (Absolute) 0.51 0.00 - 0.85 K/uL    Eos (Absolute) 0.00 0.00 - 0.51 K/uL    Baso (Absolute) 0.04 0.00 - 0.12 K/uL    Immature Granulocytes (abs) 0.03 0.00 - 0.11 K/uL    NRBC (Absolute) 0.00 K/uL   COMP METABOLIC PANEL    Collection Time: 06/12/22 11:23 AM   Result Value Ref Range    Sodium 139 135 - 145 mmol/L    Potassium 4.0 3.6 - 5.5 mmol/L    Chloride 106 96 - 112 mmol/L    Co2 17 (L) 20 - 33 mmol/L    Anion Gap 16.0 7.0 - 16.0    Glucose 119 (H) 65  - 99 mg/dL    Bun 35 (H) 8 - 22 mg/dL    Creatinine 1.03 0.50 - 1.40 mg/dL    Calcium 8.5 8.4 - 10.2 mg/dL    AST(SGOT) 28 12 - 45 U/L    ALT(SGPT) 9 2 - 50 U/L    Alkaline Phosphatase 97 30 - 99 U/L    Total Bilirubin 0.4 0.1 - 1.5 mg/dL    Albumin 3.3 3.2 - 4.9 g/dL    Total Protein 5.4 (L) 6.0 - 8.2 g/dL    Globulin 2.1 1.9 - 3.5 g/dL    A-G Ratio 1.6 g/dL   LIPASE    Collection Time: 06/12/22 11:23 AM   Result Value Ref Range    Lipase 43 7 - 58 U/L   PROTHROMBIN TIME (INR)    Collection Time: 06/12/22 11:23 AM   Result Value Ref Range    PT 36.1 (H) 12.0 - 14.6 sec    INR 3.97 (H) 0.87 - 1.13   COD (ADULT)    Collection Time: 06/12/22 11:23 AM   Result Value Ref Range    ABO Grouping Only A     Rh Grouping Only POS     Antibody Screen-Cod NEG     Component R       R99                 Red Cells, LR       [id number]   selected     06/12/22   14:14      Product Type R99     Dispense Status selected     Unit Number (Barcoded) [id number]     Product Code (Barcoded) C5355M41     Blood Type (Barcoded) 6200    ESTIMATED GFR    Collection Time: 06/12/22 11:23 AM   Result Value Ref Range    GFR (CKD-EPI) 59 (A) >60 mL/min/1.73 m 2   DIFFERENTIAL COMMENT    Collection Time: 06/12/22 11:23 AM   Result Value Ref Range    Comments-Diff see below    MAGNESIUM    Collection Time: 06/12/22 12:07 PM   Result Value Ref Range    Magnesium 1.3 (L) 1.5 - 2.5 mg/dL   RETICULOCYTES COUNT    Collection Time: 06/12/22 12:07 PM   Result Value Ref Range    Reticulocyte Count 5.6 (H) 0.8 - 2.1 %    Retic, Absolute 0.09 (H) 0.04 - 0.06 M/uL    Imm. Reticulocyte Fraction 42.1 (H) 9.3 - 17.4 %    Retic Hgb Equivalent 41.2 (H) 29.0 - 35.0 pg/cell         Radiology Review:  DX-CHEST-PORTABLE (1 VIEW)   Final Result      No acute cardiopulmonary abnormality.         US-RUQ    (Results Pending)         MDM (Data Review):   -Records reviewed and summarized in current documentation  -I personally reviewed and interpreted the laboratory  results  -I personally reviewed the radiology images        Medical Decision Making, by Problem:  Active Hospital Problems    Diagnosis    • Acute GI bleeding [K92.2]    • Macrocytic anemia [D53.9]    • Acute deep vein thrombosis (DVT) of left lower extremity (HCC) [I82.402]    • Alcohol abuse [F10.10]            Assessment/Recommendations:  66-year-old female patient with onset of dark stools and 1 episode of dark emesis occurring today, Hemoccult positive in the emergency room.  She does have acute on chronic anemia where her normal hemoglobin resides anywhere from 7.5-10.  Currently hemoglobin is at 6.  She does take Xarelto for history of DVT and PE.  She also continues to drink alcohol despite being told in the past to stop.  No history of cirrhosis and her  liver enzymes are currently normal.  Platelets are also normal lending against cirrhosis diagnosis.  Right upper quadrant ultrasound is pending ordered by hospitalist.  She has had multiple endoscopies in the last 2 months for evaluation and treatment of a few angiodysplasias nonbleeding.  Consider bleeding from angiodysplasias versus Crystal-Payan tear versus peptic ulcer versus other bleeding lesion less likely.  She more recently took her Xarelto and INR is elevated.    Assessment:  -Dark stools, possible melena  -Possible hematemesis  -Acute on chronic macrocytic anemia  -Hemoccult positive stool  -Chronic anticoagulation with Xarelto  -History of PE and DVT  -Alcohol abuse    Plan:  -Protonix 40 mg twice daily  -Clear liquid diet no red liquids  -Considering patient has had endoscopic evaluation for similar symptoms twice in the last 2 months without significant bleeding finding, will tentatively hold on EGD at this time.  Additionally, patient is not optimized for endoscopic evaluation with recency of her Xarelto as well as elevated INR.  Recommend holding Xarelto and correcting INR to less than 1.5 if possible, although TEG would also be  helpful.  -Alcohol cessation  discussed with the patient again  -Await right upper quadrant ultrasound.  No history of varices.  -Discontinue octreotide, this is not needed  -IV vitamin K 10 mg one-time and recheck INR tomorrow  -Earliest EGD possibility if needed likely on Wednesday due to Xarelto following ASGE guidelines    Dr. Claudio Geurrier and Ana Mcleod, APRN will take over rounding for our group tomorrow    NILTON Grigsby.      Thank you for inviting me to participate in the care of this patient. Please do not hesitate to call GI consultants with additional questions/concerns or changes in the patient's clinical status at [phone].      Core Quality Measures   Reviewed items:  Labs, Medications and Radiology reports reviewed

## 2022-06-12 NOTE — ED PROVIDER NOTES
ED Provider Note  CHIEF COMPLAINT  Chief Complaint   Patient presents with   • Bloody Stools     Dark stool - hx gi bld       HPI  Jaymie Peacock is a 66 y.o. female who presents with black stools.  Patient has a history of GI bleeds in the past.  Patient is on Xarelto for PE.  Patient also is a alcoholic.  She reports drinking a few cocktails yesterday.  She denies any current abdominal pain.  She has had some nausea and one episode of vomiting which she describes as dark.  She states she drank a diet Pepsi earlier today and thought maybe that is why it was dark.  Then she had a episode of black stools.  She reports feeling dizzy.  No syncope.  No chest pain or shortness of breath.  On her last admission about a month ago patient had gastroantral erosions.  Denies recent fevers.  No dysuria.    REVIEW OF SYSTEMS  See HPI for further details. All other systems are negative.     PAST MEDICAL HISTORY  Past Medical History:   Diagnosis Date   • DVT (deep venous thrombosis) (HCC)    • GI bleed    • High cholesterol    • Hypertension    • Osteoporosis    • Pulmonary embolism (HCC)    • Smoking        FAMILY HISTORY  [unfilled]    SOCIAL HISTORY  Social History     Socioeconomic History   • Marital status: Unknown   Tobacco Use   • Smoking status: Current Every Day Smoker     Packs/day: 0.50     Types: Cigarettes   • Smokeless tobacco: Never Used   Vaping Use   • Vaping Use: Never used   Substance and Sexual Activity   • Alcohol use: Yes     Alcohol/week: 0.6 oz     Types: 1 Shots of liquor per week   • Drug use: Not Currently       SURGICAL HISTORY  Past Surgical History:   Procedure Laterality Date   • MT UPPER GI ENDOSCOPY,CTRL BLEED N/A 05/11/2022    Procedure: GASTROSCOPY, WITH ARGON PLASMA COAGULATION;  Surgeon: Yariel Pagan M.D.;  Location: SURGERY SAME DAY [facility];  Service: Gastroenterology   • GASTROSCOPY W/PUSH ENTERSCOPY N/A 05/11/2022    Procedure: GASTROSCOPY, WITH PUSH ENTEROSCOPY;  Surgeon: Yariel TUCKER  "ROBIN Pagan;  Location: SURGERY SAME DAY [facility];  Service: Gastroenterology   • NC UPPER GI ENDOSCOPY,DIAGNOSIS  04/21/2022    Procedure: GASTROSCOPY;  Surgeon: Rell Rivas M.D.;  Location: SURGERY SAME DAY [facility];  Service: Gastroenterology   • NC COLONOSCOPY,DIAGNOSTIC  04/21/2022    Procedure: COLONOSCOPY;  Surgeon: Rell Rivas M.D.;  Location: SURGERY SAME DAY [facility];  Service: Gastroenterology   • NC UPPER GI ENDOSCOPY,BIOPSY  04/21/2022    Procedure: GASTROSCOPY, WITH BIOPSY;  Surgeon: Rell Rivas M.D.;  Location: SURGERY SAME DAY [facility];  Service: Gastroenterology   • NC UPPER GI ENDOSCOPY,CTRL BLEED  04/21/2022    Procedure: GASTROSCOPY, WITH ARGON PLASMA COAGULATION;  Surgeon: Rell Rivas M.D.;  Location: SURGERY SAME DAY [facility];  Service: Gastroenterology   • HIP REPLACEMENT, PARTIAL Right    • US-NEEDLE CORE BX-BREAST PANEL Right        CURRENT MEDICATIONS   Home Medications     Reviewed by Maria Elena Saenz R.N. (Registered Nurse) on 06/12/22 at 1117  Med List Status: Not Addressed   Medication Last Dose Status   mirtazapine (REMERON) 15 MG Tab  Active   omeprazole (PRILOSEC) 20 MG delayed-release capsule  Active   rivaroxaban (XARELTO) 15 MG Tab tablet  Active   simvastatin (ZOCOR) 20 MG Tab  Active                ALLERGIES  No Known Allergies    PHYSICAL EXAM  VITAL SIGNS: /64   Pulse (!) 104   Temp 36.6 °C (97.9 °F) (Temporal)   Resp 18   Ht 1.676 m (5' 6\")   Wt 45.4 kg (100 lb)   SpO2 100%   BMI 16.14 kg/m²       Constitutional:  Well developed, No acute distress, Non-toxic appearance.   HENT: Normocephalic, Atraumatic, Bilateral external ears normal,  Nose normal.   Eyes: PERRL, EOMI, Conjunctiva normal  Neck: Normal range of motion, No tenderness, Supple  Cardiovascular: tachycardic heart rate, Normal rhythm  Thorax & Lungs: Normal breath sounds, No respiratory distress,  Abdomen: Benign abdominal exam, no guarding no rebound, no " tenderness, no distention  Rectal: Black stools, heme positive  Skin: Warm, Dry, No erythema, multiple old bruises across chest and extremities  Back: No tenderness, No CVA tenderness.   Extremities: Intact distal pulses, No edema, No tenderness   Neurologic: Alert & oriented x 3, Normal motor function, Normal sensory function, No focal deficits noted.   Psychiatric: appropriate    Labs  Results for orders placed or performed during the hospital encounter of 06/12/22   CBC WITH DIFFERENTIAL   Result Value Ref Range    WBC 7.1 4.8 - 10.8 K/uL    RBC 1.69 (L) 4.20 - 5.40 M/uL    Hemoglobin 6.0 (L) 12.0 - 16.0 g/dL    Hematocrit 19.2 (L) 37.0 - 47.0 %    .6 (H) 81.4 - 97.8 fL    MCH 35.5 (H) 27.0 - 33.0 pg    MCHC 31.3 (L) 33.6 - 35.0 g/dL    RDW 69.4 (H) 35.9 - 50.0 fL    Platelet Count 207 164 - 446 K/uL    MPV 9.7 9.0 - 12.9 fL    Neutrophils-Polys 82.90 (H) 44.00 - 72.00 %    Lymphocytes 8.90 (L) 22.00 - 41.00 %    Monocytes 7.20 0.00 - 13.40 %    Eosinophils 0.00 0.00 - 6.90 %    Basophils 0.60 0.00 - 1.80 %    Immature Granulocytes 0.40 0.00 - 0.90 %    Nucleated RBC 0.00 /100 WBC    Neutrophils (Absolute) 5.90 2.00 - 7.15 K/uL    Lymphs (Absolute) 0.63 (L) 1.00 - 4.80 K/uL    Monos (Absolute) 0.51 0.00 - 0.85 K/uL    Eos (Absolute) 0.00 0.00 - 0.51 K/uL    Baso (Absolute) 0.04 0.00 - 0.12 K/uL    Immature Granulocytes (abs) 0.03 0.00 - 0.11 K/uL    NRBC (Absolute) 0.00 K/uL   COMP METABOLIC PANEL   Result Value Ref Range    Sodium 139 135 - 145 mmol/L    Potassium 4.0 3.6 - 5.5 mmol/L    Chloride 106 96 - 112 mmol/L    Co2 17 (L) 20 - 33 mmol/L    Anion Gap 16.0 7.0 - 16.0    Glucose 119 (H) 65 - 99 mg/dL    Bun 35 (H) 8 - 22 mg/dL    Creatinine 1.03 0.50 - 1.40 mg/dL    Calcium 8.5 8.4 - 10.2 mg/dL    AST(SGOT) 28 12 - 45 U/L    ALT(SGPT) 9 2 - 50 U/L    Alkaline Phosphatase 97 30 - 99 U/L    Total Bilirubin 0.4 0.1 - 1.5 mg/dL    Albumin 3.3 3.2 - 4.9 g/dL    Total Protein 5.4 (L) 6.0 - 8.2 g/dL     Globulin 2.1 1.9 - 3.5 g/dL    A-G Ratio 1.6 g/dL   LIPASE   Result Value Ref Range    Lipase 43 7 - 58 U/L   PROTHROMBIN TIME (INR)   Result Value Ref Range    PT 36.1 (H) 12.0 - 14.6 sec    INR 3.97 (H) 0.87 - 1.13   COD (ADULT)   Result Value Ref Range    ABO Grouping Only A     Rh Grouping Only POS     Antibody Screen-Cod NEG    ESTIMATED GFR   Result Value Ref Range    GFR (CKD-EPI) 59 (A) >60 mL/min/1.73 m 2   DIFFERENTIAL COMMENT   Result Value Ref Range    Comments-Diff see below        COURSE & MEDICAL DECISION MAKING  Pertinent Labs & Imaging studies reviewed. (See chart for details)  Patient presents to the emergency department with black stools.  Patient is on Xarelto for history of PE.  Patient has a history of upper GI bleeds in the past.  Was hospitalized in May for similar presentation.  Patient is also an alcoholic.  Patient's blood pressure is slightly low at 90/60.  Rectal exam revealed black stools.  Patient be placed on Protonix.  Will consult GI.    Patient's blood pressure improved with fluids.  She still is slightly tachycardic at 104.  Hemoglobin is 6.  She also has an elevated INR of 3.97.  She is on Xarelto.  BUN is slightly elevated at 35.  CO2 is 17.  Platelets are normal.    GI has been consulted.    Discussed the case with Dr. Garcia who will hold on the patient.  She has no plans for emergent endoscopy.  Blood pressure has improved with IV fluids.    CRITICAL CARE  The very real possibilty of a deterioration of this patient's condition required the highest level of my preparedness for sudden, emergent intervention.  I provided critical care services, which included medication orders, frequent reevaluations of the patient's condition and response to treatment, ordering and reviewing test results, and discussing the case with various consultants.  The critical care time associated with the care of the patient was 35 minutes. Review chart for interventions. This time is exclusive of  any other billable procedures.     FINAL IMPRESSION     1. Upper GI bleed    2. Anemia, unspecified type    3. Alcohol abuse    4. Anticoagulated             Electronically signed by: Autumn Kenney M.D., 6/12/2022 11:37 AM

## 2022-06-13 ENCOUNTER — APPOINTMENT (OUTPATIENT)
Dept: RADIOLOGY | Facility: MEDICAL CENTER | Age: 67
DRG: 378 | End: 2022-06-13
Attending: STUDENT IN AN ORGANIZED HEALTH CARE EDUCATION/TRAINING PROGRAM
Payer: MEDICARE

## 2022-06-13 PROBLEM — E04.2 MULTIPLE THYROID NODULES: Status: ACTIVE | Noted: 2022-06-13

## 2022-06-13 LAB
ALBUMIN SERPL BCP-MCNC: 2.6 G/DL (ref 3.2–4.9)
ALBUMIN/GLOB SERPL: 1.4 G/DL
ALP SERPL-CCNC: 73 U/L (ref 30–99)
ALT SERPL-CCNC: 7 U/L (ref 2–50)
ANION GAP SERPL CALC-SCNC: 10 MMOL/L (ref 7–16)
AST SERPL-CCNC: 23 U/L (ref 12–45)
BASOPHILS # BLD AUTO: 0.9 % (ref 0–1.8)
BASOPHILS # BLD: 0.04 K/UL (ref 0–0.12)
BILIRUB SERPL-MCNC: 0.8 MG/DL (ref 0.1–1.5)
BUN SERPL-MCNC: 23 MG/DL (ref 8–22)
CALCIUM SERPL-MCNC: 8 MG/DL (ref 8.4–10.2)
CFT BLD TEG: 4.9 MIN (ref 4.6–9.1)
CFT BLD TEG: 5.2 MIN (ref 4.6–9.1)
CFT P HPASE BLD TEG: 4.7 MIN (ref 4.3–8.3)
CFT P HPASE BLD TEG: 5.4 MIN (ref 4.3–8.3)
CHLORIDE SERPL-SCNC: 107 MMOL/L (ref 96–112)
CLOT ANGLE BLD TEG: 76.1 DEGREES (ref 63–78)
CLOT ANGLE BLD TEG: 76.3 DEGREES (ref 63–78)
CLOT LYSIS 30M P MA LENFR BLD TEG: 0.5 % (ref 0–2.6)
CO2 SERPL-SCNC: 19 MMOL/L (ref 20–33)
CREAT SERPL-MCNC: 0.84 MG/DL (ref 0.5–1.4)
CT.EXTRINSIC BLD ROTEM: 0.9 MIN (ref 0.8–2.1)
CT.EXTRINSIC BLD ROTEM: 1 MIN (ref 0.8–2.1)
EOSINOPHIL # BLD AUTO: 0.04 K/UL (ref 0–0.51)
EOSINOPHIL NFR BLD: 0.9 % (ref 0–6.9)
ERYTHROCYTE [DISTWIDTH] IN BLOOD BY AUTOMATED COUNT: 81.2 FL (ref 35.9–50)
GFR SERPLBLD CREATININE-BSD FMLA CKD-EPI: 76 ML/MIN/1.73 M 2
GLOBULIN SER CALC-MCNC: 1.8 G/DL (ref 1.9–3.5)
GLUCOSE SERPL-MCNC: 72 MG/DL (ref 65–99)
HCT VFR BLD AUTO: 24.5 % (ref 37–47)
HGB BLD-MCNC: 10 G/DL (ref 12–16)
HGB BLD-MCNC: 8.1 G/DL (ref 12–16)
HGB BLD-MCNC: 9.3 G/DL (ref 12–16)
IMM GRANULOCYTES # BLD AUTO: 0.02 K/UL (ref 0–0.11)
IMM GRANULOCYTES NFR BLD AUTO: 0.5 % (ref 0–0.9)
INR PPP: 1.63 (ref 0.87–1.13)
IRON SATN MFR SERPL: 78 % (ref 15–55)
IRON SERPL-MCNC: 183 UG/DL (ref 40–170)
LDH SERPL L TO P-CCNC: 185 U/L (ref 107–266)
LYMPHOCYTES # BLD AUTO: 1.14 K/UL (ref 1–4.8)
LYMPHOCYTES NFR BLD: 26.5 % (ref 22–41)
MAGNESIUM SERPL-MCNC: 1.2 MG/DL (ref 1.5–2.5)
MCF BLD TEG: 59.4 MM (ref 52–69)
MCF BLD TEG: 60.8 MM (ref 52–69)
MCF.PLATELET INHIB BLD ROTEM: 20.8 MM (ref 15–32)
MCF.PLATELET INHIB BLD ROTEM: 21.9 MM (ref 15–32)
MCH RBC QN AUTO: 32.4 PG (ref 27–33)
MCHC RBC AUTO-ENTMCNC: 33.1 G/DL (ref 33.6–35)
MCV RBC AUTO: 98 FL (ref 81.4–97.8)
MONOCYTES # BLD AUTO: 0.37 K/UL (ref 0–0.85)
MONOCYTES NFR BLD AUTO: 8.6 % (ref 0–13.4)
NEUTROPHILS # BLD AUTO: 2.69 K/UL (ref 2–7.15)
NEUTROPHILS NFR BLD: 62.6 % (ref 44–72)
NRBC # BLD AUTO: 0 K/UL
NRBC BLD-RTO: 0 /100 WBC
PA AA BLD-ACNC: 30.6 % (ref 0–11)
PA ADP BLD-ACNC: 13.2 % (ref 0–17)
PLATELET # BLD AUTO: 164 K/UL (ref 164–446)
PMV BLD AUTO: 9.4 FL (ref 9–12.9)
POTASSIUM SERPL-SCNC: 3.5 MMOL/L (ref 3.6–5.5)
PROT SERPL-MCNC: 4.4 G/DL (ref 6–8.2)
PROTHROMBIN TIME: 18.1 SEC (ref 12–14.6)
RBC # BLD AUTO: 2.5 M/UL (ref 4.2–5.4)
SODIUM SERPL-SCNC: 136 MMOL/L (ref 135–145)
TEG ALGORITHM TGALG: ABNORMAL
TEG ALGORITHM TGALG: NORMAL
TIBC SERPL-MCNC: 234 UG/DL (ref 250–450)
UIBC SERPL-MCNC: 51 UG/DL (ref 110–370)
WBC # BLD AUTO: 4.3 K/UL (ref 4.8–10.8)

## 2022-06-13 PROCEDURE — 700105 HCHG RX REV CODE 258: Performed by: STUDENT IN AN ORGANIZED HEALTH CARE EDUCATION/TRAINING PROGRAM

## 2022-06-13 PROCEDURE — 85025 COMPLETE CBC W/AUTO DIFF WBC: CPT

## 2022-06-13 PROCEDURE — 700111 HCHG RX REV CODE 636 W/ 250 OVERRIDE (IP): Performed by: FAMILY MEDICINE

## 2022-06-13 PROCEDURE — 82607 VITAMIN B-12: CPT

## 2022-06-13 PROCEDURE — 83615 LACTATE (LD) (LDH) ENZYME: CPT

## 2022-06-13 PROCEDURE — 80053 COMPREHEN METABOLIC PANEL: CPT

## 2022-06-13 PROCEDURE — 85018 HEMOGLOBIN: CPT | Mod: 91

## 2022-06-13 PROCEDURE — 85610 PROTHROMBIN TIME: CPT

## 2022-06-13 PROCEDURE — 770020 HCHG ROOM/CARE - TELE (206)

## 2022-06-13 PROCEDURE — 85576 BLOOD PLATELET AGGREGATION: CPT | Mod: 91

## 2022-06-13 PROCEDURE — 700101 HCHG RX REV CODE 250: Performed by: FAMILY MEDICINE

## 2022-06-13 PROCEDURE — 700102 HCHG RX REV CODE 250 W/ 637 OVERRIDE(OP): Performed by: STUDENT IN AN ORGANIZED HEALTH CARE EDUCATION/TRAINING PROGRAM

## 2022-06-13 PROCEDURE — 83010 ASSAY OF HAPTOGLOBIN QUANT: CPT

## 2022-06-13 PROCEDURE — C9113 INJ PANTOPRAZOLE SODIUM, VIA: HCPCS | Performed by: STUDENT IN AN ORGANIZED HEALTH CARE EDUCATION/TRAINING PROGRAM

## 2022-06-13 PROCEDURE — 99406 BEHAV CHNG SMOKING 3-10 MIN: CPT

## 2022-06-13 PROCEDURE — 99233 SBSQ HOSP IP/OBS HIGH 50: CPT | Performed by: FAMILY MEDICINE

## 2022-06-13 PROCEDURE — 97165 OT EVAL LOW COMPLEX 30 MIN: CPT

## 2022-06-13 PROCEDURE — 85347 COAGULATION TIME ACTIVATED: CPT

## 2022-06-13 PROCEDURE — 85384 FIBRINOGEN ACTIVITY: CPT

## 2022-06-13 PROCEDURE — 97161 PT EVAL LOW COMPLEX 20 MIN: CPT

## 2022-06-13 PROCEDURE — 83735 ASSAY OF MAGNESIUM: CPT

## 2022-06-13 PROCEDURE — 700111 HCHG RX REV CODE 636 W/ 250 OVERRIDE (IP): Performed by: STUDENT IN AN ORGANIZED HEALTH CARE EDUCATION/TRAINING PROGRAM

## 2022-06-13 PROCEDURE — 76705 ECHO EXAM OF ABDOMEN: CPT

## 2022-06-13 PROCEDURE — A9270 NON-COVERED ITEM OR SERVICE: HCPCS | Performed by: STUDENT IN AN ORGANIZED HEALTH CARE EDUCATION/TRAINING PROGRAM

## 2022-06-13 RX ORDER — LORAZEPAM 2 MG/ML
1 INJECTION INTRAMUSCULAR
Status: DISCONTINUED | OUTPATIENT
Start: 2022-06-13 | End: 2022-06-16 | Stop reason: HOSPADM

## 2022-06-13 RX ORDER — LORAZEPAM 1 MG/1
2 TABLET ORAL
Status: DISCONTINUED | OUTPATIENT
Start: 2022-06-13 | End: 2022-06-16 | Stop reason: HOSPADM

## 2022-06-13 RX ORDER — LORAZEPAM 1 MG/1
3 TABLET ORAL
Status: DISCONTINUED | OUTPATIENT
Start: 2022-06-13 | End: 2022-06-16 | Stop reason: HOSPADM

## 2022-06-13 RX ORDER — LORAZEPAM 0.5 MG/1
0.5 TABLET ORAL EVERY 4 HOURS PRN
Status: DISCONTINUED | OUTPATIENT
Start: 2022-06-13 | End: 2022-06-16 | Stop reason: HOSPADM

## 2022-06-13 RX ORDER — LORAZEPAM 2 MG/ML
1.5 INJECTION INTRAMUSCULAR
Status: DISCONTINUED | OUTPATIENT
Start: 2022-06-13 | End: 2022-06-16 | Stop reason: HOSPADM

## 2022-06-13 RX ORDER — ACETAMINOPHEN 325 MG/1
650 TABLET ORAL EVERY 6 HOURS PRN
Status: DISCONTINUED | OUTPATIENT
Start: 2022-06-13 | End: 2022-06-16 | Stop reason: HOSPADM

## 2022-06-13 RX ORDER — LORAZEPAM 2 MG/ML
2 INJECTION INTRAMUSCULAR
Status: DISCONTINUED | OUTPATIENT
Start: 2022-06-13 | End: 2022-06-16 | Stop reason: HOSPADM

## 2022-06-13 RX ORDER — LORAZEPAM 1 MG/1
4 TABLET ORAL
Status: DISCONTINUED | OUTPATIENT
Start: 2022-06-13 | End: 2022-06-16 | Stop reason: HOSPADM

## 2022-06-13 RX ORDER — POTASSIUM CHLORIDE 7.45 MG/ML
10 INJECTION INTRAVENOUS
Status: COMPLETED | OUTPATIENT
Start: 2022-06-13 | End: 2022-06-13

## 2022-06-13 RX ORDER — MAGNESIUM SULFATE HEPTAHYDRATE 40 MG/ML
4 INJECTION, SOLUTION INTRAVENOUS ONCE
Status: COMPLETED | OUTPATIENT
Start: 2022-06-13 | End: 2022-06-13

## 2022-06-13 RX ORDER — LORAZEPAM 1 MG/1
1 TABLET ORAL EVERY 4 HOURS PRN
Status: DISCONTINUED | OUTPATIENT
Start: 2022-06-13 | End: 2022-06-16 | Stop reason: HOSPADM

## 2022-06-13 RX ORDER — LORAZEPAM 2 MG/ML
0.5 INJECTION INTRAMUSCULAR EVERY 4 HOURS PRN
Status: DISCONTINUED | OUTPATIENT
Start: 2022-06-13 | End: 2022-06-16 | Stop reason: HOSPADM

## 2022-06-13 RX ADMIN — THIAMINE HYDROCHLORIDE: 100 INJECTION, SOLUTION INTRAMUSCULAR; INTRAVENOUS at 08:42

## 2022-06-13 RX ADMIN — PANTOPRAZOLE SODIUM 40 MG: 40 INJECTION, POWDER, LYOPHILIZED, FOR SOLUTION INTRAVENOUS at 05:12

## 2022-06-13 RX ADMIN — PANTOPRAZOLE SODIUM 40 MG: 40 INJECTION, POWDER, LYOPHILIZED, FOR SOLUTION INTRAVENOUS at 17:57

## 2022-06-13 RX ADMIN — POTASSIUM CHLORIDE 10 MEQ: 7.46 INJECTION, SOLUTION INTRAVENOUS at 09:58

## 2022-06-13 RX ADMIN — POTASSIUM CHLORIDE 10 MEQ: 7.46 INJECTION, SOLUTION INTRAVENOUS at 11:13

## 2022-06-13 RX ADMIN — MIRTAZAPINE 15 MG: 15 TABLET, ORALLY DISINTEGRATING ORAL at 21:36

## 2022-06-13 RX ADMIN — POTASSIUM CHLORIDE 10 MEQ: 7.46 INJECTION, SOLUTION INTRAVENOUS at 12:16

## 2022-06-13 RX ADMIN — SODIUM CHLORIDE, POTASSIUM CHLORIDE, SODIUM LACTATE AND CALCIUM CHLORIDE: 600; 310; 30; 20 INJECTION, SOLUTION INTRAVENOUS at 03:54

## 2022-06-13 RX ADMIN — SIMVASTATIN 20 MG: 20 TABLET, FILM COATED ORAL at 17:57

## 2022-06-13 RX ADMIN — MAGNESIUM SULFATE IN WATER 4 G: 40 INJECTION, SOLUTION INTRAVENOUS at 15:19

## 2022-06-13 RX ADMIN — POTASSIUM CHLORIDE 10 MEQ: 7.46 INJECTION, SOLUTION INTRAVENOUS at 08:42

## 2022-06-13 ASSESSMENT — LIFESTYLE VARIABLES
AGITATION: NORMAL ACTIVITY
AGITATION: NORMAL ACTIVITY
AUDITORY DISTURBANCES: NOT PRESENT
ORIENTATION AND CLOUDING OF SENSORIUM: ORIENTED AND CAN DO SERIAL ADDITIONS
ORIENTATION AND CLOUDING OF SENSORIUM: ORIENTED AND CAN DO SERIAL ADDITIONS
HEADACHE, FULLNESS IN HEAD: NOT PRESENT
TREMOR: NO TREMOR
PAROXYSMAL SWEATS: NO SWEAT VISIBLE
PAROXYSMAL SWEATS: NO SWEAT VISIBLE
ANXIETY: NO ANXIETY (AT EASE)
HEADACHE, FULLNESS IN HEAD: NOT PRESENT
AUDITORY DISTURBANCES: NOT PRESENT
ANXIETY: NO ANXIETY (AT EASE)
HEADACHE, FULLNESS IN HEAD: NOT PRESENT
HEADACHE, FULLNESS IN HEAD: NOT PRESENT
TOTAL SCORE: VERY MILD ITCHING, PINS AND NEEDLES SENSATION, BURNING OR NUMBNESS
ANXIETY: NO ANXIETY (AT EASE)
VISUAL DISTURBANCES: NOT PRESENT
AUDITORY DISTURBANCES: NOT PRESENT
TREMOR: NO TREMOR
PAROXYSMAL SWEATS: NO SWEAT VISIBLE
VISUAL DISTURBANCES: NOT PRESENT
NAUSEA AND VOMITING: NO NAUSEA AND NO VOMITING
AGITATION: NORMAL ACTIVITY
NAUSEA AND VOMITING: NO NAUSEA AND NO VOMITING
NAUSEA AND VOMITING: NO NAUSEA AND NO VOMITING
ORIENTATION AND CLOUDING OF SENSORIUM: ORIENTED AND CAN DO SERIAL ADDITIONS
TOTAL SCORE: 0
PAROXYSMAL SWEATS: NO SWEAT VISIBLE
TREMOR: NO TREMOR
TOTAL SCORE: 0
TOTAL SCORE: VERY MILD ITCHING, PINS AND NEEDLES SENSATION, BURNING OR NUMBNESS
ANXIETY: NO ANXIETY (AT EASE)
AUDITORY DISTURBANCES: NOT PRESENT
ORIENTATION AND CLOUDING OF SENSORIUM: ORIENTED AND CAN DO SERIAL ADDITIONS
VISUAL DISTURBANCES: NOT PRESENT
NAUSEA AND VOMITING: NO NAUSEA AND NO VOMITING
AGITATION: NORMAL ACTIVITY
TREMOR: NO TREMOR
VISUAL DISTURBANCES: NOT PRESENT
TOTAL SCORE: 1
TOTAL SCORE: 1

## 2022-06-13 ASSESSMENT — ENCOUNTER SYMPTOMS
CHILLS: 0
COUGH: 0
FEVER: 0
NAUSEA: 0
VOMITING: 0
SHORTNESS OF BREATH: 0
ABDOMINAL PAIN: 0

## 2022-06-13 ASSESSMENT — COGNITIVE AND FUNCTIONAL STATUS - GENERAL
HELP NEEDED FOR BATHING: A LITTLE
DAILY ACTIVITIY SCORE: 22
MOBILITY SCORE: 22
SUGGESTED CMS G CODE MODIFIER DAILY ACTIVITY: CJ
SUGGESTED CMS G CODE MODIFIER MOBILITY: CJ
WALKING IN HOSPITAL ROOM: A LITTLE
TOILETING: A LITTLE
CLIMB 3 TO 5 STEPS WITH RAILING: A LITTLE

## 2022-06-13 ASSESSMENT — ACTIVITIES OF DAILY LIVING (ADL): TOILETING: INDEPENDENT

## 2022-06-13 ASSESSMENT — GAIT ASSESSMENTS
GAIT LEVEL OF ASSIST: SUPERVISED
DISTANCE (FEET): 100
ASSISTIVE DEVICE: FRONT WHEEL WALKER
DEVIATION: SHUFFLED GAIT;DECREASED HEEL STRIKE;DECREASED TOE OFF
DISTANCE (FEET): 100

## 2022-06-13 ASSESSMENT — PAIN DESCRIPTION - PAIN TYPE
TYPE: ACUTE PAIN
TYPE: ACUTE PAIN

## 2022-06-13 NOTE — RESPIRATORY CARE
"   COPD EDUCATION by COPD CLINICAL EDUCATOR  6/13/2022 at 11:49 AM by Beata Phoenix, RRT     Patient reviewed by COPD education team. Patient does not have a history or diagnosis of COPD. Patient is a smoker, smoking cessation education done. A comprehensive packet with tips to quit and information on outpatient classes given to patient.   Smoking Cessation Intervention and education completed, 10 minutes spent on smoking cessation education with patient.  Provided smoking cessation packet with \"Tips to Quit\" and brochure for \"Free Smoking Cessation Classes\".     Provided patient with a short intervention completed with this patient covering: What is COPD (how the lungs work), daily medications rescue and maintenance, breathing techniques, infection prevention and oxygen safety were covered in detail.  A comprehensive packet including information about COPD, treatments, and oxygen safety was given.           COPD Screen  COPD Risk Screening  Do you have a history of COPD?: No    COPD Assessment  COPD Clinical Specialists ONLY  COPD Education Initiated: Yes--Short Intervention (Admit for Dark stools, Anemia has been in 4/2022, 5/2022 and 6/2022 Pt does not have a PFT on file and does not use any respiratory medications, pt is a long time smoker and is trying to quit.  Has some gum she is going to try and mints.)  Physician Name: Toña Tejada P.A.-C.  Pulmonologist Name: None - pt does plan to see Pulmonologist when all her GI issues are taken care of and shes feeling better, did not want to schedule any appointments  Referrals Initiated: Yes  Pulmonary Rehab: Declined  Smoking Cessation: Yes  $ Smoking Cessation 3-10 Minutes: Symptomatic  Smoking Pack Years: 25+ yrs  Hospice: No  Home Health Care: Yes  [facility]: N/A  [facility]: Yes  [facility]: No  Private In-Home Care Agency: N/A  Is this a COPD exacerbation patient?: No    PFT Results    No results found for: PFT    Meds " "to Beds  Would the patient like to opt in for Bedside Medication Delivery at Discharge?: No     MY COPD ACTION PLAN     It is recommended that patients and physicians /healthcare providers complete this action plan together. This plan should be discussed at each physician visit and updated as needed.    The green, yellow and red zones show groups of symptoms of COPD. This list of symptoms is not comprehensive, and you may experience other symptoms. In the \"Actions\" column, your healthcare provider has recommended actions for you to take based on your symptoms.    Patient Name: Jaymie Peacock   YOB: 1955   Last Updated on:     Green Zone:  I am doing well today Actions   •  Usual activitiy and exercise level •  Take daily medications   •  Usual amounts of cough and phlegm/mucus •  Use oxygen as prescribed   •  Sleep well at night •  Continue regular exercise/diet plan   •  Appetite is good •  At all times avoid cigarette smoke, inhaled irritants     Daily Medications (these medications are taken every day):                Yellow Zone:  I am having a bad day or a COPD flare Actions   •  More breathless than usual •  Continue daily medications   •  I have less energy for my daily activities •  Use quick relief inhaler as ordered   •  Increased or thicker phlegm/mucus •  Use oxygen as prescribed   •  Using quick relief inhaler/nebulizer more often •  Get plenty of rest   •  Swelling of ankles more than usual •  Use pursed lip breathing   •  More coughing than usual •  At all times avoid cigarette smoke, inhaled irritants   •  I feel like I have a \"chest cold\"   •  Poor sleep and my symptoms woke me up   •  My appetite is not good   •  My medicine is not helping    •  Call provider immediately if symptoms don’t improve     Continue daily medications, add rescue medications:               Medications to be used during a flare up, (as Discussed with Provider):              Red Zone:  I need urgent medical " care Actions   •  Severe shortness of breath even at rest •  Call 911 or seek medical care immediately   •  Not able to do any activity because of breathing    •  Fever or shaking chills    •  Feeling confused or very drowsy     •  Chest pains    •  Coughing up blood

## 2022-06-13 NOTE — THERAPY
"Occupational Therapy   Initial Evaluation     Patient Name: Jaymie Peacock  Age:  66 y.o., Sex:  female  Medical Record #: [id number]  Today's Date: 6/13/2022          Assessment  Patient is 66 y.o. female with a diagnosis of GI bleed.  Pt has h/o B foot drop and has been using a walker and avoiding stairs at home.  She has been indep with meal prep and self care tasks, and has assist from friend with laundry.  Friends will take her to the grocery store but she can normally walk throughout the store.  She is currently tolerating up to commode and walking in room/alberts with FWW.  She is supervised for mobility and self care tasks.  She was getting home PT and would benefit from continuing this.  She does not want (or appear to need) home OT at this time.  No further OT needs apparent at this time.  Rec continue to mobilize with nursing, and appears safe for home when cleared medically.       Plan    Recommend Occupational Therapy for Evaluation only     DC Equipment Recommendations: None  Discharge Recommendations: Recommend home health for continued occupational therapy services (for PT and nursing only)     Subjective    \"I don't need home OT,  my arms are fine.\"     Objective       06/13/22 1224   Prior Living Situation   Prior Services Home-Independent   Housing / Facility 2 Story House  (main level with basement that she doesn't access)   Steps Into Home 1  (in thru garage)   Steps In Home   (1 flt she doesn't use)   Bathroom Set up Walk In Shower;Shower Chair   Equipment Owned Front-Wheel Walker;Tub / Shower Seat;Raised Toilet Seat Without Arms   Lives with - Patient's Self Care Capacity Alone and Able to Care For Self   Comments Pt has help from friend Irwin primarily for laundry, transporation to appts, grocery store etc.  Other friends avail as needed if he is not avail.  She goes to a laundrymat or Irwin helps with laundry so she doesn't have to go to her basement   Prior Level of ADL Function   Self Feeding " Independent   Grooming / Hygiene Independent   Bathing Independent   Dressing Independent   Toileting Independent   Prior Level of IADL Function   Medication Management Independent   Laundry Requires Assist   Kitchen Mobility Independent   Finances Independent   Home Management Unable To Determine At This Time   Shopping Requires Assist   Prior Level Of Mobility Independent With Device in Community;Independent With Device in Home   Driving / Transportation Relatives / Others Provide Transportation   Occupation (Pre-Hospital Vocational) Retired Due To Age   Leisure Interests   (spending time with friends and cooking)   Comments pt states she enjoys cooking and sharing food with friends and neighbors   Vitals   O2 Delivery Device None - Room Air   Pain 0 - 10 Group   Therapist Pain Assessment 0;During Activity   Cognition    Cognition / Consciousness WDL   Level of Consciousness Alert   Comments pleasant, cooperative   Active ROM Upper Body   Active ROM Upper Body  WDL   Dominant Hand Right   Strength Upper Body   Upper Body Strength  WDL   Balance Assessment   Sitting Balance (Static) Good   Sitting Balance (Dynamic) Good   Standing Balance (Static) Fair +   Standing Balance (Dynamic) Fair +   Weight Shift Sitting Good   Weight Shift Standing Good   Comments with FWW in standing, uses FWW at baseline   Bed Mobility    Supine to Sit Modified Independent   Sit to Supine Modified Independent   Scooting Modified Independent   ADL Assessment   Eating Independent   Grooming Modified Independent   Upper Body Dressing Modified Independent   Lower Body Dressing Modified Independent  (slip on shoes)   Toileting Supervision  (with bedside commode)   How much help from another person does the patient currently need...   Putting on and taking off regular lower body clothing? 4   Bathing (including washing, rinsing, and drying)? 3   Toileting, which includes using a toilet, bedpan, or urinal? 3   Putting on and taking off regular  upper body clothing? 4   Taking care of personal grooming such as brushing teeth? 4   Eating meals? 4   6 Clicks Daily Activity Score 22   Functional Mobility   Sit to Stand Standby Assist   Bed, Chair, Wheelchair Transfer Standby Assist   Toilet Transfers Standby Assist  (commode)   Transfer Method Stand Step   Mobility supervised with FWW in room and in alberts   Distance (Feet) 100   # of Times Distance was Traveled 1   Comments household distance, pt has bilateral foot drop   Visual Perception   Visual Perception  WDL   Activity Tolerance   Sitting Edge of Bed 10 min   Standing 8 min

## 2022-06-13 NOTE — PROGRESS NOTES
Tele strip at 1527 shows SR at 94.      Measurements from am strip were as follows:  HI=0.22 AVB   QRS=0.10  QT=0.38    Tele Shift Summary:    Rhythm : SR to ST  Rate :  with activity   Ectopy : Per CCT Svetlana, pt had no ectopy.     Telemetry monitoring strips placed in pt's chart.

## 2022-06-13 NOTE — PROGRESS NOTES
4 Eyes Skin Assessment Completed by ELROY Valdez and ELROY Madsen.    Head WDL  Ears WDL  Nose WDL  Mouth WDL  Neck WDL  Breast/Chest Bruising  Shoulder Blades WDL  Spine WDL  (R) Arm/Elbow/Hand Bruising  (L) Arm/Elbow/Hand Bruising  Abdomen WDL  Groin WDL  Scrotum/Coccyx/Buttocks Redness and Blanching  (R) Leg WDL  (L) Leg Swelling  (R) Heel/Foot/Toe WDL  (L) Heel/Foot/Toe WDL          Devices In Places Tele Box, Blood Pressure Cuff and Pulse Ox      Interventions In Place N/A    Possible Skin Injury No    Pictures Uploaded Into Epic N/A  Wound Consult Placed N/A  RN Wound Prevention Protocol Ordered No

## 2022-06-13 NOTE — PROGRESS NOTES
Report received from Brook, pt able to walk from [facility] to bed with hand-held assist.    Tele monitor placed on pt and started asking admitting question.  GI came to bedside during this and made changes to her meds.  Consent for blood signed and started first unit after receiving a critical hgb and pt ambulated to the bathroom using a FWW.  Reached out to [facility] and they will not be able to get to her until tomorrow morning. Clear liquids given and modified diet to include no reds.  Pt states that she was on service with [facility] beds at the [facility].  Pt declined smoking cessation, but was receptive to alcohol cessation.  VSS.  Yellow bracelet placed and strip alarm in place.

## 2022-06-13 NOTE — DISCHARGE PLANNING
Agency/Facility Name: [facility]  Spoke To: Chari  Outcome: Pt is on service with [facility] agency.

## 2022-06-13 NOTE — ASSESSMENT & PLAN NOTE
- Received Vitamin K on admit given active bleeding on Xarelto with concern of underlying liver disease causing elevation in INR

## 2022-06-13 NOTE — FACE TO FACE
Face to Face Supporting Documentation - Home Health    The encounter with this patient was in whole or in part the primary reason for home health admission.    Date of encounter:   Patient:                    MRN:                       YOB: 2023  Jaymie Peacock  [id number]  1955     Home health to see patient for:  Skilled Nursing care for assessment, interventions & education, Physical Therapy evaluation and treatment and Occupational therapy evaluation and treatment    Skilled need for:  Recent Deterioration of Health Status GI bleed    Skilled nursing interventions to include:  Comment: n/a    Homebound status evidenced by:  Need the aid of supportive devices such as crutches, canes, wheelchairs or walkers or Needs the assistance of another person in order to leave the home. Leaving home requires a considerable and taxing effort. There is a normal inability to leave the home.    Community Physician to provide follow up care: Toña Tejada P.A.-C.     Optional Interventions? No      I certify the face to face encounter for this home health care referral meets the CMS requirements and the encounter/clinical assessment with the patient was, in whole, or in part, for the medical condition(s) listed above, which is the primary reason for home health care. Based on my clinical findings: the service(s) are medically necessary, support the need for home health care, and the homebound criteria are met.  I certify that this patient has had a face to face encounter by myself.  Adela Lopez M.D. - NPI: [id number]

## 2022-06-13 NOTE — PROGRESS NOTES
Telemetry Shift Summary    Rhythm SR  HR Range 64-84  Ectopy rPVC, PAC  Measurements 0.20/0.08/0.40      Normal Values  Rhythm SR  HR Range:   Measurements: 0.12-0.20/0.06-0.10/0.30-0.52

## 2022-06-13 NOTE — PROGRESS NOTES
[facility] Medicine Daily Progress Note    Date of Service  6/13/2022    Chief Complaint  Jaymie Peacock is a 66 y.o. female admitted 6/12/2022 with melena    Hospital Course  66-year-old alcoholic female with a past medical history of hypertension, COPD, osteoporosis, tobacco abuse, stress induced cardiomyopathy (35 ->55% EF per recent echo), recent admission for PE and LLE DVT on xarelto as well as recent GI evaluation due to anemia and occult positive stool s/p EGD ([facility]) during 5/10-5/13/2022 admission 2 mm duodenal bulb angiodysplasia, 2 distal duodenal angiodysplasias, and 2 proximal jejunal angiodysplasias all of which were treated with argon plasma coagulation and ultimately restarted on anticoagulation present emergency department on 6/12/2022 with melena that started this morning.  Patient reports that she noticed melena this morning and had nausea with one episode of dark emesis shortly after drinking Diet Coke.  Patient denies abdominal pain, hematochezia or hematemesis.  She reports compliance with Xarelto and all meds at home.  She does admit to having 2 cocktails last night but states that she has been cutting down on her drinking.  No orthostatic changes or loss of consciousness.    Interval Problem Update  6/13 - Pt normotensive, not tachycardic, had one melanic stool today at noon. Pt received 2u PRBC yesterday, hemoglobin went from a ida of 5.3 to 8.1 this morning. RUQ with hepatic steatosis, TEG not indicative of FFP/platelets etc needed. Jump Mag and K today. INR has come down from 3.97 to 1.63 after vitamin K administration, pt relates her last dose of Xarelto was yesterday am prior to coming to hospital.     Will monitor pt's symptoms and hemoglobin overnight - if no plans by GI to scope, we may need to consider IVC filter placement. Were she to need scoping, any AVMs as a source of bleeding could be treated and she could potentially continue Xarelto for PTE and DVT diagnosed in April. If  not, we would likely have to place IVC filter and stop anticoagulation.  Discussed with Dr Guerrier who does plan on scoping as an outpatient. Checking hemolysis labs. Had a low EPO level in March, will recheck and if low, consider EPO supplementation.    I have discussed this patient's plan of care and discharge plan at IDT rounds today with Case Management, Nursing, Nursing leadership, and other members of the IDT team.    Consultants/Specialty  GI    Code Status  Full Code    Disposition  Patient is not medically cleared for discharge.   Anticipate discharge to to home with organized home healthcare and close outpatient follow-up.  I have placed the appropriate orders for post-discharge needs.    Review of Systems  Review of Systems   Constitutional: Negative for chills and fever.   Respiratory: Negative for cough and shortness of breath.    Cardiovascular: Negative for chest pain and leg swelling.   Gastrointestinal: Negative for abdominal pain, nausea and vomiting.   All other systems reviewed and are negative.       Physical Exam  Temp:  [36.5 °C (97.7 °F)-37.4 °C (99.3 °F)] 36.5 °C (97.7 °F)  Pulse:  [68-99] 70  Resp:  [16-18] 18  BP: (100-117)/(55-74) 110/55  SpO2:  [93 %-100 %] 93 %    Physical Exam  Vitals and nursing note reviewed.   Constitutional:       Appearance: Normal appearance. She is not ill-appearing.   HENT:      Mouth/Throat:      Mouth: Mucous membranes are moist.   Cardiovascular:      Rate and Rhythm: Normal rate and regular rhythm.      Heart sounds: No murmur heard.  Pulmonary:      Effort: Pulmonary effort is normal. No respiratory distress.      Breath sounds: Normal breath sounds. No wheezing, rhonchi or rales.   Abdominal:      General: Abdomen is flat. Bowel sounds are normal. There is no distension.      Palpations: Abdomen is soft.      Tenderness: There is no abdominal tenderness. There is no guarding.      Hernia: No hernia is present.   Musculoskeletal:         General: No  swelling.   Skin:     General: Skin is warm and dry.      Coloration: Skin is pale.   Neurological:      General: No focal deficit present.      Mental Status: She is alert and oriented to person, place, and time. Mental status is at baseline.   Psychiatric:         Mood and Affect: Mood normal.         Behavior: Behavior normal.         Fluids    Intake/Output Summary (Last 24 hours) at 6/13/2022 1420  Last data filed at 6/13/2022 0315  Gross per 24 hour   Intake 917.5 ml   Output 150 ml   Net 767.5 ml       Laboratory  Recent Labs     06/12/22  1123 06/12/22  1616 06/12/22  2139 06/13/22  0650 06/13/22  1342   WBC 7.1  --   --  4.3*  --    RBC 1.69*  --   --  2.50*  --    HEMOGLOBIN 6.0*   < > 6.3* 8.1* 10.0*   HEMATOCRIT 19.2*  --   --  24.5*  --    .6*  --   --  98.0*  --    MCH 35.5*  --   --  32.4  --    MCHC 31.3*  --   --  33.1*  --    RDW 69.4*  --   --  81.2*  --    PLATELETCT 207  --   --  164  --    MPV 9.7  --   --  9.4  --     < > = values in this interval not displayed.     Recent Labs     06/12/22  1123 06/13/22  0650   SODIUM 139 136   POTASSIUM 4.0 3.5*   CHLORIDE 106 107   CO2 17* 19*   GLUCOSE 119* 72   BUN 35* 23*   CREATININE 1.03 0.84   CALCIUM 8.5 8.0*     Recent Labs     06/12/22  1123 06/13/22  0650   INR 3.97* 1.63*               Imaging  US-RUQ   Final Result      1. Echogenic liver, most commonly hepatic steatosis.      2. Status post cholecystectomy. No biliary dilatation.         DX-CHEST-PORTABLE (1 VIEW)   Final Result      No acute cardiopulmonary abnormality.              Assessment/Plan  * Acute GI bleeding- (present on admission)  Assessment & Plan  - Presenting with acute episode of melena and lightheadedness just prior admission, melanic stool today  - EGD (GIC) during 5/10-5/13/2022 admission 2 mm duodenal bulb angiodysplasia, 2 distal duodenal angiodysplasias, and 2 proximal jejunal angiodysplasias all of which were treated with argon plasma coagulation  -  Consideration for angiodysplasias versus Crystal-Payan tear versus peptic ulcer disease  - Holding home Xarelto for PTE and DVT in April, discussing with GI whether scoping will occur, and if not, whether to resume Xarelto or pursue IVC filter placement. Currently with plans to scope, unclear timeline - awaiting Xarelto washout  - IV PPI twice daily  - s/p 2u PRBC transfusion yesterday, continue to trend H/H  - TEG scan unremarkable, no need for additional blood products currently  - GI on board, appreciated     Multiple thyroid nodules  Assessment & Plan  - Seen on CT last hospitalization, does not appear TSH or thyroid u/s obtained at that time  - Check TSH and us here      Macrocytic anemia- (present on admission)  Assessment & Plan  Secondary to chronic alcoholism and acute blood loss anemia  Management as per above  B12s checked in the past were elevated, will recheck labs  Retic count is appropriate  Check LDH, haptoglobin, fibrinogen    Elevated INR- (present on admission)  Assessment & Plan  - Received Vitamin K on admit given active bleeding on Xarelto with concern of underlying liver disease causing elevation in INR    Acute deep vein thrombosis (DVT) of left lower extremity with PTE - (present on admission)  Assessment & Plan  Hold anticoagulation in the setting of acute GI bleed  Monitor  Would need 6 months of anticoagulation from 4/22 given her PTE and DVT seen on imaging at that time - will discuss with GI.     Alcohol abuse- (present on admission)  Assessment & Plan  History of alcoholism  Admits to having 2 alcoholic beverages prior to admission but reports that she is cut down on alcohol  No signs of withdrawal, monitor with CIWA  Rally bag  Labs on a.m.       VTE prophylaxis: SCDs/TEDs    I have performed a physical exam and reviewed and updated ROS and Plan today (6/13/2022). In review of yesterday's note (6/12/2022), there are no changes except as documented above.

## 2022-06-13 NOTE — THERAPY
Physical Therapy   Initial Evaluation     Patient Name: Jaymie Peacock  Age:  66 y.o., Sex:  female  Medical Record #: [id number]  Today's Date: 6/13/2022     Precautions  Precautions: Fall Risk    Assessment  Patient is 66 y.o. female with a diagnosis of Acute GI bleed. Additional factors influencing patient status / progress pt resides alone but has helpful friends and neighbors who can assist as well as [facility]. Pt demonstrating the ability to safely and timely ambulate for functional home distances. No further skilled inpatient PT needs. No DME needs.     Plan    Recommend Physical Therapy for Evaluation only.   DC Equipment Recommendations: None  Discharge Recommendations: Recommend home health for continued physical therapy services          Objective       06/13/22 1241   Pain 0 - 10 Group   Therapist Pain Assessment Post Activity Pain Same as Prior to Activity;0   Prior Living Situation   Prior Services Skilled Home Health Services   Housing / Facility 2 Story House  (does no go downstairs to basement.)   Steps Into Home 1   Steps In Home   (FOS ( does not access))   Bathroom Set up Walk In Shower   Equipment Owned Front-Wheel Walker   Lives with - Patient's Self Care Capacity Alone and Able to Care For Self   Comments [facility] and RN   Prior Level of Functional Mobility   Bed Mobility Independent   Transfer Status Independent   Ambulation Independent   Distance Ambulation (Feet)   (household)   Assistive Devices Used Front-Wheel Walker   History of Falls   History of Falls No   Cognition    Level of Consciousness Alert   Strength Lower Body   Lower Body Strength  WDL   Sensation Lower Body   Lower Extremity Sensation   X   Paresthesia Present    Balance Assessment   Sitting Balance (Static) Good   Sitting Balance (Dynamic) Good   Standing Balance (Static) Fair +   Standing Balance (Dynamic) Fair   Weight Shift Sitting Fair   Weight Shift Standing Fair   Comments w/FWW   Gait Analysis    Gait Level Of Assist Supervised   Assistive Device Front Wheel Walker   Distance (Feet) 100   # of Times Distance was Traveled 1   Deviation Shuffled Gait;Decreased Heel Strike;Decreased Toe Off   # of Stairs Climbed 0   Weight Bearing Status no restrictions   Bed Mobility    Supine to Sit Independent   Sit to Supine Independent   Scooting Independent   Rolling Independent   Functional Mobility   Sit to Stand Supervised   Transfer Method Stand Step   Activity Tolerance   Sitting in Chair functional   Sitting Edge of Bed functional   Standing functional

## 2022-06-13 NOTE — ASSESSMENT & PLAN NOTE
- Seen on CT last hospitalization, does not appear TSH or thyroid u/s obtained at that time  - Check TSH and us here

## 2022-06-13 NOTE — PROGRESS NOTES
Telemetry Shift Summary     Rhythm: SR/SB   HR: 56-87  Ectopy: rPVC  Measurements: .18/.10/.38   (Per 0400 strip)  Normal Values  Rhythm: SR  HR:   Measurements: 0.12-0.20 / 0.04-0.10 / 0.30-0.52    Strip reviewed and placed in chart

## 2022-06-13 NOTE — PROGRESS NOTES
Gastroenterology Initial Consult Note               Author:  JERSON Godoy Date & Time Created: 6/12/2022 3:26 PM       Patient ID:  Name:             Jaymie Peacock  YOB: 1955  Age:                 66 y.o.  female  MRN:               [id number]      Referring Provider:  Parker Sellers MD      Presenting Chief Complaint:  Dark stools, Anemia      History of Present Illness:    She is a 66-year-old female patient seen in consultation for dark stools and anemia.  The patient presented to the hospital earlier today with complaints of dark black appearing stools which started this morning.  She also reports 1 episode of dark emesis shortly after drinking Diet Coke with some associated nausea.  She denies NSAID use.  She has a past medical history that includes pulmonary embolism and left lower extremity DVT currently on Eliquis last dose today, COPD, tobacco abuse, alcohol abuse last drink yesterday, stress-induced cardiomyopathy.  The patient has undergone evaluation for recurrent melena and anemia withEGD on 4/21/2022 by Dr. Rell Rivas with findings of 2 duodenal AVMs and mild erosive gastritis.  AVMs were treated with APC.  Colonoscopy was normal.  She then had another admission on May 10th 2022 with similar GI bleeding suspected.  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  No active bleeding was seen on any of her procedures.    On admission patient noted to have hemoglobin 6, hematocrit 19.2, .6, platelets are normal.  CMP with CO2 17, glucose 119, BUN 35, creatinine 1.03.  Magnesium 1.3, INR 3.97.  It is charted that patient stool occult blood positive in ER.    INTERVAL HISTORY:    6/13/22: Stable. H/H stable. INR: 1.63.  One formed dark colored stool this morning. No abdominal pain. Abdominal US: echogenic liver most likely hepatic steatosis.      Review of Systems:  Review  of Systems   Constitutional: Positive for malaise/fatigue. Negative for chills and fever.   HENT: Negative for congestion and sinus pain.    Eyes: Negative for pain and discharge.   Respiratory: Negative for cough and wheezing.    Cardiovascular: Negative for chest pain and palpitations.   Gastrointestinal: Positive for melena.   Genitourinary: Negative for flank pain and hematuria.   Musculoskeletal: Negative for falls and joint pain.   Skin: Negative for itching and rash.   Neurological: Positive for weakness.   Psychiatric/Behavioral: Negative for memory loss. The patient does not have insomnia.    All other systems reviewed and are negative.            Past Medical History:  Past Medical History:   Diagnosis Date   • DVT (deep venous thrombosis) (HCC)    • GI bleed    • High cholesterol    • Hypertension    • Osteoporosis    • Pulmonary embolism (HCC)    • Smoking      Active Hospital Problems    Diagnosis    • Acute GI bleeding [K92.2]    • Macrocytic anemia [D53.9]    • Acute deep vein thrombosis (DVT) of left lower extremity (HCC) [I82.402]    • Alcohol abuse [F10.10]          Past Surgical History:  Past Surgical History:   Procedure Laterality Date   • FL UPPER GI ENDOSCOPY,CTRL BLEED N/A 05/11/2022    Procedure: GASTROSCOPY, WITH ARGON PLASMA COAGULATION;  Surgeon: Yariel Pagan M.D.;  Location: SURGERY SAME DAY [facility];  Service: Gastroenterology   • GASTROSCOPY W/PUSH ENTERSCOPY N/A 05/11/2022    Procedure: GASTROSCOPY, WITH PUSH ENTEROSCOPY;  Surgeon: Yariel Pagan M.D.;  Location: SURGERY SAME DAY [facility];  Service: Gastroenterology   • FL UPPER GI ENDOSCOPY,DIAGNOSIS  04/21/2022    Procedure: GASTROSCOPY;  Surgeon: Rell Rivas M.D.;  Location: SURGERY SAME DAY [facility];  Service: Gastroenterology   • FL COLONOSCOPY,DIAGNOSTIC  04/21/2022    Procedure: COLONOSCOPY;  Surgeon: Rell Rivas M.D.;  Location: SURGERY SAME DAY [facility];  Service: Gastroenterology   • FL UPPER GI  ENDOSCOPY,BIOPSY  04/21/2022    Procedure: GASTROSCOPY, WITH BIOPSY;  Surgeon: Rell Rivas M.D.;  Location: SURGERY SAME DAY [facility];  Service: Gastroenterology   • [place] UPPER GI ENDOSCOPY,CTRL BLEED  04/21/2022    Procedure: GASTROSCOPY, WITH ARGON PLASMA COAGULATION;  Surgeon: Rell Rivas M.D.;  Location: SURGERY SAME DAY [facility];  Service: Gastroenterology   • HIP REPLACEMENT, PARTIAL Right    • US-NEEDLE CORE BX-BREAST PANEL Right            Hospital Medications:  Current Facility-Administered Medications   Medication Dose Frequency Provider Last Rate Last Admin   • mirtazapine (Remeron) orally disintegrating tab 15 mg  15 mg Nightly Parker Slelers M.D.       • simvastatin (ZOCOR) tablet 20 mg  20 mg Q EVENING Parkeraspen Sellers M.D.       • pantoprazole (Protonix) injection 40 mg  40 mg BID Parker Sellers M.D.       • [Held by provider] rivaroxaban (XARELTO) tablet 20 mg  20 mg PM MEAL Parker Sellers M.D.       • senna-docusate (PERICOLACE or SENOKOT S) 8.6-50 MG per tablet 2 Tablet  2 Tablet BID Parker Sellers M.D.        And   • polyethylene glycol/lytes (MIRALAX) PACKET 1 Packet  1 Packet QDAY PRN Parker Sellers M.D.        And   • magnesium hydroxide (MILK OF MAGNESIA) suspension 30 mL  30 mL QDAY PRN Parker Sellers M.D.        And   • bisacodyl (DULCOLAX) suppository 10 mg  10 mg QDAY PRN Parker Sellers M.D.       • lactated ringers infusion   Continuous Parker Sellers M.D.       • acetaminophen (Tylenol) tablet 650 mg  650 mg Q6HRS PRN Parker Sellers M.D.       • hydrALAZINE (APRESOLINE) injection 10 mg  10 mg Q4HRS PRN Parker Sellers M.D.       • ondansetron (ZOFRAN) syringe/vial injection 4 mg  4 mg Q4HRS PRN Parker Sellers M.D.       • ondansetron (ZOFRAN ODT) dispertab 4 mg  4 mg Q4HRS PRN Parker Sellers M.D.       • octreotide (SANDOSTATIN) 1,250  mcg in  mL Infusion  50 mcg/hr Bud Sellers M.D.       Last reviewed on 6/12/2022  2:23 PM by Paola Samaniego        Current Outpatient Medications:  Medications Prior to Admission   Medication Sig Dispense Refill Last Dose   • Rivaroxaban (XARELTO STARTER PACK) 15 & 20 MG Tablet Therapy Pack Take 15-20 mg by mouth see administration instructions. Pt started on 5/13/2022 15MG BID for 21 day course, and is now on 20MG and has 9 tablets left   6/12/2022 at 0800   • omeprazole (PRILOSEC) 20 MG delayed-release capsule Take 1 Capsule by mouth every day. 30 Capsule 0 6/12/2022 at 0800   • mirtazapine (REMERON) 15 MG Tab Take 1 Tablet by mouth every evening. 30 Tablet 0 6/11/2022 at 1930   • simvastatin (ZOCOR) 20 MG Tab Take 20 mg by mouth every evening. Indications: Ischemic Heart Disease   6/11/2022 at 1930         Medication Allergies:  No Known Allergies      Family Medical History:  History reviewed. No pertinent family history.      Social History:  Social History     Socioeconomic History   • Marital status: Unknown     Spouse name: Not on file   • Number of children: Not on file   • Years of education: Not on file   • Highest education level: Not on file   Occupational History   • Not on file   Tobacco Use   • Smoking status: Current Every Day Smoker     Packs/day: 0.50     Types: Cigarettes   • Smokeless tobacco: Never Used   Vaping Use   • Vaping Use: Never used   Substance and Sexual Activity   • Alcohol use: Yes     Alcohol/week: 0.6 oz     Types: 1 Shots of liquor per week   • Drug use: Not Currently   • Sexual activity: Not on file   Other Topics Concern   • Not on file   Social History Narrative   • Not on file     Social Determinants of Health     Financial Resource Strain: Not on file   Food Insecurity: Not on file   Transportation Needs: Not on file   Physical Activity: Not on file   Stress: Not on file   Social Connections: Not on file   Intimate Partner Violence:  "Not on file   Housing Stability: Not on file         Vital signs:  Weight/BMI: Body mass index is 16.14 kg/m².  /55   Pulse 99   Temp 36.6 °C (97.9 °F) (Temporal)   Resp 18   Ht 1.676 m (5' 6\")   Wt 45.4 kg (100 lb)   SpO2 99%   Vitals:    06/12/22 1249 06/12/22 1250 06/12/22 1318 06/12/22 1448   BP: 109/66  (!) 95/65 100/55   Pulse:  100 94 99   Resp:       Temp:       TempSrc:       SpO2:  99% 99% 99%   Weight:       Height:         Oxygen Therapy:  Pulse Oximetry: 99 %, O2 Delivery Device: None - Room Air  No intake or output data in the 24 hours ending 06/12/22 1526      Physical Exam:  Physical Exam  Vitals and nursing note reviewed.   Constitutional:       Appearance: Normal appearance.   HENT:      Head: Normocephalic and atraumatic.      Right Ear: External ear normal.      Left Ear: External ear normal.      Nose: Nose normal. No congestion.      Mouth/Throat:      Mouth: Mucous membranes are moist.      Pharynx: Oropharynx is clear.   Eyes:      General: No scleral icterus.     Extraocular Movements: Extraocular movements intact.      Pupils: Pupils are equal, round, and reactive to light.   Cardiovascular:      Rate and Rhythm: Normal rate and regular rhythm.      Pulses: Normal pulses.      Heart sounds: Normal heart sounds. No murmur heard.  Pulmonary:      Effort: Pulmonary effort is normal. No respiratory distress.      Breath sounds: Normal breath sounds.   Abdominal:      General: Abdomen is flat. Bowel sounds are normal. There is no distension.      Palpations: Abdomen is soft.      Tenderness: There is no abdominal tenderness.   Musculoskeletal:      Cervical back: Neck supple.      Right lower leg: No edema.      Left lower leg: No edema.   Lymphadenopathy:      Cervical: No cervical adenopathy.   Skin:     General: Skin is warm and dry.      Coloration: Skin is pale. Skin is not jaundiced.   Neurological:      General: No focal deficit present.      Mental Status: She is alert and " oriented to person, place, and time.   Psychiatric:         Thought Content: Thought content normal.         Judgment: Judgment normal.           Labs:  Recent Labs     06/12/22  1123 06/12/22  1207   SODIUM 139  --    POTASSIUM 4.0  --    CHLORIDE 106  --    CO2 17*  --    BUN 35*  --    CREATININE 1.03  --    MAGNESIUM  --  1.3*   CALCIUM 8.5  --      Recent Labs     06/12/22  1123   ALTSGPT 9   ASTSGOT 28   ALKPHOSPHAT 97   TBILIRUBIN 0.4   LIPASE 43   GLUCOSE 119*     Recent Labs     06/12/22  1123   WBC 7.1   NEUTSPOLYS 82.90*   LYMPHOCYTES 8.90*   MONOCYTES 7.20   EOSINOPHILS 0.00   BASOPHILS 0.60   ASTSGOT 28   ALTSGPT 9   ALKPHOSPHAT 97   TBILIRUBIN 0.4     Recent Labs     06/12/22  1123   RBC 1.69*   HEMOGLOBIN 6.0*   HEMATOCRIT 19.2*   PLATELETCT 207   PROTHROMBTM 36.1*   INR 3.97*     Recent Results (from the past 24 hour(s))   CBC WITH DIFFERENTIAL    Collection Time: 06/12/22 11:23 AM   Result Value Ref Range    WBC 7.1 4.8 - 10.8 K/uL    RBC 1.69 (L) 4.20 - 5.40 M/uL    Hemoglobin 6.0 (L) 12.0 - 16.0 g/dL    Hematocrit 19.2 (L) 37.0 - 47.0 %    .6 (H) 81.4 - 97.8 fL    MCH 35.5 (H) 27.0 - 33.0 pg    MCHC 31.3 (L) 33.6 - 35.0 g/dL    RDW 69.4 (H) 35.9 - 50.0 fL    Platelet Count 207 164 - 446 K/uL    MPV 9.7 9.0 - 12.9 fL    Neutrophils-Polys 82.90 (H) 44.00 - 72.00 %    Lymphocytes 8.90 (L) 22.00 - 41.00 %    Monocytes 7.20 0.00 - 13.40 %    Eosinophils 0.00 0.00 - 6.90 %    Basophils 0.60 0.00 - 1.80 %    Immature Granulocytes 0.40 0.00 - 0.90 %    Nucleated RBC 0.00 /100 WBC    Neutrophils (Absolute) 5.90 2.00 - 7.15 K/uL    Lymphs (Absolute) 0.63 (L) 1.00 - 4.80 K/uL    Monos (Absolute) 0.51 0.00 - 0.85 K/uL    Eos (Absolute) 0.00 0.00 - 0.51 K/uL    Baso (Absolute) 0.04 0.00 - 0.12 K/uL    Immature Granulocytes (abs) 0.03 0.00 - 0.11 K/uL    NRBC (Absolute) 0.00 K/uL   COMP METABOLIC PANEL    Collection Time: 06/12/22 11:23 AM   Result Value Ref Range    Sodium 139 135 - 145 mmol/L     Potassium 4.0 3.6 - 5.5 mmol/L    Chloride 106 96 - 112 mmol/L    Co2 17 (L) 20 - 33 mmol/L    Anion Gap 16.0 7.0 - 16.0    Glucose 119 (H) 65 - 99 mg/dL    Bun 35 (H) 8 - 22 mg/dL    Creatinine 1.03 0.50 - 1.40 mg/dL    Calcium 8.5 8.4 - 10.2 mg/dL    AST(SGOT) 28 12 - 45 U/L    ALT(SGPT) 9 2 - 50 U/L    Alkaline Phosphatase 97 30 - 99 U/L    Total Bilirubin 0.4 0.1 - 1.5 mg/dL    Albumin 3.3 3.2 - 4.9 g/dL    Total Protein 5.4 (L) 6.0 - 8.2 g/dL    Globulin 2.1 1.9 - 3.5 g/dL    A-G Ratio 1.6 g/dL   LIPASE    Collection Time: 06/12/22 11:23 AM   Result Value Ref Range    Lipase 43 7 - 58 U/L   PROTHROMBIN TIME (INR)    Collection Time: 06/12/22 11:23 AM   Result Value Ref Range    PT 36.1 (H) 12.0 - 14.6 sec    INR 3.97 (H) 0.87 - 1.13   COD (ADULT)    Collection Time: 06/12/22 11:23 AM   Result Value Ref Range    ABO Grouping Only A     Rh Grouping Only POS     Antibody Screen-Cod NEG     Component R       R99                 Red Cells, LR       [id number]   selected     06/12/22   14:14      Product Type R99     Dispense Status selected     Unit Number (Barcoded) [id number]     Product Code (Barcoded) B9314I34     Blood Type (Barcoded) 6200    ESTIMATED GFR    Collection Time: 06/12/22 11:23 AM   Result Value Ref Range    GFR (CKD-EPI) 59 (A) >60 mL/min/1.73 m 2   DIFFERENTIAL COMMENT    Collection Time: 06/12/22 11:23 AM   Result Value Ref Range    Comments-Diff see below    MAGNESIUM    Collection Time: 06/12/22 12:07 PM   Result Value Ref Range    Magnesium 1.3 (L) 1.5 - 2.5 mg/dL   RETICULOCYTES COUNT    Collection Time: 06/12/22 12:07 PM   Result Value Ref Range    Reticulocyte Count 5.6 (H) 0.8 - 2.1 %    Retic, Absolute 0.09 (H) 0.04 - 0.06 M/uL    Imm. Reticulocyte Fraction 42.1 (H) 9.3 - 17.4 %    Retic Hgb Equivalent 41.2 (H) 29.0 - 35.0 pg/cell         Radiology Review:  DX-CHEST-PORTABLE (1 VIEW)   Final Result      No acute cardiopulmonary abnormality.         US-RUQ    (Results Pending)          MDM (Data Review):   -Records reviewed and summarized in current documentation  -I personally reviewed and interpreted the laboratory results  -I personally reviewed the radiology images        Medical Decision Making, by Problem:  Active Hospital Problems    Diagnosis    • Acute GI bleeding [K92.2]    • Macrocytic anemia [D53.9]    • Acute deep vein thrombosis (DVT) of left lower extremity (HCC) [I82.402]    • Alcohol abuse [F10.10]            Assessment/Recommendations:  66-year-old female patient with onset of dark stools and 1 episode of dark emesis occurring today, Hemoccult positive in the emergency room.  She does have acute on chronic anemia where her normal hemoglobin resides anywhere from 7.5-10.  Currently hemoglobin is at 6.  She does take Xarelto for history of DVT and PE.  She also continues to drink alcohol despite being told in the past to stop.  No history of cirrhosis and her  liver enzymes are currently normal.  Platelets are also normal lending against cirrhosis diagnosis.  Right upper quadrant ultrasound is pending ordered by hospitalist.  She has had multiple endoscopies in the last 2 months for evaluation and treatment of a few angiodysplasias nonbleeding.  Consider bleeding from angiodysplasias versus Crystal-Payan tear versus peptic ulcer versus other bleeding lesion less likely.  She more recently took her Xarelto and INR is elevated.    Assessment:  -Dark stools, possible melena  -Possible hematemesis  -Acute on chronic macrocytic anemia  -Hemoccult positive stool  -Chronic anticoagulation with Xarelto  -History of PE and DVT  -Alcohol abuse    Plan:  -Continue Protonix 40 mg twice daily  -Full liquid diet no red liquids  -Considering patient has had endoscopic evaluation for similar symptoms twice in the last 2 months without significant bleeding finding, will tentatively DEFER on EGD at this time.    -Recommend holding Xarelto and correcting INR to less than 1.5 if  possible.  -Alcohol cessation  discussed with the patient again  -Recheck INR tomorrow  -Trend Hgb and if she continues to have stable H/H and no overt GI bleed: likely will not need to proceed with EGD    Ana CALL    Thank you for inviting me to participate in the care of this patient. Please do not hesitate to call GI consultants with additional questions/concerns or changes in the patient's clinical status at [phone].      Core Quality Measures   Reviewed items:  Labs, Medications and Radiology reports reviewed

## 2022-06-13 NOTE — PROGRESS NOTES
Ginny from Lab called with critical result of Hgb of 5.2 at 1640. Critical lab result read back to Ginny.   Dr. Graham notified of critical lab result at 1641.  Critical lab result read back by Dr. Graham.

## 2022-06-14 ENCOUNTER — APPOINTMENT (OUTPATIENT)
Dept: RADIOLOGY | Facility: MEDICAL CENTER | Age: 67
DRG: 378 | End: 2022-06-14
Attending: FAMILY MEDICINE
Payer: MEDICARE

## 2022-06-14 LAB
ALBUMIN SERPL BCP-MCNC: 2.7 G/DL (ref 3.2–4.9)
ALBUMIN/GLOB SERPL: 1.4 G/DL
ALP SERPL-CCNC: 85 U/L (ref 30–99)
ALT SERPL-CCNC: 9 U/L (ref 2–50)
ANION GAP SERPL CALC-SCNC: 10 MMOL/L (ref 7–16)
AST SERPL-CCNC: 33 U/L (ref 12–45)
BILIRUB SERPL-MCNC: 0.4 MG/DL (ref 0.1–1.5)
BUN SERPL-MCNC: 13 MG/DL (ref 8–22)
CALCIUM SERPL-MCNC: 8.3 MG/DL (ref 8.4–10.2)
CHLORIDE SERPL-SCNC: 107 MMOL/L (ref 96–112)
CO2 SERPL-SCNC: 20 MMOL/L (ref 20–33)
CREAT SERPL-MCNC: 0.71 MG/DL (ref 0.5–1.4)
ERYTHROCYTE [DISTWIDTH] IN BLOOD BY AUTOMATED COUNT: 86.5 FL (ref 35.9–50)
FIBRINOGEN PPP-MCNC: 300 MG/DL (ref 215–460)
GFR SERPLBLD CREATININE-BSD FMLA CKD-EPI: 93 ML/MIN/1.73 M 2
GLOBULIN SER CALC-MCNC: 2 G/DL (ref 1.9–3.5)
GLUCOSE SERPL-MCNC: 76 MG/DL (ref 65–99)
HCT VFR BLD AUTO: 27.4 % (ref 37–47)
HGB BLD-MCNC: 8.8 G/DL (ref 12–16)
HGB BLD-MCNC: 8.9 G/DL (ref 12–16)
MAGNESIUM SERPL-MCNC: 2.5 MG/DL (ref 1.5–2.5)
MCH RBC QN AUTO: 32.2 PG (ref 27–33)
MCHC RBC AUTO-ENTMCNC: 32.1 G/DL (ref 33.6–35)
MCV RBC AUTO: 100.4 FL (ref 81.4–97.8)
PHOSPHATE SERPL-MCNC: 2.8 MG/DL (ref 2.5–4.5)
PLATELET # BLD AUTO: 213 K/UL (ref 164–446)
PMV BLD AUTO: 9.7 FL (ref 9–12.9)
POTASSIUM SERPL-SCNC: 3.8 MMOL/L (ref 3.6–5.5)
PROT SERPL-MCNC: 4.7 G/DL (ref 6–8.2)
RBC # BLD AUTO: 2.73 M/UL (ref 4.2–5.4)
SODIUM SERPL-SCNC: 137 MMOL/L (ref 135–145)
TSH SERPL DL<=0.005 MIU/L-ACNC: 1.48 UIU/ML (ref 0.38–5.33)
VIT B12 SERPL-MCNC: 377 PG/ML (ref 211–911)
WBC # BLD AUTO: 5.5 K/UL (ref 4.8–10.8)

## 2022-06-14 PROCEDURE — 94760 N-INVAS EAR/PLS OXIMETRY 1: CPT

## 2022-06-14 PROCEDURE — 82668 ASSAY OF ERYTHROPOIETIN: CPT

## 2022-06-14 PROCEDURE — 83735 ASSAY OF MAGNESIUM: CPT

## 2022-06-14 PROCEDURE — 85018 HEMOGLOBIN: CPT

## 2022-06-14 PROCEDURE — 84443 ASSAY THYROID STIM HORMONE: CPT

## 2022-06-14 PROCEDURE — 700102 HCHG RX REV CODE 250 W/ 637 OVERRIDE(OP): Performed by: STUDENT IN AN ORGANIZED HEALTH CARE EDUCATION/TRAINING PROGRAM

## 2022-06-14 PROCEDURE — 84100 ASSAY OF PHOSPHORUS: CPT

## 2022-06-14 PROCEDURE — 770020 HCHG ROOM/CARE - TELE (206)

## 2022-06-14 PROCEDURE — 99233 SBSQ HOSP IP/OBS HIGH 50: CPT | Performed by: INTERNAL MEDICINE

## 2022-06-14 PROCEDURE — 80053 COMPREHEN METABOLIC PANEL: CPT

## 2022-06-14 PROCEDURE — 85027 COMPLETE CBC AUTOMATED: CPT

## 2022-06-14 PROCEDURE — 76536 US EXAM OF HEAD AND NECK: CPT

## 2022-06-14 PROCEDURE — A9270 NON-COVERED ITEM OR SERVICE: HCPCS | Performed by: STUDENT IN AN ORGANIZED HEALTH CARE EDUCATION/TRAINING PROGRAM

## 2022-06-14 PROCEDURE — C9113 INJ PANTOPRAZOLE SODIUM, VIA: HCPCS | Performed by: STUDENT IN AN ORGANIZED HEALTH CARE EDUCATION/TRAINING PROGRAM

## 2022-06-14 PROCEDURE — 700111 HCHG RX REV CODE 636 W/ 250 OVERRIDE (IP): Performed by: STUDENT IN AN ORGANIZED HEALTH CARE EDUCATION/TRAINING PROGRAM

## 2022-06-14 RX ADMIN — SIMVASTATIN 20 MG: 20 TABLET, FILM COATED ORAL at 18:11

## 2022-06-14 RX ADMIN — PANTOPRAZOLE SODIUM 40 MG: 40 INJECTION, POWDER, LYOPHILIZED, FOR SOLUTION INTRAVENOUS at 18:11

## 2022-06-14 RX ADMIN — PANTOPRAZOLE SODIUM 40 MG: 40 INJECTION, POWDER, LYOPHILIZED, FOR SOLUTION INTRAVENOUS at 05:08

## 2022-06-14 RX ADMIN — MIRTAZAPINE 15 MG: 15 TABLET, ORALLY DISINTEGRATING ORAL at 21:38

## 2022-06-14 ASSESSMENT — LIFESTYLE VARIABLES
ANXIETY: NO ANXIETY (AT EASE)
TREMOR: NO TREMOR
TREMOR: NO TREMOR
ANXIETY: NO ANXIETY (AT EASE)
TOTAL SCORE: 0
ORIENTATION AND CLOUDING OF SENSORIUM: ORIENTED AND CAN DO SERIAL ADDITIONS
AGITATION: NORMAL ACTIVITY
ORIENTATION AND CLOUDING OF SENSORIUM: ORIENTED AND CAN DO SERIAL ADDITIONS
TOTAL SCORE: 0
HEADACHE, FULLNESS IN HEAD: NOT PRESENT
ANXIETY: NO ANXIETY (AT EASE)
HEADACHE, FULLNESS IN HEAD: NOT PRESENT
ORIENTATION AND CLOUDING OF SENSORIUM: ORIENTED AND CAN DO SERIAL ADDITIONS
TOTAL SCORE: 0
AUDITORY DISTURBANCES: NOT PRESENT
AUDITORY DISTURBANCES: NOT PRESENT
ORIENTATION AND CLOUDING OF SENSORIUM: ORIENTED AND CAN DO SERIAL ADDITIONS
PAROXYSMAL SWEATS: NO SWEAT VISIBLE
ANXIETY: NO ANXIETY (AT EASE)
TREMOR: NO TREMOR
AGITATION: NORMAL ACTIVITY
TREMOR: NO TREMOR
VISUAL DISTURBANCES: NOT PRESENT
TOTAL SCORE: 0
HEADACHE, FULLNESS IN HEAD: NOT PRESENT
ANXIETY: NO ANXIETY (AT EASE)
TOTAL SCORE: 0
PAROXYSMAL SWEATS: NO SWEAT VISIBLE
TREMOR: NO TREMOR
HEADACHE, FULLNESS IN HEAD: NOT PRESENT
AGITATION: NORMAL ACTIVITY
AUDITORY DISTURBANCES: NOT PRESENT
PAROXYSMAL SWEATS: NO SWEAT VISIBLE
AUDITORY DISTURBANCES: NOT PRESENT
NAUSEA AND VOMITING: NO NAUSEA AND NO VOMITING
AGITATION: NORMAL ACTIVITY
NAUSEA AND VOMITING: NO NAUSEA AND NO VOMITING
VISUAL DISTURBANCES: NOT PRESENT
AUDITORY DISTURBANCES: NOT PRESENT
PAROXYSMAL SWEATS: NO SWEAT VISIBLE
PAROXYSMAL SWEATS: NO SWEAT VISIBLE
AGITATION: NORMAL ACTIVITY
ORIENTATION AND CLOUDING OF SENSORIUM: ORIENTED AND CAN DO SERIAL ADDITIONS
NAUSEA AND VOMITING: NO NAUSEA AND NO VOMITING
HEADACHE, FULLNESS IN HEAD: NOT PRESENT

## 2022-06-14 ASSESSMENT — ENCOUNTER SYMPTOMS
BLURRED VISION: 0
WHEEZING: 0
ABDOMINAL PAIN: 0
MYALGIAS: 0
EYE REDNESS: 0
NAUSEA: 0
WEIGHT LOSS: 0
SHORTNESS OF BREATH: 0
FOCAL WEAKNESS: 0
CHILLS: 0
BLOOD IN STOOL: 0
BACK PAIN: 0
DIARRHEA: 0
CONSTIPATION: 0
DIZZINESS: 0
PALPITATIONS: 0
NECK PAIN: 0
VOMITING: 0
EYE PAIN: 0
SORE THROAT: 0
HEARTBURN: 0
DEPRESSION: 0
NERVOUS/ANXIOUS: 0
SPUTUM PRODUCTION: 0
SEIZURES: 0
CLAUDICATION: 0
FEVER: 0
COUGH: 0
SINUS PAIN: 0
STRIDOR: 0
INSOMNIA: 0
ORTHOPNEA: 0
EYE DISCHARGE: 0
HEADACHES: 0

## 2022-06-14 ASSESSMENT — PAIN DESCRIPTION - PAIN TYPE
TYPE: ACUTE PAIN
TYPE: ACUTE PAIN

## 2022-06-14 NOTE — PROGRESS NOTES
Gastroenterology Initial Consult Note               Author:  JERSON Godoy Date & Time Created: 6/12/2022 3:26 PM       Patient ID:  Name:             Jaymie Peacock  YOB: 1955  Age:                 66 y.o.  female  MRN:               [id number]      Referring Provider:  Parker Sellers MD      Presenting Chief Complaint:  Dark stools, Anemia      History of Present Illness:    She is a 66-year-old female patient seen in consultation for dark stools and anemia.  The patient presented to the hospital earlier today with complaints of dark black appearing stools which started this morning.  She also reports 1 episode of dark emesis shortly after drinking Diet Coke with some associated nausea.  She denies NSAID use.  She has a past medical history that includes pulmonary embolism and left lower extremity DVT currently on Eliquis last dose today, COPD, tobacco abuse, alcohol abuse last drink yesterday, stress-induced cardiomyopathy.  The patient has undergone evaluation for recurrent melena and anemia withEGD on 4/21/2022 by Dr. Rell Rivas with findings of 2 duodenal AVMs and mild erosive gastritis.  AVMs were treated with APC.  Colonoscopy was normal.  She then had another admission on May 10th 2022 with similar GI bleeding suspected.  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  No active bleeding was seen on any of her procedures.    On admission patient noted to have hemoglobin 6, hematocrit 19.2, .6, platelets are normal.  CMP with CO2 17, glucose 119, BUN 35, creatinine 1.03.  Magnesium 1.3, INR 3.97.  It is charted that patient stool occult blood positive in ER.    INTERVAL HISTORY:    6/13/22: Stable. H/H stable. INR: 1.63.  One formed dark colored stool this morning. No abdominal pain. Abdominal US: echogenic liver most likely hepatic steatosis.    6/14/22: Stable. H/H stable.  No acute overnight events. No further melenic stool. Denies abdominal pain.    Review of Systems:  Review of Systems   Constitutional: Positive for malaise/fatigue. Negative for chills and fever.   HENT: Negative for congestion and sinus pain.    Eyes: Negative for pain and discharge.   Respiratory: Negative for cough and wheezing.    Cardiovascular: Negative for chest pain and palpitations.   Gastrointestinal: Negative for GI bleed.   Genitourinary: Negative for flank pain and hematuria.   Musculoskeletal: Negative for falls and joint pain.   Skin: Negative for itching and rash.   Neurological: Positive for weakness.   Psychiatric/Behavioral: Negative for memory loss. The patient does not have insomnia.    All other systems reviewed and are negative.            Past Medical History:  Past Medical History:   Diagnosis Date   • DVT (deep venous thrombosis) (HCC)    • GI bleed    • High cholesterol    • Hypertension    • Osteoporosis    • Pulmonary embolism (HCC)    • Smoking      Active Hospital Problems    Diagnosis    • Acute GI bleeding [K92.2]    • Macrocytic anemia [D53.9]    • Acute deep vein thrombosis (DVT) of left lower extremity (HCC) [I82.402]    • Alcohol abuse [F10.10]          Past Surgical History:  Past Surgical History:   Procedure Laterality Date   • NM UPPER GI ENDOSCOPY,CTRL BLEED N/A 05/11/2022    Procedure: GASTROSCOPY, WITH ARGON PLASMA COAGULATION;  Surgeon: Yariel Pagan M.D.;  Location: SURGERY SAME DAY [facility];  Service: Gastroenterology   • GASTROSCOPY W/PUSH ENTERSCOPY N/A 05/11/2022    Procedure: GASTROSCOPY, WITH PUSH ENTEROSCOPY;  Surgeon: Yariel Pagan M.D.;  Location: SURGERY SAME DAY [facility];  Service: Gastroenterology   • NM UPPER GI ENDOSCOPY,DIAGNOSIS  04/21/2022    Procedure: GASTROSCOPY;  Surgeon: Rell Rivas M.D.;  Location: SURGERY SAME DAY [facility];  Service: Gastroenterology   • NM COLONOSCOPY,DIAGNOSTIC  04/21/2022    Procedure: COLONOSCOPY;  Surgeon: Rell  CHECO Rivas M.D.;  Location: SURGERY SAME DAY [facility];  Service: Gastroenterology   • HI UPPER GI ENDOSCOPY,BIOPSY  04/21/2022    Procedure: GASTROSCOPY, WITH BIOPSY;  Surgeon: Rell Rivas M.D.;  Location: SURGERY SAME DAY [facility];  Service: Gastroenterology   • HI UPPER GI ENDOSCOPY,CTRL BLEED  04/21/2022    Procedure: GASTROSCOPY, WITH ARGON PLASMA COAGULATION;  Surgeon: Rell Rivas M.D.;  Location: SURGERY SAME DAY [facility];  Service: Gastroenterology   • HIP REPLACEMENT, PARTIAL Right    • US-NEEDLE CORE BX-BREAST PANEL [facility] Medications:  Current Facility-Administered Medications   Medication Dose Frequency Provider Last Rate Last Admin   • mirtazapine (Remeron) orally disintegrating tab 15 mg  15 mg Nightly Parker Sellers M.D.       • simvastatin (ZOCOR) tablet 20 mg  20 mg Q EVENING Parkeraspen Sellers M.D.       • pantoprazole (Protonix) injection 40 mg  40 mg BID Parker Sellers M.D.       • [Held by provider] rivaroxaban (XARELTO) tablet 20 mg  20 mg PM MEAL Parker Sellers M.D.       • senna-docusate (PERICOLACE or SENOKOT S) 8.6-50 MG per tablet 2 Tablet  2 Tablet BID Parker Sellers M.D.        And   • polyethylene glycol/lytes (MIRALAX) PACKET 1 Packet  1 Packet QDAY PRN Parker Sellers M.D.        And   • magnesium hydroxide (MILK OF MAGNESIA) suspension 30 mL  30 mL QDAY PRN Parker Sellers M.D.        And   • bisacodyl (DULCOLAX) suppository 10 mg  10 mg QDAY PRN Parker Sellers M.D.       • lactated ringers infusion   Continuous Parker Sellers M.D.       • acetaminophen (Tylenol) tablet 650 mg  650 mg Q6HRS PRN Parker Sellers M.D.       • hydrALAZINE (APRESOLINE) injection 10 mg  10 mg Q4HRS PRN Parker Sellers M.D.       • ondansetron (ZOFRAN) syringe/vial injection 4 mg  4 mg Q4HRS PRN Parker Sellers M.D.       • ondansetron (ZOFRAN ODT)  dispertab 4 mg  4 mg Q4HRS PRN Parker Sellers M.D.       • octreotide (SANDOSTATIN) 1,250 mcg in  mL Infusion  50 mcg/hr Continuous Parker Sellers M.D.       Last reviewed on 6/12/2022  2:23 PM by Paola Samaniego        Current Outpatient Medications:  Medications Prior to Admission   Medication Sig Dispense Refill Last Dose   • Rivaroxaban (XARELTO STARTER PACK) 15 & 20 MG Tablet Therapy Pack Take 15-20 mg by mouth see administration instructions. Pt started on 5/13/2022 15MG BID for 21 day course, and is now on 20MG and has 9 tablets left   6/12/2022 at 0800   • omeprazole (PRILOSEC) 20 MG delayed-release capsule Take 1 Capsule by mouth every day. 30 Capsule 0 6/12/2022 at 0800   • mirtazapine (REMERON) 15 MG Tab Take 1 Tablet by mouth every evening. 30 Tablet 0 6/11/2022 at 1930   • simvastatin (ZOCOR) 20 MG Tab Take 20 mg by mouth every evening. Indications: Ischemic Heart Disease   6/11/2022 at 1930         Medication Allergies:  No Known Allergies      Family Medical History:  History reviewed. No pertinent family history.      Social History:  Social History     Socioeconomic History   • Marital status: Unknown     Spouse name: Not on file   • Number of children: Not on file   • Years of education: Not on file   • Highest education level: Not on file   Occupational History   • Not on file   Tobacco Use   • Smoking status: Current Every Day Smoker     Packs/day: 0.50     Types: Cigarettes   • Smokeless tobacco: Never Used   Vaping Use   • Vaping Use: Never used   Substance and Sexual Activity   • Alcohol use: Yes     Alcohol/week: 0.6 oz     Types: 1 Shots of liquor per week   • Drug use: Not Currently   • Sexual activity: Not on file   Other Topics Concern   • Not on file   Social History Narrative   • Not on file     Social Determinants of Health     Financial Resource Strain: Not on file   Food Insecurity: Not on file   Transportation Needs: Not on file   Physical  "Activity: Not on file   Stress: Not on file   Social Connections: Not on file   Intimate Partner Violence: Not on file   Housing Stability: Not on file         Vital signs:  Weight/BMI: Body mass index is 16.14 kg/m².  /55   Pulse 99   Temp 36.6 °C (97.9 °F) (Temporal)   Resp 18   Ht 1.676 m (5' 6\")   Wt 45.4 kg (100 lb)   SpO2 99%   Vitals:    06/12/22 1249 06/12/22 1250 06/12/22 1318 06/12/22 1448   BP: 109/66  (!) 95/65 100/55   Pulse:  100 94 99   Resp:       Temp:       TempSrc:       SpO2:  99% 99% 99%   Weight:       Height:         Oxygen Therapy:  Pulse Oximetry: 99 %, O2 Delivery Device: None - Room Air  No intake or output data in the 24 hours ending 06/12/22 1526      Physical Exam:  Physical Exam  Vitals and nursing note reviewed.   Constitutional:       Appearance: Normal appearance.   HENT:      Head: Normocephalic and atraumatic.      Right Ear: External ear normal.      Left Ear: External ear normal.      Nose: Nose normal. No congestion.      Mouth/Throat:      Mouth: Mucous membranes are moist.      Pharynx: Oropharynx is clear.   Eyes:      General: No scleral icterus.     Extraocular Movements: Extraocular movements intact.      Pupils: Pupils are equal, round, and reactive to light.   Cardiovascular:      Rate and Rhythm: Normal rate and regular rhythm.      Pulses: Normal pulses.      Heart sounds: Normal heart sounds. No murmur heard.  Pulmonary:      Effort: Pulmonary effort is normal. No respiratory distress.      Breath sounds: Normal breath sounds.   Abdominal:      General: Abdomen is flat. Bowel sounds are normal. There is no distension.      Palpations: Abdomen is soft.      Tenderness: There is no abdominal tenderness.   Musculoskeletal:      Cervical back: Neck supple.      Right lower leg: No edema.      Left lower leg: No edema.   Lymphadenopathy:      Cervical: No cervical adenopathy.   Skin:     General: Skin is warm and dry.      Coloration: Skin is pale. Skin is not " jaundiced.   Neurological:      General: No focal deficit present.      Mental Status: She is alert and oriented to person, place, and time.   Psychiatric:         Thought Content: Thought content normal.         Judgment: Judgment normal.           Labs:  Recent Labs     06/12/22  1123 06/12/22  1207   SODIUM 139  --    POTASSIUM 4.0  --    CHLORIDE 106  --    CO2 17*  --    BUN 35*  --    CREATININE 1.03  --    MAGNESIUM  --  1.3*   CALCIUM 8.5  --      Recent Labs     06/12/22  1123   ALTSGPT 9   ASTSGOT 28   ALKPHOSPHAT 97   TBILIRUBIN 0.4   LIPASE 43   GLUCOSE 119*     Recent Labs     06/12/22  1123   WBC 7.1   NEUTSPOLYS 82.90*   LYMPHOCYTES 8.90*   MONOCYTES 7.20   EOSINOPHILS 0.00   BASOPHILS 0.60   ASTSGOT 28   ALTSGPT 9   ALKPHOSPHAT 97   TBILIRUBIN 0.4     Recent Labs     06/12/22  1123   RBC 1.69*   HEMOGLOBIN 6.0*   HEMATOCRIT 19.2*   PLATELETCT 207   PROTHROMBTM 36.1*   INR 3.97*     Recent Results (from the past 24 hour(s))   CBC WITH DIFFERENTIAL    Collection Time: 06/12/22 11:23 AM   Result Value Ref Range    WBC 7.1 4.8 - 10.8 K/uL    RBC 1.69 (L) 4.20 - 5.40 M/uL    Hemoglobin 6.0 (L) 12.0 - 16.0 g/dL    Hematocrit 19.2 (L) 37.0 - 47.0 %    .6 (H) 81.4 - 97.8 fL    MCH 35.5 (H) 27.0 - 33.0 pg    MCHC 31.3 (L) 33.6 - 35.0 g/dL    RDW 69.4 (H) 35.9 - 50.0 fL    Platelet Count 207 164 - 446 K/uL    MPV 9.7 9.0 - 12.9 fL    Neutrophils-Polys 82.90 (H) 44.00 - 72.00 %    Lymphocytes 8.90 (L) 22.00 - 41.00 %    Monocytes 7.20 0.00 - 13.40 %    Eosinophils 0.00 0.00 - 6.90 %    Basophils 0.60 0.00 - 1.80 %    Immature Granulocytes 0.40 0.00 - 0.90 %    Nucleated RBC 0.00 /100 WBC    Neutrophils (Absolute) 5.90 2.00 - 7.15 K/uL    Lymphs (Absolute) 0.63 (L) 1.00 - 4.80 K/uL    Monos (Absolute) 0.51 0.00 - 0.85 K/uL    Eos (Absolute) 0.00 0.00 - 0.51 K/uL    Baso (Absolute) 0.04 0.00 - 0.12 K/uL    Immature Granulocytes (abs) 0.03 0.00 - 0.11 K/uL    NRBC (Absolute) 0.00 K/uL   COMP METABOLIC PANEL     Collection Time: 06/12/22 11:23 AM   Result Value Ref Range    Sodium 139 135 - 145 mmol/L    Potassium 4.0 3.6 - 5.5 mmol/L    Chloride 106 96 - 112 mmol/L    Co2 17 (L) 20 - 33 mmol/L    Anion Gap 16.0 7.0 - 16.0    Glucose 119 (H) 65 - 99 mg/dL    Bun 35 (H) 8 - 22 mg/dL    Creatinine 1.03 0.50 - 1.40 mg/dL    Calcium 8.5 8.4 - 10.2 mg/dL    AST(SGOT) 28 12 - 45 U/L    ALT(SGPT) 9 2 - 50 U/L    Alkaline Phosphatase 97 30 - 99 U/L    Total Bilirubin 0.4 0.1 - 1.5 mg/dL    Albumin 3.3 3.2 - 4.9 g/dL    Total Protein 5.4 (L) 6.0 - 8.2 g/dL    Globulin 2.1 1.9 - 3.5 g/dL    A-G Ratio 1.6 g/dL   LIPASE    Collection Time: 06/12/22 11:23 AM   Result Value Ref Range    Lipase 43 7 - 58 U/L   PROTHROMBIN TIME (INR)    Collection Time: 06/12/22 11:23 AM   Result Value Ref Range    PT 36.1 (H) 12.0 - 14.6 sec    INR 3.97 (H) 0.87 - 1.13   COD (ADULT)    Collection Time: 06/12/22 11:23 AM   Result Value Ref Range    ABO Grouping Only A     Rh Grouping Only POS     Antibody Screen-Cod NEG     Component R       R99                 Red Cells, LR       [id number]   selected     06/12/22   14:14      Product Type R99     Dispense Status selected     Unit Number (Barcoded) [id number]     Product Code (Barcoded) [id number]     Blood Type (Barcoded) 6200    ESTIMATED GFR    Collection Time: 06/12/22 11:23 AM   Result Value Ref Range    GFR (CKD-EPI) 59 (A) >60 mL/min/1.73 m 2   DIFFERENTIAL COMMENT    Collection Time: 06/12/22 11:23 AM   Result Value Ref Range    Comments-Diff see below    MAGNESIUM    Collection Time: 06/12/22 12:07 PM   Result Value Ref Range    Magnesium 1.3 (L) 1.5 - 2.5 mg/dL   RETICULOCYTES COUNT    Collection Time: 06/12/22 12:07 PM   Result Value Ref Range    Reticulocyte Count 5.6 (H) 0.8 - 2.1 %    Retic, Absolute 0.09 (H) 0.04 - 0.06 M/uL    Imm. Reticulocyte Fraction 42.1 (H) 9.3 - 17.4 %    Retic Hgb Equivalent 41.2 (H) 29.0 - 35.0 pg/cell         Radiology Review:  DX-CHEST-PORTABLE (1 VIEW)    Final Result      No acute cardiopulmonary abnormality.         US-RUQ    (Results Pending)         MDM (Data Review):   -Records reviewed and summarized in current documentation  -I personally reviewed and interpreted the laboratory results  -I personally reviewed the radiology images        Medical Decision Making, by Problem:  Active Hospital Problems    Diagnosis    • Acute GI bleeding [K92.2]    • Macrocytic anemia [D53.9]    • Acute deep vein thrombosis (DVT) of left lower extremity (HCC) [I82.402]    • Alcohol abuse [F10.10]            Assessment/Recommendations:  66-year-old female patient with onset of dark stools and 1 episode of dark emesis occurring today, Hemoccult positive in the emergency room.  She does have acute on chronic anemia where her normal hemoglobin resides anywhere from 7.5-10.  Currently hemoglobin is at 6.  She does take Xarelto for history of DVT and PE.  She also continues to drink alcohol despite being told in the past to stop.  No history of cirrhosis and her  liver enzymes are currently normal.  Platelets are also normal lending against cirrhosis diagnosis.  Right upper quadrant ultrasound is pending ordered by hospitalist.  She has had multiple endoscopies in the last 2 months for evaluation and treatment of a few angiodysplasias nonbleeding.  Consider bleeding from angiodysplasias versus Crystal-Payan tear versus peptic ulcer versus other bleeding lesion less likely.  She more recently took her Xarelto and INR is elevated.    Assessment:  -Dark stools, possible melena  -Possible hematemesis  -Acute on chronic macrocytic anemia  -Hemoccult positive stool  -Chronic anticoagulation with Xarelto  -History of PE and DVT  -Alcohol abuse    Plan:  -Continue Protonix 40 mg twice daily  -Full liquid diet no red liquids  -Risks, benefits, and alternatives of EGD/push enteroscopy procedures were discussed with patient and/or family member(s).  Consenting person(s) were given opportunities  to ask questions and discuss other options.  Risks including but not limited to perforation, infection, bleeding, missed lesion(s), possible need for surgery(ies) and/or interventional radiology, possible need for repeat procedure(s) and/or additional testing, hospitalization possibly prolonged, cardiac and/or pulmonary event, aspiration, hypoxia, stroke, medication and/or anesthesia reaction, indefinite diagnosis, discomfort, unsuccessful and/or incomplete procedure, ineffective therapy and/or persistent symptoms, damage to adjacent organs and/or vascular structures, and other adverse events possibly life-threatening.  Interactive discussion was undertaken with Layman's terms. I answered questions in full and to satisfaction.  Consenting person(s) stated understanding and acceptance of these risks, and wished to proceed.  Informed consent was given in clear state of mind.       -NPO after midnight        Ana CALL    Thank you for inviting me to participate in the care of this patient. Please do not hesitate to call GI consultants with additional questions/concerns or changes in the patient's clinical status at [phone].      Core Quality Measures   Reviewed items:  Labs, Medications and Radiology reports reviewed

## 2022-06-14 NOTE — DISCHARGE PLANNING
Case Management Discharge Planning    Admission Date: 6/12/2022  GMLOS: 3  ALOS: 2    6-Clicks ADL Score: 22  6-Clicks Mobility Score: 22      Anticipated Discharge Dispo:  Home with [facility]      Action(s) Taken: Per chart review, pt on service with [facility] and MD placed HH order and face to face.     LSW faxed HH choice for [facility] to [facility] to resume services.     Next Steps: LSW to follow and assist as needed.    Barriers to Discharge: None

## 2022-06-14 NOTE — PROGRESS NOTES
[facility] Medicine Daily Progress Note    Date of Service  6/14/2022    Chief Complaint  Jaymie Peacock is a 66 y.o. female admitted 6/12/2022 with melena    Hospital Course  66-year-old alcoholic female with a past medical history of hypertension, COPD, osteoporosis, tobacco abuse, stress induced cardiomyopathy (35 ->55% EF per recent echo), recent admission for PE and LLE DVT on xarelto as well as recent GI evaluation due to anemia and occult positive stool s/p EGD ([facility]) during 5/10-5/13/2022 admission 2 mm duodenal bulb angiodysplasia, 2 distal duodenal angiodysplasias, and 2 proximal jejunal angiodysplasias all of which were treated with argon plasma coagulation and ultimately restarted on anticoagulation present emergency department on 6/12/2022 with melena that started this morning.  Patient reports that she noticed melena this morning and had nausea with one episode of dark emesis shortly after drinking Diet Coke.  Patient denies abdominal pain, hematochezia or hematemesis.  She reports compliance with Xarelto and all meds at home.  She does admit to having 2 cocktails last night but states that she has been cutting down on her drinking.  No orthostatic changes or loss of consciousness.    Interval Problem Update  6/13 - Pt normotensive, not tachycardic, had one melanic stool today at noon. Pt received 2u PRBC yesterday, hemoglobin went from a ida of 5.3 to 8.1 this morning. RUQ with hepatic steatosis, TEG not indicative of FFP/platelets etc needed. Jump Mag and K today. INR has come down from 3.97 to 1.63 after vitamin K administration, pt relates her last dose of Xarelto was yesterday am prior to coming to hospital.     Will monitor pt's symptoms and hemoglobin overnight - if no plans by GI to scope, we may need to consider IVC filter placement. Were she to need scoping, any AVMs as a source of bleeding could be treated and she could potentially continue Xarelto for PTE and DVT diagnosed in April. If  not, we would likely have to place IVC filter and stop anticoagulation.  Discussed with Dr Guerrier who does plan on scoping as an outpatient. Checking hemolysis labs. Had a low EPO level in March, will recheck and if low, consider EPO supplementation.    6/14: Her EGD was postponed to tomorrow.  She can be restarted on her diet and keep n.p.o. at midnight.  Plan to have EGD done by GI tomorrow.  I have discussed this patient's plan of care and discharge plan at IDT rounds today with Case Management, Nursing, Nursing leadership, and other members of the IDT team.    Consultants/Specialty  GI    Code Status  Full Code    Disposition  Patient is not medically cleared for discharge.   Anticipate discharge to to home with organized home healthcare and close outpatient follow-up.  I have placed the appropriate orders for post-discharge needs.    Review of Systems  Review of Systems   Constitutional: Negative for chills, fever and weight loss.   HENT: Negative for congestion, hearing loss, nosebleeds, sinus pain and sore throat.    Eyes: Negative for blurred vision, pain, discharge and redness.   Respiratory: Negative for cough, sputum production, shortness of breath, wheezing and stridor.    Cardiovascular: Negative for chest pain, palpitations, orthopnea, claudication and leg swelling.   Gastrointestinal: Negative for abdominal pain, blood in stool, constipation, diarrhea, heartburn, nausea and vomiting.   Genitourinary: Negative for dysuria, frequency, hematuria and urgency.   Musculoskeletal: Negative for back pain, myalgias and neck pain.   Skin: Negative for itching and rash.   Neurological: Negative for dizziness, focal weakness, seizures and headaches.   Psychiatric/Behavioral: Negative for depression. The patient is not nervous/anxious and does not have insomnia.    All other systems reviewed and are negative.       Physical Exam  Temp:  [36.4 °C (97.6 °F)-36.7 °C (98 °F)] 36.4 °C (97.6 °F)  Pulse:  [70-79]  70  Resp:  [17-18] 18  BP: ()/(55-72) 123/72  SpO2:  [90 %-97 %] 90 %    Physical Exam  Vitals and nursing note reviewed.   Constitutional:       General: She is not in acute distress.     Appearance: Normal appearance. She is normal weight. She is not ill-appearing, toxic-appearing or diaphoretic.   HENT:      Head: Normocephalic and atraumatic.      Right Ear: Tympanic membrane, ear canal and external ear normal.      Left Ear: Tympanic membrane, ear canal and external ear normal.      Nose: No congestion or rhinorrhea.      Mouth/Throat:      Mouth: Mucous membranes are moist.   Eyes:      Extraocular Movements: Extraocular movements intact.      Conjunctiva/sclera: Conjunctivae normal.      Pupils: Pupils are equal, round, and reactive to light.   Cardiovascular:      Rate and Rhythm: Normal rate and regular rhythm.      Pulses: Normal pulses.      Heart sounds: Normal heart sounds. No murmur heard.    No friction rub. No gallop.   Pulmonary:      Effort: Pulmonary effort is normal. No respiratory distress.      Breath sounds: Normal breath sounds. No stridor. No wheezing, rhonchi or rales.   Abdominal:      General: Abdomen is flat. Bowel sounds are normal. There is no distension.      Palpations: Abdomen is soft. There is no mass.      Tenderness: There is no abdominal tenderness. There is no guarding or rebound.      Hernia: No hernia is present.   Musculoskeletal:         General: No swelling or tenderness.      Cervical back: Normal range of motion and neck supple.   Skin:     General: Skin is warm and dry.      Coloration: Skin is pale.      Findings: No erythema.   Neurological:      General: No focal deficit present.      Mental Status: She is alert and oriented to person, place, and time. Mental status is at baseline.   Psychiatric:         Mood and Affect: Mood normal.         Behavior: Behavior normal.         Fluids  No intake or output data in the 24 hours ending 06/14/22  1028    Laboratory  Recent Labs     06/12/22  1123 06/12/22  1616 06/13/22  0650 06/13/22  1342 06/13/22  2013 06/14/22  0158 06/14/22  0812   WBC 7.1  --  4.3*  --   --  5.5  --    RBC 1.69*  --  2.50*  --   --  2.73*  --    HEMOGLOBIN 6.0*   < > 8.1*   < > 9.3* 8.8* 8.9*   HEMATOCRIT 19.2*  --  24.5*  --   --  27.4*  --    .6*  --  98.0*  --   --  100.4*  --    MCH 35.5*  --  32.4  --   --  32.2  --    MCHC 31.3*  --  33.1*  --   --  32.1*  --    RDW 69.4*  --  81.2*  --   --  86.5*  --    PLATELETCT 207  --  164  --   --  213  --    MPV 9.7  --  9.4  --   --  9.7  --     < > = values in this interval not displayed.     Recent Labs     06/12/22  1123 06/13/22  0650 06/14/22  0158   SODIUM 139 136 137   POTASSIUM 4.0 3.5* 3.8   CHLORIDE 106 107 107   CO2 17* 19* 20   GLUCOSE 119* 72 76   BUN 35* 23* 13   CREATININE 1.03 0.84 0.71   CALCIUM 8.5 8.0* 8.3*     Recent Labs     06/12/22  1123 06/13/22  0650   INR 3.97* 1.63*               Imaging  US-THYROID   Final Result      Benign colloid cysts seen in the right thyroid gland. No suspicious thyroid nodule.      ACR TI-RADS Recommendations   TR1 - No FNA or follow up recommended.      Recommendations based on the American College of Radiology Thyroid imaging, reporting and Data System (TI-RADS) 2017.      INTERPRETING LOCATION: [facility] [place], [place], [address]      US-RUQ   Final Result      1. Echogenic liver, most commonly hepatic steatosis.      2. Status post cholecystectomy. No biliary dilatation.         DX-CHEST-PORTABLE (1 VIEW)   Final Result      No acute cardiopulmonary abnormality.              Assessment/Plan  * Acute GI bleeding- (present on admission)  Assessment & Plan  - Presenting with acute episode of melena and lightheadedness just prior admission, melanic stool today  - EGD ([facility]) during 5/10-5/13/2022 admission 2 mm duodenal bulb angiodysplasia, 2 distal duodenal angiodysplasias, and 2 proximal jejunal angiodysplasias all of which were  treated with argon plasma coagulation  - Consideration for angiodysplasias versus Crystal-Payan tear versus peptic ulcer disease  - Holding home Xarelto for PTE and DVT in April, discussing with GI whether scoping will occur, and if not, whether to resume Xarelto or pursue IVC filter placement. Currently with plans to scope, unclear timeline - awaiting Xarelto washout  - IV PPI twice daily  - s/p 2u PRBC transfusion yesterday, continue to trend H/H  - TEG scan unremarkable, no need for additional blood products currently  - GI on board, appreciated   -Planning EGD tomorrow    Multiple thyroid nodules  Assessment & Plan  - Seen on CT last hospitalization, does not appear TSH or thyroid u/s obtained at that time  - Check TSH and us here      Macrocytic anemia- (present on admission)  Assessment & Plan  Secondary to chronic alcoholism and acute blood loss anemia  Management as per above  B12s checked in the past were elevated, will recheck labs  Retic count is appropriate  Check LDH, haptoglobin, fibrinogen    Elevated INR- (present on admission)  Assessment & Plan  - Received Vitamin K on admit given active bleeding on Xarelto with concern of underlying liver disease causing elevation in INR    Acute deep vein thrombosis (DVT) of left lower extremity with PTE - (present on admission)  Assessment & Plan  Hold anticoagulation in the setting of acute GI bleed  Monitor  Would need 6 months of anticoagulation from 4/22 given her PTE and DVT seen on imaging at that time - will discuss with GI.     Alcohol abuse- (present on admission)  Assessment & Plan  History of alcoholism  Admits to having 2 alcoholic beverages prior to admission but reports that she is cut down on alcohol  No signs of withdrawal, monitor with CIWA  Rally bag  Labs on a.m.       VTE prophylaxis: SCDs/TEDs    I have performed a physical exam and reviewed and updated ROS and Plan today (6/14/2022). In review of yesterday's note (6/13/2022), there are no  changes except as documented above.

## 2022-06-14 NOTE — DISCHARGE PLANNING
Received Choice form at [phone]  Agency/Facility Name: [facility]  Referral sent per Choice form @ [phone]

## 2022-06-14 NOTE — PROGRESS NOTES
GI called this RN and requested that we notify patient that they are unable to scope patient tomorrow (6/14) due to no available time slots. MD will try and plan for scope on 6/15 if there are any available time slots.     Patient agreeable to change of plans at this time.

## 2022-06-14 NOTE — PROGRESS NOTES
Telemetry Shift Summary     Rhythm: SR  HR: 72-95  Ectopy: rPVC  Measurements: .20/.08/.40   (Per 0400 strip)  Normal Values  Rhythm: SR  HR:   Measurements: 0.12-0.20 / 0.04-0.10 / 0.30-0.52    Strip reviewed and placed in chart

## 2022-06-14 NOTE — PROGRESS NOTES
Spoke to ONEYDA Mcleod from GI about patient's EGD. Notified GI that patient stated she would like to have the scope done early in the morning on 6/15 if possible. GI able to do scope at 1400 on 6/15. Patient agreeable to this.

## 2022-06-15 ENCOUNTER — ANESTHESIA (OUTPATIENT)
Dept: SURGERY | Facility: MEDICAL CENTER | Age: 67
DRG: 378 | End: 2022-06-15
Payer: MEDICARE

## 2022-06-15 ENCOUNTER — ANESTHESIA EVENT (OUTPATIENT)
Dept: SURGERY | Facility: MEDICAL CENTER | Age: 67
DRG: 378 | End: 2022-06-15
Payer: MEDICARE

## 2022-06-15 LAB
BASOPHILS # BLD AUTO: 0.8 % (ref 0–1.8)
BASOPHILS # BLD: 0.04 K/UL (ref 0–0.12)
EOSINOPHIL # BLD AUTO: 0.09 K/UL (ref 0–0.51)
EOSINOPHIL NFR BLD: 1.9 % (ref 0–6.9)
EPO SERPL-ACNC: 8 MU/ML (ref 4–27)
ERYTHROCYTE [DISTWIDTH] IN BLOOD BY AUTOMATED COUNT: 86.1 FL (ref 35.9–50)
HAPTOGLOB SERPL-MCNC: 34 MG/DL (ref 30–200)
HCT VFR BLD AUTO: 27.3 % (ref 37–47)
HGB BLD-MCNC: 8.7 G/DL (ref 12–16)
IMM GRANULOCYTES # BLD AUTO: 0.02 K/UL (ref 0–0.11)
IMM GRANULOCYTES NFR BLD AUTO: 0.4 % (ref 0–0.9)
LYMPHOCYTES # BLD AUTO: 0.98 K/UL (ref 1–4.8)
LYMPHOCYTES NFR BLD: 20.8 % (ref 22–41)
MCH RBC QN AUTO: 32.5 PG (ref 27–33)
MCHC RBC AUTO-ENTMCNC: 31.9 G/DL (ref 33.6–35)
MCV RBC AUTO: 101.9 FL (ref 81.4–97.8)
MONOCYTES # BLD AUTO: 0.45 K/UL (ref 0–0.85)
MONOCYTES NFR BLD AUTO: 9.6 % (ref 0–13.4)
NEUTROPHILS # BLD AUTO: 3.13 K/UL (ref 2–7.15)
NEUTROPHILS NFR BLD: 66.5 % (ref 44–72)
NRBC # BLD AUTO: 0 K/UL
NRBC BLD-RTO: 0 /100 WBC
PATHOLOGY CONSULT NOTE: NORMAL
PLATELET # BLD AUTO: 208 K/UL (ref 164–446)
PMV BLD AUTO: 9.7 FL (ref 9–12.9)
RBC # BLD AUTO: 2.68 M/UL (ref 4.2–5.4)
WBC # BLD AUTO: 4.7 K/UL (ref 4.8–10.8)

## 2022-06-15 PROCEDURE — 160048 HCHG OR STATISTICAL LEVEL 1-5: Performed by: INTERNAL MEDICINE

## 2022-06-15 PROCEDURE — 160002 HCHG RECOVERY MINUTES (STAT): Performed by: INTERNAL MEDICINE

## 2022-06-15 PROCEDURE — 160207 HCHG ENDO MINUTES - EA ADDL 1 MIN LEVEL 3: Performed by: INTERNAL MEDICINE

## 2022-06-15 PROCEDURE — 99232 SBSQ HOSP IP/OBS MODERATE 35: CPT | Performed by: INTERNAL MEDICINE

## 2022-06-15 PROCEDURE — 770020 HCHG ROOM/CARE - TELE (206)

## 2022-06-15 PROCEDURE — 700111 HCHG RX REV CODE 636 W/ 250 OVERRIDE (IP): Performed by: ANESTHESIOLOGY

## 2022-06-15 PROCEDURE — 00811 ANES LWR INTST NDSC NOS: CPT | Performed by: ANESTHESIOLOGY

## 2022-06-15 PROCEDURE — A9270 NON-COVERED ITEM OR SERVICE: HCPCS | Performed by: STUDENT IN AN ORGANIZED HEALTH CARE EDUCATION/TRAINING PROGRAM

## 2022-06-15 PROCEDURE — 160009 HCHG ANES TIME/MIN: Performed by: INTERNAL MEDICINE

## 2022-06-15 PROCEDURE — 160035 HCHG PACU - 1ST 60 MINS PHASE I: Performed by: INTERNAL MEDICINE

## 2022-06-15 PROCEDURE — 700105 HCHG RX REV CODE 258: Performed by: INTERNAL MEDICINE

## 2022-06-15 PROCEDURE — 88305 TISSUE EXAM BY PATHOLOGIST: CPT

## 2022-06-15 PROCEDURE — C9113 INJ PANTOPRAZOLE SODIUM, VIA: HCPCS | Performed by: STUDENT IN AN ORGANIZED HEALTH CARE EDUCATION/TRAINING PROGRAM

## 2022-06-15 PROCEDURE — 700102 HCHG RX REV CODE 250 W/ 637 OVERRIDE(OP): Performed by: STUDENT IN AN ORGANIZED HEALTH CARE EDUCATION/TRAINING PROGRAM

## 2022-06-15 PROCEDURE — 85025 COMPLETE CBC W/AUTO DIFF WBC: CPT

## 2022-06-15 PROCEDURE — 160202 HCHG ENDO MINUTES - 1ST 30 MINS LEVEL 3: Performed by: INTERNAL MEDICINE

## 2022-06-15 PROCEDURE — 0DB98ZX EXCISION OF DUODENUM, VIA NATURAL OR ARTIFICIAL OPENING ENDOSCOPIC, DIAGNOSTIC: ICD-10-PCS | Performed by: INTERNAL MEDICINE

## 2022-06-15 PROCEDURE — 700111 HCHG RX REV CODE 636 W/ 250 OVERRIDE (IP): Performed by: STUDENT IN AN ORGANIZED HEALTH CARE EDUCATION/TRAINING PROGRAM

## 2022-06-15 RX ORDER — DIPHENHYDRAMINE HYDROCHLORIDE 50 MG/ML
12.5 INJECTION INTRAMUSCULAR; INTRAVENOUS
Status: DISCONTINUED | OUTPATIENT
Start: 2022-06-15 | End: 2022-06-15 | Stop reason: HOSPADM

## 2022-06-15 RX ORDER — SODIUM CHLORIDE, SODIUM LACTATE, POTASSIUM CHLORIDE, CALCIUM CHLORIDE 600; 310; 30; 20 MG/100ML; MG/100ML; MG/100ML; MG/100ML
INJECTION, SOLUTION INTRAVENOUS CONTINUOUS
Status: DISCONTINUED | OUTPATIENT
Start: 2022-06-15 | End: 2022-06-15 | Stop reason: HOSPADM

## 2022-06-15 RX ORDER — ONDANSETRON 2 MG/ML
4 INJECTION INTRAMUSCULAR; INTRAVENOUS
Status: DISCONTINUED | OUTPATIENT
Start: 2022-06-15 | End: 2022-06-15 | Stop reason: HOSPADM

## 2022-06-15 RX ORDER — SODIUM CHLORIDE 9 MG/ML
INJECTION, SOLUTION INTRAVENOUS CONTINUOUS
Status: ACTIVE | OUTPATIENT
Start: 2022-06-15 | End: 2022-06-16

## 2022-06-15 RX ORDER — SODIUM CHLORIDE, SODIUM LACTATE, POTASSIUM CHLORIDE, CALCIUM CHLORIDE 600; 310; 30; 20 MG/100ML; MG/100ML; MG/100ML; MG/100ML
INJECTION, SOLUTION INTRAVENOUS CONTINUOUS
Status: DISCONTINUED | OUTPATIENT
Start: 2022-06-15 | End: 2022-06-15

## 2022-06-15 RX ORDER — HALOPERIDOL 5 MG/ML
1 INJECTION INTRAMUSCULAR
Status: DISCONTINUED | OUTPATIENT
Start: 2022-06-15 | End: 2022-06-15 | Stop reason: HOSPADM

## 2022-06-15 RX ADMIN — SENNOSIDES AND DOCUSATE SODIUM 2 TABLET: 50; 8.6 TABLET ORAL at 19:02

## 2022-06-15 RX ADMIN — PROPOFOL 250 MG: 10 INJECTION, EMULSION INTRAVENOUS at 14:16

## 2022-06-15 RX ADMIN — PANTOPRAZOLE SODIUM 40 MG: 40 INJECTION, POWDER, LYOPHILIZED, FOR SOLUTION INTRAVENOUS at 05:14

## 2022-06-15 RX ADMIN — MIRTAZAPINE 15 MG: 15 TABLET, ORALLY DISINTEGRATING ORAL at 20:26

## 2022-06-15 RX ADMIN — SODIUM CHLORIDE: 9 INJECTION, SOLUTION INTRAVENOUS at 08:57

## 2022-06-15 RX ADMIN — SIMVASTATIN 20 MG: 20 TABLET, FILM COATED ORAL at 19:02

## 2022-06-15 RX ADMIN — PANTOPRAZOLE SODIUM 40 MG: 40 INJECTION, POWDER, LYOPHILIZED, FOR SOLUTION INTRAVENOUS at 19:03

## 2022-06-15 ASSESSMENT — ENCOUNTER SYMPTOMS
STRIDOR: 0
NECK PAIN: 0
DIARRHEA: 0
INSOMNIA: 0
HEADACHES: 0
SORE THROAT: 0
VOMITING: 0
DEPRESSION: 0
FOCAL WEAKNESS: 0
SEIZURES: 0
WEIGHT LOSS: 0
ABDOMINAL PAIN: 0
COUGH: 0
SPUTUM PRODUCTION: 0
PALPITATIONS: 0
EYE REDNESS: 0
DIZZINESS: 0
ORTHOPNEA: 0
BLOOD IN STOOL: 0
CLAUDICATION: 0
CONSTIPATION: 0
SHORTNESS OF BREATH: 0
WHEEZING: 0
BLURRED VISION: 0
EYE PAIN: 0
FEVER: 0
CHILLS: 0
MYALGIAS: 0
EYE DISCHARGE: 0
BACK PAIN: 0
NAUSEA: 0

## 2022-06-15 ASSESSMENT — LIFESTYLE VARIABLES
ORIENTATION AND CLOUDING OF SENSORIUM: ORIENTED AND CAN DO SERIAL ADDITIONS
TREMOR: NO TREMOR
ANXIETY: NO ANXIETY (AT EASE)
PAROXYSMAL SWEATS: NO SWEAT VISIBLE
HEADACHE, FULLNESS IN HEAD: NOT PRESENT
AUDITORY DISTURBANCES: NOT PRESENT
TOTAL SCORE: 0
NAUSEA AND VOMITING: NO NAUSEA AND NO VOMITING
VISUAL DISTURBANCES: NOT PRESENT
AGITATION: NORMAL ACTIVITY

## 2022-06-15 ASSESSMENT — PAIN DESCRIPTION - PAIN TYPE: TYPE: ACUTE PAIN

## 2022-06-15 ASSESSMENT — FIBROSIS 4 INDEX: FIB4 SCORE: 3.54

## 2022-06-15 NOTE — PROGRESS NOTES
[facility] Medicine Daily Progress Note    Date of Service  6/15/2022    Chief Complaint  Jaymie Peacock is a 66 y.o. female admitted 6/12/2022 with melena    Hospital Course  66-year-old alcoholic female with a past medical history of hypertension, COPD, osteoporosis, tobacco abuse, stress induced cardiomyopathy (35 ->55% EF per recent echo), recent admission for PE and LLE DVT on xarelto as well as recent GI evaluation due to anemia and occult positive stool s/p EGD ([facility]) during 5/10-5/13/2022 admission 2 mm duodenal bulb angiodysplasia, 2 distal duodenal angiodysplasias, and 2 proximal jejunal angiodysplasias all of which were treated with argon plasma coagulation and ultimately restarted on anticoagulation present emergency department on 6/12/2022 with melena that started this morning.  Patient reports that she noticed melena this morning and had nausea with one episode of dark emesis shortly after drinking Diet Coke.  Patient denies abdominal pain, hematochezia or hematemesis.  She reports compliance with Xarelto and all meds at home.  She does admit to having 2 cocktails last night but states that she has been cutting down on her drinking.  No orthostatic changes or loss of consciousness.    Interval Problem Update  6/13 - Pt normotensive, not tachycardic, had one melanic stool today at noon. Pt received 2u PRBC yesterday, hemoglobin went from a ida of 5.3 to 8.1 this morning. RUQ with hepatic steatosis, TEG not indicative of FFP/platelets etc needed. Jump Mag and K today. INR has come down from 3.97 to 1.63 after vitamin K administration, pt relates her last dose of Xarelto was yesterday am prior to coming to hospital.     Will monitor pt's symptoms and hemoglobin overnight - if no plans by GI to scope, we may need to consider IVC filter placement. Were she to need scoping, any AVMs as a source of bleeding could be treated and she could potentially continue Xarelto for PTE and DVT diagnosed in April. If  not, we would likely have to place IVC filter and stop anticoagulation.  Discussed with Dr Guerrier who does plan on scoping as an outpatient. Checking hemolysis labs. Had a low EPO level in March, will recheck and if low, consider EPO supplementation.    6/14: Her EGD was postponed to tomorrow.  She can be restarted on her diet and keep n.p.o. at midnight.  Plan to have EGD done by GI tomorrow.    6/15: No acute event overnight.  Patient is planning to get EGD done around 2 PM today.  I have discussed this patient's plan of care and discharge plan at IDT rounds today with Case Management, Nursing, Nursing leadership, and other members of the IDT team.    Consultants/Specialty  GI    Code Status  Full Code    Disposition  Patient is not medically cleared for discharge.   Anticipate discharge to to home with organized home healthcare and close outpatient follow-up.  I have placed the appropriate orders for post-discharge needs.    Review of Systems  Review of Systems   Constitutional: Negative for chills, fever and weight loss.   HENT: Negative for congestion, hearing loss, nosebleeds and sore throat.    Eyes: Negative for blurred vision, pain, discharge and redness.   Respiratory: Negative for cough, sputum production, shortness of breath, wheezing and stridor.    Cardiovascular: Negative for chest pain, palpitations, orthopnea, claudication and leg swelling.   Gastrointestinal: Negative for abdominal pain, constipation, diarrhea, nausea and vomiting.   Genitourinary: Negative for dysuria, frequency, hematuria and urgency.   Musculoskeletal: Negative for back pain, myalgias and neck pain.   Skin: Negative for itching and rash.   Neurological: Negative for dizziness, focal weakness, seizures and headaches.   Psychiatric/Behavioral: Negative for depression. The patient does not have insomnia.    All other systems reviewed and are negative.       Physical Exam  Temp:  [36.3 °C (97.3 °F)-36.9 °C (98.4 °F)] 36.7 °C (98.1  °F)  Pulse:  [65-88] 65  Resp:  [18] 18  BP: ()/(47-78) 110/70  SpO2:  [90 %-100 %] 93 %    Physical Exam  Vitals and nursing note reviewed.   Constitutional:       General: She is not in acute distress.     Appearance: Normal appearance. She is normal weight. She is not ill-appearing, toxic-appearing or diaphoretic.   HENT:      Head: Normocephalic and atraumatic.      Right Ear: Tympanic membrane and ear canal normal.      Left Ear: Tympanic membrane and ear canal normal.      Nose: No congestion or rhinorrhea.      Mouth/Throat:      Mouth: Mucous membranes are moist.   Eyes:      Extraocular Movements: Extraocular movements intact.      Conjunctiva/sclera: Conjunctivae normal.      Pupils: Pupils are equal, round, and reactive to light.   Cardiovascular:      Rate and Rhythm: Normal rate and regular rhythm.      Pulses: Normal pulses.      Heart sounds: Normal heart sounds. No murmur heard.    No friction rub. No gallop.   Pulmonary:      Effort: Pulmonary effort is normal. No respiratory distress.      Breath sounds: Normal breath sounds. No stridor. No wheezing, rhonchi or rales.   Abdominal:      General: Abdomen is flat. Bowel sounds are normal. There is no distension.      Palpations: Abdomen is soft. There is no mass.      Tenderness: There is no abdominal tenderness.      Hernia: No hernia is present.   Musculoskeletal:         General: No swelling or tenderness.      Cervical back: Normal range of motion and neck supple.   Skin:     General: Skin is warm and dry.      Coloration: Skin is pale.      Findings: No erythema.   Neurological:      General: No focal deficit present.      Mental Status: She is alert. Mental status is at baseline.   Psychiatric:         Mood and Affect: Mood normal.         Fluids    Intake/Output Summary (Last 24 hours) at 6/15/2022 1112  Last data filed at 6/14/2022 1800  Gross per 24 hour   Intake 120 ml   Output 800 ml   Net -680 ml       Laboratory  Recent Labs      06/13/22  0650 06/13/22  1342 06/14/22  0158 06/14/22  0812 06/15/22  0141   WBC 4.3*  --  5.5  --  4.7*   RBC 2.50*  --  2.73*  --  2.68*   HEMOGLOBIN 8.1*   < > 8.8* 8.9* 8.7*   HEMATOCRIT 24.5*  --  27.4*  --  27.3*   MCV 98.0*  --  100.4*  --  101.9*   MCH 32.4  --  32.2  --  32.5   MCHC 33.1*  --  32.1*  --  31.9*   RDW 81.2*  --  86.5*  --  86.1*   PLATELETCT 164  --  213  --  208   MPV 9.4  --  9.7  --  9.7    < > = values in this interval not displayed.     Recent Labs     06/12/22  1123 06/13/22  0650 06/14/22  0158   SODIUM 139 136 137   POTASSIUM 4.0 3.5* 3.8   CHLORIDE 106 107 107   CO2 17* 19* 20   GLUCOSE 119* 72 76   BUN 35* 23* 13   CREATININE 1.03 0.84 0.71   CALCIUM 8.5 8.0* 8.3*     Recent Labs     06/12/22  1123 06/13/22  0650   INR 3.97* 1.63*               Imaging  US-THYROID   Final Result      Benign colloid cysts seen in the right thyroid gland. No suspicious thyroid nodule.      ACR TI-RADS Recommendations   TR1 - No FNA or follow up recommended.      Recommendations based on the American College of Radiology Thyroid imaging, reporting and Data System (TI-RADS) 2017.      INTERPRETING LOCATION: [address] [place] [address] [facility], [place], [id number]      US-RUQ   Final Result      1. Echogenic liver, most commonly hepatic steatosis.      2. Status post cholecystectomy. No biliary dilatation.         DX-CHEST-PORTABLE (1 VIEW)   Final Result      No acute cardiopulmonary abnormality.              Assessment/Plan  * Acute GI bleeding- (present on admission)  Assessment & Plan  - Presenting with acute episode of melena and lightheadedness just prior admission, melanic stool today  - EGD ([facility]) during 5/10-5/13/2022 admission 2 mm duodenal bulb angiodysplasia, 2 distal duodenal angiodysplasias, and 2 proximal jejunal angiodysplasias all of which were treated with argon plasma coagulation  - Consideration for angiodysplasias versus Crystal-Payan tear versus peptic ulcer disease  - Holding home Xarelto for PTE and  DVT in April, discussing with GI whether scoping will occur, and if not, whether to resume Xarelto or pursue IVC filter placement. Currently with plans to scope, unclear timeline - awaiting Xarelto washout  - IV PPI twice daily  - s/p 2u PRBC transfusion yesterday, continue to trend H/H  - TEG scan unremarkable, no need for additional blood products currently  - GI on board, appreciated   -Planning EGD today    Multiple thyroid nodules  Assessment & Plan  - Seen on CT last hospitalization, does not appear TSH or thyroid u/s obtained at that time  - Check TSH and us here      Macrocytic anemia- (present on admission)  Assessment & Plan  Secondary to chronic alcoholism and acute blood loss anemia  Management as per above  B12s checked in the past were elevated, will recheck labs  Retic count is appropriate  Check LDH, haptoglobin, fibrinogen    Elevated INR- (present on admission)  Assessment & Plan  - Received Vitamin K on admit given active bleeding on Xarelto with concern of underlying liver disease causing elevation in INR    Acute deep vein thrombosis (DVT) of left lower extremity with PTE - (present on admission)  Assessment & Plan  Hold anticoagulation in the setting of acute GI bleed  Monitor  Would need 6 months of anticoagulation from 4/22 given her PTE and DVT seen on imaging at that time - will discuss with GI.     Alcohol abuse- (present on admission)  Assessment & Plan  History of alcoholism  Admits to having 2 alcoholic beverages prior to admission but reports that she is cut down on alcohol  No signs of withdrawal, monitor with CIWA  Rally bag  Labs on a.m.       VTE prophylaxis: SCDs/TEDs    I have performed a physical exam and reviewed and updated ROS and Plan today (6/15/2022). In review of yesterday's note (6/14/2022), there are no changes except as documented above.

## 2022-06-15 NOTE — OP REPORT
EGD PROCEDURE NOTE:    Claudio Guerrier M.D.  Date & Time note created:    6/15/2022   2:17 PM       Patient ID:  Name:             Jaymie Peacock   YOB: 1955  Age:                 66 y.o.  female   MRN:               [id number]     PROCEDURE: EGD with biopsy    Enteroscopy to 145 cm with pylorus at 55 cm.    SURGEON: Claudio Guerrier M.D.     PREPROCEDURE DIAGNOSIS: Melena    POSTPROCEDURE DIAGNOSIS: A definite cause of melena was not found.    SPECIMENS   A.  Descending duodenal biopsies: Rule out celiac disease.  B.  Biopsy of 12 mm firm post bulbar duodenal polyp    FINDINGS   1.  Two 3 mm islands of pink esophageal mucosal about 15 mm proximal to the GE junction, possibly due to Cantu's esophagus.  2.  Irregular descending duodenal mucosa of unclear significance  3.  Unusual 12 mm firm post bulbar duodenal polyp  4.  The small bowel was examined to 145 cm with the pylorus at 55 cm  5.  A definite cause of melena was not found during the EGD and enteroscopy    IMPRESSION   Melena  Evaluate with an outpatient capsule endoscopy.    Possible Cantu's esophagus  EGD found two 3 mm islands of pink esophageal mucosa about 15 mm proximal to the GE junction.  This finding could be due to Cantu's esophagus.  When the anemia resolves, consider an EGD to  biopsy of these areas and rule out Cantu's esophagus.    Irregular descending duodenal mucosa of unclear significance  Await the biopsies to rule out celiac disease.    Unusual 12 mm firm post bulbar duodenal polyp  This polyp was not visualized during the EGD, but was easily visualized with the pediatric colonoscope.  The clinical significance of it is unclear.  Await the biopsy results.    Chronic anemia  See the Progress Note earlier today.    PLAN   Await biopsy results.  Outpatient capsule endoscopy.  Consider CT enterography if the above evaluation is negative.  If she returns to the ER with melena, obtain a STAT nuclear medicine  bleeding scan to try to localize the location of bleeding.  Clear liquid diet today.  If she remains stable, then advance diet as tolerated tomorrow.  If she remains stable, then restart Xarelto on June 17.    COMPLICATIONS: None    PHYSICAL EXAM   Lungs: Clear to auscultation anteriorly.   Heart: Normal heart sounds. No murmur.   Abdomen: No masses, tenderness or organomegaly.     CONSENT   The procedure was explained to the patient, including the indications, risks, benefits, alternatives and method of performing the procedure. Questions about the procedure were solicited and answered to the patient’s satisfaction, who appeared to understand and agreed to proceed with it.     EGD PROCEDURE   After signed informed consent, the patient was placed in the left lateral decubitus position and deep sedation was administered by the anesthesiologist..  The Olympus flexible videoendoscope was introduced into the oropharynx, and advanced through the esophagus and stomach into the descending duodenum without difficulty.  On withdrawal of the endoscope the descending duodenal mucosa appeared somewhat irregular, and biopsies were taken to rule out celiac disease.  The descending duodenum and bulb of the duodenum were otherwise normal.  The pylorus was normal.  The gastric mucosa was normal in its entirety.  The gastroesophageal junction was normal on forward and retroflexed view.  The esophageal mucosa was notable for two 3 mm islands of pink mucosa about 15 cm proximal to the GE junction, raising the possibility of Cantu's esophagus.  Biopsies were not taken due to the anemia.  The esophageal mucosa was otherwise normal.    The endoscope was withdrawn and replaced with the pediatric colonoscope, which was introduced into the oropharynx and advanced through the esophagus and stomach and into the descending duodenum.  On introduction of the colonoscope, there was a 12 mm post bulbar duodenal polyp, and the margins were firm  with a central depression, but no ulceration.  Representative photographs were obtained.  Biopsies were taken of the margins of the polyp.  The colonoscope was then further advanced through the duodenum and into the jejunum to 145 cm.  On withdrawal of the endoscope, the visualized jejunum and duodenum were otherwise unremarkable.  The pylorus was present at 55 cm.  The stomach was deflated and the endoscope withdrawn.  The patient tolerated the procedure well and there were no immediate complications.  She was returned to the recovery area in satisfactory condition.      Claudio Guerrier M.D.

## 2022-06-15 NOTE — OR NURSING
1440: Pt admitted to PACU from ENDO. Report received from anesthesia and RN. Pt fully awake on arrival, no c/o pain or nausea. Warm blankets placed. VSS, breathing spontaneous and nonlabored.     1453: Report given to Xiao ABBASI.

## 2022-06-15 NOTE — DIETARY
Nutrition Services:    Low BMI noted on RD Screen. Pt is currently on a full liquid diet and per chart pt PO %. Pt ate all of her dinner today when RD visited her. Ht: 167.6 cm, Wt: 45.4 kg, BMI 16.14. Wt hx reviewed in Epic. Overall wt has been stable. Pt confirmed this. She said that she has been cooking a lot at home and eating well. No report of wt loss.  Consult RD as needed. RD will re-screen weekly.      RD available prn

## 2022-06-15 NOTE — ANESTHESIA TIME REPORT
Anesthesia Start and Stop Event Times     Date Time Event    6/15/2022 1346 Ready for Procedure     1353 Anesthesia Start     1454 Anesthesia Stop        Responsible Staff  06/15/22    Name Role Begin End    Nitin Werner M.D. Anesth [id number]        Overtime Reason:  no overtime (within assigned shift)    Comments:

## 2022-06-15 NOTE — ANESTHESIA POSTPROCEDURE EVALUATION
Patient: Jaymie Peacock    Procedure Summary     Date: 06/15/22 Room / Location:  ENDOSCOPIC ULTRASOUND ROOM / SURGERY [facility]    Anesthesia Start: 1353 Anesthesia Stop: 1448    Procedure: GASTROSCOPY WITH PUSH ENDOSCOPY (N/A ) Diagnosis:       Adenomatous duodenal polyp      (Adenomatous duodenal polyp [[id number]])    Surgeons: Claudio Guerrier M.D. Responsible Provider: Nitin Werner M.D.    Anesthesia Type: MAC ASA Status: 3          Final Anesthesia Type: MAC  Last vitals  BP   Blood Pressure : 100/58    Temp   36.2 °C (97.2 °F)    Pulse   (!) 55   Resp   16    SpO2   94 %      Anesthesia Post Evaluation    Patient location during evaluation: PACU  Patient participation: complete - patient participated  Level of consciousness: awake and alert    Airway patency: patent  Anesthetic complications: no  Cardiovascular status: hemodynamically stable  Respiratory status: acceptable  Hydration status: euvolemic    PONV: none          There were no known complications for this encounter.     Nurse Pain Score: 0 (NPRS)

## 2022-06-15 NOTE — OR NURSING
1453 assumed pt care; report received from ELROY Paul. Pt awake, resting comfortably, denies pain.    1510 no change in pt's status     1525 no change    1540 pt meets criteria to transfer back to [facility]; report called to ELROY Hurtado.

## 2022-06-15 NOTE — OR NURSING
Patient allergies and NPO status verified, home medication reconciliation completed and belongings secured. Patient verbalizes understanding of pain scale, expected course of stay and plan of care. Surgical site verified with patient. IV access established. Triple aim completed by CNA.

## 2022-06-15 NOTE — PROGRESS NOTES
Report received from Pamella ABBASI . Pt resting comfortably in bed at the time of report. Plan of care assumed. Safety precautions in place and call light within reach. Encouraged verbalization of questions and concerns by patient. All concerns addressed at this time. Updated patient on plan of care during my shift. Pushed PO fluid intake until patient is NPO at midnight. VSS.

## 2022-06-15 NOTE — ANESTHESIA PREPROCEDURE EVALUATION
Case: [id number] Date/Time: 06/15/22 0355    Procedure: GASTROSCOPY WITH PUSH ENDOSCOPY (N/A )    Anesthesia type: MAC    Pre-op diagnosis: GI bleed    Location:  ENDOSCOPIC ULTRASOUND ROOM / SURGERY [facility]    Surgeons: Claudio Guerrier M.D.          Relevant Problems   PULMONARY   (positive) Emphysema lung (HCC)   (positive) Necrotizing pneumonia (HCC)      CARDIAC   (positive) Acute deep vein thrombosis (DVT) of left lower extremity with PTE    (positive) NSTEMI (non-ST elevation myocardial infarction) (HCC)         (positive) YAMIL (acute kidney injury) (HCC)   (positive) Acute kidney injury (HCC)       Physical Exam    Airway   Mallampati: II  TM distance: >3 FB  Neck ROM: full       Cardiovascular - normal exam  Rhythm: regular  Rate: normal  (-) murmur     Dental - normal exam           Pulmonary - normal exam  Breath sounds clear to auscultation     Abdominal    Neurological - normal exam                 Anesthesia Plan    ASA 3   ASA physical status 3 criteria: other (comment)    Plan - MAC         (Sever malnutrition)      Induction: intravenous    Postoperative Plan: Postoperative administration of opioids is intended.    Pertinent diagnostic labs and testing reviewed    Informed Consent:    Anesthetic plan and risks discussed with patient.    Use of blood products discussed with: patient whom consented to blood products.

## 2022-06-15 NOTE — PROGRESS NOTES
Gastroenterology Progress Note     Author: Claudio Guerrier M.D.     Date & Time Created: 6/15/2022 8:00 AM    Patient ID:  Name:             Jaymie Peacock   YOB: 1955  Age:                 66 y.o.  female   MRN:               [id number]    Chief Complaint    Melena  Anemia    Original History  She is a 66-year-old female patient seen in consultation for dark stools and anemia.  The patient presented to the hospital earlier today with complaints of dark black appearing stools which started this morning.  She also reports 1 episode of dark emesis shortly after drinking Diet Coke with some associated nausea.  She denies NSAID use.  She has a past medical history that includes pulmonary embolism and left lower extremity DVT currently on Eliquis last dose today, COPD, tobacco abuse, alcohol abuse last drink yesterday, stress-induced cardiomyopathy.  The patient has undergone evaluation for recurrent melena and anemia withEGD on 4/21/2022 by Dr. Rell Rivas with findings of 2 duodenal AVMs and mild erosive gastritis.  AVMs were treated with APC.  Colonoscopy was normal.  She then had another admission on May 10th 2022 with similar GI bleeding suspected.  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  No active bleeding was seen on any of her procedures.     On admission patient noted to have hemoglobin 6, hematocrit 19.2, .6, platelets are normal.  CMP with CO2 17, glucose 119, BUN 35, creatinine 1.03.  Magnesium 1.3, INR 3.97.  It is charted that patient stool occult blood positive in ER.    Brief History  Jaymie Peacock has chronic anemia.  6/24/2021: Hgb 10.8, .     2/18/2022: Hgb 7.9, MCV 92.  GFR 13 that improved to 38, when hospitalized with stress cardiomyopathy and YAMIL.  She had heme positive stool, but no active bleeding.     3/28/2022: Hgb 7.3, MCV 88, when hospitalized with hypotension  and diarrhea.       When hospitalized with pulmonary embolism and DVT on 4/19/2022, initial Hgb was 10.4 and declined to 8.4 the next day.       EGD 4/21/2022: Mild erosive duodenitis.  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    COL 4/21/2022: Negative.  Retained food debris in cecum and hepatic flexure.       The pulmonary embolism and DVT were treated with Xarelto.  On 5/10/2022 she was hospitalized with melena.    EGD with PUSH enteroscopy 5/11/2022: APC ablation of 3 duodenal and 2 jejunal angiodysplasias.    Xarelto treatment for the pulmonary embolism and DVT was restarted.  She avoided aspirin/NSAIDs.  Melena reoccurred on June 12, and she was hospitalized.  Before hospitalization, she drank a diet Coke, and soon afterward vomited, producing what appeared to be at the Coke.  This episode raised the possibility of coffee-ground emesis, but she is convinced that the vomitus was the diet Coke.  Initial Hgb was 6, and yue to 2:10 units PRBC.     Interval History  6/13/22:   Stable. H/H stable. INR: 1.63.  One formed dark colored stool this morning. No abdominal pain. Abdominal US: echogenic liver most likely hepatic steatosis.     6/14/22:   Stable. H/H stable. No acute overnight events. No further melenic stool. Denies abdominal pain.    6/15/22  Stable.  No melena.  Hgb 8.8 and stable.  She is ready for the EGD/enteroscopy.  An informed consent discussion for EGD and enteroscopy was completed again today.      Assessment:  Melena   She initially had heme positive stool in 2/2022 when hospitalized with stress cardiomyopathy and YAMIL.  While taking Xarelto, she was hospitalized with melena in 5/2022 and again now.    Recent EGD, PUSH enteroscopy and colonoscopy found duodenal and jejunal AVMs, that were successfully ablated with APC.  Another cause of melena was not found.    It is possible that the melena is due to a different cause, such as a Dieulafoy's lesion or a small bowel tumor.  Because additional AVMs  were found during the enteroscopy, I recommend repeating the EGD and PUSH enteroscopy once more.  If a definite cause of bleeding is not found, then complete the evaluation with an outpatient capsule endoscopy.       If the above evaluation does not find a convincing cause of bleeding, then if she returns to the ER with melena, obtain a nuclear medicine bleeding scan STAT.       Chronic anemia   She had significant anemia since the first available labs in 6/2021.  Previous evaluation found a low erythropoietin level at 5 ON 3/30/2022.  Dr. Lopez is in the process of evaluating the anemia.  Consider a hematology consult.      Recommendations  EGD with PUSH enteroscopy today         Problem List  Active Hospital Problems    Diagnosis    • *Acute GI bleeding [K92.2]    • Multiple thyroid nodules [E04.2]    • Macrocytic anemia [D53.9]    • Elevated INR [R79.1]    • Acute deep vein thrombosis (DVT) of left lower extremity with PTE  [I82.402]    • Alcohol abuse [F10.10]        Labs:  Recent Labs     06/13/22  0650 06/13/22  1342 06/14/22  0158 06/14/22  0812 06/15/22  0141   WBC 4.3*  --  5.5  --  4.7*   RBC 2.50*  --  2.73*  --  2.68*   HEMOGLOBIN 8.1*   < > 8.8* 8.9* 8.7*   HEMATOCRIT 24.5*  --  27.4*  --  27.3*   MCV 98.0*  --  100.4*  --  101.9*   MCH 32.4  --  32.2  --  32.5   MCHC 33.1*  --  32.1*  --  31.9*   RDW 81.2*  --  86.5*  --  86.1*   PLATELETCT 164  --  213  --  208   MPV 9.4  --  9.7  --  9.7    < > = values in this interval not displayed.      Recent Labs     06/12/22  1123 06/13/22  0650   INR 3.97* 1.63*     Recent Labs     06/12/22  1123 06/13/22  0650 06/14/22  0158   SODIUM 139 136 137   POTASSIUM 4.0 3.5* 3.8   CHLORIDE 106 107 107   CO2 17* 19* 20   GLUCOSE 119* 72 76   BUN 35* 23* 13   CREATININE 1.03 0.84 0.71   CALCIUM 8.5 8.0* 8.3*     Recent Labs     06/12/22  1123 06/13/22  0650 06/14/22  0158   ALTSGPT 9 7 9   ASTSGOT 28 23 33   ALKPHOSPHAT 97 73 85   TBILIRUBIN 0.4 0.8 0.4   LIPASE 43  --    --    GLUCOSE 119* 72 76       No results found for: BLOODCULTU, BLDCULT, BCHOLD     GI/Nutrition:  Orders Placed This Encounter   Procedures   • Diet Order Diet: Full Liquid; Miscellaneous modifications: (optional): No Red     Standing Status:   Standing     Number of Occurrences:   1     Order Specific Question:   Diet:     Answer:   Full Liquid [11]     Order Specific Question:   Miscellaneous modifications: (optional)     Answer:   No Red [61]       Hospital Medications:  mirtazapine, 15 mg, Oral, Nightly  simvastatin, 20 mg, Oral, Q EVENING  pantoprazole, 40 mg, Intravenous, BID  [Held by provider] rivaroxaban, 20 mg, Oral, PM MEAL  senna-docusate, 2 Tablet, Oral, BID      PRN medications: LORazepam, LORazepam **OR** LORazepam, LORazepam **OR** LORazepam, LORazepam **OR** LORazepam, LORazepam **OR** LORazepam, acetaminophen, senna-docusate **AND** polyethylene glycol/lytes **AND** magnesium hydroxide **AND** bisacodyl, hydrALAZINE, ondansetron, ondansetron    Current Outpatient Medications:  No current facility-administered medications on file prior to encounter.     Current Outpatient Medications on File Prior to Encounter   Medication Sig Dispense Refill   • Rivaroxaban (XARELTO STARTER PACK) 15 & 20 MG Tablet Therapy Pack Take 15-20 mg by mouth see administration instructions. Pt started on 5/13/2022 15MG BID for 21 day course, and is now on 20MG and has 9 tablets left     • omeprazole (PRILOSEC) 20 MG delayed-release capsule Take 1 Capsule by mouth every day. 30 Capsule 0   • mirtazapine (REMERON) 15 MG Tab Take 1 Tablet by mouth every evening. 30 Tablet 0   • simvastatin (ZOCOR) 20 MG Tab Take 20 mg by mouth every evening. Indications: Ischemic Heart Disease         Fluids:    Intake/Output Summary (Last 24 hours) at 6/15/2022 0800  Last data filed at 6/14/2022 1800  Gross per 24 hour   Intake 120 ml   Output 800 ml   Net -680 ml          Past Medical History:   Past Medical History:   Diagnosis Date   •  "DVT (deep venous thrombosis) (HCC)    • GI bleed    • High cholesterol    • Hypertension    • Osteoporosis    • Pulmonary embolism (HCC)    • Smoking        Past Surgical History:  Past Surgical History:   Procedure Laterality Date   • TN UPPER GI ENDOSCOPY,CTRL BLEED N/A 05/11/2022    Procedure: GASTROSCOPY, WITH ARGON PLASMA COAGULATION;  Surgeon: Yariel Pagan M.D.;  Location: SURGERY SAME DAY [facility];  Service: Gastroenterology   • GASTROSCOPY W/PUSH ENTERSCOPY N/A 05/11/2022    Procedure: GASTROSCOPY, WITH PUSH ENTEROSCOPY;  Surgeon: Yariel Pagan M.D.;  Location: SURGERY SAME DAY [facility];  Service: Gastroenterology   • TN UPPER GI ENDOSCOPY,DIAGNOSIS  04/21/2022    Procedure: GASTROSCOPY;  Surgeon: Rell Rivas M.D.;  Location: SURGERY SAME DAY [facility];  Service: Gastroenterology   • TN COLONOSCOPY,DIAGNOSTIC  04/21/2022    Procedure: COLONOSCOPY;  Surgeon: Rell Rivas M.D.;  Location: SURGERY SAME DAY [facility];  Service: Gastroenterology   • TN UPPER GI ENDOSCOPY,BIOPSY  04/21/2022    Procedure: GASTROSCOPY, WITH BIOPSY;  Surgeon: Rell Rivas M.D.;  Location: SURGERY SAME DAY [facility];  Service: Gastroenterology   • TN UPPER GI ENDOSCOPY,CTRL BLEED  04/21/2022    Procedure: GASTROSCOPY, WITH ARGON PLASMA COAGULATION;  Surgeon: Rell Rivas M.D.;  Location: SURGERY SAME DAY [facility];  Service: Gastroenterology   • HIP REPLACEMENT, PARTIAL Right    • US-NEEDLE CORE BX-BREAST PANEL Right        Physical Exam:  Vitals/ General Appearance:   Weight/BMI: Body mass index is 16.14 kg/m².  BP (!) 92/60   Pulse 76   Temp 36.3 °C (97.3 °F) (Oral)   Resp 18   Ht 1.676 m (5' 6\")   Wt 45.4 kg (100 lb)   SpO2 100%   Vitals:    06/14/22 1525 06/14/22 1936 06/14/22 2000 06/15/22 0039   BP: 106/70 109/78  (!) 92/60   Pulse: 73 88  76   Resp: 18 18  18   Temp: 36.9 °C (98.4 °F) 36.6 °C (97.8 °F)  36.3 °C (97.3 °F)   TempSrc: Oral Oral  Oral   SpO2: 100% 100% 100% 100%   Weight:     "   Height:         Oxygen Therapy:  Pulse Oximetry: 100 %, O2 (LPM): 0, O2 Delivery Device: None - Room Air    Physical Exam  Vitals reviewed.   Constitutional:       General: She is not in acute distress.  HENT:      Head: Normocephalic and atraumatic.   Cardiovascular:      Rate and Rhythm: Regular rhythm.      Heart sounds: Normal heart sounds. No murmur heard.    No gallop.   Pulmonary:      Comments: Normal breath sounds anteriorly.  Abdominal:      Palpations: Abdomen is soft. There is no mass.      Tenderness: There is no abdominal tenderness.   Psychiatric:         Judgment: Judgment normal.         Review of Systems:  Review of Systems   Respiratory: Negative for cough and shortness of breath.    Cardiovascular: Negative for chest pain.   Gastrointestinal: Negative for abdominal pain, blood in stool, melena and vomiting.             Claudio Guerrier M.D.

## 2022-06-15 NOTE — PROGRESS NOTES
Telemetry Shift Summary    Rhythm SR  HR Range 68-75  Ectopy rPVC  Measurements 0.20/0.10/0.36      Normal Values  Rhythm SR  HR Range:   Measurements: 0.12-0.20/0.06-0.10/0.30-0.52

## 2022-06-15 NOTE — PROGRESS NOTES
Telemetry Shift Summary     Rhythm: SR  HR: 70-89  Ectopy: rPVC  Measurements: .20/.08/.36   (Per 0400 strip)  Normal Values  Rhythm: SR  HR:   Measurements: 0.12-0.20 / 0.04-0.10 / 0.30-0.52    Strip reviewed and placed in chart

## 2022-06-16 VITALS
WEIGHT: 100 LBS | HEIGHT: 66 IN | OXYGEN SATURATION: 98 % | DIASTOLIC BLOOD PRESSURE: 94 MMHG | BODY MASS INDEX: 16.07 KG/M2 | TEMPERATURE: 97.6 F | HEART RATE: 62 BPM | SYSTOLIC BLOOD PRESSURE: 126 MMHG | RESPIRATION RATE: 18 BRPM

## 2022-06-16 PROCEDURE — 700105 HCHG RX REV CODE 258: Performed by: INTERNAL MEDICINE

## 2022-06-16 PROCEDURE — 99239 HOSP IP/OBS DSCHRG MGMT >30: CPT | Performed by: INTERNAL MEDICINE

## 2022-06-16 RX ADMIN — SODIUM CHLORIDE: 9 INJECTION, SOLUTION INTRAVENOUS at 00:14

## 2022-06-16 ASSESSMENT — ENCOUNTER SYMPTOMS
VOMITING: 0
ABDOMINAL PAIN: 0
COUGH: 0
BLOOD IN STOOL: 0
SHORTNESS OF BREATH: 0

## 2022-06-16 NOTE — DISCHARGE INSTRUCTIONS
Gastrointestinal Bleeding  Gastrointestinal (GI) bleeding is bleeding somewhere along the path that food travels through the body (digestive tract). This path is anywhere between the mouth and the opening of the butt (anus). You may have blood in your poop (stool) or have black poop. If you throw up (vomit), there may be blood in it.  This condition can be mild, serious, or even life-threatening. If you have a lot of bleeding, you may need to stay in the hospital.  What are the causes?  This condition may be caused by:  Irritation and swelling of the esophagus (esophagitis). The esophagus is part of the body that moves food from your mouth to your stomach.  Swollen veins in the butt (hemorrhoids).  Areas of painful tearing in the opening of the butt (anal fissures). These are often caused by passing hard poop.  Pouches that form on the colon over time (diverticulosis).  Irritation and swelling (diverticulitis) in areas where pouches have formed on the colon.  Growths (polyps) or cancer. Colon cancer often starts out as growths that are not cancer.  Irritation of the stomach lining (gastritis).  Sores (ulcers) in the stomach.  What increases the risk?  You are more likely to develop this condition if you:  Have a certain type of infection in your stomach (Helicobacter pylori infection).  Take certain medicines.  Smoke.  Drink alcohol.  What are the signs or symptoms?  Common symptoms of this condition include:  Throwing up (vomiting) material that has bright red blood in it. It may look like coffee grounds.  Changes in your poop. The poop may:  Have red blood in it.  Be black, look like tar, and smell stronger than normal.  Be red.  Pain or cramping in the belly (abdomen).  How is this treated?  Treatment for this condition depends on the cause of the bleeding. For example:  Sometimes, the bleeding can be stopped during a procedure that is done to find the problem (endoscopy or colonoscopy).  Medicines can be used  to:  Help control irritation, swelling, or infection.  Reduce acid in your stomach.  Certain problems can be treated with:  Creams.  Medicines that are put in the butt (suppositories).  Warm baths.  Surgery is sometimes needed.  If you lose a lot of blood, you may need a blood transfusion.  If bleeding is mild, you may be allowed to go home. If there is a lot of bleeding, you will need to stay in the hospital.  Follow these instructions at home:    Take over-the-counter and prescription medicines only as told by your doctor.  Eat foods that have a lot of fiber in them. These foods include beans, whole grains, and fresh fruits and vegetables. You can also try eating 1-3 prunes each day.  Drink enough fluid to keep your pee (urine) pale yellow.  Keep all follow-up visits as told by your doctor. This is important.  Contact a doctor if:  Your symptoms do not get better.  Get help right away if:  Your bleeding does not stop.  You feel dizzy or you pass out (faint).  You feel weak.  You have very bad cramps in your back or belly.  You pass large clumps of blood (clots) in your poop.  Your symptoms are getting worse.  You have chest pain or fast heartbeats.  Summary  GI bleeding is bleeding somewhere along the path that food travels through the body (digestive tract).  This bleeding can be caused by many things. Treatment depends on the cause of the bleeding.  Take medicines only as told by your doctor.  Keep all follow-up visits as told by your doctor. This is important.  This information is not intended to replace advice given to you by your health care provider. Make sure you discuss any questions you have with your health care provider.  Document Released: 09/26/2009 Document Revised: 07/31/2019 Document Reviewed: 07/31/2019  Elsevier Patient Education © 2020 [employer]  Discharge Instructions    Discharged to home by car with friend. Discharged via wheelchair, hospital escort: Yes.  Special equipment needed: Not  Applicable    Be sure to schedule a follow-up appointment with your primary care doctor or any specialists as instructed.     Discharge Plan:   Diet Plan: Discussed  Activity Level: Discussed  Confirmed Follow up Appointment: Appointment Scheduled  Confirmed Symptoms Management: Discussed  Medication Reconciliation Updated: Yes    I understand that a diet low in cholesterol, fat, and sodium is recommended for good health. Unless I have been given specific instructions below for another diet, I accept this instruction as my diet prescription.   Other diet: Regular    Special Instructions: None    Is patient discharged on warfarin: no      Depression / Suicide Risk    As you are discharged from this [facility] facility, it is important to learn how to keep safe from harming yourself.    Recognize the warning signs:  Abrupt changes in personality, positive or negative- including increase in energy   Giving away possessions  Change in eating patterns- significant weight changes-  positive or negative  Change in sleeping patterns- unable to sleep or sleeping all the time   Unwillingness or inability to communicate  Depression  Unusual sadness, discouragement and loneliness  Talk of wanting to die  Neglect of personal appearance   Rebelliousness- reckless behavior  Withdrawal from people/activities they love  Confusion- inability to concentrate     If you or a loved one observes any of these behaviors or has concerns about self-harm, here's what you can do:  Talk about it- your feelings and reasons for harming yourself  Remove any means that you might use to hurt yourself (examples: pills, rope, extension cords, firearm)  Get professional help from the community (Mental Health, Substance Abuse, psychological counseling)  Do not be alone:Call your Safe Contact- someone whom you trust who will be there for you.  Call your local CRISIS HOTLINE 230-2622 or 523-138-9671  Call your local Children's Mobile Crisis Response  Team [facility] (463) 127-3101 or [email or web address].[place]  Call the toll free National Suicide Prevention Hotlines   National Suicide Prevention Lifeline 235-123-MJON (3416)  National [employer] Line Network 800-SUICIDE (723-8388)

## 2022-06-16 NOTE — PROGRESS NOTES
Gastroenterology Progress Note     Author: Claudio Guerrier M.D.     Date & Time Created: 6/16/2022 8:16 AM    Patient ID:  Name:             Jaymie Peacock   YOB: 1955  Age:                 66 y.o.  female   MRN:               [id number]    Chief Complaint    Melena  Anemia    Original History  She is a 66-year-old female patient seen in consultation for dark stools and anemia.  The patient presented to the hospital earlier today with complaints of dark black appearing stools which started this morning.  She also reports 1 episode of dark emesis shortly after drinking Diet Coke with some associated nausea.  She denies NSAID use.  She has a past medical history that includes pulmonary embolism and left lower extremity DVT currently on Eliquis last dose today, COPD, tobacco abuse, alcohol abuse last drink yesterday, stress-induced cardiomyopathy.  The patient has undergone evaluation for recurrent melena and anemia withEGD on 4/21/2022 by Dr. Rell Rivas with findings of 2 duodenal AVMs and mild erosive gastritis.  AVMs were treated with APC.  Colonoscopy was normal.  She then had another admission on May 10th 2022 with similar GI bleeding suspected.  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  No active bleeding was seen on any of her procedures.     On admission patient noted to have hemoglobin 6, hematocrit 19.2, .6, platelets are normal.  CMP with CO2 17, glucose 119, BUN 35, creatinine 1.03.  Magnesium 1.3, INR 3.97.  It is charted that patient stool occult blood positive in ER.    Brief History  Jaymie Peacock has chronic anemia.  6/24/2021: Hgb 10.8, .     2/18/2022: Hgb 7.9, MCV 92.  GFR 13 that improved to 38, when hospitalized with stress cardiomyopathy and YAMIL.  She had heme positive stool, but no active bleeding.     3/28/2022: Hgb 7.3, MCV 88, when hospitalized with hypotension  and diarrhea.       When hospitalized with pulmonary embolism and DVT on 4/19/2022, initial Hgb was 10.4 and declined to 8.4 the next day.       EGD 4/21/2022: Mild erosive duodenitis.  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    COL 4/21/2022: Negative.  Retained food debris in cecum and hepatic flexure.       The pulmonary embolism and DVT were treated with Xarelto.  On 5/10/2022 she was hospitalized with melena.    EGD with PUSH enteroscopy 5/11/2022: APC ablation of 3 duodenal and 2 jejunal angiodysplasias.    Xarelto treatment for the pulmonary embolism and DVT was restarted.  She avoided aspirin/NSAIDs.  Melena reoccurred on June 12, and she was hospitalized.  Before hospitalization, she drank a diet Coke, and soon afterward vomited, producing what appeared to be at the Coke.  This episode raised the possibility of coffee-ground emesis, but she is convinced that the vomitus was the diet Coke.  Initial Hgb was 6, and yue to 2:10 units PRBC.     EGD with  PUSH enteroscopy 6/15/2022  SPECIMENS   A.  Descending duodenal biopsies: Rule out celiac disease.  B.  Biopsy of 12 mm firm post bulbar duodenal polyp     FINDINGS   1.  Two 3 mm islands of pink esophageal mucosal about 15 mm proximal to the GE junction, possibly due to Cantu's esophagus.  2.  Irregular descending duodenal mucosa of unclear significance  3.  Unusual 12 mm firm post bulbar duodenal polyp  4.  The small bowel was examined to 145 cm with the pylorus at 55 cm  5.  A definite cause of melena was not found during the EGD and enteroscopy      Interval History  6/13/22:   Stable. H/H stable. INR: 1.63.  One formed dark colored stool this morning. No abdominal pain. Abdominal US: echogenic liver most likely hepatic steatosis.     6/14/22:   Stable. H/H stable. No acute overnight events. No further melenic stool. Denies abdominal pain.    6/15/22  Stable.  No melena.  Hgb 8.8 and stable.  She is ready for the EGD/enteroscopy.  An informed consent  discussion for EGD and enteroscopy was completed again today.    6/16/2022  Stable.  No melena.  Hgb 8.7 and stable.  She denied symptoms.  We discussed her evaluation and outpatient plans for additional evaluation, as below.  Questions were answered to her satisfaction.      Assessment:  Melena   She initially had heme positive stool in 2/2022 when hospitalized with stress cardiomyopathy and YAMIL.  While taking Xarelto, she was hospitalized with melena in 5/2022 and again now.    Recent EGD, PUSH enteroscopy and colonoscopy found duodenal and jejunal AVMs, that were successfully ablated with APC.  Another cause of melena was not found.    EGD with PUSH enteroscopy on 6/15/2022: A definite cause of melena was not found.    Arrange for an outpatient capsule endoscopy.  If she returns to the ER with melena, obtain a STAT nuclear medicine bleeding scan.    Possible Cantu's esophagus  EGD found two 3 mm islands of pink esophageal mucosa about 15 mm proximal to the GE junction.  This finding could be due to Cantu's esophagus.  When the anemia resolves, consider an EGD to  biopsy of these areas and rule out Cantu's esophagus.     Irregular descending duodenal mucosa of unclear significance  Await the biopsies to rule out celiac disease.     Unusual 12 mm firm post bulbar duodenal polyp  This polyp was not visualized during the EGD, but was easily visualized with the pediatric colonoscope.  The clinical significance of it is unclear.  Await the biopsy results.        Chronic anemia   She had Hgb 10.8,  since the first available labs in 6/2021.  Since 2/2022, Hgb has been 7-8, except after recent transfusion.  Iron saturation: 47% in 2/2022.  18% in 5/2022.  78% in 6/2022.  Ferritin: 955 in 2/2022.  820 and 5/2022.  Vitamin B12: 958 in 2/2022.  377 in 6/2022.  Folate: No results.  TSH: 1.06 in 2/2022.  1.4 in 6/2022.  Haptoglobin: 34 and 6/2022.  LDH: 185 in 6/2022.  Cortisol level: 13 in 2/2022.  12.5 in  3/2022.  16.5 in 5/2022.  Erythropoietin level: 5 in 3/2022, and 8 in 6/2022.  Dr. Lopez is evaluating the anemia.  Consider a hematology consult.    Xarelto treatment  If she remains stable, then restart Xarelto on June 17.      Recommendations  Advance diet as tolerated  Await biopsy results.  Outpatient capsule endoscopy.  Office visit with GI consultants in 4 weeks.  Consider CT enterography if the above evaluation is negative.  If she returns to the ER with melena, obtain a STAT nuclear medicine bleeding scan to try to localize the bleeding site.    If she remains stable, then restart Xarelto on June 17.  Hematology consult as outpatient.    GI will stand by for now.    Please call [facility] if additional GI questions arise ([phone]).  Thank you again for this consult.        Problem List  Active Hospital Problems    Diagnosis    • *Acute GI bleeding [K92.2]    • Multiple thyroid nodules [E04.2]    • Macrocytic anemia [D53.9]    • Elevated INR [R79.1]    • Acute deep vein thrombosis (DVT) of left lower extremity with PTE  [I82.402]    • Alcohol abuse [F10.10]        Labs:  Recent Labs     06/14/22  0158 06/14/22  0812 06/15/22  0141   WBC 5.5  --  4.7*   RBC 2.73*  --  2.68*   HEMOGLOBIN 8.8* 8.9* 8.7*   HEMATOCRIT 27.4*  --  27.3*   .4*  --  101.9*   MCH 32.2  --  32.5   MCHC 32.1*  --  31.9*   RDW 86.5*  --  86.1*   PLATELETCT 213  --  208   MPV 9.7  --  9.7          Recent Labs     06/14/22  0158   SODIUM 137   POTASSIUM 3.8   CHLORIDE 107   CO2 20   GLUCOSE 76   BUN 13   CREATININE 0.71   CALCIUM 8.3*     Recent Labs     06/14/22  0158   ALTSGPT 9   ASTSGOT 33   ALKPHOSPHAT 85   TBILIRUBIN 0.4   GLUCOSE 76       No results found for: BLOODCULTU, BLDCULT, BCHOLD     GI/Nutrition:  Orders Placed This Encounter   Procedures   • Diet Order Diet: Clear Liquid     Standing Status:   Standing     Number of Occurrences:   1     Order Specific Question:   Diet:     Answer:   Clear Liquid [10]        Hospital Medications:  mirtazapine, 15 mg, Oral, Nightly  simvastatin, 20 mg, Oral, Q EVENING  pantoprazole, 40 mg, Intravenous, BID  [Held by provider] rivaroxaban, 20 mg, Oral, PM MEAL  senna-docusate, 2 Tablet, Oral, BID      PRN medications: LORazepam, LORazepam **OR** LORazepam, LORazepam **OR** LORazepam, LORazepam **OR** LORazepam, LORazepam **OR** LORazepam, acetaminophen, senna-docusate **AND** polyethylene glycol/lytes **AND** magnesium hydroxide **AND** bisacodyl, hydrALAZINE, ondansetron, ondansetron    Current Outpatient Medications:  No current facility-administered medications on file prior to encounter.     Current Outpatient Medications on File Prior to Encounter   Medication Sig Dispense Refill   • omeprazole (PRILOSEC) 20 MG delayed-release capsule Take 1 Capsule by mouth every day. 30 Capsule 0   • mirtazapine (REMERON) 15 MG Tab Take 1 Tablet by mouth every evening. 30 Tablet 0   • simvastatin (ZOCOR) 20 MG Tab Take 20 mg by mouth every evening. Indications: Ischemic Heart Disease         Fluids:    Intake/Output Summary (Last 24 hours) at 6/15/2022 0800  Last data filed at 6/14/2022 1800  Gross per 24 hour   Intake 120 ml   Output 800 ml   Net -680 ml     Weight: 45.4 kg (100 lb)    Past Medical History:   Past Medical History:   Diagnosis Date   • DVT (deep venous thrombosis) (HCC)    • GI bleed    • High cholesterol    • Hypertension    • Osteoporosis    • Pulmonary embolism (HCC)    • Smoking        Past Surgical History:  Past Surgical History:   Procedure Laterality Date   • ME UPPER GI ENDOSCOPY,DIAGNOSIS N/A 6/15/2022    Procedure: GASTROSCOPY WITH PUSH ENDOSCOPY;  Surgeon: Claudio Guerrier M.D.;  Location: SURGERY [facility];  Service: Gastroenterology   • ME UPPER GI ENDOSCOPY,CTRL BLEED N/A 05/11/2022    Procedure: GASTROSCOPY, WITH ARGON PLASMA COAGULATION;  Surgeon: Yariel Pagan M.D.;  Location: SURGERY SAME DAY [facility];  Service: Gastroenterology   • GASTROSCOPY  "W/PUSH ENTERSCOPY N/A 05/11/2022    Procedure: GASTROSCOPY, WITH PUSH ENTEROSCOPY;  Surgeon: Yariel Pagan M.D.;  Location: SURGERY SAME DAY [facility];  Service: Gastroenterology   • MS UPPER GI ENDOSCOPY,DIAGNOSIS  04/21/2022    Procedure: GASTROSCOPY;  Surgeon: Rell Rivas M.D.;  Location: SURGERY SAME DAY [facility];  Service: Gastroenterology   • MS COLONOSCOPY,DIAGNOSTIC  04/21/2022    Procedure: COLONOSCOPY;  Surgeon: Rell Rivas M.D.;  Location: SURGERY SAME DAY [facility];  Service: Gastroenterology   • MS UPPER GI ENDOSCOPY,BIOPSY  04/21/2022    Procedure: GASTROSCOPY, WITH BIOPSY;  Surgeon: Rell Rivas M.D.;  Location: SURGERY SAME DAY [facility];  Service: Gastroenterology   • MS UPPER GI ENDOSCOPY,CTRL BLEED  04/21/2022    Procedure: GASTROSCOPY, WITH ARGON PLASMA COAGULATION;  Surgeon: Rell Rivas M.D.;  Location: SURGERY SAME DAY [facility];  Service: Gastroenterology   • HIP REPLACEMENT, PARTIAL Right    • US-NEEDLE CORE BX-BREAST PANEL Right        Physical Exam:  Vitals/ General Appearance:   Weight/BMI: Body mass index is 16.15 kg/m².  /60   Pulse 75   Temp 36.9 °C (98.4 °F) (Oral)   Resp 18   Ht 1.676 m (5' 5.98\")   Wt 45.4 kg (100 lb)   SpO2 97%   Vitals:    06/15/22 1648 06/15/22 1917 06/15/22 2306 06/16/22 0316   BP: 125/67 115/67 119/69 113/60   Pulse: (!) 59 61 70 75   Resp: 18 18 17 18   Temp: (!) 35.8 °C (96.4 °F) 36.5 °C (97.7 °F) 36.7 °C (98.1 °F) 36.9 °C (98.4 °F)   TempSrc: Axillary Oral Oral Oral   SpO2: 100% 100% 96% 97%   Weight:       Height:         Oxygen Therapy:  Pulse Oximetry: 97 %, O2 Delivery Device: None - Room Air    Physical Exam  Vitals reviewed.   Constitutional:       Appearance: Normal appearance.   Neurological:      Mental Status: She is alert.   Psychiatric:         Mood and Affect: Mood normal.         Behavior: Behavior normal.         Thought Content: Thought content normal.         Judgment: Judgment normal. "         Review of Systems:  Review of Systems   Respiratory: Negative for cough and shortness of breath.    Cardiovascular: Negative for chest pain.   Gastrointestinal: Negative for abdominal pain, blood in stool, melena and vomiting.             Claudio Guerrier M.D.

## 2022-06-16 NOTE — DISCHARGE SUMMARY
Discharge Summary    CHIEF COMPLAINT ON ADMISSION  Chief Complaint   Patient presents with   • Bloody Stools     Dark stool - hx gi bld       Reason for Admission  EMS     Admission Date  6/12/2022    CODE STATUS  Full Code    HPI & HOSPITAL COURSE  66-year-old alcoholic female with a past medical history of hypertension, COPD, osteoporosis, tobacco abuse, stress induced cardiomyopathy (35 ->55% EF per recent echo), recent admission for PE and LLE DVT on xarelto as well as recent GI evaluation due to anemia and occult positive stool s/p EGD ([facility]) during 5/10-5/13/2022 admission 2 mm duodenal bulb angiodysplasia, 2 distal duodenal angiodysplasias, and 2 proximal jejunal angiodysplasias all of which were treated with argon plasma coagulation and ultimately restarted on anticoagulation present emergency department on 6/12/2022 with melena that started this morning.  Patient reports that she noticed melena this morning and had nausea with one episode of dark emesis shortly after drinking Diet Coke.  Patient denies abdominal pain, hematochezia or hematemesis.  She reports compliance with Xarelto and all meds at home.  She does admit to having 2 cocktails last night but states that she has been cutting down on her drinking.  No orthostatic changes or loss of consciousness.  She was started on CIWA protocol and her symptom improved during hospitalization.  In addition she was noted to have GI bleed and received 2 unit of blood transfusion.  Her anticoagulation was held and GI was consulted.  Finally the patient had EGD done and showed no evidence of acute bleeding.  GI recommended the following  Await biopsy results.  Outpatient capsule endoscopy.  Consider CT enterography if the above evaluation is negative.  If she returns to the ER with melena, obtain a STAT nuclear medicine bleeding scan to try to localize the location of bleeding.  Clear liquid diet today.  If she remains stable, then advance diet as tolerated  tomorrow.  If she remains stable, then restart Xarelto on June 17.  Patient was found to have incidental thyroid nodules on imaging while in the hospital.  She will need to follow-up with PCP with imaging on a regular basis.  She was educated about alcohol cessation.  I saw and examined the patient today.  She was instructed to come back to ER if her symptoms get worse.  Please call [phone] to schedule PCP appointment for patient.    Required specialty appointments include:     As below    Therefore, she is discharged in fair and stable condition to home with close outpatient follow-up.    The patient met 2-midnight criteria for an inpatient stay at the time of discharge.    Discharge Date  06/16/22    FOLLOW UP ITEMS POST DISCHARGE      DISCHARGE DIAGNOSES  Principal Problem:    Acute GI bleeding POA: Yes  Active Problems:    Alcohol abuse POA: Yes    Acute deep vein thrombosis (DVT) of left lower extremity with PTE  POA: Yes    Elevated INR POA: Yes    Macrocytic anemia POA: Yes    Multiple thyroid nodules POA: Unknown  Resolved Problems:    * No resolved hospital problems. *      FOLLOW UP  No future appointments.  [address]  [facility] [address]  [phone]        Toña Tejada P.A.-C.  [address] [id number] [phone]    Follow up in 1 week(s)      Manuel Wills M.D.  [address] [id number] [phone]          Claudio Guerrier M.D.  [address] [facility] [id number] [phone]    Follow up in 1 week(s)        MEDICATIONS ON DISCHARGE     Medication List      START taking these medications      Instructions   rivaroxaban 20 MG Tabs tablet  Start taking on: June 17, 2022  Commonly known as: XARELTO  Replaces: Xarelto Starter Pack 15 & 20 MG Tbpk   Take 1 Tablet by mouth with dinner.  Dose: 20 mg        CONTINUE taking these medications      Instructions   mirtazapine 15 MG Tabs  Commonly known as:  Remeron   Take 1 Tablet by mouth every evening.  Dose: 15 mg     omeprazole 20 MG delayed-release capsule  Commonly known as: PRILOSEC   Take 1 Capsule by mouth every day.  Dose: 20 mg     simvastatin 20 MG Tabs  Commonly known as: ZOCOR   Take 20 mg by mouth every evening. Indications: Ischemic Heart Disease  Dose: 20 mg        STOP taking these medications    Xarelto Starter Pack 15 & 20 MG Tbpk  Generic drug: Rivaroxaban  Replaced by: rivaroxaban 20 MG Tabs tablet            Allergies  No Known Allergies    DIET  Orders Placed This Encounter   Procedures   • Diet Order Diet: Clear Liquid     Standing Status:   Standing     Number of Occurrences:   1     Order Specific Question:   Diet:     Answer:   Clear Liquid [10]       ACTIVITY  As tolerated.  Weight bearing as tolerated    CONSULTATIONS  GI    PROCEDURES      LABORATORY  Lab Results   Component Value Date    SODIUM 137 06/14/2022    POTASSIUM 3.8 06/14/2022    CHLORIDE 107 06/14/2022    CO2 20 06/14/2022    GLUCOSE 76 06/14/2022    BUN 13 06/14/2022    CREATININE 0.71 06/14/2022        Lab Results   Component Value Date    WBC 4.7 (L) 06/15/2022    HEMOGLOBIN 8.7 (L) 06/15/2022    HEMATOCRIT 27.3 (L) 06/15/2022    PLATELETCT 208 06/15/2022        Total time of the discharge process exceeds 36 minutes.

## 2022-06-16 NOTE — CARE PLAN
Problem: Knowledge Deficit - Standard  Goal: Patient and family/care givers will demonstrate understanding of plan of care, disease process/condition, diagnostic tests and medications  Outcome: Progressing  Note: Work up for gi bleed     Problem: Fall Risk  Goal: Patient will remain free from falls  Outcome: Progressing   The patient is Stable - Low risk of patient condition declining or worsening    Shift Goals  Clinical Goals: Check blood level, blood transfusion,  Patient Goals: Hold Xarelto, get something to drink    Progress made toward(s) clinical / shift goals:  achieved    Patient is not progressing towards the following goals:      
The patient is Stable - Low risk of patient condition declining or worsening    Shift Goals  Clinical Goals: Monitor for s/s of increased bleeding  Patient Goals: Sleep well    Progress made toward(s) clinical / shift goals:  Patient hemodynamically stable at this time.     Problem: Knowledge Deficit - Standard  Goal: Patient and family/care givers will demonstrate understanding of plan of care, disease process/condition, diagnostic tests and medications  Outcome: Progressing  Note: Updated patient on plan of care including medications and treatments. Encouraged verbalization of questions and concerns. All concerns have been addressed at this time.      Problem: Fall Risk  Goal: Patient will remain free from falls  Outcome: Progressing  Note: Patient's fall risk score has decreased since admission. Patient calls appropriately for assistance with ambulation. Belongings and call light within reach.        Patient is not progressing towards the following goals:      
The patient is Stable - Low risk of patient condition declining or worsening    Shift Goals  Clinical Goals: Monitor hgb, transfuse as needed  Patient Goals: Sleep well    Progress made toward(s) clinical / shift goals:  One unit of blood has been transfused during my shift. Will continue to monitor hgb.     Problem: Knowledge Deficit - Standard  Goal: Patient and family/care givers will demonstrate understanding of plan of care, disease process/condition, diagnostic tests and medications  Outcome: Progressing  Note: Updated patient on plan of care including medications and treatments. Encouraged verbalization of questions and concerns. All concerns have been addressed at this time.      Problem: Fall Risk  Goal: Patient will remain free from falls  Outcome: Progressing  Note: Patient calls appropriately for assistance ambulating and toileting. Safety is maintained at this time.        Patient is not progressing towards the following goals:      
The patient is Stable - Low risk of patient condition declining or worsening    Shift Goals  Clinical Goals: Temperature monitoring, discharge planning  Patient Goals: Discharge    Progress made toward(s) clinical / shift goals:  Pt temperature WDL on this shift, not requiring any interventions. Pt verbalizing desire to discharge tomorrow, choice sent to [facility]. EGD performed and found melena, requiring outpt follow up endoscopy.    Patient is not progressing towards the following goals: N/A       Problem: Knowledge Deficit - Standard  Goal: Patient and family/care givers will demonstrate understanding of plan of care, disease process/condition, diagnostic tests and medications  Outcome: Progressing     
The patient is Stable - Low risk of patient condition declining or worsening    Shift Goals  Clinical Goals: monitor hbg, replace potassium, CIWA  Patient Goals: sleep    Progress made toward(s) clinical / shift goals:  Clinical goals are being monitored.     Problem: Knowledge Deficit - Standard  Goal: Patient and family/care givers will demonstrate understanding of plan of care, disease process/condition, diagnostic tests and medications  Outcome: Progressing  Note: Updated patient on plan of care including medications and treatments. Encouraged verbalization of questions and concerns. All concerns have been addressed at this time.      Problem: Fall Risk  Goal: Patient will remain free from falls  Outcome: Progressing  Note: Patient calls appropriately for assistance with ambulation.          Patient is not progressing towards the following goals:      
The patient is Stable - Low risk of patient condition declining or worsening    Shift Goals  Clinical Goals: monitor hbg, replace potassium, CIWA  Patient Goals: sleep    Progress made toward(s) clinical / shift goals:  HBG improving, patient having soft bowel movements today, dark in color. CIWA has been less than 5. Potassium supplements given along with detox bag and magnesium.       Problem: Knowledge Deficit - Standard  Goal: Patient and family/care givers will demonstrate understanding of plan of care, disease process/condition, diagnostic tests and medications  Outcome: Progressing       Problem: Fall Risk  Goal: Patient will remain free from falls  Outcome: Progressing     Problem: Optimal Care for Alcohol Withdrawal  Goal: Optimal Care for the alcohol withdrawal patient  Outcome: Progressing     Patient is not progressing towards the following goals:      
The patient is Stable - Low risk of patient condition declining or worsening    Shift Goals  Clinical Goals: monitor hgb, CIWA  Patient Goals: get scope time    Progress made toward(s) clinical / shift goals:  hemoglobin decreased slightly but is still above 7. CIWA continued and remains 0. Patient set up for scope at 1400 6/15. No further questions or concerns at this time      Problem: Knowledge Deficit - Standard  Goal: Patient and family/care givers will demonstrate understanding of plan of care, disease process/condition, diagnostic tests and medications  Outcome: Progressing     Problem: Optimal Care for Alcohol Withdrawal  Goal: Optimal Care for the alcohol withdrawal patient  Outcome: Progressing     Problem: Psychosocial  Goal: Patient's level of anxiety will decrease  Outcome: Progressing       Patient is not progressing towards the following goals:      
normal...

## 2022-06-16 NOTE — PROGRESS NOTES
Telemetry Shift Summary    Rhythm Sinus Bradycardia Sinus Rhythm  HR Range 56-73  Ectopy rare PVC  Measurements .20/.06/.40        Normal Values  Rhythm SR  HR Range    Measurements 0.12-0.20 / 0.06-0.10  / 0.30-0.52

## 2022-06-27 ENCOUNTER — TELEPHONE (OUTPATIENT)
Dept: HEALTH INFORMATION MANAGEMENT | Facility: OTHER | Age: 67
End: 2022-06-27

## 2022-06-30 ENCOUNTER — HOSPITAL ENCOUNTER (OUTPATIENT)
Dept: LAB | Facility: MEDICAL CENTER | Age: 67
End: 2022-06-30
Attending: STUDENT IN AN ORGANIZED HEALTH CARE EDUCATION/TRAINING PROGRAM
Payer: MEDICARE

## 2022-06-30 LAB
ANISOCYTOSIS BLD QL SMEAR: ABNORMAL
BASOPHILS # BLD AUTO: 1.7 % (ref 0–1.8)
BASOPHILS # BLD: 0.1 K/UL (ref 0–0.12)
EOSINOPHIL # BLD AUTO: 0.15 K/UL (ref 0–0.51)
EOSINOPHIL NFR BLD: 2.5 % (ref 0–6.9)
ERYTHROCYTE [DISTWIDTH] IN BLOOD BY AUTOMATED COUNT: 78.8 FL (ref 35.9–50)
FOLATE SERPL-MCNC: 17.6 NG/ML
HCT VFR BLD AUTO: 29.1 % (ref 37–47)
HGB BLD-MCNC: 9.1 G/DL (ref 12–16)
LG PLATELETS BLD QL SMEAR: NORMAL
LYMPHOCYTES # BLD AUTO: 1.14 K/UL (ref 1–4.8)
LYMPHOCYTES NFR BLD: 18.7 % (ref 22–41)
MACROCYTES BLD QL SMEAR: ABNORMAL
MAGNESIUM SERPL-MCNC: 1.4 MG/DL (ref 1.5–2.5)
MANUAL DIFF BLD: NORMAL
MCH RBC QN AUTO: 32.7 PG (ref 27–33)
MCHC RBC AUTO-ENTMCNC: 31.3 G/DL (ref 33.6–35)
MCV RBC AUTO: 104.7 FL (ref 81.4–97.8)
MONOCYTES # BLD AUTO: 0.21 K/UL (ref 0–0.85)
MONOCYTES NFR BLD AUTO: 3.4 % (ref 0–13.4)
MORPHOLOGY BLD-IMP: NORMAL
NEUTROPHILS # BLD AUTO: 4.5 K/UL (ref 2–7.15)
NEUTROPHILS NFR BLD: 73.7 % (ref 44–72)
NRBC # BLD AUTO: 0 K/UL
NRBC BLD-RTO: 0 /100 WBC
PLATELET # BLD AUTO: 369 K/UL (ref 164–446)
PLATELET BLD QL SMEAR: NORMAL
PMV BLD AUTO: 10.7 FL (ref 9–12.9)
POLYCHROMASIA BLD QL SMEAR: NORMAL
RBC # BLD AUTO: 2.78 M/UL (ref 4.2–5.4)
RBC BLD AUTO: PRESENT
VIT B12 SERPL-MCNC: 652 PG/ML (ref 211–911)
WBC # BLD AUTO: 6.1 K/UL (ref 4.8–10.8)

## 2022-06-30 PROCEDURE — 83735 ASSAY OF MAGNESIUM: CPT

## 2022-06-30 PROCEDURE — 82746 ASSAY OF FOLIC ACID SERUM: CPT

## 2022-06-30 PROCEDURE — 85306 CLOT INHIBIT PROT S FREE: CPT

## 2022-06-30 PROCEDURE — 81240 F2 GENE: CPT

## 2022-06-30 PROCEDURE — 86147 CARDIOLIPIN ANTIBODY EA IG: CPT

## 2022-06-30 PROCEDURE — 85007 BL SMEAR W/DIFF WBC COUNT: CPT

## 2022-06-30 PROCEDURE — 82607 VITAMIN B-12: CPT

## 2022-06-30 PROCEDURE — 36415 COLL VENOUS BLD VENIPUNCTURE: CPT

## 2022-06-30 PROCEDURE — 81241 F5 GENE: CPT

## 2022-06-30 PROCEDURE — 85300 ANTITHROMBIN III ACTIVITY: CPT

## 2022-06-30 PROCEDURE — 85025 COMPLETE CBC W/AUTO DIFF WBC: CPT

## 2022-06-30 PROCEDURE — 85303 CLOT INHIBIT PROT C ACTIVITY: CPT

## 2022-07-02 LAB
CARDIOLIPIN IGG SER IA-ACNC: <10 GPL
CARDIOLIPIN IGM SER IA-ACNC: <10 MPL

## 2022-07-03 LAB
PROT C ACT/NOR PPP: 86 % (ref 83–168)
PROT S ACT/NOR PPP: 106 % (ref 57–131)

## 2022-07-04 LAB
AT III ACT/NOR PPP CHRO: 91 % (ref 76–128)
F2 C.20210G>A GENO BLD/T: NEGATIVE
F5 P.R506Q BLD/T QL: NEGATIVE

## 2022-07-28 ENCOUNTER — HOSPITAL ENCOUNTER (OUTPATIENT)
Dept: RADIOLOGY | Facility: MEDICAL CENTER | Age: 67
End: 2022-07-28
Attending: INTERNAL MEDICINE
Payer: MEDICARE

## 2022-07-28 DIAGNOSIS — Z12.31 VISIT FOR SCREENING MAMMOGRAM: ICD-10-CM

## 2022-07-28 PROCEDURE — 77063 BREAST TOMOSYNTHESIS BI: CPT

## 2022-07-31 ENCOUNTER — ANESTHESIA (OUTPATIENT)
Dept: SURGERY | Facility: MEDICAL CENTER | Age: 67
DRG: 369 | End: 2022-07-31
Payer: MEDICARE

## 2022-07-31 ENCOUNTER — APPOINTMENT (OUTPATIENT)
Dept: RADIOLOGY | Facility: MEDICAL CENTER | Age: 67
DRG: 369 | End: 2022-07-31
Attending: EMERGENCY MEDICINE
Payer: MEDICARE

## 2022-07-31 ENCOUNTER — ANESTHESIA EVENT (OUTPATIENT)
Dept: SURGERY | Facility: MEDICAL CENTER | Age: 67
DRG: 369 | End: 2022-07-31
Payer: MEDICARE

## 2022-07-31 ENCOUNTER — HOSPITAL ENCOUNTER (INPATIENT)
Facility: MEDICAL CENTER | Age: 67
LOS: 2 days | DRG: 369 | End: 2022-08-02
Attending: EMERGENCY MEDICINE | Admitting: EMERGENCY MEDICINE
Payer: MEDICARE

## 2022-07-31 DIAGNOSIS — F10.29 ALCOHOL DEPENDENCE WITH UNSPECIFIED ALCOHOL-INDUCED DISORDER (HCC): ICD-10-CM

## 2022-07-31 DIAGNOSIS — D62 ACUTE BLOOD LOSS ANEMIA: ICD-10-CM

## 2022-07-31 DIAGNOSIS — K22.6 MALLORY-WEISS TEAR: ICD-10-CM

## 2022-07-31 DIAGNOSIS — K92.2 UPPER GI BLEED: ICD-10-CM

## 2022-07-31 DIAGNOSIS — I82.402 ACUTE DEEP VEIN THROMBOSIS (DVT) OF LEFT LOWER EXTREMITY, UNSPECIFIED VEIN (HCC): ICD-10-CM

## 2022-07-31 DIAGNOSIS — K92.0 HEMATEMESIS WITH NAUSEA: ICD-10-CM

## 2022-07-31 PROBLEM — Z86.711 HISTORY OF PULMONARY EMBOLUS (PE): Status: ACTIVE | Noted: 2022-07-31

## 2022-07-31 LAB
ABO GROUP BLD: NORMAL
ALBUMIN SERPL BCP-MCNC: 3.9 G/DL (ref 3.2–4.9)
ALBUMIN/GLOB SERPL: 1.6 G/DL
ALP SERPL-CCNC: 146 U/L (ref 30–99)
ALT SERPL-CCNC: 43 U/L (ref 2–50)
ANION GAP SERPL CALC-SCNC: 30 MMOL/L (ref 7–16)
APTT PPP: 42.6 SEC (ref 24.7–36)
AST SERPL-CCNC: 107 U/L (ref 12–45)
BARCODED ABORH UBTYP: 6200
BARCODED ABORH UBTYP: 6200
BARCODED PRD CODE UBPRD: NORMAL
BARCODED PRD CODE UBPRD: NORMAL
BARCODED UNIT NUM UBUNT: NORMAL
BARCODED UNIT NUM UBUNT: NORMAL
BASOPHILS # BLD AUTO: 0.2 % (ref 0–1.8)
BASOPHILS # BLD: 0.02 K/UL (ref 0–0.12)
BILIRUB SERPL-MCNC: 0.7 MG/DL (ref 0.1–1.5)
BLD GP AB SCN SERPL QL: NORMAL
BUN SERPL-MCNC: 41 MG/DL (ref 8–22)
CALCIUM SERPL-MCNC: 8.5 MG/DL (ref 8.5–10.5)
CHLORIDE SERPL-SCNC: 91 MMOL/L (ref 96–112)
CO2 SERPL-SCNC: 13 MMOL/L (ref 20–33)
COMPONENT R 8504R: NORMAL
COMPONENT R 8504R: NORMAL
CREAT SERPL-MCNC: 1.5 MG/DL (ref 0.5–1.4)
EKG IMPRESSION: NORMAL
EKG IMPRESSION: NORMAL
EOSINOPHIL # BLD AUTO: 0 K/UL (ref 0–0.51)
EOSINOPHIL NFR BLD: 0 % (ref 0–6.9)
ERYTHROCYTE [DISTWIDTH] IN BLOOD BY AUTOMATED COUNT: 64.8 FL (ref 35.9–50)
ETHANOL BLD-MCNC: <10.1 MG/DL
GFR SERPLBLD CREATININE-BSD FMLA CKD-EPI: 38 ML/MIN/1.73 M 2
GLOBULIN SER CALC-MCNC: 2.5 G/DL (ref 1.9–3.5)
GLUCOSE BLD STRIP.AUTO-MCNC: 167 MG/DL (ref 65–99)
GLUCOSE SERPL-MCNC: 150 MG/DL (ref 65–99)
HCT VFR BLD AUTO: 19.6 % (ref 37–47)
HGB BLD-MCNC: 6.4 G/DL (ref 12–16)
HGB BLD-MCNC: 8.5 G/DL (ref 12–16)
IMM GRANULOCYTES # BLD AUTO: 0.07 K/UL (ref 0–0.11)
IMM GRANULOCYTES NFR BLD AUTO: 0.8 % (ref 0–0.9)
INR PPP: 5.4 (ref 0.87–1.13)
LIPASE SERPL-CCNC: 30 U/L (ref 11–82)
LYMPHOCYTES # BLD AUTO: 0.46 K/UL (ref 1–4.8)
LYMPHOCYTES NFR BLD: 5.1 % (ref 22–41)
MAGNESIUM SERPL-MCNC: 1.3 MG/DL (ref 1.5–2.5)
MCH RBC QN AUTO: 33.9 PG (ref 27–33)
MCHC RBC AUTO-ENTMCNC: 32.7 G/DL (ref 33.6–35)
MCV RBC AUTO: 103.7 FL (ref 81.4–97.8)
MONOCYTES # BLD AUTO: 0.74 K/UL (ref 0–0.85)
MONOCYTES NFR BLD AUTO: 8.2 % (ref 0–13.4)
NEUTROPHILS # BLD AUTO: 7.68 K/UL (ref 2–7.15)
NEUTROPHILS NFR BLD: 85.7 % (ref 44–72)
NRBC # BLD AUTO: 0.02 K/UL
NRBC BLD-RTO: 0.2 /100 WBC
PHOSPHATE SERPL-MCNC: 5.7 MG/DL (ref 2.5–4.5)
PLATELET # BLD AUTO: 215 K/UL (ref 164–446)
PMV BLD AUTO: 11.1 FL (ref 9–12.9)
POTASSIUM SERPL-SCNC: 3.6 MMOL/L (ref 3.6–5.5)
PRODUCT TYPE UPROD: NORMAL
PRODUCT TYPE UPROD: NORMAL
PROT SERPL-MCNC: 6.4 G/DL (ref 6–8.2)
PROTHROMBIN TIME: 47 SEC (ref 12–14.6)
RBC # BLD AUTO: 1.89 M/UL (ref 4.2–5.4)
RH BLD: NORMAL
SODIUM SERPL-SCNC: 134 MMOL/L (ref 135–145)
UNIT STATUS USTAT: NORMAL
UNIT STATUS USTAT: NORMAL
WBC # BLD AUTO: 9 K/UL (ref 4.8–10.8)

## 2022-07-31 PROCEDURE — 36430 TRANSFUSION BLD/BLD COMPNT: CPT

## 2022-07-31 PROCEDURE — 99291 CRITICAL CARE FIRST HOUR: CPT | Mod: GC | Performed by: EMERGENCY MEDICINE

## 2022-07-31 PROCEDURE — 99291 CRITICAL CARE FIRST HOUR: CPT

## 2022-07-31 PROCEDURE — C9113 INJ PANTOPRAZOLE SODIUM, VIA: HCPCS | Performed by: EMERGENCY MEDICINE

## 2022-07-31 PROCEDURE — 700102 HCHG RX REV CODE 250 W/ 637 OVERRIDE(OP): Performed by: EMERGENCY MEDICINE

## 2022-07-31 PROCEDURE — 96375 TX/PRO/DX INJ NEW DRUG ADDON: CPT

## 2022-07-31 PROCEDURE — 83690 ASSAY OF LIPASE: CPT

## 2022-07-31 PROCEDURE — 96365 THER/PROPH/DIAG IV INF INIT: CPT

## 2022-07-31 PROCEDURE — 160002 HCHG RECOVERY MINUTES (STAT): Performed by: INTERNAL MEDICINE

## 2022-07-31 PROCEDURE — 86900 BLOOD TYPING SEROLOGIC ABO: CPT

## 2022-07-31 PROCEDURE — 700111 HCHG RX REV CODE 636 W/ 250 OVERRIDE (IP): Performed by: EMERGENCY MEDICINE

## 2022-07-31 PROCEDURE — 160202 HCHG ENDO MINUTES - 1ST 30 MINS LEVEL 3: Performed by: INTERNAL MEDICINE

## 2022-07-31 PROCEDURE — 36415 COLL VENOUS BLD VENIPUNCTURE: CPT

## 2022-07-31 PROCEDURE — 770006 HCHG ROOM/CARE - MED/SURG/GYN SEMI*

## 2022-07-31 PROCEDURE — 82962 GLUCOSE BLOOD TEST: CPT

## 2022-07-31 PROCEDURE — 99140 ANES COMP EMERGENCY COND: CPT | Performed by: ANESTHESIOLOGY

## 2022-07-31 PROCEDURE — 700102 HCHG RX REV CODE 250 W/ 637 OVERRIDE(OP): Performed by: STUDENT IN AN ORGANIZED HEALTH CARE EDUCATION/TRAINING PROGRAM

## 2022-07-31 PROCEDURE — 700111 HCHG RX REV CODE 636 W/ 250 OVERRIDE (IP): Performed by: INTERNAL MEDICINE

## 2022-07-31 PROCEDURE — 00731 ANES UPR GI NDSC PX NOS: CPT | Performed by: ANESTHESIOLOGY

## 2022-07-31 PROCEDURE — 160048 HCHG OR STATISTICAL LEVEL 1-5: Performed by: INTERNAL MEDICINE

## 2022-07-31 PROCEDURE — 86901 BLOOD TYPING SEROLOGIC RH(D): CPT

## 2022-07-31 PROCEDURE — 3E0G8GC INTRODUCTION OF OTHER THERAPEUTIC SUBSTANCE INTO UPPER GI, VIA NATURAL OR ARTIFICIAL OPENING ENDOSCOPIC: ICD-10-PCS | Performed by: INTERNAL MEDICINE

## 2022-07-31 PROCEDURE — C1889 IMPLANT/INSERT DEVICE, NOC: HCPCS | Performed by: INTERNAL MEDICINE

## 2022-07-31 PROCEDURE — 85018 HEMOGLOBIN: CPT

## 2022-07-31 PROCEDURE — 86923 COMPATIBILITY TEST ELECTRIC: CPT

## 2022-07-31 PROCEDURE — 30233N1 TRANSFUSION OF NONAUTOLOGOUS RED BLOOD CELLS INTO PERIPHERAL VEIN, PERCUTANEOUS APPROACH: ICD-10-PCS | Performed by: EMERGENCY MEDICINE

## 2022-07-31 PROCEDURE — C9113 INJ PANTOPRAZOLE SODIUM, VIA: HCPCS | Performed by: INTERNAL MEDICINE

## 2022-07-31 PROCEDURE — 93005 ELECTROCARDIOGRAM TRACING: CPT | Performed by: EMERGENCY MEDICINE

## 2022-07-31 PROCEDURE — 0W3P8ZZ CONTROL BLEEDING IN GASTROINTESTINAL TRACT, VIA NATURAL OR ARTIFICIAL OPENING ENDOSCOPIC: ICD-10-PCS | Performed by: INTERNAL MEDICINE

## 2022-07-31 PROCEDURE — 700111 HCHG RX REV CODE 636 W/ 250 OVERRIDE (IP): Performed by: ANESTHESIOLOGY

## 2022-07-31 PROCEDURE — 85610 PROTHROMBIN TIME: CPT

## 2022-07-31 PROCEDURE — 160035 HCHG PACU - 1ST 60 MINS PHASE I: Performed by: INTERNAL MEDICINE

## 2022-07-31 PROCEDURE — 85025 COMPLETE CBC W/AUTO DIFF WBC: CPT

## 2022-07-31 PROCEDURE — 84100 ASSAY OF PHOSPHORUS: CPT

## 2022-07-31 PROCEDURE — 700105 HCHG RX REV CODE 258: Performed by: ANESTHESIOLOGY

## 2022-07-31 PROCEDURE — 160009 HCHG ANES TIME/MIN: Performed by: INTERNAL MEDICINE

## 2022-07-31 PROCEDURE — 82077 ASSAY SPEC XCP UR&BREATH IA: CPT

## 2022-07-31 PROCEDURE — 700102 HCHG RX REV CODE 250 W/ 637 OVERRIDE(OP): Performed by: INTERNAL MEDICINE

## 2022-07-31 PROCEDURE — 700111 HCHG RX REV CODE 636 W/ 250 OVERRIDE (IP)

## 2022-07-31 PROCEDURE — A9270 NON-COVERED ITEM OR SERVICE: HCPCS | Performed by: STUDENT IN AN ORGANIZED HEALTH CARE EDUCATION/TRAINING PROGRAM

## 2022-07-31 PROCEDURE — 700105 HCHG RX REV CODE 258: Performed by: INTERNAL MEDICINE

## 2022-07-31 PROCEDURE — 85730 THROMBOPLASTIN TIME PARTIAL: CPT

## 2022-07-31 PROCEDURE — 99221 1ST HOSP IP/OBS SF/LOW 40: CPT | Performed by: INTERNAL MEDICINE

## 2022-07-31 PROCEDURE — 86850 RBC ANTIBODY SCREEN: CPT

## 2022-07-31 PROCEDURE — A9270 NON-COVERED ITEM OR SERVICE: HCPCS | Performed by: EMERGENCY MEDICINE

## 2022-07-31 PROCEDURE — P9016 RBC LEUKOCYTES REDUCED: HCPCS

## 2022-07-31 PROCEDURE — A9270 NON-COVERED ITEM OR SERVICE: HCPCS | Performed by: INTERNAL MEDICINE

## 2022-07-31 PROCEDURE — 80053 COMPREHEN METABOLIC PANEL: CPT

## 2022-07-31 PROCEDURE — 83735 ASSAY OF MAGNESIUM: CPT

## 2022-07-31 PROCEDURE — 700105 HCHG RX REV CODE 258: Performed by: EMERGENCY MEDICINE

## 2022-07-31 PROCEDURE — 700101 HCHG RX REV CODE 250: Performed by: ANESTHESIOLOGY

## 2022-07-31 PROCEDURE — 71045 X-RAY EXAM CHEST 1 VIEW: CPT

## 2022-07-31 RX ORDER — HYDRALAZINE HYDROCHLORIDE 20 MG/ML
5 INJECTION INTRAMUSCULAR; INTRAVENOUS
Status: DISCONTINUED | OUTPATIENT
Start: 2022-07-31 | End: 2022-07-31 | Stop reason: HOSPADM

## 2022-07-31 RX ORDER — SODIUM CHLORIDE, SODIUM LACTATE, POTASSIUM CHLORIDE, CALCIUM CHLORIDE 600; 310; 30; 20 MG/100ML; MG/100ML; MG/100ML; MG/100ML
INJECTION, SOLUTION INTRAVENOUS CONTINUOUS
Status: DISCONTINUED | OUTPATIENT
Start: 2022-07-31 | End: 2022-07-31 | Stop reason: HOSPADM

## 2022-07-31 RX ORDER — EPINEPHRINE 0.1 MG/ML
SYRINGE (ML) INJECTION
Status: DISCONTINUED | OUTPATIENT
Start: 2022-07-31 | End: 2022-07-31 | Stop reason: HOSPADM

## 2022-07-31 RX ORDER — OCTREOTIDE ACETATE 200 UG/ML
50 INJECTION INTRAVENOUS ONCE
Status: COMPLETED | OUTPATIENT
Start: 2022-07-31 | End: 2022-07-31

## 2022-07-31 RX ORDER — MIRTAZAPINE 15 MG/1
15 TABLET, FILM COATED ORAL NIGHTLY
Status: DISCONTINUED | OUTPATIENT
Start: 2022-07-31 | End: 2022-08-02 | Stop reason: HOSPADM

## 2022-07-31 RX ORDER — ROCURONIUM BROMIDE 10 MG/ML
INJECTION, SOLUTION INTRAVENOUS PRN
Status: DISCONTINUED | OUTPATIENT
Start: 2022-07-31 | End: 2022-07-31 | Stop reason: SURG

## 2022-07-31 RX ORDER — SODIUM CHLORIDE, SODIUM LACTATE, POTASSIUM CHLORIDE, CALCIUM CHLORIDE 600; 310; 30; 20 MG/100ML; MG/100ML; MG/100ML; MG/100ML
1000 INJECTION, SOLUTION INTRAVENOUS ONCE
Status: COMPLETED | OUTPATIENT
Start: 2022-07-31 | End: 2022-07-31

## 2022-07-31 RX ORDER — DEXAMETHASONE SODIUM PHOSPHATE 4 MG/ML
INJECTION, SOLUTION INTRA-ARTICULAR; INTRALESIONAL; INTRAMUSCULAR; INTRAVENOUS; SOFT TISSUE PRN
Status: DISCONTINUED | OUTPATIENT
Start: 2022-07-31 | End: 2022-07-31 | Stop reason: SURG

## 2022-07-31 RX ORDER — POLYETHYLENE GLYCOL 3350 17 G/17G
1 POWDER, FOR SOLUTION ORAL
Status: DISCONTINUED | OUTPATIENT
Start: 2022-07-31 | End: 2022-08-02 | Stop reason: HOSPADM

## 2022-07-31 RX ORDER — SODIUM CHLORIDE 9 MG/ML
INJECTION, SOLUTION INTRAVENOUS CONTINUOUS
Status: DISCONTINUED | OUTPATIENT
Start: 2022-07-31 | End: 2022-08-02 | Stop reason: HOSPADM

## 2022-07-31 RX ORDER — HYDROMORPHONE HYDROCHLORIDE 1 MG/ML
0.4 INJECTION, SOLUTION INTRAMUSCULAR; INTRAVENOUS; SUBCUTANEOUS
Status: DISCONTINUED | OUTPATIENT
Start: 2022-07-31 | End: 2022-07-31 | Stop reason: HOSPADM

## 2022-07-31 RX ORDER — DIPHENHYDRAMINE HYDROCHLORIDE 50 MG/ML
12.5 INJECTION INTRAMUSCULAR; INTRAVENOUS
Status: DISCONTINUED | OUTPATIENT
Start: 2022-07-31 | End: 2022-07-31 | Stop reason: HOSPADM

## 2022-07-31 RX ORDER — MAGNESIUM SULFATE HEPTAHYDRATE 40 MG/ML
4 INJECTION, SOLUTION INTRAVENOUS ONCE
Status: COMPLETED | OUTPATIENT
Start: 2022-07-31 | End: 2022-07-31

## 2022-07-31 RX ORDER — HYDROMORPHONE HYDROCHLORIDE 1 MG/ML
0.1 INJECTION, SOLUTION INTRAMUSCULAR; INTRAVENOUS; SUBCUTANEOUS
Status: DISCONTINUED | OUTPATIENT
Start: 2022-07-31 | End: 2022-07-31 | Stop reason: HOSPADM

## 2022-07-31 RX ORDER — LIDOCAINE HYDROCHLORIDE 20 MG/ML
INJECTION, SOLUTION EPIDURAL; INFILTRATION; INTRACAUDAL; PERINEURAL PRN
Status: DISCONTINUED | OUTPATIENT
Start: 2022-07-31 | End: 2022-07-31 | Stop reason: SURG

## 2022-07-31 RX ORDER — AMOXICILLIN 250 MG
2 CAPSULE ORAL 2 TIMES DAILY
Status: DISCONTINUED | OUTPATIENT
Start: 2022-07-31 | End: 2022-08-02 | Stop reason: HOSPADM

## 2022-07-31 RX ORDER — ONDANSETRON 2 MG/ML
INJECTION INTRAMUSCULAR; INTRAVENOUS
Status: COMPLETED
Start: 2022-07-31 | End: 2022-07-31

## 2022-07-31 RX ORDER — PANTOPRAZOLE SODIUM 40 MG/10ML
40 INJECTION, POWDER, LYOPHILIZED, FOR SOLUTION INTRAVENOUS 2 TIMES DAILY
Status: DISCONTINUED | OUTPATIENT
Start: 2022-07-31 | End: 2022-08-02 | Stop reason: HOSPADM

## 2022-07-31 RX ORDER — HALOPERIDOL 5 MG/ML
1 INJECTION INTRAMUSCULAR
Status: DISCONTINUED | OUTPATIENT
Start: 2022-07-31 | End: 2022-07-31 | Stop reason: HOSPADM

## 2022-07-31 RX ORDER — ONDANSETRON 2 MG/ML
INJECTION INTRAMUSCULAR; INTRAVENOUS PRN
Status: DISCONTINUED | OUTPATIENT
Start: 2022-07-31 | End: 2022-07-31 | Stop reason: SURG

## 2022-07-31 RX ORDER — ONDANSETRON 2 MG/ML
4 INJECTION INTRAMUSCULAR; INTRAVENOUS
Status: COMPLETED | OUTPATIENT
Start: 2022-07-31 | End: 2022-07-31

## 2022-07-31 RX ORDER — SODIUM CHLORIDE, SODIUM LACTATE, POTASSIUM CHLORIDE, CALCIUM CHLORIDE 600; 310; 30; 20 MG/100ML; MG/100ML; MG/100ML; MG/100ML
INJECTION, SOLUTION INTRAVENOUS
Status: DISCONTINUED | OUTPATIENT
Start: 2022-07-31 | End: 2022-07-31

## 2022-07-31 RX ORDER — LABETALOL HYDROCHLORIDE 5 MG/ML
5 INJECTION, SOLUTION INTRAVENOUS
Status: DISCONTINUED | OUTPATIENT
Start: 2022-07-31 | End: 2022-07-31 | Stop reason: HOSPADM

## 2022-07-31 RX ORDER — HYDROMORPHONE HYDROCHLORIDE 1 MG/ML
0.2 INJECTION, SOLUTION INTRAMUSCULAR; INTRAVENOUS; SUBCUTANEOUS
Status: DISCONTINUED | OUTPATIENT
Start: 2022-07-31 | End: 2022-07-31 | Stop reason: HOSPADM

## 2022-07-31 RX ORDER — ALBUTEROL SULFATE 2.5 MG/3ML
2.5 SOLUTION RESPIRATORY (INHALATION)
Status: DISCONTINUED | OUTPATIENT
Start: 2022-07-31 | End: 2022-07-31 | Stop reason: HOSPADM

## 2022-07-31 RX ORDER — ACETAMINOPHEN 325 MG/1
650 TABLET ORAL EVERY 4 HOURS PRN
Status: DISCONTINUED | OUTPATIENT
Start: 2022-07-31 | End: 2022-08-02 | Stop reason: HOSPADM

## 2022-07-31 RX ORDER — SIMVASTATIN 20 MG
20 TABLET ORAL EVERY EVENING
Status: DISCONTINUED | OUTPATIENT
Start: 2022-07-31 | End: 2022-08-02 | Stop reason: HOSPADM

## 2022-07-31 RX ORDER — BISACODYL 10 MG
10 SUPPOSITORY, RECTAL RECTAL
Status: DISCONTINUED | OUTPATIENT
Start: 2022-07-31 | End: 2022-08-02 | Stop reason: HOSPADM

## 2022-07-31 RX ORDER — SUCRALFATE ORAL 1 G/10ML
1 SUSPENSION ORAL
Status: DISCONTINUED | OUTPATIENT
Start: 2022-07-31 | End: 2022-08-02 | Stop reason: HOSPADM

## 2022-07-31 RX ORDER — SODIUM CHLORIDE, SODIUM LACTATE, POTASSIUM CHLORIDE, CALCIUM CHLORIDE 600; 310; 30; 20 MG/100ML; MG/100ML; MG/100ML; MG/100ML
INJECTION, SOLUTION INTRAVENOUS
Status: DISCONTINUED | OUTPATIENT
Start: 2022-07-31 | End: 2022-07-31 | Stop reason: SURG

## 2022-07-31 RX ADMIN — ONDANSETRON HYDROCHLORIDE 4 MG: 2 SOLUTION INTRAMUSCULAR; INTRAVENOUS at 10:49

## 2022-07-31 RX ADMIN — PANTOPRAZOLE SODIUM 40 MG: 40 INJECTION, POWDER, LYOPHILIZED, FOR SOLUTION INTRAVENOUS at 16:55

## 2022-07-31 RX ADMIN — ONDANSETRON 4 MG: 2 INJECTION INTRAMUSCULAR; INTRAVENOUS at 14:06

## 2022-07-31 RX ADMIN — FENTANYL CITRATE 50 MCG: 50 INJECTION, SOLUTION INTRAMUSCULAR; INTRAVENOUS at 14:07

## 2022-07-31 RX ADMIN — PROPOFOL 40 MG: 10 INJECTION, EMULSION INTRAVENOUS at 13:54

## 2022-07-31 RX ADMIN — PROTHROMBIN, COAGULATION FACTOR VII HUMAN, COAGULATION FACTOR IX HUMAN, COAGULATION FACTOR X HUMAN, PROTEIN C, PROTEIN S HUMAN, AND WATER 2000 UNITS: KIT at 10:39

## 2022-07-31 RX ADMIN — SODIUM CHLORIDE, POTASSIUM CHLORIDE, SODIUM LACTATE AND CALCIUM CHLORIDE: 600; 310; 30; 20 INJECTION, SOLUTION INTRAVENOUS at 13:48

## 2022-07-31 RX ADMIN — ACETAMINOPHEN 650 MG: 325 TABLET, FILM COATED ORAL at 16:55

## 2022-07-31 RX ADMIN — FENTANYL CITRATE 50 MCG: 50 INJECTION, SOLUTION INTRAMUSCULAR; INTRAVENOUS at 13:50

## 2022-07-31 RX ADMIN — PANTOPRAZOLE SODIUM 80 MG: 40 INJECTION, POWDER, FOR SOLUTION INTRAVENOUS at 10:34

## 2022-07-31 RX ADMIN — SUCRALFATE ORAL 1 G: 1 SUSPENSION ORAL at 16:56

## 2022-07-31 RX ADMIN — ONDANSETRON HYDROCHLORIDE 4 MG: 2 SOLUTION INTRAMUSCULAR; INTRAVENOUS at 15:02

## 2022-07-31 RX ADMIN — SIMVASTATIN 20 MG: 20 TABLET, FILM COATED ORAL at 17:10

## 2022-07-31 RX ADMIN — SODIUM CHLORIDE, POTASSIUM CHLORIDE, SODIUM LACTATE AND CALCIUM CHLORIDE 1000 ML: 600; 310; 30; 20 INJECTION, SOLUTION INTRAVENOUS at 10:31

## 2022-07-31 RX ADMIN — ROCURONIUM BROMIDE 50 MG: 10 INJECTION, SOLUTION INTRAVENOUS at 13:52

## 2022-07-31 RX ADMIN — MAGNESIUM SULFATE HEPTAHYDRATE 4 G: 40 INJECTION, SOLUTION INTRAVENOUS at 16:55

## 2022-07-31 RX ADMIN — SUGAMMADEX 200 MG: 100 INJECTION, SOLUTION INTRAVENOUS at 14:07

## 2022-07-31 RX ADMIN — SUCRALFATE ORAL 1 G: 1 SUSPENSION ORAL at 21:23

## 2022-07-31 RX ADMIN — PROPOFOL 120 MG: 10 INJECTION, EMULSION INTRAVENOUS at 13:52

## 2022-07-31 RX ADMIN — MIRTAZAPINE 15 MG: 15 TABLET, FILM COATED ORAL at 21:23

## 2022-07-31 RX ADMIN — DEXAMETHASONE SODIUM PHOSPHATE 4 MG: 4 INJECTION, SOLUTION INTRA-ARTICULAR; INTRALESIONAL; INTRAMUSCULAR; INTRAVENOUS; SOFT TISSUE at 14:06

## 2022-07-31 RX ADMIN — OCTREOTIDE ACETATE 50 MCG/HR: 200 INJECTION, SOLUTION INTRAVENOUS; SUBCUTANEOUS at 11:32

## 2022-07-31 RX ADMIN — LIDOCAINE HYDROCHLORIDE 60 MG: 20 INJECTION, SOLUTION EPIDURAL; INFILTRATION; INTRACAUDAL at 13:52

## 2022-07-31 RX ADMIN — PROPOFOL 40 MG: 10 INJECTION, EMULSION INTRAVENOUS at 14:07

## 2022-07-31 RX ADMIN — SODIUM CHLORIDE: 9 INJECTION, SOLUTION INTRAVENOUS at 16:55

## 2022-07-31 RX ADMIN — OCTREOTIDE ACETATE 50 MCG: 200 INJECTION, SOLUTION INTRAVENOUS; SUBCUTANEOUS at 10:35

## 2022-07-31 ASSESSMENT — COGNITIVE AND FUNCTIONAL STATUS - GENERAL
DAILY ACTIVITIY SCORE: 24
SUGGESTED CMS G CODE MODIFIER MOBILITY: CH
MOBILITY SCORE: 24
SUGGESTED CMS G CODE MODIFIER DAILY ACTIVITY: CH

## 2022-07-31 ASSESSMENT — LIFESTYLE VARIABLES
TOTAL SCORE: 1
CONSUMPTION TOTAL: INCOMPLETE
HAVE YOU EVER FELT YOU SHOULD CUT DOWN ON YOUR DRINKING: YES
DOES PATIENT WANT TO TALK TO SOMEONE ABOUT QUITTING: NO
ALCOHOL_USE: YES
EVER FELT BAD OR GUILTY ABOUT YOUR DRINKING: NO
EVER HAD A DRINK FIRST THING IN THE MORNING TO STEADY YOUR NERVES TO GET RID OF A HANGOVER: NO
TOTAL SCORE: 1
DOES PATIENT WANT TO STOP DRINKING: YES
HAVE PEOPLE ANNOYED YOU BY CRITICIZING YOUR DRINKING: NO
AVERAGE NUMBER OF DAYS PER WEEK YOU HAVE A DRINK CONTAINING ALCOHOL: 3
ON A TYPICAL DAY WHEN YOU DRINK ALCOHOL HOW MANY DRINKS DO YOU HAVE: 2
TOTAL SCORE: 1

## 2022-07-31 ASSESSMENT — ENCOUNTER SYMPTOMS
SPUTUM PRODUCTION: 0
NAUSEA: 0
MYALGIAS: 0
BACK PAIN: 1
EYE PAIN: 0
WEIGHT LOSS: 0
MYALGIAS: 1
DIZZINESS: 1
CHILLS: 0
DIZZINESS: 0
SENSORY CHANGE: 0
NECK PAIN: 0
COUGH: 0
SINUS PAIN: 0
SEIZURES: 0
DEPRESSION: 0
HEMOPTYSIS: 0
BLURRED VISION: 0
ORTHOPNEA: 0
FEVER: 0
PHOTOPHOBIA: 0
CONSTIPATION: 0
FOCAL WEAKNESS: 0
PALPITATIONS: 0
EYE DISCHARGE: 0
FLANK PAIN: 0
CLAUDICATION: 0
TINGLING: 0
HEADACHES: 0
WEAKNESS: 0
BLOOD IN STOOL: 1
DIARRHEA: 0
NAUSEA: 1
DIAPHORESIS: 0
BLOOD IN STOOL: 0
BRUISES/BLEEDS EASILY: 1
SORE THROAT: 0
BACK PAIN: 0
NERVOUS/ANXIOUS: 0
VOMITING: 1
HEARTBURN: 0
ABDOMINAL PAIN: 0
SHORTNESS OF BREATH: 0
DOUBLE VISION: 0
FALLS: 1
SPEECH CHANGE: 0
EYE REDNESS: 0

## 2022-07-31 ASSESSMENT — FIBROSIS 4 INDEX
FIB4 SCORE: 5.08
FIB4 SCORE: 2

## 2022-07-31 ASSESSMENT — PAIN DESCRIPTION - PAIN TYPE
TYPE: ACUTE PAIN
TYPE: SURGICAL PAIN

## 2022-07-31 ASSESSMENT — PAIN SCALES - GENERAL: PAIN_LEVEL: 3

## 2022-07-31 NOTE — H&P
History & Physical Note    Date of Admission: 7/31/2022  Admission Status: Inpatient  [facility] Team: [facility] Gold Team  Attending: Ron Parikh M.D.   Senior Resident: Dr. Raymundo    Contact Number: [phone]    Chief Complaint: Vomiting, hematemesis and Melena x12 hours    History of Present Illness (HPI):   Jaymie is a 66 y.o. female with a pmh hypertension, COPD, osteoporosis, tobacco abuse, stress induced cardiomyopathy (35 ->55% EF per recent echo 3/31/22), recent admission for PE and LLE DVT on xarelto as well as recent GI evaluation due to anemia and occult positive stool s/p EGD ([facility]) during 5/10-5/13 and 6/12/-6/16 who presented 7/31/2022 with a 12-hour history of melena and hematemesis.  Patient states that prior to her presentation, she was doing well without any acute illness or signs or symptoms of GI bleed.  However the patient states last night around 10 PM she had 1 dark tarry stool that she endorses is black and melodic and like her previous dark stools.  Patient then states that she woke this morning with vomiting clear emesis and some minor abdominal pain which eventually resolved, however given her past history patient decided to present to the emergency department.  In the emergency department patient had 1 episode of bloody emesis of approximately 150 mL witnessed by nursing, patient denies any recent medication changes, any new food ingestions, or any significant abdominal pain at the time of my interview.  Patient does does have significant history including multiple upper and lower GI angiodysplasias with multiple hospitalizations requiring transfusions and endoscopic intervention for upper and lower GI bleeds.  Patient has multiple risk factors for recurrent GI bleed including currently on anticoagulation, patient with recent NSAID use, patient fell mechanical ground-level fall without loss of consciousness approximately 4 days ago, for which she has been using NSAIDs for pain, though  states that she uses ibuprofen unknown dose daily.  Patient with previous EtOH history, patient states that within the last month she is down to 4 shots of vodka per week, patient denies any history of cirrhosis.    In the emergency department, patient was afebrile, tachycardic into the 1 teens, blood pressures ranging from Afebrile, Blood pressure 94/65 to improving after a bolus of 1 L of LR to 101/60 MAP greater than 65, respiratory rate was 18, pulse oximetry was 96% on room air.  In the ED, patient received 1 L of LR given in bolus, 2 units of packed red blood cells were ordered, and Kcentra was given to reverse anticoagulation.  CBC was remarkable for hemoglobin of 6.4 baseline (approximately 9.1), WBC unremarkable 9.0 platelets unremarkable 215, INR elevated at 5.4, PTT elevated at 42.6.  Diagnostic alcohol levels negative, lipase 30 within normal limits, CMP remarkable for mild hyponatremia sodium of 134, hypokalemia potassium 3.6 chloride low 91 consistent with emesis, bicarb 13 and again consistent with him and his BUN elevated at 41, creatinine 1.5 baseline approximately 0.8, AST mildly elevated at 107 ALT within normal limits at 43 bilirubin within normal limits at 0.7, albumin within normal limits at 3.9 alkaline phosphatase mildly elevated at 146.  Patient was evaluated by GI who are recommending urgent endoscopic intervention.    Review of Systems:   Review of Systems   Constitutional: Positive for malaise/fatigue. Negative for chills, fever and weight loss.   HENT: Negative for congestion, ear discharge, ear pain, hearing loss, nosebleeds, sinus pain and sore throat.    Eyes: Negative for blurred vision, double vision, pain, discharge and redness.   Respiratory: Negative for cough, hemoptysis, sputum production and shortness of breath.    Cardiovascular: Negative for chest pain, palpitations, orthopnea, claudication and leg swelling.   Gastrointestinal: Positive for blood in stool, melena and  vomiting. Negative for abdominal pain, constipation, diarrhea, heartburn and nausea.   Genitourinary: Negative for dysuria, flank pain, frequency, hematuria and urgency.   Musculoskeletal: Positive for falls. Negative for back pain, joint pain, myalgias and neck pain.   Skin: Negative for itching and rash.   Neurological: Negative for dizziness, tingling, sensory change, speech change, focal weakness, seizures, weakness and headaches.   Endo/Heme/Allergies: Negative for environmental allergies. Bruises/bleeds easily.   Psychiatric/Behavioral: Negative for depression. The patient is not nervous/anxious.          Past Medical History:   Past Medical History was reviewed with patient.   has a past medical history of DVT (deep venous thrombosis) (HCC), GI bleed, High cholesterol, Hypertension, Osteoporosis, Pulmonary embolism (HCC), and Smoking.    Past Surgical History: Past Surgical History was reviewed with patient.   has a past surgical history that includes us-needle core bx-breast panel (Right); pr upper gi endoscopy,diagnosis (04/21/2022); pr colonoscopy,diagnostic (04/21/2022); pr upper gi endoscopy,biopsy (04/21/2022); pr upper gi endoscopy,ctrl bleed (04/21/2022); pr upper gi endoscopy,ctrl bleed (N/A, 05/11/2022); gastroscopy w/push enterscopy (N/A, 05/11/2022); hip replacement, partial (Right); and pr upper gi endoscopy,diagnosis (N/A, 6/15/2022).    Medications: Medications have been reviewed with patient.  Prior to Admission Medications   Prescriptions Last Dose Informant Patient Reported? Taking?   mirtazapine (REMERON) 15 MG Tab  Patient No No   Sig: Take 1 Tablet by mouth every evening.   omeprazole (PRILOSEC) 20 MG delayed-release capsule  Patient No No   Sig: Take 1 Capsule by mouth every day.   rivaroxaban (XARELTO) 20 MG Tab tablet   No No   Sig: Take 1 Tablet by mouth with dinner.   simvastatin (ZOCOR) 20 MG Tab  Patient Yes No   Sig: Take 20 mg by mouth every evening. Indications: Ischemic Heart  Disease      Facility-Administered Medications: None        Allergies: Allergies have been reviewed with patient.  No Known Allergies    Family History:   And denies any known family history of GI bleeds, coagulopathies, colon or rectal cancers.  Family history reviewed not pertinent to current illness.    Social History:   Tobacco: Half pack per day for 30 years  Alcohol: Patient endorses drinking 3-4 standard units of alcohol weekly  Recreational drugs (illegal and prescription): Patient denies any illicit drug use  Activity Level: Up, active, performs all ADLs  Living situation: Lives alone in [place], feels safe in current home  Recent travel: Denies any recent travel  Primary Care Provider: reviewed Toña Tejada P.A.-C.  Other (stressors, spirituality, exposures): Denies stressors.  Physical Exam:       Vitals:  Temp:  [35.8 °C (96.4 °F)-36.7 °C (98 °F)] 36.7 °C (98 °F)  Pulse:  [106-129] 109  Resp:  [16-22] 20  BP: ()/(52-65) 101/60  SpO2:  [92 %-98 %] 96 %    Physical Exam  General: Well developed, well nourished female, in mild distress.  HEENT: NC/AT, PERRL, EOMI, no scleral icterus or conjunctival pallor, fair dentition, dried blood around lips, no nasal discharge or oral erythema or exudates.   Neck: Supple, No cervical or supraclavicular LAD, no crepitus  CV:RRR, no murmurs gallops or Rubs, no JVD, 2-3s capillary refill  Pulm: LCAB, no crackles, rales, rhonchi, or wheezing  GI: Normal bowel sounds, abdomen soft, nontender, nondistended to deep or light palpation in all 4 quadrants, no HSM.  MSK: Multiple healing bruises along upper extremities, bandages with small scrapes on bilateral lower extremities.  Radial and dorsalis pedis pulses 2+ and equal bilaterally, respectively.  Strength 5 out of 5 in upper and lower extremities.  No lower extremity edema  Neuro: Patient is alert and oriented x3, no focal deficits  Psych: Appropriate mood and affect     Labs:       Recent Labs     07/31/22  1027    SODIUM 134*   POTASSIUM 3.6   CHLORIDE 91*   CO2 13*   BUN 41*   CREATININE 1.50*   CALCIUM 8.5     Recent Labs     07/31/22  1027   ALTSGPT 43   ASTSGOT 107*   ALKPHOSPHAT 146*   TBILIRUBIN 0.7   LIPASE 30   GLUCOSE 150*     Recent Labs     07/31/22  1027   RBC 1.89*   HEMOGLOBIN 6.4*   HEMATOCRIT 19.6*   PLATELETCT 215   PROTHROMBTM 47.0*   APTT 42.6*   INR 5.40*     Recent Labs     07/31/22  1027   WBC 9.0   NEUTSPOLYS 85.70*   LYMPHOCYTES 5.10*   MONOCYTES 8.20   EOSINOPHILS 0.00   BASOPHILS 0.20   ASTSGOT 107*   ALTSGPT 43   ALKPHOSPHAT 146*   TBILIRUBIN 0.7       Imaging:   DX-CHEST-PORTABLE (1 VIEW)   Final Result      No acute cardiopulmonary abnormality.             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  Patient endorses compliance with PPI, patient has history of hepatic steatosis on right upper quadrant ultrasound, but otherwise no history of esophageal varices.  Patient without significant vomiting, no crepitus or x-ray findings concerning for mediastinal free air consistent with Boerhaave's or esophageal rupture.  His bleeding is most likely the result of a another AVM versus gastritis versus ulcer.  -Admit to ICU  - GI Bleed protocol  - Continue octreotide gtt.  - Pantoprazole 40 mg twice daily  - Maintain 2 large-bore  IVs  - GI consulted: Appreciate recs; will order for EGD today  - Transfused 2 units packed red blood cells in ED, Kcentra for coagulopathy,  -Hemoglobins Q6 hours  -Awaiting results of procedure for further dry management in regard to octreotide drip, pantoprazole further work-up.    Acute kidney injury (HCC)- (present on admission)  Assessment & Plan  Patient presents with a creatinine of 1.50, baseline approximately 0.8, has recent NSAID use but most likely secondary to prerenal cause secondary to dehydration with acute GI bleed.  - Fluid resuscitation as per primary problem  - We will monitor on CMP in the a.m., can consider UA, urine lytes, renal ultrasound in a.m. if unresolved.  -- avoid nephrotoxic medications    History of pulmonary embolus (PE)  Assessment & Plan  Hx of recent PE and DVT, on elequis, saturating well, denies any chronic complaints, factor 5 Leiden and protein C&S all within normal limits.  -Hold AC for now, discuss with pharmacy when to restart given bleed once procedure completed.     Macrocytic anemia- (present on admission)  Assessment & Plan  Patient with a history of macrocytic anemia, most likely secondary to acute GI bleed as above, patient also has a BMI of 16.37, on mirtazapine for underweight  -Continue home mirtazapine.  -Continue to monitor on CBC  -when not acutely ill consider nutrition consult    Elevated INR- (present on admission)  Assessment & Plan  With elevated PT/INR, PTT, on Xarelto, endorses compliance  - As per above, has had coagulopathy work-up in the past including protein C&S, factor V Leiden they have all been within normal limits.    Hyponatremia- (present on admission)  Assessment & Plan  Patient with mild hyponatremia of 134 on admission, likely secondary to hypovolemic hyponatremia  -We will continue to monitor on CMP    Hypokalemia- (present on admission)  Assessment & Plan  Patient has potassium of 3.6 on admission, replete  - Repeat CMP      Hypotension-  (present on admission)  Assessment & Plan  Responsive, resolved with treatment above  - Continue to monitor on vitals  -Treatment as above.    Alcohol abuse- (present on admission)  Assessment & Plan  Hx of ETOh abuse, now 4 drinks per week per patient, diagnostic etoh negative last drink 2 days ago, denies withdraw hx.   -RUQ US with hepatic steatosis during 3/22 admission, mild AST predominance tranasaminitis.   -Continue to encourage cessation.     Smoking- (present on admission)  Assessment & Plan  Patient with a 15-pack-year history of smoking, still smoking approximately half pack per day, not ready to quit  - Offered smoking cessation and counseling, lasting approximately 3 minutes       FEN:fluids as above, replete lytes, NPO pending procedure  GI: PPI IV   DVT: AC held given acute bleed   Code Status: FULL CODE

## 2022-07-31 NOTE — ED PROVIDER NOTES
"ED Provider Note    Scribed for Dhara Hackett M.D. by Jeff Watters. 7/31/2022  10:07 AM    Primary care provider: Toña Tejada P.A.-C.  Means of arrival: EMS  History obtained from: Patient  History limited by: None    CHIEF COMPLAINT  Chief Complaint   Patient presents with   • Blood in Vomit   • Bloody Stools       HPI  Jaymie Peacock is a 66 y.o. female who presents to the Emergency Department for evaluation of blood in vomit and bloody stools onset last night. Per nursing note, patient has a history of GI bleed in the past. Patient states she started vomiting blood last night. States the amount of vomiting is about a couple of cups of bright red blood. She noticed her stools appeared \"kind of black\" starting yesterday. No reports of any alleviating factors to her symptoms. Denies abdominal pain, chest pain, or shortness of breath. She has seen Dr. [facility] ([facility]) but states she was unable to get in to see him. Patient reports history of two blood clots in the right lung and one in the left leg. Denies known history of ulcer. States she smokes. She does drink with last drink last night.    REVIEW OF SYSTEMS  HEENT:  No ear pain, congestion, or sore throat   CARDIAC: no chest pain, no palpitations    PULMONARY: no dyspnea  GI: Vomiting, blood in stools, melena. No abdominal pain.  Endocrine: no fevers, sweating, or weight loss   See history of present illness. All other systems are negative.    PAST MEDICAL HISTORY   has a past medical history of DVT (deep venous thrombosis) (HCC), GI bleed, High cholesterol, Hypertension, Osteoporosis, Pulmonary embolism (HCC), and Smoking.    SURGICAL HISTORY   has a past surgical history that includes us-needle core bx-breast panel (Right); upper gi endoscopy,diagnosis (04/21/2022); colonoscopy,diagnostic (04/21/2022); upper gi endoscopy,biopsy (04/21/2022); upper gi endoscopy,ctrl bleed (04/21/2022); upper gi endoscopy,ctrl bleed (N/A, 05/11/2022); " "gastroscopy w/push enterscopy (N/A, 05/11/2022); hip replacement, partial (Right); and upper gi endoscopy,diagnosis (N/A, 6/15/2022).    SOCIAL HISTORY  Social History     Tobacco Use   • Smoking status: Current Every Day Smoker     Packs/day: 0.50     Types: Cigarettes   • Smokeless tobacco: Never Used   Vaping Use   • Vaping Use: Never used   Substance Use Topics   • Alcohol use: Yes     Alcohol/week: 9.6 oz     Types: 16 Shots of liquor per week     Comment: Last drink 7/30/22 per patient   • Drug use: Not Currently      Social History     Substance and Sexual Activity   Drug Use Not Currently       FAMILY HISTORY  History reviewed. No pertinent family history.    CURRENT MEDICATIONS  Home Medications     Reviewed by Magno Floyd R.N. (Registered Nurse) on 07/31/22 at 1254  Med List Status: Not Addressed   Medication Last Dose Status   mirtazapine (REMERON) 15 MG Tab 7/30/2022 Active   omeprazole (PRILOSEC) 20 MG delayed-release capsule  Active   rivaroxaban (XARELTO) 20 MG Tab tablet 7/30/2022 Active   simvastatin (ZOCOR) 20 MG Tab 7/30/2022 Active                ALLERGIES  No Known Allergies    PHYSICAL EXAM  VITAL SIGNS: BP (!) 97/59   Pulse (!) 129   Temp (!) 35.8 °C (96.4 °F) (Oral)   Resp 19   Ht 1.676 m (5' 6\")   Wt 45.4 kg (100 lb)   SpO2 96%   BMI 16.14 kg/m²   Constitutional: Well developed, Well nourished, No acute distress, Non-toxic appearance.   HEENT: Normocephalic, Atraumatic,  external ears normal, pharynx pink,  Mucous  Membranes moist, Clotted blood in teeth. No rhinorrhea or mucosal edema  Eyes: PERRL, EOMI, Conjunctiva pale, No discharge.   Neck: Normal range of motion, No tenderness, Supple, No stridor.   Lymphatic: No lymphadenopathy    Cardiovascular: Tachycardic. Regular Rhythm, No murmurs,  rubs, or gallops.   Thorax & Lungs: Lungs clear to auscultation bilaterally, No respiratory distress, No wheezes, rhales or rhonchi, No chest wall tenderness.   Abdomen: Bowel sounds " normal, Soft, non tender, non distended,  No pulsatile masses., no rebound guarding or peritoneal signs.   Skin: Warm, Dry, No erythema, No rash, Pale. Bandages and scabs to anterior shins.  Back:  No CVA tenderness,  No spinal tenderness, bony crepitance, step offs, or instability.   Neurologic: Alert & oriented clear speech no focal deficits  Extremities: Equal, intact distal pulses, No cyanosis, clubbing or edema,  No tenderness.   Musculoskeletal: Good range of motion in all major joints. No tenderness to palpation or major deformities noted.     DIAGNOSTIC STUDIES / PROCEDURES    LABS  Results for orders placed or performed during the hospital encounter of 07/31/22   CBC WITH DIFFERENTIAL   Result Value Ref Range    WBC 9.0 4.8 - 10.8 K/uL    RBC 1.89 (L) 4.20 - 5.40 M/uL    Hemoglobin 6.4 (L) 12.0 - 16.0 g/dL    Hematocrit 19.6 (L) 37.0 - 47.0 %    .7 (H) 81.4 - 97.8 fL    MCH 33.9 (H) 27.0 - 33.0 pg    MCHC 32.7 (L) 33.6 - 35.0 g/dL    RDW 64.8 (H) 35.9 - 50.0 fL    Platelet Count 215 164 - 446 K/uL    MPV 11.1 9.0 - 12.9 fL    Neutrophils-Polys 85.70 (H) 44.00 - 72.00 %    Lymphocytes 5.10 (L) 22.00 - 41.00 %    Monocytes 8.20 0.00 - 13.40 %    Eosinophils 0.00 0.00 - 6.90 %    Basophils 0.20 0.00 - 1.80 %    Immature Granulocytes 0.80 0.00 - 0.90 %    Nucleated RBC 0.20 /100 WBC    Neutrophils (Absolute) 7.68 (H) 2.00 - 7.15 K/uL    Lymphs (Absolute) 0.46 (L) 1.00 - 4.80 K/uL    Monos (Absolute) 0.74 0.00 - 0.85 K/uL    Eos (Absolute) 0.00 0.00 - 0.51 K/uL    Baso (Absolute) 0.02 0.00 - 0.12 K/uL    Immature Granulocytes (abs) 0.07 0.00 - 0.11 K/uL    NRBC (Absolute) 0.02 K/uL   COMP METABOLIC PANEL   Result Value Ref Range    Sodium 134 (L) 135 - 145 mmol/L    Potassium 3.6 3.6 - 5.5 mmol/L    Chloride 91 (L) 96 - 112 mmol/L    Co2 13 (L) 20 - 33 mmol/L    Anion Gap 30.0 (H) 7.0 - 16.0    Glucose 150 (H) 65 - 99 mg/dL    Bun 41 (H) 8 - 22 mg/dL    Creatinine 1.50 (H) 0.50 - 1.40 mg/dL    Calcium 8.5  8.5 - 10.5 mg/dL    AST(SGOT) 107 (H) 12 - 45 U/L    ALT(SGPT) 43 2 - 50 U/L    Alkaline Phosphatase 146 (H) 30 - 99 U/L    Total Bilirubin 0.7 0.1 - 1.5 mg/dL    Albumin 3.9 3.2 - 4.9 g/dL    Total Protein 6.4 6.0 - 8.2 g/dL    Globulin 2.5 1.9 - 3.5 g/dL    A-G Ratio 1.6 g/dL   LIPASE   Result Value Ref Range    Lipase 30 11 - 82 U/L   PROTHROMBIN TIME (INR)   Result Value Ref Range    PT 47.0 (H) 12.0 - 14.6 sec    INR 5.40 (H) 0.87 - 1.13   APTT   Result Value Ref Range    APTT 42.6 (H) 24.7 - 36.0 sec   COD (ADULT)   Result Value Ref Range    ABO Grouping Only A     Rh Grouping Only POS     Antibody Screen-Cod NEG     Component R       R7                  Red Blood Cells7    [id number]   issued       22   11:34      Product Type Red Blood Cells LR Pheresis     Dispense Status issued     Unit Number (Barcoded) [id number]     Product Code (Barcoded) A1857T51     Blood Type (Barcoded) 6200     Component R       R99                 Red Cells, LR       [id number]   issued       22   12:50      Product Type R99     Dispense Status issued     Unit Number (Barcoded) [id number]     Product Code (Barcoded) I0917H14     Blood Type (Barcoded) 6200    DIAGNOSTIC ALCOHOL   Result Value Ref Range    Diagnostic Alcohol <10.1 <10.1 mg/dL   ESTIMATED GFR   Result Value Ref Range    GFR (CKD-EPI) 38 (A) >60 mL/min/1.73 m 2   HGB (Hemoglobin) for 48 hours   Result Value Ref Range    Hemoglobin 8.5 (L) 12.0 - 16.0 g/dL   MAGNESIUM   Result Value Ref Range    Magnesium 1.3 (L) 1.5 - 2.5 mg/dL   PHOSPHORUS   Result Value Ref Range    Phosphorus 5.7 (H) 2.5 - 4.5 mg/dL   EKG   Result Value Ref Range    Report       [facility] Emergency Dept.    Test Date:  2022  Pt Name:    VALENTINE ALANIZ                Department: ER  MRN:        [id number]                      Room:       RD 12  Gender:     Female                       Technician: [id number]  :        [id number]                   Requested  By:ARABELLA THOMPSON  Order #:    [id number]                    Reading MD: ARABELLA THOMPSON MD    Measurements  Intervals                                Axis  Rate:       106                          P:          76  CA:         174                          QRS:        68  QRSD:       88                           T:          27  QT:         346  QTc:        459    Interpretive Statements  Sinus tachycardia  Low voltage, extremity and precordial leads  Anteroseptal infarct, age indeterminate  Compared to ECG 05/10/2022 10:21:10  Low QRS voltage now present  Myocardial infarct finding now present  Electronically Signed On 7- 12:30:29 PDT by ARABELLA THOMPSON MD     POCT glucose device results   Result Value Ref Range    POC Glucose, Blood 167 (H) 65 - 99 mg/dL      All labs reviewed by me.    EKG  See Labs section. Interpreted by me.    RADIOLOGY  DX-CHEST-PORTABLE (1 VIEW)   Final Result      No acute cardiopulmonary abnormality.           The radiologist's interpretation of all radiological studies have been reviewed by me.    COURSE & MEDICAL DECISION MAKING  Nursing notes, VS, PMSFHx reviewed in chart.    10:07 AM Patient seen and examined at bedside. Patient will be treated with pantoprazole 80 mg in  ml ivpb, Sandostatin, Kcentra. Ordered EKG, DX chest, estimated GFR, diagnostic alcohol, CBC with differential, CMP, and other labs to evaluate her symptoms. The differential diagnoses include but are not limited to: upper GI bleed from varices or from peptic ulcer disease, anemia.     10:13 AM Obtained and reviewed past medical records from 6/12/22 which showed Dr. Guerrier did EGD with biopsy, did not find the cause of melena. They were going to do a capsule endoscopy.    10:50 AM Patient actively vomiting more blood. Ordered another unit of blood transfusion. Paged GI.    11:19 AM - I discussed the patient's case and the above findings with Dr. Slade (GI) who will take patient to the OR.    11:31 AM - I  discussed the patient's case and the above findings with Dr. Parikh (intensivist) who will consult patient for hosptitalization.      I have explained to the patient the risks and benefits of transfusion of blood products.  This includes, as appropriate, the risk of mild allergic reaction, hemolytic reaction, transfusion-associated lung injury, febrile reactions, circulatory or iron overload, and infection.    We discussed possible alternatives and their risks, including directed donation, autologous transfusion, and no transfusion, including IV or oral iron supplementation, as appropriate.  I believe the patient understands the risks and benefits and was able to express understanding.     HYDRATION: Based on the patient's presentation of Hypotension the patient was given IV fluids. IV Hydration was used because oral hydration was not adequate alone. Upon recheck following hydration, the patient was improved.      CRITICAL CARE  The very real possibilty of a deterioration of this patient's condition required the highest level of my preparedness for sudden, emergent intervention.  I provided critical care services, which included medication orders, frequent reevaluations of the patient's condition and response to treatment, ordering and reviewing test results, and discussing the case with various consultants.  The critical care time associated with the care of the patient was 45 minutes. Review chart for interventions. This time is exclusive of any other billable procedures.      DISPOSITION:  Patient will be hospitalized by Dr. Parikh in critical condition.     FINAL IMPRESSION  1. Hematemesis with nausea    2. Acute blood loss anemia    3. Alcohol dependence with unspecified alcohol-induced disorder (HCC)       Critical care time of 45 minutes, as outlined above.      IJeff (Melvin), am scribing for, and in the presence of, Dhara Hackett M.D..    Electronically signed by: Jeff BLACKBURN  Duong (Scribe), 7/31/2022    IDhara M.D. personally performed the services described in this documentation, as scribed by Jeff Watters in my presence, and it is both accurate and complete.    The note accurately reflects work and decisions made by me.  Dhara Hackett M.D.  7/31/2022  5:10 PM

## 2022-07-31 NOTE — ED NOTES
Dr. Hackett at bedside, she spoke with GI and there is plan for surgery today. Pt understands and does not have further questions.

## 2022-07-31 NOTE — ED TRIAGE NOTES
Chief Complaint   Patient presents with   • Blood in Vomit   • Bloody Stools     Pt presents by EMS from home for c/o vomiting blood and having dark stools. Pt has hx of GI bleed in past and is on Xarelto. Pt states she began vomiting at midnight and then she fell to the ground and was too weak to get up. Pt has rug burns noted to arms/elbows from dragging herself across the floor to get to the phone and call 911. Pt denies hitting her head or any LOC. Pt states she has probably vomited 3-4 times and had 2 days of dark stools.

## 2022-07-31 NOTE — ASSESSMENT & PLAN NOTE
Patient presents with a creatinine of 1.50, baseline approximately 0.8, has recent NSAID use but most likely secondary to prerenal cause secondary to dehydration with acute GI bleed.  - Fluid resuscitation as per primary problem  - We will monitor on CMP in the a.m., can consider UA, urine lytes, renal ultrasound in a.m. if unresolved.  -- avoid nephrotoxic medications

## 2022-07-31 NOTE — ANESTHESIA TIME REPORT
Anesthesia Start and Stop Event Times     Date Time Event    7/31/2022 1340 Ready for Procedure     1348 Anesthesia Start     1421 Anesthesia Stop        Responsible Staff  07/31/22    Name Role Begin End    Hakan Olguin M.D. Anesth 1348 1421        Overtime Reason:  no overtime (within assigned shift)    Comments:

## 2022-07-31 NOTE — ASSESSMENT & PLAN NOTE
Patient with a history of macrocytic anemia, most likely secondary to acute GI bleed as above, patient also has a BMI of 16.37, on mirtazapine for underweight  -Continue home mirtazapine.  -Continue to monitor on CBC  -when not acutely ill consider nutrition consult

## 2022-07-31 NOTE — OR SURGEON
Immediate Post OP Note    PreOp Diagnosis: hematemesis, melena, anticoagulated      PostOp Diagnosis: Crystal Payan tear with visible vessel, s/p epinephrine and hemoclipping, erosive esophagitis LA class A      Procedure(s):  GASTROSCOPY - Wound Class: Clean Contaminated  GASTROSCOPY, WITH SCLEROTHERAPY - Wound Class: Clean Contaminated  EGD, WITH CLIP PLACEMENT - Wound Class: Clean Contaminated    Surgeon(s):  Cyn Slade M.D.    Anesthesiologist/Type of Anesthesia:  Anesthesiologist: Hakan Olguin M.D./General    Surgical Staff:  Endoscopy Technician: Brenton Rondon  Endoscopy Nurse: Roseanna Watters R.N.    Specimens removed if any:  * No specimens in log *    Estimated Blood Loss: large amount of blood and clot in stomach    Findings:   Esophagus:  LA grade A erosive esophagitis.  Visible vessel, not actively bleeding at 7:00 position, did not see distinct tear but no other etiology for vessel.  2cc epinephrine 1:10,000 and hemoclip x 2    Stomach:  Large amount of old blood and clot in fundus, no mucosal lesions appreciated    Duodenum: Small amount of blood, no mucosal lesions    Complications: none    NO FOOD X 48 HRS, liquids only  No anticoagulation x 5 days minimum  Dc octreotide  Continue PPI IV BID and add CArafate slurry x 2 weeks        7/31/2022 2:13 PM Cyn Slade M.D.

## 2022-07-31 NOTE — ASSESSMENT & PLAN NOTE
Drinking vodka almost every day  Encouraged the patient to quit  Monitor for withdrawal start with CIWA if needed  Multivitamin and IV fluid

## 2022-07-31 NOTE — ANESTHESIA PROCEDURE NOTES
Airway    Date/Time: 7/31/2022 1:53 PM  Performed by: Hakan Olguin M.D.  Authorized by: Hakan Olguin M.D.     Location:  OR  Urgency:  Elective  Indications for Airway Management:  Anesthesia      Spontaneous Ventilation: absent    Sedation Level:  Deep  Preoxygenated: Yes    Patient Position:  Sniffing  Mask Difficulty Assessment:  0 - not attempted  Final Airway Type:  Endotracheal airway  Final Endotracheal Airway:  ETT  Cuffed: Yes    Technique Used for Successful ETT Placement:  Direct laryngoscopy    Insertion Site:  Oral  Blade Type:  Javed  Laryngoscope Blade/Videolaryngoscope Blade Size:  3  ETT Size (mm):  7.0  Measured from:  Lips  ETT to Lips (cm):  21  Placement Verified by: auscultation and capnometry    Cormack-Lehane Classification:  Grade IIa - partial view of glottis  Number of Attempts at Approach:  1  Number of Other Approaches Attempted:  0

## 2022-07-31 NOTE — ASSESSMENT & PLAN NOTE
Patient with mild hyponatremia of 134 on admission, likely secondary to hypovolemic hyponatremia  -We will continue to monitor on CMP

## 2022-07-31 NOTE — ASSESSMENT & PLAN NOTE
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     S/p  EGD 7/31/22 which showed Crystal-Payan with visible vessels.  Treated with epinephrine injection    Continue PPI and sucralfate  Avoid NSAID and alcohol  Continue monitoring hemoglobin every 8 hours and transfuse as needed  According to GI recommendation, Eliquis can be resumed after 5 days  Alcoholic cessation education was given at the bedside

## 2022-07-31 NOTE — CONSULTS
Hospital Medicine Consultation    Date of Service  7/31/2022    Referring Physician  Ron Parikh M.D.     Consulting Physician  Aditya Byrd M.D.    Reason for Consultation  Medical management    History of Presenting Illness    66-year-old female with history of PE, alcoholism, hypertension and smoking presented 7/30 for hematemesis.  Started the day of admission with vomiting blood and abdominal pain, also patient noticed melena.  No significant fever or chills no chest pain or other symptoms.  Last May patient was diagnosed with PE and start with Eliquis also patient has been taking ibuprofen almost every day.  Patient states she drinks 2 shots of vodka around daily.  On admission patient was found to have anemia with hemoglobin around 6.  IV fluids with PPI and KCentra was given.  GI was consulted and EGD done on 7/31 which showed Crystal-Payan with visible vessels.  Treated with epinephrine injection.      Review of Systems  Review of Systems   Constitutional: Negative for chills, fever and weight loss.   HENT: Negative for ear pain, hearing loss and tinnitus.    Eyes: Negative for blurred vision, double vision and photophobia.   Respiratory: Negative for cough and hemoptysis.    Cardiovascular: Negative for chest pain, palpitations, orthopnea and claudication.   Gastrointestinal: Positive for melena and vomiting. Negative for abdominal pain, blood in stool, constipation, diarrhea and nausea.   Genitourinary: Negative for dysuria, frequency and urgency.   Musculoskeletal: Negative for myalgias and neck pain.   Skin: Negative for rash.   Neurological: Negative for dizziness, speech change and weakness.       Past Medical History   has a past medical history of DVT (deep venous thrombosis) (HCC), GI bleed, High cholesterol, Hypertension, Osteoporosis, Pulmonary embolism (HCC), and Smoking.    Surgical History   has a past surgical history that includes us-needle core bx-breast panel (Right); pr upper  gi endoscopy,diagnosis (04/21/2022); pr colonoscopy,diagnostic (04/21/2022); pr upper gi endoscopy,biopsy (04/21/2022); pr upper gi endoscopy,ctrl bleed (04/21/2022); pr upper gi endoscopy,ctrl bleed (N/A, 05/11/2022); gastroscopy w/push enterscopy (N/A, 05/11/2022); hip replacement, partial (Right); and pr upper gi endoscopy,diagnosis (N/A, 6/15/2022).    Family History          Social History   reports that she has been smoking cigarettes. She has been smoking about 0.50 packs per day. She has never used smokeless tobacco. She reports current alcohol use of about 9.6 oz of alcohol per week. She reports previous drug use.    Medications  Prior to Admission Medications   Prescriptions Last Dose Informant Patient Reported? Taking?   mirtazapine (REMERON) 15 MG Tab 7/30/2022 at Unknown time Patient No No   Sig: Take 1 Tablet by mouth every evening.   omeprazole (PRILOSEC) 20 MG delayed-release capsule  Patient No No   Sig: Take 1 Capsule by mouth every day.   rivaroxaban (XARELTO) 20 MG Tab tablet 7/30/2022 at Unknown time  No No   Sig: Take 1 Tablet by mouth with dinner.   simvastatin (ZOCOR) 20 MG Tab 7/30/2022 at Unknown time Patient Yes No   Sig: Take 20 mg by mouth every evening. Indications: Ischemic Heart Disease      Facility-Administered Medications: None       Allergies  No Known Allergies    Physical Exam  Temp:  [35.8 °C (96.4 °F)-36.8 °C (98.3 °F)] 36.3 °C (97.3 °F)  Pulse:  [] 94  Resp:  [11-32] 18  BP: ()/(50-76) 123/76  SpO2:  [92 %-100 %] 100 %    Physical Exam  Constitutional:       General: She is not in acute distress.     Appearance: She is not ill-appearing.   Eyes:      General: No scleral icterus.  Cardiovascular:      Rate and Rhythm: Tachycardia present.      Heart sounds: No murmur heard.  Pulmonary:      Effort: No respiratory distress.      Breath sounds: No wheezing or rales.   Abdominal:      General: There is no distension.      Tenderness: There is no abdominal tenderness.    Musculoskeletal:      Right lower leg: No edema.      Left lower leg: No edema.   Skin:     General: Skin is dry.      Coloration: Skin is not pale.      Findings: Bruising present. No erythema, lesion or rash.   Neurological:      General: No focal deficit present.      Mental Status: She is alert and oriented to person, place, and time. Mental status is at baseline.      Cranial Nerves: No cranial nerve deficit.      Motor: No weakness.         Fluids  Date 07/31/22 0700 - 08/01/22 0659   Shift 0901-1706 1471-9654 6793-1286 24 Hour Total   INTAKE   P.O. 0   0   I.V. 1614.7   1614.7   Blood 300   300   Other 0   0   IV Piggyback 100   100   Shift Total 2014.7   2014.7   OUTPUT   Blood 10   10   Shift Total 10   10   Weight (kg) 46 46 46 46       Laboratory  Recent Labs     07/31/22  1027 07/31/22  1520   WBC 9.0  --    RBC 1.89*  --    HEMOGLOBIN 6.4* 8.5*   HEMATOCRIT 19.6*  --    .7*  --    MCH 33.9*  --    MCHC 32.7*  --    RDW 64.8*  --    PLATELETCT 215  --    MPV 11.1  --      Recent Labs     07/31/22  1027   SODIUM 134*   POTASSIUM 3.6   CHLORIDE 91*   CO2 13*   GLUCOSE 150*   BUN 41*   CREATININE 1.50*   CALCIUM 8.5     Recent Labs     07/31/22  1027   APTT 42.6*   INR 5.40*                 Imaging  DX-CHEST-PORTABLE (1 VIEW)   Final Result      No acute cardiopulmonary abnormality.             Assessment/Plan  * Upper GI bleed  Assessment & Plan  Due to Crystal-Payan tear  Patient underwent EGD with epinephrine injection  Continue PPI and sucralfate  Avoid NSAID and alcohol  Continue monitoring hemoglobin every 8 hours and transfuse as needed  According to GI recommendation, Eliquis can be resumed after 5 days    Hypotension- (present on admission)  Assessment & Plan  Due to GI bleeding  Improved  Continue IV fluid    Hypomagnesemia- (present on admission)  Assessment & Plan  Replace IV labs daily      History of pulmonary embolus (PE)  Assessment & Plan  Recently diagnosed  Continue holding  Eliquis, by GI we can resume after 5 days around 8/5  SCDs and encourage the patient to walk around    Elevated INR- (present on admission)  Assessment & Plan  Treated with Kcentra   INR daily    Acute kidney injury (HCC)- (present on admission)  Assessment & Plan  Came with creatinine 1.5 and her baseline around 1  Likely related to dehydration  IV fluid and monitoring with labs daily    Alcohol abuse- (present on admission)  Assessment & Plan  Drinking vodka almost every day  Encouraged the patient to quit  Monitor for withdrawal start with CIWA if needed  Multivitamin and IV fluid    Smoking- (present on admission)  Assessment & Plan  Smoking cessation

## 2022-07-31 NOTE — ASSESSMENT & PLAN NOTE
Hx of recent PE and DVT, on elequis, saturating well, denies any chronic complaints, factor 5 Leiden and protein C&S all within normal limits.  -Hold AC for now, discuss with pharmacy when to restart given bleed once procedure completed.

## 2022-07-31 NOTE — CONSULTS
Gastroenterology Initial Consult Note               Author:  Cyn Slade M.D. Date & Time Created: 7/31/2022 11:55 AM       Patient ID:  Name:             Jaymie Peacock  YOB: 1955  Age:                 66 y.o.  female  MRN:               [id number]      Referring Provider:  Dr. Dhara Hackett        Presenting Chief Complaint:  Hematemesis      History of Present Illness:    67yo female on chronic anticoagulation for pulmonary emboli presented with hematemesis, multiple episodes, and melena. She states she has been taking ibuprofen possibly daily for an injury.  She also drinks at least 1-2 shots vodka daily.  She presented hemodynamically unstable and has been given KCentra due to anticoagulation.       April 2022:   EGD for same with findings of 2 duodenal AVMs treated with APC and mild erosive gastritis.  She had a normal colonoscopy.      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  None of the procedures showed active bleeding.    June 2022: admitted with hemoglobin 6, .6 and heme positive stool.  She underwent EGD with push enteroscopy to 145cm small bowel and no source to explain bleeding.  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    Review of Systems:  Review of Systems   Constitutional: Positive for malaise/fatigue. Negative for chills, diaphoresis, fever and weight loss.   HENT: Negative for nosebleeds and sore throat.    Eyes: Negative for photophobia.   Respiratory: Negative for cough, hemoptysis and sputum production.    Cardiovascular: Negative for chest pain and leg swelling.   Gastrointestinal: Positive for melena, nausea and vomiting. Negative for abdominal pain, constipation, diarrhea and heartburn.   Genitourinary: Negative for dysuria and hematuria.   Musculoskeletal: Positive for back pain, falls and myalgias.   Skin: Negative  for itching and rash.   Neurological: Positive for dizziness.             Past Medical History:  Past Medical History:   Diagnosis Date   • DVT (deep venous thrombosis) (HCC)    • GI bleed    • High cholesterol    • Hypertension    • Osteoporosis    • Pulmonary embolism (HCC)    • Smoking      There are no active hospital problems to display for this patient.        Past Surgical History:  Past Surgical History:   Procedure Laterality Date   • CA UPPER GI ENDOSCOPY,DIAGNOSIS N/A 6/15/2022    Procedure: GASTROSCOPY WITH PUSH ENDOSCOPY;  Surgeon: Claudio Guerrier M.D.;  Location: SURGERY [facility];  Service: Gastroenterology   • CA UPPER GI ENDOSCOPY,CTRL BLEED N/A 05/11/2022    Procedure: GASTROSCOPY, WITH ARGON PLASMA COAGULATION;  Surgeon: Yariel Pagan M.D.;  Location: SURGERY SAME DAY [facility];  Service: Gastroenterology   • GASTROSCOPY W/PUSH ENTERSCOPY N/A 05/11/2022    Procedure: GASTROSCOPY, WITH PUSH ENTEROSCOPY;  Surgeon: Yariel Pagan M.D.;  Location: SURGERY SAME DAY [facility];  Service: Gastroenterology   • CA UPPER GI ENDOSCOPY,DIAGNOSIS  04/21/2022    Procedure: GASTROSCOPY;  Surgeon: Rell Rivas M.D.;  Location: SURGERY SAME DAY [facility];  Service: Gastroenterology   • CA COLONOSCOPY,DIAGNOSTIC  04/21/2022    Procedure: COLONOSCOPY;  Surgeon: Rell Rivas M.D.;  Location: SURGERY SAME DAY [facility];  Service: Gastroenterology   • CA UPPER GI ENDOSCOPY,BIOPSY  04/21/2022    Procedure: GASTROSCOPY, WITH BIOPSY;  Surgeon: Rell Rivas M.D.;  Location: SURGERY SAME DAY [facility];  Service: Gastroenterology   • CA UPPER GI ENDOSCOPY,CTRL BLEED  04/21/2022    Procedure: GASTROSCOPY, WITH ARGON PLASMA COAGULATION;  Surgeon: Rell Rivas M.D.;  Location: SURGERY SAME DAY [facility];  Service: Gastroenterology   • HIP REPLACEMENT, PARTIAL Right    • US-NEEDLE CORE BX-BREAST PANEL Right            [facility] Medications:  Current Facility-Administered Medications   Medication  "Dose Frequency Provider Last Rate Last Admin   • octreotide (Sandostatin) 1,250 mcg in  mL Infusion  50 mcg/hr Continuous Dhara Hackett M.D. 10 mL/hr at 07/31/22 1132 50 mcg/hr at 07/31/22 1132   Last reviewed on 7/31/2022  9:55 AM by Susan Erwin R.N.        Current Outpatient Medications:  (Not in a hospital admission)        Medication Allergies:  No Known Allergies      Family Medical History:  No family history on file.      Social History:  Social History     Socioeconomic History   • Marital status:      Spouse name: Not on file   • Number of children: Not on file   • Years of education: Not on file   • Highest education level: Not on file   Occupational History   • Not on file   Tobacco Use   • Smoking status: Current Every Day Smoker     Packs/day: 0.50     Types: Cigarettes   • Smokeless tobacco: Never Used   Vaping Use   • Vaping Use: Never used   Substance and Sexual Activity   • Alcohol use: Yes     Alcohol/week: 9.6 oz     Types: 16 Shots of liquor per week   • Drug use: Not Currently   • Sexual activity: Not on file   Other Topics Concern   • Not on file   Social History Narrative   • Not on file     Social Determinants of Health     Financial Resource Strain: Not on file   Food Insecurity: Not on file   Transportation Needs: Not on file   Physical Activity: Not on file   Stress: Not on file   Social Connections: Not on file   Intimate Partner Violence: Not on file   Housing Stability: Not on file         Vital signs:  Weight/BMI: Body mass index is 16.14 kg/m².  /52   Pulse (!) 106   Temp 36.5 °C (97.7 °F) (Temporal)   Resp 16   Ht 1.676 m (5' 6\")   Wt 45.4 kg (100 lb)   SpO2 98%   Vitals:    07/31/22 0953 07/31/22 1051 07/31/22 1109 07/31/22 1147   BP: (!) 97/59 116/55 (!) 94/65 101/52   Pulse: (!) 129 (!) 127 (!) 116 (!) 106   Resp: 19 (!) 22 18 16   Temp: (!) 35.8 °C (96.4 °F)   36.5 °C (97.7 °F)   TempSrc: Oral   Temporal   SpO2: 96% 92% 95% 98%   Weight: 45.4 kg (100 " "lb)      Height: 1.676 m (5' 6\")        Oxygen Therapy:  Pulse Oximetry: 98 %, O2 Delivery Device: None - Room Air    Intake/Output Summary (Last 24 hours) at 7/31/2022 1156  Last data filed at 7/31/2022 1131  Gross per 24 hour   Intake 1100 ml   Output --   Net 1100 ml         Physical Exam:  Physical Exam            Labs:  Recent Labs     07/31/22  1027   SODIUM 134*   POTASSIUM 3.6   CHLORIDE 91*   CO2 13*   BUN 41*   CREATININE 1.50*   CALCIUM 8.5     Recent Labs     07/31/22  1027   ALTSGPT 43   ASTSGOT 107*   ALKPHOSPHAT 146*   TBILIRUBIN 0.7   LIPASE 30   GLUCOSE 150*     Recent Labs     07/31/22  1027   WBC 9.0   NEUTSPOLYS 85.70*   LYMPHOCYTES 5.10*   MONOCYTES 8.20   EOSINOPHILS 0.00   BASOPHILS 0.20   ASTSGOT 107*   ALTSGPT 43   ALKPHOSPHAT 146*   TBILIRUBIN 0.7     Recent Labs     07/31/22  1027   RBC 1.89*   HEMOGLOBIN 6.4*   HEMATOCRIT 19.6*   PLATELETCT 215   PROTHROMBTM 47.0*   APTT 42.6*   INR 5.40*     Recent Results (from the past 24 hour(s))   CBC WITH DIFFERENTIAL    Collection Time: 07/31/22 10:27 AM   Result Value Ref Range    WBC 9.0 4.8 - 10.8 K/uL    RBC 1.89 (L) 4.20 - 5.40 M/uL    Hemoglobin 6.4 (L) 12.0 - 16.0 g/dL    Hematocrit 19.6 (L) 37.0 - 47.0 %    .7 (H) 81.4 - 97.8 fL    MCH 33.9 (H) 27.0 - 33.0 pg    MCHC 32.7 (L) 33.6 - 35.0 g/dL    RDW 64.8 (H) 35.9 - 50.0 fL    Platelet Count 215 164 - 446 K/uL    MPV 11.1 9.0 - 12.9 fL    Neutrophils-Polys 85.70 (H) 44.00 - 72.00 %    Lymphocytes 5.10 (L) 22.00 - 41.00 %    Monocytes 8.20 0.00 - 13.40 %    Eosinophils 0.00 0.00 - 6.90 %    Basophils 0.20 0.00 - 1.80 %    Immature Granulocytes 0.80 0.00 - 0.90 %    Nucleated RBC 0.20 /100 WBC    Neutrophils (Absolute) 7.68 (H) 2.00 - 7.15 K/uL    Lymphs (Absolute) 0.46 (L) 1.00 - 4.80 K/uL    Monos (Absolute) 0.74 0.00 - 0.85 K/uL    Eos (Absolute) 0.00 0.00 - 0.51 K/uL    Baso (Absolute) 0.02 0.00 - 0.12 K/uL    Immature Granulocytes (abs) 0.07 0.00 - 0.11 K/uL    NRBC (Absolute) 0.02 " K/uL   COMP METABOLIC PANEL    Collection Time: 07/31/22 10:27 AM   Result Value Ref Range    Sodium 134 (L) 135 - 145 mmol/L    Potassium 3.6 3.6 - 5.5 mmol/L    Chloride 91 (L) 96 - 112 mmol/L    Co2 13 (L) 20 - 33 mmol/L    Anion Gap 30.0 (H) 7.0 - 16.0    Glucose 150 (H) 65 - 99 mg/dL    Bun 41 (H) 8 - 22 mg/dL    Creatinine 1.50 (H) 0.50 - 1.40 mg/dL    Calcium 8.5 8.5 - 10.5 mg/dL    AST(SGOT) 107 (H) 12 - 45 U/L    ALT(SGPT) 43 2 - 50 U/L    Alkaline Phosphatase 146 (H) 30 - 99 U/L    Total Bilirubin 0.7 0.1 - 1.5 mg/dL    Albumin 3.9 3.2 - 4.9 g/dL    Total Protein 6.4 6.0 - 8.2 g/dL    Globulin 2.5 1.9 - 3.5 g/dL    A-G Ratio 1.6 g/dL   LIPASE    Collection Time: 07/31/22 10:27 AM   Result Value Ref Range    Lipase 30 11 - 82 U/L   PROTHROMBIN TIME (INR)    Collection Time: 07/31/22 10:27 AM   Result Value Ref Range    PT 47.0 (H) 12.0 - 14.6 sec    INR 5.40 (H) 0.87 - 1.13   APTT    Collection Time: 07/31/22 10:27 AM   Result Value Ref Range    APTT 42.6 (H) 24.7 - 36.0 sec   COD (ADULT)    Collection Time: 07/31/22 10:27 AM   Result Value Ref Range    ABO Grouping Only A     Rh Grouping Only POS     Antibody Screen-Cod NEG     Component R       R7                  Red Blood Cells7    [id number]   issued       07/31/22   11:34      Product Type Red Blood Cells LR Pheresis     Dispense Status issued     Unit Number (Barcoded) [id number]     Product Code (Barcoded) [id number]     Blood Type (Barcoded) 6200    DIAGNOSTIC ALCOHOL    Collection Time: 07/31/22 10:27 AM   Result Value Ref Range    Diagnostic Alcohol <10.1 <10.1 mg/dL   ESTIMATED GFR    Collection Time: 07/31/22 10:27 AM   Result Value Ref Range    GFR (CKD-EPI) 38 (A) >60 mL/min/1.73 m 2         Radiology Review:  DX-CHEST-PORTABLE (1 VIEW)   Final Result      No acute cardiopulmonary abnormality.               MDM (Data Review):   -Records reviewed and summarized in current documentation  -I personally reviewed and interpreted the laboratory  results  -I personally reviewed the radiology images    Assessment/Recommendations:    Impression:  1.  Hematemesis  2.  Melena  3.  Post hemorrhagic anemia  4.  Chronic anticoagulation--reversed  5.  History of gastric, duodenal and jejunal AVMs, s/p APC  6.  ETOH abuse  7.  Abnormal liver tests  8.  History of pulmonary emboli    Recs:  1.  Receiving PRBC transfusion  2.  Receiving octreotide  3.   PPI IV BID  4.  NPO  5.  Received K Centra  6.  Urgent EGD with GEA this afternoon          Cyn Slade M.D.      Thank you for inviting me to participate in the care of this patient. Please do not hesitate to call GI consultants with additional questions/concerns or changes in the patient's clinical status at [phone].      Core Quality Measures   Reviewed items:  Labs, Medications and Radiology reports reviewed

## 2022-07-31 NOTE — ASSESSMENT & PLAN NOTE
Patient with a 15-pack-year history of smoking, still smoking approximately half pack per day, not ready to quit  - Offered smoking cessation and counseling, lasting approximately 3 minutes

## 2022-07-31 NOTE — ASSESSMENT & PLAN NOTE
Came with creatinine 1.5 and her baseline around 1  Likely related to dehydration  IV fluid and monitoring with labs daily

## 2022-07-31 NOTE — ASSESSMENT & PLAN NOTE
Hx of ETOh abuse, now 4 drinks per week per patient, diagnostic etoh negative last drink 2 days ago, denies withdraw hx.   -RUQ US with hepatic steatosis during 3/22 admission, mild AST predominance tranasaminitis.   -Continue to encourage cessation.

## 2022-07-31 NOTE — OR NURSING
Pt is aaox4, resting comfortably in hospital bed on room air. Respirations are equal and unlabored, she is in no acute distress. She does not complain of any pain at this time. Her abdomen feels slightly firm upon palpation, but denies pain or discomfort at this time. Pt denies needs, will continue to monitor.

## 2022-07-31 NOTE — ANESTHESIA PREPROCEDURE EVALUATION
Case: [id number] Date/Time: 07/31/22 1234    Procedure: GASTROSCOPY (N/A Esophagus)    Anesthesia type: MAC    Pre-op diagnosis: Hematemesis, melena    Location: [facility] OR 09 / SURGERY [facility]    Surgeons: Cyn Slade M.D.          Relevant Problems   PULMONARY   (positive) Emphysema lung (HCC)   (positive) Necrotizing pneumonia (HCC)      NEURO   (positive) History of pulmonary embolus (PE)      CARDIAC   (positive) Acute deep vein thrombosis (DVT) of left lower extremity with PTE    (positive) NSTEMI (non-ST elevation myocardial infarction) (HCC)         (positive) YAMIL (acute kidney injury) (HCC)   (positive) Acute kidney injury (HCC)       Physical Exam    Airway   Mallampati: II  TM distance: >3 FB  Neck ROM: full       Cardiovascular - normal exam  Rhythm: regular  Rate: normal  (-) murmur     Dental - normal exam           Pulmonary - normal exam  Breath sounds clear to auscultation     Abdominal    Neurological - normal exam                 Anesthesia Plan    ASA 2- EMERGENT   ASA physical status emergent criteria: acute hemorrhage    Plan - general       Airway plan will be ETT          Induction: intravenous    Postoperative Plan: Postoperative administration of opioids is intended.    Pertinent diagnostic labs and testing reviewed    Informed Consent:    Anesthetic plan and risks discussed with patient.    Use of blood products discussed with: patient whom consented to blood products.

## 2022-07-31 NOTE — ASSESSMENT & PLAN NOTE
Recently diagnosed  Continue holding Eliquis, by GI we can resume after 5 days around 8/5  SCDs and encourage the patient to walk around

## 2022-07-31 NOTE — ASSESSMENT & PLAN NOTE
Patient presents today with hematemesis, melena, and a hemoglobin of 6.4, PT/INR greatly perturbed, patient received fluid resuscitation along with 2 units packed red blood cells in emergency department, GI consulted emergently for procedure, Kcentra given to reverse coagulopathy.In April 2022 patient had an EGD with 2 duodenal AVMs treated with APC with some mild erosive gastritis, patient also has a history in May 2022 with 3 duodenal AVMs and 2 jejunal AVMs treated with APC during push enteroscopy, in June 2022 patient underwent EGD with push enteroscopy with no source, but 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  Patient endorses compliance with PPI, patient has history of hepatic steatosis on right upper quadrant ultrasound, but otherwise no history of esophageal varices.  Patient without significant vomiting, no crepitus or x-ray findings concerning for mediastinal free air consistent with Boerhaave's or esophageal rupture.  His bleeding is most likely the result of a another AVM versus gastritis versus ulcer.  -Admit to ICU  - GI Bleed protocol  - Continue octreotide gtt.  - Pantoprazole 40 mg twice daily  - Maintain 2 large-bore IVs  - GI consulted: Appreciate recs; will order for EGD today  - Transfused 2 units packed red blood cells in ED, Kcentra for coagulopathy,  -Hemoglobins Q6 hours  -Awaiting results of procedure for further dry management in regard to octreotide drip, pantoprazole further work-up.

## 2022-07-31 NOTE — ASSESSMENT & PLAN NOTE
With elevated PT/INR, PTT, on Xarelto, endorses compliance  - As per above, has had coagulopathy work-up in the past including protein C&S, factor V Leiden they have all been within normal limits.

## 2022-07-31 NOTE — ED NOTES
Pt had two episodes of bloody vomit. First episode = 100 ml, second 50 ml. Dr. Hackett aware and to bedside.

## 2022-07-31 NOTE — CARE PLAN
The patient is Watcher - Medium risk of patient condition declining or worsening         Progress made toward(s) clinical / shift goals:  OR for EGD, stable HDs    Patient is not progressing towards the following goals: NA      Problem: Skin Integrity  Goal: Skin integrity is maintained or improved  Outcome: Progressing  Note: 4 eye skin assessment performed on admission. Numerous areas of skin breakdown noted including bruising, frail skin, skin tears and significant bruising. Pictures taken, mepilex placed to sacrum, offloading w/ pillows to maintain skin integrity     Problem: Pain - Standard  Goal: Alleviation of pain or a reduction in pain to the patient’s comfort goal  Outcome: Progressing  Note: 0-10 pain assessment tool in place to identify pain. PRN and nonpharmacologic measures in place to manage pain

## 2022-08-01 LAB
ALBUMIN SERPL BCP-MCNC: 3.4 G/DL (ref 3.2–4.9)
ALBUMIN/GLOB SERPL: 1.7 G/DL
ALP SERPL-CCNC: 117 U/L (ref 30–99)
ALT SERPL-CCNC: 47 U/L (ref 2–50)
ANION GAP SERPL CALC-SCNC: 15 MMOL/L (ref 7–16)
AST SERPL-CCNC: 158 U/L (ref 12–45)
BASOPHILS # BLD AUTO: 0.3 % (ref 0–1.8)
BASOPHILS # BLD: 0.02 K/UL (ref 0–0.12)
BILIRUB SERPL-MCNC: 1.7 MG/DL (ref 0.1–1.5)
BUN SERPL-MCNC: 41 MG/DL (ref 8–22)
CALCIUM SERPL-MCNC: 7.4 MG/DL (ref 8.5–10.5)
CHLORIDE SERPL-SCNC: 97 MMOL/L (ref 96–112)
CO2 SERPL-SCNC: 20 MMOL/L (ref 20–33)
CREAT SERPL-MCNC: 1.06 MG/DL (ref 0.5–1.4)
EOSINOPHIL # BLD AUTO: 0 K/UL (ref 0–0.51)
EOSINOPHIL NFR BLD: 0 % (ref 0–6.9)
ERYTHROCYTE [DISTWIDTH] IN BLOOD BY AUTOMATED COUNT: 60.6 FL (ref 35.9–50)
GFR SERPLBLD CREATININE-BSD FMLA CKD-EPI: 57 ML/MIN/1.73 M 2
GLOBULIN SER CALC-MCNC: 2 G/DL (ref 1.9–3.5)
GLUCOSE SERPL-MCNC: 174 MG/DL (ref 65–99)
HCT VFR BLD AUTO: 25.1 % (ref 37–47)
HGB BLD-MCNC: 8 G/DL (ref 12–16)
HGB BLD-MCNC: 8.7 G/DL (ref 12–16)
IMM GRANULOCYTES # BLD AUTO: 0.04 K/UL (ref 0–0.11)
IMM GRANULOCYTES NFR BLD AUTO: 0.6 % (ref 0–0.9)
INR PPP: 1.71 (ref 0.87–1.13)
LYMPHOCYTES # BLD AUTO: 0.3 K/UL (ref 1–4.8)
LYMPHOCYTES NFR BLD: 4.7 % (ref 22–41)
MAGNESIUM SERPL-MCNC: 2.2 MG/DL (ref 1.5–2.5)
MCH RBC QN AUTO: 32 PG (ref 27–33)
MCHC RBC AUTO-ENTMCNC: 34.7 G/DL (ref 33.6–35)
MCV RBC AUTO: 92.3 FL (ref 81.4–97.8)
MONOCYTES # BLD AUTO: 0.39 K/UL (ref 0–0.85)
MONOCYTES NFR BLD AUTO: 6.2 % (ref 0–13.4)
NEUTROPHILS # BLD AUTO: 5.58 K/UL (ref 2–7.15)
NEUTROPHILS NFR BLD: 88.2 % (ref 44–72)
NRBC # BLD AUTO: 0 K/UL
NRBC BLD-RTO: 0 /100 WBC
PHOSPHATE SERPL-MCNC: 2.8 MG/DL (ref 2.5–4.5)
PLATELET # BLD AUTO: 123 K/UL (ref 164–446)
PMV BLD AUTO: 11.1 FL (ref 9–12.9)
POTASSIUM SERPL-SCNC: 3.3 MMOL/L (ref 3.6–5.5)
PROT SERPL-MCNC: 5.4 G/DL (ref 6–8.2)
PROTHROMBIN TIME: 19.6 SEC (ref 12–14.6)
RBC # BLD AUTO: 2.72 M/UL (ref 4.2–5.4)
SODIUM SERPL-SCNC: 132 MMOL/L (ref 135–145)
WBC # BLD AUTO: 6.3 K/UL (ref 4.8–10.8)

## 2022-08-01 PROCEDURE — A9270 NON-COVERED ITEM OR SERVICE: HCPCS | Performed by: STUDENT IN AN ORGANIZED HEALTH CARE EDUCATION/TRAINING PROGRAM

## 2022-08-01 PROCEDURE — 700111 HCHG RX REV CODE 636 W/ 250 OVERRIDE (IP): Performed by: INTERNAL MEDICINE

## 2022-08-01 PROCEDURE — 700102 HCHG RX REV CODE 250 W/ 637 OVERRIDE(OP): Performed by: STUDENT IN AN ORGANIZED HEALTH CARE EDUCATION/TRAINING PROGRAM

## 2022-08-01 PROCEDURE — A9270 NON-COVERED ITEM OR SERVICE: HCPCS | Performed by: INTERNAL MEDICINE

## 2022-08-01 PROCEDURE — 84100 ASSAY OF PHOSPHORUS: CPT

## 2022-08-01 PROCEDURE — C9113 INJ PANTOPRAZOLE SODIUM, VIA: HCPCS | Performed by: INTERNAL MEDICINE

## 2022-08-01 PROCEDURE — 700102 HCHG RX REV CODE 250 W/ 637 OVERRIDE(OP): Performed by: INTERNAL MEDICINE

## 2022-08-01 PROCEDURE — 700105 HCHG RX REV CODE 258: Performed by: INTERNAL MEDICINE

## 2022-08-01 PROCEDURE — 700105 HCHG RX REV CODE 258: Performed by: STUDENT IN AN ORGANIZED HEALTH CARE EDUCATION/TRAINING PROGRAM

## 2022-08-01 PROCEDURE — 99233 SBSQ HOSP IP/OBS HIGH 50: CPT | Performed by: STUDENT IN AN ORGANIZED HEALTH CARE EDUCATION/TRAINING PROGRAM

## 2022-08-01 PROCEDURE — 85610 PROTHROMBIN TIME: CPT

## 2022-08-01 PROCEDURE — 80053 COMPREHEN METABOLIC PANEL: CPT

## 2022-08-01 PROCEDURE — 85025 COMPLETE CBC W/AUTO DIFF WBC: CPT

## 2022-08-01 PROCEDURE — 85018 HEMOGLOBIN: CPT | Mod: 91

## 2022-08-01 PROCEDURE — 770006 HCHG ROOM/CARE - MED/SURG/GYN SEMI*

## 2022-08-01 PROCEDURE — 83735 ASSAY OF MAGNESIUM: CPT

## 2022-08-01 RX ORDER — POTASSIUM CHLORIDE 20 MEQ/1
40 TABLET, EXTENDED RELEASE ORAL ONCE
Status: COMPLETED | OUTPATIENT
Start: 2022-08-01 | End: 2022-08-01

## 2022-08-01 RX ORDER — SODIUM CHLORIDE 9 MG/ML
500 INJECTION, SOLUTION INTRAVENOUS ONCE
Status: COMPLETED | OUTPATIENT
Start: 2022-08-01 | End: 2022-08-01

## 2022-08-01 RX ADMIN — SODIUM CHLORIDE: 9 INJECTION, SOLUTION INTRAVENOUS at 21:03

## 2022-08-01 RX ADMIN — PANTOPRAZOLE SODIUM 40 MG: 40 INJECTION, POWDER, LYOPHILIZED, FOR SOLUTION INTRAVENOUS at 16:53

## 2022-08-01 RX ADMIN — SUCRALFATE ORAL 1 G: 1 SUSPENSION ORAL at 16:53

## 2022-08-01 RX ADMIN — SODIUM CHLORIDE: 9 INJECTION, SOLUTION INTRAVENOUS at 04:37

## 2022-08-01 RX ADMIN — MIRTAZAPINE 15 MG: 15 TABLET, FILM COATED ORAL at 21:03

## 2022-08-01 RX ADMIN — SODIUM CHLORIDE 500 ML: 9 INJECTION, SOLUTION INTRAVENOUS at 14:49

## 2022-08-01 RX ADMIN — SUCRALFATE ORAL 1 G: 1 SUSPENSION ORAL at 12:08

## 2022-08-01 RX ADMIN — SIMVASTATIN 20 MG: 20 TABLET, FILM COATED ORAL at 16:53

## 2022-08-01 RX ADMIN — POTASSIUM CHLORIDE 40 MEQ: 1500 TABLET, EXTENDED RELEASE ORAL at 09:15

## 2022-08-01 RX ADMIN — PANTOPRAZOLE SODIUM 40 MG: 40 INJECTION, POWDER, LYOPHILIZED, FOR SOLUTION INTRAVENOUS at 04:38

## 2022-08-01 RX ADMIN — SUCRALFATE ORAL 1 G: 1 SUSPENSION ORAL at 09:16

## 2022-08-01 RX ADMIN — SUCRALFATE ORAL 1 G: 1 SUSPENSION ORAL at 21:03

## 2022-08-01 ASSESSMENT — ENCOUNTER SYMPTOMS
ABDOMINAL PAIN: 0
PHOTOPHOBIA: 0
DIAPHORESIS: 0
FEVER: 0
NAUSEA: 0
WHEEZING: 0
SHORTNESS OF BREATH: 0
SPUTUM PRODUCTION: 0
SORE THROAT: 0
BACK PAIN: 0
CHILLS: 0
WEIGHT LOSS: 0
DIARRHEA: 0
DIZZINESS: 0
HEARTBURN: 0
MYALGIAS: 0
FALLS: 0
VOMITING: 0
CONSTIPATION: 0
HEADACHES: 1
COUGH: 0
HEMOPTYSIS: 0

## 2022-08-01 ASSESSMENT — COPD QUESTIONNAIRES
IN THE PAST 12 MONTHS DO YOU DO LESS THAN YOU USED TO BECAUSE OF YOUR BREATHING PROBLEMS: DISAGREE/UNSURE
HAVE YOU SMOKED AT LEAST 100 CIGARETTES IN YOUR ENTIRE LIFE: YES
COPD SCREENING SCORE: 4
DURING THE PAST 4 WEEKS HOW MUCH DID YOU FEEL SHORT OF BREATH: NONE/LITTLE OF THE TIME
DO YOU EVER COUGH UP ANY MUCUS OR PHLEGM?: NO/ONLY WITH OCCASIONAL COLDS OR INFECTIONS

## 2022-08-01 NOTE — PROCEDURES
DATE OF PROCEDURE:  07/31/2022     PROCEDURE:  Esophagogastroduodenoscopy with submucosal injection and   hemoclipping.     PREPROCEDURE DIAGNOSES:  A 66-year-old female with hematemesis, melena,   posthemorrhagic anemia and anticoagulated with INR 5.4.     POSTPROCEDURE DIAGNOSES:  Crystal-Payan tear with visible vessel, status post   epinephrine injection and hemoclipping.     CONSENT:  Risks, benefits, alternatives explained to the patient.  The patient   gave consent to proceed.     ANESTHESIA:  General endotracheal anesthesia.     ANESTHESIOLOGIST:  Hakan Olguin MD     DESCRIPTION OF PROCEDURE:  The patient was turned in the left lateral   decubitus position.  Anesthesia monitoring was in place.  The Olympus 1T adult   endoscope was inserted into the esophagus under direct visualization.  It was   advanced to the distal esophagus where there was a minimal amount of   inflammation at the Z-line.  However, there was a visible vessel that was not   actively bleeding at the 7 o'clock position.  I did not appreciate a distinct   tear, but there was no other etiology to explain this vessel and the patient   had been vomiting at home.  The endoscope was carefully advanced into the   stomach where there was a large amount of old blood and clot in the fundus.    50% of the fundic mucosa was not visualized and I did not make an attempt to   remove this clot.  The endoscope was advanced through the fundus to the body   and the antrum.  There were no mucosal lesions.  Retroflexion was carefully   performed.  No gross abnormalities noted.  Retroflexion was taken down and the   endoscope was advanced through the pylorus into the first and second portions   of the duodenum.  There was old blood in the duodenum, but no mucosal   lesions.  The endoscope was pulled back into the stomach.  Epinephrine was   injected into the visible vessel for a total of 2 mL.  Two Hemoclips were   placed at the base of the vessel.  The  endoscope was removed.  The patient   tolerated the procedure well.  No complications.        ______________________________  MD AMAYA KING/PHILOMENA    DD:  07/31/2022 14:21  DT:  07/31/2022 15:46    Job#:  [id number]

## 2022-08-01 NOTE — PROGRESS NOTES
Assumed care of patient this shift. Patient is alert and oriented x 4. Able to make her needs known.  Up with assist to bedside commode. Fall prevention tactics in place, bed locked and in lowest position and bed alarm on for safety.

## 2022-08-01 NOTE — DIETARY
"Nutrition services: Day 1 of admit.  Jaymie Peacock is a 66 y.o. female with admitting DX of GI bleed and crystal payan tear.     Consult received for BMI <19. Per nutrition admit screen pt does not report any recent wt loss or change in appetite.     Assessment:  Height: 167.6 cm (5' 6\")  Weight: 46 kg (101 lb 6.6 oz) via bed scale  Body mass index is 16.37 kg/m²., BMI classification: underweight   Diet/Intake: Low fiber (GI soft) No red. % x 2 meals.     Evaluation:   1. Pt with BMI <19. Per chart review pt with stable wt with a slight trend up over last several months. Pt's bed scale wt consistent with stated admit weight. No weight loss concerns at this time.   2. Current clinical picture and MD progress notes reviewed. Pt admitted with hematemesis, has hx of previous GI bleeds. Pt had EGD on 7/31 showed Crystal Payan with visible vessels.   3. Labs (8/1) Na 132 (L) K+ 3.3 (L), Glu 174 (H)  4. Meds: NS @ 100 mL/hr  5. Skin: 1+ LLE edema noted  6. +BM 7/31    Malnutrition Risk: Criteria not met     Recommendations/Plan:  1. Diet as tolerated   2. Encourage intake of all meals   3. Document intake of all meals as % taken in ADL's to provide interdisciplinary communication across all shifts.   4. Monitor weight.  5. Nutrition rep will continue to see patient for ongoing meal and snack preferences.     RD to monitor per department policy           "

## 2022-08-01 NOTE — FACE TO FACE
Face to Face Supporting Documentation - Home Health    The encounter with this patient was in whole or in part the primary reason for home health admission.    Date of encounter:   Patient:                    MRN:                       YOB: 2023  Jaymie Peacock  [id number]  1955     Home health to see patient for:  Skilled Nursing care for assessment, interventions & education, Home health aide, Physical Therapy evaluation and treatment and Occupational therapy evaluation and treatment    Skilled need for:  Comment: GIB    Skilled nursing interventions to include:  Comment: PT OT    Homebound status evidenced by:  Have a condition such that leaving his or her home is medically contraindicated. Leaving home requires a considerable and taxing effort. There is a normal inability to leave the home.    Community Physician to provide follow up care: Toña Tejada P.A.-C.     Optional Interventions? No      I certify the face to face encounter for this home health care referral meets the CMS requirements and the encounter/clinical assessment with the patient was, in whole, or in part, for the medical condition(s) listed above, which is the primary reason for home health care. Based on my clinical findings: the service(s) are medically necessary, support the need for home health care, and the homebound criteria are met.  I certify that this patient has had a face to face encounter by myself.  Karla Blanchard M.D. - NPI: [id number]

## 2022-08-01 NOTE — DISCHARGE PLANNING
[facility] TCN chart review completed. Collaborated with SE Mckeon prior to meeting with the pt. The most current review of medical record, knowledge of pt's PLOF and social support, LACE+ score of 75, 6 clicks scores of 24 mobility were considered.      This is a familiar pt to the TCN program. Pt seen at bedside. Re-introduced TCN and reviewed education regarding [facility] plan benefits and post acute services. Pt verbalizes understanding and endorses being on service with [facility] prior to this admission. Reports she has been well supported by her friend Irwin and her neighbor for IADLs, cleaning, yard work. She has no additional functional concerns with a discharge to home when medically cleared. Choice proactively obtained for HH, faxed to [facility] and given to CM. Reviewed benefits of [facility] transition care in depth; however, pt declines a referral at this time. TCN will continue to follow and collaborate with discharge planning team as additional post acute needs arise. Thank you.

## 2022-08-01 NOTE — DISCHARGE PLANNING
Received Choice form at 0920  Agency/Facility Name: [facility]  Referral sent per Choice form @ Unable to fax the referral.  Currently no home health order.    Supervisor Linda notified via Teams.

## 2022-08-01 NOTE — CARE PLAN
Patient alert oriented x 4 on room air. No pain reported at this time. No acute distress noted, patient call light within reach.

## 2022-08-01 NOTE — ANESTHESIA POSTPROCEDURE EVALUATION
Patient: Jaymie Peacock    Procedure Summary     Date: 07/31/22 Room / Location: [facility] OR 09 / SURGERY [facility]    Anesthesia Start: 1348 Anesthesia Stop: 1421    Procedures:       GASTROSCOPY (N/A Esophagus)      GASTROSCOPY, WITH SCLEROTHERAPY (N/A Esophagus)      EGD, WITH CLIP PLACEMENT (N/A Esophagus) Diagnosis: (Crystal-ramirez tear with visible vessel)    Surgeons: Cyn Slade M.D. Responsible Provider: Hakan Olguin M.D.    Anesthesia Type: general ASA Status: 2 - Emergent          Final Anesthesia Type: general  Last vitals  BP   Blood Pressure : 118/76    Temp   36.3 °C (97.3 °F)    Pulse   91   Resp   (!) 11    SpO2   98 %      Anesthesia Post Evaluation    Patient location during evaluation: PACU  Patient participation: complete - patient participated  Level of consciousness: awake and alert  Pain score: 3    Airway patency: patent  Anesthetic complications: no  Cardiovascular status: hemodynamically stable  Respiratory status: acceptable  Hydration status: euvolemic    PONV: none          There were no known complications for this encounter.     Nurse Pain Score: 3 (NPRS)

## 2022-08-01 NOTE — PROGRESS NOTES
Gastroenterology Progress Note               Author:  Anapatrick Marieebony ONEYDA Date & Time Created: 8/1/2022 10:25 AM       Patient ID:  Name:             Jaymie Peacock  YOB: 1955  Age:                 66 y.o.  female  MRN:               [id number]      Referring Provider:  Dr. Dhara Hackett        Presenting Chief Complaint:  Hematemesis      History of Present Illness:    65yo female on chronic anticoagulation for pulmonary emboli presented with hematemesis, multiple episodes, and melena. She states she has been taking ibuprofen possibly daily for an injury.  She also drinks at least 1-2 shots vodka daily.  She presented hemodynamically unstable and has been given KCentra due to anticoagulation.       April 2022:   EGD for same with findings of 2 duodenal AVMs treated with APC and mild erosive gastritis.  She had a normal colonoscopy.      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  None of the procedures showed active bleeding.    June 2022: admitted with hemoglobin 6, .6 and heme positive stool.  She underwent EGD with push enteroscopy to 145cm small bowel and no source to explain bleeding.  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    INTERVAL HISTORY:    8/1/22: Stable. No acute overnight events. H/H. No further hematemesis or melenic stool. Denies abdominal pain.    EGD performed by Dr. Slade: Crystal Payan tear with visible vessel s/p epi and hemoclipping.     Review of Systems:  Review of Systems   Constitutional: Positive for malaise/fatigue. Negative for chills, diaphoresis, fever and weight loss.   HENT: Negative for nosebleeds and sore throat.    Eyes: Negative for photophobia.   Respiratory: Negative for cough, hemoptysis and sputum production.    Cardiovascular: Negative for chest pain and leg swelling.   Gastrointestinal: Negative for  abdominal pain, constipation, diarrhea, heartburn, melena, nausea and vomiting.   Genitourinary: Negative for dysuria and hematuria.   Musculoskeletal: Negative for back pain, falls and myalgias.   Skin: Negative for itching and rash.   Neurological: Negative for dizziness.             Past Medical History:  Past Medical History:   Diagnosis Date   • DVT (deep venous thrombosis) (HCC)    • GI bleed    • High cholesterol    • Hypertension    • Osteoporosis    • Pulmonary embolism (HCC)    • Smoking      Active Hospital Problems    Diagnosis    • History of pulmonary embolus (PE) [Z86.711]    • Crystal-Payan tear [K22.6]    • Elevated INR [R79.1]    • Macrocytic anemia [D53.9]    • Upper GI bleed [K92.2]    • Hypomagnesemia [E83.42]    • Acute kidney injury (HCC) [N17.9]    • Hypotension [I95.9]    • Hypokalemia [E87.6]    • Hyponatremia [E87.1]    • Smoking [F17.200]    • Alcohol abuse [F10.10]          Past Surgical History:  Past Surgical History:   Procedure Laterality Date   • [place] UPPER GI ENDOSCOPY,DIAGNOSIS N/A 7/31/2022    Procedure: GASTROSCOPY;  Surgeon: Cyn Slade M.D.;  Location: SURGERY [facility];  Service: Gastroenterology   • [place] UPPER GI ENDOSCOPY,SCLER INJECT N/A 7/31/2022    Procedure: GASTROSCOPY, WITH SCLEROTHERAPY;  Surgeon: Cyn Slade M.D.;  Location: [facility];  Service: Gastroenterology   • [place] UPPER GI ENDOSCOPY,CTRL BLEED N/A 7/31/2022    Procedure: EGD, WITH CLIP PLACEMENT;  Surgeon: Cyn Slade M.D.;  Location: SURGERY [facility];  Service: Gastroenterology   • [place] UPPER GI ENDOSCOPY,DIAGNOSIS N/A 6/15/2022    Procedure: GASTROSCOPY WITH PUSH ENDOSCOPY;  Surgeon: Claudio Guerrier M.D.;  Location: SURGERY [facility];  Service: Gastroenterology   • [place] UPPER GI ENDOSCOPY,CTRL BLEED N/A 05/11/2022    Procedure: GASTROSCOPY, WITH ARGON PLASMA COAGULATION;  Surgeon: Yariel Pagan M.D.;  Location: SURGERY SAME DAY [facility];  Service: Gastroenterology   •  GASTROSCOPY W/PUSH ENTERSCOPY N/A 05/11/2022    Procedure: GASTROSCOPY, WITH PUSH ENTEROSCOPY;  Surgeon: Yariel Pagan M.D.;  Location: SURGERY SAME DAY [facility];  Service: Gastroenterology   • [place] UPPER GI ENDOSCOPY,DIAGNOSIS  04/21/2022    Procedure: GASTROSCOPY;  Surgeon: Rell Rivas M.D.;  Location: SURGERY SAME DAY [facility];  Service: Gastroenterology   • [place] COLONOSCOPY,DIAGNOSTIC  04/21/2022    Procedure: COLONOSCOPY;  Surgeon: Rell Rivas M.D.;  Location: SURGERY SAME DAY [facility];  Service: Gastroenterology   • [place] UPPER GI ENDOSCOPY,BIOPSY  04/21/2022    Procedure: GASTROSCOPY, WITH BIOPSY;  Surgeon: Rell Rivas M.D.;  Location: SURGERY SAME DAY [facility];  Service: Gastroenterology   • [place] UPPER GI ENDOSCOPY,CTRL BLEED  04/21/2022    Procedure: GASTROSCOPY, WITH ARGON PLASMA COAGULATION;  Surgeon: Rell Rivas M.D.;  Location: SURGERY SAME DAY [facility];  Service: Gastroenterology   • HIP REPLACEMENT, PARTIAL Right    • US-NEEDLE CORE BX-BREAST PANEL [facility] Medications:  Current Facility-Administered Medications   Medication Dose Frequency Provider Last Rate Last Admin   • simvastatin (ZOCOR) tablet 20 mg  20 mg Q EVENING Kristopher Raymundo M.D.   20 mg at 07/31/22 1710   • mirtazapine (Remeron) tablet 15 mg  15 mg Nightly Kristopher Raymundo M.D.   15 mg at 07/31/22 2123   • pantoprazole (Protonix) injection 40 mg  40 mg BID Cyn Slade M.D.   40 mg at 08/01/22 0438   • senna-docusate (PERICOLACE or SENOKOT S) 8.6-50 MG per tablet 2 Tablet  2 Tablet BID Kristopher Raymundo M.D.        And   • polyethylene glycol/lytes (MIRALAX) PACKET 1 Packet  1 Packet QDAY PRN Kristopher Raymundo M.D.        And   • magnesium hydroxide (MILK OF MAGNESIA) suspension 30 mL  30 mL QDAY PRN Kristopher Raymundo M.D.        And   • bisacodyl (DULCOLAX) suppository 10 mg  10 mg QDAY PRN Kristopher Raymundo M.D.       • sucralfate (CARAFATE) 1 GM/10ML suspension 1 g  1 g 4X/DAY  [facility]   1 g at 08/01/22 0916   • NS infusion   Continuous Aditya Byrd M.D. 83 mL/hr at 08/01/22 0437 New Bag at 08/01/22 0437   • acetaminophen (Tylenol) tablet 650 mg  650 mg Q4HRS PRN Ron Parikh M.D.   650 mg at 07/31/22 1655   Last reviewed on 7/31/2022 12:54 PM by Magno Floyd R.N.        Current Outpatient Medications:  Medications Prior to Admission   Medication Sig Dispense Refill Last Dose   • rivaroxaban (XARELTO) 20 MG Tab tablet Take 1 Tablet by mouth with dinner. 30 Tablet 0 7/30/2022 at Unknown time   • omeprazole (PRILOSEC) 20 MG delayed-release capsule Take 1 Capsule by mouth every day. 30 Capsule 0    • mirtazapine (REMERON) 15 MG Tab Take 1 Tablet by mouth every evening. 30 Tablet 0 7/30/2022 at Unknown time   • simvastatin (ZOCOR) 20 MG Tab Take 20 mg by mouth every evening. Indications: Ischemic Heart Disease   7/30/2022 at Unknown time         Medication Allergies:  No Known Allergies      Family Medical History:  History reviewed. No pertinent family history.      Social History:  Social History     Socioeconomic History   • Marital status:      Spouse name: Not on file   • Number of children: Not on file   • Years of education: Not on file   • Highest education level: Not on file   Occupational History   • Not on file   Tobacco Use   • Smoking status: Current Every Day Smoker     Packs/day: 0.50     Types: Cigarettes   • Smokeless tobacco: Never Used   Vaping Use   • Vaping Use: Never used   Substance and Sexual Activity   • Alcohol use: Yes     Alcohol/week: 9.6 oz     Types: 16 Shots of liquor per week     Comment: Last drink 7/30/22 per patient   • Drug use: Not Currently   • Sexual activity: Not on file   Other Topics Concern   • Not on file   Social History Narrative   • Not on file     Social Determinants of Health     Financial Resource Strain: Not on file   Food Insecurity: Not on file   Transportation Needs: Not on file   Physical  "Activity: Not on file   Stress: Not on file   Social Connections: Not on file   Intimate Partner Violence: Not on file   Housing Stability: Not on file         Vital signs:  Weight/BMI: Body mass index is 16.37 kg/m².  BP (!) 90/58   Pulse 80   Temp 36.2 °C (97.2 °F) (Temporal)   Resp 16   Ht 1.676 m (5' 6\")   Wt 46 kg (101 lb 6.6 oz)   SpO2 96%   Vitals:    07/31/22 1700 07/31/22 2145 08/01/22 0332 08/01/22 0751   BP: 118/76 100/67 (!) 84/56 (!) 90/58   Pulse: 91 87 77 80   Resp: (!) 11 16 17 16   Temp:  36.6 °C (97.8 °F) 36.2 °C (97.2 °F) 36.2 °C (97.2 °F)   TempSrc:  Temporal Temporal Temporal   SpO2: 98% 99% 96% 96%   Weight:       Height:         Oxygen Therapy:  Pulse Oximetry: 96 %, O2 (LPM): 0, O2 Delivery Device: None - Room Air    Intake/Output Summary (Last 24 hours) at 8/1/2022 1025  Last data filed at 7/31/2022 1800  Gross per 24 hour   Intake 3192 ml   Output 10 ml   Net 3182 ml         Physical Exam:  Physical Exam  Vitals reviewed.   Constitutional:       Appearance: Normal appearance.   HENT:      Mouth/Throat:      Mouth: Mucous membranes are moist.   Eyes:      Extraocular Movements: Extraocular movements intact.      Pupils: Pupils are equal, round, and reactive to light.   Cardiovascular:      Rate and Rhythm: Normal rate and regular rhythm.      Pulses: Normal pulses.      Heart sounds: Normal heart sounds.   Pulmonary:      Effort: Pulmonary effort is normal.      Breath sounds: Normal breath sounds.   Abdominal:      General: Bowel sounds are normal. There is no distension.      Palpations: Abdomen is soft.      Tenderness: There is no abdominal tenderness.   Musculoskeletal:         General: Normal range of motion.   Skin:     General: Skin is warm and dry.      Capillary Refill: Capillary refill takes less than 2 seconds.      Coloration: Skin is pale.   Neurological:      General: No focal deficit present.      Mental Status: She is alert and oriented to person, place, and time. "   Psychiatric:         Mood and Affect: Mood normal.         Behavior: Behavior normal.         Thought Content: Thought content normal.         Judgment: Judgment normal.                 Labs:  Recent Labs     07/31/22 1027 08/01/22  0004 08/01/22 0614   SODIUM 134* 132*  --    POTASSIUM 3.6 3.3*  --    CHLORIDE 91* 97  --    CO2 13* 20  --    BUN 41* 41*  --    CREATININE 1.50* 1.06  --    MAGNESIUM 1.3*  --  2.2   PHOSPHORUS 5.7* 2.8  --    CALCIUM 8.5 7.4*  --      Recent Labs     07/31/22 1027 08/01/22  0004   ALTSGPT 43 47   ASTSGOT 107* 158*   ALKPHOSPHAT 146* 117*   TBILIRUBIN 0.7 1.7*   LIPASE 30  --    GLUCOSE 150* 174*     Recent Labs     07/31/22 1027 08/01/22  0004   WBC 9.0 6.3   NEUTSPOLYS 85.70* 88.20*   LYMPHOCYTES 5.10* 4.70*   MONOCYTES 8.20 6.20   EOSINOPHILS 0.00 0.00   BASOPHILS 0.20 0.30   ASTSGOT 107* 158*   ALTSGPT 43 47   ALKPHOSPHAT 146* 117*   TBILIRUBIN 0.7 1.7*     Recent Labs     07/31/22 1027 07/31/22  1520 08/01/22  0004 08/01/22  0153 08/01/22 0614   RBC 1.89*  --  2.72*  --   --    HEMOGLOBIN 6.4* 8.5* 8.7*  --  8.0*   HEMATOCRIT 19.6*  --  25.1*  --   --    PLATELETCT 215  --  123*  --   --    PROTHROMBTM 47.0*  --   --  19.6*  --    APTT 42.6*  --   --   --   --    INR 5.40*  --   --  1.71*  --      Recent Results (from the past 24 hour(s))   CBC WITH DIFFERENTIAL    Collection Time: 07/31/22 10:27 AM   Result Value Ref Range    WBC 9.0 4.8 - 10.8 K/uL    RBC 1.89 (L) 4.20 - 5.40 M/uL    Hemoglobin 6.4 (L) 12.0 - 16.0 g/dL    Hematocrit 19.6 (L) 37.0 - 47.0 %    .7 (H) 81.4 - 97.8 fL    MCH 33.9 (H) 27.0 - 33.0 pg    MCHC 32.7 (L) 33.6 - 35.0 g/dL    RDW 64.8 (H) 35.9 - 50.0 fL    Platelet Count 215 164 - 446 K/uL    MPV 11.1 9.0 - 12.9 fL    Neutrophils-Polys 85.70 (H) 44.00 - 72.00 %    Lymphocytes 5.10 (L) 22.00 - 41.00 %    Monocytes 8.20 0.00 - 13.40 %    Eosinophils 0.00 0.00 - 6.90 %    Basophils 0.20 0.00 - 1.80 %    Immature Granulocytes 0.80 0.00 - 0.90 %     Nucleated RBC 0.20 /100 WBC    Neutrophils (Absolute) 7.68 (H) 2.00 - 7.15 K/uL    Lymphs (Absolute) 0.46 (L) 1.00 - 4.80 K/uL    Monos (Absolute) 0.74 0.00 - 0.85 K/uL    Eos (Absolute) 0.00 0.00 - 0.51 K/uL    Baso (Absolute) 0.02 0.00 - 0.12 K/uL    Immature Granulocytes (abs) 0.07 0.00 - 0.11 K/uL    NRBC (Absolute) 0.02 K/uL   COMP METABOLIC PANEL    Collection Time: 07/31/22 10:27 AM   Result Value Ref Range    Sodium 134 (L) 135 - 145 mmol/L    Potassium 3.6 3.6 - 5.5 mmol/L    Chloride 91 (L) 96 - 112 mmol/L    Co2 13 (L) 20 - 33 mmol/L    Anion Gap 30.0 (H) 7.0 - 16.0    Glucose 150 (H) 65 - 99 mg/dL    Bun 41 (H) 8 - 22 mg/dL    Creatinine 1.50 (H) 0.50 - 1.40 mg/dL    Calcium 8.5 8.5 - 10.5 mg/dL    AST(SGOT) 107 (H) 12 - 45 U/L    ALT(SGPT) 43 2 - 50 U/L    Alkaline Phosphatase 146 (H) 30 - 99 U/L    Total Bilirubin 0.7 0.1 - 1.5 mg/dL    Albumin 3.9 3.2 - 4.9 g/dL    Total Protein 6.4 6.0 - 8.2 g/dL    Globulin 2.5 1.9 - 3.5 g/dL    A-G Ratio 1.6 g/dL   LIPASE    Collection Time: 07/31/22 10:27 AM   Result Value Ref Range    Lipase 30 11 - 82 U/L   PROTHROMBIN TIME (INR)    Collection Time: 07/31/22 10:27 AM   Result Value Ref Range    PT 47.0 (H) 12.0 - 14.6 sec    INR 5.40 (H) 0.87 - 1.13   APTT    Collection Time: 07/31/22 10:27 AM   Result Value Ref Range    APTT 42.6 (H) 24.7 - 36.0 sec   COD (ADULT)    Collection Time: 07/31/22 10:27 AM   Result Value Ref Range    ABO Grouping Only A     Rh Grouping Only POS     Antibody Screen-Cod NEG     Component R       R7                  Red Blood Cells7    [id number]   transfused   07/31/22   11:34      Product Type Red Blood Cells LR Pheresis     Dispense Status transfused     Unit Number (Barcoded) [id number]     Product Code (Barcoded) O3162B66     Blood Type (Barcoded) 6200     Component R       R99                 Red Cells, LR       [id number]   transfused   07/31/22   12:50      Product Type R99     Dispense Status transfused     Unit  Number (Barcoded) [id number]     Product Code (Barcoded) U2398Q84     Blood Type (Barcoded) 6200    DIAGNOSTIC ALCOHOL    Collection Time: 22 10:27 AM   Result Value Ref Range    Diagnostic Alcohol <10.1 <10.1 mg/dL   ESTIMATED GFR    Collection Time: 22 10:27 AM   Result Value Ref Range    GFR (CKD-EPI) 38 (A) >60 mL/min/1.73 m 2   MAGNESIUM    Collection Time: 22 10:27 AM   Result Value Ref Range    Magnesium 1.3 (L) 1.5 - 2.5 mg/dL   PHOSPHORUS    Collection Time: 22 10:27 AM   Result Value Ref Range    Phosphorus 5.7 (H) 2.5 - 4.5 mg/dL   EKG    Collection Time: 22 12:07 PM   Result Value Ref Range    Report       [facility] Emergency Dept.    Test Date:  2022  Pt Name:    VALENTINE ALANIZ                Department: ER  MRN:        [id number]                      Room:       [facility]  Gender:     Female                       Technician: [id number]  :        1955                   Requested By:ARABELLA THOMPSON  Order #:    [id number]                    Reading MD: ARABELLA THOMPSON MD    Measurements  Intervals                                Axis  Rate:       106                          P:          76  CT:         174                          QRS:        68  QRSD:       88                           T:          27  QT:         346  QTc:        459    Interpretive Statements  Sinus tachycardia  Low voltage, extremity and precordial leads  Anteroseptal infarct, age indeterminate  Compared to ECG 05/10/2022 10:21:10  Low QRS voltage now present  Myocardial infarct finding now present  Electronically Signed On 2022 12:30:29 PDT by ARABELLA THOMPSON MD     HGB (Hemoglobin) for 48 hours    Collection Time: 22  3:20 PM   Result Value Ref Range    Hemoglobin 8.5 (L) 12.0 - 16.0 g/dL   POCT glucose device results    Collection Time: 22  3:23 PM   Result Value Ref Range    POC Glucose, Blood 167 (H) 65 - 99 mg/dL   Comp Metabolic Panel (CMP)    Collection Time:  08/01/22 12:04 AM   Result Value Ref Range    Sodium 132 (L) 135 - 145 mmol/L    Potassium 3.3 (L) 3.6 - 5.5 mmol/L    Chloride 97 96 - 112 mmol/L    Co2 20 20 - 33 mmol/L    Anion Gap 15.0 7.0 - 16.0    Glucose 174 (H) 65 - 99 mg/dL    Bun 41 (H) 8 - 22 mg/dL    Creatinine 1.06 0.50 - 1.40 mg/dL    Calcium 7.4 (L) 8.5 - 10.5 mg/dL    AST(SGOT) 158 (H) 12 - 45 U/L    ALT(SGPT) 47 2 - 50 U/L    Alkaline Phosphatase 117 (H) 30 - 99 U/L    Total Bilirubin 1.7 (H) 0.1 - 1.5 mg/dL    Albumin 3.4 3.2 - 4.9 g/dL    Total Protein 5.4 (L) 6.0 - 8.2 g/dL    Globulin 2.0 1.9 - 3.5 g/dL    A-G Ratio 1.7 g/dL   PHOSPHORUS    Collection Time: 08/01/22 12:04 AM   Result Value Ref Range    Phosphorus 2.8 2.5 - 4.5 mg/dL   CBC WITH DIFFERENTIAL    Collection Time: 08/01/22 12:04 AM   Result Value Ref Range    WBC 6.3 4.8 - 10.8 K/uL    RBC 2.72 (L) 4.20 - 5.40 M/uL    Hemoglobin 8.7 (L) 12.0 - 16.0 g/dL    Hematocrit 25.1 (L) 37.0 - 47.0 %    MCV 92.3 81.4 - 97.8 fL    MCH 32.0 27.0 - 33.0 pg    MCHC 34.7 33.6 - 35.0 g/dL    RDW 60.6 (H) 35.9 - 50.0 fL    Platelet Count 123 (L) 164 - 446 K/uL    MPV 11.1 9.0 - 12.9 fL    Neutrophils-Polys 88.20 (H) 44.00 - 72.00 %    Lymphocytes 4.70 (L) 22.00 - 41.00 %    Monocytes 6.20 0.00 - 13.40 %    Eosinophils 0.00 0.00 - 6.90 %    Basophils 0.30 0.00 - 1.80 %    Immature Granulocytes 0.60 0.00 - 0.90 %    Nucleated RBC 0.00 /100 WBC    Neutrophils (Absolute) 5.58 2.00 - 7.15 K/uL    Lymphs (Absolute) 0.30 (L) 1.00 - 4.80 K/uL    Monos (Absolute) 0.39 0.00 - 0.85 K/uL    Eos (Absolute) 0.00 0.00 - 0.51 K/uL    Baso (Absolute) 0.02 0.00 - 0.12 K/uL    Immature Granulocytes (abs) 0.04 0.00 - 0.11 K/uL    NRBC (Absolute) 0.00 K/uL   ESTIMATED GFR    Collection Time: 08/01/22 12:04 AM   Result Value Ref Range    GFR (CKD-EPI) 57 (A) >60 mL/min/1.73 m 2   Prothrombin time (INR)    Collection Time: 08/01/22  1:53 AM   Result Value Ref Range    PT 19.6 (H) 12.0 - 14.6 sec    INR 1.71 (H) 0.87 - 1.13    Magnesium    Collection Time: 08/01/22  6:14 AM   Result Value Ref Range    Magnesium 2.2 1.5 - 2.5 mg/dL   HGB (Hemoglobin) for 48 hours    Collection Time: 08/01/22  6:14 AM   Result Value Ref Range    Hemoglobin 8.0 (L) 12.0 - 16.0 g/dL         Radiology Review:  DX-CHEST-PORTABLE (1 VIEW)   Final Result      No acute cardiopulmonary abnormality.               MDM (Data Review):   -Records reviewed and summarized in current documentation  -I personally reviewed and interpreted the laboratory results  -I personally reviewed the radiology images    Assessment/Recommendations:    Impression:  1.  Hematemesis  2.  Melena  3.  Post hemorrhagic anemia  4.  Chronic anticoagulation--reversed  5.  History of gastric, duodenal and jejunal AVMs, s/p APC  6.  ETOH abuse  7.  Abnormal liver tests  8.  History of pulmonary emboli    Recs:  1. Start GI soft diet  2. Continue Protonix 40 mg IV BID then switch to PPI daily  3. Sucralfate suspension x 2 weeks  4. Trend Hgb and transfuse >7    GI WILL SIGN OFF. PLEASE CALL IF ANY QUESTIONS OR CONCERNS      Ana Mcleod, ENEDINA.P.R.N.      Thank you for inviting me to participate in the care of this patient. Please do not hesitate to call GI consultants with additional questions/concerns or changes in the patient's clinical status at [phone].      Core Quality Measures   Reviewed items:  Labs, Medications and Radiology reports reviewed

## 2022-08-01 NOTE — DISCHARGE PLANNING
Received Choice form at 5928  Agency/Facility Name: [facility]  Referral sent per Choice form @ [phone]

## 2022-08-01 NOTE — RESPIRATORY CARE
COPD EDUCATION by COPD CLINICAL EDUCATOR  8/1/2022 at 2:35 PM by Dipti Villagran RRT     Patient interviewed by COPD education team. Patient denies COPD diagnosis or history, does not use respiratory medications. Offered smoking cessation literature and education, patient declined.       COPD Screen  COPD Risk Screening  Do you have a history of COPD?: No  COPD Population Screener  During the past 4 weeks, how much did you feel short of breath?: None/Little of the time  Do you ever cough up any mucus or phlegm?: No/only with occasional colds or infections  In the past 12 months, you do less than you used to because of your breathing problems: Disagree/unsure  Have you smoked at least 100 cigarettes in your entire life?: Yes  How old are you?: 60+  COPD Screening Score: 4  COPD Coordinator Not Recommended: Yes    COPD Assessment  COPD Clinical Specialists ONLY  COPD Education Initiated: Yes--Short Intervention (Denies COPD Dx/Hx, declined smoking cessation education and literature. No PFT on file, no resp meds.)  Referrals Initiated:  (declined all)    PFT Results    No results found for: PFT    Meds to Beds  Would the patient like to opt in for Bedside Medication Delivery at Discharge?: No

## 2022-08-01 NOTE — PROGRESS NOTES
Hospital Medicine Daily Progress Note    Date of Service  8/1/2022    Chief Complaint  Jaymie Peacock is a 66 y.o. female admitted 7/31/2022 with hematemesis    Hospital Course  66-year-old female with history of PE, alcoholism, hypertension and smoking presented 7/30 for hematemesis.  Started the day of admission with vomiting blood and abdominal pain, also patient noticed melena.  No significant fever or chills no chest pain or other symptoms.  Last May patient was diagnosed with PE and start with Eliquis also patient has been taking ibuprofen almost every day.  Patient states she drinks 2 shots of vodka around daily.  On admission patient was found to have anemia with hemoglobin around 6.  IV fluids with PPI and KCentra was given.  GI was consulted and EGD done on 7/31 which showed Crystal-Payan with visible vessels.  Treated with epinephrine injection     Interval Problem Update  Patient denies any nausea vomiting.she had a black stool yesterday, no bowel movement today    Hemoglobin stable 8s  Continue IV PPI, transition to p.o. if Hb stays stable    BP in the lower range, continue IV fluid  Advance to GI soft  Potassium 3.3 -replaced    Ordered home health      I have discussed this patient's plan of care and discharge plan at IDT rounds today with Case Management, Nursing, Nursing leadership, and other members of the IDT team.    Consultants/Specialty  GI    Code Status  Full Code    Disposition  Patient is not medically cleared for discharge.   Anticipate discharge to to home with organized home healthcare and close outpatient follow-up.  I have placed the appropriate orders for post-discharge needs.    Review of Systems  Review of Systems   Constitutional: Positive for malaise/fatigue. Negative for chills and fever.   HENT: Negative for sore throat.    Eyes: Negative for photophobia.   Respiratory: Negative for shortness of breath and wheezing.    Cardiovascular: Negative for chest pain and leg swelling.    Gastrointestinal: Negative for abdominal pain, diarrhea, nausea and vomiting.   Genitourinary: Negative for dysuria and urgency.   Musculoskeletal: Negative for myalgias.   Skin: Negative for itching.   Neurological: Positive for headaches.        Physical Exam  Temp:  [36.2 °C (97.2 °F)-36.6 °C (97.9 °F)] 36.2 °C (97.2 °F)  Pulse:  [] 80  Resp:  [11-34] 16  BP: ()/(56-89) 90/58  SpO2:  [94 %-100 %] 96 %    Physical Exam  Constitutional:       Appearance: She is ill-appearing.   HENT:      Head: Normocephalic.      Nose: Nose normal.   Eyes:      Extraocular Movements: Extraocular movements intact.      Conjunctiva/sclera: Conjunctivae normal.   Cardiovascular:      Rate and Rhythm: Normal rate and regular rhythm.      Pulses: Normal pulses.      Heart sounds: Normal heart sounds.   Pulmonary:      Effort: Pulmonary effort is normal.      Breath sounds: Normal breath sounds.   Abdominal:      Palpations: Abdomen is soft.      Tenderness: There is no abdominal tenderness. There is no guarding.   Musculoskeletal:         General: No swelling or tenderness. Normal range of motion.      Cervical back: Normal range of motion.   Skin:     General: Skin is warm.   Neurological:      Mental Status: She is alert and oriented to person, place, and time. Mental status is at baseline.   Psychiatric:         Mood and Affect: Mood normal.         Fluids    Intake/Output Summary (Last 24 hours) at 8/1/2022 1307  Last data filed at 8/1/2022 0930  Gross per 24 hour   Intake 1817.33 ml   Output 10 ml   Net 1807.33 ml       Laboratory  Recent Labs     07/31/22  1027 07/31/22  1520 08/01/22  0004 08/01/22  0614 08/01/22  1234   WBC 9.0  --  6.3  --   --    RBC 1.89*  --  2.72*  --   --    HEMOGLOBIN 6.4*   < > 8.7* 8.0* 8.0*   HEMATOCRIT 19.6*  --  25.1*  --   --    .7*  --  92.3  --   --    MCH 33.9*  --  32.0  --   --    MCHC 32.7*  --  34.7  --   --    RDW 64.8*  --  60.6*  --   --    PLATELETCT 215  --  123*   --   --    MPV 11.1  --  11.1  --   --     < > = values in this interval not displayed.     Recent Labs     07/31/22  1027 08/01/22  0004   SODIUM 134* 132*   POTASSIUM 3.6 3.3*   CHLORIDE 91* 97   CO2 13* 20   GLUCOSE 150* 174*   BUN 41* 41*   CREATININE 1.50* 1.06   CALCIUM 8.5 7.4*     Recent Labs     07/31/22  1027 08/01/22  0153   APTT 42.6*  --    INR 5.40* 1.71*               Imaging  DX-CHEST-PORTABLE (1 VIEW)   Final Result      No acute cardiopulmonary abnormality.              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     S/p  EGD 7/31/22 which showed Crystal-Payan with visible vessels.  Treated with epinephrine injection    Continue PPI and sucralfate  Avoid NSAID and alcohol  Continue monitoring hemoglobin every 8 hours and transfuse as needed  According to GI recommendation, Eliquis can be resumed after 5 days  Alcoholic cessation education was given at the bedside    History of pulmonary embolus (PE)  Assessment & Plan  Recently diagnosed  Continue holding Eliquis, by GI we can resume after 5 days around 8/5  SCDs and encourage the patient to walk around    Elevated INR- (present on admission)  Assessment & Plan  Treated with Kcentra   INR daily    Hypomagnesemia- (present on admission)  Assessment & Plan  Replace IV labs daily      Hypotension- (present on admission)  Assessment & Plan  Due to GI bleeding  Improved  Continue IV fluid    Acute kidney injury (HCC)- (present on admission)  Assessment & Plan  Came with creatinine 1.5 and her baseline around 1  Likely related to dehydration  IV fluid and monitoring with labs daily    Alcohol abuse- (present on admission)  Assessment & Plan  Drinking vodka almost every day  Encouraged the patient to quit  Monitor for withdrawal start with CIWA if needed  Multivitamin and IV fluid    Smoking- (present on  admission)  Assessment & Plan  Smoking cessation       VTE prophylaxis: SCDs/TEDs    I have performed a physical exam and reviewed and updated ROS and Plan today (8/1/2022). In review of yesterday's note (7/31/2022), there are no changes except as documented above.

## 2022-08-01 NOTE — DISCHARGE PLANNING
Case Management Discharge Planning    Admission Date: 7/31/2022  GMLOS: 4.6  ALOS: 1    6-Clicks ADL Score: 24  6-Clicks Mobility Score: 24      Anticipated Discharge Dispo:      DME Needed: No    Action(s) Taken:     0915: RN CM spoke with TCN. Per TCN, patient was previously on service with [facility]. TCN collected choice and faxed it to [facility].     0933: RN CM sent a voalte message to MD for home health order.     Escalations Completed: MD    Medically Clear: No    Next Steps: wait for  order and Medical Clearance    Barriers to Discharge: Medical clearance    Is the patient up for discharge tomorrow: No

## 2022-08-02 VITALS
SYSTOLIC BLOOD PRESSURE: 99 MMHG | TEMPERATURE: 96.7 F | OXYGEN SATURATION: 90 % | BODY MASS INDEX: 16.3 KG/M2 | HEART RATE: 81 BPM | WEIGHT: 101.41 LBS | HEIGHT: 66 IN | DIASTOLIC BLOOD PRESSURE: 66 MMHG | RESPIRATION RATE: 18 BRPM

## 2022-08-02 PROBLEM — I95.9 HYPOTENSION: Status: RESOLVED | Noted: 2021-05-22 | Resolved: 2022-08-02

## 2022-08-02 PROBLEM — N17.9 ACUTE KIDNEY INJURY (HCC): Status: RESOLVED | Noted: 2021-05-22 | Resolved: 2022-08-02

## 2022-08-02 PROBLEM — E83.42 HYPOMAGNESEMIA: Status: RESOLVED | Noted: 2022-02-18 | Resolved: 2022-08-02

## 2022-08-02 PROBLEM — E87.1 HYPONATREMIA: Status: RESOLVED | Noted: 2021-05-22 | Resolved: 2022-08-02

## 2022-08-02 PROBLEM — E87.6 HYPOKALEMIA: Status: RESOLVED | Noted: 2021-05-22 | Resolved: 2022-08-02

## 2022-08-02 LAB
ANION GAP SERPL CALC-SCNC: 7 MMOL/L (ref 7–16)
BUN SERPL-MCNC: 24 MG/DL (ref 8–22)
CALCIUM SERPL-MCNC: 7.1 MG/DL (ref 8.5–10.5)
CHLORIDE SERPL-SCNC: 110 MMOL/L (ref 96–112)
CO2 SERPL-SCNC: 21 MMOL/L (ref 20–33)
CREAT SERPL-MCNC: 0.88 MG/DL (ref 0.5–1.4)
ERYTHROCYTE [DISTWIDTH] IN BLOOD BY AUTOMATED COUNT: 68.4 FL (ref 35.9–50)
GFR SERPLBLD CREATININE-BSD FMLA CKD-EPI: 72 ML/MIN/1.73 M 2
GLUCOSE SERPL-MCNC: 100 MG/DL (ref 65–99)
HCT VFR BLD AUTO: 22.6 % (ref 37–47)
HGB BLD-MCNC: 7.6 G/DL (ref 12–16)
HGB BLD-MCNC: 8.5 G/DL (ref 12–16)
INR PPP: 1.04 (ref 0.87–1.13)
MAGNESIUM SERPL-MCNC: 1.6 MG/DL (ref 1.5–2.5)
MCH RBC QN AUTO: 32.2 PG (ref 27–33)
MCHC RBC AUTO-ENTMCNC: 33.6 G/DL (ref 33.6–35)
MCV RBC AUTO: 95.8 FL (ref 81.4–97.8)
PLATELET # BLD AUTO: 122 K/UL (ref 164–446)
PMV BLD AUTO: 10.1 FL (ref 9–12.9)
POTASSIUM SERPL-SCNC: 3.6 MMOL/L (ref 3.6–5.5)
PROTHROMBIN TIME: 13.5 SEC (ref 12–14.6)
RBC # BLD AUTO: 2.36 M/UL (ref 4.2–5.4)
SODIUM SERPL-SCNC: 138 MMOL/L (ref 135–145)
WBC # BLD AUTO: 6.2 K/UL (ref 4.8–10.8)

## 2022-08-02 PROCEDURE — A9270 NON-COVERED ITEM OR SERVICE: HCPCS | Performed by: HOSPITALIST

## 2022-08-02 PROCEDURE — 85610 PROTHROMBIN TIME: CPT

## 2022-08-02 PROCEDURE — 85027 COMPLETE CBC AUTOMATED: CPT

## 2022-08-02 PROCEDURE — 700111 HCHG RX REV CODE 636 W/ 250 OVERRIDE (IP): Performed by: HOSPITALIST

## 2022-08-02 PROCEDURE — 700102 HCHG RX REV CODE 250 W/ 637 OVERRIDE(OP): Performed by: EMERGENCY MEDICINE

## 2022-08-02 PROCEDURE — 80048 BASIC METABOLIC PNL TOTAL CA: CPT

## 2022-08-02 PROCEDURE — 700105 HCHG RX REV CODE 258: Performed by: STUDENT IN AN ORGANIZED HEALTH CARE EDUCATION/TRAINING PROGRAM

## 2022-08-02 PROCEDURE — 700102 HCHG RX REV CODE 250 W/ 637 OVERRIDE(OP): Performed by: HOSPITALIST

## 2022-08-02 PROCEDURE — 85018 HEMOGLOBIN: CPT

## 2022-08-02 PROCEDURE — 700102 HCHG RX REV CODE 250 W/ 637 OVERRIDE(OP): Performed by: INTERNAL MEDICINE

## 2022-08-02 PROCEDURE — 700111 HCHG RX REV CODE 636 W/ 250 OVERRIDE (IP): Performed by: INTERNAL MEDICINE

## 2022-08-02 PROCEDURE — 83735 ASSAY OF MAGNESIUM: CPT

## 2022-08-02 PROCEDURE — A9270 NON-COVERED ITEM OR SERVICE: HCPCS | Performed by: INTERNAL MEDICINE

## 2022-08-02 PROCEDURE — 99239 HOSP IP/OBS DSCHRG MGMT >30: CPT | Performed by: HOSPITALIST

## 2022-08-02 PROCEDURE — C9113 INJ PANTOPRAZOLE SODIUM, VIA: HCPCS | Performed by: INTERNAL MEDICINE

## 2022-08-02 PROCEDURE — A9270 NON-COVERED ITEM OR SERVICE: HCPCS | Performed by: EMERGENCY MEDICINE

## 2022-08-02 RX ORDER — MAGNESIUM SULFATE HEPTAHYDRATE 40 MG/ML
2 INJECTION, SOLUTION INTRAVENOUS ONCE
Status: COMPLETED | OUTPATIENT
Start: 2022-08-02 | End: 2022-08-02

## 2022-08-02 RX ORDER — ACETAMINOPHEN 500 MG
500-1000 TABLET ORAL EVERY 6 HOURS PRN
Qty: 30 TABLET | Refills: 0 | Status: ON HOLD | COMMUNITY
Start: 2022-08-02 | End: 2022-08-22

## 2022-08-02 RX ORDER — POTASSIUM CHLORIDE 20 MEQ/1
20 TABLET, EXTENDED RELEASE ORAL ONCE
Status: COMPLETED | OUTPATIENT
Start: 2022-08-02 | End: 2022-08-02

## 2022-08-02 RX ORDER — OMEPRAZOLE 20 MG/1
20 CAPSULE, DELAYED RELEASE ORAL 2 TIMES DAILY
Qty: 60 CAPSULE | Refills: 0 | Status: ON HOLD | OUTPATIENT
Start: 2022-08-02 | End: 2022-08-22

## 2022-08-02 RX ORDER — SUCRALFATE ORAL 1 G/10ML
1 SUSPENSION ORAL
Qty: 560 ML | Refills: 0 | Status: SHIPPED | OUTPATIENT
Start: 2022-08-02 | End: 2022-08-16

## 2022-08-02 RX ORDER — ONDANSETRON 4 MG/1
4 TABLET, ORALLY DISINTEGRATING ORAL EVERY 6 HOURS PRN
Qty: 10 TABLET | Refills: 0 | Status: SHIPPED | OUTPATIENT
Start: 2022-08-02 | End: 2023-05-28

## 2022-08-02 RX ADMIN — POTASSIUM CHLORIDE 20 MEQ: 1500 TABLET, EXTENDED RELEASE ORAL at 08:37

## 2022-08-02 RX ADMIN — SUCRALFATE ORAL 1 G: 1 SUSPENSION ORAL at 07:30

## 2022-08-02 RX ADMIN — SODIUM CHLORIDE: 9 INJECTION, SOLUTION INTRAVENOUS at 10:09

## 2022-08-02 RX ADMIN — PANTOPRAZOLE SODIUM 40 MG: 40 INJECTION, POWDER, LYOPHILIZED, FOR SOLUTION INTRAVENOUS at 04:54

## 2022-08-02 RX ADMIN — ACETAMINOPHEN 650 MG: 325 TABLET, FILM COATED ORAL at 05:58

## 2022-08-02 RX ADMIN — MAGNESIUM SULFATE HEPTAHYDRATE 2 G: 40 INJECTION, SOLUTION INTRAVENOUS at 08:37

## 2022-08-02 NOTE — DISCHARGE PLANNING
Agency/Facility Name: [facility]  Spoke To:  Nicole  Outcome: Needs approval from [facility].  [facility] is waiting to hear back from them.

## 2022-08-02 NOTE — DISCHARGE SUMMARY
Discharge Summary    CHIEF COMPLAINT ON ADMISSION  Chief Complaint   Patient presents with   • Blood in Vomit   • Bloody Stools       Reason for Admission  EMS     Admission Date  7/31/2022    CODE STATUS  Full Code    HPI & HOSPITAL COURSE  This is a 66 y.o. female here with history of PE on anticoagulation alcohol dependence hypertension tobacco dependence presented with hematemesis and melena noted to have anemia with a hemoglobin of 6.  Patient was admitted and started on IV fluids for hydration she was started on IV Protonix and received Kcentra.  She was evaluated by GI and underwent an EGD on 7/31/2022 which revealed a Crystal-Payan tear with visible vessel treated with hemoclipping and epinephrine injection.  GI recommended holding anticoagulation for 5 days.  The patient clinically was stable after procedure.  On evaluation this morning she is tolerating her diet.  Her hemoglobin is stable.  I reviewed with her the importance to abstain from alcohol and discussed smoking cessation.  Discussed with her resuming her anticoagulation on 8/5/2022 and close outpatient monitoring with follow-up with PCP and GI.        Therefore, she is discharged in good and stable condition to home with close outpatient follow-up.    The patient met 2-midnight criteria for an inpatient stay at the time of discharge.    Discharge Date  8/2/2022    FOLLOW UP ITEMS POST DISCHARGE  Follow-up with PCP with repeat CBC  Follow-up with GI  Resume anticoagulation on 8/5/2022    DISCHARGE DIAGNOSES  Principal Problem:    Upper GI bleed POA: Unknown  Active Problems:    Smoking POA: Yes    Alcohol abuse POA: Yes    Elevated INR POA: Yes    Macrocytic anemia POA: Yes    History of pulmonary embolus (PE) POA: Unknown      Overview: Hx of PE      Crystal-Payan tear POA: Yes  Resolved Problems:    Acute kidney injury (HCC) POA: Yes    Hypotension POA: Yes    Hypokalemia POA: Yes    Hyponatremia POA: Yes    Hypomagnesemia POA: Yes      FOLLOW  UP  No future appointments.  Toña Tejada P.A.-C.  [address] [id number] [phone]    Follow up      Manuel Wills M.D.  [address] [id number] [phone]          Cyn Slade M.D.  [address] [address]  [phone]    Follow up      Cyn Slade M.D.  [address] [facility] [id number]            MEDICATIONS ON DISCHARGE     Medication List      START taking these medications      Instructions   ondansetron 4 MG Tbdp  Commonly known as: ZOFRAN ODT   Take 1 Tablet by mouth every 6 hours as needed for Nausea.  Dose: 4 mg     sucralfate 1 GM/10ML Susp  Commonly known as: CARAFATE   Take 10 mL by mouth 4 Times a Day,Before Meals and at Bedtime for 14 days.  Dose: 1 g        CHANGE how you take these medications      Instructions   omeprazole 20 MG delayed-release capsule  What changed: when to take this  Commonly known as: PRILOSEC   Take 1 Capsule by mouth 2 times a day.  Dose: 20 mg     rivaroxaban 20 MG Tabs tablet  Start taking on: August 5, 2022  What changed: These instructions start on August 5, 2022. If you are unsure what to do until then, ask your doctor or other care provider.  Commonly known as: XARELTO   Take 1 Tablet by mouth with dinner.  Dose: 20 mg        CONTINUE taking these medications      Instructions   acetaminophen 500 MG Tabs  Commonly known as: TYLENOL   Take 1-2 Tablets by mouth every 6 hours as needed for Mild Pain or Moderate Pain.  Dose: 500-1,000 mg     mirtazapine 15 MG Tabs  Commonly known as: Remeron   Take 1 Tablet by mouth every evening.  Dose: 15 mg     simvastatin 20 MG Tabs  Commonly known as: ZOCOR   Take 20 mg by mouth every evening. Indications: Ischemic Heart Disease  Dose: 20 mg            Allergies  No Known Allergies    DIET  Orders Placed This Encounter   Procedures   • Diet Order Diet: Low Fiber(GI Soft) (NO REDS)     Standing Status:   Standing     Number of Occurrences:   1      Order Specific Question:   Diet:     Answer:   Low Fiber(GI Soft) [2]     Comments:   NO REDS       ACTIVITY  As tolerated.  Weight bearing as tolerated    CONSULTATIONS  GI    PROCEDURES  EGD    LABORATORY  Lab Results   Component Value Date    SODIUM 138 08/02/2022    POTASSIUM 3.6 08/02/2022    CHLORIDE 110 08/02/2022    CO2 21 08/02/2022    GLUCOSE 100 (H) 08/02/2022    BUN 24 (H) 08/02/2022    CREATININE 0.88 08/02/2022        Lab Results   Component Value Date    WBC 6.2 08/02/2022    HEMOGLOBIN 8.5 (L) 08/02/2022    HEMATOCRIT 22.6 (L) 08/02/2022    PLATELETCT 122 (L) 08/02/2022        Total time of the discharge process exceeds 32 minutes.

## 2022-08-02 NOTE — CARE PLAN
The patient is Stable - Low risk of patient condition declining or worsening    Shift Goals  Clinical Goals: absence of melena/hematemesis, safety, comfort, appropriate Hgb level  Patient Goals: Rest  Family Goals: NA    Progress made toward(s) clinical / shift goals:  Patient remain safe and comfortable and denies any bleeding.     Patient is not progressing towards the following goals:

## 2022-08-02 NOTE — PROGRESS NOTES
Pt DC'd to home. IV removed, discharge instructions provided to patient, pt verbalizes understanding. Pt states all questions have been answered. Copy of discharge paperwork provided to pt, signed copy in chart. Pt states all belongings in possession. Pt escorted off unit with friend.

## 2022-08-02 NOTE — DISCHARGE PLANNING
[facility] TCN chart review completed. Collaborated with [facility] pt is anticipated to discharge home today. Referral has been placed with [facility] for resumption of care (still pending at present time). Patient seen at bedside. Appreciative of the visit, continues to decline [facility] transitional care. No additional TCN needs are anticipated this admission.  .    Previously completed:  - Choice forms: HH   - [facility] introduced (Y), referral (not sent).

## 2022-08-02 NOTE — DISCHARGE INSTRUCTIONS
Discharge Instructions    Discharged to home by car with friend. Discharged via wheelchair, hospital escort: Yes.  Special equipment needed: Not Applicable    Be sure to schedule a follow-up appointment with your primary care doctor or any specialists as instructed.     Discharge Plan:   Diet Plan: Discussed  Activity Level: Discussed  Confirmed Follow up Appointment: Patient to Call and Schedule Appointment  Confirmed Symptoms Management: Discussed  Medication Reconciliation Updated: Yes    I understand that a diet low in cholesterol, fat, and sodium is recommended for good health. Unless I have been given specific instructions below for another diet, I accept this instruction as my diet prescription.   Other diet: Heart healthy    Special Instructions: None    -Is this patient being discharged with medication to prevent blood clots?  No    Is patient discharged on Warfarin / Coumadin?   No

## 2022-08-02 NOTE — CARE PLAN
Patient alert oriented x 4 on room air. Patient remain free from any falls during shift. No acute distress noted, patient call light within reach.

## 2022-08-03 NOTE — DISCHARGE PLANNING
Agency/Facility Name: [facility]  Spoke To: Nicole  Outcome: Still waiting for approval from [facility]  to accept pt onto service.    @[id number]  Agency/Facility Name: [facility]  Spoke To: Nicole  Outcome: Referral accepted.

## 2022-08-15 ENCOUNTER — TELEPHONE (OUTPATIENT)
Dept: HEALTH INFORMATION MANAGEMENT | Facility: OTHER | Age: 67
End: 2022-08-15
Payer: MEDICARE

## 2022-08-16 ENCOUNTER — HOSPITAL ENCOUNTER (OUTPATIENT)
Dept: RADIOLOGY | Facility: MEDICAL CENTER | Age: 67
End: 2022-08-16
Attending: PHYSICIAN ASSISTANT
Payer: MEDICARE

## 2022-08-16 DIAGNOSIS — K55.20 ANGIODYSPLASIA OF INTESTINAL TRACT: ICD-10-CM

## 2022-08-16 DIAGNOSIS — D50.0 IRON DEFICIENCY ANEMIA SECONDARY TO BLOOD LOSS (CHRONIC): ICD-10-CM

## 2022-08-19 ENCOUNTER — APPOINTMENT (OUTPATIENT)
Dept: RADIOLOGY | Facility: MEDICAL CENTER | Age: 67
DRG: 378 | End: 2022-08-19
Attending: EMERGENCY MEDICINE
Payer: MEDICARE

## 2022-08-19 ENCOUNTER — HOSPITAL ENCOUNTER (INPATIENT)
Facility: MEDICAL CENTER | Age: 67
LOS: 3 days | DRG: 378 | End: 2022-08-22
Attending: EMERGENCY MEDICINE | Admitting: HOSPITALIST
Payer: MEDICARE

## 2022-08-19 DIAGNOSIS — D64.9 ANEMIA, UNSPECIFIED TYPE: ICD-10-CM

## 2022-08-19 DIAGNOSIS — I95.9 HYPOTENSION, UNSPECIFIED HYPOTENSION TYPE: ICD-10-CM

## 2022-08-19 DIAGNOSIS — K92.2 GASTROINTESTINAL HEMORRHAGE, UNSPECIFIED GASTROINTESTINAL HEMORRHAGE TYPE: ICD-10-CM

## 2022-08-19 DIAGNOSIS — Z79.01 CHRONIC ANTICOAGULATION: ICD-10-CM

## 2022-08-19 DIAGNOSIS — D62 ACUTE BLOOD LOSS ANEMIA: ICD-10-CM

## 2022-08-19 DIAGNOSIS — R79.1 ELEVATED INR: ICD-10-CM

## 2022-08-19 DIAGNOSIS — R00.0 TACHYCARDIA: ICD-10-CM

## 2022-08-19 LAB
ABO GROUP BLD: NORMAL
ALBUMIN SERPL BCP-MCNC: 3.3 G/DL (ref 3.2–4.9)
ALBUMIN/GLOB SERPL: 1.5 G/DL
ALP SERPL-CCNC: 124 U/L (ref 30–99)
ALT SERPL-CCNC: 9 U/L (ref 2–50)
ANION GAP SERPL CALC-SCNC: 17 MMOL/L (ref 7–16)
ANISOCYTOSIS BLD QL SMEAR: ABNORMAL
APTT PPP: 45.8 SEC (ref 24.7–36)
AST SERPL-CCNC: 25 U/L (ref 12–45)
BARCODED ABORH UBTYP: 600
BARCODED ABORH UBTYP: 600
BARCODED ABORH UBTYP: 6200
BARCODED PRD CODE UBPRD: NORMAL
BARCODED UNIT NUM UBUNT: NORMAL
BASOPHILS # BLD AUTO: 0.1 % (ref 0–1.8)
BASOPHILS # BLD: 0.01 K/UL (ref 0–0.12)
BILIRUB SERPL-MCNC: 0.5 MG/DL (ref 0.1–1.5)
BLD GP AB SCN SERPL QL: NORMAL
BUN SERPL-MCNC: 50 MG/DL (ref 8–22)
CALCIUM SERPL-MCNC: 8.5 MG/DL (ref 8.5–10.5)
CFT BLD TEG: 14.2 MIN (ref 4.6–9.1)
CFT P HPASE BLD TEG: 14.2 MIN (ref 4.3–8.3)
CHLORIDE SERPL-SCNC: 103 MMOL/L (ref 96–112)
CLOT ANGLE BLD TEG: 77.3 DEGREES (ref 63–78)
CLOT LYSIS 30M P MA LENFR BLD TEG: 2.2 % (ref 0–2.6)
CO2 SERPL-SCNC: 18 MMOL/L (ref 20–33)
COMMENT 1642: NORMAL
COMPONENT R 8504R: NORMAL
CREAT SERPL-MCNC: 0.94 MG/DL (ref 0.5–1.4)
CT.EXTRINSIC BLD ROTEM: 1 MIN (ref 0.8–2.1)
EKG IMPRESSION: NORMAL
EOSINOPHIL # BLD AUTO: 0.01 K/UL (ref 0–0.51)
EOSINOPHIL NFR BLD: 0.1 % (ref 0–6.9)
ERYTHROCYTE [DISTWIDTH] IN BLOOD BY AUTOMATED COUNT: 74.3 FL (ref 35.9–50)
FOLATE SERPL-MCNC: 19.6 NG/ML
GFR SERPLBLD CREATININE-BSD FMLA CKD-EPI: 66 ML/MIN/1.73 M 2
GLOBULIN SER CALC-MCNC: 2.2 G/DL (ref 1.9–3.5)
GLUCOSE SERPL-MCNC: 169 MG/DL (ref 65–99)
HCT VFR BLD AUTO: 12.1 % (ref 37–47)
HGB BLD-MCNC: 3.7 G/DL (ref 12–16)
HGB BLD-MCNC: 8.7 G/DL (ref 12–16)
HYPOCHROMIA BLD QL SMEAR: ABNORMAL
IMM GRANULOCYTES # BLD AUTO: 0.13 K/UL (ref 0–0.11)
IMM GRANULOCYTES NFR BLD AUTO: 1.3 % (ref 0–0.9)
INR PPP: 3.59 (ref 0.87–1.13)
INR PPP: 5.97 (ref 0.87–1.13)
LIPASE SERPL-CCNC: 58 U/L (ref 11–82)
LYMPHOCYTES # BLD AUTO: 1.5 K/UL (ref 1–4.8)
LYMPHOCYTES NFR BLD: 14.7 % (ref 22–41)
MACROCYTES BLD QL SMEAR: ABNORMAL
MCF BLD TEG: 68.8 MM (ref 52–69)
MCF.PLATELET INHIB BLD ROTEM: 34 MM (ref 15–32)
MCH RBC QN AUTO: 34.6 PG (ref 27–33)
MCHC RBC AUTO-ENTMCNC: 30.6 G/DL (ref 33.6–35)
MCV RBC AUTO: 113.1 FL (ref 81.4–97.8)
MONOCYTES # BLD AUTO: 0.82 K/UL (ref 0–0.85)
MONOCYTES NFR BLD AUTO: 8 % (ref 0–13.4)
MORPHOLOGY BLD-IMP: NORMAL
NEUTROPHILS # BLD AUTO: 7.73 K/UL (ref 2–7.15)
NEUTROPHILS NFR BLD: 75.8 % (ref 44–72)
NRBC # BLD AUTO: 0.13 K/UL
NRBC BLD-RTO: 1.3 /100 WBC
PA AA BLD-ACNC: ABNORMAL % (ref 0–11)
PA ADP BLD-ACNC: ABNORMAL % (ref 0–17)
PLATELET # BLD AUTO: 301 K/UL (ref 164–446)
PLATELET BLD QL SMEAR: NORMAL
PMV BLD AUTO: 10.4 FL (ref 9–12.9)
POLYCHROMASIA BLD QL SMEAR: NORMAL
POTASSIUM SERPL-SCNC: 4.2 MMOL/L (ref 3.6–5.5)
PRODUCT TYPE UPROD: NORMAL
PROT SERPL-MCNC: 5.5 G/DL (ref 6–8.2)
PROTHROMBIN TIME: 34.5 SEC (ref 12–14.6)
PROTHROMBIN TIME: 50.7 SEC (ref 12–14.6)
RBC # BLD AUTO: 1.07 M/UL (ref 4.2–5.4)
RBC BLD AUTO: PRESENT
RH BLD: NORMAL
SODIUM SERPL-SCNC: 138 MMOL/L (ref 135–145)
TEG ALGORITHM TGALG: ABNORMAL
UNIT STATUS USTAT: NORMAL
VIT B12 SERPL-MCNC: 285 PG/ML (ref 211–911)
WBC # BLD AUTO: 10.2 K/UL (ref 4.8–10.8)

## 2022-08-19 PROCEDURE — 82746 ASSAY OF FOLIC ACID SERUM: CPT

## 2022-08-19 PROCEDURE — 86850 RBC ANTIBODY SCREEN: CPT

## 2022-08-19 PROCEDURE — 83690 ASSAY OF LIPASE: CPT

## 2022-08-19 PROCEDURE — 700105 HCHG RX REV CODE 258: Performed by: HOSPITALIST

## 2022-08-19 PROCEDURE — 94760 N-INVAS EAR/PLS OXIMETRY 1: CPT

## 2022-08-19 PROCEDURE — 85347 COAGULATION TIME ACTIVATED: CPT

## 2022-08-19 PROCEDURE — 96366 THER/PROPH/DIAG IV INF ADDON: CPT

## 2022-08-19 PROCEDURE — 36415 COLL VENOUS BLD VENIPUNCTURE: CPT

## 2022-08-19 PROCEDURE — 86900 BLOOD TYPING SEROLOGIC ABO: CPT

## 2022-08-19 PROCEDURE — 99291 CRITICAL CARE FIRST HOUR: CPT | Performed by: HOSPITALIST

## 2022-08-19 PROCEDURE — 30233N1 TRANSFUSION OF NONAUTOLOGOUS RED BLOOD CELLS INTO PERIPHERAL VEIN, PERCUTANEOUS APPROACH: ICD-10-PCS | Performed by: EMERGENCY MEDICINE

## 2022-08-19 PROCEDURE — 71045 X-RAY EXAM CHEST 1 VIEW: CPT

## 2022-08-19 PROCEDURE — 99285 EMERGENCY DEPT VISIT HI MDM: CPT

## 2022-08-19 PROCEDURE — 85730 THROMBOPLASTIN TIME PARTIAL: CPT

## 2022-08-19 PROCEDURE — 82607 VITAMIN B-12: CPT

## 2022-08-19 PROCEDURE — 700111 HCHG RX REV CODE 636 W/ 250 OVERRIDE (IP): Performed by: EMERGENCY MEDICINE

## 2022-08-19 PROCEDURE — 85610 PROTHROMBIN TIME: CPT

## 2022-08-19 PROCEDURE — 700101 HCHG RX REV CODE 250: Performed by: HOSPITALIST

## 2022-08-19 PROCEDURE — P9016 RBC LEUKOCYTES REDUCED: HCPCS

## 2022-08-19 PROCEDURE — 96375 TX/PRO/DX INJ NEW DRUG ADDON: CPT

## 2022-08-19 PROCEDURE — 93005 ELECTROCARDIOGRAM TRACING: CPT

## 2022-08-19 PROCEDURE — 700111 HCHG RX REV CODE 636 W/ 250 OVERRIDE (IP): Performed by: HOSPITALIST

## 2022-08-19 PROCEDURE — 86901 BLOOD TYPING SEROLOGIC RH(D): CPT

## 2022-08-19 PROCEDURE — 96365 THER/PROPH/DIAG IV INF INIT: CPT

## 2022-08-19 PROCEDURE — 93005 ELECTROCARDIOGRAM TRACING: CPT | Performed by: EMERGENCY MEDICINE

## 2022-08-19 PROCEDURE — A9270 NON-COVERED ITEM OR SERVICE: HCPCS | Performed by: HOSPITALIST

## 2022-08-19 PROCEDURE — 80053 COMPREHEN METABOLIC PANEL: CPT

## 2022-08-19 PROCEDURE — 85025 COMPLETE CBC W/AUTO DIFF WBC: CPT

## 2022-08-19 PROCEDURE — 700105 HCHG RX REV CODE 258: Performed by: EMERGENCY MEDICINE

## 2022-08-19 PROCEDURE — 86923 COMPATIBILITY TEST ELECTRIC: CPT

## 2022-08-19 PROCEDURE — 85384 FIBRINOGEN ACTIVITY: CPT

## 2022-08-19 PROCEDURE — C9113 INJ PANTOPRAZOLE SODIUM, VIA: HCPCS | Performed by: EMERGENCY MEDICINE

## 2022-08-19 PROCEDURE — 700102 HCHG RX REV CODE 250 W/ 637 OVERRIDE(OP): Performed by: HOSPITALIST

## 2022-08-19 PROCEDURE — 36430 TRANSFUSION BLD/BLD COMPNT: CPT

## 2022-08-19 PROCEDURE — 770000 HCHG ROOM/CARE - INTERMEDIATE ICU *

## 2022-08-19 PROCEDURE — 85018 HEMOGLOBIN: CPT

## 2022-08-19 PROCEDURE — C9113 INJ PANTOPRAZOLE SODIUM, VIA: HCPCS | Performed by: HOSPITALIST

## 2022-08-19 PROCEDURE — 85576 BLOOD PLATELET AGGREGATION: CPT | Mod: 91

## 2022-08-19 RX ORDER — ONDANSETRON 2 MG/ML
4 INJECTION INTRAMUSCULAR; INTRAVENOUS EVERY 4 HOURS PRN
Status: DISCONTINUED | OUTPATIENT
Start: 2022-08-19 | End: 2022-08-22 | Stop reason: HOSPADM

## 2022-08-19 RX ORDER — SODIUM CHLORIDE, SODIUM LACTATE, POTASSIUM CHLORIDE, CALCIUM CHLORIDE 600; 310; 30; 20 MG/100ML; MG/100ML; MG/100ML; MG/100ML
INJECTION, SOLUTION INTRAVENOUS CONTINUOUS
Status: DISCONTINUED | OUTPATIENT
Start: 2022-08-19 | End: 2022-08-22

## 2022-08-19 RX ORDER — MULTIVIT-MINERALS/FOLIC ACID 120 MCG
1 TABLET,CHEWABLE ORAL EVERY MORNING
COMMUNITY
End: 2023-05-28

## 2022-08-19 RX ORDER — SODIUM CHLORIDE, SODIUM LACTATE, POTASSIUM CHLORIDE, CALCIUM CHLORIDE 600; 310; 30; 20 MG/100ML; MG/100ML; MG/100ML; MG/100ML
1000 INJECTION, SOLUTION INTRAVENOUS ONCE
Status: COMPLETED | OUTPATIENT
Start: 2022-08-19 | End: 2022-08-19

## 2022-08-19 RX ORDER — MIRTAZAPINE 15 MG/1
15 TABLET, FILM COATED ORAL NIGHTLY
Status: DISCONTINUED | OUTPATIENT
Start: 2022-08-19 | End: 2022-08-22 | Stop reason: HOSPADM

## 2022-08-19 RX ORDER — ACETAMINOPHEN 325 MG/1
650 TABLET ORAL EVERY 6 HOURS PRN
Status: DISCONTINUED | OUTPATIENT
Start: 2022-08-19 | End: 2022-08-22 | Stop reason: HOSPADM

## 2022-08-19 RX ORDER — ONDANSETRON 4 MG/1
4 TABLET, ORALLY DISINTEGRATING ORAL EVERY 4 HOURS PRN
Status: DISCONTINUED | OUTPATIENT
Start: 2022-08-19 | End: 2022-08-22 | Stop reason: HOSPADM

## 2022-08-19 RX ORDER — OXYCODONE HYDROCHLORIDE 5 MG/1
2.5 TABLET ORAL
Status: DISCONTINUED | OUTPATIENT
Start: 2022-08-19 | End: 2022-08-22 | Stop reason: HOSPADM

## 2022-08-19 RX ORDER — ONDANSETRON 2 MG/ML
4 INJECTION INTRAMUSCULAR; INTRAVENOUS ONCE
Status: COMPLETED | OUTPATIENT
Start: 2022-08-19 | End: 2022-08-19

## 2022-08-19 RX ORDER — OXYCODONE HYDROCHLORIDE 5 MG/1
5 TABLET ORAL
Status: DISCONTINUED | OUTPATIENT
Start: 2022-08-19 | End: 2022-08-22 | Stop reason: HOSPADM

## 2022-08-19 RX ORDER — HYDROMORPHONE HYDROCHLORIDE 1 MG/ML
0.25 INJECTION, SOLUTION INTRAMUSCULAR; INTRAVENOUS; SUBCUTANEOUS
Status: DISCONTINUED | OUTPATIENT
Start: 2022-08-19 | End: 2022-08-22 | Stop reason: HOSPADM

## 2022-08-19 RX ADMIN — ONDANSETRON 4 MG: 2 INJECTION INTRAMUSCULAR; INTRAVENOUS at 09:35

## 2022-08-19 RX ADMIN — SODIUM CHLORIDE 80 MG: 9 INJECTION, SOLUTION INTRAVENOUS at 11:11

## 2022-08-19 RX ADMIN — SODIUM CHLORIDE 8 MG/HR: 9 INJECTION, SOLUTION INTRAVENOUS at 11:45

## 2022-08-19 RX ADMIN — CEFTRIAXONE SODIUM 1 G: 10 INJECTION, POWDER, FOR SOLUTION INTRAVENOUS at 12:20

## 2022-08-19 RX ADMIN — PROTHROMBIN, COAGULATION FACTOR VII HUMAN, COAGULATION FACTOR IX HUMAN, COAGULATION FACTOR X HUMAN, PROTEIN C, PROTEIN S HUMAN, AND WATER 2000 UNITS: KIT at 11:48

## 2022-08-19 RX ADMIN — OXYCODONE 5 MG: 5 TABLET ORAL at 19:45

## 2022-08-19 RX ADMIN — SODIUM CHLORIDE, POTASSIUM CHLORIDE, SODIUM LACTATE AND CALCIUM CHLORIDE: 600; 310; 30; 20 INJECTION, SOLUTION INTRAVENOUS at 23:50

## 2022-08-19 RX ADMIN — SODIUM CHLORIDE 8 MG/HR: 9 INJECTION, SOLUTION INTRAVENOUS at 21:00

## 2022-08-19 RX ADMIN — MIRTAZAPINE 15 MG: 15 TABLET, FILM COATED ORAL at 20:56

## 2022-08-19 RX ADMIN — ACETAMINOPHEN 650 MG: 325 TABLET, FILM COATED ORAL at 19:44

## 2022-08-19 RX ADMIN — SODIUM CHLORIDE, POTASSIUM CHLORIDE, SODIUM LACTATE AND CALCIUM CHLORIDE: 600; 310; 30; 20 INJECTION, SOLUTION INTRAVENOUS at 12:23

## 2022-08-19 RX ADMIN — HYDROMORPHONE HYDROCHLORIDE 0.25 MG: 1 INJECTION, SOLUTION INTRAMUSCULAR; INTRAVENOUS; SUBCUTANEOUS at 20:55

## 2022-08-19 RX ADMIN — SODIUM CHLORIDE, POTASSIUM CHLORIDE, SODIUM LACTATE AND CALCIUM CHLORIDE 1000 ML: 600; 310; 30; 20 INJECTION, SOLUTION INTRAVENOUS at 09:34

## 2022-08-19 ASSESSMENT — ENCOUNTER SYMPTOMS
NECK PAIN: 0
EYE DISCHARGE: 0
CHILLS: 0
VOMITING: 1
WEAKNESS: 1
VOMITING: 0
HEADACHES: 0
WEAKNESS: 0
EYES NEGATIVE: 1
DIZZINESS: 0
SHORTNESS OF BREATH: 0
HEARTBURN: 0
BLOOD IN STOOL: 0
DEPRESSION: 0
PALPITATIONS: 0
FOCAL WEAKNESS: 0
ABDOMINAL PAIN: 0
BACK PAIN: 0
EYE PAIN: 0
BRUISES/BLEEDS EASILY: 0
MYALGIAS: 0
RESPIRATORY NEGATIVE: 1
SINUS PAIN: 0
POLYDIPSIA: 0
BRUISES/BLEEDS EASILY: 1
CARDIOVASCULAR NEGATIVE: 1
COUGH: 0
DIZZINESS: 1
MUSCULOSKELETAL NEGATIVE: 1
FEVER: 0
NERVOUS/ANXIOUS: 1
CONSTIPATION: 1
NAUSEA: 1

## 2022-08-19 ASSESSMENT — LIFESTYLE VARIABLES
TOTAL SCORE: 0
HAVE PEOPLE ANNOYED YOU BY CRITICIZING YOUR DRINKING: NO
ON A TYPICAL DAY WHEN YOU DRINK ALCOHOL HOW MANY DRINKS DO YOU HAVE: 0
ALCOHOL_USE: YES
DOES PATIENT WANT TO STOP DRINKING: NO
EVER HAD A DRINK FIRST THING IN THE MORNING TO STEADY YOUR NERVES TO GET RID OF A HANGOVER: NO
EVER FELT BAD OR GUILTY ABOUT YOUR DRINKING: NO
TOTAL SCORE: 0
CONSUMPTION TOTAL: NEGATIVE
HOW MANY TIMES IN THE PAST YEAR HAVE YOU HAD 5 OR MORE DRINKS IN A DAY: 0
AVERAGE NUMBER OF DAYS PER WEEK YOU HAVE A DRINK CONTAINING ALCOHOL: 0
SUBSTANCE_ABUSE: 1
TOTAL SCORE: 0
HAVE YOU EVER FELT YOU SHOULD CUT DOWN ON YOUR DRINKING: NO

## 2022-08-19 ASSESSMENT — PATIENT HEALTH QUESTIONNAIRE - PHQ9
2. FEELING DOWN, DEPRESSED, IRRITABLE, OR HOPELESS: NOT AT ALL
1. LITTLE INTEREST OR PLEASURE IN DOING THINGS: NOT AT ALL
SUM OF ALL RESPONSES TO PHQ9 QUESTIONS 1 AND 2: 0

## 2022-08-19 ASSESSMENT — FIBROSIS 4 INDEX
FIB4 SCORE: 1.85
FIB4 SCORE: 12.66
FIB4 SCORE: 1.85

## 2022-08-19 ASSESSMENT — PAIN DESCRIPTION - PAIN TYPE
TYPE: ACUTE PAIN
TYPE: ACUTE PAIN

## 2022-08-19 NOTE — ED NOTES
Pharmacy Medication Reconciliation      ~Medication reconciliation updated and complete per patient at bedside   ~Allergies have been verified   ~No oral ABX within the last 30 days  ~Patient home pharmacy: [facility]

## 2022-08-19 NOTE — CARE PLAN
The patient is Watcher - Medium risk of patient condition declining or worsening         Progress made toward(s) clinical / shift goals:      Problem: Knowledge Deficit - Standard  Goal: Patient and family/care givers will demonstrate understanding of plan of care, disease process/condition, diagnostic tests and medications  Outcome: Progressing; Education provided to patient regarding future plan of care. Including potential EGD in AM.     Problem: Fall Risk  Goal: Patient will remain free from falls  Outcome: Progressing; Patient able to ambulate to new bed.     Problem: Pain - Standard  Goal: Alleviation of pain or a reduction in pain to the patient’s comfort goal  Outcome: Progressing; No complaints of pain throughout shift.

## 2022-08-19 NOTE — CONSULTS
Gastroenterology Initial Consult Note               Author:  JERSON Grigsby Date & Time Created: 8/19/2022 11:33 AM       Patient ID:  Name:             Jaymie Peacock  YOB: 1955  Age:                 66 y.o.  female  MRN:               [id number]      Referring Provider:  Hesham Martinez MD      Presenting Chief Complaint:  Hematemesis, Anemia      History of Present Illness:    This is a very pleasant 66 y.o. female with the past medical history that includes recurrent GI bleeding, chronic anemia, DVT, pulmonary embolism, chronic anticoagulation with Xarelto last dose yesterday, alcohol abuse, hypertension, osteoporosis.  The patient presented to the hospital on 8/19/2022 with weakness that started over the last day.  She reports extreme fatigue and weakness in her legs.  She called EMS and was found to have hypotension on arrival in the emergency room 89/55.  Additionally, in route to the hospital, she vomited 200 mL of dark brown/red appearing vomitus.  She denies any melena.  She is somewhat constipated per her recollection.  She is on my colleague Maya Del Valle PA-C in clinic 8/11/2022.  Plan is for capsule endoscopy with initial patency capsule which is scheduled this coming Tuesday.  The patient admits to drinking 2 drinks of alcohol about 2 weeks ago.    She has a history of recurrent GI bleeding, alcohol use, and several endoscopies.  Timeline follows:  6/24/2021: Hgb 10.8, .     2/18/2022: Hgb 7.9, MCV 92.  GFR 13 that improved to 38, when hospitalized with stress cardiomyopathy and YAMIL.  She had heme positive stool, but no active bleeding.     3/28/2022: Hgb 7.3, MCV 88, when hospitalized with hypotension and diarrhea.       When hospitalized with pulmonary embolism and DVT on 4/19/2022, initial Hgb was 10.4 and declined to 8.4 the next day.       EGD 4/21/2022: Mild erosive duodenitis.  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    COL 4/21/2022: Negative.  Retained food  debris in cecum and hepatic flexure.       The pulmonary embolism and DVT were treated with Xarelto.  On 5/10/2022 she was hospitalized with melena.    EGD with PUSH enteroscopy 5/11/2022: APC ablation of 3 duodenal and 2 jejunal angiodysplasias.    Xarelto treatment for the pulmonary embolism and DVT was restarted.  She avoided aspirin/NSAIDs.  Melena reoccurred on June 12, and she was hospitalized.    EGD with push enteroscopy 6/15/2022: 1.  Two 3 mm islands of pink esophageal mucosal about 15 mm proximal to the GE junction, possibly due to Cantu's esophagus.  2.  Irregular descending duodenal mucosa of unclear significance  3.  Unusual 12 mm firm post bulbar duodenal polyp  4.  The small bowel was examined to 145 cm with the pylorus at 55 cm  5.  A definite cause of melena was not found during the EGD and enteroscopy    Then hospitalized 7/31/2022 with hematemesis, melena.  Hemoglobin 6.4, INR 5.4    EGD 7/31/2022 LA grade a esophagitis, with visible vessel injected with epi and Hemoclip x2 large amount of blood and clot in stomach        Review of Systems:  Review of Systems   Constitutional:  Positive for malaise/fatigue. Negative for chills and fever.   HENT:  Negative for nosebleeds and sinus pain.    Eyes:  Negative for pain and discharge.   Respiratory:  Negative for cough and shortness of breath.    Cardiovascular:  Negative for chest pain and palpitations.   Gastrointestinal:  Positive for constipation, nausea and vomiting. Negative for abdominal pain, blood in stool and melena.   Genitourinary:  Negative for frequency and hematuria.   Musculoskeletal:  Negative for joint pain and myalgias.   Skin:  Negative for itching and rash.   Neurological:  Positive for weakness. Negative for dizziness.   Endo/Heme/Allergies:  Negative for environmental allergies. Bruises/bleeds easily.   Psychiatric/Behavioral:  Positive for substance abuse. The patient is nervous/anxious.            Past Medical History:  Past  Medical History:   Diagnosis Date    DVT (deep venous thrombosis) (HCC)     GI bleed     High cholesterol     Hypertension     Osteoporosis     Pulmonary embolism (HCC)     Smoking      Active Hospital Problems    Diagnosis     GIB (gastrointestinal bleeding) [K92.2]     Crystal-Payan tear [K22.6]     Acute GI bleeding [K92.2]     Elevated INR [R79.1]     Frequent hospital admissions [Z78.9]     Emphysema lung (HCC) [J43.9]     Alcohol abuse [F10.10]          Past Surgical History:  Past Surgical History:   Procedure Laterality Date    ND UPPER GI ENDOSCOPY,DIAGNOSIS N/A 7/31/2022    Procedure: GASTROSCOPY;  Surgeon: Cyn Slade M.D.;  Location: [facility];  Service: Gastroenterology    ND UPPER GI ENDOSCOPY,SCLER INJECT N/A 7/31/2022    Procedure: GASTROSCOPY, WITH SCLEROTHERAPY;  Surgeon: Cyn Slade M.D.;  Location: [facility];  Service: Gastroenterology    ND UPPER GI ENDOSCOPY,CTRL BLEED N/A 7/31/2022    Procedure: EGD, WITH CLIP PLACEMENT;  Surgeon: Cyn Slade M.D.;  Location: [facility];  Service: Gastroenterology    ND UPPER GI ENDOSCOPY,DIAGNOSIS N/A 6/15/2022    Procedure: GASTROSCOPY WITH PUSH ENDOSCOPY;  Surgeon: Claudio Guerrier M.D.;  Location: [facility];  Service: Gastroenterology    ND UPPER GI ENDOSCOPY,CTRL BLEED N/A 05/11/2022    Procedure: GASTROSCOPY, WITH ARGON PLASMA COAGULATION;  Surgeon: Yariel Pagan M.D.;  Location: SURGERY SAME DAY [facility];  Service: Gastroenterology    GASTROSCOPY W/PUSH ENTERSCOPY N/A 05/11/2022    Procedure: GASTROSCOPY, WITH PUSH ENTEROSCOPY;  Surgeon: Yariel Pagan M.D.;  Location: SURGERY SAME DAY [facility];  Service: Gastroenterology    ND UPPER GI ENDOSCOPY,DIAGNOSIS  04/21/2022    Procedure: GASTROSCOPY;  Surgeon: Rell Rivas M.D.;  Location: SURGERY SAME DAY [facility];  Service: Gastroenterology    ND COLONOSCOPY,DIAGNOSTIC  04/21/2022    Procedure: COLONOSCOPY;  Surgeon: Rell KESSLER  ROBIN Rivas;  Location: SURGERY SAME DAY [facility];  Service: Gastroenterology    MT UPPER GI ENDOSCOPY,BIOPSY  04/21/2022    Procedure: GASTROSCOPY, WITH BIOPSY;  Surgeon: Rell Rivas M.D.;  Location: SURGERY SAME DAY [facility];  Service: Gastroenterology    MT UPPER GI ENDOSCOPY,CTRL BLEED  04/21/2022    Procedure: GASTROSCOPY, WITH ARGON PLASMA COAGULATION;  Surgeon: Rell Rivas M.D.;  Location: SURGERY SAME DAY [facility];  Service: Gastroenterology    HIP REPLACEMENT, PARTIAL Right     US-NEEDLE CORE BX-BREAST PANEL [facility] Medications:  Current Facility-Administered Medications   Medication Dose Frequency Provider Last Rate Last Admin    prothrombin complex conc human (Kcentra) 1000 units KIT 2,000 Units  2,000 Units Once Meek Griffin M.D.        mirtazapine (Remeron) tablet 15 mg  15 mg Nightly Meek Griffin M.D.        lactated ringers infusion   Continuous Meek Griffin M.D.        acetaminophen (Tylenol) tablet 650 mg  650 mg Q6HRS PRN Meek Griffin M.D.        Pharmacy Consult Request ...Pain Management Review 1 Each  1 Each PHARMACY TO DOSE Meek Griffin M.D.        oxyCODONE immediate-release (ROXICODONE) tablet 2.5 mg  2.5 mg Q3HRS PRN Meek Griffin M.D.        Or    oxyCODONE immediate-release (ROXICODONE) tablet 5 mg  5 mg Q3HRS PRN Meek Griffin M.D.        Or    HYDROmorphone (Dilaudid) injection 0.25 mg  0.25 mg Q3HRS PRN Meek Griffin M.D.        cefTRIAXone (Rocephin) syringe 1 g  1 g Q24HRS Meek Griffin M.D.        Pharmacy Consult Request   PHARMACY TO DOSE Meek Griffin M.D.        ondansetron (ZOFRAN) syringe/vial injection 4 mg  4 mg Q4HRS PRN Meek Griffin M.D.        ondansetron (ZOFRAN ODT) dispertab 4 mg  4 mg Q4HRS PRN Meek Griffin M.D.        pantoprazole (Protonix) 80 mg in  mL Infusion  8 mg/hr Continuous Meek Schmidhuber, M.D. 25 mL/hr at 08/19/22 1145 8 mg/hr at  "08/19/22 1145   Last reviewed on 8/19/2022  9:11 AM by Khoa Carrera R.N.       Current Outpatient Medications:  (Not in a hospital admission)        Medication Allergies:  No Known Allergies      Family Medical History:  History reviewed. No pertinent family history.      Social History:  Social History     Socioeconomic History    Marital status:      Spouse name: Not on file    Number of children: Not on file    Years of education: Not on file    Highest education level: Not on file   Occupational History    Not on file   Tobacco Use    Smoking status: Every Day     Packs/day: 0.50     Types: Cigarettes    Smokeless tobacco: Never   Vaping Use    Vaping Use: Never used   Substance and Sexual Activity    Alcohol use: Yes     Alcohol/week: 9.6 oz     Types: 16 Shots of liquor per week     Comment: Last drink 7/30/22 per patient    Drug use: Not Currently    Sexual activity: Not on file   Other Topics Concern    Not on file   Social History Narrative    Not on file     Social Determinants of Health     Financial Resource Strain: Not on file   Food Insecurity: Not on file   Transportation Needs: Not on file   Physical Activity: Not on file   Stress: Not on file   Social Connections: Not on file   Intimate Partner Violence: Not on file   Housing Stability: Not on file         Vital signs:  Weight/BMI: Body mass index is 16.3 kg/m².  BP (!) 92/55   Pulse (!) 117   Temp 36.1 °C (97 °F) (Temporal)   Resp (!) 66   Ht 1.676 m (5' 6\")   Wt 45.8 kg (101 lb)   SpO2 98%   Vitals:    08/19/22 1014 08/19/22 1021 08/19/22 1036 08/19/22 1046   BP:  (!) 89/51 (!) 94/53 (!) 92/55   Pulse: (!) 105 (!) 104 (!) 105 (!) 117   Resp: 14 13 20 (!) 66   Temp:       TempSrc:       SpO2: 97% 94% 94% 98%   Weight:       Height:         Oxygen Therapy:  Pulse Oximetry: 98 %    Intake/Output Summary (Last 24 hours) at 8/19/2022 1133  Last data filed at 8/19/2022 1034  Gross per 24 hour   Intake 1000 ml   Output --   Net 1000 ml "         Physical Exam:  Physical Exam  Vitals and nursing note reviewed.   Constitutional:       Appearance: She is ill-appearing.   HENT:      Head: Normocephalic and atraumatic.      Right Ear: External ear normal.      Left Ear: External ear normal.      Nose: Nose normal. No congestion.      Mouth/Throat:      Mouth: Mucous membranes are moist.      Pharynx: Oropharynx is clear.   Eyes:      General: No scleral icterus.     Extraocular Movements: Extraocular movements intact.      Pupils: Pupils are equal, round, and reactive to light.   Cardiovascular:      Rate and Rhythm: Normal rate and regular rhythm.      Pulses: Normal pulses.      Heart sounds: Normal heart sounds. No murmur heard.  Pulmonary:      Effort: Pulmonary effort is normal. No respiratory distress.      Breath sounds: Normal breath sounds.   Abdominal:      General: Abdomen is flat. Bowel sounds are normal. There is no distension.      Palpations: Abdomen is soft.      Tenderness: There is no abdominal tenderness.   Musculoskeletal:      Cervical back: Neck supple.      Right lower leg: No edema.      Left lower leg: No edema.   Lymphadenopathy:      Cervical: No cervical adenopathy.   Skin:     General: Skin is warm and dry.      Coloration: Skin is pale.   Neurological:      General: No focal deficit present.      Mental Status: She is alert and oriented to person, place, and time.   Psychiatric:         Mood and Affect: Mood normal.         Behavior: Behavior normal.         Labs:  Recent Labs     08/19/22  0905   SODIUM 138   POTASSIUM 4.2   CHLORIDE 103   CO2 18*   BUN 50*   CREATININE 0.94   CALCIUM 8.5     Recent Labs     08/19/22  0905   ALTSGPT 9   ASTSGOT 25   ALKPHOSPHAT 124*   TBILIRUBIN 0.5   LIPASE 58   GLUCOSE 169*     Recent Labs     08/19/22  0905   WBC 10.2   NEUTSPOLYS 75.80*   LYMPHOCYTES 14.70*   MONOCYTES 8.00   EOSINOPHILS 0.10   BASOPHILS 0.10   ASTSGOT 25   ALTSGPT 9   ALKPHOSPHAT 124*   TBILIRUBIN 0.5     Recent Labs      08/19/22  0905   RBC 1.07*   HEMOGLOBIN 3.7*   HEMATOCRIT 12.1*   PLATELETCT 301   PROTHROMBTM 50.7*   APTT 45.8*   INR 5.97*     Recent Results (from the past 24 hour(s))   CBC WITH DIFFERENTIAL    Collection Time: 08/19/22  9:05 AM   Result Value Ref Range    WBC 10.2 4.8 - 10.8 K/uL    RBC 1.07 (L) 4.20 - 5.40 M/uL    Hemoglobin 3.7 (LL) 12.0 - 16.0 g/dL    Hematocrit 12.1 (LL) 37.0 - 47.0 %    .1 (H) 81.4 - 97.8 fL    MCH 34.6 (H) 27.0 - 33.0 pg    MCHC 30.6 (L) 33.6 - 35.0 g/dL    RDW 74.3 (H) 35.9 - 50.0 fL    Platelet Count 301 164 - 446 K/uL    MPV 10.4 9.0 - 12.9 fL    Neutrophils-Polys 75.80 (H) 44.00 - 72.00 %    Lymphocytes 14.70 (L) 22.00 - 41.00 %    Monocytes 8.00 0.00 - 13.40 %    Eosinophils 0.10 0.00 - 6.90 %    Basophils 0.10 0.00 - 1.80 %    Immature Granulocytes 1.30 (H) 0.00 - 0.90 %    Nucleated RBC 1.30 /100 WBC    Neutrophils (Absolute) 7.73 (H) 2.00 - 7.15 K/uL    Lymphs (Absolute) 1.50 1.00 - 4.80 K/uL    Monos (Absolute) 0.82 0.00 - 0.85 K/uL    Eos (Absolute) 0.01 0.00 - 0.51 K/uL    Baso (Absolute) 0.01 0.00 - 0.12 K/uL    Immature Granulocytes (abs) 0.13 (H) 0.00 - 0.11 K/uL    NRBC (Absolute) 0.13 K/uL    Hypochromia 1+     Anisocytosis 2+ (A)     Macrocytosis 2+ (A)    COMP METABOLIC PANEL    Collection Time: 08/19/22  9:05 AM   Result Value Ref Range    Sodium 138 135 - 145 mmol/L    Potassium 4.2 3.6 - 5.5 mmol/L    Chloride 103 96 - 112 mmol/L    Co2 18 (L) 20 - 33 mmol/L    Anion Gap 17.0 (H) 7.0 - 16.0    Glucose 169 (H) 65 - 99 mg/dL    Bun 50 (H) 8 - 22 mg/dL    Creatinine 0.94 0.50 - 1.40 mg/dL    Calcium 8.5 8.5 - 10.5 mg/dL    AST(SGOT) 25 12 - 45 U/L    ALT(SGPT) 9 2 - 50 U/L    Alkaline Phosphatase 124 (H) 30 - 99 U/L    Total Bilirubin 0.5 0.1 - 1.5 mg/dL    Albumin 3.3 3.2 - 4.9 g/dL    Total Protein 5.5 (L) 6.0 - 8.2 g/dL    Globulin 2.2 1.9 - 3.5 g/dL    A-G Ratio 1.5 g/dL   LIPASE    Collection Time: 08/19/22  9:05 AM   Result Value Ref Range    Lipase 58  11 - 82 U/L   PROTHROMBIN TIME (INR)    Collection Time: 22  9:05 AM   Result Value Ref Range    PT 50.7 (H) 12.0 - 14.6 sec    INR 5.97 (H) 0.87 - 1.13   APTT    Collection Time: 22  9:05 AM   Result Value Ref Range    APTT 45.8 (H) 24.7 - 36.0 sec   ESTIMATED GFR    Collection Time: 22  9:05 AM   Result Value Ref Range    GFR (CKD-EPI) 66 >60 mL/min/1.73 m 2   PERIPHERAL SMEAR REVIEW    Collection Time: 22  9:05 AM   Result Value Ref Range    Peripheral Smear Review see below    PLATELET ESTIMATE    Collection Time: 22  9:05 AM   Result Value Ref Range    Plt Estimation Normal    MORPHOLOGY    Collection Time: 22  9:05 AM   Result Value Ref Range    RBC Morphology Present     Polychromia 1+    DIFFERENTIAL COMMENT    Collection Time: 22  9:05 AM   Result Value Ref Range    Comments-Diff see below    COD (ADULT)    Collection Time: 22  9:10 AM   Result Value Ref Range    ABO Grouping Only A     Rh Grouping Only POS     Antibody Screen-Cod NEG     Component R       R99                 Red Cells, LR       [id number]   issued       22   11:30      Product Type R99     Dispense Status issued     Unit Number (Barcoded) [id number]     Product Code (Barcoded) D5539K11     Blood Type (Barcoded) 0600    EKG (NOW)    Collection Time: 22  9:24 AM   Result Value Ref Range    Report       [facility]    Test Date:  2022  Pt Name:    VALENTINE ALANIZ                Department: ER  MRN:        [id number]                      Room:       [facility]  Gender:     Female                       Technician: [id number]  :        1955                   Requested By:ER TRIAGE PROTOCOL  Order #:    [id number]                    Reading MD: BROOKE BROCK MD    Measurements  Intervals                                Axis  Rate:       117                          P:          65  WV:         153                          QRS:        65  QRSD:        87                           T:          58  QT:         308  QTc:        430    Interpretive Statements  Sinus tachycardia rate 117  Low voltage, extremity and precordial leads  Axis normal  Intervals normal  Baseline wander in lead(s) V2  Compared to ECG 07/31/2022 12:07:14  nonspecific changes  My impression of this EKG, it does not indicate ischemia at this time, sinus  tachycardia  Electronically Ary d On 8- 9:29:27 PDT by BROOKE BROCK MD           Radiology Review:  DX-CHEST-PORTABLE (1 VIEW)   Final Result      No acute cardiopulmonary abnormality.      US-EXTREMITY VENOUS LOWER BILAT    (Results Pending)         MDM (Data Review):   -Records reviewed and summarized in current documentation  -I personally reviewed and interpreted the laboratory results  -I personally reviewed the radiology images        Medical Decision Making, by Problem:  Active Hospital Problems    Diagnosis     GIB (gastrointestinal bleeding) [K92.2]     Crystal-Payan tear [K22.6]     Acute GI bleeding [K92.2]     Elevated INR [R79.1]     Frequent hospital admissions [Z78.9]     Emphysema lung (HCC) [J43.9]     Alcohol abuse [F10.10]            Assessment/Recommendations:  Assessment:  -Hematemesis  -Acute on chromic anemia-history of recurrent GI bleeds  -Elevated INR  -HX of DVT/PE  -Chronic anticoagulation with Xarelto, last dose yesterday  -Continued alcohol use/abuse    Plan:  -Clear liquid diet, NPO after midnight  -Protonix IV BID or Drip  -Esophagogastroduodenoscopy with hemostasis, biopsies of other intervention with Dr. Kranthi Fernandez at 0730 tomorrow 8/20/2022.    Risks, benefits, and alternatives of aforementioned procedures were discussed with patient .  Consenting patient was given opportunities to ask questions and discuss other options.  Risks including but not limited to perforation, infection, bleeding, missed lesion(s), possible need for surgery(ies) and/or interventional radiology, possible need  for repeat procedure(s) and/or additional testing, hospitalization possibly prolonged, cardiac and/or pulmonary event, aspiration, hypoxia, stroke, medication and/or anesthesia reaction, indefinite diagnosis, discomfort, unsuccessful and/or incomplete procedure, ineffective therapy and/or persistent symptoms, damage to adjacent organs and/or vascular structures, and other adverse events possibly life-threatening.  Interactive discussion was undertaken with Layman's terms. I answered questions in full and to satisfaction.  Consenting person(s) stated understanding and acceptance of these risks, and wished to proceed.  Informed consent was given in clear state of mind.    -Kcentra scheduled to be given  -Recheck INR this evening. If still elevated recommend correction with FFP  -Hold anticoagulants, NSAIDs  -Check iron studies, B12, folate  -Serial hemoglobin and hematocrit.  She is receiving transfusions and we should have goal hemoglobin greater than 7    JERSON Grigsby      Thank you for inviting me to participate in the care of this patient. Please do not hesitate to call GI consultants with additional questions/concerns or changes in the patient's clinical status at [phone].      Core Quality Measures   Reviewed items:  Labs, Medications and Radiology reports reviewed

## 2022-08-19 NOTE — ED NOTES
Patient provided with ginger ale. Blood is running, patient VS remain unchanged. Call bell within reach, will continue to monitor

## 2022-08-19 NOTE — ED TRIAGE NOTES
Chief Complaint   Patient presents with    Weakness     Pt reports feeling weak and unable to walk today. Hypotensive to 89/55 upon arrival. Pt appears pale.     Blood in Vomit     Pt had episode of vomiting en route to the hospital, approximately 200 cc of dark red/brown vomit.      Pt BIBA from home for above complaint. Pt recently admitted at the end of July for GI bleed and esophageal tear. Pt reports history of alcoholism but quit drinking after last admission. Pt A+OX4. Hypotensive and tachycardic upon arrival.

## 2022-08-19 NOTE — RESPIRATORY CARE
COPD EDUCATION by COPD CLINICAL EDUCATOR  8/19/2022 at 2:42 PM by Karen Simon, RRT     Patient interviewed by COPD education team. Patient denied history of COPD. She denies taking respiratory medications. She continues to smoke and declined information.

## 2022-08-19 NOTE — H&P
Hospital Medicine History & Physical Note    Date of Service  8/19/2022    Primary Care Physician  Toña Tejada P.A.-C.    Consultants  GI    Specialist Names: DR Federica GARLAND approval with Dr. Dominguez    Code Status  Full Code    Chief Complaint  Chief Complaint   Patient presents with    Weakness     Pt reports feeling weak and unable to walk today. Hypotensive to 89/55 upon arrival. Pt appears pale.     Blood in Vomit     Pt had episode of vomiting en route to the hospital, approximately 200 cc of dark red/brown vomit.        History of Presenting Illness  Jaymie Peacock is a 66 y.o. female who presented 8/19/2022 with severe weakness since last night, the patient presents to the emergency room and has found to have a hemoglobin of 3.7, the patient has a history of pulmonary embolism, lower extremity DVT, chronic alcohol dependence, hypertension, tobacco dependence, who was recently admitted with an episode of hematemesis and melena where the patient had a hemoglobin down to 6, the patient underwent recent upper endoscopy and was found to have a Crystal-Payan tear with visible vessel treated with hemoclipping and epinephrine injection, esophageal erosions were seen also.  At that time anticoagulation was held for 5 days, the patient was treated with Carafate and PPI, the patient was discharged on 8/2, she is somewhat vague about her medication intake but states that she has a stomach pill that she takes, she was not able to fill Carafate apparently, she admits to ongoing alcohol use but per her report not much.  She states her stools have been brown, she has not vomited since her last admission, other than when she now presented to the emergency room and she reports that as brown vomit.  The patient is to be admitted to the stepdown unit in light of failure profound anemia, tachycardia, severe suspected hemorrhage.  Gastroenterology is consulted, the patient has been given 1 L of LR, 2 units of red  cells are ordered, the debate was to give Kcentra or not, but at this point the patient might become significantly unstable and I believe Kcentra is in order.  2 large-bore IVs at all times, the patient will be admitted to the stepdown unit      I discussed the plan of care with patient.  Gastroenterology, ER physician, critical care physician    Review of Systems  Review of Systems   Constitutional:  Positive for malaise/fatigue. Negative for chills and fever.   HENT: Negative.     Eyes: Negative.    Respiratory: Negative.  Negative for cough.    Cardiovascular: Negative.  Negative for chest pain and palpitations.   Gastrointestinal:  Positive for constipation and nausea. Negative for heartburn and vomiting.   Genitourinary: Negative.  Negative for dysuria and frequency.   Musculoskeletal: Negative.  Negative for back pain and neck pain.   Skin: Negative.  Negative for itching and rash.   Neurological:  Positive for dizziness. Negative for focal weakness, weakness and headaches.   Endo/Heme/Allergies: Negative.  Negative for polydipsia. Does not bruise/bleed easily.   Psychiatric/Behavioral:  Positive for substance abuse. Negative for depression. The patient is nervous/anxious.      Past Medical History   has a past medical history of DVT (deep venous thrombosis) (HCC), GI bleed, High cholesterol, Hypertension, Osteoporosis, Pulmonary embolism (HCC), and Smoking.  History of recent Crystal-Payan tear with visible vessel  Erosive esophagitis  Recurrent alcoholism  History of profound blood loss anemia  Medical noncompliance    Surgical History   has a past surgical history that includes us-needle core bx-breast panel (Right); pr upper gi endoscopy,diagnosis (04/21/2022); pr colonoscopy,diagnostic (04/21/2022); pr upper gi endoscopy,biopsy (04/21/2022); pr upper gi endoscopy,ctrl bleed (04/21/2022); pr upper gi endoscopy,ctrl bleed (N/A, 05/11/2022); gastroscopy w/push enterscopy (N/A, 05/11/2022); hip replacement,  partial (Right); pr upper gi endoscopy,diagnosis (N/A, 6/15/2022); pr upper gi endoscopy,diagnosis (N/A, 7/31/2022); pr upper gi endoscopy,scler inject (N/A, 7/31/2022); and pr upper gi endoscopy,ctrl bleed (N/A, 7/31/2022).     Family History    Family history reviewed with patient. There is no family history that is pertinent to the chief complaint.     Social History   reports that she has been smoking cigarettes. She has been smoking an average of .5 packs per day. She has never used smokeless tobacco. She reports current alcohol use of about 9.6 oz per week. She reports that she does not currently use drugs.    Allergies  No Known Allergies    Medications  Prior to Admission Medications   Prescriptions Last Dose Informant Patient Reported? Taking?   acetaminophen (TYLENOL) 500 MG Tab   Yes No   Sig: Take 1-2 Tablets by mouth every 6 hours as needed for Mild Pain or Moderate Pain.   mirtazapine (REMERON) 15 MG Tab  Patient No No   Sig: Take 1 Tablet by mouth every evening.   omeprazole (PRILOSEC) 20 MG delayed-release capsule   No No   Sig: Take 1 Capsule by mouth 2 times a day.   ondansetron (ZOFRAN ODT) 4 MG TABLET DISPERSIBLE   No No   Sig: Take 1 Tablet by mouth every 6 hours as needed for Nausea.   rivaroxaban (XARELTO) 20 MG Tab tablet   No No   Sig: Take 1 Tablet by mouth with dinner.   simvastatin (ZOCOR) 20 MG Tab  Patient Yes No   Sig: Take 20 mg by mouth every evening. Indications: Ischemic Heart Disease      Facility-Administered Medications: None       Physical Exam  Temp:  [36.1 °C (97 °F)] 36.1 °C (97 °F)  Pulse:  [104-133] 117  Resp:  [13-66] 66  BP: (82-94)/(47-56) 92/55  SpO2:  [81 %-99 %] 98 %  Blood Pressure : (!) 92/55   Temperature: 36.1 °C (97 °F)   Pulse: (!) 117   Respiration: (!) 66   Pulse Oximetry: 98 %       Physical Exam  Vitals and nursing note reviewed.   Constitutional:       Appearance: She is well-developed. She is not diaphoretic.      Comments: Chronically ill-appearing, very  pale, thin female  Looks older than stated age   HENT:      Head: Normocephalic and atraumatic.      Nose: Nose normal.      Mouth/Throat:      Mouth: Mucous membranes are moist.   Eyes:      Conjunctiva/sclera: Conjunctivae normal.      Pupils: Pupils are equal, round, and reactive to light.   Neck:      Thyroid: No thyromegaly.      Vascular: No JVD.   Cardiovascular:      Rate and Rhythm: Regular rhythm. Tachycardia present.      Heart sounds: Normal heart sounds.     No friction rub. No gallop.   Pulmonary:      Effort: Pulmonary effort is normal.      Breath sounds: Normal breath sounds. No wheezing or rales.   Abdominal:      General: Bowel sounds are normal. There is no distension.      Palpations: Abdomen is soft. There is no mass.      Tenderness: There is no abdominal tenderness. There is no guarding or rebound.      Hernia: No hernia is present.   Musculoskeletal:         General: No tenderness. Normal range of motion.      Cervical back: Normal range of motion and neck supple.   Lymphadenopathy:      Cervical: No cervical adenopathy.   Skin:     General: Skin is warm and dry.      Coloration: Skin is pale.   Neurological:      Mental Status: She is alert and oriented to person, place, and time.      Cranial Nerves: No cranial nerve deficit.   Psychiatric:         Behavior: Behavior normal.       Laboratory:  Recent Labs     08/19/22  0905   WBC 10.2   RBC 1.07*   HEMOGLOBIN 3.7*   HEMATOCRIT 12.1*   .1*   MCH 34.6*   MCHC 30.6*   RDW 74.3*   PLATELETCT 301   MPV 10.4     Recent Labs     08/19/22  0905   SODIUM 138   POTASSIUM 4.2   CHLORIDE 103   CO2 18*   GLUCOSE 169*   BUN 50*   CREATININE 0.94   CALCIUM 8.5     Recent Labs     08/19/22  0905   ALTSGPT 9   ASTSGOT 25   ALKPHOSPHAT 124*   TBILIRUBIN 0.5   LIPASE 58   GLUCOSE 169*     Recent Labs     08/19/22  0905   APTT 45.8*   INR 5.97*     No results for input(s): NTPROBNP in the last 72 hours.      No results for input(s): TROPONINT in the  last 72 hours.    Imaging:  DX-CHEST-PORTABLE (1 VIEW)   Final Result      No acute cardiopulmonary abnormality.          X-Ray:  I have personally reviewed the images and compared with prior images.    Assessment/Plan:  Justification for Admission Status  I anticipate this patient will require at least two midnights for appropriate medical management, necessitating inpatient admission because of acute severe blood loss anemia, intestinal hemorrhage with hemodynamic compromise, likely requiring intervention, several transfusions and hemodynamic stabilization in a critical care fashion, unable to be performed in any other setting.    Patient will need a Intermediate Care (Adult and Pediatrics) bed on TELEMETRY service .  The need is secondary to acute profound severe blood loss anemia with need for acute critical care stabilization.    * GIB (gastrointestinal bleeding)- (present on admission)  Assessment & Plan  Gastroenterology consulted, see above    Crystal-Payan tear- (present on admission)  Assessment & Plan  Recent diagnosis, reendoscopy no    Elevated INR- (present on admission)  Assessment & Plan  Secondary to Xarelto use, administer Kcentra    Acute GI bleeding- (present on admission)  Assessment & Plan  Appears to be recurrent, the patient with recent admission and have been identified to have upper GI bleeding  The patient presents again now with profound anemia, hemoglobin of 3.7, platelet count 301  Patient be [facility] to [facility]  2 large-bore IVs  Reverse anticoagulation with Kcentra  2 units of red cells preliminary  IV fluid resuscitation  Frequent lab draws, follow-up H&H, GI consultation  IV Protonix drip    Frequent hospital admissions- (present on admission)  Assessment & Plan  Recurrent admissions related to GI bleeding    Emphysema lung (HCC)- (present on admission)  Assessment & Plan  History of, monitor respiratory status    Alcohol abuse- (present on admission)  Assessment & Plan  Recurrent,  again instructed to absolutely stay away from alcohol in the future    Plan  Reverse anticoagulation with Kcentra  Close follow-up with hemoglobin after 2 units red cells  Will likely need additional transfusion  Follow coagulation  Gastroenterology consult  Upper endoscopy  Volume resuscitation  Close observation in critical care setting given the patient's profound anemia, and risk of worsening hypovolemic status and risk of developing shock  Patient is critically ill  Critical care time 35 minutes spent in direct contact with patient, in contact with consultants and staff    VTE prophylaxis: pharmacologic prophylaxis contraindicated due to active bleeding      Please note that this dictation was created using voice recognition software. I have made every reasonable attempt to correct obvious errors, but I expect that there are errors of grammar and possibly context that I did not discover before finalizing the note.

## 2022-08-19 NOTE — ASSESSMENT & PLAN NOTE
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

## 2022-08-19 NOTE — ED PROVIDER NOTES
ED Provider Note    CHIEF COMPLAINT  Chief Complaint   Patient presents with    Weakness     Pt reports feeling weak and unable to walk today. Hypotensive to 89/55 upon arrival. Pt appears pale.     Blood in Vomit     Pt had episode of vomiting en route to the hospital, approximately 200 cc of dark red/brown vomit.        HPI  Jaymie Peacock is a 66 y.o. female who presents with a history of recently diagnosed esophagitis and Crystal-Payan tear when she was admitted with GI bleed on July 30, 2022, the patient is currently on Xarelto for DVT.  The patient presents today eating that she has had profound generalized weakness to the point where she was unable to walk last night.  She denies any focal weakness.  She denies any abdominal pain or chest pain.  She vomited dark material in route to the hospital.  She denies any diarrhea, she says she has been severely constipated only been able to pass small hard balls of stool.  She only reports vomiting one time with dark material.    REVIEW OF SYSTEMS  See HPI for further details. All other systems are negative.     PAST MEDICAL HISTORY   has a past medical history of DVT (deep venous thrombosis) (HCC), GI bleed, High cholesterol, Hypertension, Osteoporosis, Pulmonary embolism (HCC), and Smoking.    SOCIAL HISTORY  Social History     Tobacco Use    Smoking status: Every Day     Packs/day: 0.50     Types: Cigarettes    Smokeless tobacco: Never   Vaping Use    Vaping Use: Never used   Substance and Sexual Activity    Alcohol use: Yes     Alcohol/week: 9.6 oz     Types: 16 Shots of liquor per week     Comment: Last drink 7/30/22 per patient    Drug use: Not Currently    Sexual activity: Not on file       SURGICAL HISTORY   has a past surgical history that includes us-needle core bx-breast panel (Right); upper gi endoscopy,diagnosis (04/21/2022); colonoscopy,diagnostic (04/21/2022); upper gi endoscopy,biopsy (04/21/2022); upper gi endoscopy,ctrl bleed (04/21/2022); upper  "gi endoscopy,ctrl bleed (N/A, 05/11/2022); gastroscopy w/push enterscopy (N/A, 05/11/2022); hip replacement, partial (Right); upper gi endoscopy,diagnosis (N/A, 6/15/2022); upper gi endoscopy,diagnosis (N/A, 7/31/2022); upper gi endoscopy,scler inject (N/A, 7/31/2022); and upper gi endoscopy,ctrl bleed (N/A, 7/31/2022).    CURRENT MEDICATIONS  Home Medications       Reviewed by Khoa Carrera R.N. (Registered Nurse) on 08/19/22 at 0911  Med List Status: Not Addressed     Medication Last Dose Status   acetaminophen (TYLENOL) 500 MG Tab  Active   mirtazapine (REMERON) 15 MG Tab  Active   omeprazole (PRILOSEC) 20 MG delayed-release capsule  Active   ondansetron (ZOFRAN ODT) 4 MG TABLET DISPERSIBLE  Active   rivaroxaban (XARELTO) 20 MG Tab tablet  Active   simvastatin (ZOCOR) 20 MG Tab  Active                    ALLERGIES  No Known Allergies    FAMILY HISTORY  No pertinent family history    PHYSICAL EXAM  VITAL SIGNS: BP (!) 92/55   Pulse (!) 117   Temp 36.1 °C (97 °F) (Temporal)   Resp (!) 66   Ht 1.676 m (5' 6\")   Wt 45.8 kg (101 lb)   SpO2 98%   BMI 16.30 kg/m²  @AMY[[id number]::@   Pulse ox interpretation: I interpret this pulse ox as normal.  Constitutional: Alert.  Appears pale and dehydrated.  HENT: No signs of trauma, Bilateral external ears normal, Nose normal.   Eyes: Pupils are equal and reactive, Conjunctiva normal, Non-icteric.   Neck: Normal range of motion, No tenderness, Supple, No stridor.   Lymphatic: No lymphadenopathy noted.   Cardiovascular: Regular rate and rhythm, no murmurs.   Thorax & Lungs: Normal breath sounds, No respiratory distress, No wheezing, No chest tenderness.   Abdomen: Bowel sounds normal, Soft, No tenderness, No masses, No pulsatile masses. No peritoneal signs.  Skin: Warm, Dry, No erythema, No rash.   Back: No bony tenderness, No CVA tenderness.   Extremities: Intact distal pulses, No edema, No tenderness, No cyanosis.  Musculoskeletal: Good range of motion in all major " joints. No tenderness to palpation or major deformities noted.   Neurologic: Alert , Normal motor function, Normal sensory function, No focal deficits noted.   Psychiatric: Affect normal, Judgment normal, Mood normal.       DIAGNOSTIC STUDIES / PROCEDURES    EKG  Sinus tachycardia rate 117  Low voltage, extremity and precordial leads  Axis normal  Intervals normal  Baseline wander in lead(s) V2  Compared to ECG 07/31/2022 12:07:14  nonspecific changes  My impression of this EKG, it does not indicate ischemia at this time, sinus tachycardia      LABS  Labs Reviewed   CBC WITH DIFFERENTIAL - Abnormal; Notable for the following components:       Result Value    RBC 1.07 (*)     Hemoglobin 3.7 (*)     Hematocrit 12.1 (*)     .1 (*)     MCH 34.6 (*)     MCHC 30.6 (*)     RDW 74.3 (*)     Neutrophils-Polys 75.80 (*)     Lymphocytes 14.70 (*)     Immature Granulocytes 1.30 (*)     Neutrophils (Absolute) 7.73 (*)     Immature Granulocytes (abs) 0.13 (*)     Anisocytosis 2+ (*)     Macrocytosis 2+ (*)     All other components within normal limits    Narrative:     Indicate which anticoagulants the patient is on:->UNKNOWN   COMP METABOLIC PANEL - Abnormal; Notable for the following components:    Co2 18 (*)     Anion Gap 17.0 (*)     Glucose 169 (*)     Bun 50 (*)     Alkaline Phosphatase 124 (*)     Total Protein 5.5 (*)     All other components within normal limits    Narrative:     Indicate which anticoagulants the patient is on:->UNKNOWN   PROTHROMBIN TIME - Abnormal; Notable for the following components:    PT 50.7 (*)     INR 5.97 (*)     All other components within normal limits    Narrative:     Indicate which anticoagulants the patient is on:->UNKNOWN   APTT - Abnormal; Notable for the following components:    APTT 45.8 (*)     All other components within normal limits    Narrative:     Indicate which anticoagulants the patient is on:->UNKNOWN   LIPASE    Narrative:     Indicate which anticoagulants the  patient is on:->UNKNOWN   COD (ADULT)   ESTIMATED GFR    Narrative:     Indicate which anticoagulants the patient is on:->UNKNOWN   PERIPHERAL SMEAR REVIEW    Narrative:     Indicate which anticoagulants the patient is on:->UNKNOWN   PLATELET ESTIMATE    Narrative:     Indicate which anticoagulants the patient is on:->UNKNOWN   MORPHOLOGY    Narrative:     Indicate which anticoagulants the patient is on:->UNKNOWN   DIFFERENTIAL COMMENT    Narrative:     Indicate which anticoagulants the patient is on:->UNKNOWN   PLATELET MAPPING WITH BASIC TEG    Narrative:     Do you want to extend TEG graph to LY30? (If no, graph will  terminate at MA)->Yes   OCCULT BLOOD STOOL   VITAMIN B12   MISCELLANEOUS TEST   TRANSFUSE RBC ACTIVE BLEED-NURSING COMMUNICATION         RADIOLOGY  DX-CHEST-PORTABLE (1 VIEW)   Final Result      No acute cardiopulmonary abnormality.              COURSE & MEDICAL DECISION MAKING  Pertinent Labs & Imaging studies reviewed. (See chart for details)    The patient presents with 1 episode of vomiting dark material, history of previous GI bleed.  On Xarelto for DVT.    IV x2 were established, the patient was placed on monitor, she is hypotensive.  1 L LR IV was ordered, she is n.p.o. and hypotensive and appears dehydrated.  Her H&H is markedly low, hemoglobin 3.7.  I consented the patient for blood transfusion and 2 units packed red blood cells were ordered.    I spoke with Dr. Breaux, he would like iron, folate, B12 studies to be drawn before blood transfusion to determine the chronicity of the anemia.  We agreed to give Protonix IV.    I spoke with Dr. Bisi Merritt who will speak with Dr. Dominguez, they will assess the patient for hospitalization in the ICU stepdown.  The patient is in critical condition.      The total critical care time on this patient is 40 minutes, resuscitating patient, speaking with admitting physician, and deciphering test results. This 40 minutes is exclusive of  separately billable procedures.      FINAL IMPRESSION  1. Anemia, unspecified type        2. Hypotension, unspecified hypotension type        3. Tachycardia        4. Chronic anticoagulation        5. Elevated INR            Total critical care time 40 minutes as outlined above      Electronically signed by: Hesham Martinez M.D., 8/19/2022 9:29 AM

## 2022-08-19 NOTE — ED NOTES
Bedside report given to ELROY Angulo from [facility]. Patient being transported up to floor on monitor. Chart and belongings with patient

## 2022-08-19 NOTE — PROGRESS NOTES
Pulmonary and Critical Care Medicine Progress Note    I have the pleasure of evaluating this lady for admission disposition.  She presented with hematemesis and fatigue.  She has acute blood loss anemia.  She is anticoagulant with rivaroxaban.  She has received prothrombin complex concentrate and is being transfused PRBCs.  Gastroenterology is on board.  She may be safely admitted to the [facility].  Please do not hesitate to contact [facility] if she develops any deterioration.    Vincent Pepper MD  Pulmonary and Critical Care Medicine

## 2022-08-19 NOTE — PROGRESS NOTES
4 Eyes Skin Assessment Completed by Kev RN and ELROY Haley.    Head WDL  Ears WDL  Nose WDL  Mouth WDL  Neck WDL  Breast/Chest WDL  Shoulder Blades WDL  Spine WDL  (R) Arm/Elbow/Hand WDL  (L) Arm/Elbow/Hand WDL  Abdomen WDL  Groin WDL  Scrotum/Coccyx/Buttocks WDL  (R) Leg WDL  (L) Leg WDL  (R) Heel/Foot/Toe WDL  (L) Heel/Foot/Toe WDL          Devices In Places Tele Box, Blood Pressure Cuff, and Pulse Ox      Interventions In Place Pillows    Possible Skin Injury No    Pictures Uploaded Into Epic N/A  Wound Consult Placed N/A  RN Wound Prevention Protocol Ordered No

## 2022-08-20 ENCOUNTER — ANESTHESIA EVENT (OUTPATIENT)
Dept: SURGERY | Facility: MEDICAL CENTER | Age: 67
DRG: 378 | End: 2022-08-20
Payer: MEDICARE

## 2022-08-20 ENCOUNTER — ANESTHESIA (OUTPATIENT)
Dept: SURGERY | Facility: MEDICAL CENTER | Age: 67
DRG: 378 | End: 2022-08-20
Payer: MEDICARE

## 2022-08-20 LAB
ALBUMIN SERPL BCP-MCNC: 2.9 G/DL (ref 3.2–4.9)
ALBUMIN/GLOB SERPL: 1.7 G/DL
ALP SERPL-CCNC: 106 U/L (ref 30–99)
ALT SERPL-CCNC: 10 U/L (ref 2–50)
ANION GAP SERPL CALC-SCNC: 10 MMOL/L (ref 7–16)
AST SERPL-CCNC: 30 U/L (ref 12–45)
BASOPHILS # BLD AUTO: 0.5 % (ref 0–1.8)
BASOPHILS # BLD: 0.03 K/UL (ref 0–0.12)
BILIRUB SERPL-MCNC: 1.2 MG/DL (ref 0.1–1.5)
BUN SERPL-MCNC: 31 MG/DL (ref 8–22)
CALCIUM SERPL-MCNC: 7.5 MG/DL (ref 8.5–10.5)
CHLORIDE SERPL-SCNC: 108 MMOL/L (ref 96–112)
CO2 SERPL-SCNC: 22 MMOL/L (ref 20–33)
CREAT SERPL-MCNC: 0.75 MG/DL (ref 0.5–1.4)
EOSINOPHIL # BLD AUTO: 0.03 K/UL (ref 0–0.51)
EOSINOPHIL NFR BLD: 0.5 % (ref 0–6.9)
ERYTHROCYTE [DISTWIDTH] IN BLOOD BY AUTOMATED COUNT: 52.7 FL (ref 35.9–50)
GFR SERPLBLD CREATININE-BSD FMLA CKD-EPI: 87 ML/MIN/1.73 M 2
GLOBULIN SER CALC-MCNC: 1.7 G/DL (ref 1.9–3.5)
GLUCOSE SERPL-MCNC: 88 MG/DL (ref 65–99)
HCT VFR BLD AUTO: 25.2 % (ref 37–47)
HGB BLD-MCNC: 8.3 G/DL (ref 12–16)
HGB BLD-MCNC: 8.8 G/DL (ref 12–16)
HGB BLD-MCNC: 9.3 G/DL (ref 12–16)
HGB BLD-MCNC: 9.5 G/DL (ref 12–16)
IMM GRANULOCYTES # BLD AUTO: 0.04 K/UL (ref 0–0.11)
IMM GRANULOCYTES NFR BLD AUTO: 0.7 % (ref 0–0.9)
INR PPP: 2.03 (ref 0.87–1.13)
LYMPHOCYTES # BLD AUTO: 0.82 K/UL (ref 1–4.8)
LYMPHOCYTES NFR BLD: 15 % (ref 22–41)
MCH RBC QN AUTO: 30.2 PG (ref 27–33)
MCHC RBC AUTO-ENTMCNC: 34.9 G/DL (ref 33.6–35)
MCV RBC AUTO: 86.6 FL (ref 81.4–97.8)
MONOCYTES # BLD AUTO: 0.36 K/UL (ref 0–0.85)
MONOCYTES NFR BLD AUTO: 6.6 % (ref 0–13.4)
NEUTROPHILS # BLD AUTO: 4.18 K/UL (ref 2–7.15)
NEUTROPHILS NFR BLD: 76.7 % (ref 44–72)
NRBC # BLD AUTO: 0.17 K/UL
NRBC BLD-RTO: 3.1 /100 WBC
PLATELET # BLD AUTO: 102 K/UL (ref 164–446)
PMV BLD AUTO: 9.7 FL (ref 9–12.9)
POTASSIUM SERPL-SCNC: 3.7 MMOL/L (ref 3.6–5.5)
PROT SERPL-MCNC: 4.6 G/DL (ref 6–8.2)
PROTHROMBIN TIME: 22.4 SEC (ref 12–14.6)
RBC # BLD AUTO: 2.91 M/UL (ref 4.2–5.4)
SODIUM SERPL-SCNC: 140 MMOL/L (ref 135–145)
WBC # BLD AUTO: 5.5 K/UL (ref 4.8–10.8)

## 2022-08-20 PROCEDURE — 85610 PROTHROMBIN TIME: CPT

## 2022-08-20 PROCEDURE — 85018 HEMOGLOBIN: CPT

## 2022-08-20 PROCEDURE — 700111 HCHG RX REV CODE 636 W/ 250 OVERRIDE (IP): Performed by: ANESTHESIOLOGY

## 2022-08-20 PROCEDURE — 700102 HCHG RX REV CODE 250 W/ 637 OVERRIDE(OP): Performed by: STUDENT IN AN ORGANIZED HEALTH CARE EDUCATION/TRAINING PROGRAM

## 2022-08-20 PROCEDURE — 700102 HCHG RX REV CODE 250 W/ 637 OVERRIDE(OP): Performed by: HOSPITALIST

## 2022-08-20 PROCEDURE — 99233 SBSQ HOSP IP/OBS HIGH 50: CPT | Performed by: STUDENT IN AN ORGANIZED HEALTH CARE EDUCATION/TRAINING PROGRAM

## 2022-08-20 PROCEDURE — 160035 HCHG PACU - 1ST 60 MINS PHASE I: Performed by: INTERNAL MEDICINE

## 2022-08-20 PROCEDURE — 36415 COLL VENOUS BLD VENIPUNCTURE: CPT

## 2022-08-20 PROCEDURE — 160048 HCHG OR STATISTICAL LEVEL 1-5: Performed by: INTERNAL MEDICINE

## 2022-08-20 PROCEDURE — 80053 COMPREHEN METABOLIC PANEL: CPT

## 2022-08-20 PROCEDURE — 0W3P8ZZ CONTROL BLEEDING IN GASTROINTESTINAL TRACT, VIA NATURAL OR ARTIFICIAL OPENING ENDOSCOPIC: ICD-10-PCS | Performed by: INTERNAL MEDICINE

## 2022-08-20 PROCEDURE — C9113 INJ PANTOPRAZOLE SODIUM, VIA: HCPCS | Performed by: STUDENT IN AN ORGANIZED HEALTH CARE EDUCATION/TRAINING PROGRAM

## 2022-08-20 PROCEDURE — A9270 NON-COVERED ITEM OR SERVICE: HCPCS | Performed by: STUDENT IN AN ORGANIZED HEALTH CARE EDUCATION/TRAINING PROGRAM

## 2022-08-20 PROCEDURE — 160002 HCHG RECOVERY MINUTES (STAT): Performed by: INTERNAL MEDICINE

## 2022-08-20 PROCEDURE — 700105 HCHG RX REV CODE 258: Performed by: ANESTHESIOLOGY

## 2022-08-20 PROCEDURE — 700105 HCHG RX REV CODE 258: Performed by: HOSPITALIST

## 2022-08-20 PROCEDURE — 160203 HCHG ENDO MINUTES - 1ST 30 MINS LEVEL 4: Performed by: INTERNAL MEDICINE

## 2022-08-20 PROCEDURE — 700111 HCHG RX REV CODE 636 W/ 250 OVERRIDE (IP): Performed by: HOSPITALIST

## 2022-08-20 PROCEDURE — 770020 HCHG ROOM/CARE - TELE (206)

## 2022-08-20 PROCEDURE — 160009 HCHG ANES TIME/MIN: Performed by: INTERNAL MEDICINE

## 2022-08-20 PROCEDURE — A9270 NON-COVERED ITEM OR SERVICE: HCPCS | Performed by: HOSPITALIST

## 2022-08-20 PROCEDURE — 00731 ANES UPR GI NDSC PX NOS: CPT | Performed by: ANESTHESIOLOGY

## 2022-08-20 PROCEDURE — 85025 COMPLETE CBC W/AUTO DIFF WBC: CPT

## 2022-08-20 PROCEDURE — 700111 HCHG RX REV CODE 636 W/ 250 OVERRIDE (IP): Performed by: STUDENT IN AN ORGANIZED HEALTH CARE EDUCATION/TRAINING PROGRAM

## 2022-08-20 RX ORDER — OXYCODONE HCL 5 MG/5 ML
5 SOLUTION, ORAL ORAL
Status: DISCONTINUED | OUTPATIENT
Start: 2022-08-20 | End: 2022-08-20 | Stop reason: HOSPADM

## 2022-08-20 RX ORDER — HYDROMORPHONE HYDROCHLORIDE 1 MG/ML
0.4 INJECTION, SOLUTION INTRAMUSCULAR; INTRAVENOUS; SUBCUTANEOUS
Status: DISCONTINUED | OUTPATIENT
Start: 2022-08-20 | End: 2022-08-20 | Stop reason: HOSPADM

## 2022-08-20 RX ORDER — SODIUM CHLORIDE, SODIUM LACTATE, POTASSIUM CHLORIDE, CALCIUM CHLORIDE 600; 310; 30; 20 MG/100ML; MG/100ML; MG/100ML; MG/100ML
INJECTION, SOLUTION INTRAVENOUS CONTINUOUS
Status: DISCONTINUED | OUTPATIENT
Start: 2022-08-20 | End: 2022-08-20 | Stop reason: HOSPADM

## 2022-08-20 RX ORDER — HALOPERIDOL 5 MG/ML
1 INJECTION INTRAMUSCULAR
Status: DISCONTINUED | OUTPATIENT
Start: 2022-08-20 | End: 2022-08-20 | Stop reason: HOSPADM

## 2022-08-20 RX ORDER — PANTOPRAZOLE SODIUM 40 MG/10ML
40 INJECTION, POWDER, LYOPHILIZED, FOR SOLUTION INTRAVENOUS 2 TIMES DAILY
Status: DISCONTINUED | OUTPATIENT
Start: 2022-08-20 | End: 2022-08-22 | Stop reason: HOSPADM

## 2022-08-20 RX ORDER — ONDANSETRON 2 MG/ML
4 INJECTION INTRAMUSCULAR; INTRAVENOUS
Status: DISCONTINUED | OUTPATIENT
Start: 2022-08-20 | End: 2022-08-20 | Stop reason: HOSPADM

## 2022-08-20 RX ORDER — HYDROMORPHONE HYDROCHLORIDE 1 MG/ML
0.2 INJECTION, SOLUTION INTRAMUSCULAR; INTRAVENOUS; SUBCUTANEOUS
Status: DISCONTINUED | OUTPATIENT
Start: 2022-08-20 | End: 2022-08-20 | Stop reason: HOSPADM

## 2022-08-20 RX ORDER — MIDAZOLAM HYDROCHLORIDE 1 MG/ML
INJECTION INTRAMUSCULAR; INTRAVENOUS PRN
Status: DISCONTINUED | OUTPATIENT
Start: 2022-08-20 | End: 2022-08-20 | Stop reason: HOSPADM

## 2022-08-20 RX ORDER — HYDROMORPHONE HYDROCHLORIDE 1 MG/ML
0.1 INJECTION, SOLUTION INTRAMUSCULAR; INTRAVENOUS; SUBCUTANEOUS
Status: DISCONTINUED | OUTPATIENT
Start: 2022-08-20 | End: 2022-08-20 | Stop reason: HOSPADM

## 2022-08-20 RX ORDER — MEPERIDINE HYDROCHLORIDE 25 MG/ML
12.5 INJECTION INTRAMUSCULAR; INTRAVENOUS; SUBCUTANEOUS
Status: DISCONTINUED | OUTPATIENT
Start: 2022-08-20 | End: 2022-08-20 | Stop reason: HOSPADM

## 2022-08-20 RX ORDER — OXYCODONE HCL 5 MG/5 ML
10 SOLUTION, ORAL ORAL
Status: DISCONTINUED | OUTPATIENT
Start: 2022-08-20 | End: 2022-08-20 | Stop reason: HOSPADM

## 2022-08-20 RX ORDER — SUCRALFATE ORAL 1 G/10ML
1 SUSPENSION ORAL
Status: DISCONTINUED | OUTPATIENT
Start: 2022-08-20 | End: 2022-08-22 | Stop reason: HOSPADM

## 2022-08-20 RX ORDER — DIPHENHYDRAMINE HYDROCHLORIDE 50 MG/ML
12.5 INJECTION INTRAMUSCULAR; INTRAVENOUS
Status: DISCONTINUED | OUTPATIENT
Start: 2022-08-20 | End: 2022-08-20 | Stop reason: HOSPADM

## 2022-08-20 RX ORDER — SODIUM CHLORIDE, SODIUM LACTATE, POTASSIUM CHLORIDE, CALCIUM CHLORIDE 600; 310; 30; 20 MG/100ML; MG/100ML; MG/100ML; MG/100ML
INJECTION, SOLUTION INTRAVENOUS
Status: DISCONTINUED | OUTPATIENT
Start: 2022-08-20 | End: 2022-08-20 | Stop reason: SURG

## 2022-08-20 RX ADMIN — SODIUM CHLORIDE, POTASSIUM CHLORIDE, SODIUM LACTATE AND CALCIUM CHLORIDE: 600; 310; 30; 20 INJECTION, SOLUTION INTRAVENOUS at 12:06

## 2022-08-20 RX ADMIN — PANTOPRAZOLE SODIUM 40 MG: 40 INJECTION, POWDER, FOR SOLUTION INTRAVENOUS at 11:12

## 2022-08-20 RX ADMIN — SODIUM CHLORIDE, POTASSIUM CHLORIDE, SODIUM LACTATE AND CALCIUM CHLORIDE: 600; 310; 30; 20 INJECTION, SOLUTION INTRAVENOUS at 07:39

## 2022-08-20 RX ADMIN — SUCRALFATE 1 G: 1 SUSPENSION ORAL at 20:47

## 2022-08-20 RX ADMIN — MIDAZOLAM HYDROCHLORIDE 2 MG: 1 INJECTION, SOLUTION INTRAMUSCULAR; INTRAVENOUS at 07:47

## 2022-08-20 RX ADMIN — MIRTAZAPINE 15 MG: 15 TABLET, FILM COATED ORAL at 20:48

## 2022-08-20 RX ADMIN — PROPOFOL 50 MG: 10 INJECTION, EMULSION INTRAVENOUS at 07:47

## 2022-08-20 RX ADMIN — CEFTRIAXONE SODIUM 1 G: 10 INJECTION, POWDER, FOR SOLUTION INTRAVENOUS at 04:32

## 2022-08-20 RX ADMIN — SUCRALFATE 1 G: 1 SUSPENSION ORAL at 11:12

## 2022-08-20 RX ADMIN — SUCRALFATE 1 G: 1 SUSPENSION ORAL at 18:01

## 2022-08-20 RX ADMIN — HYDROMORPHONE HYDROCHLORIDE 0.25 MG: 1 INJECTION, SOLUTION INTRAMUSCULAR; INTRAVENOUS; SUBCUTANEOUS at 03:51

## 2022-08-20 RX ADMIN — PANTOPRAZOLE SODIUM 40 MG: 40 INJECTION, POWDER, FOR SOLUTION INTRAVENOUS at 18:01

## 2022-08-20 RX ADMIN — FENTANYL CITRATE 50 MCG: 50 INJECTION, SOLUTION INTRAMUSCULAR; INTRAVENOUS at 07:47

## 2022-08-20 ASSESSMENT — LIFESTYLE VARIABLES: SUBSTANCE_ABUSE: 0

## 2022-08-20 ASSESSMENT — ENCOUNTER SYMPTOMS
SENSORY CHANGE: 0
VOMITING: 0
ABDOMINAL PAIN: 0
EYE PAIN: 0
WEAKNESS: 1
BLURRED VISION: 0
SHORTNESS OF BREATH: 0
PALPITATIONS: 0
HEADACHES: 0
DIZZINESS: 0
INSOMNIA: 0
FALLS: 0
FOCAL WEAKNESS: 0
CHILLS: 0
COUGH: 0
BACK PAIN: 0
NAUSEA: 0
FEVER: 0

## 2022-08-20 ASSESSMENT — FIBROSIS 4 INDEX: FIB4 SCORE: 6.23

## 2022-08-20 ASSESSMENT — PAIN DESCRIPTION - PAIN TYPE
TYPE: SURGICAL PAIN
TYPE: ACUTE PAIN
TYPE: ACUTE PAIN
TYPE: SURGICAL PAIN
TYPE: SURGICAL PAIN

## 2022-08-20 NOTE — PROGRESS NOTES
[facility] Medicine Daily Progress Note    Date of Service  8/20/2022    Chief Complaint  Jaymie Peacock is a 66 y.o. female admitted 8/19/2022 with generalized weakness.    Hospital Course  Jaymie Peacock is a 66 y.o. female who presented 8/19/2022 with severe weakness since last night, the patient presents to the emergency room and has found to have a hemoglobin of 3.7, the patient has a history of pulmonary embolism, lower extremity DVT, chronic alcohol dependence, hypertension, tobacco dependence, who was recently admitted with an episode of hematemesis and melena where the patient had a hemoglobin down to 6, the patient underwent recent upper endoscopy and was found to have a Crystal-Payan tear with visible vessel treated with hemoclipping and epinephrine injection, esophageal erosions were seen also.  At that time anticoagulation was held for 5 days, the patient was treated with Carafate and PPI, the patient was discharged on 8/2, she is somewhat vague about her medication intake but states that she has a stomach pill that she takes, she was not able to fill Carafate apparently, she admits to ongoing alcohol use but per her report not much.  She states her stools have been brown, she has not vomited since her last admission, other than when she now presented to the emergency room and she reports that as brown vomit.  The patient is to be admitted to the stepdown unit in light of failure profound anemia, tachycardia, severe suspected hemorrhage.  Gastroenterology is consulted, the patient has been given 1 L of LR, 2 units of red cells are ordered, the debate was to give Kcentra or not, but at this point the patient might become significantly unstable and I believe Kcentra is in order.  2 large-bore IVs at all times, the patient will be admitted to the stepdown unit    Interval Problem Update  Patient admitted yesterday for acute blood loss anemia.  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  Hgb this morning 8.8. Monitor q8h.  Patient feels well.  Continue PPI, transition to BID dosing. IV for today, PO tomorrow.  Sucralfate ordered.  US LE ordered to assess for chronic DVT. She has been on xarelto at home on and off for DVT/PE in April.  If DVT clot no longer seen, could consider stopping anticoagulation altogether. If clot is present, would consider IVC filter. Will hold anticoagulation at this time.  Patient has GI follow up next week for capsule endoscopy.  Okay for telemetry floor.    I have discussed this patient's plan of care and discharge plan at IDT rounds today with Case Management, Nursing, Nursing leadership, and other members of the IDT team.    Consultants/Specialty  GI    Code Status  Full Code    Disposition  Patient is not medically cleared for discharge.   Anticipate discharge to to home with close outpatient follow-up.  I have placed the appropriate orders for post-discharge needs.    Review of Systems  Review of Systems   Constitutional:  Positive for malaise/fatigue. Negative for chills and fever.   Eyes:  Negative for blurred vision and pain.   Respiratory:  Negative for cough and shortness of breath.    Cardiovascular:  Negative for chest pain, palpitations and leg swelling.   Gastrointestinal:  Positive for melena. Negative for abdominal pain, nausea and vomiting.   Genitourinary:  Negative for dysuria and urgency.   Musculoskeletal:  Negative for back pain and falls.   Skin:  Negative for itching and rash.   Neurological:  Positive for weakness. Negative for dizziness, sensory change, focal weakness and headaches.   Psychiatric/Behavioral:  Negative for substance abuse. The patient does not have insomnia.       Physical Exam  Temp:  [36.2 °C (97.1 °F)-37.4 °C (99.3 °F)] 36.8 °C (98.2 °F)  Pulse:  [] 79  Resp:  [12-34] 13  BP: ()/(48-72) 93/60  SpO2:  [93 %-100 %] 99 %    Physical Exam  Vitals reviewed.   Constitutional:        General: She is not in acute distress.     Appearance: She is not diaphoretic.   HENT:      Head: Normocephalic and atraumatic.      Right Ear: External ear normal.      Left Ear: External ear normal.      Nose: Nose normal. No congestion.      Mouth/Throat:      Pharynx: No oropharyngeal exudate or posterior oropharyngeal erythema.   Eyes:      Extraocular Movements: Extraocular movements intact.      Pupils: Pupils are equal, round, and reactive to light.   Cardiovascular:      Rate and Rhythm: Normal rate and regular rhythm.   Pulmonary:      Effort: Pulmonary effort is normal. No respiratory distress.      Breath sounds: Normal breath sounds. No wheezing or rales.   Abdominal:      General: Bowel sounds are normal. There is no distension.      Palpations: Abdomen is soft.      Tenderness: There is no abdominal tenderness. There is no guarding or rebound.   Musculoskeletal:         General: No swelling. Normal range of motion.      Cervical back: Normal range of motion and neck supple.      Right lower leg: No edema.      Left lower leg: No edema.   Skin:     General: Skin is warm and dry.   Neurological:      General: No focal deficit present.      Mental Status: She is alert and oriented to person, place, and time.      Sensory: No sensory deficit.      Motor: No weakness.   Psychiatric:         Mood and Affect: Mood normal.         Behavior: Behavior normal.       Fluids    Intake/Output Summary (Last 24 hours) at 8/20/2022 1228  Last data filed at 8/20/2022 0804  Gross per 24 hour   Intake 1113.75 ml   Output 1 ml   Net 1112.75 ml       Laboratory  Recent Labs     08/19/22  0905 08/19/22  1802 08/20/22  0025 08/20/22  0345   WBC 10.2  --   --  5.5   RBC 1.07*  --   --  2.91*   HEMOGLOBIN 3.7* 8.7* 8.3* 8.8*   HEMATOCRIT 12.1*  --   --  25.2*   .1*  --   --  86.6   MCH 34.6*  --   --  30.2   MCHC 30.6*  --   --  34.9   RDW 74.3*  --   --  52.7*   PLATELETCT 301  --   --  102*   MPV 10.4  --   --  9.7      Recent Labs     08/19/22  0905 08/20/22  0345   SODIUM 138 140   POTASSIUM 4.2 3.7   CHLORIDE 103 108   CO2 18* 22   GLUCOSE 169* 88   BUN 50* 31*   CREATININE 0.94 0.75   CALCIUM 8.5 7.5*     Recent Labs     08/19/22  0905 08/19/22  1802 08/20/22  0440   APTT 45.8*  --   --    INR 5.97* 3.59* 2.03*               Imaging  DX-CHEST-PORTABLE (1 VIEW)   Final Result      No acute cardiopulmonary abnormality.      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, no MW tear    Elevated INR- (present on admission)  Assessment & Plan  Secondary to Xarelto use, administer Kcentra    Acute GI bleeding- (present on admission)  Assessment & Plan  Appears to be recurrent, the patient with recent admission and have been identified to have upper GI bleeding  The patient presents again now with profound anemia, hemoglobin of 3.7, platelet count 301  Patient [facility]  2 large-bore IVs  Reverse anticoagulation with Kcentra  S/p 2 U RBC's tranfused  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, s/p APC ablation  Monitor H/H q8h, transfuse for Hgb<7  Continue PPI BID  Follow up GI outpatient for capsule endoscopy  Hold anticoagulation for hx DVT/PE 4/2022    Frequent hospital admissions- (present on admission)  Assessment & Plan  Recurrent admissions related to GI bleeding    Emphysema lung (HCC)- (present on admission)  Assessment & Plan  History of, monitor respiratory status    Alcohol abuse- (present on admission)  Assessment & Plan  Recurrent, again instructed to absolutely stay away from alcohol in the future       VTE prophylaxis: SCDs/TEDs    I have performed a physical exam and reviewed and updated ROS and Plan today (8/20/2022). In review of yesterday's note (8/19/2022),  there are no changes except as documented above.

## 2022-08-20 NOTE — PROGRESS NOTES
GI ATTENDING:    Patient seen and examined.  Chart reviewed.  EGD explained at length.    Patient requests to proceed with EGD    Consenting person was given an opportunity to ask questions and discuss other options.  Risks including but not limited to perforation, infection, bleeding, missed lesion(s), cardiac and/or pulmonary event, aspiration, stroke, possible need for surgery and/or interventional radiology, hospitalization possibly prolonged, discomfort, unsuccessful and/or incomplete procedure, indefinite diagnosis, ineffective therapy and/or persistent symptoms, possible need for repeat procedures and/or additional testings, damage to adjacent organs and/or vascular structures, medication reaction, disability, death, and other adverse events possibly life-threatening.  Discussion was undertaken with Layman's terms.  Consenting person stated understanding and acceptance of these risks, and wished to proceed.  Informed consent was given in clear state of mind.

## 2022-08-20 NOTE — OR SURGEON
EGD Operative Note    PreOp Diagnosis: Hematemesis      PostOp Diagnosis: Same      Procedure(s):  GASTROSCOPY - Wound Class: Clean Contaminated  GASTROSCOPY, WITH ARGON PLASMA COAGULATION - Wound Class: Clean Contaminated    Surgeon(s):  Kranthi Fernandez M.D.    Anesthesiologist/Type of Anesthesia:  Anesthesiologist: Tobey Gansert, M.D./MONTEZ    Surgical Staff:  Endoscopy Technician: Soni Curiel  Endoscopy Nurse: Roseanna Watters R.N.    Specimens removed if any:  * No specimens in log *    Dr. Fernandez  [facility]  NISHI Low  [phone]    EGD with:  Argon plasma coagulation (APC) ablation of AVM    Indication:  Hematemesis    Sedation:  MAC Dr. Gansert    Findings:    Esophagus     Small salmon-colored patches in distal esophagus otherwise unremarkable mucosa    Stomach     Hiatal hernia 38 to 40 cm     Unremarkable mucosa    Duodenum     Duodenal bulb AVM      2 mm without stigmata of bleeding      Treated with APC     Second portion of duodenum AVM      Less than 1 mm without stigmata of bleeding      Treated with APC      Plan:   Follow CBC     Transfuse as needed to keep hemoglobin greater than or equal to 7     Okay to cautiously restart anticoagulation therapy     Follow-up with GI as outpatient for capsule endoscopy     **  GI WILL SIGN OFF / STAND BY - PLEASE CALL IF ANY CONCERNS  **     Procedure detail:    Prior to procedure, informed consent was obtained.  Risks, benefits, alternatives, including but not limited to risk of bleeding, infection, perforation, adverse reaction to sedating medicine, failure to identify pathology and death were explained to the patient who accepted all risks.    Patient was prepped in the left lateral position IV propofol sedation was provided by anesthesia.    Scope tip of the Olympus flexible gastroscope was passed in the proximal esophagus.  Proximal middle and distal thirds of the esophagus were well visualized.  At the distal esophagus there were 2 small  patches of salmon-colored mucosa each measuring 2 mm or less.  There was no nodularity or other abnormality.  The GE junction was regular at 38 cm.  The stomach was entered.  Gastric pool was suctioned.  Air was insufflated.  Scope was retroflexed to view the body fundus and cardia.  No lesions were seen.  There was a hiatal hernia from 38 to 40 cm.  Scope was straightened to view the antrum and incisura which were unremarkable.  The pylorus was patent.  Duodenal bulb was well visualized and at the inferior margin was a 2 mm arteriovenous malformation.  This had no stigmata of bleeding.  The second and third portion of the duodenum were well visualized.  In the second portion of the duodenum on the opposite wall from the ampulla was a small AVM measuring 1 mm or less.  This was treated with argon plasma coagulation.  No other lesions were seen.  The scope was withdrawn back to the duodenal bulb and the second AVM was treated with argon plasma coagulation.  No bleeding was incited.  The visual exam was repeated through the upper GI tract and no other lesions were seen.  The procedure was deemed complete.  Air and liquid were suctioned.  Patient tolerated the procedure well and was sent to recovery without immediate complications.      8/20/2022 7:54 AM Kranthi Fernandez M.D.

## 2022-08-20 NOTE — ANESTHESIA TIME REPORT
Anesthesia Start and Stop Event Times     Date Time Event    8/20/2022 0741 Anesthesia Start     0801 Anesthesia Stop        Responsible Staff  08/20/22    Name Role Begin End    Tobey Gansert, M.D. Anesth 0741 0801        Overtime Reason:  no overtime (within assigned shift)    Comments:

## 2022-08-20 NOTE — PROGRESS NOTES
Patient tranferred to [facility], report given to Lizzie ABBASI. Patient sent to unit via wheelchair with belongings. Telemetry in place. Patient safe/stable.

## 2022-08-20 NOTE — PROGRESS NOTES
Report received from Kev ABBASI, assumed patient care. Patient is calmly resting in bed, no signs of distress, even and unlabored breathing noted. Pt on 1L NC O2. Tele box on and in place. Plan of care discussed with patient, repeat back understanding obtained. Patient has call light within reach, fall precautions in place. Will continue to monitor.

## 2022-08-20 NOTE — PROGRESS NOTES
Assumed care of patient at bedside report from Kev ABBASI. Pt Aox4, on room air, lying calmly in bed, no signs of distress, and all patient needs addressed during bedside report. Updated on POC. Call light within reach. Patient instructed to press the red call light button when needing assistance. Whiteboard updated. Fall precautions in place. Bed locked and in lowest position. Bed alarm pad under patient and activated. Two side rails up and locked. Complaints of back and leg discomfort. NPO at midnight for EGD in AM.

## 2022-08-20 NOTE — OR NURSING
Pt is aaox4, resting comfortably in hospital bed on 1lpm nasal cannula. Her respirations are equal and unlabored, she is in no acute distress. She does not complain of any pain at this time. She informs she is not nauseated. Abdomen is soft to palpation. Will continue to monitor.

## 2022-08-20 NOTE — CARE PLAN
The patient is Stable - Low risk of patient condition declining or worsening    Shift Goals  Clinical Goals: NPO at midnight. EGD 8/20  Patient Goals: sleep / prepare for EGD  Family Goals: arnie    Progress made toward(s) clinical / shift goals:  YES    Patient is not progressing towards the following goals:      Problem: Knowledge Deficit - Standard  Goal: Patient and family/care givers will demonstrate understanding of plan of care, disease process/condition, diagnostic tests and medications  Outcome: Progressing     Problem: Fall Risk  Goal: Patient will remain free from falls  Outcome: Progressing     Problem: Pain - Standard  Goal: Alleviation of pain or a reduction in pain to the patient’s comfort goal  Outcome: Progressing

## 2022-08-20 NOTE — ANESTHESIA POSTPROCEDURE EVALUATION
Patient: Jaymie Peacock    Procedure Summary     Date: 08/20/22 Room / Location: [facility] 06 / SURGERY [facility]    Anesthesia Start: 0741 Anesthesia Stop: 0801    Procedures:       GASTROSCOPY (Esophagus)      GASTROSCOPY, WITH ARGON PLASMA COAGULATION (Esophagus) Diagnosis: (hIATAL HERNIA, DUODENAL avm x2)    Surgeons: Kranthi Fernandez M.D. Responsible Provider: Tobey Gansert, M.D.    Anesthesia Type: MAC ASA Status: 3 - Emergent          Final Anesthesia Type: MAC  Last vitals  BP   Blood Pressure : 103/65    Temp   37.2 °C (99 °F)    Pulse   77   Resp   12    SpO2   97 %      Anesthesia Post Evaluation    Patient location during evaluation: PACU  Patient participation: complete - patient participated  Level of consciousness: awake and alert    Airway patency: patent  Anesthetic complications: no  Cardiovascular status: hemodynamically stable  Respiratory status: acceptable  Hydration status: euvolemic    PONV: none          No notable events documented.     Nurse Pain Score: 0 (NPRS)

## 2022-08-20 NOTE — ANESTHESIA PREPROCEDURE EVALUATION
Case: [id number] Date/Time: 08/20/22 0715    Procedure: GASTROSCOPY (Esophagus)    Anesthesia type: MAC    Pre-op diagnosis: GI bleed    Location: [facility] OR 07 / SURGERY [facility]    Surgeons: Kranthi Fernandez M.D.          Relevant Problems   PULMONARY   (positive) Emphysema lung (HCC)   (positive) Necrotizing pneumonia (HCC)      NEURO   (positive) History of pulmonary embolus (PE)      CARDIAC   (positive) Acute deep vein thrombosis (DVT) of left lower extremity with PTE    (positive) NSTEMI (non-ST elevation myocardial infarction) (HCC)         (positive) YAMIL (acute kidney injury) (HCC)       Physical Exam    Airway   Mallampati: II  TM distance: >3 FB  Neck ROM: full       Cardiovascular - normal exam  Rhythm: regular  Rate: normal  (-) murmur     Dental - normal exam           Pulmonary - normal exam  Breath sounds clear to auscultation     Abdominal    Neurological - normal exam                 Anesthesia Plan    ASA 3- EMERGENT   ASA physical status emergent criteria: acute hemorrhage    Plan - MAC               Induction: intravenous    Postoperative Plan: Postoperative administration of opioids is intended.    Pertinent diagnostic labs and testing reviewed    Informed Consent:    Anesthetic plan and risks discussed with patient.    Use of blood products discussed with: patient whom consented to blood products.

## 2022-08-21 ENCOUNTER — APPOINTMENT (OUTPATIENT)
Dept: RADIOLOGY | Facility: MEDICAL CENTER | Age: 67
DRG: 378 | End: 2022-08-21
Attending: HOSPITALIST
Payer: MEDICARE

## 2022-08-21 ENCOUNTER — HOME HEALTH ADMISSION (OUTPATIENT)
Dept: HOME HEALTH SERVICES | Facility: HOME HEALTHCARE | Age: 67
End: 2022-08-21
Payer: MEDICARE

## 2022-08-21 PROBLEM — E83.51 HYPOCALCEMIA: Status: ACTIVE | Noted: 2022-08-21

## 2022-08-21 PROBLEM — I82.409 DVT (DEEP VENOUS THROMBOSIS) (HCC): Status: ACTIVE | Noted: 2022-08-21

## 2022-08-21 LAB
ERYTHROCYTE [DISTWIDTH] IN BLOOD BY AUTOMATED COUNT: 58 FL (ref 35.9–50)
HCT VFR BLD AUTO: 27.6 % (ref 37–47)
HGB BLD-MCNC: 10.2 G/DL (ref 12–16)
HGB BLD-MCNC: 9.1 G/DL (ref 12–16)
HGB BLD-MCNC: 9.4 G/DL (ref 12–16)
MCH RBC QN AUTO: 30.7 PG (ref 27–33)
MCHC RBC AUTO-ENTMCNC: 34.1 G/DL (ref 33.6–35)
MCV RBC AUTO: 90.2 FL (ref 81.4–97.8)
PLATELET # BLD AUTO: 190 K/UL (ref 164–446)
PMV BLD AUTO: 10.5 FL (ref 9–12.9)
RBC # BLD AUTO: 3.06 M/UL (ref 4.2–5.4)
WBC # BLD AUTO: 6.2 K/UL (ref 4.8–10.8)

## 2022-08-21 PROCEDURE — 770020 HCHG ROOM/CARE - TELE (206)

## 2022-08-21 PROCEDURE — 99233 SBSQ HOSP IP/OBS HIGH 50: CPT | Performed by: HOSPITALIST

## 2022-08-21 PROCEDURE — 06H03DZ INSERTION OF INTRALUMINAL DEVICE INTO INFERIOR VENA CAVA, PERCUTANEOUS APPROACH: ICD-10-PCS | Performed by: RADIOLOGY

## 2022-08-21 PROCEDURE — C9113 INJ PANTOPRAZOLE SODIUM, VIA: HCPCS | Performed by: STUDENT IN AN ORGANIZED HEALTH CARE EDUCATION/TRAINING PROGRAM

## 2022-08-21 PROCEDURE — 700111 HCHG RX REV CODE 636 W/ 250 OVERRIDE (IP): Performed by: STUDENT IN AN ORGANIZED HEALTH CARE EDUCATION/TRAINING PROGRAM

## 2022-08-21 PROCEDURE — 85018 HEMOGLOBIN: CPT

## 2022-08-21 PROCEDURE — A9270 NON-COVERED ITEM OR SERVICE: HCPCS | Performed by: HOSPITALIST

## 2022-08-21 PROCEDURE — 700105 HCHG RX REV CODE 258: Performed by: HOSPITALIST

## 2022-08-21 PROCEDURE — 93970 EXTREMITY STUDY: CPT

## 2022-08-21 PROCEDURE — 85027 COMPLETE CBC AUTOMATED: CPT

## 2022-08-21 PROCEDURE — 93970 EXTREMITY STUDY: CPT | Mod: 26 | Performed by: INTERNAL MEDICINE

## 2022-08-21 PROCEDURE — C1887 CATHETER, GUIDING: HCPCS

## 2022-08-21 PROCEDURE — 700102 HCHG RX REV CODE 250 W/ 637 OVERRIDE(OP): Performed by: HOSPITALIST

## 2022-08-21 PROCEDURE — 700102 HCHG RX REV CODE 250 W/ 637 OVERRIDE(OP): Performed by: STUDENT IN AN ORGANIZED HEALTH CARE EDUCATION/TRAINING PROGRAM

## 2022-08-21 PROCEDURE — 700117 HCHG RX CONTRAST REV CODE 255: Performed by: HOSPITALIST

## 2022-08-21 PROCEDURE — A9270 NON-COVERED ITEM OR SERVICE: HCPCS | Performed by: STUDENT IN AN ORGANIZED HEALTH CARE EDUCATION/TRAINING PROGRAM

## 2022-08-21 RX ORDER — IODIXANOL 270 MG/ML
35 INJECTION, SOLUTION INTRAVASCULAR ONCE
Status: COMPLETED | OUTPATIENT
Start: 2022-08-21 | End: 2022-08-21

## 2022-08-21 RX ORDER — CALCIUM CARBONATE 500 MG/1
1000 TABLET, CHEWABLE ORAL 2 TIMES DAILY
Status: COMPLETED | OUTPATIENT
Start: 2022-08-21 | End: 2022-08-21

## 2022-08-21 RX ADMIN — CALCIUM CARBONATE 1000 MG: 500 TABLET, CHEWABLE ORAL at 09:13

## 2022-08-21 RX ADMIN — SUCRALFATE 1 G: 1 SUSPENSION ORAL at 17:21

## 2022-08-21 RX ADMIN — MIRTAZAPINE 15 MG: 15 TABLET, FILM COATED ORAL at 22:55

## 2022-08-21 RX ADMIN — SUCRALFATE 1 G: 1 SUSPENSION ORAL at 06:16

## 2022-08-21 RX ADMIN — PANTOPRAZOLE SODIUM 40 MG: 40 INJECTION, POWDER, FOR SOLUTION INTRAVENOUS at 06:17

## 2022-08-21 RX ADMIN — SODIUM CHLORIDE, POTASSIUM CHLORIDE, SODIUM LACTATE AND CALCIUM CHLORIDE: 600; 310; 30; 20 INJECTION, SOLUTION INTRAVENOUS at 00:56

## 2022-08-21 RX ADMIN — SODIUM CHLORIDE, POTASSIUM CHLORIDE, SODIUM LACTATE AND CALCIUM CHLORIDE: 600; 310; 30; 20 INJECTION, SOLUTION INTRAVENOUS at 16:54

## 2022-08-21 RX ADMIN — IODIXANOL 35 ML: 270 INJECTION, SOLUTION INTRAVASCULAR at 15:30

## 2022-08-21 RX ADMIN — SUCRALFATE 1 G: 1 SUSPENSION ORAL at 22:56

## 2022-08-21 RX ADMIN — PANTOPRAZOLE SODIUM 40 MG: 40 INJECTION, POWDER, FOR SOLUTION INTRAVENOUS at 17:21

## 2022-08-21 RX ADMIN — CALCIUM CARBONATE 1000 MG: 500 TABLET, CHEWABLE ORAL at 17:21

## 2022-08-21 RX ADMIN — SUCRALFATE 1 G: 1 SUSPENSION ORAL at 12:39

## 2022-08-21 ASSESSMENT — ENCOUNTER SYMPTOMS
HEADACHES: 0
FOCAL WEAKNESS: 0
EYE PAIN: 0
BLURRED VISION: 0
DIZZINESS: 0
FALLS: 0
WEAKNESS: 1
SENSORY CHANGE: 0
VOMITING: 0
COUGH: 0
FEVER: 0
PALPITATIONS: 0
INSOMNIA: 0
BACK PAIN: 0
SHORTNESS OF BREATH: 0
CHILLS: 0
NAUSEA: 0
ABDOMINAL PAIN: 0

## 2022-08-21 ASSESSMENT — LIFESTYLE VARIABLES: SUBSTANCE_ABUSE: 0

## 2022-08-21 ASSESSMENT — COGNITIVE AND FUNCTIONAL STATUS - GENERAL
CLIMB 3 TO 5 STEPS WITH RAILING: A LITTLE
SUGGESTED CMS G CODE MODIFIER MOBILITY: CJ
WALKING IN HOSPITAL ROOM: A LITTLE
DAILY ACTIVITIY SCORE: 23
SUGGESTED CMS G CODE MODIFIER DAILY ACTIVITY: CI
MOBILITY SCORE: 21
MOVING TO AND FROM BED TO CHAIR: A LITTLE
TOILETING: A LITTLE

## 2022-08-21 ASSESSMENT — PAIN DESCRIPTION - PAIN TYPE
TYPE: ACUTE PAIN

## 2022-08-21 NOTE — PROGRESS NOTES
Monitor Summary  SR 79-90  (O) PVC, (R) Trig, (R) Multifocal Coup, (R) Big  .18/.06/.36

## 2022-08-21 NOTE — PROGRESS NOTES
Pt presents to IR 2. Pt was consented by MD at bedside, confirmed by this RN and consent at bedside. Pt transferred to IR table in supine position. Patient underwent a inferior vena cava filter placement by Dr. Almanzar. Procedure site was marked by MD and verified using imaging guidance. Pt placed on monitor, prepped and draped in a sterile fashion. Vitals were taken every 10 minutes and remained stable during procedure (see doc flow sheet for results). This was a non sedation case.    Report called to Lizzie ABBASI. Pt transported by shilpa with RN to T737.        ARGON  Option Elite - retrievable vena cava filter  REF: [id number]  LOT: [id number]  Exp: 07/13/2025

## 2022-08-21 NOTE — DISCHARGE PLANNING
ATTN: Case Management  RE: Referral for Home Health    As of 8/21/2022, we have accepted the Home Health referral for the patient listed above.    A [facility] clinician will be out to see the patient within 48 hours. If you have any questions or concerns regarding the patient's transition to Home Health, please do not hesitate to contact us at [phone].      We look forward to collaborating with you,  [facility] Health Team

## 2022-08-21 NOTE — PROGRESS NOTES
Received Bedside report. Assumed care at 1900. This pt is AOx4, ambulatory with standby assistance and a FWW, voiding adequately, 0/10 pain. Patient and RN discussed plan of care: questions answered. Labs noted, assessment complete, patient tolerating clear liquid diet. Tele box in place. Pt is on room air. Call light in place, fall precautions in place, patient educated on importance of calling for assistance. No additional needs at this time. VSS

## 2022-08-21 NOTE — CARE PLAN
Problem: Knowledge Deficit - Standard  Goal: Patient and family/care givers will demonstrate understanding of plan of care, disease process/condition, diagnostic tests and medications  Outcome: Progressing  Note: Patient verbally demonstrated full understanding of plan of care.  Will continue to educate patient and reassess patient's understanding.      Problem: Fall Risk  Goal: Patient will remain free from falls  Outcome: Progressing  Note: Patient has remained free from falls/injury.  Fall precautions in place.  Patient has used call light appropriately throughout the night to call for assistance.     The patient is Watcher - Medium risk of patient condition declining or worsening    Shift Goals  Clinical Goals: Closely monitor labs, rest/comfort  Patient Goals: Rest and comfort  Family Goals: PATTI.  No family present    Progress made toward(s) clinical / shift goals:  Patient verbally demonstrated full understanding of plan of care.  Will continue to educate patient and reassess patient's understanding. Patient has remained free from falls/injury.  Fall precautions in place.  Patient has used call light appropriately throughout the night to call for assistance.  Patient's hemoglobin has remained stable throughout the night.         Patient is not progressing towards the following goals: N/A

## 2022-08-21 NOTE — OR SURGEON
Immediate Post- Operative Note        Findings: dvt, gi bleed      Procedure(s): ivc filter      Estimated Blood Loss: Less than 5 ml        Complications: None            8/21/2022     3:09 PM     Lavelle Almanzar M.D.

## 2022-08-21 NOTE — PROGRESS NOTES
Report received from [facility] shift RN, assumed patient care. Patient is calmly resting in bed, no signs of distress, even and unlabored breathing noted. Pt on room air. Tele box on and in place. Patient has call light within reach, fall precautions in place. Will continue to monitor.

## 2022-08-21 NOTE — PROGRESS NOTES
4 Eyes Skin Assessment Completed by ELROY Samuel and ELROY Cardona.    Head WDL  Ears WDL  Nose WDL  Mouth WDL  Neck WDL  Breast/Chest WDL  Shoulder Blades WDL  Spine WDL  (R) Arm/Elbow/Hand Bruising  (L) Arm/Elbow/Hand Bruising  Abdomen WDL  Groin WDL  Scrotum/Coccyx/Buttocks Redness and Blanching  (R) Leg Bruising  (L) Leg Bruising  (R) Heel/Foot/Toe WDL  (L) Heel/Foot/Toe WDL          Devices In Places Tele Box      Interventions In Place Pillows    Possible Skin Injury No    Pictures Uploaded Into Epic N/A  Wound Consult Placed N/A  RN Wound Prevention Protocol Ordered No

## 2022-08-21 NOTE — FACE TO FACE
Face to Face Supporting Documentation - Home Health    The encounter with this patient was in whole or in part the primary reason for home health admission.    Date of encounter:   Patient:                    MRN:                       YOB: 2023  Jaymie Peacock  [id number]  1955     Home health to see patient for:  Skilled Nursing care for assessment, interventions & education    Skilled need for:  Exacerbation of Chronic Disease State gi bleed    Skilled nursing interventions to include:  Comment: exam, vitals    Homebound status evidenced by:  Needs the assistance of another person in order to leave the home. Leaving home requires a considerable and taxing effort. There is a normal inability to leave the home.    Community Physician to provide follow up care: Toña Tejada P.A.-C.     Optional Interventions? No      I certify the face to face encounter for this home health care referral meets the CMS requirements and the encounter/clinical assessment with the patient was, in whole, or in part, for the medical condition(s) listed above, which is the primary reason for home health care. Based on my clinical findings: the service(s) are medically necessary, support the need for home health care, and the homebound criteria are met.  I certify that this patient has had a face to face encounter by myself.  Rika Campbell M.D. - NPI: [id number]

## 2022-08-21 NOTE — DISCHARGE PLANNING
Received Choice form at 9415  Agency/Facility Name: [facility]  Referral sent per Choice form @ [phone]

## 2022-08-21 NOTE — PROGRESS NOTES
[facility] Medicine Daily Progress Note    Date of Service  8/21/2022    Chief Complaint  Jaymie Peacock is a 66 y.o. female admitted 8/19/2022 with generalized weakness.    Hospital Course  Patient is a 66 y.o. female with history of pulmonary embolism, lower extremity DVT, chronic alcohol dependence, hypertension and tobacco dependence. She was recently admitted to [facility] following an episode of hematemesis and melena where the patient had a hemoglobin down to 6. She underwent a recent upper endoscopy and was found to have a Crystal-Payan tear with visible vessel treated with hemoclipping and epinephrine injection, esophageal erosions were seen also.  At that time anticoagulation was held for 5 days, the patient was treated with Carafate and PPI, the patient was discharged on 8/2, she is somewhat vague about her medication intake but states that she has a stomach pill that she takes, she was not able to fill Carafate apparently, she admits to ongoing alcohol use but per her report not much.  She states her stools have been brown, she has not vomited since her last admission. She returned to [facility] on 8/19/2022 with severe weakness and was found to have a hemoglobin of 3.7. In the emergency room she reportedly had an episode of brown emesis.  She was initially admitted to the stepdown unit in light of failure profound anemia, tachycardia, severe suspected hemorrhage.  Gastroenterology was consulted, th she has been given 1 L of LR, and 2 units of red cells.   On 8/20/22 she underwent an EGD that showed  duodenal angiodysplasia, s/p argon plasma coagulation ablation.    Interval Problem Update  Axox3, she states she is feeling much better, h/h stable, she denies melena or hematochezia, tolerating clears, denies sob or LE pain. We discussed u/s findings and IVC filter options - she would like to proceed with this. No sob, stable on room air. Ros otherwise negative. PT and OT to enrico HORTON have discussed this  patient's plan of care and discharge plan at IDT rounds today with Case Management, Nursing, Nursing leadership, and other members of the IDT team.    Consultants/Specialty  GI  IR  Code Status  Full Code    Disposition  Patient is not medically cleared for discharge.   Anticipate discharge to to home with close outpatient follow-up.  I have placed the appropriate orders for post-discharge needs.    Review of Systems  Review of Systems   Constitutional:  Positive for malaise/fatigue. Negative for chills and fever.   Eyes:  Negative for blurred vision and pain.   Respiratory:  Negative for cough and shortness of breath.    Cardiovascular:  Negative for chest pain, palpitations and leg swelling.   Gastrointestinal:  Negative for abdominal pain, melena, nausea and vomiting.   Genitourinary:  Negative for dysuria and urgency.   Musculoskeletal:  Negative for back pain and falls.   Skin:  Negative for itching and rash.   Neurological:  Positive for weakness. Negative for dizziness, sensory change, focal weakness and headaches.   Psychiatric/Behavioral:  Negative for substance abuse. The patient does not have insomnia.       Physical Exam  Temp:  [36.3 °C (97.4 °F)-37.1 °C (98.8 °F)] 36.3 °C (97.4 °F)  Pulse:  [] 87  Resp:  [12-17] 16  BP: ()/(56-71) 109/70  SpO2:  [94 %-98 %] 96 %    Physical Exam  Vitals reviewed.   Constitutional:       General: She is not in acute distress.     Appearance: She is not diaphoretic.   HENT:      Head: Normocephalic and atraumatic.      Right Ear: External ear normal.      Left Ear: External ear normal.      Nose: Nose normal. No congestion.      Mouth/Throat:      Pharynx: No oropharyngeal exudate or posterior oropharyngeal erythema.   Eyes:      Extraocular Movements: Extraocular movements intact.      Pupils: Pupils are equal, round, and reactive to light.   Cardiovascular:      Rate and Rhythm: Normal rate and regular rhythm.   Pulmonary:      Effort: Pulmonary effort is  normal. No respiratory distress.      Breath sounds: Normal breath sounds. No wheezing or rales.   Abdominal:      General: Bowel sounds are normal. There is no distension.      Palpations: Abdomen is soft.      Tenderness: There is no abdominal tenderness. There is no guarding or rebound.   Musculoskeletal:         General: No swelling. Normal range of motion.      Cervical back: Normal range of motion and neck supple.      Right lower leg: No edema.      Left lower leg: No edema.   Skin:     General: Skin is warm and dry.   Neurological:      General: No focal deficit present.      Mental Status: She is alert and oriented to person, place, and time.      Sensory: No sensory deficit.      Motor: No weakness.   Psychiatric:         Mood and Affect: Mood normal.         Behavior: Behavior normal.       Fluids    Intake/Output Summary (Last 24 hours) at 8/21/2022 1200  Last data filed at 8/21/2022 0226  Gross per 24 hour   Intake --   Output 950 ml   Net -950 ml       Laboratory  Recent Labs     08/19/22  0905 08/19/22  1802 08/20/22  0345 08/20/22  1425 08/20/22  2056 08/21/22  0530 08/21/22  0924   WBC 10.2  --  5.5  --   --  6.2  --    RBC 1.07*  --  2.91*  --   --  3.06*  --    HEMOGLOBIN 3.7*   < > 8.8*   < > 9.5* 9.4* 9.1*   HEMATOCRIT 12.1*  --  25.2*  --   --  27.6*  --    .1*  --  86.6  --   --  90.2  --    MCH 34.6*  --  30.2  --   --  30.7  --    MCHC 30.6*  --  34.9  --   --  34.1  --    RDW 74.3*  --  52.7*  --   --  58.0*  --    PLATELETCT 301  --  102*  --   --  190  --    MPV 10.4  --  9.7  --   --  10.5  --     < > = values in this interval not displayed.     Recent Labs     08/19/22  0905 08/20/22  0345   SODIUM 138 140   POTASSIUM 4.2 3.7   CHLORIDE 103 108   CO2 18* 22   GLUCOSE 169* 88   BUN 50* 31*   CREATININE 0.94 0.75   CALCIUM 8.5 7.5*     Recent Labs     08/19/22  0905 08/19/22  1802 08/20/22  0440   APTT 45.8*  --   --    INR 5.97* 3.59* 2.03*               Imaging  DX-CHEST-PORTABLE  (1 VIEW)   Final Result      No acute cardiopulmonary abnormality.      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, no MW tear    Elevated INR- (present on admission)  Assessment & Plan  Secondary to Xarelto use, s/p  Kcentra    Acute GI bleeding- (present on admission)  Assessment & Plan  Appears to be recurrent, the patient with recent admission and have been identified to have upper GI bleeding  The patient presents again now with profound anemia, hemoglobin of 3.7, platelet count 301  Patient be admitted to [facility]  2 large-bore IVs  Reverse anticoagulation with Kcentra  S/p 2 U RBC's tranfused  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, s/p APC ablation  Monitor H/H q8h, transfuse for Hgb<7  Continue PPI BID  Follow up GI outpatient for capsule endoscopy  Hold anticoagulation for hx DVT/PE 4/2022  H/h stable   following    Frequent hospital admissions- (present on admission)  Assessment & Plan  Recurrent admissions related to GI bleeding    Emphysema lung (HCC)- (present on admission)  Assessment & Plan  History of, monitor respiratory status  No evidence of exacerbation  following    Alcohol abuse- (present on admission)  Assessment & Plan  Recurrent  Cessation strategies discussed and recommended       VTE prophylaxis: SCDs/TEDs    I have performed a physical exam and reviewed and updated ROS and Plan today (8/21/2022). In review of yesterday's note (8/20/2022), there are no changes except as documented above.

## 2022-08-22 VITALS
OXYGEN SATURATION: 94 % | RESPIRATION RATE: 18 BRPM | WEIGHT: 108.47 LBS | HEART RATE: 90 BPM | HEIGHT: 66 IN | DIASTOLIC BLOOD PRESSURE: 71 MMHG | SYSTOLIC BLOOD PRESSURE: 107 MMHG | TEMPERATURE: 97.7 F | BODY MASS INDEX: 17.43 KG/M2

## 2022-08-22 PROBLEM — R79.1 ELEVATED INR: Status: RESOLVED | Noted: 2022-05-10 | Resolved: 2022-08-22

## 2022-08-22 PROBLEM — K92.2 GIB (GASTROINTESTINAL BLEEDING): Status: RESOLVED | Noted: 2022-08-19 | Resolved: 2022-08-22

## 2022-08-22 PROBLEM — D62 ACUTE BLOOD LOSS ANEMIA: Status: RESOLVED | Noted: 2022-08-19 | Resolved: 2022-08-22

## 2022-08-22 PROBLEM — K92.2 ACUTE GI BLEEDING: Status: RESOLVED | Noted: 2022-05-10 | Resolved: 2022-08-22

## 2022-08-22 LAB
BASOPHILS # BLD AUTO: 0.5 % (ref 0–1.8)
BASOPHILS # BLD: 0.03 K/UL (ref 0–0.12)
CALCIUM SERPL-MCNC: 7.8 MG/DL (ref 8.5–10.5)
EOSINOPHIL # BLD AUTO: 0.16 K/UL (ref 0–0.51)
EOSINOPHIL NFR BLD: 2.9 % (ref 0–6.9)
ERYTHROCYTE [DISTWIDTH] IN BLOOD BY AUTOMATED COUNT: 59.8 FL (ref 35.9–50)
HCT VFR BLD AUTO: 27.2 % (ref 37–47)
HGB BLD-MCNC: 9.1 G/DL (ref 12–16)
IMM GRANULOCYTES # BLD AUTO: 0.04 K/UL (ref 0–0.11)
IMM GRANULOCYTES NFR BLD AUTO: 0.7 % (ref 0–0.9)
LYMPHOCYTES # BLD AUTO: 0.77 K/UL (ref 1–4.8)
LYMPHOCYTES NFR BLD: 13.8 % (ref 22–41)
MCH RBC QN AUTO: 30.8 PG (ref 27–33)
MCHC RBC AUTO-ENTMCNC: 33.5 G/DL (ref 33.6–35)
MCV RBC AUTO: 92.2 FL (ref 81.4–97.8)
MONOCYTES # BLD AUTO: 0.63 K/UL (ref 0–0.85)
MONOCYTES NFR BLD AUTO: 11.3 % (ref 0–13.4)
NEUTROPHILS # BLD AUTO: 3.97 K/UL (ref 2–7.15)
NEUTROPHILS NFR BLD: 70.8 % (ref 44–72)
NRBC # BLD AUTO: 0 K/UL
NRBC BLD-RTO: 0 /100 WBC
PLATELET # BLD AUTO: 208 K/UL (ref 164–446)
PMV BLD AUTO: 9.6 FL (ref 9–12.9)
RBC # BLD AUTO: 2.95 M/UL (ref 4.2–5.4)
WBC # BLD AUTO: 5.6 K/UL (ref 4.8–10.8)

## 2022-08-22 PROCEDURE — 700111 HCHG RX REV CODE 636 W/ 250 OVERRIDE (IP): Performed by: STUDENT IN AN ORGANIZED HEALTH CARE EDUCATION/TRAINING PROGRAM

## 2022-08-22 PROCEDURE — C9113 INJ PANTOPRAZOLE SODIUM, VIA: HCPCS | Performed by: STUDENT IN AN ORGANIZED HEALTH CARE EDUCATION/TRAINING PROGRAM

## 2022-08-22 PROCEDURE — 700102 HCHG RX REV CODE 250 W/ 637 OVERRIDE(OP): Performed by: STUDENT IN AN ORGANIZED HEALTH CARE EDUCATION/TRAINING PROGRAM

## 2022-08-22 PROCEDURE — 99239 HOSP IP/OBS DSCHRG MGMT >30: CPT | Performed by: HOSPITALIST

## 2022-08-22 PROCEDURE — A9270 NON-COVERED ITEM OR SERVICE: HCPCS | Performed by: STUDENT IN AN ORGANIZED HEALTH CARE EDUCATION/TRAINING PROGRAM

## 2022-08-22 PROCEDURE — 85025 COMPLETE CBC W/AUTO DIFF WBC: CPT

## 2022-08-22 PROCEDURE — 82310 ASSAY OF CALCIUM: CPT

## 2022-08-22 RX ORDER — PANTOPRAZOLE SODIUM 20 MG/1
20 TABLET, DELAYED RELEASE ORAL 2 TIMES DAILY
Qty: 60 TABLET | Refills: 0 | Status: SHIPPED | OUTPATIENT
Start: 2022-08-22 | End: 2022-11-15

## 2022-08-22 RX ORDER — SUCRALFATE ORAL 1 G/10ML
1 SUSPENSION ORAL
Qty: 30 ML | Refills: 3 | Status: SHIPPED | OUTPATIENT
Start: 2022-08-22 | End: 2022-11-15

## 2022-08-22 RX ADMIN — PANTOPRAZOLE SODIUM 40 MG: 40 INJECTION, POWDER, FOR SOLUTION INTRAVENOUS at 06:44

## 2022-08-22 RX ADMIN — SUCRALFATE 1 G: 1 SUSPENSION ORAL at 06:45

## 2022-08-22 ASSESSMENT — ENCOUNTER SYMPTOMS
DIZZINESS: 0
BLURRED VISION: 0
MYALGIAS: 0
BRUISES/BLEEDS EASILY: 1
FEVER: 0
HEMOPTYSIS: 0
WEAKNESS: 0
SHORTNESS OF BREATH: 0
ABDOMINAL PAIN: 0
HEADACHES: 0
COUGH: 0
PALPITATIONS: 0
VOMITING: 0
CHILLS: 0

## 2022-08-22 ASSESSMENT — PAIN DESCRIPTION - PAIN TYPE
TYPE: ACUTE PAIN
TYPE: ACUTE PAIN

## 2022-08-22 ASSESSMENT — FIBROSIS 4 INDEX: FIB4 SCORE: 3.35

## 2022-08-22 NOTE — PROGRESS NOTES
"Radiology Progress Note   Author: JERSON Sher Date & Time created: 8/22/2022  10:43 AM   Date of admission  8/19/2022  Note to reader: this note follows the APSO format rather than the historical SOAP format. Assessment and plan located at the top of the note for ease of use.    Chief Complaint  66 y.o. female admitted 8/19/2022 with   Chief Complaint   Patient presents with    Weakness     Pt reports feeling weak and unable to walk today. Hypotensive to 89/55 upon arrival. Pt appears pale.     Blood in Vomit     Pt had episode of vomiting en route to the hospital, approximately 200 cc of dark red/brown vomit.        HPI  \" 66 y.o. female with history of pulmonary embolism, lower extremity DVT, chronic alcohol dependence, hypertension and tobacco dependence. She was recently admitted to [facility] following an episode of hematemesis and melena where the patient had a hemoglobin down to 6. She underwent a recent upper endoscopy and was found to have a Crystal-Payan tear with visible vessel treated with hemoclipping and epinephrine injection, esophageal erosions were seen also.  At that time anticoagulation was held for 5 days, the patient was treated with Carafate and PPI, the patient was discharged on 8/2, she is somewhat vague about her medication intake but states that she has a stomach pill that she takes, she was not able to fill Carafate apparently, she admits to ongoing alcohol use but per her report not much.  She states her stools have been brown, she has not vomited since her last admission. She returned to [facility] on 8/19/2022 with severe weakness and was found to have a hemoglobin of 3.7. In the emergency room she reportedly had an episode of brown emesis.  She was initially admitted to the stepdown unit in light of failure profound anemia, tachycardia, severe suspected hemorrhage.  Gastroenterology was consulted, th she has been given 1 L of LR, and 2 units of red cells.   On 8/20/22 she " "underwent an EGD that showed  duodenal angiodysplasia, s/p argon plasma coagulation ablation.\"       Assessment/Plan  Interval History   Active Problems:    Alcohol abuse    Emphysema lung (HCC)    Frequent hospital admissions    Crystal-Payan tear    DVT (deep venous thrombosis) (HCC)    Hypocalcemia      Plan IR  -- IVC filter in place   - IVC filters should be removed when the risk of PE/ DVT and the risks of filter removal are acceptably low  - If acute DVT or PE is present, at least 2-3 weeks of anticoagulation should be given prior to IVC filter removal, prefer to remove IVC filter within 1 year if possible   - OP Consult for Vascular Medicine for IVC filter removal in future placed     -Thank you for allowing Interventional Radiology team to participate in the patients care, if any additional care or requests are needed in the future please do not hesitate call or place IR order   [phone]      IR:   8/21- IVC filter placed   8/22- Right groin access site CDI, no redness or swelling. IVC filter education provided to patient          Review of Systems  Physical Exam   Review of Systems   Constitutional:  Positive for malaise/fatigue. Negative for chills and fever.   HENT:  Negative for hearing loss.    Eyes:  Negative for blurred vision.   Respiratory:  Negative for cough, hemoptysis and shortness of breath.    Cardiovascular:  Negative for chest pain and palpitations.   Gastrointestinal:  Negative for abdominal pain and vomiting.   Genitourinary:  Negative for dysuria.   Musculoskeletal:  Negative for myalgias.   Skin:  Negative for rash.   Neurological:  Negative for dizziness, weakness and headaches.   Endo/Heme/Allergies:  Bruises/bleeds easily.   Psychiatric/Behavioral:  Negative for suicidal ideas.     Vitals:    08/22/22 0900   BP:    Pulse: 90   Resp:    Temp:    SpO2: 94%        Physical Exam  HENT:      Head: Normocephalic.      Nose: Nose normal.      Mouth/Throat:      Mouth: Mucous membranes are " moist.   Eyes:      Pupils: Pupils are equal, round, and reactive to light.   Cardiovascular:      Rate and Rhythm: Normal rate.   Pulmonary:      Effort: Pulmonary effort is normal. No respiratory distress.   Abdominal:      General: Abdomen is flat.      Tenderness: There is no abdominal tenderness.   Musculoskeletal:         General: No tenderness or deformity.      Cervical back: Normal range of motion.      Right lower leg: No edema.      Left lower leg: No edema.   Skin:     General: Skin is warm and dry.      Coloration: Skin is not jaundiced or pale.      Findings: Bruising present.   Neurological:      Mental Status: She is alert and oriented to person, place, and time.      Motor: No weakness.   Psychiatric:         Mood and Affect: Mood normal.         Behavior: Behavior normal.           Labs    Recent Labs     08/20/22  0345 08/20/22  1425 08/21/22  0530 08/21/22  0924 08/21/22  2038 08/22/22  0320   WBC 5.5  --  6.2  --   --  5.6   RBC 2.91*  --  3.06*  --   --  2.95*   HEMOGLOBIN 8.8*   < > 9.4* 9.1* 10.2* 9.1*   HEMATOCRIT 25.2*  --  27.6*  --   --  27.2*   MCV 86.6  --  90.2  --   --  92.2   MCH 30.2  --  30.7  --   --  30.8   MCHC 34.9  --  34.1  --   --  33.5*   RDW 52.7*  --  58.0*  --   --  59.8*   PLATELETCT 102*  --  190  --   --  208   MPV 9.7  --  10.5  --   --  9.6    < > = values in this interval not displayed.     Recent Labs     08/20/22  0345 08/22/22  0320   SODIUM 140  --    POTASSIUM 3.7  --    CHLORIDE 108  --    CO2 22  --    GLUCOSE 88  --    BUN 31*  --    CREATININE 0.75  --    CALCIUM 7.5* 7.8*     Recent Labs     08/20/22 0345   ALBUMIN 2.9*   TBILIRUBIN 1.2   ALKPHOSPHAT 106*   TOTPROTEIN 4.6*   ALTSGPT 10   ASTSGOT 30   CREATININE 0.75     IR-INSERT IVC FILTER WITH IG & SI   Final Result      1. Ultrasound and fluoroscopic guided placement of an Argon Option retrievable IVC filter.      2. Inferior vena cavagram within normal limits with no evidence of caval thrombus or  occlusion.      US-EXTREMITY VENOUS LOWER BILAT   Final Result      DX-CHEST-PORTABLE (1 VIEW)   Final Result      No acute cardiopulmonary abnormality.          INR   Date Value Ref Range Status   08/20/2022 2.03 (H) 0.87 - 1.13 Final     Comment:     INR - Non-therapeutic Reference Range: 0.87-1.13  INR - Therapeutic Reference Range: 2.0-4.0       No results found for: POCINR   No intake or output data in the 24 hours ending 08/22/22 1043   Labs not explicitly included in this progress note were reviewed by the author. Radiology/imaging not explicitly included in this progress note was reviewed by the author.     I have performed a physical exam and reviewed and updated ROS and Plan today (8/22/2022).     15 minutes in directly providing and coordinating care and extensive data review.  No time overlap and excludes procedures.

## 2022-08-22 NOTE — DISCHARGE INSTRUCTIONS
Discharge Instructions    Discharged to home by car with relative. Discharged via wheelchair, hospital escort: Yes.  Special equipment needed: Not Applicable    Be sure to schedule a follow-up appointment with your primary care doctor or any specialists as instructed.     Discharge Plan:   Diet Plan: Discussed  Activity Level: Discussed  Confirmed Follow up Appointment: Patient to Call and Schedule Appointment  Confirmed Symptoms Management: Discussed  Medication Reconciliation Updated: Yes    I understand that a diet low in cholesterol, fat, and sodium is recommended for good health. Unless I have been given specific instructions below for another diet, I accept this instruction as my diet prescription.   Other diet: as tolerated    Special Instructions: None    -Is this patient being discharged with medication to prevent blood clots?  No    Is patient discharged on Warfarin / Coumadin?   No

## 2022-08-22 NOTE — DISCHARGE PLANNING
HTH/SCP TCN chart review completed. Collaborated with SE Elkins prior to meeting with the pt. The most current review of medical record, knowledge of pt's PLOF and social support, LACE+ score of 76, 6 clicks scores of 23 ADL and 21 mobility were considered.      Pt seen at bedside. PLOF includes: Patient lives alone in a 1 story home.  She has her washer in the basement but she does not go downstairs.  Her friend take her to the laundromat.  Her friend Irwin does her yardwork and takes her shopping.  She has good family/friends support system.  She states she was independent with ADL's, cooking, cleaning and finances.  She ambulated with a walker and feels she is close but is not at baseline level of function.     Patient states she was being seen by [facility] although all the medical records show she had been seen by [facility].     Introduced TCN program. Provided education regarding post acute levels of care. Discussed HTH/SCP plan benefits (Meds to Beds, medical uber and [facility] transitional care). Pt verbalizes understanding.     Choice proactively obtained for JC, faxed to [facility] and given to SE. Patient is not medically cleared for discharge and states she has a L LE blood clot. TCN will continue to follow and collaborate with discharge planning team as additional post acute needs arise. Thank you.     Completed today:  Choice obtained: JC  [facility] referral (Y), sent     1400- HH FTF sheet present and [facility] has accepted patient.

## 2022-08-22 NOTE — DISCHARGE SUMMARY
Discharge Summary    CHIEF COMPLAINT ON ADMISSION  Chief Complaint   Patient presents with    Weakness     Pt reports feeling weak and unable to walk today. Hypotensive to 89/55 upon arrival. Pt appears pale.     Blood in Vomit     Pt had episode of vomiting en route to the hospital, approximately 200 cc of dark red/brown vomit.        Reason for Admission  EMS     Admission Date  8/19/2022    CODE STATUS  Full Code    HPI & HOSPITAL COURSE  Patient is a 66 y.o. female with history of pulmonary embolism, lower extremity DVT, chronic alcohol dependence, hypertension and tobacco dependence. She was recently admitted to [facility] following an episode of hematemesis and melena where the patient had a hemoglobin down to 6. She underwent a recent upper endoscopy and was found to have a Crystal-Payan tear with visible vessel treated with hemoclipping and epinephrine injection, esophageal erosions were seen also.  At that time anticoagulation was held for 5 days, the patient was treated with Carafate and PPI, the patient was discharged on 8/2, she is somewhat vague about her medication intake but states that she has a stomach pill that she takes, she was not able to fill Carafate apparently, she admits to ongoing alcohol use but per her report not much.  She states her stools have been brown, she has not vomited since her last admission. She returned to [facility] on 8/19/2022 with severe weakness and was found to have a hemoglobin of 3.7. In the emergency room she reportedly had an episode of brown emesis.  She was initially admitted to the stepdown unit in light of failure profound anemia, tachycardia, severe suspected hemorrhage.  Gastroenterology was consulted, and she was transfused 2 units of red cells.   On 8/20/22 she underwent an EGD that showed  duodenal angiodysplasia, s/p argon plasma coagulation ablation.  Her hemoglobin and hematocrit normalized and she had no evidence of further bleeding. She will follow up with GI  Consultants later this week for follow up and an outpatient capsule endoscopy.    An incidental non vascularized echolucent structure was noted in the R popliteal fossa on LE ultrasound and she agrees to have this followed up with her pcp. An ivc filter was also placed as she will not be able to remain on blood thinners.     Alcohol cessation was discussed and recommended. She agrees to close outpatient follow up and to return to the ER if needed. She is being discharged with home health. Smoking cessation was also discussed and recommended      Therefore, she is discharged in fair and stable condition to home with organized home healthcare and close outpatient follow-up.    The patient met 2-midnight criteria for an inpatient stay at the time of discharge.    Discharge Date  8/22/22    FOLLOW UP ITEMS POST DISCHARGE  GI consultants  Pcp  AA    DISCHARGE DIAGNOSES  Principal Problem (Resolved):    GIB (gastrointestinal bleeding) POA: Yes  Active Problems:    Alcohol abuse POA: Yes    Emphysema lung (HCC) POA: Yes    Frequent hospital admissions POA: Yes    Crystal-Payan tear POA: Yes    DVT (deep venous thrombosis) (HCC) POA: Unknown    Hypocalcemia POA: Unknown  Resolved Problems:    Acute GI bleeding POA: Yes    Elevated INR POA: Yes    Acute blood loss anemia POA: Yes      FOLLOW UP  No future appointments.  MICHELLE GrigsbyREvelynN.  [address] [facility] [address]  [phone]    Follow up in 1 week(s)      Toña Tejada P.A.-C.  [address] [id number] [phone]    Follow up in 1 week(s)      Manuel Wills M.D.  [address] [id number] [phone]          MEDICATIONS ON DISCHARGE     Medication List        START taking these medications        Instructions   pantoprazole 20 MG tablet  Commonly known as: PROTONIX   Take 1 Tablet by mouth 2 times a day.  Dose: 20 mg     sucralfate 1 GM/10ML Susp  Commonly known as: CARAFATE   Take 10 mL by mouth 4  Times a Day,Before Meals and at Bedtime.  Dose: 1 g            CONTINUE taking these medications        Instructions   Centrum Fresh/Fruity 50+ Chew   Chew 1 Tablet every morning.  Dose: 1 Tablet     mirtazapine 15 MG Tabs  Commonly known as: Remeron   Take 1 Tablet by mouth every evening.  Dose: 15 mg     ondansetron 4 MG Tbdp  Commonly known as: ZOFRAN ODT   Take 1 Tablet by mouth every 6 hours as needed for Nausea.  Dose: 4 mg     simvastatin 20 MG Tabs  Commonly known as: ZOCOR   Take 20 mg by mouth every evening. Indications: Ischemic Heart Disease  Dose: 20 mg            STOP taking these medications      acetaminophen 500 MG Tabs  Commonly known as: TYLENOL     omeprazole 20 MG delayed-release capsule  Commonly known as: PRILOSEC     rivaroxaban 20 MG Tabs tablet  Commonly known as: XARELTO              Allergies  No Known Allergies    DIET  Orders Placed This Encounter   Procedures    Diet Order Diet: Regular     Standing Status:   Standing     Number of Occurrences:   1     Order Specific Question:   Diet:     Answer:   Regular [1]       ACTIVITY  As tolerated.  Weight bearing as tolerated    CONSULTATIONS  GIC  IR      PROCEDURES  IR-INSERT IVC FILTER WITH IG & SI   Final Result      1. Ultrasound and fluoroscopic guided placement of an Argon Option retrievable IVC filter.      2. Inferior vena cavagram within normal limits with no evidence of caval thrombus or occlusion.      US-EXTREMITY VENOUS LOWER BILAT   Final Result      DX-CHEST-PORTABLE (1 VIEW)   Final Result      No acute cardiopulmonary abnormality.            LABORATORY  Lab Results   Component Value Date    SODIUM 140 08/20/2022    POTASSIUM 3.7 08/20/2022    CHLORIDE 108 08/20/2022    CO2 22 08/20/2022    GLUCOSE 88 08/20/2022    BUN 31 (H) 08/20/2022    CREATININE 0.75 08/20/2022        Lab Results   Component Value Date    WBC 5.6 08/22/2022    HEMOGLOBIN 9.1 (L) 08/22/2022    HEMATOCRIT 27.2 (L) 08/22/2022    PLATELETCT 208 08/22/2022         Total time of the discharge process exceeds 45 minutes.

## 2022-08-22 NOTE — DISCHARGE PLANNING
Agency/Facility Name: [facility]  Spoke To: Nicole   Outcome: Pt on service with [facility] agency services.

## 2022-08-24 ENCOUNTER — HOSPITAL ENCOUNTER (OUTPATIENT)
Dept: RADIOLOGY | Facility: MEDICAL CENTER | Age: 67
End: 2022-08-24
Attending: PHYSICIAN ASSISTANT
Payer: MEDICARE

## 2022-08-24 PROCEDURE — 74018 RADEX ABDOMEN 1 VIEW: CPT

## 2022-09-01 ENCOUNTER — HOSPITAL ENCOUNTER (OUTPATIENT)
Dept: LAB | Facility: MEDICAL CENTER | Age: 67
End: 2022-09-01
Attending: PHYSICIAN ASSISTANT
Payer: MEDICARE

## 2022-09-01 LAB
ANISOCYTOSIS BLD QL SMEAR: ABNORMAL
BASOPHILS # BLD AUTO: 1.2 % (ref 0–1.8)
BASOPHILS # BLD: 0.08 K/UL (ref 0–0.12)
COMMENT 1642: NORMAL
EOSINOPHIL # BLD AUTO: 0.04 K/UL (ref 0–0.51)
EOSINOPHIL NFR BLD: 0.6 % (ref 0–6.9)
ERYTHROCYTE [DISTWIDTH] IN BLOOD BY AUTOMATED COUNT: 66.2 FL (ref 35.9–50)
FERRITIN SERPL-MCNC: 242 NG/ML (ref 10–291)
HCT VFR BLD AUTO: 35.6 % (ref 37–47)
HGB BLD-MCNC: 11.3 G/DL (ref 12–16)
IMM GRANULOCYTES # BLD AUTO: 0.02 K/UL (ref 0–0.11)
IMM GRANULOCYTES NFR BLD AUTO: 0.3 % (ref 0–0.9)
IRON SATN MFR SERPL: 28 % (ref 15–55)
IRON SERPL-MCNC: 91 UG/DL (ref 40–170)
LYMPHOCYTES # BLD AUTO: 1.06 K/UL (ref 1–4.8)
LYMPHOCYTES NFR BLD: 16.4 % (ref 22–41)
MACROCYTES BLD QL SMEAR: ABNORMAL
MCH RBC QN AUTO: 30.8 PG (ref 27–33)
MCHC RBC AUTO-ENTMCNC: 31.7 G/DL (ref 33.6–35)
MCV RBC AUTO: 97 FL (ref 81.4–97.8)
MONOCYTES # BLD AUTO: 0.35 K/UL (ref 0–0.85)
MONOCYTES NFR BLD AUTO: 5.4 % (ref 0–13.4)
MORPHOLOGY BLD-IMP: NORMAL
NEUTROPHILS # BLD AUTO: 4.93 K/UL (ref 2–7.15)
NEUTROPHILS NFR BLD: 76.1 % (ref 44–72)
NRBC # BLD AUTO: 0 K/UL
NRBC BLD-RTO: 0 /100 WBC
PLATELET # BLD AUTO: 426 K/UL (ref 164–446)
PLATELET BLD QL SMEAR: NORMAL
PMV BLD AUTO: 10.4 FL (ref 9–12.9)
RBC # BLD AUTO: 3.67 M/UL (ref 4.2–5.4)
RBC BLD AUTO: PRESENT
TIBC SERPL-MCNC: 321 UG/DL (ref 250–450)
UIBC SERPL-MCNC: 230 UG/DL (ref 110–370)
WBC # BLD AUTO: 6.5 K/UL (ref 4.8–10.8)

## 2022-09-01 PROCEDURE — 83540 ASSAY OF IRON: CPT

## 2022-09-01 PROCEDURE — 36415 COLL VENOUS BLD VENIPUNCTURE: CPT

## 2022-09-01 PROCEDURE — 85025 COMPLETE CBC W/AUTO DIFF WBC: CPT

## 2022-09-01 PROCEDURE — 83550 IRON BINDING TEST: CPT

## 2022-09-01 PROCEDURE — 82728 ASSAY OF FERRITIN: CPT

## 2022-09-04 NOTE — H&P
Hospital Medicine History & Physical Note    Date of Service  5/10/2022    Primary Care Physician  Toña Tejada P.A.-C.    Consultants  GI    Specialist Names: Dr. Pagan     Code Status  Full Code    Chief Complaint  Chief Complaint   Patient presents with   • Lower GI Bleed     Dark diarrhea since yesterday afternoon. Weakness x 2 days. Pt on Xarelto for Hx of PE.    • Shortness of Breath     SOB x 2 days.        History of Presenting Illness  Jaymie Peacock is a 66 y.o. female with past medical history of hypertension, COPD, osteoporosis, tobacco abuse, stress induced cardiomyopathy (35 ->55% EF per recent echo), recent admission for PE and LLE DVT on xarelto as well as recent GI evaluation due to anemia and occult positive stool s/p EGD which showed gastric antral erosions  who presented 5/10/2022 with increasing weakness/fatigue and black stools that started yesterday. She denies any shortness of breath, chest pain, abdominal pain, indigestion, palpitations or lightheadedness. Her last dose of xarelto was last night.     In ER, pt was tachycardic with , RR 22, BP 91//55, saturating well on RA. Labs remarkable for WBC 11.2, H/H 5.9/18.0, , BUN/Cr 68/1.26, INR 4.24. she was given IV protonix and 2 units of PRBCs and admitted for further evaluation.     GI was consulted and plan to do EGD tomorrow. Continue close monitoring of H/H and transfuse as needed.     I discussed the plan of care with patient and GI.    Review of Systems  Review of Systems   Constitutional: Positive for malaise/fatigue. Negative for fever.   HENT: Negative for sore throat.    Eyes: Negative for blurred vision and double vision.   Respiratory: Negative for cough and shortness of breath.    Cardiovascular: Positive for leg swelling (LLE). Negative for chest pain.   Gastrointestinal: Positive for blood in stool and melena. Negative for abdominal pain, nausea and vomiting.   Genitourinary: Negative for dysuria.    Musculoskeletal: Negative for myalgias.   Neurological: Positive for weakness. Negative for dizziness, focal weakness and headaches.   Psychiatric/Behavioral: Negative for substance abuse. The patient is not nervous/anxious.        Past Medical History   has a past medical history of Hypertension, Osteoporosis, and Smoking.    Surgical History   has a past surgical history that includes us-needle core bx-breast panel (Right); pr upper gi endoscopy,diagnosis (4/21/2022); pr colonoscopy,diagnostic (4/21/2022); pr upper gi endoscopy,biopsy (4/21/2022); and pr upper gi endoscopy,ctrl bleed (4/21/2022).     Family History  family history is not on file.   Family history reviewed with patient. There is no family history that is pertinent to the chief complaint.     Social History   reports that she has quit smoking. Her smoking use included cigarettes. She smoked 0.50 packs per day. She has never used smokeless tobacco. She reports current alcohol use of about 0.6 oz of alcohol per week. She reports previous drug use.    Allergies  No Known Allergies    Medications  Prior to Admission Medications   Prescriptions Last Dose Informant Patient Reported? Taking?   furosemide (LASIX) 20 MG Tab 5/9/2022 at AM Patient No No   Sig: Take 1 Tablet by mouth every day for 30 days.   mirtazapine (REMERON) 15 MG Tab 5/9/2022 at PM Patient No No   Sig: Take 1 Tablet by mouth every evening.   omeprazole (PRILOSEC) 20 MG delayed-release capsule 5/9/2022 at AM Patient No No   Sig: Take 1 Capsule by mouth every day.   potassium chloride ER (KLOR-CON) 10 MEQ tablet 5/9/2022 at AM Patient No No   Sig: Take 1 Tablet by mouth every day for 30 days.   rivaroxaban (XARELTO) 15 MG Tab tablet 5/9/2022 at PM Patient Yes Yes   Sig: Take 15 mg by mouth 2 times a day.   rivaroxaban (XARELTO) 20 MG Tab tablet NEW RX at NOT STARTED Patient No No   Sig: Take 1 Tablet by mouth with dinner.   simvastatin (ZOCOR) 20 MG Tab 5/9/2022 at PM Patient Yes No    Sig: Take 20 mg by mouth every evening. Indications: Ischemic Heart Disease      Facility-Administered Medications: None       Physical Exam  Temp:  [36.6 °C (97.9 °F)-36.9 °C (98.5 °F)] 36.6 °C (97.9 °F)  Pulse:  [] 87  Resp:  [14-22] 15  BP: ()/(44-76) 106/53  SpO2:  [90 %-100 %] 100 %  Blood Pressure : 118/55   Temperature: 36.6 °C (97.9 °F)   Pulse: 92   Respiration: 15   Pulse Oximetry: 99 %       Physical Exam  Vitals and nursing note reviewed.   Constitutional:       General: She is not in acute distress.     Appearance: She is ill-appearing.      Comments: Frail, underweight elderly femlae   HENT:      Head: Normocephalic and atraumatic.      Mouth/Throat:      Mouth: Mucous membranes are dry.   Eyes:      Extraocular Movements: Extraocular movements intact.      Conjunctiva/sclera: Conjunctivae normal.   Cardiovascular:      Rate and Rhythm: Regular rhythm. Tachycardia present.      Pulses: Normal pulses.   Pulmonary:      Effort: Pulmonary effort is normal.      Breath sounds: Normal breath sounds.   Abdominal:      General: Bowel sounds are normal. There is no distension.      Palpations: Abdomen is soft.      Tenderness: There is no abdominal tenderness. There is no guarding.   Genitourinary:     Rectum: Guaiac result positive.   Musculoskeletal:         General: Normal range of motion.      Cervical back: Neck supple.      Left lower leg: Edema present.   Skin:     General: Skin is warm.   Neurological:      General: No focal deficit present.      Mental Status: She is alert and oriented to person, place, and time.   Psychiatric:         Mood and Affect: Mood normal.         Behavior: Behavior normal.         Thought Content: Thought content normal.         Judgment: Judgment normal.         Laboratory:  Recent Labs     05/10/22  1019   WBC 11.2*   RBC 1.66*   HEMOGLOBIN 5.9*   HEMATOCRIT 18.0*   .4*   MCH 35.5*   MCHC 32.8*   RDW 81.1*   PLATELETCT 256   MPV 10.3     Recent Labs      05/10/22  1019   SODIUM 135   POTASSIUM 3.7   CHLORIDE 97   CO2 20   GLUCOSE 138*   BUN 68*   CREATININE 1.26   CALCIUM 8.7     Recent Labs     05/10/22  1019   ALTSGPT 13   ASTSGOT 24   ALKPHOSPHAT 97   TBILIRUBIN 0.5   LIPASE 29   GLUCOSE 138*     Recent Labs     05/10/22  1019   APTT 35.8   INR 4.24*     No results for input(s): NTPROBNP in the last 72 hours.      No results for input(s): TROPONINT in the last 72 hours.    Imaging:  DX-CHEST-PORTABLE (1 VIEW)   Final Result      No acute cardiopulmonary abnormality.          X-Ray:  I have personally reviewed the images and compared with prior images.  EKG:  I have personally reviewed the images and compared with prior images.    Assessment/Plan:  Justification for Admission Status  I anticipate this patient will require at least two midnights for appropriate medical management, necessitating inpatient admission because patient has acute GI bleeding, likely upper GI and requires evaluation and intervention by gastroneterology as well as monitoring of her blood levels as she is requiring blood transfusion. Her daignosis is complicated by presence of PE and DVT and she will need to be restarted on anticaogluation during admission and monitotred for acute blood loss.     * Acute GI bleeding- (present on admission)  Assessment & Plan  Presents with melana, anemia and elevated BUN, suspect Upper GI, recent EGD with gastric antral erosions   Continue protonix   2 units in ER, monitor H/H Q8H, transfuse as needed for Hgb <7  GI consulted, appreciate their help with management   Clear liquid diet, NPO at midnight for EGD tomorrow   Maintain 2 IV access sites   Holding all anticoagulation     Macrocytic anemia- (present on admission)  Assessment & Plan  Acute on chronic macrocytic anemia, suspect AOCD complicated by acute GI bleed   Vit B12 elevated   protonix   tranfusion as needed   GI consulted   EGD tomorrow, NPO at midnight     Elevated INR- (present on  admission)  Assessment & Plan  Acute GI bleed, with elevated INR >4, unclear etiology, suspect this may be due to underlying ETOH use  Will repeat INR, if remains elevated will give Kcentra given acute bleed requiring transfusion   Monitor INR     Acute deep vein thrombosis (DVT) of left lower extremity (HCC)- (present on admission)  Assessment & Plan  Pt recently diagnosed with DVT and PE, on xarelto, here with GI bleed   Holding home xarelto, transfuse as needed   Will need to discuss resumption once patient GI bleed is stabilized  Monitor closely      Pulmonary embolism and infarction (HCC)- (present on admission)  Assessment & Plan  Recent PE, xarelto being held for acute GI Bleed   Saturating well on RA, monitor respiratory status   Will need to discuss resumption of anticoagulation once GI bleed is stabilized       VTE prophylaxis: SCDs/TEDs and pharmacologic prophylaxis contraindicated due to acute GI bleed   Orthopedic

## 2022-09-28 ENCOUNTER — DOCUMENTATION (OUTPATIENT)
Dept: VASCULAR LAB | Facility: MEDICAL CENTER | Age: 67
End: 2022-09-28
Payer: MEDICARE

## 2022-10-05 ENCOUNTER — DOCUMENTATION (OUTPATIENT)
Dept: VASCULAR LAB | Facility: MEDICAL CENTER | Age: 67
End: 2022-10-05
Payer: MEDICARE

## 2022-11-15 ENCOUNTER — APPOINTMENT (OUTPATIENT)
Dept: RADIOLOGY | Facility: MEDICAL CENTER | Age: 67
DRG: 543 | End: 2022-11-15
Attending: EMERGENCY MEDICINE
Payer: MEDICARE

## 2022-11-15 ENCOUNTER — APPOINTMENT (OUTPATIENT)
Dept: RADIOLOGY | Facility: MEDICAL CENTER | Age: 67
DRG: 543 | End: 2022-11-15
Attending: GENERAL PRACTICE
Payer: MEDICARE

## 2022-11-15 ENCOUNTER — HOSPITAL ENCOUNTER (INPATIENT)
Facility: MEDICAL CENTER | Age: 67
LOS: 2 days | DRG: 543 | End: 2022-11-17
Attending: EMERGENCY MEDICINE | Admitting: GENERAL PRACTICE
Payer: MEDICARE

## 2022-11-15 DIAGNOSIS — M25.559 HIP PAIN: ICD-10-CM

## 2022-11-15 DIAGNOSIS — M84.48XA SACRAL INSUFFICIENCY FRACTURE, INITIAL ENCOUNTER: ICD-10-CM

## 2022-11-15 DIAGNOSIS — F10.930 ALCOHOL WITHDRAWAL SYNDROME WITHOUT COMPLICATION (HCC): ICD-10-CM

## 2022-11-15 DIAGNOSIS — F10.939 ALCOHOL WITHDRAWAL SYNDROME WITH COMPLICATION (HCC): ICD-10-CM

## 2022-11-15 PROBLEM — F17.200 TOBACCO USE DISORDER: Status: ACTIVE | Noted: 2022-11-15

## 2022-11-15 PROBLEM — D70.9 NEUTROPENIA (HCC): Status: ACTIVE | Noted: 2022-11-15

## 2022-11-15 PROBLEM — R74.01 TRANSAMINITIS: Status: ACTIVE | Noted: 2022-11-15

## 2022-11-15 PROBLEM — D69.6 THROMBOCYTOPENIA (HCC): Status: ACTIVE | Noted: 2022-11-15

## 2022-11-15 LAB
ALBUMIN SERPL BCP-MCNC: 4.2 G/DL (ref 3.2–4.9)
ALBUMIN/GLOB SERPL: 1.4 G/DL
ALP SERPL-CCNC: 374 U/L (ref 30–99)
ALT SERPL-CCNC: 157 U/L (ref 2–50)
ANION GAP SERPL CALC-SCNC: 16 MMOL/L (ref 7–16)
AST SERPL-CCNC: 429 U/L (ref 12–45)
BASOPHILS # BLD AUTO: 0.9 % (ref 0–1.8)
BASOPHILS # BLD: 0.03 K/UL (ref 0–0.12)
BILIRUB SERPL-MCNC: 4.1 MG/DL (ref 0.1–1.5)
BUN SERPL-MCNC: 19 MG/DL (ref 8–22)
CALCIUM SERPL-MCNC: 9.7 MG/DL (ref 8.5–10.5)
CHLORIDE SERPL-SCNC: 98 MMOL/L (ref 96–112)
CO2 SERPL-SCNC: 23 MMOL/L (ref 20–33)
CREAT SERPL-MCNC: 0.96 MG/DL (ref 0.5–1.4)
EOSINOPHIL # BLD AUTO: 0.01 K/UL (ref 0–0.51)
EOSINOPHIL NFR BLD: 0.3 % (ref 0–6.9)
ERYTHROCYTE [DISTWIDTH] IN BLOOD BY AUTOMATED COUNT: 54.4 FL (ref 35.9–50)
GFR SERPLBLD CREATININE-BSD FMLA CKD-EPI: 64 ML/MIN/1.73 M 2
GLOBULIN SER CALC-MCNC: 3 G/DL (ref 1.9–3.5)
GLUCOSE SERPL-MCNC: 86 MG/DL (ref 65–99)
HCT VFR BLD AUTO: 35.6 % (ref 37–47)
HGB BLD-MCNC: 12.5 G/DL (ref 12–16)
IMM GRANULOCYTES # BLD AUTO: 0.01 K/UL (ref 0–0.11)
IMM GRANULOCYTES NFR BLD AUTO: 0.3 % (ref 0–0.9)
LYMPHOCYTES # BLD AUTO: 0.51 K/UL (ref 1–4.8)
LYMPHOCYTES NFR BLD: 14.9 % (ref 22–41)
MCH RBC QN AUTO: 32.7 PG (ref 27–33)
MCHC RBC AUTO-ENTMCNC: 35.1 G/DL (ref 33.6–35)
MCV RBC AUTO: 93.2 FL (ref 81.4–97.8)
MONOCYTES # BLD AUTO: 0.28 K/UL (ref 0–0.85)
MONOCYTES NFR BLD AUTO: 8.2 % (ref 0–13.4)
NEUTROPHILS # BLD AUTO: 2.59 K/UL (ref 2–7.15)
NEUTROPHILS NFR BLD: 75.4 % (ref 44–72)
NRBC # BLD AUTO: 0 K/UL
NRBC BLD-RTO: 0 /100 WBC
PLATELET # BLD AUTO: 92 K/UL (ref 164–446)
PMV BLD AUTO: 11.7 FL (ref 9–12.9)
POTASSIUM SERPL-SCNC: 3.2 MMOL/L (ref 3.6–5.5)
PROT SERPL-MCNC: 7.2 G/DL (ref 6–8.2)
RBC # BLD AUTO: 3.82 M/UL (ref 4.2–5.4)
SODIUM SERPL-SCNC: 137 MMOL/L (ref 135–145)
WBC # BLD AUTO: 3.4 K/UL (ref 4.8–10.8)

## 2022-11-15 PROCEDURE — A9270 NON-COVERED ITEM OR SERVICE: HCPCS | Performed by: GENERAL PRACTICE

## 2022-11-15 PROCEDURE — 99285 EMERGENCY DEPT VISIT HI MDM: CPT

## 2022-11-15 PROCEDURE — 96366 THER/PROPH/DIAG IV INF ADDON: CPT

## 2022-11-15 PROCEDURE — 96365 THER/PROPH/DIAG IV INF INIT: CPT

## 2022-11-15 PROCEDURE — 700111 HCHG RX REV CODE 636 W/ 250 OVERRIDE (IP): Performed by: GENERAL PRACTICE

## 2022-11-15 PROCEDURE — 99223 1ST HOSP IP/OBS HIGH 75: CPT | Performed by: GENERAL PRACTICE

## 2022-11-15 PROCEDURE — A9270 NON-COVERED ITEM OR SERVICE: HCPCS | Performed by: EMERGENCY MEDICINE

## 2022-11-15 PROCEDURE — 700101 HCHG RX REV CODE 250: Performed by: EMERGENCY MEDICINE

## 2022-11-15 PROCEDURE — 73501 X-RAY EXAM HIP UNI 1 VIEW: CPT | Mod: RT

## 2022-11-15 PROCEDURE — 76705 ECHO EXAM OF ABDOMEN: CPT

## 2022-11-15 PROCEDURE — 700111 HCHG RX REV CODE 636 W/ 250 OVERRIDE (IP): Performed by: EMERGENCY MEDICINE

## 2022-11-15 PROCEDURE — 700102 HCHG RX REV CODE 250 W/ 637 OVERRIDE(OP): Performed by: EMERGENCY MEDICINE

## 2022-11-15 PROCEDURE — 770006 HCHG ROOM/CARE - MED/SURG/GYN SEMI*

## 2022-11-15 PROCEDURE — 85025 COMPLETE CBC W/AUTO DIFF WBC: CPT

## 2022-11-15 PROCEDURE — 96375 TX/PRO/DX INJ NEW DRUG ADDON: CPT

## 2022-11-15 PROCEDURE — 700102 HCHG RX REV CODE 250 W/ 637 OVERRIDE(OP): Performed by: GENERAL PRACTICE

## 2022-11-15 PROCEDURE — 73700 CT LOWER EXTREMITY W/O DYE: CPT | Mod: RT

## 2022-11-15 PROCEDURE — 36415 COLL VENOUS BLD VENIPUNCTURE: CPT

## 2022-11-15 PROCEDURE — 80053 COMPREHEN METABOLIC PANEL: CPT

## 2022-11-15 RX ORDER — OMEPRAZOLE 20 MG/1
20 CAPSULE, DELAYED RELEASE ORAL DAILY
Status: DISCONTINUED | OUTPATIENT
Start: 2022-11-16 | End: 2022-11-17 | Stop reason: HOSPADM

## 2022-11-15 RX ORDER — SIMVASTATIN 20 MG
20 TABLET ORAL EVERY EVENING
Status: DISCONTINUED | OUTPATIENT
Start: 2022-11-15 | End: 2022-11-17 | Stop reason: HOSPADM

## 2022-11-15 RX ORDER — LORAZEPAM 1 MG/1
4 TABLET ORAL
Status: DISCONTINUED | OUTPATIENT
Start: 2022-11-15 | End: 2022-11-17 | Stop reason: HOSPADM

## 2022-11-15 RX ORDER — LORAZEPAM 1 MG/1
3 TABLET ORAL
Status: DISCONTINUED | OUTPATIENT
Start: 2022-11-15 | End: 2022-11-17 | Stop reason: HOSPADM

## 2022-11-15 RX ORDER — LORAZEPAM 2 MG/ML
0.5 INJECTION INTRAMUSCULAR EVERY 4 HOURS PRN
Status: DISCONTINUED | OUTPATIENT
Start: 2022-11-15 | End: 2022-11-17 | Stop reason: HOSPADM

## 2022-11-15 RX ORDER — NICOTINE 21 MG/24HR
14 PATCH, TRANSDERMAL 24 HOURS TRANSDERMAL
Status: DISCONTINUED | OUTPATIENT
Start: 2022-11-15 | End: 2022-11-17 | Stop reason: HOSPADM

## 2022-11-15 RX ORDER — POTASSIUM CHLORIDE 20 MEQ/1
40 TABLET, EXTENDED RELEASE ORAL ONCE
Status: COMPLETED | OUTPATIENT
Start: 2022-11-15 | End: 2022-11-15

## 2022-11-15 RX ORDER — ONDANSETRON 2 MG/ML
4 INJECTION INTRAMUSCULAR; INTRAVENOUS EVERY 6 HOURS PRN
Status: DISCONTINUED | OUTPATIENT
Start: 2022-11-15 | End: 2022-11-17 | Stop reason: HOSPADM

## 2022-11-15 RX ORDER — BISACODYL 10 MG
10 SUPPOSITORY, RECTAL RECTAL
Status: DISCONTINUED | OUTPATIENT
Start: 2022-11-15 | End: 2022-11-17 | Stop reason: HOSPADM

## 2022-11-15 RX ORDER — POLYETHYLENE GLYCOL 3350 17 G/17G
1 POWDER, FOR SOLUTION ORAL
Status: DISCONTINUED | OUTPATIENT
Start: 2022-11-15 | End: 2022-11-17 | Stop reason: HOSPADM

## 2022-11-15 RX ORDER — LIDOCAINE 50 MG/G
1 PATCH TOPICAL ONCE
Status: COMPLETED | OUTPATIENT
Start: 2022-11-15 | End: 2022-11-16

## 2022-11-15 RX ORDER — LORAZEPAM 2 MG/ML
1.5 INJECTION INTRAMUSCULAR
Status: DISCONTINUED | OUTPATIENT
Start: 2022-11-15 | End: 2022-11-17 | Stop reason: HOSPADM

## 2022-11-15 RX ORDER — GAUZE BANDAGE 2" X 2"
100 BANDAGE TOPICAL DAILY
Status: DISCONTINUED | OUTPATIENT
Start: 2022-11-15 | End: 2022-11-17 | Stop reason: HOSPADM

## 2022-11-15 RX ORDER — PANTOPRAZOLE SODIUM 20 MG/1
20 TABLET, DELAYED RELEASE ORAL DAILY
COMMUNITY
End: 2023-05-28

## 2022-11-15 RX ORDER — FOLIC ACID 1 MG/1
1 TABLET ORAL DAILY
Status: DISCONTINUED | OUTPATIENT
Start: 2022-11-15 | End: 2022-11-17 | Stop reason: HOSPADM

## 2022-11-15 RX ORDER — LORAZEPAM 1 MG/1
0.5 TABLET ORAL ONCE
Status: COMPLETED | OUTPATIENT
Start: 2022-11-15 | End: 2022-11-15

## 2022-11-15 RX ORDER — LORAZEPAM 2 MG/ML
1 INJECTION INTRAMUSCULAR
Status: DISCONTINUED | OUTPATIENT
Start: 2022-11-15 | End: 2022-11-17 | Stop reason: HOSPADM

## 2022-11-15 RX ORDER — LORAZEPAM 2 MG/ML
0.5 INJECTION INTRAMUSCULAR ONCE
Status: DISCONTINUED | OUTPATIENT
Start: 2022-11-15 | End: 2022-11-15

## 2022-11-15 RX ORDER — LORAZEPAM 1 MG/1
2 TABLET ORAL
Status: DISCONTINUED | OUTPATIENT
Start: 2022-11-15 | End: 2022-11-17 | Stop reason: HOSPADM

## 2022-11-15 RX ORDER — LORAZEPAM 1 MG/1
1 TABLET ORAL EVERY 4 HOURS PRN
Status: DISCONTINUED | OUTPATIENT
Start: 2022-11-15 | End: 2022-11-17 | Stop reason: HOSPADM

## 2022-11-15 RX ORDER — LORAZEPAM 2 MG/ML
2 INJECTION INTRAMUSCULAR
Status: DISCONTINUED | OUTPATIENT
Start: 2022-11-15 | End: 2022-11-17 | Stop reason: HOSPADM

## 2022-11-15 RX ORDER — PANTOPRAZOLE SODIUM 20 MG/1
20 TABLET, DELAYED RELEASE ORAL DAILY
Status: DISCONTINUED | OUTPATIENT
Start: 2022-11-15 | End: 2022-11-15

## 2022-11-15 RX ORDER — AMOXICILLIN 250 MG
2 CAPSULE ORAL 2 TIMES DAILY
Status: DISCONTINUED | OUTPATIENT
Start: 2022-11-15 | End: 2022-11-17 | Stop reason: HOSPADM

## 2022-11-15 RX ORDER — LORAZEPAM 0.5 MG/1
0.5 TABLET ORAL EVERY 4 HOURS PRN
Status: DISCONTINUED | OUTPATIENT
Start: 2022-11-15 | End: 2022-11-17 | Stop reason: HOSPADM

## 2022-11-15 RX ORDER — ENOXAPARIN SODIUM 100 MG/ML
40 INJECTION SUBCUTANEOUS DAILY
Status: DISCONTINUED | OUTPATIENT
Start: 2022-11-15 | End: 2022-11-15

## 2022-11-15 RX ORDER — KETOROLAC TROMETHAMINE 30 MG/ML
15 INJECTION, SOLUTION INTRAMUSCULAR; INTRAVENOUS ONCE
Status: COMPLETED | OUTPATIENT
Start: 2022-11-15 | End: 2022-11-15

## 2022-11-15 RX ADMIN — LIDOCAINE 1 PATCH: 50 PATCH TOPICAL at 14:28

## 2022-11-15 RX ADMIN — KETOROLAC TROMETHAMINE 15 MG: 30 INJECTION, SOLUTION INTRAMUSCULAR; INTRAVENOUS at 15:31

## 2022-11-15 RX ADMIN — NICOTINE 14 MG: 14 PATCH TRANSDERMAL at 21:01

## 2022-11-15 RX ADMIN — THERA TABS 1 TABLET: TAB at 21:01

## 2022-11-15 RX ADMIN — SIMVASTATIN 20 MG: 20 TABLET, FILM COATED ORAL at 21:03

## 2022-11-15 RX ADMIN — LORAZEPAM 0.5 MG: 0.5 TABLET ORAL at 21:04

## 2022-11-15 RX ADMIN — LORAZEPAM 0.5 MG: 1 TABLET ORAL at 14:28

## 2022-11-15 RX ADMIN — THIAMINE HYDROCHLORIDE: 100 INJECTION, SOLUTION INTRAMUSCULAR; INTRAVENOUS at 15:32

## 2022-11-15 RX ADMIN — POTASSIUM CHLORIDE 40 MEQ: 1500 TABLET, EXTENDED RELEASE ORAL at 17:49

## 2022-11-15 RX ADMIN — FOLIC ACID 1 MG: 1 TABLET ORAL at 21:02

## 2022-11-15 RX ADMIN — LORAZEPAM 0.5 MG: 2 INJECTION INTRAMUSCULAR; INTRAVENOUS at 17:50

## 2022-11-15 RX ADMIN — Medication 100 MG: at 21:03

## 2022-11-15 ASSESSMENT — LIFESTYLE VARIABLES
TOTAL SCORE: 2
ORIENTATION AND CLOUDING OF SENSORIUM: ORIENTED AND CAN DO SERIAL ADDITIONS
EVER FELT BAD OR GUILTY ABOUT YOUR DRINKING: NO
HAVE YOU EVER FELT YOU SHOULD CUT DOWN ON YOUR DRINKING: YES
CONSUMPTION TOTAL: POSITIVE
AGITATION: *
ALCOHOL_USE: NO
TOTAL SCORE: 6
NAUSEA AND VOMITING: MILD NAUSEA WITH NO VOMITING
ORIENTATION AND CLOUDING OF SENSORIUM: ORIENTED AND CAN DO SERIAL ADDITIONS
HEADACHE, FULLNESS IN HEAD: VERY MILD
VISUAL DISTURBANCES: NOT PRESENT
AUDITORY DISTURBANCES: NOT PRESENT
HEADACHE, FULLNESS IN HEAD: VERY MILD
TREMOR: MODERATE TREMOR WITH ARMS EXTENDED
AVERAGE NUMBER OF DAYS PER WEEK YOU HAVE A DRINK CONTAINING ALCOHOL: 4
TREMOR: MODERATE TREMOR WITH ARMS EXTENDED
TOTAL SCORE: 2
EVER HAD A DRINK FIRST THING IN THE MORNING TO STEADY YOUR NERVES TO GET RID OF A HANGOVER: NO
AUDITORY DISTURBANCES: NOT PRESENT
NAUSEA AND VOMITING: NO NAUSEA AND NO VOMITING
ANXIETY: MILDLY ANXIOUS
VISUAL DISTURBANCES: NOT PRESENT
PAROXYSMAL SWEATS: NO SWEAT VISIBLE
HOW MANY TIMES IN THE PAST YEAR HAVE YOU HAD 5 OR MORE DRINKS IN A DAY: 2
TOTAL SCORE: 2
ANXIETY: MILDLY ANXIOUS
AGITATION: NORMAL ACTIVITY
TOTAL SCORE: 9
DOES PATIENT WANT TO STOP DRINKING: NO
HAVE PEOPLE ANNOYED YOU BY CRITICIZING YOUR DRINKING: YES
PAROXYSMAL SWEATS: NO SWEAT VISIBLE
ON A TYPICAL DAY WHEN YOU DRINK ALCOHOL HOW MANY DRINKS DO YOU HAVE: 2

## 2022-11-15 ASSESSMENT — COGNITIVE AND FUNCTIONAL STATUS - GENERAL
TURNING FROM BACK TO SIDE WHILE IN FLAT BAD: A LITTLE
TOILETING: A LITTLE
PERSONAL GROOMING: A LITTLE
STANDING UP FROM CHAIR USING ARMS: A LITTLE
WALKING IN HOSPITAL ROOM: A LITTLE
HELP NEEDED FOR BATHING: A LITTLE
MOVING TO AND FROM BED TO CHAIR: A LITTLE
SUGGESTED CMS G CODE MODIFIER MOBILITY: CK
MOBILITY SCORE: 18
DRESSING REGULAR LOWER BODY CLOTHING: A LOT
SUGGESTED CMS G CODE MODIFIER DAILY ACTIVITY: CK
DRESSING REGULAR UPPER BODY CLOTHING: A LITTLE
CLIMB 3 TO 5 STEPS WITH RAILING: A LITTLE
MOVING FROM LYING ON BACK TO SITTING ON SIDE OF FLAT BED: A LITTLE
EATING MEALS: A LITTLE
DAILY ACTIVITIY SCORE: 17

## 2022-11-15 ASSESSMENT — ENCOUNTER SYMPTOMS
NAUSEA: 0
ABDOMINAL PAIN: 0
FEVER: 0
SENSORY CHANGE: 0
HEADACHES: 0
VOMITING: 0
FALLS: 1
BACK PAIN: 1

## 2022-11-15 ASSESSMENT — FIBROSIS 4 INDEX
FIB4 SCORE: 25.31
FIB4 SCORE: 1.51

## 2022-11-15 ASSESSMENT — PAIN DESCRIPTION - PAIN TYPE
TYPE: ACUTE PAIN
TYPE: ACUTE PAIN

## 2022-11-15 NOTE — ED TRIAGE NOTES
"Jaymie Oneal Ayush  67 y.o. female  Chief Complaint   Patient presents with    GLF     Pt fell on Sunday,    Hip Pain     R hip pain after fall    Foot Pain     Pt reports \"neuropathy\" pain to bilateral feet       Pt BIB [facility] from home to ED Lobby. Pt to triage via wheelchair for above complaint. Pt to scale with one person assist. Pt reports she was attempting to \"straighten up\" her bed when she fell.  Denies head trauma and LOC. Pt struck her hip on the ground.  Pt is alert and oriented, speaking in full sentences, follows commands and responds appropriately to questions. Not in any apparent distress. Respirations are even and unlabored.  Pt placed in lobby. Pt educated on triage process. Pt encouraged to alert staff for any changes.  This RN in appropriate PPE during encounter.  Pt placed in mask as well    "

## 2022-11-15 NOTE — ED PROVIDER NOTES
"ED Provider Note    Primary care provider: Toña Tejada P.A.-C.  Means of arrival: EMS  History obtained from: patient  History limited by: none    CHIEF COMPLAINT  Chief Complaint   Patient presents with    GLF     Pt fell on Sunday,    Hip Pain     R hip pain after fall    Foot Pain     Pt reports \"neuropathy\" pain to bilateral feet       HPI  Jaymie Peacock is a 67 y.o. female with history of alcohol dependence who presents to the Emergency Department for evaluation of right hip pain after fall.  Patient reports 2 days ago she fell and hurt her right hip.  Overall her pain has been improving but she wanted to get it checked out she does have a history of hip replacement to this hip.  However she reports despite the pain improving she has been unable to walk or bear weight on the right lower extremity for the past 2 days she did come in by ambulance as she has been unable to ambulate around the house.  Denies numbness, tingling or weakness.  She reports that she has chronic foot pain to her bilateral feet which is her neuropathy, denies any acute pain or other injuries.  She not hit her head or lose consciousness.  Of note patient is noted to be slightly tremulous during my assessment and reports that she is a daily drinker.  Reports, \"I only drink a couple a day.\"  Per record review patient does have a history of chronic alcohol dependence.    Per chart review patient was last hospitalized in August 2022 for hematemesis and GI bleed.  She was found to have a Crystal-Payan tear during endoscopy which was repaired.  Given her significant GI bleed an IVC filter was placed as she does have a history of DVT and pulmonary embolism, anticoagulation was stopped.    REVIEW OF SYSTEMS  Review of Systems   Constitutional:  Negative for fever.   Cardiovascular:  Negative for chest pain.   Gastrointestinal:  Negative for abdominal pain, nausea and vomiting.   Musculoskeletal:         Positive for right hip pain "   Neurological:  Negative for sensory change and headaches.   All other systems reviewed and are negative.      PAST MEDICAL HISTORY   has a past medical history of DVT (deep venous thrombosis) (HCC), GI bleed, High cholesterol, Hypertension, Osteoporosis, Pulmonary embolism (HCC), and Smoking.    SURGICAL HISTORY   has a past surgical history that includes us-needle core bx-breast panel (Right); upper gi endoscopy,diagnosis (04/21/2022); colonoscopy,diagnostic (04/21/2022); upper gi endoscopy,biopsy (04/21/2022); upper gi endoscopy,ctrl bleed (04/21/2022); upper gi endoscopy,ctrl bleed (N/A, 05/11/2022); gastroscopy w/push enterscopy (N/A, 05/11/2022); hip replacement, partial (Right); upper gi endoscopy,diagnosis (N/A, 6/15/2022); upper gi endoscopy,diagnosis (N/A, 7/31/2022); upper gi endoscopy,scler inject (N/A, 7/31/2022); upper gi endoscopy,ctrl bleed (N/A, 7/31/2022); upper gi endoscopy,diagnosis (N/A, 8/20/2022); and upper gi endoscopy,ctrl bleed (N/A, 8/20/2022).    SOCIAL HISTORY  Social History     Tobacco Use    Smoking status: Every Day     Packs/day: 0.50     Types: Cigarettes    Smokeless tobacco: Never   Vaping Use    Vaping Use: Never used   Substance Use Topics    Alcohol use: Yes     Alcohol/week: 9.6 oz     Types: 16 Shots of liquor per week    Drug use: Not Currently      Social History     Substance and Sexual Activity   Drug Use Not Currently       FAMILY HISTORY  History reviewed. No pertinent family history.    CURRENT MEDICATIONS  Home Medications       Reviewed by Paola Coulter (Pharmacy Tech) on 11/15/22 at 1638  Med List Status: Complete     Medication Last Dose Status   Multiple Vitamins-Minerals (CENTRUM FRESH/FRUITY 50+) Chew Tab 11/14/2022 Active   ondansetron (ZOFRAN ODT) 4 MG TABLET DISPERSIBLE PRN Active   pantoprazole (PROTONIX) 20 MG tablet FEW DAYS AGO Active   simvastatin (ZOCOR) 20 MG Tab 11/14/2022 Active                    ALLERGIES  none    PHYSICAL EXAM  VITAL SIGNS:  BP (!) 134/96   Pulse (!) 113   Temp 36.7 °C (98 °F) (Temporal)   Resp 18   Wt 48 kg (105 lb 13.1 oz)   SpO2 98%   BMI 17.08 kg/m²   Vitals reviewed by myself.  Physical Exam  Nursing note and vitals reviewed.  Constitutional: Well-developed and well-nourished.  Patient is mildly tremulous on exam  HENT: Head is normocephalic and atraumatic.  Eyes: extra-ocular movements intact, scleral icterus is present  Cardiovascular: tachycardic rate and regular rhythm. No murmur heard.  Pulmonary/Chest: Breath sounds normal. No wheezes or rales.   Musculoskeletal: Extremities exhibit normal range of motion without edema or tenderness.  Patient has full range of motion of her right hip. No obvious deformity or tenderness  Neurological: Awake and alert, sensation intact in all extremities  Skin: Skin is warm and dry. No rash.  Jaundice        DIAGNOSTIC STUDIES /  LABS  Labs Reviewed   CBC WITH DIFFERENTIAL - Abnormal; Notable for the following components:       Result Value    WBC 3.4 (*)     RBC 3.82 (*)     Hematocrit 35.6 (*)     MCHC 35.1 (*)     RDW 54.4 (*)     Platelet Count 92 (*)     Neutrophils-Polys 75.40 (*)     Lymphocytes 14.90 (*)     Lymphs (Absolute) 0.51 (*)     All other components within normal limits   COMP METABOLIC PANEL - Abnormal; Notable for the following components:    Potassium 3.2 (*)     AST(SGOT) 429 (*)     ALT(SGPT) 157 (*)     Alkaline Phosphatase 374 (*)     Total Bilirubin 4.1 (*)     All other components within normal limits   ESTIMATED GFR       All labs reviewed by me.      RADIOLOGY  CT-HIP W/O PLUS RECONS RIGHT   Final Result      1.  Heterogeneity of the bilateral sacrum could represent sacral insufficiency fractures.   2.  Heterogeneity of the L5 vertebral body is also noted. Consider evaluation for marrow infiltrative process or metastatic disease.   3.  Right hip arthroplasty. The acetabular component appears somewhat malaligned. Recommend correlation with prior imaging.       DX-HIP-UNILATERAL-WITH PELVIS-1 VIEW RIGHT   Final Result      1.  Right hip arthroplasty.   2.  Small osseous fragment adjacent to the right femoral lesser trochanter, likely chronic. Avulsion fracture cannot be entirely excluded given lack of prior imaging.   3.  Osteopenia.      US-RUQ    (Results Pending)     The radiologist's interpretation of all radiological studies have been reviewed by me.        COURSE & MEDICAL DECISION MAKING  Nursing notes, VS, PMSFHx reviewed in chart.    Patient is a 67-year-old female who comes in for evaluation of right hip pain after fall 2 days ago.  Differential diagnosis includes strain, sprain, fracture, dislocation.  Diagnostic work-up includes right hip x-ray.  Of note patient's vitals are notable for slight tachycardia, she is mildly tremulous on exam, she appears to have mild signs of withdrawal.  She is treated with Ativan which will also help her musculoskeletal pain and lidocaine patch.  Right hip x-ray is ordered for further work-up.    Patient's initial vitals notable for tachycardia and hypertension likely secondary to her withdrawal.  She is noted to be jaundiced.  Therefore she is treated with Ativan and detox bag empirically for alcohol withdrawal.  She is also treated with lidocaine patch and Toradol for her hip pain.  X-ray returns demonstrates a small osseous fragment adjacent to the right femoral lesser trochanter, avulsion fracture cannot be excluded and therefore I obtained a CT.  Also given her ongoing tremulousness I obtain labs to assess for electrolyte derangement related to alcohol abuse and withdrawal.  CT returns and demonstrates acetabular component of the right hip arthroplasty to possibly be malaligned, patient also has bilateral sacral insufficiency fractures.  I spoke with orthopedics on-call Dr. lFoyd who advises that the arthroplasty looks well-seated, no acute abnormalities of the hip.  Sacral insufficiency fractures will heal, there is no  acute surgical intervention.  Patient can be weightbearing as tolerated.  Unfortunately she is having difficulty with weightbearing and will need to be hospitalized for ongoing management of alcohol withdrawal and difficulty bearing weight in her right lower extremity.  Patient is amenable to this plan.  Labs returned and are notable for elevated LFTs consistent with likely alcohol abuse, she is jaundiced and this is consistent with her elevated bilirubin.  I discussed the case with hospitalist Dr. Tang who has accepted patient for hospitalization.  Patient is in guarded condition.      HYDRATION: Based on the patient's presentation of Tachycardia the patient was given IV fluids. IV Hydration was used because oral hydration was not adequate alone. Upon recheck following hydration, the patient was improved.            FINAL IMPRESSION  1. Alcohol withdrawal syndrome without complication (HCC)    2. Hip pain    3. Sacral insufficiency fracture, initial encounter

## 2022-11-16 ENCOUNTER — APPOINTMENT (OUTPATIENT)
Dept: RADIOLOGY | Facility: MEDICAL CENTER | Age: 67
DRG: 543 | End: 2022-11-16
Attending: GENERAL PRACTICE
Payer: MEDICARE

## 2022-11-16 PROBLEM — D61.818 PANCYTOPENIA (HCC): Status: ACTIVE | Noted: 2022-11-16

## 2022-11-16 PROBLEM — K70.10 ALCOHOLIC HEPATITIS: Status: ACTIVE | Noted: 2022-11-15

## 2022-11-16 PROBLEM — R17 ELEVATED BILIRUBIN: Status: ACTIVE | Noted: 2022-11-16

## 2022-11-16 PROBLEM — E83.39 HYPOPHOSPHATEMIA: Status: ACTIVE | Noted: 2022-11-16

## 2022-11-16 LAB
ALBUMIN SERPL BCP-MCNC: 3.4 G/DL (ref 3.2–4.9)
ALBUMIN/GLOB SERPL: 1.2 G/DL
ALP SERPL-CCNC: 336 U/L (ref 30–99)
ALT SERPL-CCNC: 124 U/L (ref 2–50)
ANION GAP SERPL CALC-SCNC: 14 MMOL/L (ref 7–16)
AST SERPL-CCNC: 341 U/L (ref 12–45)
BILIRUB SERPL-MCNC: 3 MG/DL (ref 0.1–1.5)
BUN SERPL-MCNC: 18 MG/DL (ref 8–22)
CALCIUM SERPL-MCNC: 9.4 MG/DL (ref 8.5–10.5)
CHLORIDE SERPL-SCNC: 102 MMOL/L (ref 96–112)
CO2 SERPL-SCNC: 23 MMOL/L (ref 20–33)
CREAT SERPL-MCNC: 0.94 MG/DL (ref 0.5–1.4)
ERYTHROCYTE [DISTWIDTH] IN BLOOD BY AUTOMATED COUNT: 55.8 FL (ref 35.9–50)
GFR SERPLBLD CREATININE-BSD FMLA CKD-EPI: 66 ML/MIN/1.73 M 2
GLOBULIN SER CALC-MCNC: 2.8 G/DL (ref 1.9–3.5)
GLUCOSE SERPL-MCNC: 76 MG/DL (ref 65–99)
HAV IGM SERPL QL IA: NORMAL
HBV CORE IGM SER QL: NORMAL
HBV SURFACE AG SER QL: NORMAL
HCT VFR BLD AUTO: 33.8 % (ref 37–47)
HCV AB SER QL: NORMAL
HGB BLD-MCNC: 11.5 G/DL (ref 12–16)
HIV 1+2 AB+HIV1 P24 AG SERPL QL IA: NORMAL
MAGNESIUM SERPL-MCNC: 1.2 MG/DL (ref 1.5–2.5)
MCH RBC QN AUTO: 32.4 PG (ref 27–33)
MCHC RBC AUTO-ENTMCNC: 34 G/DL (ref 33.6–35)
MCV RBC AUTO: 95.2 FL (ref 81.4–97.8)
PHOSPHATE SERPL-MCNC: 1.1 MG/DL (ref 2.5–4.5)
PLATELET # BLD AUTO: 88 K/UL (ref 164–446)
PMV BLD AUTO: 11.9 FL (ref 9–12.9)
POTASSIUM SERPL-SCNC: 3.7 MMOL/L (ref 3.6–5.5)
PROT SERPL-MCNC: 6.2 G/DL (ref 6–8.2)
RBC # BLD AUTO: 3.55 M/UL (ref 4.2–5.4)
SODIUM SERPL-SCNC: 139 MMOL/L (ref 135–145)
WBC # BLD AUTO: 2.5 K/UL (ref 4.8–10.8)

## 2022-11-16 PROCEDURE — 80053 COMPREHEN METABOLIC PANEL: CPT

## 2022-11-16 PROCEDURE — 83735 ASSAY OF MAGNESIUM: CPT

## 2022-11-16 PROCEDURE — 770006 HCHG ROOM/CARE - MED/SURG/GYN SEMI*

## 2022-11-16 PROCEDURE — 36415 COLL VENOUS BLD VENIPUNCTURE: CPT

## 2022-11-16 PROCEDURE — A9270 NON-COVERED ITEM OR SERVICE: HCPCS | Performed by: STUDENT IN AN ORGANIZED HEALTH CARE EDUCATION/TRAINING PROGRAM

## 2022-11-16 PROCEDURE — 99233 SBSQ HOSP IP/OBS HIGH 50: CPT | Performed by: STUDENT IN AN ORGANIZED HEALTH CARE EDUCATION/TRAINING PROGRAM

## 2022-11-16 PROCEDURE — 700111 HCHG RX REV CODE 636 W/ 250 OVERRIDE (IP): Performed by: STUDENT IN AN ORGANIZED HEALTH CARE EDUCATION/TRAINING PROGRAM

## 2022-11-16 PROCEDURE — 85027 COMPLETE CBC AUTOMATED: CPT

## 2022-11-16 PROCEDURE — 97162 PT EVAL MOD COMPLEX 30 MIN: CPT

## 2022-11-16 PROCEDURE — A9576 INJ PROHANCE MULTIPACK: HCPCS | Performed by: GENERAL PRACTICE

## 2022-11-16 PROCEDURE — 700102 HCHG RX REV CODE 250 W/ 637 OVERRIDE(OP): Performed by: GENERAL PRACTICE

## 2022-11-16 PROCEDURE — 80074 ACUTE HEPATITIS PANEL: CPT

## 2022-11-16 PROCEDURE — 700102 HCHG RX REV CODE 250 W/ 637 OVERRIDE(OP): Performed by: STUDENT IN AN ORGANIZED HEALTH CARE EDUCATION/TRAINING PROGRAM

## 2022-11-16 PROCEDURE — 97165 OT EVAL LOW COMPLEX 30 MIN: CPT

## 2022-11-16 PROCEDURE — A9270 NON-COVERED ITEM OR SERVICE: HCPCS | Performed by: GENERAL PRACTICE

## 2022-11-16 PROCEDURE — 87389 HIV-1 AG W/HIV-1&-2 AB AG IA: CPT

## 2022-11-16 PROCEDURE — 84100 ASSAY OF PHOSPHORUS: CPT

## 2022-11-16 PROCEDURE — 72158 MRI LUMBAR SPINE W/O & W/DYE: CPT

## 2022-11-16 PROCEDURE — 700117 HCHG RX CONTRAST REV CODE 255: Performed by: GENERAL PRACTICE

## 2022-11-16 RX ORDER — MAGNESIUM SULFATE HEPTAHYDRATE 40 MG/ML
4 INJECTION, SOLUTION INTRAVENOUS ONCE
Status: DISCONTINUED | OUTPATIENT
Start: 2022-11-16 | End: 2022-11-17 | Stop reason: HOSPADM

## 2022-11-16 RX ADMIN — THERA TABS 1 TABLET: TAB at 06:10

## 2022-11-16 RX ADMIN — FOLIC ACID 1 MG: 1 TABLET ORAL at 06:10

## 2022-11-16 RX ADMIN — GADOTERIDOL 10 ML: 279.3 INJECTION, SOLUTION INTRAVENOUS at 19:14

## 2022-11-16 RX ADMIN — SENNOSIDES AND DOCUSATE SODIUM 2 TABLET: 50; 8.6 TABLET ORAL at 06:11

## 2022-11-16 RX ADMIN — DIBASIC SODIUM PHOSPHATE, MONOBASIC POTASSIUM PHOSPHATE AND MONOBASIC SODIUM PHOSPHATE 500 MG: 852; 155; 130 TABLET ORAL at 15:11

## 2022-11-16 RX ADMIN — Medication 100 MG: at 06:10

## 2022-11-16 RX ADMIN — OMEPRAZOLE 20 MG: 20 CAPSULE, DELAYED RELEASE ORAL at 06:11

## 2022-11-16 RX ADMIN — SIMVASTATIN 20 MG: 20 TABLET, FILM COATED ORAL at 17:53

## 2022-11-16 RX ADMIN — NICOTINE 14 MG: 14 PATCH TRANSDERMAL at 06:11

## 2022-11-16 RX ADMIN — DIBASIC SODIUM PHOSPHATE, MONOBASIC POTASSIUM PHOSPHATE AND MONOBASIC SODIUM PHOSPHATE 500 MG: 852; 155; 130 TABLET ORAL at 17:53

## 2022-11-16 ASSESSMENT — LIFESTYLE VARIABLES
ORIENTATION AND CLOUDING OF SENSORIUM: ORIENTED AND CAN DO SERIAL ADDITIONS
AUDITORY DISTURBANCES: NOT PRESENT
PAROXYSMAL SWEATS: NO SWEAT VISIBLE
ANXIETY: NO ANXIETY (AT EASE)
VISUAL DISTURBANCES: NOT PRESENT
AGITATION: NORMAL ACTIVITY
HEADACHE, FULLNESS IN HEAD: NOT PRESENT
NAUSEA AND VOMITING: NO NAUSEA AND NO VOMITING
ANXIETY: NO ANXIETY (AT EASE)
ANXIETY: NO ANXIETY (AT EASE)
NAUSEA AND VOMITING: NO NAUSEA AND NO VOMITING
ANXIETY: NO ANXIETY (AT EASE)
HEADACHE, FULLNESS IN HEAD: NOT PRESENT
VISUAL DISTURBANCES: NOT PRESENT
AGITATION: NORMAL ACTIVITY
AUDITORY DISTURBANCES: NOT PRESENT
TOTAL SCORE: 4
AUDITORY DISTURBANCES: NOT PRESENT
NAUSEA AND VOMITING: NO NAUSEA AND NO VOMITING
ORIENTATION AND CLOUDING OF SENSORIUM: CANNOT DO SERIAL ADDITIONS OR IS UNCERTAIN ABOUT DATE
ORIENTATION AND CLOUDING OF SENSORIUM: ORIENTED AND CAN DO SERIAL ADDITIONS
TREMOR: *
TREMOR: *
NAUSEA AND VOMITING: NO NAUSEA AND NO VOMITING
AUDITORY DISTURBANCES: NOT PRESENT
PAROXYSMAL SWEATS: NO SWEAT VISIBLE
HEADACHE, FULLNESS IN HEAD: NOT PRESENT
TOTAL SCORE: 3
PAROXYSMAL SWEATS: NO SWEAT VISIBLE
AGITATION: NORMAL ACTIVITY
ORIENTATION AND CLOUDING OF SENSORIUM: CANNOT DO SERIAL ADDITIONS OR IS UNCERTAIN ABOUT DATE
PAROXYSMAL SWEATS: NO SWEAT VISIBLE
TREMOR: MODERATE TREMOR WITH ARMS EXTENDED
NAUSEA AND VOMITING: NO NAUSEA AND NO VOMITING
ORIENTATION AND CLOUDING OF SENSORIUM: ORIENTED AND CAN DO SERIAL ADDITIONS
TREMOR: *
PAROXYSMAL SWEATS: NO SWEAT VISIBLE
HEADACHE, FULLNESS IN HEAD: NOT PRESENT
TOTAL SCORE: 3
TOTAL SCORE: 4
PAROXYSMAL SWEATS: NO SWEAT VISIBLE
ORIENTATION AND CLOUDING OF SENSORIUM: ORIENTED AND CAN DO SERIAL ADDITIONS
TOTAL SCORE: 3
TOTAL SCORE: 3
ANXIETY: NO ANXIETY (AT EASE)
AGITATION: NORMAL ACTIVITY
NAUSEA AND VOMITING: NO NAUSEA AND NO VOMITING
AGITATION: NORMAL ACTIVITY
VISUAL DISTURBANCES: NOT PRESENT
VISUAL DISTURBANCES: NOT PRESENT
AUDITORY DISTURBANCES: NOT PRESENT
AGITATION: NORMAL ACTIVITY
ANXIETY: NO ANXIETY (AT EASE)
TREMOR: *
VISUAL DISTURBANCES: NOT PRESENT
TREMOR: *
AUDITORY DISTURBANCES: NOT PRESENT
HEADACHE, FULLNESS IN HEAD: NOT PRESENT
HEADACHE, FULLNESS IN HEAD: NOT PRESENT
VISUAL DISTURBANCES: NOT PRESENT

## 2022-11-16 ASSESSMENT — COGNITIVE AND FUNCTIONAL STATUS - GENERAL
DAILY ACTIVITIY SCORE: 18
SUGGESTED CMS G CODE MODIFIER MOBILITY: CH
PERSONAL GROOMING: A LITTLE
TOILETING: A LITTLE
EATING MEALS: A LITTLE
DRESSING REGULAR UPPER BODY CLOTHING: A LITTLE
MOBILITY SCORE: 24
HELP NEEDED FOR BATHING: A LITTLE
DRESSING REGULAR LOWER BODY CLOTHING: A LITTLE
SUGGESTED CMS G CODE MODIFIER DAILY ACTIVITY: CK

## 2022-11-16 ASSESSMENT — ENCOUNTER SYMPTOMS
NAUSEA: 0
CHILLS: 0
ABDOMINAL PAIN: 0
BACK PAIN: 1
DIARRHEA: 0
FALLS: 1
HEADACHES: 0
DEPRESSION: 0
VOMITING: 0
FEVER: 0
DIZZINESS: 0

## 2022-11-16 ASSESSMENT — GAIT ASSESSMENTS
DISTANCE (FEET): 75
GAIT LEVEL OF ASSIST: SUPERVISED
ASSISTIVE DEVICE: FRONT WHEEL WALKER

## 2022-11-16 ASSESSMENT — ACTIVITIES OF DAILY LIVING (ADL): TOILETING: INDEPENDENT

## 2022-11-16 NOTE — THERAPY
Physical Therapy   Initial Evaluation     Patient Name: Jaymie Peacock  Age:  67 y.o., Sex:  female  Medical Record #: [id number]  Today's Date: 11/16/2022          Assessment  Patient is 67 y.o. female admitted w/ c/o right hip pain after a GLF.  Found to have a non operative sacral fx, possible avulsion fx, WBAT.  Hx of HTN, ETOH, PE, LE DVT, right foot drop times one year.  She lives alone in a single story house w/ two steps to enter.  She has a friend who comes by her house twice a week to drive her to appointments and do household chores.  She was indep PTA w/ a fww.  Today, she is able to move in/ouf of bed w/o assist and w/o use of bed features.  She is able to stand w/o assist and ambulate 75 feet times two.  Pt denied the suggestion of performing stairs prior to d/c, stating that they would not be a problem.  She also reports that she only walks a short distance at home before needing to sit and rest.  She does appear to be at her PLOF.  She would like to d/c home w/ home health  Plan    Recommend Physical Therapy for Evaluation only     DC Equipment Recommendations: None  Discharge Recommendations: Recommend home health for continued physical therapy services         Objective       11/16/22 1001   Prior Living Situation   Housing / Facility 1 Story House   Steps Into Home 2   Steps In Home 0   Rail Right Rail (Steps into Home)   Equipment Owned Front-Wheel Walker   Lives with - Patient's Self Care Capacity Alone and Able to Care For Self  (Has a friend who does house chores and drives pt)   Prior Level of Functional Mobility   Bed Mobility Independent   Transfer Status Independent   Ambulation Independent   Assistive Devices Used Front-Wheel Walker   Stairs Independent   Balance Assessment   Sitting Balance (Static) Fair +   Sitting Balance (Dynamic) Fair +   Standing Balance (Static) Fair   Standing Balance (Dynamic) Fair   Weight Shift Sitting Good   Weight Shift Standing Good   Comments w/ fww   Gait  Analysis   Gait Level Of Assist Supervised   Assistive Device Front Wheel Walker   Distance (Feet) 75   # of Times Distance was Traveled 2   Level of Assist with Stairs   (declines the need to perform stating they wont be an issue when d/c'd)   Weight Bearing Status WBAT   Bed Mobility    Supine to Sit Supervised   Sit to Supine Supervised   Functional Mobility   Sit to Stand Supervised   Patient / Family Goals    Patient / Family Goal #1 Home w/ home health   Anticipated Discharge Equipment and Recommendations   DC Equipment Recommendations None   Discharge Recommendations Recommend home health for continued physical therapy services

## 2022-11-16 NOTE — ASSESSMENT & PLAN NOTE
AST//157, bilirubin 4.1 at admission  HIV screen negative  Acute hepatitis panel negative  U/s abd demonstrated steatosis and/or hepatocellular disease.,  No other acute pathology    Improving  Daily CMP

## 2022-11-16 NOTE — ASSESSMENT & PLAN NOTE
Patient reports she took a fall 2 days prior to admission, she presents to the ER due to worsening right-sided pain and unable to bear weight.      CT hip showed 1.  Heterogeneity of the bilateral sacrum could represent sacral insufficiency fractures; Heterogeneity of the L5 vertebral body is also noted. Consider evaluation for marrow infiltrative process or metastatic disease; Right hip arthroplasty. The acetabular component appears somewhat malaligned.     Patient evaluated by orthopedic surgery, with no recommendation for surgical intervention at this time.  Patient is weightbearing as tolerated on bilateral lower extremities, PT/OT and pain control.    MRI lumbar pending to rule out metastatic disease

## 2022-11-16 NOTE — THERAPY
Occupational Therapy   Initial Evaluation     Patient Name: Jaymie Peacock  Age:  67 y.o., Sex:  female  Medical Record #: [id number]  Today's Date: 11/16/2022     Precautions  Precautions: (P) Weight Bearing As Tolerated Right Lower Extremity, Weight Bearing As Tolerated Left Lower Extremity  Comments: (P) non-op sacral fx    Assessment    Patient is 67 y.o. female admitted w/ c/o right hip pain after a GLF.  Found to have a non operative sacral fx, possible avulsion fx, WBAT.  Hx of HTN, ETOH, PE, LE DVT.  Pt normally independent with functional mobility and ADLs living in a [facility] alone, has multiple friends who assist her as needed. Pt able to complete all functional mobility and ADLs with supervision and use of FWW. Anticipate pt is at her functional baseline, has good assistance at home no acute OT needs will recommend home health at discharge.      Plan    Recommend Occupational Therapy for Evaluation only.    DC Equipment Recommendations: (P) None  Discharge Recommendations: (P) Recommend home health for continued occupational therapy services     Objective       11/16/22 1008   Prior Living Situation   Prior Services None   Housing / Facility 1 Story House   Steps Into Home 2   Steps In Home 0   Rail Right Rail (Steps into Home)   Bathroom Set up Walk In Shower;Shower Chair   Equipment Owned Front-Wheel Walker   Lives with - Patient's Self Care Capacity Alone and Able to Care For Self   Prior Level of ADL Function   Self Feeding Independent   Grooming / Hygiene Independent   Bathing Independent   Dressing Independent   Toileting Independent   Prior Level of IADL Function   Medication Management Independent   Laundry Independent   Kitchen Mobility Independent   Finances Independent   Home Management Requires Assist   Shopping Requires Assist   Prior Level Of Mobility Independent With Device in Home   Driving / Transportation Relatives / Others Provide Transportation   History of Falls   History of Falls Yes    Date of Last Fall   (reason for admit)   Precautions   Precautions Weight Bearing As Tolerated Right Lower Extremity;Weight Bearing As Tolerated Left Lower Extremity   Comments non-op sacral fx   Pain 0 - 10 Group   Therapist Pain Assessment Post Activity Pain Same as Prior to Activity;Nurse Notified   Active ROM Upper Body   Active ROM Upper Body  WDL   Dominant Hand Right   Strength Upper Body   Upper Body Strength  WDL   Sensation Upper Body   Upper Extremity Sensation  WDL   Upper Body Muscle Tone   Upper Body Muscle Tone  WDL   Neurological Concerns   Neurological Concerns No   Coordination Upper Body   Coordination WDL   Balance Assessment   Sitting Balance (Static) Fair +   Sitting Balance (Dynamic) Fair +   Standing Balance (Static) Fair   Standing Balance (Dynamic) Fair   Weight Shift Sitting Good   Weight Shift Standing Good   Comments w/ FWW   Bed Mobility    Supine to Sit Supervised   Sit to Supine Supervised   Scooting Supervised   Rolling Supervised   ADL Assessment   Grooming Supervision   Upper Body Dressing Supervision   Lower Body Dressing Supervision   How much help from another person does the patient currently need...   Putting on and taking off regular lower body clothing? 3   Bathing (including washing, rinsing, and drying)? 3   Toileting, which includes using a toilet, bedpan, or urinal? 3   Putting on and taking off regular upper body clothing? 3   Taking care of personal grooming such as brushing teeth? 3   Eating meals? 3   6 Clicks Daily Activity Score 18   Functional Mobility   Sit to Stand Supervised   Bed, Chair, Wheelchair Transfer Supervised   Transfer Method Stand Step   Mobility bed mobility, hallway mobility, back to bed   Comments w/ FWW   Activity Tolerance   Sitting in Chair 2 min  (rest break)   Standing 10 min   Education Group   Education Provided Role of Occupational Therapist   Role of Occupational Therapist Patient Response Patient;Acceptance;Explanation   Problem List    Problem List None   Anticipated Discharge Equipment and Recommendations   DC Equipment Recommendations None   Discharge Recommendations Recommend home health for continued occupational therapy services   Interdisciplinary Plan of Care Collaboration   IDT Collaboration with  Nursing   Patient Position at End of Therapy In Bed;Bed Alarm On;Call Light within Reach;Tray Table within Reach;Phone within Reach   Collaboration Comments RN updated

## 2022-11-16 NOTE — H&P
"Hospital Medicine History & Physical Note    Date of Service  11/15/2022    Primary Care Physician  Toña Tejada P.A.-C.    Consultants  orthopedics    Specialist Names: Dr. Rashawn Floyd    Code Status  Full Code    Chief Complaint  Chief Complaint   Patient presents with    GLF     Pt fell on Sunday,    Hip Pain     R hip pain after fall    Foot Pain     Pt reports \"neuropathy\" pain to bilateral feet       History of Presenting Illness  This is a 67-year-old female with past medical history of hypertension, alcohol and tobacco use disorder, history of pulmonary embolism, LE DVT on Xarelto, and  who presented to the ER on 11/15/2022 with right hip pain s/p GLF on 11/13/2022.    Patient reports she was walking around her room, when she slid on something and fell down on her sacrum.  She reported her pain got worse and she was unable to bear weight yesterday which prompted her to come to the ED today.    Patient reports she took a fall 2 days prior to admission, she presents to the ER due to worsening right-sided pain and unable to bear weight.  CT hip noted possible bilateral sacral fractures and x-ray of the right hip noted small fragment adjacent to the right femoral lesser trochanter, this was noted on prior imaging, and possible avulsion fracture cannot be ruled out.  Patient evaluated by orthopedic surgery, with no recommendation for surgical intervention at this time.  Patient is WBAT bilateral lower extremities, PT/OT and pain control.    Concern for marrow infiltrative process or metastatic disease?  MRI of the lumbar spine ordered.    Patient admits the last drink consumed was on 11/11.  She reports has been cutting back on her drinking from 6 to 7 glasses of vodka tonic to now 1-2 every other day.  CIWA protocol in place.    Patient with significant transaminitis elevated T bili, slight jaundice on exam, right upper quadrant ultrasound ordered.    Of note patient had admission x2 in July/ August 2022 " for acute blood loss anemia secondary to Crystal-Payan tear treated with hemoclipping and epi injection.  Patient discharged with Carafate and PPI and was resumed on her Xarelto.  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  IVC filter was placed during that admission, patient discontinued on her Xarelto.    I discussed the plan of care with patient and bedside RN.    Review of Systems  Review of Systems   Musculoskeletal:  Positive for back pain, falls and joint pain.   All other systems reviewed and are negative.    Past Medical History   has a past medical history of DVT (deep venous thrombosis) (HCC), GI bleed, High cholesterol, Hypertension, Osteoporosis, Pulmonary embolism (HCC), and Smoking.    Surgical History   has a past surgical history that includes us-needle core bx-breast panel (Right); pr upper gi endoscopy,diagnosis (04/21/2022); pr colonoscopy,diagnostic (04/21/2022); pr upper gi endoscopy,biopsy (04/21/2022); pr upper gi endoscopy,ctrl bleed (04/21/2022); pr upper gi endoscopy,ctrl bleed (N/A, 05/11/2022); gastroscopy w/push enterscopy (N/A, 05/11/2022); hip replacement, partial (Right); pr upper gi endoscopy,diagnosis (N/A, 6/15/2022); pr upper gi endoscopy,diagnosis (N/A, 7/31/2022); pr upper gi endoscopy,scler inject (N/A, 7/31/2022); pr upper gi endoscopy,ctrl bleed (N/A, 7/31/2022); pr upper gi endoscopy,diagnosis (N/A, 8/20/2022); and pr upper gi endoscopy,ctrl bleed (N/A, 8/20/2022).     Family History  family history is not on file.   Family history reviewed with patient. There is no family history that is pertinent to the chief complaint.     Social History   reports that she has been smoking cigarettes. She has been smoking an average of .5 packs per day. She has never used smokeless tobacco. She reports current alcohol use of about 9.6 oz per week. She reports that she does not currently use drugs.    Allergies  No Known  Allergies    Medications  Prior to Admission Medications   Prescriptions Last Dose Informant Patient Reported? Taking?   Multiple Vitamins-Minerals (CENTRUM FRESH/FRUITY 50+) Chew Tab 11/14/2022 at AM Patient Yes No   Sig: Chew 1 Tablet every morning.   ondansetron (ZOFRAN ODT) 4 MG TABLET DISPERSIBLE PRN at PRN Patient No No   Sig: Take 1 Tablet by mouth every 6 hours as needed for Nausea.   pantoprazole (PROTONIX) 20 MG tablet FEW DAYS AGO at OUT Patient Yes Yes   Sig: Take 1 Tablet by mouth every day.   simvastatin (ZOCOR) 20 MG Tab 11/14/2022 at PM Patient Yes No   Sig: Take 1 Tablet by mouth every evening. Indications: Ischemic Heart Disease      Facility-Administered Medications: None       Physical Exam  Temp:  [36.7 °C (98 °F)] 36.7 °C (98 °F)  Pulse:  [113] 113  Resp:  [18] 18  BP: (134)/(96) 134/96  SpO2:  [98 %] 98 %  Blood Pressure : (!) 134/96   Temperature: 36.7 °C (98 °F)   Pulse: (!) 113   Respiration: 18   Pulse Oximetry: 98 %       Physical Exam  Vitals and nursing note reviewed.   Constitutional:       General: She is not in acute distress.  HENT:      Head: Normocephalic and atraumatic.      Mouth/Throat:      Mouth: Mucous membranes are moist.      Pharynx: No oropharyngeal exudate.   Eyes:      Extraocular Movements: Extraocular movements intact.      Pupils: Pupils are equal, round, and reactive to light.   Cardiovascular:      Rate and Rhythm: Normal rate and regular rhythm.      Pulses: Normal pulses.      Heart sounds: No murmur heard.    No friction rub. No gallop.   Pulmonary:      Effort: Pulmonary effort is normal. No respiratory distress.      Breath sounds: No wheezing, rhonchi or rales.   Abdominal:      General: Bowel sounds are normal. There is no distension.      Palpations: Abdomen is soft. There is no mass.      Tenderness: There is no abdominal tenderness.   Musculoskeletal:         General: No swelling or tenderness. Normal range of motion.      Cervical back: Normal range of  motion. No rigidity. No muscular tenderness.      Right lower leg: No edema.      Left lower leg: No edema.   Skin:     General: Skin is warm and dry.      Capillary Refill: Capillary refill takes less than 2 seconds.      Coloration: Skin is jaundiced.      Findings: No erythema or rash.   Neurological:      General: No focal deficit present.      Mental Status: She is alert and oriented to person, place, and time.      Motor: No weakness.      Gait: Gait normal.       Laboratory:  Recent Labs     11/15/22  1512   WBC 3.4*   RBC 3.82*   HEMOGLOBIN 12.5   HEMATOCRIT 35.6*   MCV 93.2   MCH 32.7   MCHC 35.1*   RDW 54.4*   PLATELETCT 92*   MPV 11.7     Recent Labs     11/15/22  1512   SODIUM 137   POTASSIUM 3.2*   CHLORIDE 98   CO2 23   GLUCOSE 86   BUN 19   CREATININE 0.96   CALCIUM 9.7     Recent Labs     11/15/22  1512   ALTSGPT 157*   ASTSGOT 429*   ALKPHOSPHAT 374*   TBILIRUBIN 4.1*   GLUCOSE 86         No results for input(s): NTPROBNP in the last 72 hours.      No results for input(s): TROPONINT in the last 72 hours.    Imaging:  CT-HIP W/O PLUS RECONS RIGHT   Final Result      1.  Heterogeneity of the bilateral sacrum could represent sacral insufficiency fractures.   2.  Heterogeneity of the L5 vertebral body is also noted. Consider evaluation for marrow infiltrative process or metastatic disease.   3.  Right hip arthroplasty. The acetabular component appears somewhat malaligned. Recommend correlation with prior imaging.      DX-HIP-UNILATERAL-WITH PELVIS-1 VIEW RIGHT   Final Result      1.  Right hip arthroplasty.   2.  Small osseous fragment adjacent to the right femoral lesser trochanter, likely chronic. Avulsion fracture cannot be entirely excluded given lack of prior imaging.   3.  Osteopenia.      US-RUQ    (Results Pending)   MR-LUMBAR SPINE-WITH & W/O    (Results Pending)       X-Ray:  I have personally reviewed the images and compared with prior images.    Assessment/Plan:  Justification for Admission  Status  I anticipate this patient will require at least two midnights for appropriate medical management, necessitating inpatient admission because patient will need to be evaluated by PT/OT for recommendations for her nonoperative sacral fracture, will need to be monitored due to acute alcohol withdrawal.    Patient will need a Med/Surg bed on MEDICAL service .  The need is secondary to  patient will need to be evaluated by PT/OT for recommendations for her nonoperative sacral fracture, will need to be monitored due to acute alcohol withdrawal.  .    * Bilateral sacral insufficiency fracture, initial encounter- (present on admission)  Assessment & Plan  Patient reports she took a fall 2 days prior to admission, she presents to the ER due to worsening right-sided pain and unable to bear weight.    CT hip noted possible bilateral sacral fractures and x-ray of the right hip noted small fragment  Adjacent to the right femoral lesser trochanter, this was noted on prior imaging, and possible avulsion fracture cannot be ruled out.      Patient evaluated by orthopedic surgery, with no recommendation for surgical intervention at this time.  Patient is weightbearing as tolerated on bilateral lower extremities, PT/OT and pain control.    Concern for marrow infiltrative process or metastatic disease?  MRI of the lumbar spine ordered    Alcohol withdrawal syndrome with complication (HCC)- (present on admission)  Assessment & Plan  Patient reports last drink was 11/11 reports she is cutting back -   Chronic alcohol use  CIWA protocol in place  Thiamine, folic acid and multivitamin  Encouraged alcohol cessation    Neutropenia (HCC)  Assessment & Plan  Likely secondary to bone marrow suppression from alcohol use    Thrombocytopenia (HCC)  Assessment & Plan  Likely secondary to bone marrow suppression from alcohol use    Tobacco use disorder  Assessment & Plan  Nicotine replacement protocol and cessation education provided for 12  minutes, discussed options of nicotine patch, acupuncture, medical treatment with wellbutrin and chantix. Discussed other options. Code 52492    Transaminitis  Assessment & Plan  Likely secondary to chronic alcohol use  Hepatitis panel, HIV  Elevated T bili  Right upper quadrant ultrasound ordered  Serial CMP    DVT (deep venous thrombosis) (HCC)- (present on admission)  Assessment & Plan  Of note patient had admission x2 in July/ August 2022 for acute blood loss anemia secondary to Crystal-Payan tear treated with hemoclipping and epi injection.  Patient discharged with Carafate and PPI and was resumed on her Xarelto.  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  IVC filter was placed during that admission, patient discontinued on her Xarelto.    Crystal-Payan tear- (present on admission)  Assessment & Plan  Of note patient had admission x2 in July/ August 2022 for acute blood loss anemia secondary to Crystal-Payan tear treated with hemoclipping and epi injection.  Patient discharged with Carafate and PPI and was resumed on her Xarelto.  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  IVC filter was placed during that admission, patient discontinued on her Xarelto.    History of pulmonary embolus (PE)- (present on admission)  Assessment & Plan  Of note patient had admission x2 in July/ August 2022 for acute blood loss anemia secondary to Crystal-Payan tear treated with hemoclipping and epi injection.  Patient discharged with Carafate and PPI and was resumed on her Xarelto.  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  IVC filter was placed during that admission, patient discontinued on her Xarelto.    History of hemiarthroplasty of right hip- (present on admission)  Assessment & Plan  History of    Hypokalemia- (present on  admission)  Assessment & Plan  Replenished with oral supplementation  Trend BMP, mag and Phos      VTE prophylaxis: SCDs/TEDs

## 2022-11-16 NOTE — ASSESSMENT & PLAN NOTE
Patient reports she took a fall 2 days prior to admission, she presents to the ER due to worsening right-sided pain and unable to bear weight.    CT hip noted possible bilateral sacral fractures and x-ray of the right hip noted small fragment  Adjacent to the right femoral lesser trochanter, this was noted on prior imaging, and possible avulsion fracture cannot be ruled out.      Patient evaluated by orthopedic surgery, with no recommendation for surgical intervention at this time.  Patient is weightbearing as tolerated on bilateral lower extremities, PT/OT and pain control.    Concern for marrow infiltrative process or metastatic disease?  MRI of the lumbar spine ordered

## 2022-11-16 NOTE — CARE PLAN
The patient is Stable - Low risk of patient condition declining or worsening    Shift Goals  Clinical Goals: Lower CIWA score  Patient Goals: Rest  Family Goals: arnie    Progress made toward(s) clinical / shift goals:  See flowsheet for assessment details. All meds given per MAR. Pt CIWAs taken q4 and are improving. Pt is AxOx3 and can be confused at times. Bed alarm on. Will continue to monito pt.       Problem: Knowledge Deficit - Standard  Goal: Patient and family/care givers will demonstrate understanding of plan of care, disease process/condition, diagnostic tests and medications  Outcome: Progressing     Problem: Optimal Care for Alcohol Withdrawal  Goal: Optimal Care for the alcohol withdrawal patient  Outcome: Progressing     Problem: Seizure Precautions  Goal: Implementation of seizure precautions  Outcome: Progressing     Problem: Lifestyle Changes  Goal: Patient's ability to identify lifestyle changes and available resources to help reduce recurrence of condition will improve  Outcome: Progressing     Problem: Psychosocial  Goal: Patient's level of anxiety will decrease  Outcome: Progressing  Goal: Spiritual and cultural needs incorporated into hospitalization  Outcome: Progressing     Problem: Risk for Aspiration  Goal: Patient's risk for aspiration will be absent or decrease  Outcome: Progressing     Problem: Pain - Standard  Goal: Alleviation of pain or a reduction in pain to the patient’s comfort goal  Outcome: Progressing     Problem: Fall Risk  Goal: Patient will remain free from falls  Outcome: Progressing

## 2022-11-16 NOTE — PROGRESS NOTES
[facility] Medicine Daily Progress Note    Date of Service  11/16/2022    Chief Complaint  Jaymie Peacock is a 67 y.o. female admitted 11/15/2022 with right hip pain s/p GLF    Hospital Course  This is a 67-year-old female with past medical history of hypertension, alcohol and tobacco use disorder, history of pulmonary embolism, LE DVT on Xarelto, and  who presented to the ER on 11/15/2022 with right hip pain s/p GLF on 11/13/2022.     Patient reports she slid on something and fell down on her sacrum.  She denies a dizziness or loss of consciousness.  patient reports she had a fall 2 days prior to admission, she presents to the ER due to worsening right-sided pain and unable to bear weight.  CT hip noted possible bilateral sacral fractures and x-ray of the right hip noted small fragment adjacent to the right femoral lesser trochanter, this was noted on prior imaging, and possible avulsion fracture cannot be ruled out.  Patient evaluated by orthopedic surgery, with no recommendation for surgical intervention at this time.  Patient is WBAT bilateral lower extremities, PT/OT and pain control.     Concern for marrow infiltrative process or metastatic disease?  MRI of the lumbar spine ordered.     Patient admits the last drink consumed was on 11/11.  She reports has been cutting back on her drinking from 6 to 7 glasses of vodka tonic to now 1-2 every other day.  CIWA protocol in place.      Of note patient had admission x2 in July/ August 2022 for acute blood loss anemia secondary to Crystal-Payan tear treated with hemoclipping and epi injection.  Patient discharged with Carafate and PPI and was resumed on her Xarelto.  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  IVC filter was placed during that admission, patient discontinued on her Xarelto.    CT hip showed 1.  Heterogeneity of the bilateral sacrum could represent sacral insufficiency fractures;  Heterogeneity of the L5 vertebral body is also noted. Consider evaluation for marrow infiltrative process or metastatic disease; Right hip arthroplasty. The acetabular component appears somewhat malaligned.        Interval Problem Update  Reported the hip pain and sacrum pain    MRI lumbar pending    CIWA score 3 this morning    HIV screen negative  Acute hepatitis panel negative    AST//157-> 341/124, bilirubin 4.1-> 3  Mag 1.2  Phos 1.1    WBC 2.5  Hgb 11.5  Platelet 88    I have discussed this patient's plan of care and discharge plan at IDT rounds today with Case Management, Nursing, Nursing leadership, and other members of the IDT team.    Consultants/Specialty      Code Status  Full Code    Disposition  Patient is not medically cleared for discharge.   Anticipate discharge to to home with organized home healthcare and close outpatient follow-up.  I have placed the appropriate orders for post-discharge needs.    Review of Systems  Review of Systems   Constitutional:  Positive for malaise/fatigue. Negative for chills and fever.   HENT:  Negative for ear pain.    Cardiovascular:  Negative for chest pain and leg swelling.   Gastrointestinal:  Negative for abdominal pain, diarrhea, nausea and vomiting.   Genitourinary:  Negative for dysuria, frequency and urgency.   Musculoskeletal:  Positive for back pain, falls and joint pain.   Neurological:  Negative for dizziness and headaches.   Psychiatric/Behavioral:  Negative for depression.       Physical Exam  Temp:  [35.8 °C (96.5 °F)-36.5 °C (97.7 °F)] 36.1 °C (97 °F)  Pulse:  [] 89  Resp:  [16-18] 16  BP: (101-134)/(73-83) 101/73  SpO2:  [98 %-100 %] 100 %    Physical Exam  Constitutional:       Appearance: She is ill-appearing.   HENT:      Head: Normocephalic.      Right Ear: External ear normal.      Left Ear: External ear normal.      Mouth/Throat:      Mouth: Mucous membranes are moist.   Eyes:      Extraocular Movements: Extraocular movements  intact.      Conjunctiva/sclera: Conjunctivae normal.   Cardiovascular:      Rate and Rhythm: Normal rate and regular rhythm.   Pulmonary:      Effort: Pulmonary effort is normal.      Breath sounds: Normal breath sounds. No wheezing or rales.   Abdominal:      General: Bowel sounds are normal.      Palpations: Abdomen is soft.   Musculoskeletal:         General: Tenderness present. No swelling.      Cervical back: Normal range of motion and neck supple.   Skin:     General: Skin is warm.   Neurological:      Mental Status: She is alert and oriented to person, place, and time. Mental status is at baseline.   Psychiatric:         Mood and Affect: Mood normal.       Fluids  No intake or output data in the 24 hours ending 11/16/22 1423    Laboratory  Recent Labs     11/15/22  1512 11/16/22  0432   WBC 3.4* 2.5*   RBC 3.82* 3.55*   HEMOGLOBIN 12.5 11.5*   HEMATOCRIT 35.6* 33.8*   MCV 93.2 95.2   MCH 32.7 32.4   MCHC 35.1* 34.0   RDW 54.4* 55.8*   PLATELETCT 92* 88*   MPV 11.7 11.9     Recent Labs     11/15/22  1512 11/16/22  0432   SODIUM 137 139   POTASSIUM 3.2* 3.7   CHLORIDE 98 102   CO2 23 23   GLUCOSE 86 76   BUN 19 18   CREATININE 0.96 0.94   CALCIUM 9.7 9.4                   Imaging  US-RUQ   Final Result      1.  No acute sonographic abnormality. Prior cholecystectomy.   2.  Increased hepatic echogenicity, suggestive of steatosis and/or hepatocellular disease.      CT-HIP W/O PLUS RECONS RIGHT   Final Result      1.  Heterogeneity of the bilateral sacrum could represent sacral insufficiency fractures.   2.  Heterogeneity of the L5 vertebral body is also noted. Consider evaluation for marrow infiltrative process or metastatic disease.   3.  Right hip arthroplasty. The acetabular component appears somewhat malaligned. Recommend correlation with prior imaging.      DX-HIP-UNILATERAL-WITH PELVIS-1 VIEW RIGHT   Final Result      1.  Right hip arthroplasty.   2.  Small osseous fragment adjacent to the right femoral  lesser trochanter, likely chronic. Avulsion fracture cannot be entirely excluded given lack of prior imaging.   3.  Osteopenia.      MR-LUMBAR SPINE-WITH & W/O    (Results Pending)        Assessment/Plan  * Bilateral sacral insufficiency fracture, initial encounter- (present on admission)  Assessment & Plan  Patient reports she took a fall 2 days prior to admission, she presents to the ER due to worsening right-sided pain and unable to bear weight.      CT hip showed 1.  Heterogeneity of the bilateral sacrum could represent sacral insufficiency fractures; Heterogeneity of the L5 vertebral body is also noted. Consider evaluation for marrow infiltrative process or metastatic disease; Right hip arthroplasty. The acetabular component appears somewhat malaligned.     Patient evaluated by orthopedic surgery, with no recommendation for surgical intervention at this time.  Patient is weightbearing as tolerated on bilateral lower extremities, PT/OT and pain control.    MRI lumbar pending to rule out metastatic disease    Elevated bilirubin  Assessment & Plan  Bilirubin 4.1 at admission  Ultrasound negative for obstruction, CBD  Likely due to alcoholic hepatitis    Improving  Daily CMP    Alcoholic hepatitis  Assessment & Plan  AST//157, bilirubin 4.1 at admission  HIV screen negative  Acute hepatitis panel negative  U/s abd demonstrated steatosis and/or hepatocellular disease.,  No other acute pathology    Improving  Daily CMP    Hypophosphatemia  Assessment & Plan  11/16 phos 1.1 -replaced with IV and po    A.m. lab monitor    Pancytopenia (HCC)  Assessment & Plan  Likely due to chronic alcohol use  Continue to monitor    Neutropenia (HCC)  Assessment & Plan  Likely secondary to bone marrow suppression from alcohol use    Continue to monitor    Thrombocytopenia (HCC)  Assessment & Plan  Likely secondary to bone marrow suppression from alcohol use  Platelet 90s    Continue to monitor    Tobacco use  disorder  Assessment & Plan  Nicotine replacement protocol and cessation education provided for 12 minutes, discussed options of nicotine patch, acupuncture, medical treatment with wellbutrin and chantix. Discussed other options. Code 42515    DVT (deep venous thrombosis) (HCC)- (present on admission)  Assessment & Plan  Of note patient had admission x2 in July/ August 2022 for acute blood loss anemia secondary to Crystal-Payan tear treated with hemoclipping and epi injection.  Patient discharged with Carafate and PPI and was resumed on her Xarelto.  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  IVC filter was placed during that admission, patient discontinued on her Xarelto.    Crystal-Payan tear- (present on admission)  Assessment & Plan  Of note patient had admission x2 in July/ August 2022 for acute blood loss anemia secondary to Crystal-Payan tear treated with hemoclipping and epi injection.  Patient discharged with Carafate and PPI and was resumed on her Xarelto.  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  IVC filter was placed during that admission, patient discontinued on her Xarelto.    History of pulmonary embolus (PE)- (present on admission)  Assessment & Plan  Of note patient had admission x2 in July/ August 2022 for acute blood loss anemia secondary to Crystal-Payan tear treated with hemoclipping and epi injection.  Patient discharged with Carafate and PPI and was resumed on her Xarelto.  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  IVC filter was placed during that admission, patient discontinued on her Xarelto.    Hypomagnesemia  Assessment & Plan  11/16 Mag 1.2, replaced wih iv 4 g mag    A.m. lab monitor    History of hemiarthroplasty of right hip- (present on admission)  Assessment & Plan  History  of    Hypokalemia- (present on admission)  Assessment & Plan  Replenished with oral supplementation  Trend BMP, mag and Phos    Alcohol withdrawal syndrome with complication (HCC)- (present on admission)  Assessment & Plan  Patient reports last drink was 11/11 reports she is cutting back -   Chronic alcohol use  [facility] protocol in place  Thiamine, folic acid and multivitamin  Encouraged alcohol cessation       VTE prophylaxis: SCDs/TEDs    I have performed a physical exam and reviewed and updated ROS and Plan today (11/16/2022). In review of yesterday's note (11/15/2022), there are no changes except as documented above.

## 2022-11-16 NOTE — DISCHARGE PLANNING
Received Choice form at 3419  Agency/Facility Name: [facility]  Referral sent per Choice form @ 5131

## 2022-11-16 NOTE — DISCHARGE PLANNING
HTH/SCP TCN chart review completed. Collaborated with SE Ordoñez prior to meeting with the pt. The most current review of medical record, knowledge of pt's PLOF and social support, LACE+ score of 72, 6 clicks scores of 18 mobility were considered.      Pt seen at bedside. Introduced TCN program. Provided education regarding post acute levels of care. Discussed HTH/SCP plan benefits (Meds to Beds, medical uber and [facility] transitional care). Pt verbalizes understanding. She reports no functional concerns with dc to home once medically cleared, and noted PT saw pt in AM with recs for HH. Pt reports she will have transportation from her friend at time of dc and for follow ups.    Choice proactively obtained for HH, faxed to [facility] and given to SE. TCN will continue to follow and collaborate with discharge planning team as additional post acute needs arise. Thank you.     Completed today:    PT with recommendations for HH on 11/16  Choice obtained: HH; pt has appropriate DME prior to admit  [facility] referral sent (11/16)

## 2022-11-16 NOTE — PROGRESS NOTES
Discussed patient with ED physician  Images reviewed  Right hip hemiarthroplasty well seated without obvious fracture noted. Stable appearing stem.   Possible sacral insufficiency fx although difficult to tell  Significant SI joint changes bilaterally      Plan  No plan for surgery at this time  WBAT ble's  PT/OT  Admit to hospitalist for pain/DT's      Rashawn Floyd MD  Orthopedic Trauma Surgery

## 2022-11-16 NOTE — ASSESSMENT & PLAN NOTE
Patient reports last drink was 11/11 reports she is cutting back -   Chronic alcohol use  [facility] protocol in place  Thiamine, folic acid and multivitamin  Encouraged alcohol cessation

## 2022-11-16 NOTE — ASSESSMENT & PLAN NOTE
Nicotine replacement protocol and cessation education provided for 12 minutes, discussed options of nicotine patch, acupuncture, medical treatment with wellbutrin and chantix. Discussed other options. Code 19200

## 2022-11-16 NOTE — ASSESSMENT & PLAN NOTE
Of note patient had admission x2 in July/ August 2022 for acute blood loss anemia secondary to Crystal-Payan tear treated with hemoclipping and epi injection.  Patient discharged with Carafate and PPI and was resumed on her Xarelto.  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  IVC filter was placed during that admission, patient discontinued on her Xarelto.

## 2022-11-16 NOTE — DIETARY
"Nutrition services: Day 1 of admit.  Jaymie Peacock is a 67 y.o. female with admitting DX of bilateral sacral insufficiency fracture.    Consult received for BMI <19. Admit screen is negative for poor PO and wt loss. Met with pt at bedside. Pt appeared small, though adequately nourished. No visible signs of fat or muscle loss noted. Pt reports a good appetite, stating she eats small meals at home and is able to do \"light cooking\". Pt reports a UBW of ~105 lbs with no recent wt loss noted. Discussed oral nutrition supplements with pt. Pt declined receiving Boost at this time stating they are too sweet. Pt also declined chobani yogurt smoothies.     Assessment:  Height: 165.1 cm (5' 5\")  Weight: 47.6 kg (105 lb)  Body mass index is 17.47 kg/m²., BMI classification: underweight  Diet/Intake: Regular; no PO recorded yet to assess    Evaluation:   Admitted following ground level fall, hip pain and foot pain.  Hx of ETOH.  Admitted in August 2022 for hematemesis, GI bleed, Crystal-Payan tear.  Wt hx per chart review: 108 lbs 8/22/22, 100 lbs 6/12/22, 94 lbs 5/10/22. No significant wt loss noted.  Thiamine, Folvite and Theragran per MAR.    Malnutrition Risk: Does not meet criteria per ASPEN guidelines at this time.    Recommendations/Plan:  Encourage intake of meals.  Document intake of all meals as % taken in ADLs to provide interdisciplinary communication across all shifts.   Monitor weight.  Nutrition rep will continue to see patient for ongoing meal and snack preferences.   Snacks per pt preference.    RD will follow per dept guidelines.          "

## 2022-11-16 NOTE — PROGRESS NOTES
Received bedside report from night shift RN.   Assumed care of patient at change of shift.   Assessment complete and POC discussed.   Patient is A&Ox4, VSS, on RA.   CIWA = 3 this AM.  Patient denies hip pain at this time.  Patient is sitting up in bed for breakfast.   Bed is in lowest/locked position.   Call light and belongings are within reach.   No further needs at this time.

## 2022-11-17 VITALS
HEIGHT: 65 IN | BODY MASS INDEX: 17.49 KG/M2 | HEART RATE: 107 BPM | TEMPERATURE: 96.7 F | OXYGEN SATURATION: 98 % | SYSTOLIC BLOOD PRESSURE: 122 MMHG | RESPIRATION RATE: 18 BRPM | WEIGHT: 105 LBS | DIASTOLIC BLOOD PRESSURE: 74 MMHG

## 2022-11-17 LAB
ALBUMIN SERPL BCP-MCNC: 3.8 G/DL (ref 3.2–4.9)
ALBUMIN/GLOB SERPL: 1.3 G/DL
ALP SERPL-CCNC: 397 U/L (ref 30–99)
ALT SERPL-CCNC: 131 U/L (ref 2–50)
ANION GAP SERPL CALC-SCNC: 15 MMOL/L (ref 7–16)
AST SERPL-CCNC: 375 U/L (ref 12–45)
BILIRUB SERPL-MCNC: 2.4 MG/DL (ref 0.1–1.5)
BUN SERPL-MCNC: 15 MG/DL (ref 8–22)
CALCIUM SERPL-MCNC: 8.8 MG/DL (ref 8.5–10.5)
CHLORIDE SERPL-SCNC: 98 MMOL/L (ref 96–112)
CO2 SERPL-SCNC: 24 MMOL/L (ref 20–33)
CREAT SERPL-MCNC: 0.74 MG/DL (ref 0.5–1.4)
ERYTHROCYTE [DISTWIDTH] IN BLOOD BY AUTOMATED COUNT: 54.3 FL (ref 35.9–50)
GFR SERPLBLD CREATININE-BSD FMLA CKD-EPI: 88 ML/MIN/1.73 M 2
GLOBULIN SER CALC-MCNC: 3 G/DL (ref 1.9–3.5)
GLUCOSE SERPL-MCNC: 120 MG/DL (ref 65–99)
HCT VFR BLD AUTO: 36.2 % (ref 37–47)
HGB BLD-MCNC: 12.7 G/DL (ref 12–16)
MAGNESIUM SERPL-MCNC: 2 MG/DL (ref 1.5–2.5)
MCH RBC QN AUTO: 32.6 PG (ref 27–33)
MCHC RBC AUTO-ENTMCNC: 35.1 G/DL (ref 33.6–35)
MCV RBC AUTO: 92.8 FL (ref 81.4–97.8)
PHOSPHATE SERPL-MCNC: 5.8 MG/DL (ref 2.5–4.5)
PLATELET # BLD AUTO: 114 K/UL (ref 164–446)
PMV BLD AUTO: 10.7 FL (ref 9–12.9)
POTASSIUM SERPL-SCNC: 3.9 MMOL/L (ref 3.6–5.5)
PROT SERPL-MCNC: 6.8 G/DL (ref 6–8.2)
RBC # BLD AUTO: 3.9 M/UL (ref 4.2–5.4)
SODIUM SERPL-SCNC: 137 MMOL/L (ref 135–145)
WBC # BLD AUTO: 4.1 K/UL (ref 4.8–10.8)

## 2022-11-17 PROCEDURE — 85027 COMPLETE CBC AUTOMATED: CPT

## 2022-11-17 PROCEDURE — 700101 HCHG RX REV CODE 250: Performed by: STUDENT IN AN ORGANIZED HEALTH CARE EDUCATION/TRAINING PROGRAM

## 2022-11-17 PROCEDURE — 700102 HCHG RX REV CODE 250 W/ 637 OVERRIDE(OP): Performed by: GENERAL PRACTICE

## 2022-11-17 PROCEDURE — 99239 HOSP IP/OBS DSCHRG MGMT >30: CPT | Performed by: STUDENT IN AN ORGANIZED HEALTH CARE EDUCATION/TRAINING PROGRAM

## 2022-11-17 PROCEDURE — 700102 HCHG RX REV CODE 250 W/ 637 OVERRIDE(OP): Performed by: STUDENT IN AN ORGANIZED HEALTH CARE EDUCATION/TRAINING PROGRAM

## 2022-11-17 PROCEDURE — 83735 ASSAY OF MAGNESIUM: CPT

## 2022-11-17 PROCEDURE — 36415 COLL VENOUS BLD VENIPUNCTURE: CPT

## 2022-11-17 PROCEDURE — A9270 NON-COVERED ITEM OR SERVICE: HCPCS | Performed by: GENERAL PRACTICE

## 2022-11-17 PROCEDURE — A9270 NON-COVERED ITEM OR SERVICE: HCPCS | Performed by: STUDENT IN AN ORGANIZED HEALTH CARE EDUCATION/TRAINING PROGRAM

## 2022-11-17 PROCEDURE — 84100 ASSAY OF PHOSPHORUS: CPT

## 2022-11-17 PROCEDURE — 82652 VIT D 1 25-DIHYDROXY: CPT

## 2022-11-17 PROCEDURE — 80053 COMPREHEN METABOLIC PANEL: CPT

## 2022-11-17 PROCEDURE — 700105 HCHG RX REV CODE 258: Performed by: STUDENT IN AN ORGANIZED HEALTH CARE EDUCATION/TRAINING PROGRAM

## 2022-11-17 RX ORDER — LANOLIN ALCOHOL/MO/W.PET/CERES
100 CREAM (GRAM) TOPICAL DAILY
Qty: 30 TABLET | Refills: 0 | Status: SHIPPED | OUTPATIENT
Start: 2022-11-18 | End: 2022-12-18

## 2022-11-17 RX ORDER — FOLIC ACID 1 MG/1
1 TABLET ORAL DAILY
Qty: 30 TABLET | Refills: 0 | Status: SHIPPED | OUTPATIENT
Start: 2022-11-18 | End: 2022-12-18

## 2022-11-17 RX ADMIN — THERA TABS 1 TABLET: TAB at 05:49

## 2022-11-17 RX ADMIN — OMEPRAZOLE 20 MG: 20 CAPSULE, DELAYED RELEASE ORAL at 05:49

## 2022-11-17 RX ADMIN — DIBASIC SODIUM PHOSPHATE, MONOBASIC POTASSIUM PHOSPHATE AND MONOBASIC SODIUM PHOSPHATE 500 MG: 852; 155; 130 TABLET ORAL at 05:49

## 2022-11-17 RX ADMIN — FOLIC ACID 1 MG: 1 TABLET ORAL at 05:50

## 2022-11-17 RX ADMIN — Medication 100 MG: at 05:49

## 2022-11-17 RX ADMIN — NICOTINE 14 MG: 14 PATCH TRANSDERMAL at 05:50

## 2022-11-17 RX ADMIN — POTASSIUM PHOSPHATE, MONOBASIC AND POTASSIUM PHOSPHATE, DIBASIC 30 MMOL: 224; 236 INJECTION, SOLUTION, CONCENTRATE INTRAVENOUS at 00:21

## 2022-11-17 ASSESSMENT — LIFESTYLE VARIABLES
ORIENTATION AND CLOUDING OF SENSORIUM: ORIENTED AND CAN DO SERIAL ADDITIONS
TREMOR: *
AUDITORY DISTURBANCES: NOT PRESENT
ANXIETY: NO ANXIETY (AT EASE)
TOTAL SCORE: 3
TOTAL SCORE: 3
HEADACHE, FULLNESS IN HEAD: NOT PRESENT
AUDITORY DISTURBANCES: NOT PRESENT
NAUSEA AND VOMITING: NO NAUSEA AND NO VOMITING
PAROXYSMAL SWEATS: NO SWEAT VISIBLE
NAUSEA AND VOMITING: NO NAUSEA AND NO VOMITING
AGITATION: NORMAL ACTIVITY
ANXIETY: NO ANXIETY (AT EASE)
HEADACHE, FULLNESS IN HEAD: NOT PRESENT
VISUAL DISTURBANCES: NOT PRESENT
ORIENTATION AND CLOUDING OF SENSORIUM: ORIENTED AND CAN DO SERIAL ADDITIONS
AGITATION: NORMAL ACTIVITY
VISUAL DISTURBANCES: NOT PRESENT
TREMOR: *
PAROXYSMAL SWEATS: NO SWEAT VISIBLE

## 2022-11-17 NOTE — DISCHARGE PLANNING
HTH/SCP TCN chart review completed. Collaborated with SE Ordoñez. No new TCN needs identified at this time. Anticipating dc to home with HH ([facility] 1st choice-> [facility] 2nd if [facility] unable) and [facility] services as indicated. Noted MD placed HH FTF/order this AM, awaiting acceptance. TCN will continue to follow and collaborate with discharge planning team as additional post acute needs arise. Thank you.    Previously completed:  PT/OT with recommendations for HH on 11/16  Choice obtained: HH -> awaiting acceptance; see above  [facility] referral sent (11/16)

## 2022-11-17 NOTE — CARE PLAN
The patient is Stable - Low risk of patient condition declining or worsening    Shift Goals  Clinical Goals: MRI of hip, work with PT  Patient Goals: Rest  Family Goals: arnie    Progress made toward(s) clinical / shift goals:  MRI of hip completed at 1900. Patient worked with PT/OT. Patient is tremulous, but tolerated walking with PT.    Patient is not progressing towards the following goals:

## 2022-11-17 NOTE — CARE PLAN
The patient is Stable - Low risk of patient condition declining or worsening    Shift Goals  Clinical Goals: Electrolyte replaccement,MRI of hip  Patient Goals: rest  Family Goals: arnie    Progress made toward(s) clinical / shift goals:  See flowsheet for assessment details. All meds given per Mar. Pt awaiting MRI results. CIWA score was 3. Pt unsteady and bed alarm in place      Problem: Knowledge Deficit - Standard  Goal: Patient and family/care givers will demonstrate understanding of plan of care, disease process/condition, diagnostic tests and medications  Outcome: Progressing     Problem: Optimal Care for Alcohol Withdrawal  Goal: Optimal Care for the alcohol withdrawal patient  Outcome: Progressing     Problem: Seizure Precautions  Goal: Implementation of seizure precautions  Outcome: Progressing     Problem: Lifestyle Changes  Goal: Patient's ability to identify lifestyle changes and available resources to help reduce recurrence of condition will improve  Outcome: Progressing     Problem: Psychosocial  Goal: Patient's level of anxiety will decrease  Outcome: Progressing  Goal: Spiritual and cultural needs incorporated into hospitalization  Outcome: Progressing     Problem: Risk for Aspiration  Goal: Patient's risk for aspiration will be absent or decrease  Outcome: Progressing     Problem: Pain - Standard  Goal: Alleviation of pain or a reduction in pain to the patient’s comfort goal  Outcome: Progressing     Problem: Fall Risk  Goal: Patient will remain free from falls  Outcome: Progressing

## 2022-11-17 NOTE — DISCHARGE SUMMARY
"Discharge Summary    CHIEF COMPLAINT ON ADMISSION  Chief Complaint   Patient presents with    GLF     Pt fell on Sunday,    Hip Pain     R hip pain after fall    Foot Pain     Pt reports \"neuropathy\" pain to bilateral feet       Reason for Admission  EMS     Admission Date  11/15/2022    CODE STATUS  Full Code    HPI & HOSPITAL COURSE  This is a 67-year-old female with past medical history of hypertension, alcohol and tobacco use disorder, history of pulmonary embolism, LE DVT on Xarelto, and  who presented to the ER on 11/15/2022 with right hip pain s/p GLF on 11/13/2022.     Patient reports she slid on something and fell down on her sacrum.  She denies a dizziness or loss of consciousness.  CT hip showed Heterogeneity of the bilateral sacrum could represent sacral insufficiency fractures; Heterogeneity of the L5 vertebral body is also noted. Consider evaluation for marrow infiltrative process or metastatic disease; Right hip arthroplasty. The acetabular component appears somewhat malaligned.  Subsequent MRI showed Acute compression fractures with bone marrow edema at T12 and L5 with 50% loss of height without significant posterior cortical retropulsion. S1 and S2 insufficiency fractures; negative for malignancy process. Patient evaluated by orthopedic surgery, with no recommendation for surgical intervention at this time.  Patient is weightbearing as tolerated on bilateral lower extremities. Patient denies pain and able to walk. PT OT evaluated the patient, recommended HH.    Patient is a heavy drinker. She admits the last drink consumed was on 11/11.  She reports has been cutting back on her drinking from 6 to 7 glasses of vodka tonic to now 1-2 every other day.  CIWA protocol was placed and received ativan prn.     Patient was also found alcoholic hepatitis with elevated AST//157, bilirubin 4.1. Liver enzyme has been trending down to AST//131, Bili 2.4. Patient is anxious to go home.  She agreed to " recheck liver function in 3 days and to follow-up with the primary care physician closely.  I called scheduling department to arrange appointment with the PCP and labs in 3 days.    Pancytopenia likely due to chronic alcohol use. Has been stable.     Hypokalemia, hypomagnesemia and hypophosphatemia resolved    Of note patient had admission x2 in July/ August 2022 for acute blood loss anemia secondary to Crystal-Payan tear treated with hemoclipping and epi injection.  Patient discharged with Carafate and PPI and was resumed on her Xarelto.  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  IVC filter was placed during that admission, patient discontinued on her Xarelto. Hb has been stable during this admission. PPI is continued.     Smoke and alcohol cessation education were given at bedside.      Patient is discharged to home with home health. I called scheduling department to arrange appointment with the PCP and labs in 3 days.         Therefore, she is discharged in fair and stable condition to home with organized home healthcare and close outpatient follow-up.    The patient met 2-midnight criteria for an inpatient stay at the time of discharge.    Discharge Date  11/17/2022    FOLLOW UP ITEMS POST DISCHARGE  - Follow up with primary care physician in 1 week.  Recheck CMP in 3 days.     -Patient was advised absolutely no drinking at all.  She agreed.    - Please take the medications as instructed    - Go to the local Emergency Department if you have any worsening condition.       DISCHARGE DIAGNOSES  Principal Problem:    Bilateral sacral insufficiency fracture, initial encounter POA: Yes  Active Problems:    Alcoholic hepatitis POA: Unknown    Elevated bilirubin POA: Unknown    Alcohol withdrawal syndrome with complication (HCC) POA: Yes    Hypokalemia POA: Yes    History of hemiarthroplasty of right hip POA: Yes    Hypomagnesemia POA: Unknown    History  of pulmonary embolus (PE) POA: Yes      Overview: Hx of PE      Crystal-Payan tear POA: Yes    DVT (deep venous thrombosis) (HCC) POA: Yes    Tobacco use disorder POA: Unknown    Thrombocytopenia (HCC) POA: Unknown    Neutropenia (HCC) POA: Unknown    Pancytopenia (HCC) POA: Unknown    Hypophosphatemia POA: Unknown  Resolved Problems:    * No resolved hospital problems. *      FOLLOW UP  Future Appointments   Date Time Provider Department Center   11/29/2022 11:00 AM VASCULAR NURSE PRACTITIONER [facility] None     Toña Tejada P.A.-C.  [address] [address]  [phone]    Call in 3 day(s)  Please call your primary care provider to schedule, recent hospitalization follow up, recheck lab CMP    Manuel Wills M.D.  [address] [address]  [phone]          MEDICATIONS ON DISCHARGE     Medication List        START taking these medications        Instructions   folic acid 1 MG Tabs  Start taking on: November 18, 2022  Commonly known as: FOLVITE   Take 1 Tablet by mouth every day for 30 days.  Dose: 1 mg     thiamine 100 MG tablet  Start taking on: November 18, 2022  Commonly known as: THIAMINE   Take 1 Tablet by mouth every day for 30 days.  Dose: 100 mg            CONTINUE taking these medications        Instructions   Centrum Fresh/Fruity 50+ Chew   Chew 1 Tablet every morning.  Dose: 1 Tablet     ondansetron 4 MG Tbdp  Commonly known as: ZOFRAN ODT   Take 1 Tablet by mouth every 6 hours as needed for Nausea.  Dose: 4 mg     pantoprazole 20 MG tablet  Commonly known as: PROTONIX   Take 1 Tablet by mouth every day.  Dose: 20 mg     simvastatin 20 MG Tabs  Commonly known as: ZOCOR   Take 1 Tablet by mouth every evening. Indications: Ischemic Heart Disease  Dose: 20 mg              Allergies  No Known Allergies    DIET  Orders Placed This Encounter   Procedures    Diet Order Diet: Regular     Standing Status:   Standing     Number of Occurrences:   1     Order Specific  Question:   Diet:     Answer:   Regular [1]       ACTIVITY  As tolerated.  Weight bearing as tolerated    CONSULTATIONS  ortho    PROCEDURES      LABORATORY  Lab Results   Component Value Date    SODIUM 137 11/17/2022    POTASSIUM 3.9 11/17/2022    CHLORIDE 98 11/17/2022    CO2 24 11/17/2022    GLUCOSE 120 (H) 11/17/2022    BUN 15 11/17/2022    CREATININE 0.74 11/17/2022        Lab Results   Component Value Date    WBC 4.1 (L) 11/17/2022    HEMOGLOBIN 12.7 11/17/2022    HEMATOCRIT 36.2 (L) 11/17/2022    PLATELETCT 114 (L) 11/17/2022        Total time of the discharge process exceeds 35 minutes.

## 2022-11-17 NOTE — CARE PLAN
Problem: Knowledge Deficit - Standard  Goal: Patient and family/care givers will demonstrate understanding of plan of care, disease process/condition, diagnostic tests and medications  Outcome: Met     Problem: Optimal Care for Alcohol Withdrawal  Goal: Optimal Care for the alcohol withdrawal patient  Outcome: Met     Problem: Seizure Precautions  Goal: Implementation of seizure precautions  Outcome: Met     Problem: Lifestyle Changes  Goal: Patient's ability to identify lifestyle changes and available resources to help reduce recurrence of condition will improve  Outcome: Met     Problem: Psychosocial  Goal: Patient's level of anxiety will decrease  Outcome: Met  Goal: Spiritual and cultural needs incorporated into hospitalization  Outcome: Met     Problem: Risk for Aspiration  Goal: Patient's risk for aspiration will be absent or decrease  Outcome: Met     Problem: Pain - Standard  Goal: Alleviation of pain or a reduction in pain to the patient’s comfort goal  Outcome: Met     Problem: Fall Risk  Goal: Patient will remain free from falls  Outcome: Met   The patient is Stable - Low risk of patient condition declining or worsening    Shift Goals  Clinical Goals: decrease lab levels  Patient Goals: rest  Family Goals: na    Progress made toward(s) clinical / shift goals:      Patient is not progressing towards the following goals:

## 2022-11-17 NOTE — FACE TO FACE
Face to Face Supporting Documentation - Home Health    The encounter with this patient was in whole or in part the primary reason for home health admission.    Date of encounter:   Patient:                    MRN:                       YOB: 2023  Jaymie Peacock  [id number]  1955     Home health to see patient for:  Skilled Nursing care for assessment, interventions & education, Home health aide, Physical Therapy evaluation and treatment, and Occupational therapy evaluation and treatment    Skilled need for:  Comment: fracture    Skilled nursing interventions to include:  Comment: pt ot    Homebound status evidenced by:  Have a condition such that leaving his or her home is medically contraindicated. Leaving home requires a considerable and taxing effort. There is a normal inability to leave the home.    Community Physician to provide follow up care: Toña Tejada P.A.-C.     Optional Interventions? No      I certify the face to face encounter for this home health care referral meets the CMS requirements and the encounter/clinical assessment with the patient was, in whole, or in part, for the medical condition(s) listed above, which is the primary reason for home health care. Based on my clinical findings: the service(s) are medically necessary, support the need for home health care, and the homebound criteria are met.  I certify that this patient has had a face to face encounter by myself.  Karla Blanchard M.D. - NPI: [id number]

## 2022-11-17 NOTE — DISCHARGE PLANNING
ATTN: Case Management  RE: Referral for Home Health    Reason for referral denial: Patient is non-compliant with our plan of care per clinical supervisor.               Unfortunately, we are not able to accept this referral for the reason listed above. If further clarity is needed, our Transitional Care Specialists are available to discuss any barriers to service at [phone].      We look forward to collaborating with you in the future,  [facility]

## 2022-11-17 NOTE — DISCHARGE INSTRUCTIONS
- Follow up with primary care physician in 1 week.  Recheck CMP in 3 days.     -Patient was advised absolutely no drinking at all.  She agreed.    - Please take the medications as instructed    - Go to the local Emergency Department if you have any worsening condition.

## 2022-11-19 LAB — 1,25(OH)2D3 SERPL-MCNC: 113 PG/ML (ref 19.9–79.3)

## 2022-11-28 PROBLEM — S32.000A LUMBAR COMPRESSION FRACTURE (HCC): Status: ACTIVE | Noted: 2022-11-28

## 2022-11-28 PROBLEM — M48.50XA VERTEBRAL COMPRESSION FRACTURE (HCC): Status: ACTIVE | Noted: 2022-11-28

## 2022-11-29 ENCOUNTER — OFFICE VISIT (OUTPATIENT)
Dept: VASCULAR LAB | Facility: MEDICAL CENTER | Age: 67
End: 2022-11-29
Attending: NURSE PRACTITIONER
Payer: MEDICARE

## 2022-11-29 VITALS
DIASTOLIC BLOOD PRESSURE: 82 MMHG | SYSTOLIC BLOOD PRESSURE: 132 MMHG | HEART RATE: 84 BPM | HEIGHT: 66 IN | WEIGHT: 111 LBS | BODY MASS INDEX: 17.84 KG/M2

## 2022-11-29 DIAGNOSIS — I82.4Y2 ACUTE DEEP VEIN THROMBOSIS (DVT) OF PROXIMAL VEIN OF LEFT LOWER EXTREMITY (HCC): ICD-10-CM

## 2022-11-29 DIAGNOSIS — K22.6 MALLORY-WEISS TEAR: ICD-10-CM

## 2022-11-29 DIAGNOSIS — I26.99 PULMONARY EMBOLISM AND INFARCTION (HCC): ICD-10-CM

## 2022-11-29 DIAGNOSIS — F17.200 SMOKING: ICD-10-CM

## 2022-11-29 DIAGNOSIS — F10.939 ALCOHOL WITHDRAWAL SYNDROME WITH COMPLICATION (HCC): ICD-10-CM

## 2022-11-29 PROCEDURE — 99214 OFFICE O/P EST MOD 30 MIN: CPT | Mod: 25 | Performed by: NURSE PRACTITIONER

## 2022-11-29 PROCEDURE — 99406 BEHAV CHNG SMOKING 3-10 MIN: CPT | Performed by: NURSE PRACTITIONER

## 2022-11-29 PROCEDURE — 99212 OFFICE O/P EST SF 10 MIN: CPT

## 2022-11-29 ASSESSMENT — ENCOUNTER SYMPTOMS
HEADACHES: 0
PALPITATIONS: 0
SHORTNESS OF BREATH: 0
DIZZINESS: 0
BLOOD IN STOOL: 0
BRUISES/BLEEDS EASILY: 0

## 2022-11-29 ASSESSMENT — FIBROSIS 4 INDEX: FIB4 SCORE: 19.54

## 2022-11-29 NOTE — PROGRESS NOTES
Initial VASCULAR ANTI-COAGULATION VISIT  Subjective     Jaymie Peacock is a 67 y.o. female who presents today 11/29/2022 for   Chief Complaint   Patient presents with    Follow-Up     HPI: Pt here today for initial appointment for evaluation of her IVC filter disposition.  In April 2022 pt was hospitalized with multiple PEs and LLE thrombosis.  She also has a history of chronic alcohol dependenc, HTN and tobacco dependence.   She was started on Xarelto which she initially tolerated well.  Over the summer she had multiple trips to the ER with vomiting and blood in her stool.  In August 2022 she had severe melena and hematemesis with hemoglobin dropping to 6.  She had  a upper endoscopy and found to have a Crystal-Payan tear which required hemoclippin and noted to have esophageal erosions.  All blood thinners were stopped at that time and an IVC filter was placed.  She has not been on a blood thinner since that time and no plans to restart per pt report.  Reports no family history of blood clots  No provoking incident prior to her VTE  She has a history of smoking and is a current smoker    Past Medical History:   Diagnosis Date    DVT (deep venous thrombosis) (HCC)     GI bleed     High cholesterol     Hypertension     Osteoporosis     Pulmonary embolism (HCC)     Smoking      Past Surgical History:   Procedure Laterality Date    UT UPPER GI ENDOSCOPY,DIAGNOSIS N/A 8/20/2022    Procedure: GASTROSCOPY;  Surgeon: Kranthi Fernandez M.D.;  Location: [facility];  Service: Gastroenterology    UT UPPER GI ENDOSCOPY,CTRL BLEED N/A 8/20/2022    Procedure: GASTROSCOPY, WITH ARGON PLASMA COAGULATION;  Surgeon: Kranthi Fernandez M.D.;  Location: SURGERY [facility];  Service: Gastroenterology    UT UPPER GI ENDOSCOPY,DIAGNOSIS N/A 7/31/2022    Procedure: GASTROSCOPY;  Surgeon: Cyn Slade M.D.;  Location: SURGERY [facility];  Service: Gastroenterology    UT UPPER GI ENDOSCOPY,SCLER INJECT N/A  7/31/2022    Procedure: GASTROSCOPY, WITH SCLEROTHERAPY;  Surgeon: Cyn Slade M.D.;  Location: SURGERY [facility];  Service: Gastroenterology    ME UPPER GI ENDOSCOPY,CTRL BLEED N/A 7/31/2022    Procedure: EGD, WITH CLIP PLACEMENT;  Surgeon: Cyn Slade M.D.;  Location: SURGERY [facility];  Service: Gastroenterology    ME UPPER GI ENDOSCOPY,DIAGNOSIS N/A 6/15/2022    Procedure: GASTROSCOPY WITH PUSH ENDOSCOPY;  Surgeon: Claudio Guerrier M.D.;  Location: SURGERY [facility];  Service: Gastroenterology    ME UPPER GI ENDOSCOPY,CTRL BLEED N/A 05/11/2022    Procedure: GASTROSCOPY, WITH ARGON PLASMA COAGULATION;  Surgeon: Yariel Pagan M.D.;  Location: SURGERY SAME DAY [facility];  Service: Gastroenterology    GASTROSCOPY W/PUSH ENTERSCOPY N/A 05/11/2022    Procedure: GASTROSCOPY, WITH PUSH ENTEROSCOPY;  Surgeon: Yariel Pagan M.D.;  Location: SURGERY SAME DAY [facility];  Service: Gastroenterology    ME UPPER GI ENDOSCOPY,DIAGNOSIS  04/21/2022    Procedure: GASTROSCOPY;  Surgeon: Rell Rivas M.D.;  Location: SURGERY SAME DAY [facility];  Service: Gastroenterology    ME COLONOSCOPY,DIAGNOSTIC  04/21/2022    Procedure: COLONOSCOPY;  Surgeon: Rell Rivas M.D.;  Location: SURGERY SAME DAY [facility];  Service: Gastroenterology    ME UPPER GI ENDOSCOPY,BIOPSY  04/21/2022    Procedure: GASTROSCOPY, WITH BIOPSY;  Surgeon: Rell Rivas M.D.;  Location: SURGERY SAME DAY [facility];  Service: Gastroenterology    ME UPPER GI ENDOSCOPY,CTRL BLEED  04/21/2022    Procedure: GASTROSCOPY, WITH ARGON PLASMA COAGULATION;  Surgeon: Rell Rivas M.D.;  Location: SURGERY SAME DAY [facility];  Service: Gastroenterology    HIP REPLACEMENT, PARTIAL Right     US-NEEDLE CORE BX-BREAST PANEL Right      Social History     Tobacco Use    Smoking status: Every Day     Packs/day: 0.50     Types: Cigarettes    Smokeless tobacco: Never   Vaping Use    Vaping Use: Never used   Substance Use Topics     "Alcohol use: Yes     Alcohol/week: 9.6 oz     Types: 16 Shots of liquor per week    Drug use: Not Currently     DIET AND EXERCISE:  Weight Change: N/A  Diet: common adult  Exercise: no regular exercise program; starting home PT     Review of Systems   Respiratory:  Negative for shortness of breath.    Cardiovascular:  Negative for chest pain, palpitations and leg swelling.   Gastrointestinal:  Negative for blood in stool.   Genitourinary:  Negative for hematuria.   Musculoskeletal:  Positive for joint pain.   Neurological:  Negative for dizziness and headaches.   Endo/Heme/Allergies:  Does not bruise/bleed easily.        Objective     Vitals:    11/29/22 1058 11/29/22 1102   BP: 130/84 132/82   BP Location: Left arm Left arm   Patient Position: Sitting Sitting   BP Cuff Size: Small adult Small adult   Pulse: 88 84   Weight: 50.3 kg (111 lb)    Height: 1.676 m (5' 6\")      Body mass index is 17.92 kg/m².  Physical Exam  Constitutional:       Appearance: Normal appearance.   Cardiovascular:      Rate and Rhythm: Normal rate and regular rhythm.      Pulses: Normal pulses.      Heart sounds: Normal heart sounds.   Pulmonary:      Effort: Pulmonary effort is normal. No respiratory distress.      Breath sounds: Normal breath sounds.   Musculoskeletal:         General: No swelling or tenderness. Normal range of motion.   Skin:     General: Skin is warm and dry.   Neurological:      General: No focal deficit present.      Mental Status: She is alert and oriented to person, place, and time.   Psychiatric:         Mood and Affect: Mood normal.     Lab Results   Component Value Date    CHOLSTRLTOT 119 04/12/2022    LDL 40 04/12/2022    HDL 69 04/12/2022    TRIGLYCERIDE 51 04/12/2022      Lab Results   Component Value Date    PROTHROMBTM 22.4 (H) 08/20/2022    INR 2.03 (H) 08/20/2022         Lab Results   Component Value Date    SODIUM 137 11/17/2022    POTASSIUM 3.9 11/17/2022    CHLORIDE 98 11/17/2022    CO2 24 11/17/2022    " GLUCOSE 120 (H) 11/17/2022    BUN 15 11/17/2022    CREATININE 0.74 11/17/2022        Lab Results   Component Value Date    WBC 4.1 (L) 11/17/2022    RBC 3.90 (L) 11/17/2022    HEMOGLOBIN 12.7 11/17/2022    HEMATOCRIT 36.2 (L) 11/17/2022    MCV 92.8 11/17/2022    MCH 32.6 11/17/2022    MCHC 35.1 (H) 11/17/2022    MPV 10.7 11/17/2022      1. Smoking        2. Alcohol withdrawal syndrome with complication (HCC)        3. Pulmonary embolism and infarction (HCC)        4. Acute deep vein thrombosis (DVT) of proximal vein of left lower extremity (HCC)        5. Crystal-Payan tear             Medical Decision Making: Today's Assessment / Status / Plan:      Indication for anticoagulation: History of unprovoked PE/VTE     IVC filter    Discussed the risks and benefits of leaving the IVC filter in place versus removing it.  Risks of leaving filter in place, although rare in occurrence, include filter fracture, development of clot above filter causing PE, and/or mobility of filter.  Explained procedure for removal.  Discussed possibility of complications such as inability to remove filter if a thrombosis is found within filter or if it is embeded in the vessel it will be left in place.      Future Surgeries: None    Bleeding complications while on anticoagulation: Yes- see above    Decision: Leave in place indefinitely; after long discussion of risks and benefits of removal vs leaving in place indefinitely, through shared decision making, have elected to keep the filter in place indefinitely      Anti-Platelet/Anti-Coagulant Tx: no    Anti-Coagulation Plan: No further anticoagulation d/t past severe bleeding complications     Smoking: Provided strong recommendation for complete cessation and informed this is the primary contributor to the majority of all ASCVD and cancer-related conditions and can result in significant morbidity and early mortality.   - reviewed resources for cessation including tobacco cessation clinic  visit, pharmacotx meds, quit lines  - review at every visit   Provided 5 minutes of face-to-face counseling on above topics     Physical Activity: referred to physical therapy (will start home PT next week)    Weight Management and Nutrition:exercise counseling and nutrition counseling    Instructed to follow-up with PCP for remainder of adult medical needs: yes  We will partner with other provider in the management of established vascular disease and cardiometabolic risk factors    Studies to Be Obtained: None  Labs to Be Obtained: None    Follow up in: As needed    ADELINA Anderson    CC:   Toña Tejada P.A.-C.

## 2022-12-02 ENCOUNTER — TELEPHONE (OUTPATIENT)
Dept: VASCULAR LAB | Facility: MEDICAL CENTER | Age: 67
End: 2022-12-02
Payer: MEDICARE

## 2022-12-05 ENCOUNTER — HOME HEALTH ADMISSION (OUTPATIENT)
Dept: HOME HEALTH SERVICES | Facility: HOME HEALTHCARE | Age: 67
End: 2022-12-05
Payer: MEDICARE

## 2023-01-18 ENCOUNTER — TELEPHONE (OUTPATIENT)
Dept: HEALTH INFORMATION MANAGEMENT | Facility: OTHER | Age: 68
End: 2023-01-18
Payer: MEDICARE

## 2023-01-18 NOTE — TELEPHONE ENCOUNTER
Received referral from [facility] for CCM program. [facility] and no-showed a new patient appointment with Bel Oh in November. This RN to reach out and see if patient would like to reschedule/introduce CCM program.     Per patient, she would like to stay with her current physician Toña Tejada ([facility]) and will not be rescheduling her NP appointment with Adriano. This RN mentioned CCM program. Patient advised that if she would like to establish with [facility] to give us a call back.

## 2023-01-24 ENCOUNTER — HOSPITAL ENCOUNTER (OUTPATIENT)
Dept: LAB | Facility: MEDICAL CENTER | Age: 68
End: 2023-01-24
Attending: PHYSICIAN ASSISTANT
Payer: MEDICARE

## 2023-01-24 LAB
ALBUMIN SERPL BCP-MCNC: 4 G/DL (ref 3.2–4.9)
ALBUMIN/GLOB SERPL: 1.3 G/DL
ALP SERPL-CCNC: 174 U/L (ref 30–99)
ALT SERPL-CCNC: 17 U/L (ref 2–50)
ANION GAP SERPL CALC-SCNC: 12 MMOL/L (ref 7–16)
APTT PPP: 29.3 SEC (ref 24.7–36)
AST SERPL-CCNC: 32 U/L (ref 12–45)
BASOPHILS # BLD AUTO: 1.5 % (ref 0–1.8)
BASOPHILS # BLD: 0.07 K/UL (ref 0–0.12)
BILIRUB SERPL-MCNC: 0.7 MG/DL (ref 0.1–1.5)
BUN SERPL-MCNC: 9 MG/DL (ref 8–22)
CALCIUM ALBUM COR SERPL-MCNC: 9.8 MG/DL (ref 8.5–10.5)
CALCIUM SERPL-MCNC: 9.8 MG/DL (ref 8.5–10.5)
CHLORIDE SERPL-SCNC: 104 MMOL/L (ref 96–112)
CO2 SERPL-SCNC: 22 MMOL/L (ref 20–33)
CREAT SERPL-MCNC: 0.9 MG/DL (ref 0.5–1.4)
EOSINOPHIL # BLD AUTO: 0.07 K/UL (ref 0–0.51)
EOSINOPHIL NFR BLD: 1.5 % (ref 0–6.9)
ERYTHROCYTE [DISTWIDTH] IN BLOOD BY AUTOMATED COUNT: 55.7 FL (ref 35.9–50)
FERRITIN SERPL-MCNC: 108 NG/ML (ref 10–291)
GFR SERPLBLD CREATININE-BSD FMLA CKD-EPI: 70 ML/MIN/1.73 M 2
GLOBULIN SER CALC-MCNC: 3 G/DL (ref 1.9–3.5)
GLUCOSE SERPL-MCNC: 91 MG/DL (ref 65–99)
HAV IGM SERPL QL IA: NORMAL
HBV CORE IGM SER QL: NORMAL
HBV SURFACE AG SER QL: NORMAL
HCT VFR BLD AUTO: 39.2 % (ref 37–47)
HCV AB SER QL: NORMAL
HGB BLD-MCNC: 12.6 G/DL (ref 12–16)
IMM GRANULOCYTES # BLD AUTO: 0.01 K/UL (ref 0–0.11)
IMM GRANULOCYTES NFR BLD AUTO: 0.2 % (ref 0–0.9)
INR PPP: 1.03 (ref 0.87–1.13)
LYMPHOCYTES # BLD AUTO: 1.08 K/UL (ref 1–4.8)
LYMPHOCYTES NFR BLD: 23.3 % (ref 22–41)
MCH RBC QN AUTO: 34.7 PG (ref 27–33)
MCHC RBC AUTO-ENTMCNC: 32.1 G/DL (ref 33.6–35)
MCV RBC AUTO: 108 FL (ref 81.4–97.8)
MONOCYTES # BLD AUTO: 0.69 K/UL (ref 0–0.85)
MONOCYTES NFR BLD AUTO: 14.9 % (ref 0–13.4)
NEUTROPHILS # BLD AUTO: 2.72 K/UL (ref 2–7.15)
NEUTROPHILS NFR BLD: 58.6 % (ref 44–72)
NRBC # BLD AUTO: 0 K/UL
NRBC BLD-RTO: 0 /100 WBC
PLATELET # BLD AUTO: 242 K/UL (ref 164–446)
PMV BLD AUTO: 10.4 FL (ref 9–12.9)
POTASSIUM SERPL-SCNC: 4.2 MMOL/L (ref 3.6–5.5)
PROT SERPL-MCNC: 7 G/DL (ref 6–8.2)
PROTHROMBIN TIME: 13.4 SEC (ref 12–14.6)
RBC # BLD AUTO: 3.63 M/UL (ref 4.2–5.4)
SODIUM SERPL-SCNC: 138 MMOL/L (ref 135–145)
WBC # BLD AUTO: 4.6 K/UL (ref 4.8–10.8)

## 2023-01-24 PROCEDURE — 83883 ASSAY NEPHELOMETRY NOT SPEC: CPT

## 2023-01-24 PROCEDURE — 36415 COLL VENOUS BLD VENIPUNCTURE: CPT

## 2023-01-24 PROCEDURE — G0480 DRUG TEST DEF 1-7 CLASSES: HCPCS

## 2023-01-24 PROCEDURE — 82105 ALPHA-FETOPROTEIN SERUM: CPT

## 2023-01-24 PROCEDURE — 82465 ASSAY BLD/SERUM CHOLESTEROL: CPT

## 2023-01-24 PROCEDURE — 80053 COMPREHEN METABOLIC PANEL: CPT

## 2023-01-24 PROCEDURE — 82728 ASSAY OF FERRITIN: CPT

## 2023-01-24 PROCEDURE — 82977 ASSAY OF GGT: CPT

## 2023-01-24 PROCEDURE — 82947 ASSAY GLUCOSE BLOOD QUANT: CPT | Mod: XU

## 2023-01-24 PROCEDURE — 84460 ALANINE AMINO (ALT) (SGPT): CPT | Mod: XU

## 2023-01-24 PROCEDURE — 82172 ASSAY OF APOLIPOPROTEIN: CPT

## 2023-01-24 PROCEDURE — 85025 COMPLETE CBC W/AUTO DIFF WBC: CPT

## 2023-01-24 PROCEDURE — 85610 PROTHROMBIN TIME: CPT

## 2023-01-24 PROCEDURE — 84478 ASSAY OF TRIGLYCERIDES: CPT

## 2023-01-24 PROCEDURE — 84450 TRANSFERASE (AST) (SGOT): CPT | Mod: XU

## 2023-01-24 PROCEDURE — 83010 ASSAY OF HAPTOGLOBIN QUANT: CPT

## 2023-01-24 PROCEDURE — 80074 ACUTE HEPATITIS PANEL: CPT

## 2023-01-24 PROCEDURE — 85730 THROMBOPLASTIN TIME PARTIAL: CPT

## 2023-01-24 PROCEDURE — 82247 BILIRUBIN TOTAL: CPT | Mod: XU

## 2023-01-26 LAB — AFP-TM SERPL-MCNC: 2 NG/ML (ref 0–9)

## 2023-01-27 LAB — TEST NAME 95000: NORMAL

## 2023-02-01 LAB — MISCELLANEOUS LAB RESULT MISCLAB: NORMAL

## 2023-04-18 NOTE — CARE PLAN
Outpatient Speech Language Pathology Progress Note     Patient: Jose Alfredo Conde  : 2020    Beginning/End Dates of Reporting Period:  2023 to 2023    Referring Provider: Leidy Crenshaw MD    Therapy Diagnosis: Mixed receptive-expressive language disorder    Client Self Report:  Jose Alfredo Conde is a 2 year old female being seen for outpatient speech-language intervention.  Jose Alfredo is seen at a frequency of once weekly, however, due to patient and clinician absences, she only attended 7 sessions this reporting period.  Jose Alfredo typically presented with inconsistent participation during sessions - great participation when motivated and able to attend, however, distractibility negatively impacted ability to participate frequently.  She is on a waitlist for neuropsychological evaluation due to her difficulties with attention.  Jose Alfredo regularly completed home programming recommendations.  Per parent report, Jose Alfredo is responding well to interventions and has demonstrated improved skills in her home environment.    Objective Measurements: Data collected during treatment sessions and summarized in each goal as listed below.      Goals:  Goal Identifier LTG 1 - Receptive and Expressive Language   Goal Description Jose Alfredo will improve receptive- and expressive-language skills as evidenced by the ability to follow 2-3 step directions 70% of the time, produce 3+ word utterances 70% of the time, to express wants and needs across communication environments.   Target Date 23   Date Met      Progress (detail required for progress note): Ongoing goal.  Goal may need to be extended given limited attendance and difficulties participating in therapy activities.     Goal Identifier STG 1 - Receptive Language   Goal Description Jose Alfredo will demonstrate understanding of early-developing spatial concepts (e.g., in, out, on, off, up, down, etc.) by pointing, appropriately responding to basic  where   The patient is Stable - Low risk of patient condition declining or worsening    Shift Goals  Clinical Goals: safety  Patient Goals: rest  Family Goals: PATTI    Progress made toward(s) clinical / shift goals:    Problem: Skin Integrity  Goal: Skin integrity is maintained or improved  Outcome: Progressing     Patient Aox3. Respirations even and unlabored. Patient reports no pain. Patient able to ambulate SB assist with FWW up to bathroom. Tolerates well.   Patient is not progressing towards the following goals:       questions, or by following 1-step directions in semi-structured play in 3/5 trials when provided moderate cueing to facilitate the development of receptive-language skills and improve ability to follow caregivers  instructions.   Target Date 04/23/23   Date Met      Progress (detail required for progress note): Progressing with goal.  Extend goal 3 months to allow for mastery.    Targeted with auditory and visual bombardment during variety of therapy activities.      Goal Identifier STG 2 - Receptive Language   Goal Description Jose Alfredo will identify common actions in a foil of 2 in 4/5 trials to facilitate the development of receptive-language skills.   Target Date 04/23/23   Date Met      Progress (detail required for progress note): Limited trials this reporting period due to focus on other goals.  Extend goal 3 months to allow for mastery.     Goal Identifier STG 3 - Expressive Language   Goal Description Jose Alfredo will produce 2-word utterances, including a verb (e.g., action words such as  eat ) or protoverb (e.g., using prepositions such as  on  for  put on ), in 10x per session when provided models and moderate cueing to facilitate development of expressive-language skills.   Target Date 04/23/23   Date Met      Progress (detail required for progress note): Progressing with goal.  Extend goal 3 months to allow for mastery.    At last session:  Repetitive auditory bombardment across play activities for high frequency phrases (e.g., I need help; etc.); produced:  go, go; see ya bye bye!     Goal Identifier STG 4 - Parental Education   Goal Description Jose Alfredo's parents will independently demonstrate understanding of strategies targeted in sessions for completion of home programming.   Target Date 04/23/23   Date Met      Progress (detail required for progress note): Indicated understanding of strategies modeled and discussed.  Ongoing goal - continue.      Progress Toward Goals:    Jose Alfredo has made fair  progress this reporting period, as described above, due to limited sessions and difficulties participating in therapy activities.  Jose Alfredo continues to present with a mixed receptive-expressive language disorder, which negatively impacts her ability to express wants and needs.  Continued skilled intervention is recommended to further assist Jose Alfredo Conde in the development of her receptive- and expressive-language skills for more effective and efficient communication.    Plan:  Continue therapy per current plan of care.    Discharge:  No.  Jose Alfredo Conde will be discharged from therapy when the patient meets long term goals, displays a plateau in progress, or demonstrates resistance or low motivation for therapy after redirections have been made. The patient may be discharged from therapy when parents or guardians wish to discontinue therapy and or fails to adhere to [facility]'s attendance policy.  Please contact me with any questions or concerns at [email or web address].    Lulu Joe MA, CCC-SLP  Speech Language Pathologist

## 2023-05-28 ENCOUNTER — APPOINTMENT (OUTPATIENT)
Dept: RADIOLOGY | Facility: MEDICAL CENTER | Age: 68
DRG: 871 | End: 2023-05-28
Attending: EMERGENCY MEDICINE
Payer: MEDICARE

## 2023-05-28 ENCOUNTER — HOSPITAL ENCOUNTER (INPATIENT)
Facility: MEDICAL CENTER | Age: 68
LOS: 8 days | DRG: 871 | End: 2023-06-05
Attending: EMERGENCY MEDICINE | Admitting: INTERNAL MEDICINE
Payer: MEDICARE

## 2023-05-28 DIAGNOSIS — K22.6 MALLORY-WEISS TEAR: ICD-10-CM

## 2023-05-28 DIAGNOSIS — E87.20 LACTIC ACIDOSIS: ICD-10-CM

## 2023-05-28 DIAGNOSIS — N39.0 ACUTE UTI: ICD-10-CM

## 2023-05-28 DIAGNOSIS — R65.20 SEVERE SEPSIS (HCC): ICD-10-CM

## 2023-05-28 DIAGNOSIS — A41.9 SEVERE SEPSIS (HCC): ICD-10-CM

## 2023-05-28 LAB
ALBUMIN SERPL BCP-MCNC: 4.4 G/DL (ref 3.2–4.9)
ALBUMIN/GLOB SERPL: 1.3 G/DL
ALP SERPL-CCNC: 247 U/L (ref 30–99)
ALT SERPL-CCNC: 24 U/L (ref 2–50)
AMMONIA PLAS-SCNC: 29 UMOL/L (ref 11–45)
ANION GAP SERPL CALC-SCNC: 26 MMOL/L (ref 7–16)
APPEARANCE UR: CLEAR
AST SERPL-CCNC: 41 U/L (ref 12–45)
BACTERIA #/AREA URNS HPF: NEGATIVE /HPF
BASOPHILS # BLD AUTO: 0.7 % (ref 0–1.8)
BASOPHILS # BLD: 0.06 K/UL (ref 0–0.12)
BILIRUB SERPL-MCNC: 1.5 MG/DL (ref 0.1–1.5)
BILIRUB UR QL STRIP.AUTO: ABNORMAL
BUN SERPL-MCNC: 40 MG/DL (ref 8–22)
CALCIUM ALBUM COR SERPL-MCNC: 11 MG/DL (ref 8.5–10.5)
CALCIUM SERPL-MCNC: 11.3 MG/DL (ref 8.5–10.5)
CHLORIDE SERPL-SCNC: 96 MMOL/L (ref 96–112)
CO2 SERPL-SCNC: 19 MMOL/L (ref 20–33)
COLOR UR: ABNORMAL
CREAT SERPL-MCNC: 1.39 MG/DL (ref 0.5–1.4)
EKG IMPRESSION: NORMAL
EOSINOPHIL # BLD AUTO: 0 K/UL (ref 0–0.51)
EOSINOPHIL NFR BLD: 0 % (ref 0–6.9)
EPI CELLS #/AREA URNS HPF: NEGATIVE /HPF
ERYTHROCYTE [DISTWIDTH] IN BLOOD BY AUTOMATED COUNT: 50.6 FL (ref 35.9–50)
ETHANOL BLD-MCNC: <10.1 MG/DL
GFR SERPLBLD CREATININE-BSD FMLA CKD-EPI: 41 ML/MIN/1.73 M 2
GLOBULIN SER CALC-MCNC: 3.5 G/DL (ref 1.9–3.5)
GLUCOSE SERPL-MCNC: 142 MG/DL (ref 65–99)
GLUCOSE UR STRIP.AUTO-MCNC: NEGATIVE MG/DL
HCT VFR BLD AUTO: 31.5 % (ref 37–47)
HGB BLD-MCNC: 10.7 G/DL (ref 12–16)
HYALINE CASTS #/AREA URNS LPF: ABNORMAL /LPF
IMM GRANULOCYTES # BLD AUTO: 0.09 K/UL (ref 0–0.11)
IMM GRANULOCYTES NFR BLD AUTO: 1 % (ref 0–0.9)
INR PPP: 1 (ref 0.87–1.13)
KETONES UR STRIP.AUTO-MCNC: ABNORMAL MG/DL
LACTATE SERPL-SCNC: 2.3 MMOL/L (ref 0.5–2)
LACTATE SERPL-SCNC: 2.4 MMOL/L (ref 0.5–2)
LACTATE SERPL-SCNC: 2.9 MMOL/L (ref 0.5–2)
LEUKOCYTE ESTERASE UR QL STRIP.AUTO: ABNORMAL
LYMPHOCYTES # BLD AUTO: 0.7 K/UL (ref 1–4.8)
LYMPHOCYTES NFR BLD: 7.8 % (ref 22–41)
MCH RBC QN AUTO: 33 PG (ref 27–33)
MCHC RBC AUTO-ENTMCNC: 34 G/DL (ref 32.2–35.5)
MCV RBC AUTO: 97.2 FL (ref 81.4–97.8)
MICRO URNS: ABNORMAL
MONOCYTES # BLD AUTO: 0.44 K/UL (ref 0–0.85)
MONOCYTES NFR BLD AUTO: 4.9 % (ref 0–13.4)
NEUTROPHILS # BLD AUTO: 7.73 K/UL (ref 1.82–7.42)
NEUTROPHILS NFR BLD: 85.6 % (ref 44–72)
NITRITE UR QL STRIP.AUTO: POSITIVE
NRBC # BLD AUTO: 0.12 K/UL
NRBC BLD-RTO: 1.3 /100 WBC (ref 0–0.2)
PH UR STRIP.AUTO: 5.5 [PH] (ref 5–8)
PLATELET # BLD AUTO: 291 K/UL (ref 164–446)
PMV BLD AUTO: 11.1 FL (ref 9–12.9)
POTASSIUM SERPL-SCNC: 3.2 MMOL/L (ref 3.6–5.5)
PROT SERPL-MCNC: 7.9 G/DL (ref 6–8.2)
PROT UR QL STRIP: 30 MG/DL
PROTHROMBIN TIME: 13.1 SEC (ref 12–14.6)
RBC # BLD AUTO: 3.24 M/UL (ref 4.2–5.4)
RBC # URNS HPF: ABNORMAL /HPF
RBC UR QL AUTO: NEGATIVE
SODIUM SERPL-SCNC: 141 MMOL/L (ref 135–145)
SP GR UR STRIP.AUTO: 1.02
UROBILINOGEN UR STRIP.AUTO-MCNC: 1 MG/DL
WBC # BLD AUTO: 9 K/UL (ref 4.8–10.8)
WBC #/AREA URNS HPF: ABNORMAL /HPF

## 2023-05-28 PROCEDURE — 93005 ELECTROCARDIOGRAM TRACING: CPT | Performed by: INTERNAL MEDICINE

## 2023-05-28 PROCEDURE — 99406 BEHAV CHNG SMOKING 3-10 MIN: CPT

## 2023-05-28 PROCEDURE — 81001 URINALYSIS AUTO W/SCOPE: CPT

## 2023-05-28 PROCEDURE — 87086 URINE CULTURE/COLONY COUNT: CPT

## 2023-05-28 PROCEDURE — 96374 THER/PROPH/DIAG INJ IV PUSH: CPT

## 2023-05-28 PROCEDURE — 85610 PROTHROMBIN TIME: CPT

## 2023-05-28 PROCEDURE — 82140 ASSAY OF AMMONIA: CPT

## 2023-05-28 PROCEDURE — 700101 HCHG RX REV CODE 250: Performed by: NURSE PRACTITIONER

## 2023-05-28 PROCEDURE — A9270 NON-COVERED ITEM OR SERVICE: HCPCS | Performed by: INTERNAL MEDICINE

## 2023-05-28 PROCEDURE — 700105 HCHG RX REV CODE 258: Performed by: EMERGENCY MEDICINE

## 2023-05-28 PROCEDURE — 36415 COLL VENOUS BLD VENIPUNCTURE: CPT

## 2023-05-28 PROCEDURE — 700111 HCHG RX REV CODE 636 W/ 250 OVERRIDE (IP): Performed by: EMERGENCY MEDICINE

## 2023-05-28 PROCEDURE — 99223 1ST HOSP IP/OBS HIGH 75: CPT | Mod: 25,AI | Performed by: INTERNAL MEDICINE

## 2023-05-28 PROCEDURE — 85025 COMPLETE CBC W/AUTO DIFF WBC: CPT

## 2023-05-28 PROCEDURE — 700102 HCHG RX REV CODE 250 W/ 637 OVERRIDE(OP): Performed by: INTERNAL MEDICINE

## 2023-05-28 PROCEDURE — 99285 EMERGENCY DEPT VISIT HI MDM: CPT

## 2023-05-28 PROCEDURE — 700105 HCHG RX REV CODE 258: Performed by: INTERNAL MEDICINE

## 2023-05-28 PROCEDURE — 700101 HCHG RX REV CODE 250: Performed by: INTERNAL MEDICINE

## 2023-05-28 PROCEDURE — 700111 HCHG RX REV CODE 636 W/ 250 OVERRIDE (IP): Performed by: INTERNAL MEDICINE

## 2023-05-28 PROCEDURE — 770020 HCHG ROOM/CARE - TELE (206)

## 2023-05-28 PROCEDURE — 80053 COMPREHEN METABOLIC PANEL: CPT

## 2023-05-28 PROCEDURE — HZ2ZZZZ DETOXIFICATION SERVICES FOR SUBSTANCE ABUSE TREATMENT: ICD-10-PCS | Performed by: INTERNAL MEDICINE

## 2023-05-28 PROCEDURE — 83605 ASSAY OF LACTIC ACID: CPT | Mod: 91

## 2023-05-28 PROCEDURE — 82077 ASSAY SPEC XCP UR&BREATH IA: CPT

## 2023-05-28 PROCEDURE — 94640 AIRWAY INHALATION TREATMENT: CPT

## 2023-05-28 PROCEDURE — 71045 X-RAY EXAM CHEST 1 VIEW: CPT

## 2023-05-28 PROCEDURE — 99406 BEHAV CHNG SMOKING 3-10 MIN: CPT | Performed by: INTERNAL MEDICINE

## 2023-05-28 PROCEDURE — 87040 BLOOD CULTURE FOR BACTERIA: CPT | Mod: 91

## 2023-05-28 RX ORDER — LORAZEPAM 2 MG/ML
1 INJECTION INTRAMUSCULAR
Status: DISCONTINUED | OUTPATIENT
Start: 2023-05-28 | End: 2023-06-03

## 2023-05-28 RX ORDER — CEFTRIAXONE 2 G/1
2000 INJECTION, POWDER, FOR SOLUTION INTRAMUSCULAR; INTRAVENOUS ONCE
Status: COMPLETED | OUTPATIENT
Start: 2023-05-28 | End: 2023-05-28

## 2023-05-28 RX ORDER — LORAZEPAM 2 MG/ML
1.5 INJECTION INTRAMUSCULAR
Status: DISCONTINUED | OUTPATIENT
Start: 2023-05-28 | End: 2023-06-03

## 2023-05-28 RX ORDER — SODIUM CHLORIDE 9 MG/ML
INJECTION, SOLUTION INTRAVENOUS CONTINUOUS
Status: DISCONTINUED | OUTPATIENT
Start: 2023-05-28 | End: 2023-06-03

## 2023-05-28 RX ORDER — LORAZEPAM 2 MG/1
2 TABLET ORAL
Status: DISCONTINUED | OUTPATIENT
Start: 2023-05-28 | End: 2023-06-03

## 2023-05-28 RX ORDER — LORAZEPAM 2 MG/1
4 TABLET ORAL
Status: DISCONTINUED | OUTPATIENT
Start: 2023-05-28 | End: 2023-06-03

## 2023-05-28 RX ORDER — OMEPRAZOLE 20 MG/1
20 CAPSULE, DELAYED RELEASE ORAL DAILY
Status: DISCONTINUED | OUTPATIENT
Start: 2023-05-28 | End: 2023-06-05 | Stop reason: HOSPADM

## 2023-05-28 RX ORDER — IPRATROPIUM BROMIDE AND ALBUTEROL SULFATE 2.5; .5 MG/3ML; MG/3ML
3 SOLUTION RESPIRATORY (INHALATION) ONCE
Status: COMPLETED | OUTPATIENT
Start: 2023-05-28 | End: 2023-05-28

## 2023-05-28 RX ORDER — LORAZEPAM 2 MG/ML
0.5 INJECTION INTRAMUSCULAR EVERY 4 HOURS PRN
Status: DISCONTINUED | OUTPATIENT
Start: 2023-05-28 | End: 2023-06-03

## 2023-05-28 RX ORDER — GAUZE BANDAGE 2" X 2"
100 BANDAGE TOPICAL DAILY
Status: DISPENSED | OUTPATIENT
Start: 2023-05-29 | End: 2023-06-02

## 2023-05-28 RX ORDER — ONDANSETRON 4 MG/1
4 TABLET, ORALLY DISINTEGRATING ORAL EVERY 4 HOURS PRN
Status: DISCONTINUED | OUTPATIENT
Start: 2023-05-28 | End: 2023-06-05 | Stop reason: HOSPADM

## 2023-05-28 RX ORDER — LORAZEPAM 1 MG/1
1 TABLET ORAL EVERY 4 HOURS PRN
Status: DISCONTINUED | OUTPATIENT
Start: 2023-05-28 | End: 2023-06-03

## 2023-05-28 RX ORDER — LABETALOL HYDROCHLORIDE 5 MG/ML
10 INJECTION, SOLUTION INTRAVENOUS EVERY 4 HOURS PRN
Status: DISCONTINUED | OUTPATIENT
Start: 2023-05-28 | End: 2023-06-05 | Stop reason: HOSPADM

## 2023-05-28 RX ORDER — POLYETHYLENE GLYCOL 3350 17 G/17G
1 POWDER, FOR SOLUTION ORAL
Status: DISCONTINUED | OUTPATIENT
Start: 2023-05-28 | End: 2023-06-05 | Stop reason: HOSPADM

## 2023-05-28 RX ORDER — ENOXAPARIN SODIUM 100 MG/ML
30 INJECTION SUBCUTANEOUS DAILY
Status: DISCONTINUED | OUTPATIENT
Start: 2023-05-28 | End: 2023-06-05 | Stop reason: HOSPADM

## 2023-05-28 RX ORDER — IPRATROPIUM BROMIDE AND ALBUTEROL SULFATE 2.5; .5 MG/3ML; MG/3ML
3 SOLUTION RESPIRATORY (INHALATION)
Status: DISCONTINUED | OUTPATIENT
Start: 2023-05-28 | End: 2023-06-05 | Stop reason: HOSPADM

## 2023-05-28 RX ORDER — BISACODYL 10 MG
10 SUPPOSITORY, RECTAL RECTAL
Status: DISCONTINUED | OUTPATIENT
Start: 2023-05-28 | End: 2023-06-05 | Stop reason: HOSPADM

## 2023-05-28 RX ORDER — ACETAMINOPHEN 325 MG/1
650 TABLET ORAL EVERY 6 HOURS PRN
Status: DISCONTINUED | OUTPATIENT
Start: 2023-05-28 | End: 2023-06-05 | Stop reason: HOSPADM

## 2023-05-28 RX ORDER — LORAZEPAM 0.5 MG/1
0.5 TABLET ORAL EVERY 4 HOURS PRN
Status: DISCONTINUED | OUTPATIENT
Start: 2023-05-28 | End: 2023-06-03

## 2023-05-28 RX ORDER — LORAZEPAM 2 MG/ML
2 INJECTION INTRAMUSCULAR
Status: DISCONTINUED | OUTPATIENT
Start: 2023-05-28 | End: 2023-06-03

## 2023-05-28 RX ORDER — OXYCODONE HYDROCHLORIDE 5 MG/1
2.5 TABLET ORAL
Status: DISCONTINUED | OUTPATIENT
Start: 2023-05-28 | End: 2023-06-03

## 2023-05-28 RX ORDER — ONDANSETRON 2 MG/ML
4 INJECTION INTRAMUSCULAR; INTRAVENOUS EVERY 4 HOURS PRN
Status: DISCONTINUED | OUTPATIENT
Start: 2023-05-28 | End: 2023-06-05 | Stop reason: HOSPADM

## 2023-05-28 RX ORDER — FOLIC ACID 1 MG/1
1 TABLET ORAL DAILY
Status: DISPENSED | OUTPATIENT
Start: 2023-05-29 | End: 2023-06-02

## 2023-05-28 RX ORDER — POTASSIUM CHLORIDE 20 MEQ/1
40 TABLET, EXTENDED RELEASE ORAL ONCE
Status: COMPLETED | OUTPATIENT
Start: 2023-05-28 | End: 2023-05-28

## 2023-05-28 RX ORDER — SODIUM CHLORIDE, SODIUM LACTATE, POTASSIUM CHLORIDE, AND CALCIUM CHLORIDE .6; .31; .03; .02 G/100ML; G/100ML; G/100ML; G/100ML
1000 INJECTION, SOLUTION INTRAVENOUS
Status: COMPLETED | OUTPATIENT
Start: 2023-05-28 | End: 2023-05-28

## 2023-05-28 RX ORDER — OXYCODONE HYDROCHLORIDE 5 MG/1
5 TABLET ORAL
Status: DISCONTINUED | OUTPATIENT
Start: 2023-05-28 | End: 2023-06-03

## 2023-05-28 RX ORDER — AMOXICILLIN 250 MG
2 CAPSULE ORAL 2 TIMES DAILY
Status: DISCONTINUED | OUTPATIENT
Start: 2023-05-28 | End: 2023-06-05 | Stop reason: HOSPADM

## 2023-05-28 RX ORDER — HYDROMORPHONE HYDROCHLORIDE 1 MG/ML
0.25 INJECTION, SOLUTION INTRAMUSCULAR; INTRAVENOUS; SUBCUTANEOUS
Status: DISCONTINUED | OUTPATIENT
Start: 2023-05-28 | End: 2023-06-03

## 2023-05-28 RX ADMIN — IPRATROPIUM BROMIDE AND ALBUTEROL SULFATE 3 ML: 2.5; .5 SOLUTION RESPIRATORY (INHALATION) at 21:59

## 2023-05-28 RX ADMIN — ENOXAPARIN SODIUM 30 MG: 100 INJECTION SUBCUTANEOUS at 17:14

## 2023-05-28 RX ADMIN — DOCUSATE SODIUM 50 MG AND SENNOSIDES 8.6 MG 2 TABLET: 8.6; 5 TABLET, FILM COATED ORAL at 17:14

## 2023-05-28 RX ADMIN — OMEPRAZOLE 20 MG: 20 CAPSULE, DELAYED RELEASE ORAL at 15:33

## 2023-05-28 RX ADMIN — POTASSIUM CHLORIDE 40 MEQ: 1500 TABLET, EXTENDED RELEASE ORAL at 13:54

## 2023-05-28 RX ADMIN — CEFTRIAXONE SODIUM 2000 MG: 2 INJECTION, POWDER, FOR SOLUTION INTRAMUSCULAR; INTRAVENOUS at 12:09

## 2023-05-28 RX ADMIN — SODIUM CHLORIDE: 9 INJECTION, SOLUTION INTRAVENOUS at 13:53

## 2023-05-28 RX ADMIN — SODIUM CHLORIDE, POTASSIUM CHLORIDE, SODIUM LACTATE AND CALCIUM CHLORIDE 1000 ML: 600; 310; 30; 20 INJECTION, SOLUTION INTRAVENOUS at 11:59

## 2023-05-28 RX ADMIN — LORAZEPAM 0.5 MG: 0.5 TABLET ORAL at 15:44

## 2023-05-28 RX ADMIN — SODIUM CHLORIDE: 9 INJECTION, SOLUTION INTRAVENOUS at 15:35

## 2023-05-28 RX ADMIN — THIAMINE HYDROCHLORIDE: 100 INJECTION, SOLUTION INTRAMUSCULAR; INTRAVENOUS at 16:21

## 2023-05-28 ASSESSMENT — ENCOUNTER SYMPTOMS
SHORTNESS OF BREATH: 0
NAUSEA: 0
FEVER: 0
DEPRESSION: 0
ABDOMINAL PAIN: 0
DIZZINESS: 0
BLURRED VISION: 0
VOMITING: 0
WEAKNESS: 1
CHILLS: 0
MYALGIAS: 0
HEADACHES: 0

## 2023-05-28 ASSESSMENT — LIFESTYLE VARIABLES
PAROXYSMAL SWEATS: NO SWEAT VISIBLE
VISUAL DISTURBANCES: NOT PRESENT
PAROXYSMAL SWEATS: NO SWEAT VISIBLE
TOTAL SCORE: 4
AGITATION: NORMAL ACTIVITY
AUDITORY DISTURBANCES: NOT PRESENT
AUDITORY DISTURBANCES: NOT PRESENT
VISUAL DISTURBANCES: NOT PRESENT
AUDITORY DISTURBANCES: NOT PRESENT
TREMOR: TREMOR NOT VISIBLE BUT CAN BE FELT, FINGERTIP TO FINGERTIP
ANXIETY: NO ANXIETY (AT EASE)
HEADACHE, FULLNESS IN HEAD: NOT PRESENT
TREMOR: NO TREMOR
HEADACHE, FULLNESS IN HEAD: NOT PRESENT
ANXIETY: NO ANXIETY (AT EASE)
AUDITORY DISTURBANCES: NOT PRESENT
VISUAL DISTURBANCES: NOT PRESENT
ORIENTATION AND CLOUDING OF SENSORIUM: DISORIENTED FOR PLACE AND / OR PERSON
NAUSEA AND VOMITING: NO NAUSEA AND NO VOMITING
ANXIETY: NO ANXIETY (AT EASE)
SUBSTANCE_ABUSE: 1
NAUSEA AND VOMITING: NO NAUSEA AND NO VOMITING
ORIENTATION AND CLOUDING OF SENSORIUM: DATE DISORIENTATION BY NO MORE THAN TWO CALENDAR DAYS
ORIENTATION AND CLOUDING OF SENSORIUM: DATE DISORIENTATION BY MORE THAN TWO CALENDAR DAYS
ANXIETY: NO ANXIETY (AT EASE)
VISUAL DISTURBANCES: NOT PRESENT
AGITATION: NORMAL ACTIVITY
TREMOR: NO TREMOR
HEADACHE, FULLNESS IN HEAD: NOT PRESENT
NAUSEA AND VOMITING: NO NAUSEA AND NO VOMITING
DO YOU DRINK ALCOHOL: YES
TOTAL SCORE: 3
ORIENTATION AND CLOUDING OF SENSORIUM: DISORIENTED FOR PLACE AND / OR PERSON
TOTAL SCORE: 5
PAROXYSMAL SWEATS: NO SWEAT VISIBLE
TOTAL SCORE: 4
AGITATION: NORMAL ACTIVITY
TREMOR: TREMOR NOT VISIBLE BUT CAN BE FELT, FINGERTIP TO FINGERTIP
PAROXYSMAL SWEATS: NO SWEAT VISIBLE
HEADACHE, FULLNESS IN HEAD: NOT PRESENT
NAUSEA AND VOMITING: NO NAUSEA AND NO VOMITING
AGITATION: SOMEWHAT MORE THAN NORMAL ACTIVITY

## 2023-05-28 ASSESSMENT — FIBROSIS 4 INDEX
FIB4 SCORE: 1.96
FIB4 SCORE: 2.18

## 2023-05-28 NOTE — H&P
Hospital Medicine History & Physical Note    Date of Service  5/28/2023    Primary Care Physician  Toña Tejada P.A.-C.    Consultants  N/A    Code Status  Full Code    Chief Complaint  Chief Complaint   Patient presents with    ALOC     FTT    Hallucinations       History of Presenting Illness  Jaymie Peacock is a 67 y.o. female with PMH of hypertension, alcohol and tobacco use disorder, history of pulmonary embolism, LE DVT on Xarelto who presented 5/28/2023 with altered mental status.  Patient is oriented to self and year, believes she is in [place] and does not know why she is in the hospital.  Patient complaining of urinary frequency, denies fevers, chills, dysuria, chest pain or shortness of breath.  Patient answers questions however her answers do not make sense.  Per previous notes patient was brought in by EMS from home as her neighbor called as he reported she has been having hallucinations for the last 1 and half weeks.  Patient does report she is drinking alcohol, cannot quantify how much.  Also reports she has not been eating very well. Is still smoking cigarettes cant tell me how much.    In the ER patient tachycardic, tachypneic and hypertensive.  Labs significant for hemoglobin 10.3, hematocrit 31.5, potassium 2.2, CO2 19, anion gap 26, lactic acid 2.9, creatinine 1.39, GFR 41 and alkaline phosphatase 247 with a negative alcohol.  UA with positive nitrites and leukocyte esterase.  Chest x-ray interpreted by myself shows no acute findings.    I discussed the plan of care with patient, bedside RN, and ERP .    Review of Systems  Review of Systems   Constitutional:  Positive for malaise/fatigue. Negative for chills and fever.   HENT:  Negative for congestion.    Eyes:  Negative for blurred vision.   Respiratory:  Negative for shortness of breath.    Cardiovascular:  Negative for chest pain.   Gastrointestinal:  Negative for abdominal pain, nausea and vomiting.   Genitourinary:  Positive for  frequency. Negative for dysuria.   Musculoskeletal:  Negative for myalgias.   Skin:  Negative for rash.   Neurological:  Positive for weakness. Negative for dizziness and headaches.   Psychiatric/Behavioral:  Positive for substance abuse. Negative for depression.    All other systems reviewed and are negative.      Past Medical History   has a past medical history of DVT (deep venous thrombosis) (HCC), ETOH abuse, GI bleed, High cholesterol, Hypertension, Osteoporosis, Pulmonary embolism (HCC), and Smoking.    Surgical History   has a past surgical history that includes us-needle core bx-breast panel (Right); pr upper gi endoscopy,diagnosis (04/21/2022); pr colonoscopy,diagnostic (04/21/2022); pr upper gi endoscopy,biopsy (04/21/2022); pr upper gi endoscopy,ctrl bleed (04/21/2022); pr upper gi endoscopy,ctrl bleed (N/A, 05/11/2022); gastroscopy w/push enterscopy (N/A, 05/11/2022); hip replacement, partial (Right); pr upper gi endoscopy,diagnosis (N/A, 6/15/2022); pr upper gi endoscopy,diagnosis (N/A, 7/31/2022); pr upper gi endoscopy,scler inject (N/A, 7/31/2022); pr upper gi endoscopy,ctrl bleed (N/A, 7/31/2022); pr upper gi endoscopy,diagnosis (N/A, 8/20/2022); and pr upper gi endoscopy,ctrl bleed (N/A, 8/20/2022).     Family History  family history is not on file.   Family history reviewed with patient. There is no family history that is pertinent to the chief complaint.     Social History   reports that she has been smoking cigarettes. She has been smoking an average of .5 packs per day. She has never used smokeless tobacco. She reports current alcohol use of about 9.6 oz of alcohol per week. She reports that she does not currently use drugs.    Allergies  No Known Allergies    Medications  Prior to Admission Medications   Prescriptions Last Dose Informant Patient Reported? Taking?   Multiple Vitamins-Minerals (CENTRUM FRESH/FRUITY 50+) Chew Tab  Patient Yes No   Sig: Chew 1 Tablet every morning.   POTASSIUM  CHLORIDE ER PO   Yes No   Sig: Take  by mouth.   ondansetron (ZOFRAN ODT) 4 MG TABLET DISPERSIBLE  Patient No No   Sig: Take 1 Tablet by mouth every 6 hours as needed for Nausea.   pantoprazole (PROTONIX) 20 MG tablet  Patient Yes No   Sig: Take 1 Tablet by mouth every day.   simvastatin (ZOCOR) 20 MG Tab  Patient Yes No   Sig: Take 1 Tablet by mouth every evening. Indications: Ischemic Heart Disease      Facility-Administered Medications: None       Physical Exam  Temp:  [36.3 °C (97.4 °F)] 36.3 °C (97.4 °F)  Pulse:  [] 100  Resp:  [17-22] 20  BP: (125-171)/(73-89) 128/76  SpO2:  [93 %-98 %] 95 %  Blood Pressure : 125/88   Temperature: 36.3 °C (97.4 °F)   Pulse: (!) 134   Respiration: 17   Pulse Oximetry: 97 %       Physical Exam  Vitals and nursing note reviewed.   Constitutional:       General: She is not in acute distress.     Appearance: She is cachectic. She is ill-appearing.      Comments: Muscle wasting on exam    HENT:      Head: Normocephalic and atraumatic.      Right Ear: External ear normal.      Left Ear: External ear normal.      Nose: Nose normal.      Mouth/Throat:      Mouth: Mucous membranes are dry.      Pharynx: Oropharynx is clear.   Eyes:      Extraocular Movements: Extraocular movements intact.      Conjunctiva/sclera: Conjunctivae normal.      Pupils: Pupils are equal, round, and reactive to light.   Cardiovascular:      Rate and Rhythm: Regular rhythm. Tachycardia present.      Pulses: Normal pulses.      Heart sounds: Normal heart sounds.   Pulmonary:      Effort: Pulmonary effort is normal. No respiratory distress.      Breath sounds: Normal breath sounds. No wheezing.   Abdominal:      General: Abdomen is flat. There is no distension.      Palpations: Abdomen is soft.      Tenderness: There is no abdominal tenderness.   Musculoskeletal:         General: Normal range of motion.      Cervical back: Neck supple.      Right lower leg: No edema.      Left lower leg: No edema.   Skin:      General: Skin is warm and dry.   Neurological:      General: No focal deficit present.      Mental Status: She is alert. She is disoriented.      Cranial Nerves: No cranial nerve deficit.      Motor: Weakness present.   Psychiatric:         Mood and Affect: Mood normal.         Behavior: Behavior normal.         Laboratory:  Recent Labs     05/28/23  1114   WBC 9.0   RBC 3.24*   HEMOGLOBIN 10.7*   HEMATOCRIT 31.5*   MCV 97.2   MCH 33.0   MCHC 34.0   RDW 50.6*   PLATELETCT 291   MPV 11.1     Recent Labs     05/28/23  1114   SODIUM 141   POTASSIUM 3.2*   CHLORIDE 96   CO2 19*   GLUCOSE 142*   BUN 40*   CREATININE 1.39   CALCIUM 11.3*     Recent Labs     05/28/23  1114   ALTSGPT 24   ASTSGOT 41   ALKPHOSPHAT 247*   TBILIRUBIN 1.5   GLUCOSE 142*         No results for input(s): NTPROBNP in the last 72 hours.      No results for input(s): TROPONINT in the last 72 hours.    Imaging:  DX-CHEST-PORTABLE (1 VIEW)   Final Result         No acute cardiac or pulmonary abnormality is identified.          X-Ray:  I have personally reviewed the images and compared with prior images.  EKG:  I have personally reviewed the images and compared with prior images.    Assessment/Plan:  Justification for Admission Status  I anticipate this patient will require at least two midnights for appropriate medical management, necessitating inpatient admission because Severe sepsis from UTI with lactic acidosis and YAMIL also with acute encephalopathy. Needs IVF, IV abx and telemetry monitoring.       * Severe sepsis (HCC)- (present on admission)  Assessment & Plan  This is Severe Sepsis Present on admission  SIRS criteria identified on my evaluation include: Tachycardia, with heart rate greater than 90 BPM and Tachypnea, with respirations greater than 20 per minute  Clinical indicators of end organ dysfunction include Lactate >2 mmol/L (18.0 mg/dL)  Source is urinary   Sepsis protocol initiated  Crystalloid Fluid Administration: Fluid  resuscitation ordered per standard protocol - 30 mL/kg per current or ideal body weight  IV antibiotics as appropriate for source of sepsis  Reassessment: I have reassessed the patient's hemodynamic status    Cultures pending   Continue rocephin   IVF     Lactic acidosis- (present on admission)  Assessment & Plan  Lactic > 2 on admission   IVF, sepsis tx   Repeat pending     Tobacco use disorder- (present on admission)  Assessment & Plan  Reports she is still smoking, not interested in quitting   Does not want nicotine replacement at this time   Cessation counseling performed, 4 minutes     Crystal-Payan tear- (present on admission)  Assessment & Plan  Hx of   Omeprazole     History of pulmonary embolus (PE)- (present on admission)  Assessment & Plan  Patient had admission x2 in July/ August 2022 for acute blood loss anemia secondary to Crystal-Payan tear treated with hemoclipping and epi injection.  Patient discharged with Carafate and PPI and was resumed on her Xarelto.  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  IVC filter was placed during that admission, patient discontinued on her Xarelto.    High anion gap metabolic acidosis- (present on admission)  Assessment & Plan  Present on admission   2/2 sepsis and lactic acidosis   IVF   Repeat BMP in am     Acute cystitis without hematuria- (present on admission)  Assessment & Plan  Rocephin   Urine culture pending     Encephalopathy acute- (present on admission)  Assessment & Plan  2/2 infection   Reports alcohol use, hx of withdrawals   IV abx   IVF  Patient is confused but no focal neurologic deficits on exam   If does not improve consider CT/MRI     Severe protein-calorie malnutrition ([place]: less than 60% of standard weight) (HCC)- (present on admission)  Assessment & Plan  Muscle wasting on exam   nutritionist consulted     YAMIL (acute kidney injury) ([facility])- (present on admission)  Assessment &  Plan  2/2 sepsis and dehydration   IVF   Avoid nephrotoxic medications   Repeat BMP in am     Hypokalemia- (present on admission)  Assessment & Plan  Replace as needed   Check mg     Alcohol abuse- (present on admission)  Assessment & Plan  Long history of alcohol abuse, multiple prior admissions for withdrawal   Thinks her last drink was 2 days ago but very poor historian   Alcohol level negative in ER   Monitor on ciwa protocol for now   Detox bag   MVI, folate, thiamine         VTE prophylaxis: enoxaparin ppx

## 2023-05-28 NOTE — ASSESSMENT & PLAN NOTE
Reports she is still smoking, not interested in quitting   Does not want nicotine replacement at this time   Cessation counseling performed, 4 minutes

## 2023-05-28 NOTE — ASSESSMENT & PLAN NOTE
Hx of   Omeprazole   No s/o bleeding on exam, h/h did drop overnight but suspect this is due to hemodilution, following closely   Serial h/h  Consult GI if s/o acute bleed

## 2023-05-28 NOTE — PROGRESS NOTES
4 Eyes Skin Assessment Completed by ELROY Giron and ELROY Chapman.    Head WDL  Ears WDL  Nose WDL  Mouth WDL  Neck WDL  Breast/Chest Bruising  Shoulder Blades WDL  Spine WDL  (R) Arm/Elbow/Hand WDL  (L) Arm/Elbow/Hand WDL  Abdomen WDL  Groin WDL  Scrotum/Coccyx/Buttocks Redness and Blanching  (R) Leg WDL  (L) Leg WDL  (R) Heel/Foot/Toe WDL  (L) Heel/Foot/Toe WDL          Devices In Places Tele Box, Blood Pressure Cuff, and Pulse Ox      Interventions In Place Waffle Overlay, Pillows, and Pressure Redistribution Mattress    Possible Skin Injury Yes    Pictures Uploaded Into Epic Yes  Wound Consult Placed Yes  RN Wound Prevention Protocol Ordered Yes

## 2023-05-28 NOTE — ASSESSMENT & PLAN NOTE
2/2 sepsis and dehydration   This has resolved  Continue to follow  Avoid nephrotoxic medications

## 2023-05-28 NOTE — ED NOTES
Pt medicated per MAR.  Pt swallowing without difficulty.    IVF infusing.  Call light in reach and bed alarm in place.

## 2023-05-28 NOTE — ED NOTES
Med Rec complete per pt and pt's pharmacy  Allergies Reviewed    Pt's unable to fully participate in interview called pt's pharmacy no medications filled

## 2023-05-28 NOTE — ASSESSMENT & PLAN NOTE
2/2 infection   Reports alcohol use, hx of withdrawals   IV abx   IVF  Per her friend this is improving, suspect metabolic encephalopathy from infection and toxic encephalopathy from etoh  Non focal exam, following closely  If does not improve consider CT/MRI

## 2023-05-28 NOTE — ED PROVIDER NOTES
ED Provider Note    CHIEF COMPLAINT  Chief Complaint   Patient presents with    ALOC     FTT    Hallucinations       EXTERNAL RECORDS REVIEWED  None    HPI/ROS  LIMITATION TO HISTORY   Select: Altered mental status / Confusion  OUTSIDE HISTORIAN(S):  Nurse    Jaymie Peacock is a 67 y.o. female who presents here for evaluation of altered mentation and possible UTI.  The patient has been having some intermittent confusion over the last few weeks, and was thought that she may have had a UTI.  She has no fever chills or vomiting, and no chest pain, or shortness breath.  Patient states that she does not alcohol daily.    PAST MEDICAL HISTORY   has a past medical history of DVT (deep venous thrombosis) (HCC), ETOH abuse, GI bleed, High cholesterol, Hypertension, Osteoporosis, Pulmonary embolism (HCC), and Smoking.    SURGICAL HISTORY   has a past surgical history that includes us-needle core bx-breast panel (Right); upper gi endoscopy,diagnosis (04/21/2022); colonoscopy,diagnostic (04/21/2022); upper gi endoscopy,biopsy (04/21/2022); upper gi endoscopy,ctrl bleed (04/21/2022); upper gi endoscopy,ctrl bleed (N/A, 05/11/2022); gastroscopy w/push enterscopy (N/A, 05/11/2022); hip replacement, partial (Right); upper gi endoscopy,diagnosis (N/A, 6/15/2022); upper gi endoscopy,diagnosis (N/A, 7/31/2022); upper gi endoscopy,scler inject (N/A, 7/31/2022); upper gi endoscopy,ctrl bleed (N/A, 7/31/2022); upper gi endoscopy,diagnosis (N/A, 8/20/2022); and upper gi endoscopy,ctrl bleed (N/A, 8/20/2022).    FAMILY HISTORY  History reviewed. No pertinent family history.    SOCIAL HISTORY  Social History     Tobacco Use    Smoking status: Every Day     Packs/day: 0.50     Types: Cigarettes    Smokeless tobacco: Never   Vaping Use    Vaping Use: Never used   Substance and Sexual Activity    Alcohol use: Yes     Alcohol/week: 9.6 oz     Types: 16 Shots of liquor per week     Comment: vodka daily    Drug use: Not Currently    Sexual  "activity: Not on file       CURRENT MEDICATIONS  Home Medications       Reviewed by Flaca Metcalf R.N. (Registered Nurse) on 05/28/23 at 1111  Med List Status: Partial     Medication Last Dose Status   Multiple Vitamins-Minerals (CENTRUM FRESH/FRUITY 50+) Chew Tab  Active   ondansetron (ZOFRAN ODT) 4 MG TABLET DISPERSIBLE  Active   pantoprazole (PROTONIX) 20 MG tablet  Active   POTASSIUM CHLORIDE ER PO  Active   simvastatin (ZOCOR) 20 MG Tab  Active                    ALLERGIES  No Known Allergies    PHYSICAL EXAM  VITAL SIGNS: /88   Pulse (!) 134   Temp 36.3 °C (97.4 °F) (Oral)   Resp 17   Ht 1.676 m (5' 6\")   Wt 50.3 kg (111 lb)   SpO2 97%   BMI 17.92 kg/m²    Constitutional: Well developed, well nourished. No acute distress.  HEENT: Normocephalic, atraumatic. Posterior pharynx clear and moist.  Eyes:  EOMI. Normal sclera.  Neck: Supple, Full range of motion, nontender.  Chest/Pulmonary: clear to ausculation. Symmetrical expansion.   Cardio: Regular rate and rhythm with no murmur.   Abdomen: Soft, nontender. No peritoneal signs. No guarding. No palpable masses.  Musculoskeletal: No deformity, no edema, neurovascular intact.   Neuro: Clear speech, appropriate, cooperative, cranial nerves II-XII grossly intact.  Psych: Normal mood and affect      DIAGNOSTIC STUDIES / PROCEDURES  Results for orders placed or performed during the hospital encounter of 05/28/23   CBC WITH DIFFERENTIAL   Result Value Ref Range    WBC 9.0 4.8 - 10.8 K/uL    RBC 3.24 (L) 4.20 - 5.40 M/uL    Hemoglobin 10.7 (L) 12.0 - 16.0 g/dL    Hematocrit 31.5 (L) 37.0 - 47.0 %    MCV 97.2 81.4 - 97.8 fL    MCH 33.0 27.0 - 33.0 pg    MCHC 34.0 32.2 - 35.5 g/dL    RDW 50.6 (H) 35.9 - 50.0 fL    Platelet Count 291 164 - 446 K/uL    MPV 11.1 9.0 - 12.9 fL    Neutrophils-Polys 85.60 (H) 44.00 - 72.00 %    Lymphocytes 7.80 (L) 22.00 - 41.00 %    Monocytes 4.90 0.00 - 13.40 %    Eosinophils 0.00 0.00 - 6.90 %    Basophils 0.70 0.00 - 1.80 % "    Immature Granulocytes 1.00 (H) 0.00 - 0.90 %    Nucleated RBC 1.30 (H) 0.00 - 0.20 /100 WBC    Neutrophils (Absolute) 7.73 (H) 1.82 - 7.42 K/uL    Lymphs (Absolute) 0.70 (L) 1.00 - 4.80 K/uL    Monos (Absolute) 0.44 0.00 - 0.85 K/uL    Eos (Absolute) 0.00 0.00 - 0.51 K/uL    Baso (Absolute) 0.06 0.00 - 0.12 K/uL    Immature Granulocytes (abs) 0.09 0.00 - 0.11 K/uL    NRBC (Absolute) 0.12 K/uL   COMP METABOLIC PANEL   Result Value Ref Range    Sodium 141 135 - 145 mmol/L    Potassium 3.2 (L) 3.6 - 5.5 mmol/L    Chloride 96 96 - 112 mmol/L    Co2 19 (L) 20 - 33 mmol/L    Anion Gap 26.0 (H) 7.0 - 16.0    Glucose 142 (H) 65 - 99 mg/dL    Bun 40 (H) 8 - 22 mg/dL    Creatinine 1.39 0.50 - 1.40 mg/dL    Calcium 11.3 (H) 8.5 - 10.5 mg/dL    AST(SGOT) 41 12 - 45 U/L    ALT(SGPT) 24 2 - 50 U/L    Alkaline Phosphatase 247 (H) 30 - 99 U/L    Total Bilirubin 1.5 0.1 - 1.5 mg/dL    Albumin 4.4 3.2 - 4.9 g/dL    Total Protein 7.9 6.0 - 8.2 g/dL    Globulin 3.5 1.9 - 3.5 g/dL    A-G Ratio 1.3 g/dL   DIAGNOSTIC ALCOHOL   Result Value Ref Range    Diagnostic Alcohol <10.1 <10.1 mg/dL   URINALYSIS,CULTURE IF INDICATED    Specimen: Urine, Clean Catch   Result Value Ref Range    Color DK Yellow     Character Clear     Specific Gravity 1.017 <1.035    Ph 5.5 5.0 - 8.0    Glucose Negative Negative mg/dL    Ketones Trace (A) Negative mg/dL    Protein 30 (A) Negative mg/dL    Bilirubin Large (A) Negative    Urobilinogen, Urine 1.0 Negative    Nitrite Positive (A) Negative    Leukocyte Esterase Small (A) Negative    Occult Blood Negative Negative    Micro Urine Req Microscopic    LACTIC ACID   Result Value Ref Range    Lactic Acid 2.9 (H) 0.5 - 2.0 mmol/L   ESTIMATED GFR   Result Value Ref Range    GFR (CKD-EPI) 41 (A) >60 mL/min/1.73 m 2   CORRECTED CALCIUM   Result Value Ref Range    Correct Calcium 11.0 (H) 8.5 - 10.5 mg/dL   URINE MICROSCOPIC (W/UA)   Result Value Ref Range    WBC 0-2 /hpf    RBC 0-2 /hpf    Bacteria Negative None  /hpf    Epithelial Cells Negative /hpf    Hyaline Cast 3-5 (A) /lpf   Prothrombin time (INR)   Result Value Ref Range    PT 13.1 12.0 - 14.6 sec    INR 1.00 0.87 - 1.13   EKG   Result Value Ref Range    Report       [facility] Emergency Dept.    Test Date:  2023  Pt Name:    VALENTINE ALANIZ                Department: ER  MRN:        [id number]                      Room:        21  Gender:     Female                       Technician: [id number]  :        [id number]                   Requested By:EDDA GAMBOA  Order #:    [id number]                    Reading MD:    Measurements  Intervals                                Axis  Rate:       88                           P:          70  SD:         152                          QRS:        75  QRSD:       80                           T:          71  QT:         350  QTc:        424    Interpretive Statements  Sinus rhythm  Consider right atrial enlargement  ST depression, consider ischemia, lateral lds  Borderline ST elevation, inferior leads  Artifact in lead(s) I,III,aVR,aVL,aVF,V1,V3,V4,V5,V6  Compared to ECG 2022 09:24:08  ST (T wave) deviation now present  Possible ischemia now present  Sinus tachycardia no longer presen t         EKG; normal sinus rhythm at a rate of 88.  No ST elevation, no ST depression.  QTc is 424.      RADIOLOGY  I have independently interpreted the diagnostic imaging associated with this visit and am waiting the final reading from the radiologist.   My preliminary interpretation is as follows: see below  Radiologist interpretation:   DX-CHEST-PORTABLE (1 VIEW)   Final Result         No acute cardiac or pulmonary abnormality is identified.            COURSE & MEDICAL DECISION MAKING    Admit    INITIAL ASSESSMENT, COURSE AND PLAN  Care Narrative: The patient that will be admitted to the hospital service for UTI, IV antibiotics, and IV fluids.  She did have elevated lactic acid, which will be redrawn.  She has been  given antibiotics, and is comfortable at this time.    DISPOSITION AND DISCUSSIONS  Patient will be admitted to the hospitalist service    FINAL DIAGNOSIS  1. Acute UTI           Electronically signed by: Chad Keen D.O., 5/28/2023 11:15 AM

## 2023-05-28 NOTE — ED NOTES
Pt BIB EMS from home.  Pt lives alone.  Pt has been having AMS/Hallucination for the last 1.5 wks per pt neighbor.  Pt neighbor Vincent called EMS for pt.  Pt admits to alcohol drinking.  Reports drinks a few glasses of vodka daily.  Pt also reports that she hasn't been eating very well.   by EMS.  PIV started by EMS and 150 ml of NS given.  Pt A/Ox3, unsure about date.  Pt isn't ambulatory per EMS.  ERP to see.

## 2023-05-28 NOTE — ASSESSMENT & PLAN NOTE
Long history of alcohol abuse, multiple prior admissions for withdrawal   No current s/o withdrawal but this is likely contributing to her confusion  Thinks her last drink was 2 days ago but very poor historian   Alcohol level negative in ER     [facility] protocol d/c

## 2023-05-28 NOTE — PROGRESS NOTES
Bedside report received from ER nurse. Assumed care of pt. PT transferred to T724-2 on ZOLL. Pt awake, laying in bed. A/Ox1, disoriented to time situation ,place. VSS.  Pt oriented to unit and educated to call before getting out of bed. POC reviewed and white board updated. Tele box on. .  Call light in reach. Bed locked in lowest position with 2 upper bed rails up. Bed alarm on. Pt is a HIGH fall risk with assistance of one person necessary.

## 2023-05-28 NOTE — ASSESSMENT & PLAN NOTE
Patient had admission x2 in July/ August 2022 for acute blood loss anemia secondary to Crystal-Payan tear treated with hemoclipping and epi injection.  Patient discharged with Carafate and PPI and was resumed on her Xarelto.  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  IVC filter was placed during that admission, patient discontinued on her Xarelto.

## 2023-05-29 LAB
ALBUMIN SERPL BCP-MCNC: 3.2 G/DL (ref 3.2–4.9)
ALBUMIN/GLOB SERPL: 1.3 G/DL
ALP SERPL-CCNC: 170 U/L (ref 30–99)
ALT SERPL-CCNC: 17 U/L (ref 2–50)
ANION GAP SERPL CALC-SCNC: 15 MMOL/L (ref 7–16)
ANION GAP SERPL CALC-SCNC: 15 MMOL/L (ref 7–16)
AST SERPL-CCNC: 23 U/L (ref 12–45)
BILIRUB SERPL-MCNC: 0.9 MG/DL (ref 0.1–1.5)
BUN SERPL-MCNC: 25 MG/DL (ref 8–22)
BUN SERPL-MCNC: 30 MG/DL (ref 8–22)
CALCIUM ALBUM COR SERPL-MCNC: 10 MG/DL (ref 8.5–10.5)
CALCIUM SERPL-MCNC: 9 MG/DL (ref 8.5–10.5)
CALCIUM SERPL-MCNC: 9.4 MG/DL (ref 8.5–10.5)
CHLORIDE SERPL-SCNC: 102 MMOL/L (ref 96–112)
CHLORIDE SERPL-SCNC: 104 MMOL/L (ref 96–112)
CO2 SERPL-SCNC: 22 MMOL/L (ref 20–33)
CO2 SERPL-SCNC: 24 MMOL/L (ref 20–33)
CREAT SERPL-MCNC: 0.82 MG/DL (ref 0.5–1.4)
CREAT SERPL-MCNC: 0.91 MG/DL (ref 0.5–1.4)
ERYTHROCYTE [DISTWIDTH] IN BLOOD BY AUTOMATED COUNT: 49.9 FL (ref 35.9–50)
GFR SERPLBLD CREATININE-BSD FMLA CKD-EPI: 68 ML/MIN/1.73 M 2
GFR SERPLBLD CREATININE-BSD FMLA CKD-EPI: 78 ML/MIN/1.73 M 2
GLOBULIN SER CALC-MCNC: 2.4 G/DL (ref 1.9–3.5)
GLUCOSE SERPL-MCNC: 108 MG/DL (ref 65–99)
GLUCOSE SERPL-MCNC: 93 MG/DL (ref 65–99)
HCT VFR BLD AUTO: 21.8 % (ref 37–47)
HCT VFR BLD AUTO: 23 % (ref 37–47)
HGB BLD-MCNC: 7.6 G/DL (ref 12–16)
HGB BLD-MCNC: 7.9 G/DL (ref 12–16)
LACTATE SERPL-SCNC: 1.3 MMOL/L (ref 0.5–2)
MAGNESIUM SERPL-MCNC: 1.2 MG/DL (ref 1.5–2.5)
MCH RBC QN AUTO: 33.1 PG (ref 27–33)
MCHC RBC AUTO-ENTMCNC: 34.3 G/DL (ref 32.2–35.5)
MCV RBC AUTO: 96.2 FL (ref 81.4–97.8)
PHOSPHATE SERPL-MCNC: 1.9 MG/DL (ref 2.5–4.5)
PLATELET # BLD AUTO: 180 K/UL (ref 164–446)
PMV BLD AUTO: 11.3 FL (ref 9–12.9)
POTASSIUM SERPL-SCNC: 3 MMOL/L (ref 3.6–5.5)
POTASSIUM SERPL-SCNC: 3 MMOL/L (ref 3.6–5.5)
PROT SERPL-MCNC: 5.6 G/DL (ref 6–8.2)
RBC # BLD AUTO: 2.39 M/UL (ref 4.2–5.4)
SODIUM SERPL-SCNC: 141 MMOL/L (ref 135–145)
SODIUM SERPL-SCNC: 141 MMOL/L (ref 135–145)
WBC # BLD AUTO: 6.8 K/UL (ref 4.8–10.8)

## 2023-05-29 PROCEDURE — 99233 SBSQ HOSP IP/OBS HIGH 50: CPT | Performed by: HOSPITALIST

## 2023-05-29 PROCEDURE — 770020 HCHG ROOM/CARE - TELE (206)

## 2023-05-29 PROCEDURE — 84100 ASSAY OF PHOSPHORUS: CPT

## 2023-05-29 PROCEDURE — 700102 HCHG RX REV CODE 250 W/ 637 OVERRIDE(OP): Performed by: HOSPITALIST

## 2023-05-29 PROCEDURE — 85027 COMPLETE CBC AUTOMATED: CPT

## 2023-05-29 PROCEDURE — 85018 HEMOGLOBIN: CPT

## 2023-05-29 PROCEDURE — A9270 NON-COVERED ITEM OR SERVICE: HCPCS | Performed by: HOSPITALIST

## 2023-05-29 PROCEDURE — 36415 COLL VENOUS BLD VENIPUNCTURE: CPT

## 2023-05-29 PROCEDURE — A9270 NON-COVERED ITEM OR SERVICE: HCPCS | Performed by: INTERNAL MEDICINE

## 2023-05-29 PROCEDURE — 700101 HCHG RX REV CODE 250

## 2023-05-29 PROCEDURE — 97163 PT EVAL HIGH COMPLEX 45 MIN: CPT

## 2023-05-29 PROCEDURE — 700111 HCHG RX REV CODE 636 W/ 250 OVERRIDE (IP): Performed by: HOSPITALIST

## 2023-05-29 PROCEDURE — 700111 HCHG RX REV CODE 636 W/ 250 OVERRIDE (IP)

## 2023-05-29 PROCEDURE — 80053 COMPREHEN METABOLIC PANEL: CPT

## 2023-05-29 PROCEDURE — 51798 US URINE CAPACITY MEASURE: CPT

## 2023-05-29 PROCEDURE — 83605 ASSAY OF LACTIC ACID: CPT

## 2023-05-29 PROCEDURE — 83735 ASSAY OF MAGNESIUM: CPT

## 2023-05-29 PROCEDURE — 700105 HCHG RX REV CODE 258: Performed by: INTERNAL MEDICINE

## 2023-05-29 PROCEDURE — 700111 HCHG RX REV CODE 636 W/ 250 OVERRIDE (IP): Performed by: INTERNAL MEDICINE

## 2023-05-29 PROCEDURE — 80048 BASIC METABOLIC PNL TOTAL CA: CPT

## 2023-05-29 PROCEDURE — 700105 HCHG RX REV CODE 258

## 2023-05-29 PROCEDURE — 700102 HCHG RX REV CODE 250 W/ 637 OVERRIDE(OP): Performed by: INTERNAL MEDICINE

## 2023-05-29 PROCEDURE — 85014 HEMATOCRIT: CPT

## 2023-05-29 RX ORDER — POTASSIUM CHLORIDE 20 MEQ/1
40 TABLET, EXTENDED RELEASE ORAL DAILY
Status: DISCONTINUED | OUTPATIENT
Start: 2023-05-29 | End: 2023-06-05 | Stop reason: HOSPADM

## 2023-05-29 RX ORDER — MAGNESIUM SULFATE HEPTAHYDRATE 40 MG/ML
2 INJECTION, SOLUTION INTRAVENOUS ONCE
Status: COMPLETED | OUTPATIENT
Start: 2023-05-29 | End: 2023-05-29

## 2023-05-29 RX ORDER — POTASSIUM CHLORIDE 20 MEQ/1
40 TABLET, EXTENDED RELEASE ORAL ONCE
Status: COMPLETED | OUTPATIENT
Start: 2023-05-29 | End: 2023-05-29

## 2023-05-29 RX ADMIN — SODIUM CHLORIDE: 9 INJECTION, SOLUTION INTRAVENOUS at 22:12

## 2023-05-29 RX ADMIN — DOCUSATE SODIUM 50 MG AND SENNOSIDES 8.6 MG 2 TABLET: 8.6; 5 TABLET, FILM COATED ORAL at 16:17

## 2023-05-29 RX ADMIN — LORAZEPAM 1 MG: 1 TABLET ORAL at 20:16

## 2023-05-29 RX ADMIN — POTASSIUM CHLORIDE 40 MEQ: 1500 TABLET, EXTENDED RELEASE ORAL at 16:18

## 2023-05-29 RX ADMIN — POTASSIUM CHLORIDE 40 MEQ: 1500 TABLET, EXTENDED RELEASE ORAL at 09:03

## 2023-05-29 RX ADMIN — LORAZEPAM 0.5 MG: 0.5 TABLET ORAL at 16:17

## 2023-05-29 RX ADMIN — MAGNESIUM SULFATE HEPTAHYDRATE 2 G: 2 INJECTION, SOLUTION INTRAVENOUS at 16:18

## 2023-05-29 RX ADMIN — THERA TABS 1 TABLET: TAB at 05:03

## 2023-05-29 RX ADMIN — SODIUM CHLORIDE: 9 INJECTION, SOLUTION INTRAVENOUS at 01:16

## 2023-05-29 RX ADMIN — CEFTRIAXONE SODIUM 2000 MG: 10 INJECTION, POWDER, FOR SOLUTION INTRAVENOUS at 12:59

## 2023-05-29 RX ADMIN — MAGNESIUM SULFATE HEPTAHYDRATE 2 G: 2 INJECTION, SOLUTION INTRAVENOUS at 09:04

## 2023-05-29 RX ADMIN — POTASSIUM PHOSPHATE, MONOBASIC AND POTASSIUM PHOSPHATE, DIBASIC 15 MMOL: 224; 236 INJECTION, SOLUTION, CONCENTRATE INTRAVENOUS at 03:44

## 2023-05-29 RX ADMIN — MAGNESIUM SULFATE HEPTAHYDRATE 2 G: 2 INJECTION, SOLUTION INTRAVENOUS at 03:28

## 2023-05-29 RX ADMIN — ENOXAPARIN SODIUM 30 MG: 100 INJECTION SUBCUTANEOUS at 16:18

## 2023-05-29 RX ADMIN — FOLIC ACID 1 MG: 1 TABLET ORAL at 05:03

## 2023-05-29 RX ADMIN — LORAZEPAM 0.5 MG: 0.5 TABLET ORAL at 04:07

## 2023-05-29 RX ADMIN — DOCUSATE SODIUM 50 MG AND SENNOSIDES 8.6 MG 2 TABLET: 8.6; 5 TABLET, FILM COATED ORAL at 05:03

## 2023-05-29 RX ADMIN — Medication 100 MG: at 05:03

## 2023-05-29 RX ADMIN — OMEPRAZOLE 20 MG: 20 CAPSULE, DELAYED RELEASE ORAL at 05:03

## 2023-05-29 ASSESSMENT — GAIT ASSESSMENTS
DEVIATION: DECREASED BASE OF SUPPORT;BRADYKINETIC;SHUFFLED GAIT;DECREASED HEEL STRIKE;DECREASED TOE OFF
GAIT LEVEL OF ASSIST: MODERATE ASSIST
DISTANCE (FEET): 6
ASSISTIVE DEVICE: HAND HELD ASSIST

## 2023-05-29 ASSESSMENT — COGNITIVE AND FUNCTIONAL STATUS - GENERAL
DAILY ACTIVITIY SCORE: 13
DRESSING REGULAR LOWER BODY CLOTHING: A LOT
EATING MEALS: A LITTLE
MOVING TO AND FROM BED TO CHAIR: UNABLE
HELP NEEDED FOR BATHING: A LOT
MOBILITY SCORE: 10
CLIMB 3 TO 5 STEPS WITH RAILING: TOTAL
STANDING UP FROM CHAIR USING ARMS: A LOT
MOVING TO AND FROM BED TO CHAIR: UNABLE
SUGGESTED CMS G CODE MODIFIER MOBILITY: CL
TOILETING: A LOT
TURNING FROM BACK TO SIDE WHILE IN FLAT BAD: A LITTLE
WALKING IN HOSPITAL ROOM: A LOT
STANDING UP FROM CHAIR USING ARMS: A LOT
MOVING FROM LYING ON BACK TO SITTING ON SIDE OF FLAT BED: UNABLE
SUGGESTED CMS G CODE MODIFIER MOBILITY: CL
CLIMB 3 TO 5 STEPS WITH RAILING: TOTAL
WALKING IN HOSPITAL ROOM: A LOT
PERSONAL GROOMING: A LOT
MOBILITY SCORE: 10
DRESSING REGULAR UPPER BODY CLOTHING: A LOT
SUGGESTED CMS G CODE MODIFIER DAILY ACTIVITY: CL
MOVING FROM LYING ON BACK TO SITTING ON SIDE OF FLAT BED: UNABLE
TURNING FROM BACK TO SIDE WHILE IN FLAT BAD: A LITTLE

## 2023-05-29 ASSESSMENT — LIFESTYLE VARIABLES
HEADACHE, FULLNESS IN HEAD: NOT PRESENT
ANXIETY: NO ANXIETY (AT EASE)
SUBSTANCE_ABUSE: 0
TREMOR: NO TREMOR
HEADACHE, FULLNESS IN HEAD: NOT PRESENT
AGITATION: NORMAL ACTIVITY
HEADACHE, FULLNESS IN HEAD: NOT PRESENT
TREMOR: NO TREMOR
VISUAL DISTURBANCES: NOT PRESENT
HEADACHE, FULLNESS IN HEAD: NOT PRESENT
TOTAL SCORE: 10
TREMOR: NO TREMOR
AUDITORY DISTURBANCES: NOT PRESENT
AGITATION: *
ANXIETY: NO ANXIETY (AT EASE)
NAUSEA AND VOMITING: NO NAUSEA AND NO VOMITING
AGITATION: NORMAL ACTIVITY
AGITATION: *
ORIENTATION AND CLOUDING OF SENSORIUM: DISORIENTED FOR PLACE AND / OR PERSON
NAUSEA AND VOMITING: NO NAUSEA AND NO VOMITING
TOTAL SCORE: 4
NAUSEA AND VOMITING: NO NAUSEA AND NO VOMITING
VISUAL DISTURBANCES: MODERATE SENSITIVITY
AGITATION: NORMAL ACTIVITY
ANXIETY: NO ANXIETY (AT EASE)
HEADACHE, FULLNESS IN HEAD: NOT PRESENT
PAROXYSMAL SWEATS: NO SWEAT VISIBLE
AUDITORY DISTURBANCES: NOT PRESENT
ORIENTATION AND CLOUDING OF SENSORIUM: DISORIENTED FOR PLACE AND / OR PERSON
ANXIETY: NO ANXIETY (AT EASE)
TREMOR: TREMOR NOT VISIBLE BUT CAN BE FELT, FINGERTIP TO FINGERTIP
NAUSEA AND VOMITING: NO NAUSEA AND NO VOMITING
ORIENTATION AND CLOUDING OF SENSORIUM: DATE DISORIENTATION BY MORE THAN TWO CALENDAR DAYS
ORIENTATION AND CLOUDING OF SENSORIUM: DISORIENTED FOR PLACE AND / OR PERSON
VISUAL DISTURBANCES: MILD SENSITIVITY
PAROXYSMAL SWEATS: NO SWEAT VISIBLE
NAUSEA AND VOMITING: NO NAUSEA AND NO VOMITING
PAROXYSMAL SWEATS: NO SWEAT VISIBLE
TOTAL SCORE: 7
AGITATION: NORMAL ACTIVITY
PAROXYSMAL SWEATS: NO SWEAT VISIBLE
ANXIETY: MILDLY ANXIOUS
TREMOR: NO TREMOR
ORIENTATION AND CLOUDING OF SENSORIUM: DISORIENTED FOR PLACE AND / OR PERSON
NAUSEA AND VOMITING: NO NAUSEA AND NO VOMITING
TOTAL SCORE: 4
TOTAL SCORE: 4
ORIENTATION AND CLOUDING OF SENSORIUM: DISORIENTED FOR PLACE AND / OR PERSON
PAROXYSMAL SWEATS: NO SWEAT VISIBLE
AUDITORY DISTURBANCES: NOT PRESENT
VISUAL DISTURBANCES: NOT PRESENT
TREMOR: TREMOR NOT VISIBLE BUT CAN BE FELT, FINGERTIP TO FINGERTIP
AUDITORY DISTURBANCES: NOT PRESENT
VISUAL DISTURBANCES: NOT PRESENT
HEADACHE, FULLNESS IN HEAD: NOT PRESENT
AUDITORY DISTURBANCES: NOT PRESENT
PAROXYSMAL SWEATS: NO SWEAT VISIBLE
ANXIETY: MILDLY ANXIOUS
AUDITORY DISTURBANCES: NOT PRESENT
VISUAL DISTURBANCES: NOT PRESENT
TOTAL SCORE: 7

## 2023-05-29 ASSESSMENT — ENCOUNTER SYMPTOMS
DIZZINESS: 0
EYES NEGATIVE: 1
WEAKNESS: 0
SORE THROAT: 0
FOCAL WEAKNESS: 0
VOMITING: 0
NEUROLOGICAL NEGATIVE: 1
BRUISES/BLEEDS EASILY: 0
PALPITATIONS: 0
DEPRESSION: 0
CARDIOVASCULAR NEGATIVE: 1
BLURRED VISION: 0
SHORTNESS OF BREATH: 0
ABDOMINAL PAIN: 0
GASTROINTESTINAL NEGATIVE: 1
RESPIRATORY NEGATIVE: 1
MEMORY LOSS: 1
DIAPHORESIS: 0
COUGH: 0
WEIGHT LOSS: 0
HEADACHES: 0
NAUSEA: 0
CHILLS: 0
MYALGIAS: 0
MUSCULOSKELETAL NEGATIVE: 1
FEVER: 0

## 2023-05-29 ASSESSMENT — FIBROSIS 4 INDEX
FIB4 SCORE: 2.11
FIB4 SCORE: 2.11

## 2023-05-29 NOTE — PROGRESS NOTES
Contacted APRN as pt has expiratory wheezing in all lung fields. RN requested duoneb. APRN to place orders.

## 2023-05-29 NOTE — PROGRESS NOTES
Contacted ONEYDA Zaman to alert her that pt's hgb is 7.9 today, down from 10.7, no obvious signs of bleeding. Also I alerted APRN pt's potassium is 3.0 and Mg is 1.2. APRN to place orders.

## 2023-05-29 NOTE — DIETARY
"Nutrition services: Day 1 of admit.  Jaymie Peacock is a 67 y.o. female with admitting DX of severe sepsis.    Consult received for failure to thrive, BMI <19. Met with pt at bedside. Pt appeared thin. Moderate fat loss evidenced by slightly hollowed temporals, slightly pronounced zygomatic arches; severe fat loss evidenced by dark under eye circles; Severe fat loss evidenced by mostly skin in upper arm region; severe muscle loss evidenced by thin upper leg, and squared patella bone. Pt somewhat confused during interview. She stated a good appetite, stated she has been eating well at home. She denied any wt loss. Breakfast tray observed at bedside was >50% consumed. Discussed nutrient dense snacks and/or oral nutrition supplements with pt to bolster nutrition. Pt requested fruit between meals at this time.     Assessment:  Height: 167.6 cm (5' 6\")  Weight: 47.1 kg (103 lb 13.4 oz)  Body mass index is 16.76 kg/m²., BMI classification: underweight  Diet/Intake: Cardiac; no PO recorded in flow sheet yet    Evaluation:   Admitted with altered level of consciousness, hallucinations.  PMH: ETOH abuse, GI bleed, high cholesterol, hypertension, pulmonary embolism.  Wt hx per chart review: 105 lbs 11/16/22, 101 lbs 8/1/22, 93 lbs 5/11/22, 89 lbs 3/29/22. No wt loss noted.  Potassium 3.0, Glucose 108, BUN 25, Phos 1.9, Mg 1.2  Thiamine, Multivitamin, Folvite per MAR.    Malnutrition Risk: Pt with severe, chronic malnutrition related to ongoing ETOH use, with multiple admissions over the past year, AEB physical markers for severe fat and severe muscle loss as noted above.    Recommendations/Plan:  Fruit between meals per pt preference.  Encourage intake of meals and snacks.  Document intake of all meals and snacks as % taken in ADL's to provide interdisciplinary communication across all shifts.   Monitor weight.  Nutrition rep will continue to see patient for ongoing meal and snack preferences.   Monitor for refeeding: " Order BMP w/ Mg and Phos x 7 days. Replete K, Phos and Mg prn. Continue to supplement 100 mg Thiamine x 7 days to reduce risk of refeeding.    RD will follow per dept guidelines.

## 2023-05-29 NOTE — CARE PLAN
The patient is Stable - Low risk of patient condition declining or worsening    Shift Goals  Clinical Goals: CIWA assessments, electrolyte balance  Patient Goals: rest  Family Goals: PATTI    Progress made toward(s) clinical / shift goals:    Problem: Knowledge Deficit - Standard  Goal: Patient and family/care givers will demonstrate understanding of plan of care, disease process/condition, diagnostic tests and medications  Description: Target End Date:  1-3 days or as soon as patient condition allows    Document in Patient Education    1.  Patient and family/caregiver oriented to unit, equipment, visitation policy and means for communicating concern  2.  Complete/review Learning Assessment  3.  Assess knowledge level of disease process/condition, treatment plan, diagnostic tests and medications  4.  Explain disease process/condition, treatment plan, diagnostic tests and medications  Outcome: Progressing     Problem: Optimal Care for Alcohol Withdrawal  Goal: Optimal Care for the alcohol withdrawal patient  Description: Target End Date:  1 to 3 days    1.  Alcohol history screening done on admission  2.  CIWA-AR score assessment (includes assessment of nausea/vomiting, tremor, sweats, anxiety, agitation, tactile, visual and auditory disturbances, headache and orientation/sensorium).  Document on CIWA flowsheet.  3.  Follow CIWA-AR score protocol  4.  Frequent reorientation  5.  Monitor for fluid and electrolyte imbalance.  6.  Assess for respiratory depression due to sedation (pulse ox)  7.  Consider thiamine, multivitamins, folic acid and magnesium sulfate per provider order  8.  Collaborate with Social Workers/Case Management  9.  Collaborate with mental health  Outcome: Progressing     Problem: Seizure Precautions  Goal: Implementation of seizure precautions  Description: Target End Date:  Prior to discharge or change in level of care    1.  Padded side rails up at all times  2.  Suction equipment and oxygen  delivery system at bedside  3.  Continuous pulse oximeter in use  4.  Implement fall precautions, bed alarm on, bed in lowest position  5.  IV access (per order)  6.  Provide low stimulus environment, avoid exposure to triggers  7.  Instruct patient to use call light/seizure button if having warning signs of impending seizure  Outcome: Progressing     Problem: Lifestyle Changes  Goal: Patient's ability to identify lifestyle changes and available resources to help reduce recurrence of condition will improve  Description: Target End Date:  1 to 3 days    1.  Discuss recommended lifestyle changes  2.  Identify available resources and support systems  3.  Consider referral to substance abuse program  Outcome: Progressing     Problem: Hemodynamics  Goal: Patient's hemodynamics, fluid balance and neurologic status will be stable or improve  Description: Target End Date:  Prior to discharge or change in level of care    Document on Assessment and I/O flowsheet templates    1.  Monitor vital signs, pulse oximetry and cardiac monitor per provider order and/or policy  2.  Maintain blood pressure per provider order  3.  Hemodynamic monitoring per provider order  4.  Manage IV fluids and IV infusions  5.  Monitor intake and output  6.  Daily weights per unit policy or provider order  7.  Assess peripheral pulses and capillary refill  8.  Assess color and body temperature  9.  Position patient for maximum circulation/cardiac output  10. Monitor for signs/symptoms of excessive bleeding  11. Assess mental status, restlessness and changes in level of consciousness  12. Monitor temperature and report fever or hypothermia to provider immediately. Consideration of targeted temperature management.  Outcome: Progressing       Patient is not progressing towards the following goals:

## 2023-05-29 NOTE — PROGRESS NOTES
[facility] Medicine Daily Progress Note    Date of Service  5/29/2023    Chief Complaint  Jaymie Peacock is a 67 y.o. female admitted 5/28/2023 with confusion and altered menation    Hospital Course  Patient is a 67 year old female with history of hypertension, alcohol and tobacco use, pulmonary embolism, and LE DVT on Xarelto. She presented to [facility] on 5/28/2023 with altered mental status.  Patient was oriented to self and year, believed she was in [place] and did not know why she was in the hospital.  Patient complained of urinary frequency, denied fevers, chills, dysuria, chest pain or shortness of breath.  Patient answered questions however her answers did not make sense.  Per previous notes patient was brought in by EMS from home as her neighbor called as he reported she had been having hallucinations for the last 1 and half weeks.  Patient did report she regularly drinks alcohol, but could not quantify how much.  Also she reported she had not been eating very well     In the ER patient tachycardic, tachypneic and hypertensive.  Labs significant for hemoglobin 10.3, hematocrit 31.5, potassium 2.2, CO2 19, anion gap 26, lactic acid 2.9, creatinine 1.39, GFR 41 and alkaline phosphatase 247 with a negative alcohol.  UA with positive nitrites and leukocyte esterase.  Chest x-ray interpreted by myself shows no acute findings.    Interval Problem Update  Axox2, poor historian. A friend is at bedside, he states he was a good friend of her  before he passed and he helps her as much as he can. She has no local family. She does have family in a different state that has expressed interest in moving her there to assist her. He reports she remains confused but this is improving, he reports a cycle of recurrent infections. Patient denies pain, denies sob, stable on RA. Had urinary retention overnight. ROS otherwise negative.     I have discussed this patient's plan of care and discharge plan at IDT rounds today with  Case Management, Nursing, Nursing leadership, and other members of the IDT team.    Consultants/Specialty      Code Status  Full Code    Disposition    I have placed the appropriate orders for post-discharge needs.    Review of Systems  Review of Systems   Constitutional:  Positive for malaise/fatigue. Negative for chills, diaphoresis, fever and weight loss.   HENT: Negative.  Negative for sore throat.    Eyes: Negative.  Negative for blurred vision.   Respiratory: Negative.  Negative for cough and shortness of breath.    Cardiovascular: Negative.  Negative for chest pain, palpitations and leg swelling.   Gastrointestinal: Negative.  Negative for abdominal pain, nausea and vomiting.   Genitourinary: Negative.  Negative for dysuria.   Musculoskeletal: Negative.  Negative for myalgias.   Skin: Negative.  Negative for itching and rash.   Neurological: Negative.  Negative for dizziness, focal weakness, weakness and headaches.   Endo/Heme/Allergies: Negative.  Does not bruise/bleed easily.   Psychiatric/Behavioral:  Positive for memory loss. Negative for depression, substance abuse and suicidal ideas.    All other systems reviewed and are negative.       Physical Exam  Temp:  [36.2 °C (97.2 °F)-37 °C (98.6 °F)] 37 °C (98.6 °F)  Pulse:  [] 105  Resp:  [17-20] 18  BP: ()/(56-81) 97/56  SpO2:  [92 %-95 %] 95 %    Physical Exam  Vitals and nursing note reviewed. Exam conducted with a chaperone present.   Constitutional:       General: She is not in acute distress.     Appearance: She is not diaphoretic.      Comments: She appears chronically ill   HENT:      Head: Normocephalic.      Nose: Nose normal.      Mouth/Throat:      Mouth: Mucous membranes are moist.   Eyes:      Pupils: Pupils are equal, round, and reactive to light.   Cardiovascular:      Rate and Rhythm: Normal rate and regular rhythm.      Pulses: Normal pulses.      Heart sounds: Normal heart sounds.   Pulmonary:      Effort: Pulmonary effort is  normal.      Breath sounds: Normal breath sounds.   Abdominal:      General: Abdomen is flat. Bowel sounds are normal.      Palpations: Abdomen is soft.   Musculoskeletal:         General: No swelling or deformity. Normal range of motion.   Skin:     General: Skin is warm and dry.      Capillary Refill: Capillary refill takes less than 2 seconds.   Neurological:      General: No focal deficit present.      Mental Status: She is alert.      Cranial Nerves: No cranial nerve deficit.   Psychiatric:         Mood and Affect: Mood normal.         Behavior: Behavior normal.         Fluids    Intake/Output Summary (Last 24 hours) at 5/29/2023 1509  Last data filed at 5/29/2023 0904  Gross per 24 hour   Intake 1950.75 ml   Output 400 ml   Net 1550.75 ml       Laboratory  Recent Labs     05/28/23  1114 05/29/23  0050 05/29/23  0702   WBC 9.0 6.8  --    RBC 3.24* 2.39*  --    HEMOGLOBIN 10.7* 7.9* 7.6*   HEMATOCRIT 31.5* 23.0* 21.8*   MCV 97.2 96.2  --    MCH 33.0 33.1*  --    MCHC 34.0 34.3  --    RDW 50.6* 49.9  --    PLATELETCT 291 180  --    MPV 11.1 11.3  --      Recent Labs     05/28/23  1114 05/29/23  0050 05/29/23  1139   SODIUM 141 141 141   POTASSIUM 3.2* 3.0* 3.0*   CHLORIDE 96 102 104   CO2 19* 24 22   GLUCOSE 142* 93 108*   BUN 40* 30* 25*   CREATININE 1.39 0.91 0.82   CALCIUM 11.3* 9.4 9.0     Recent Labs     05/28/23  1114   INR 1.00               Imaging  DX-CHEST-PORTABLE (1 VIEW)   Final Result         No acute cardiac or pulmonary abnormality is identified.           Assessment/Plan  * Severe sepsis (HCC)- (present on admission)  Assessment & Plan  This is Severe Sepsis Present on admission  SIRS criteria identified on my evaluation include: Tachycardia, with heart rate greater than 90 BPM and Tachypnea, with respirations greater than 20 per minute  Clinical indicators of end organ dysfunction include Lactate >2 mmol/L (18.0 mg/dL)  Source is urinary   Sepsis protocol initiated  Crystalloid Fluid  Administration: Fluid resuscitation ordered per standard protocol - 30 mL/kg per current or ideal body weight  IV antibiotics as appropriate for source of sepsis  Reassessment: I have reassessed the patient's hemodynamic status  Slight improvement today, continue to follow closely   Cultures so far negative  With secondary encephalopathy  Continue rocephin   IVF     Lactic acidosis- (present on admission)  Assessment & Plan  Lactic > 2 on admission   Nw resolved    Tobacco use disorder- (present on admission)  Assessment & Plan  Reports she is still smoking, not interested in quitting   Does not want nicotine replacement at this time   Cessation counseling performed, 4 minutes     Crystal-Payan tear- (present on admission)  Assessment & Plan  Hx of   Omeprazole   No s/o bleeding on exam, h/h did drop overnight but suspect this is due to hemodilution, following closely   Serial h/h  Consult GI if s/o acute bleed    History of pulmonary embolus (PE)- (present on admission)  Assessment & Plan  Patient had admission x2 in July/ August 2022 for acute blood loss anemia secondary to Crystal-Payan tear treated with hemoclipping and epi injection.  Patient discharged with Carafate and PPI and was resumed on her Xarelto.  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  IVC filter was placed during that admission, patient discontinued on her Xarelto.    High anion gap metabolic acidosis- (present on admission)  Assessment & Plan  Present on admission   Now resolved    Acute cystitis without hematuria- (present on admission)  Assessment & Plan  Continue iv rocephin  Cultures so far negative, following      Encephalopathy acute- (present on admission)  Assessment & Plan  2/2 infection   Reports alcohol use, hx of withdrawals   IV abx   IVF  Per her friend this is improving, suspect metabolic encephalopathy from infection and toxic encephalopathy from etoh  Non focal  exam, following closely  If does not improve consider CT/MRI     Severe protein-calorie malnutrition ([place]: less than 60% of standard weight) ([facility])- (present on admission)  Assessment & Plan  Muscle wasting on exam   nutritionist consulted     YAMIL (acute kidney injury) ([facility])- (present on admission)  Assessment & Plan  2/2 sepsis and dehydration   This has resolved  Continue to follow  Avoid nephrotoxic medications       Hypokalemia- (present on admission)  Assessment & Plan  Low mag contributing  Replacing mag and K  Will repeat both levels in am    Alcohol abuse- (present on admission)  Assessment & Plan  Long history of alcohol abuse, multiple prior admissions for withdrawal   No current s/o withdrawal but this is likely contributing to her confusion  Thinks her last drink was 2 days ago but very poor historian   Alcohol level negative in ER   Continue CIWA  Detox bag   MVI, folate, thiamine          VTE prophylaxis: enoxaparin ppx    I have performed a physical exam and reviewed and updated ROS and Plan today (5/29/2023). In review of yesterday's note (5/28/2023), there are no changes except as documented above.

## 2023-05-29 NOTE — PROGRESS NOTES
NOC HOSPITALIST CROSS COVER    Notified by RN regarding hgb of 7.9, down from 10.7 without any signs of bleeding.The patient is hemodynamically stable and asymptomatic. Also notified of potassium of 3.0 and magnesium of 1.2. Additionally, the patient is retaining urine > 350 mL.      Vitals:    05/29/23 0344   BP: 107/75   Pulse: 89   Resp: 20   Temp: 36.4 °C (97.5 °F)   SpO2: 92%          Plan:  #Anemia  -Likely multifactorial  -Decreased from 10.7 to 7.9 overnight, concern for active bleeding given alcohol hx and Crystal Payan tear hx  -Occult stool ordered and pending  -Repeat H&H ordered for 0500 to re-eval for further bleeding   -Continue home omeprazole pending results of occult stool - will upgrade to IV PPI if positive    #Electrolyte disturbances  -Potassium phosphate 15 mmol IVPB  -Mag sulfate 2 g IV  -Repeat BMP scheduled for 1200    #Urinary Retention  -Straight cath x 2 for bladder scan > 300 mL. If third scan > 300 mL, contact provider for possible holloway      -----------------------------------------------------------------------------------------------------------    Electronically signed by:  Cayden Zaman, MOISÉS, APRN, JAIME-BC  Hospitalist Services

## 2023-05-29 NOTE — DISCHARGE PLANNING
"HTH/SCP TCN chart review completed. CM unavailable to collaborate with prior to meeting with the pt. The most current review of medical record, knowledge of pt's PLOF and social support, LACE+ score of 72, 6 clicks scores of N/E mobility were considered.      Pt seen at bedside. Introduced TCN program. Provided education regarding post acute levels of care. Discussed HTH/SCP plan benefits (Meds to Beds, medical uber and [facility] transitional care). Pt verbalizes understanding.     Patient exhibits mild/moderate confusion at this time. She is oriented to self and year, though is unable to state where she is at. Patient reports that she is fairly independent at baseline using a 4WW. She does not drive, but relies on her brother, niece, and nephew for transportation. She also reports getting \"a lot\" of help from her neighbor, Vincent. She is agreeable to  and [facility] at this time. When attempting to discuss SNF, patient became tangential and confused. PT and OT have been ordered.     Choice proactively obtained for  and faxed to [facility]. TCN will continue to follow and collaborate with discharge planning team as additional post acute needs arise. Thank you.     Completed today:  PT/OT recommendations pending  Choice obtained:   [facility] referral (Y), sent 5/29/23            "

## 2023-05-29 NOTE — THERAPY
Physical Therapy   Initial Evaluation     Patient Name: Jaymie Peacock  Age:  67 y.o., Sex:  female  Medical Record #: [id number]  Today's Date: 5/29/2023     Precautions  Precautions: (P) Fall Risk  Comments: (P) confused    Assessment  Patient is 67 y.o. female with PMH of hypertension, alcohol and tobacco use disorder, history of pulmonary embolism, LE DVT on Xarelto who presented 5/28/2023 with altered mental status. Pt was agreeable to therapy evaluation and presented with confusion, impaired balance, impaired gait, weakness, impaired coordination, poor upright activity tolerance, and overall dec general locomotion. These impairments are limiting the patients ability to safely perform bed mobility, sit<>stands, transfers, and ambulation. Pt is currently a high fall risk. Pt states she is primarily independent with 4WW use, however, this may be inaccurate given current AMS. Pt will continue to benefit from skilled PT while in house, with recommendation for post acute therapy prior to d/c home.     Plan    Physical Therapy Initial Treatment Plan   Treatment Plan : (P) Bed Mobility, Equipment, Gait Training, Manual Therapy, Neuro Re-Education / Balance, Self Care / Home Evaluation, Therapeutic Activities, Therapeutic Exercise, Stair Training  Treatment Frequency: (P) 3 Times per Week  Duration: (P) Until Therapy Goals Met    DC Equipment Recommendations: (P) Unable to determine at this time  Discharge Recommendations: (P) Recommend post-acute placement for additional physical therapy services prior to discharge home     Objective       05/29/23 1015   Initial Contact Note    Initial Contact Note Order Received and Verified, Physical Therapy Evaluation in Progress with Full Report to Follow.   Precautions   Precautions Fall Risk   Comments confused   Prior Living Situation   Prior Services None   Housing / Facility 1 Story House   Steps Into Home 1   Steps In Home 0   Equipment Owned 4-Wheel Walker   Lives with -  Patient's Self Care Capacity Alone and Unable to Care For Self   Comments pt was able to provide history, however, information may be inaccurate due to confusion   Prior Level of Functional Mobility   Bed Mobility Independent   Transfer Status Independent   Ambulation Independent   Ambulation Distance   (community)   Assistive Devices Used 4-Wheel Walker   Stairs Independent   Comments pt states of and IPLOF with 4WW use, however, this may be inaccurate due to confusion   Cognition    Cognition / Consciousness X   Speech/ Communication Delayed Responses   Orientation Level Not Oriented to Reason;Not Oriented to Place;Not Oriented to Time;Not Oriented to Day;Not Oriented to Month;Not Oriented to Year   Level of Consciousness Alert   Attention Impaired   Comments pt presents with signifcant confusion. Pt was able to follow commands, however, requires freuqent VC and redirection   Passive ROM Upper Body   Passive ROM Upper Body WDL   Active ROM Upper Body   Active ROM Upper Body  WDL   Strength Upper Body   Upper Body Strength  X   Gross Strength Generalized Weakness, Equal Bilaterally.    Sensation Upper Body   Upper Extremity Sensation  WDL   Upper Body Muscle Tone   Upper Body Muscle Tone  WDL   Passive ROM Lower Body   Passive ROM Lower Body WDL   Active ROM Lower Body    Active ROM Lower Body  WDL   Strength Lower Body   Lower Body Strength  X   Gross Strength Generalized Weakness, Equal Bilaterally   Sensation Lower Body   Lower Extremity Sensation   WDL   Lower Body Muscle Tone   Lower Body Muscle Tone  WDL   Neurological Concerns   Neurological Concerns No   Coordination Upper Body   Coordination WDL   Coordination Lower Body    Coordination Lower Body  Not Tested   Comments presents with narrow BUDDY upon standing   Balance Assessment   Sitting Balance (Static) Fair   Sitting Balance (Dynamic) Fair -   Standing Balance (Static) Fair -   Standing Balance (Dynamic) Poor +   Weight Shift Sitting Fair   Weight Shift  Standing Poor   Comments w/HHA   Bed Mobility    Supine to Sit Minimal Assist   Sit to Supine Minimal Assist   Scooting Minimal Assist   Comments HOB elevated and rails up; pt able to scoot up HOB once flat with VC for hand railing use and use of B LE   Gait Analysis   Gait Level Of Assist Moderate Assist   Assistive Device Hand Held Assist   Distance (Feet) 6   # of Times Distance was Traveled 1   Deviation Decreased Base Of Support;Bradykinetic;Shuffled Gait;Decreased Heel Strike;Decreased Toe Off   Weight Bearing Status fwb   Comments able to take a few steps forward and back , also able to side step along EOB   Functional Mobility   Sit to Stand Minimal Assist   Mobility EOB, sit<>stand, marching in place, few steps forward and back, back to supine   Comments deferred transfer to chair due to risk of falling and confusion, pt back to bed. Anticipate pt to be able to transfer to chair with Min to Mod A   How much difficulty does the patient currently have...   Turning over in bed (including adjusting bedclothes, sheets and blankets)? 3   Sitting down on and standing up from a chair with arms (e.g., wheelchair, bedside commode, etc.) 1   Moving from lying on back to sitting on the side of the bed? 1   How much help from another person does the patient currently need...   Moving to and from a bed to a chair (including a wheelchair)? 2   Need to walk in a hospital room? 2   Climbing 3-5 steps with a railing? 1   6 clicks Mobility Score 10   Activity Tolerance   Sitting in Chair NT   Sitting Edge of Bed 8 min   Standing 3 mins   Comments limited by confusion during session   Edema / Skin Assessment   Edema / Skin  Not Assessed   Patient / Family Goals    Patient / Family Goal #1 none stated   Short Term Goals    Short Term Goal # 1 pt will go supine<>sit w/hob elevated and rails up w/spv in 6tx   Short Term Goal # 2 pt will go sit<>stand w/LRD w/spv in 6tx   Short Term Goal # 3 pt will transfer bed<>chair w/LRD w/spv  in 6tx   Short Term Goal # 4 pt will ambulate for 100ft w/LRD w/spv in 6tx   Education Group   Education Provided Role of Physical Therapist   Role of Physical Therapist Patient Response Patient;Acceptance;Demonstration;Explanation;Verbal Demonstration;Action Demonstration   Physical Therapy Initial Treatment Plan    Treatment Plan  Bed Mobility;Equipment;Gait Training;Manual Therapy;Neuro Re-Education / Balance;Self Care / Home Evaluation;Therapeutic Activities;Therapeutic Exercise;Stair Training   Treatment Frequency 3 Times per Week   Duration Until Therapy Goals Met   Problem List    Problems Impaired Bed Mobility;Impaired Transfers;Impaired Ambulation;Functional Strength Deficit;Impaired Balance;Impaired Coordination;Decreased Activity Tolerance;Safety Awareness Deficits / Cognition;Motor Planning / Sequencing   Anticipated Discharge Equipment and Recommendations   DC Equipment Recommendations Unable to determine at this time   Discharge Recommendations Recommend post-acute placement for additional physical therapy services prior to discharge home   Interdisciplinary Plan of Care Collaboration   IDT Collaboration with  Nursing   Patient Position at End of Therapy In Bed;Bed Alarm On;Call Light within Reach;Tray Table within Reach;Phone within Reach   Collaboration Comments aware of visit and recs   Session Information   Date / Session Number  5/29-1 (1/3, 6/4)

## 2023-05-29 NOTE — DISCHARGE PLANNING
Received Choice form at 1210  Agency/Facility Name: [facility]   Referral sent per Choice form @ Unable to fax referral.  Currently no home health order.

## 2023-05-29 NOTE — PROGRESS NOTES
Monitor summary:        Rhythm: SR/ST   Rate:   Ectopy: N/A  Measurements:         12hr chart check

## 2023-05-29 NOTE — CARE PLAN
The patient is Watcher - Medium risk of patient condition declining or worsening    Shift Goals  Clinical Goals: withdrawal safely, monitor labs, neuro status improvement  Patient Goals: sleep  Family Goals: PATTI    Progress made toward(s) clinical / shift goals:    Problem: Optimal Care for Alcohol Withdrawal  Goal: Optimal Care for the alcohol withdrawal patient  Outcome: Progressing     Problem: Seizure Precautions  Goal: Implementation of seizure precautions  Outcome: Progressing     Problem: Psychosocial  Goal: Patient's level of anxiety will decrease  Outcome: Progressing     Problem: Fall Risk  Goal: Patient will remain free from falls  Outcome: Progressing   Pt withdrawal symptoms being monitored closely and medicated for per MAR. Seizure precautions in place. Pt denies anxiety. Pt remains free of falls, bed alarm on, call light within reach, bed locked and in lowest position.

## 2023-05-30 LAB
ALBUMIN SERPL BCP-MCNC: 3 G/DL (ref 3.2–4.9)
ALBUMIN/GLOB SERPL: 1.4 G/DL
ALP SERPL-CCNC: 168 U/L (ref 30–99)
ALT SERPL-CCNC: 20 U/L (ref 2–50)
ANION GAP SERPL CALC-SCNC: 13 MMOL/L (ref 7–16)
AST SERPL-CCNC: 39 U/L (ref 12–45)
BACTERIA UR CULT: NORMAL
BASOPHILS # BLD AUTO: 0.9 % (ref 0–1.8)
BASOPHILS # BLD: 0.07 K/UL (ref 0–0.12)
BILIRUB SERPL-MCNC: 0.6 MG/DL (ref 0.1–1.5)
BUN SERPL-MCNC: 19 MG/DL (ref 8–22)
CALCIUM ALBUM COR SERPL-MCNC: 9.2 MG/DL (ref 8.5–10.5)
CALCIUM SERPL-MCNC: 8.4 MG/DL (ref 8.5–10.5)
CHLORIDE SERPL-SCNC: 108 MMOL/L (ref 96–112)
CO2 SERPL-SCNC: 19 MMOL/L (ref 20–33)
CREAT SERPL-MCNC: 0.76 MG/DL (ref 0.5–1.4)
EOSINOPHIL # BLD AUTO: 0.09 K/UL (ref 0–0.51)
EOSINOPHIL NFR BLD: 1.2 % (ref 0–6.9)
ERYTHROCYTE [DISTWIDTH] IN BLOOD BY AUTOMATED COUNT: 53 FL (ref 35.9–50)
GFR SERPLBLD CREATININE-BSD FMLA CKD-EPI: 85 ML/MIN/1.73 M 2
GLOBULIN SER CALC-MCNC: 2.2 G/DL (ref 1.9–3.5)
GLUCOSE SERPL-MCNC: 75 MG/DL (ref 65–99)
HCT VFR BLD AUTO: 22.4 % (ref 37–47)
HGB BLD-MCNC: 7.6 G/DL (ref 12–16)
IMM GRANULOCYTES # BLD AUTO: 0.31 K/UL (ref 0–0.11)
IMM GRANULOCYTES NFR BLD AUTO: 4.1 % (ref 0–0.9)
LYMPHOCYTES # BLD AUTO: 1.1 K/UL (ref 1–4.8)
LYMPHOCYTES NFR BLD: 14.4 % (ref 22–41)
MAGNESIUM SERPL-MCNC: 2.4 MG/DL (ref 1.5–2.5)
MCH RBC QN AUTO: 33.6 PG (ref 27–33)
MCHC RBC AUTO-ENTMCNC: 33.9 G/DL (ref 32.2–35.5)
MCV RBC AUTO: 99.1 FL (ref 81.4–97.8)
MONOCYTES # BLD AUTO: 0.36 K/UL (ref 0–0.85)
MONOCYTES NFR BLD AUTO: 4.7 % (ref 0–13.4)
NEUTROPHILS # BLD AUTO: 5.71 K/UL (ref 1.82–7.42)
NEUTROPHILS NFR BLD: 74.7 % (ref 44–72)
NRBC # BLD AUTO: 0.05 K/UL
NRBC BLD-RTO: 0.7 /100 WBC (ref 0–0.2)
PLATELET # BLD AUTO: 186 K/UL (ref 164–446)
PMV BLD AUTO: 10.4 FL (ref 9–12.9)
POTASSIUM SERPL-SCNC: 3.9 MMOL/L (ref 3.6–5.5)
PROT SERPL-MCNC: 5.2 G/DL (ref 6–8.2)
RBC # BLD AUTO: 2.26 M/UL (ref 4.2–5.4)
SIGNIFICANT IND 70042: NORMAL
SITE SITE: NORMAL
SODIUM SERPL-SCNC: 140 MMOL/L (ref 135–145)
SOURCE SOURCE: NORMAL
WBC # BLD AUTO: 7.6 K/UL (ref 4.8–10.8)

## 2023-05-30 PROCEDURE — 700111 HCHG RX REV CODE 636 W/ 250 OVERRIDE (IP): Performed by: INTERNAL MEDICINE

## 2023-05-30 PROCEDURE — 80053 COMPREHEN METABOLIC PANEL: CPT

## 2023-05-30 PROCEDURE — 770020 HCHG ROOM/CARE - TELE (206)

## 2023-05-30 PROCEDURE — 85025 COMPLETE CBC W/AUTO DIFF WBC: CPT

## 2023-05-30 PROCEDURE — A9270 NON-COVERED ITEM OR SERVICE: HCPCS | Performed by: INTERNAL MEDICINE

## 2023-05-30 PROCEDURE — 83735 ASSAY OF MAGNESIUM: CPT

## 2023-05-30 PROCEDURE — 700102 HCHG RX REV CODE 250 W/ 637 OVERRIDE(OP): Performed by: INTERNAL MEDICINE

## 2023-05-30 PROCEDURE — 36415 COLL VENOUS BLD VENIPUNCTURE: CPT

## 2023-05-30 PROCEDURE — 99232 SBSQ HOSP IP/OBS MODERATE 35: CPT | Performed by: INTERNAL MEDICINE

## 2023-05-30 RX ADMIN — Medication 100 MG: at 04:54

## 2023-05-30 RX ADMIN — ENOXAPARIN SODIUM 30 MG: 100 INJECTION SUBCUTANEOUS at 17:16

## 2023-05-30 RX ADMIN — LORAZEPAM 1 MG: 1 TABLET ORAL at 03:31

## 2023-05-30 RX ADMIN — THERA TABS 1 TABLET: TAB at 04:54

## 2023-05-30 RX ADMIN — DOCUSATE SODIUM 50 MG AND SENNOSIDES 8.6 MG 2 TABLET: 8.6; 5 TABLET, FILM COATED ORAL at 04:54

## 2023-05-30 RX ADMIN — LORAZEPAM 0.5 MG: 0.5 TABLET ORAL at 18:13

## 2023-05-30 RX ADMIN — OMEPRAZOLE 20 MG: 20 CAPSULE, DELAYED RELEASE ORAL at 04:54

## 2023-05-30 RX ADMIN — LORAZEPAM 1 MG: 1 TABLET ORAL at 11:40

## 2023-05-30 RX ADMIN — CEFTRIAXONE SODIUM 2000 MG: 10 INJECTION, POWDER, FOR SOLUTION INTRAVENOUS at 11:40

## 2023-05-30 RX ADMIN — FOLIC ACID 1 MG: 1 TABLET ORAL at 04:54

## 2023-05-30 RX ADMIN — LORAZEPAM 1 MG: 2 INJECTION INTRAMUSCULAR; INTRAVENOUS at 17:16

## 2023-05-30 ASSESSMENT — LIFESTYLE VARIABLES
ANXIETY: NO ANXIETY (AT EASE)
PAROXYSMAL SWEATS: NO SWEAT VISIBLE
ANXIETY: MILDLY ANXIOUS
NAUSEA AND VOMITING: NO NAUSEA AND NO VOMITING
TOTAL SCORE: 12
ORIENTATION AND CLOUDING OF SENSORIUM: DISORIENTED FOR PLACE AND / OR PERSON
ORIENTATION AND CLOUDING OF SENSORIUM: DISORIENTED FOR PLACE AND / OR PERSON
VISUAL DISTURBANCES: NOT PRESENT
TOTAL SCORE: 5
SUBSTANCE_ABUSE: 0
PAROXYSMAL SWEATS: NO SWEAT VISIBLE
ORIENTATION AND CLOUDING OF SENSORIUM: DISORIENTED FOR PLACE AND / OR PERSON
PAROXYSMAL SWEATS: NO SWEAT VISIBLE
NAUSEA AND VOMITING: NO NAUSEA AND NO VOMITING
AGITATION: SOMEWHAT MORE THAN NORMAL ACTIVITY
TREMOR: MODERATE TREMOR WITH ARMS EXTENDED
TREMOR: MODERATE TREMOR WITH ARMS EXTENDED
NAUSEA AND VOMITING: NO NAUSEA AND NO VOMITING
VISUAL DISTURBANCES: NOT PRESENT
PAROXYSMAL SWEATS: NO SWEAT VISIBLE
PAROXYSMAL SWEATS: NO SWEAT VISIBLE
AGITATION: NORMAL ACTIVITY
AUDITORY DISTURBANCES: NOT PRESENT
HEADACHE, FULLNESS IN HEAD: NOT PRESENT
HEADACHE, FULLNESS IN HEAD: NOT PRESENT
AUDITORY DISTURBANCES: NOT PRESENT
AGITATION: NORMAL ACTIVITY
AUDITORY DISTURBANCES: NOT PRESENT
ORIENTATION AND CLOUDING OF SENSORIUM: DISORIENTED FOR PLACE AND / OR PERSON
HEADACHE, FULLNESS IN HEAD: NOT PRESENT
HEADACHE, FULLNESS IN HEAD: VERY MILD
TREMOR: *
TREMOR: TREMOR NOT VISIBLE BUT CAN BE FELT, FINGERTIP TO FINGERTIP
TREMOR: NO TREMOR
HEADACHE, FULLNESS IN HEAD: NOT PRESENT
VISUAL DISTURBANCES: NOT PRESENT
ANXIETY: NO ANXIETY (AT EASE)
NAUSEA AND VOMITING: MILD NAUSEA WITH NO VOMITING
VISUAL DISTURBANCES: NOT PRESENT
ANXIETY: NO ANXIETY (AT EASE)
ORIENTATION AND CLOUDING OF SENSORIUM: DISORIENTED FOR PLACE AND / OR PERSON
AGITATION: MODERATELY FIDGETY AND RESTLESS
AGITATION: NORMAL ACTIVITY
TOTAL SCORE: 9
TOTAL SCORE: 10
NAUSEA AND VOMITING: NO NAUSEA AND NO VOMITING
TREMOR: *
AUDITORY DISTURBANCES: NOT PRESENT
ANXIETY: NO ANXIETY (AT EASE)
NAUSEA AND VOMITING: NO NAUSEA AND NO VOMITING
ANXIETY: MILDLY ANXIOUS
PAROXYSMAL SWEATS: NO SWEAT VISIBLE
VISUAL DISTURBANCES: NOT PRESENT
TOTAL SCORE: 6
HEADACHE, FULLNESS IN HEAD: NOT PRESENT
TOTAL SCORE: 6
ORIENTATION AND CLOUDING OF SENSORIUM: DISORIENTED FOR PLACE AND / OR PERSON
AGITATION: NORMAL ACTIVITY
VISUAL DISTURBANCES: MILD SENSITIVITY
AUDITORY DISTURBANCES: NOT PRESENT
AUDITORY DISTURBANCES: NOT PRESENT

## 2023-05-30 ASSESSMENT — ENCOUNTER SYMPTOMS
WEIGHT LOSS: 0
PALPITATIONS: 0
NEUROLOGICAL NEGATIVE: 1
EYES NEGATIVE: 1
RESPIRATORY NEGATIVE: 1
SHORTNESS OF BREATH: 0
BRUISES/BLEEDS EASILY: 0
HEADACHES: 0
WEAKNESS: 0
DIZZINESS: 0
MYALGIAS: 0
NAUSEA: 0
FEVER: 0
FOCAL WEAKNESS: 0
DEPRESSION: 0
CHILLS: 0
CARDIOVASCULAR NEGATIVE: 1
VOMITING: 0
MUSCULOSKELETAL NEGATIVE: 1
GASTROINTESTINAL NEGATIVE: 1
SORE THROAT: 0
MEMORY LOSS: 1
ABDOMINAL PAIN: 0
COUGH: 0
BLURRED VISION: 0
DIAPHORESIS: 0

## 2023-05-30 ASSESSMENT — FIBROSIS 4 INDEX: FIB4 SCORE: 3.19

## 2023-05-30 NOTE — CARE PLAN
The patient is Stable - Low risk of patient condition declining or worsening    Shift Goals  Clinical Goals: Withdrawal safely, monitor labs  Patient Goals: Sleep  Family Goals: PATTI    Progress made toward(s) clinical / shift goals:    Problem: Optimal Care for Alcohol Withdrawal  Goal: Optimal Care for the alcohol withdrawal patient  Outcome: Progressing     Problem: Seizure Precautions  Goal: Implementation of seizure precautions  Outcome: Progressing     Problem: Respiratory  Goal: Patient will achieve/maintain optimum respiratory ventilation and gas exchange  Outcome: Progressing     Problem: Skin Integrity  Goal: Skin integrity is maintained or improved  Outcome: Progressing     Problem: Fall Risk  Goal: Patient will remain free from falls  Outcome: Progressing   Pt withdrawal symptoms being monitored and medicated for per MAR. Seizure precautions are in place. Pt respiratory status is improving. Pt skin remains intact. Pt remains free of falls. Bed alarm on, call light within reach, bed locked and in lowest position.

## 2023-05-30 NOTE — PROGRESS NOTES
RN received bedside report, pt care assumed. Pt resting comfortably. No current concerns. Bed alarm on, call light within reach. VSS. Pt has no complaints of pain at this time. Hourly rounding in place. Telemetry box in place. Breathing equal and unlabored. Seizure precautions in place.

## 2023-05-30 NOTE — PROGRESS NOTES
Contacted ONEYDA Zaman regarding pt's potassium. Pt's potassium is 3.9 today but pt has 40 mEq kdur ordered. Per APRN OK to hold kdur.

## 2023-05-30 NOTE — PROGRESS NOTES
[facility] Medicine Daily Progress Note    Date of Service  5/30/2023    Chief Complaint  Jaymie Peacock is a 67 y.o. female admitted 5/28/2023 with confusion and altered menation    Hospital Course  Patient is a 67 year old female with history of hypertension, alcohol and tobacco use, pulmonary embolism, and LE DVT on Xarelto. She presented to [facility] on 5/28/2023 with altered mental status.  Patient was oriented to self and year, believed she was in [place] and did not know why she was in the hospital.  Patient complained of urinary frequency, denied fevers, chills, dysuria, chest pain or shortness of breath.  Patient answered questions however her answers did not make sense.  Per previous notes patient was brought in by EMS from home as her neighbor called as he reported she had been having hallucinations for the last 1 and half weeks.  Patient did report she regularly drinks alcohol, but could not quantify how much.  Also she reported she had not been eating very well     In the ER patient tachycardic, tachypneic and hypertensive.  Labs significant for hemoglobin 10.3, hematocrit 31.5, potassium 2.2, CO2 19, anion gap 26, lactic acid 2.9, creatinine 1.39, GFR 41 and alkaline phosphatase 247 with a negative alcohol.  UA with positive nitrites and leukocyte esterase.  Chest x-ray interpreted by myself shows no acute findings.    Interval Problem Update  Axox2, poor historian. A friend is at bedside, he states he was a good friend of her  before he passed and he helps her as much as he can. She has no local family. She does have family in a different state that has expressed interest in moving her there to assist her. He reports she remains confused but this is improving, he reports a cycle of recurrent infections. Patient denies pain, denies sob, stable on RA. Had urinary retention overnight. ROS otherwise negative.   5/30: Patient seen and examined, resting in bed, confused , on CIWA protocol for alcohol  withdraws.  PT/OT eval will need placement   I have discussed this patient's plan of care and discharge plan at IDT rounds today with Case Management, Nursing, Nursing leadership, and other members of the IDT team.    Consultants/Specialty      Code Status  Full Code    Disposition    I have placed the appropriate orders for post-discharge needs.    Review of Systems  Review of Systems   Constitutional:  Positive for malaise/fatigue. Negative for chills, diaphoresis, fever and weight loss.   HENT: Negative.  Negative for sore throat.    Eyes: Negative.  Negative for blurred vision.   Respiratory: Negative.  Negative for cough and shortness of breath.    Cardiovascular: Negative.  Negative for chest pain, palpitations and leg swelling.   Gastrointestinal: Negative.  Negative for abdominal pain, nausea and vomiting.   Genitourinary: Negative.  Negative for dysuria.   Musculoskeletal: Negative.  Negative for myalgias.   Skin: Negative.  Negative for itching and rash.   Neurological: Negative.  Negative for dizziness, focal weakness, weakness and headaches.   Endo/Heme/Allergies: Negative.  Does not bruise/bleed easily.   Psychiatric/Behavioral:  Positive for memory loss. Negative for depression, substance abuse and suicidal ideas.    All other systems reviewed and are negative.       Physical Exam  Temp:  [36.1 °C (97 °F)-36.9 °C (98.4 °F)] 36.1 °C (97 °F)  Pulse:  [] 81  Resp:  [12-18] 12  BP: (108-122)/(47-81) 110/77  SpO2:  [93 %-96 %] 96 %    Physical Exam  Vitals and nursing note reviewed. Exam conducted with a chaperone present.   Constitutional:       General: She is not in acute distress.     Appearance: She is not diaphoretic.      Comments: She appears chronically ill   HENT:      Head: Normocephalic.      Nose: Nose normal.      Mouth/Throat:      Mouth: Mucous membranes are moist.   Eyes:      Pupils: Pupils are equal, round, and reactive to light.   Cardiovascular:      Rate and Rhythm: Normal rate  and regular rhythm.      Pulses: Normal pulses.      Heart sounds: Normal heart sounds.   Pulmonary:      Effort: Pulmonary effort is normal.      Breath sounds: Normal breath sounds.   Abdominal:      General: Abdomen is flat. Bowel sounds are normal.      Palpations: Abdomen is soft.   Musculoskeletal:         General: No swelling or deformity. Normal range of motion.   Skin:     General: Skin is warm and dry.      Capillary Refill: Capillary refill takes less than 2 seconds.   Neurological:      General: No focal deficit present.      Mental Status: She is alert.      Cranial Nerves: No cranial nerve deficit.   Psychiatric:         Mood and Affect: Mood normal.         Behavior: Behavior normal.         Fluids    Intake/Output Summary (Last 24 hours) at 5/30/2023 1414  Last data filed at 5/29/2023 1800  Gross per 24 hour   Intake 904.28 ml   Output --   Net 904.28 ml       Laboratory  Recent Labs     05/28/23  1114 05/29/23  0050 05/29/23  0702 05/30/23  0150   WBC 9.0 6.8  --  7.6   RBC 3.24* 2.39*  --  2.26*   HEMOGLOBIN 10.7* 7.9* 7.6* 7.6*   HEMATOCRIT 31.5* 23.0* 21.8* 22.4*   MCV 97.2 96.2  --  99.1*   MCH 33.0 33.1*  --  33.6*   MCHC 34.0 34.3  --  33.9   RDW 50.6* 49.9  --  53.0*   PLATELETCT 291 180  --  186   MPV 11.1 11.3  --  10.4     Recent Labs     05/29/23  0050 05/29/23  1139 05/30/23  0150   SODIUM 141 141 140   POTASSIUM 3.0* 3.0* 3.9   CHLORIDE 102 104 108   CO2 24 22 19*   GLUCOSE 93 108* 75   BUN 30* 25* 19   CREATININE 0.91 0.82 0.76   CALCIUM 9.4 9.0 8.4*     Recent Labs     05/28/23  1114   INR 1.00               Imaging  DX-CHEST-PORTABLE (1 VIEW)   Final Result         No acute cardiac or pulmonary abnormality is identified.           Assessment/Plan  * Severe sepsis (HCC)- (present on admission)  Assessment & Plan  This is Severe Sepsis Present on admission  SIRS criteria identified on my evaluation include: Tachycardia, with heart rate greater than 90 BPM and Tachypnea, with  respirations greater than 20 per minute  Clinical indicators of end organ dysfunction include Lactate >2 mmol/L (18.0 mg/dL)  Source is urinary   Sepsis protocol initiated  Crystalloid Fluid Administration: Fluid resuscitation ordered per standard protocol - 30 mL/kg per current or ideal body weight  IV antibiotics as appropriate for source of sepsis  Reassessment: I have reassessed the patient's hemodynamic status  Slight improvement today, continue to follow closely   Cultures so far negative  With secondary encephalopathy  Continue rocephin   IVF     Lactic acidosis- (present on admission)  Assessment & Plan  Lactic > 2 on admission   Nw resolved    Tobacco use disorder- (present on admission)  Assessment & Plan  Reports she is still smoking, not interested in quitting   Does not want nicotine replacement at this time   Cessation counseling performed, 4 minutes     Crystal-Payan tear- (present on admission)  Assessment & Plan  Hx of   Omeprazole   No s/o bleeding on exam, h/h did drop overnight but suspect this is due to hemodilution, following closely   Serial h/h  Consult GI if s/o acute bleed    History of pulmonary embolus (PE)- (present on admission)  Assessment & Plan  Patient had admission x2 in July/ August 2022 for acute blood loss anemia secondary to Crystal-Payan tear treated with hemoclipping and epi injection.  Patient discharged with Carafate and PPI and was resumed on her Xarelto.  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  IVC filter was placed during that admission, patient discontinued on her Xarelto.    High anion gap metabolic acidosis- (present on admission)  Assessment & Plan  Present on admission   Now resolved    Acute cystitis without hematuria- (present on admission)  Assessment & Plan  Continue iv rocephin  Cultures so far negative, following      Encephalopathy acute- (present on admission)  Assessment & Plan  2/2  infection   Reports alcohol use, hx of withdrawals   IV abx   IVF  Per her friend this is improving, suspect metabolic encephalopathy from infection and toxic encephalopathy from etoh  Non focal exam, following closely  If does not improve consider CT/MRI     Severe protein-calorie malnutrition ([place]: less than 60% of standard weight) ([facility])- (present on admission)  Assessment & Plan  Muscle wasting on exam   nutritionist consulted     YAMIL (acute kidney injury) ([facility])- (present on admission)  Assessment & Plan  2/2 sepsis and dehydration   This has resolved  Continue to follow  Avoid nephrotoxic medications       Hypokalemia- (present on admission)  Assessment & Plan  Low mag contributing  Replacing mag and K  Will repeat both levels in am    Alcohol abuse- (present on admission)  Assessment & Plan  Long history of alcohol abuse, multiple prior admissions for withdrawal   No current s/o withdrawal but this is likely contributing to her confusion  Thinks her last drink was 2 days ago but very poor historian   Alcohol level negative in ER   Continue CIWA  Detox bag   MVI, folate, thiamine          VTE prophylaxis: enoxaparin ppx    I have performed a physical exam and reviewed and updated ROS and Plan today (5/30/2023). In review of yesterday's note (5/29/2023), there are no changes except as documented above.

## 2023-05-30 NOTE — CARE PLAN
The patient is Stable - Low risk of patient condition declining or worsening    Shift Goals  Clinical Goals: CIWA  Patient Goals: Rest  Family Goals: NA    Progress made toward(s) clinical / shift goals:    Problem: Knowledge Deficit - Standard  Goal: Patient and family/care givers will demonstrate understanding of plan of care, disease process/condition, diagnostic tests and medications  Outcome: Progressing     Problem: Psychosocial  Goal: Patient's level of anxiety will decrease  Outcome: Progressing  Goal: Spiritual and cultural needs incorporated into hospitalization  Outcome: Progressing       Patient is not progressing towards the following goals:

## 2023-05-30 NOTE — DISCHARGE PLANNING
HTH/SCP TCN chart review completed. Collaborated with Mariposa SAEZ. Current discharge considerations are HH vs. Placement. HH choice obtained yesterday, however noted today that PT is recommending placement. Patient seen at bedside. Her neighbor Vincent was present. Together we discussed placement options; choices obtained for IRF and SNF. Per Vincent, pt's relative Nena is fishing in [place] and may be out of service range. Attempted to call Nena x2 with no answer, left voicemail with call back number. TCN will continue to follow and collaborate with discharge planning team as additional post acute needs arise. Thank you.    Completed:  PT recommending post-acute placement  OT recommendation pending  Choice obtained: HH (5/29), IRF, SNF (5/30)  [facility]  [facility] referral (Y), sent 5/29/23

## 2023-05-31 LAB
BASOPHILS # BLD AUTO: 1.6 % (ref 0–1.8)
BASOPHILS # BLD: 0.14 K/UL (ref 0–0.12)
EOSINOPHIL # BLD AUTO: 0.13 K/UL (ref 0–0.51)
EOSINOPHIL NFR BLD: 1.5 % (ref 0–6.9)
ERYTHROCYTE [DISTWIDTH] IN BLOOD BY AUTOMATED COUNT: 51.9 FL (ref 35.9–50)
HCT VFR BLD AUTO: 25.9 % (ref 37–47)
HGB BLD-MCNC: 8.7 G/DL (ref 12–16)
IMM GRANULOCYTES # BLD AUTO: 0.21 K/UL (ref 0–0.11)
IMM GRANULOCYTES NFR BLD AUTO: 2.4 % (ref 0–0.9)
LYMPHOCYTES # BLD AUTO: 0.92 K/UL (ref 1–4.8)
LYMPHOCYTES NFR BLD: 10.3 % (ref 22–41)
MCH RBC QN AUTO: 33.7 PG (ref 27–33)
MCHC RBC AUTO-ENTMCNC: 33.6 G/DL (ref 32.2–35.5)
MCV RBC AUTO: 100.4 FL (ref 81.4–97.8)
MONOCYTES # BLD AUTO: 0.42 K/UL (ref 0–0.85)
MONOCYTES NFR BLD AUTO: 4.7 % (ref 0–13.4)
NEUTROPHILS # BLD AUTO: 7.1 K/UL (ref 1.82–7.42)
NEUTROPHILS NFR BLD: 79.5 % (ref 44–72)
NRBC # BLD AUTO: 0.02 K/UL
NRBC BLD-RTO: 0.2 /100 WBC (ref 0–0.2)
PLATELET # BLD AUTO: 188 K/UL (ref 164–446)
PMV BLD AUTO: 10.3 FL (ref 9–12.9)
RBC # BLD AUTO: 2.58 M/UL (ref 4.2–5.4)
WBC # BLD AUTO: 8.9 K/UL (ref 4.8–10.8)

## 2023-05-31 PROCEDURE — 700111 HCHG RX REV CODE 636 W/ 250 OVERRIDE (IP): Performed by: INTERNAL MEDICINE

## 2023-05-31 PROCEDURE — 770020 HCHG ROOM/CARE - TELE (206)

## 2023-05-31 PROCEDURE — A9270 NON-COVERED ITEM OR SERVICE: HCPCS | Performed by: INTERNAL MEDICINE

## 2023-05-31 PROCEDURE — 700105 HCHG RX REV CODE 258: Performed by: INTERNAL MEDICINE

## 2023-05-31 PROCEDURE — 85025 COMPLETE CBC W/AUTO DIFF WBC: CPT

## 2023-05-31 PROCEDURE — 92610 EVALUATE SWALLOWING FUNCTION: CPT

## 2023-05-31 PROCEDURE — 700102 HCHG RX REV CODE 250 W/ 637 OVERRIDE(OP): Performed by: INTERNAL MEDICINE

## 2023-05-31 PROCEDURE — 99232 SBSQ HOSP IP/OBS MODERATE 35: CPT | Performed by: INTERNAL MEDICINE

## 2023-05-31 PROCEDURE — 36415 COLL VENOUS BLD VENIPUNCTURE: CPT

## 2023-05-31 RX ADMIN — DOCUSATE SODIUM 50 MG AND SENNOSIDES 8.6 MG 2 TABLET: 8.6; 5 TABLET, FILM COATED ORAL at 18:04

## 2023-05-31 RX ADMIN — LORAZEPAM 1 MG: 1 TABLET ORAL at 11:58

## 2023-05-31 RX ADMIN — LORAZEPAM 2 MG: 2 INJECTION INTRAMUSCULAR; INTRAVENOUS at 02:18

## 2023-05-31 RX ADMIN — ENOXAPARIN SODIUM 30 MG: 100 INJECTION SUBCUTANEOUS at 18:04

## 2023-05-31 RX ADMIN — SODIUM CHLORIDE: 9 INJECTION, SOLUTION INTRAVENOUS at 21:34

## 2023-05-31 RX ADMIN — CEFTRIAXONE SODIUM 2000 MG: 10 INJECTION, POWDER, FOR SOLUTION INTRAVENOUS at 15:49

## 2023-05-31 RX ADMIN — LORAZEPAM 1 MG: 1 TABLET ORAL at 18:05

## 2023-05-31 ASSESSMENT — ENCOUNTER SYMPTOMS
BLURRED VISION: 0
NAUSEA: 0
ABDOMINAL PAIN: 0
SHORTNESS OF BREATH: 0
SORE THROAT: 0
MYALGIAS: 0
CARDIOVASCULAR NEGATIVE: 1
PALPITATIONS: 0
FEVER: 0
MEMORY LOSS: 1
COUGH: 0
DEPRESSION: 0
MUSCULOSKELETAL NEGATIVE: 1
HEADACHES: 0
DIZZINESS: 0
NEUROLOGICAL NEGATIVE: 1
DIAPHORESIS: 0
FOCAL WEAKNESS: 0
RESPIRATORY NEGATIVE: 1
GASTROINTESTINAL NEGATIVE: 1
BRUISES/BLEEDS EASILY: 0
EYES NEGATIVE: 1
CHILLS: 0
WEAKNESS: 0
VOMITING: 0
WEIGHT LOSS: 0

## 2023-05-31 ASSESSMENT — LIFESTYLE VARIABLES
ORIENTATION AND CLOUDING OF SENSORIUM: DISORIENTED FOR PLACE AND / OR PERSON
AUDITORY DISTURBANCES: NOT PRESENT
NAUSEA AND VOMITING: NO NAUSEA AND NO VOMITING
TOTAL SCORE: 26
VISUAL DISTURBANCES: NOT PRESENT
TREMOR: *
AGITATION: NORMAL ACTIVITY
HEADACHE, FULLNESS IN HEAD: VERY MILD
TREMOR: *
AUDITORY DISTURBANCES: NOT PRESENT
TOTAL SCORE: MODERATE ITCHING, PINS AND NEEDLES SENSATION, BURNING OR NUMBNESS
HEADACHE, FULLNESS IN HEAD: NOT PRESENT
VISUAL DISTURBANCES: NOT PRESENT
VISUAL DISTURBANCES: VERY MILD SENSITIVITY
NAUSEA AND VOMITING: NO NAUSEA AND NO VOMITING
ANXIETY: *
AGITATION: *
ANXIETY: NO ANXIETY (AT EASE)
NAUSEA AND VOMITING: NO NAUSEA AND NO VOMITING
AGITATION: NORMAL ACTIVITY
VISUAL DISTURBANCES: NOT PRESENT
PAROXYSMAL SWEATS: NO SWEAT VISIBLE
NAUSEA AND VOMITING: NO NAUSEA AND NO VOMITING
AGITATION: SOMEWHAT MORE THAN NORMAL ACTIVITY
TREMOR: *
ANXIETY: NO ANXIETY (AT EASE)
HEADACHE, FULLNESS IN HEAD: NOT PRESENT
HEADACHE, FULLNESS IN HEAD: VERY MILD
TOTAL SCORE: 9
AUDITORY DISTURBANCES: NOT PRESENT
TREMOR: *
ORIENTATION AND CLOUDING OF SENSORIUM: DISORIENTED FOR PLACE AND / OR PERSON
ANXIETY: *
PAROXYSMAL SWEATS: BARELY PERCEPTIBLE SWEATING. PALMS MOIST
SUBSTANCE_ABUSE: 0
TREMOR: MODERATE TREMOR WITH ARMS EXTENDED
AGITATION: NORMAL ACTIVITY
AGITATION: *
ORIENTATION AND CLOUDING OF SENSORIUM: DISORIENTED FOR PLACE AND / OR PERSON
NAUSEA AND VOMITING: NO NAUSEA AND NO VOMITING
PAROXYSMAL SWEATS: BEADS OF SWEAT OBVIOUS ON FOREHEAD
VISUAL DISTURBANCES: NOT PRESENT
AUDITORY DISTURBANCES: NOT PRESENT
TOTAL SCORE: 4
TOTAL SCORE: 18
ANXIETY: MODERATELY ANXIOUS OR GUARDED, SO ANXIETY IS INFERRED
PAROXYSMAL SWEATS: BEADS OF SWEAT OBVIOUS ON FOREHEAD
VISUAL DISTURBANCES: NOT PRESENT
HEADACHE, FULLNESS IN HEAD: NOT PRESENT
PAROXYSMAL SWEATS: NO SWEAT VISIBLE
ORIENTATION AND CLOUDING OF SENSORIUM: DISORIENTED FOR PLACE AND / OR PERSON
AUDITORY DISTURBANCES: NOT PRESENT
AUDITORY DISTURBANCES: VERY MILD HARSHNESS OR ABILITY TO FRIGHTEN
PAROXYSMAL SWEATS: NO SWEAT VISIBLE
TREMOR: NO TREMOR
NAUSEA AND VOMITING: NO NAUSEA AND NO VOMITING
ORIENTATION AND CLOUDING OF SENSORIUM: DISORIENTED FOR PLACE AND / OR PERSON
TOTAL SCORE: 7
HEADACHE, FULLNESS IN HEAD: NOT PRESENT
TOTAL SCORE: 9
ANXIETY: NO ANXIETY (AT EASE)
ORIENTATION AND CLOUDING OF SENSORIUM: DISORIENTED FOR PLACE AND / OR PERSON

## 2023-05-31 ASSESSMENT — FIBROSIS 4 INDEX: FIB4 SCORE: 3.15

## 2023-05-31 NOTE — CARE PLAN
The patient is Watcher - Medium risk of patient condition declining or worsening    Shift Goals  Clinical Goals: increase LOC, CIWA  Patient Goals: PATTI  Family Goals: PATTI    Progress made toward(s) clinical / shift goals:    Problem: Skin Integrity  Goal: Skin integrity is maintained or improved  Outcome: Progressing  Note: Pt turns self from side to side. Wound care onboard. Waffle mattress in place. Pt educated about the importance of frequent repositioning to prevent skin breakdown. Encouraged turning in bed and notifying staff for help. Pt verbalized understanding. Appropriate dressings in place. Extra pillows in place for comfort, between knees, and to float heels. Barrier cream applied as appropriate. Pt cleaned and linens changed frequently as appropriate.             Patient is not progressing towards the following goals:      Problem: Optimal Care for Alcohol Withdrawal  Goal: Optimal Care for the alcohol withdrawal patient  Outcome: Not Progressing  Note: Pt CIWA score went up to 26 which called for increase in medication

## 2023-05-31 NOTE — DISCHARGE PLANNING
"RN SE spoke with patient's niece and decision maker \"Carolina\" via phone. Discussed SNF referrals & placement. Niece is in agreement with SNF. CM called and left voicemail for admissions coordinator at [facility] to review. Await return call.   "

## 2023-05-31 NOTE — DISCHARGE PLANNING
HTH/SCP TCN chart review completed. Collaborated with Yamel SAEZ. Current discharge considerations are post-acute placement. Per CM, she was able to get a hold of Nena this morning and Nena reported that she is open to any placement. Patient seen at bedside, advised that Nena gave permission to send blanket referral to open options for placement. Patient agrees to this plan, though would note she continues to present with cognitive deficits. TCN will continue to follow and collaborate with discharge planning team as additional post acute needs arise. Thank you.    Completed:  PT recommending post-acute placement  OT recommendation pending  Choice obtained: HH (5/29), IRF, SNF (5/30), expanded SNF choice 5/31  Lane Butcher  [facility] referral (Y), sent 5/29/23

## 2023-05-31 NOTE — PROGRESS NOTES
[facility] Medicine Daily Progress Note    Date of Service  5/31/2023    Chief Complaint  Jaymie Peacock is a 67 y.o. female admitted 5/28/2023 with confusion and altered menation    Hospital Course  Patient is a 67 year old female with history of hypertension, alcohol and tobacco use, pulmonary embolism, and LE DVT on Xarelto. She presented to [facility] on 5/28/2023 with altered mental status.  Patient was oriented to self and year, believed she was in [place] and did not know why she was in the hospital.  Patient complained of urinary frequency, denied fevers, chills, dysuria, chest pain or shortness of breath.  Patient answered questions however her answers did not make sense.  Per previous notes patient was brought in by EMS from home as her neighbor called as he reported she had been having hallucinations for the last 1 and half weeks.  Patient did report she regularly drinks alcohol, but could not quantify how much.  Also she reported she had not been eating very well     In the ER patient tachycardic, tachypneic and hypertensive.  Labs significant for hemoglobin 10.3, hematocrit 31.5, potassium 2.2, CO2 19, anion gap 26, lactic acid 2.9, creatinine 1.39, GFR 41 and alkaline phosphatase 247 with a negative alcohol.  UA with positive nitrites and leukocyte esterase.  Chest x-ray interpreted by myself shows no acute findings.    Interval Problem Update  Axox2, poor historian. A friend is at bedside, he states he was a good friend of her  before he passed and he helps her as much as he can. She has no local family. She does have family in a different state that has expressed interest in moving her there to assist her. He reports she remains confused but this is improving, he reports a cycle of recurrent infections. Patient denies pain, denies sob, stable on RA. Had urinary retention overnight. ROS otherwise negative.   5/30: Patient seen and examined, resting in bed, confused , on CIWA protocol for alcohol  withdraws.  PT/OT eval will need placement   5/31: Patient seen and examined, going through alcohol withdraws with high CIWA score, cont on CIWA protocol   I have discussed this patient's plan of care and discharge plan at IDT rounds today with Case Management, Nursing, Nursing leadership, and other members of the IDT team.    Consultants/Specialty      Code Status  Full Code    Disposition    I have placed the appropriate orders for post-discharge needs.    Review of Systems  Review of Systems   Constitutional:  Positive for malaise/fatigue. Negative for chills, diaphoresis, fever and weight loss.   HENT: Negative.  Negative for sore throat.    Eyes: Negative.  Negative for blurred vision.   Respiratory: Negative.  Negative for cough and shortness of breath.    Cardiovascular: Negative.  Negative for chest pain, palpitations and leg swelling.   Gastrointestinal: Negative.  Negative for abdominal pain, nausea and vomiting.   Genitourinary: Negative.  Negative for dysuria.   Musculoskeletal: Negative.  Negative for myalgias.   Skin: Negative.  Negative for itching and rash.   Neurological: Negative.  Negative for dizziness, focal weakness, weakness and headaches.   Endo/Heme/Allergies: Negative.  Does not bruise/bleed easily.   Psychiatric/Behavioral:  Positive for memory loss. Negative for depression, substance abuse and suicidal ideas.    All other systems reviewed and are negative.       Physical Exam  Temp:  [36.2 °C (97.2 °F)-36.3 °C (97.3 °F)] 36.3 °C (97.3 °F)  Pulse:  [] 108  Resp:  [12-20] 18  BP: ()/(62-97) 111/78  SpO2:  [88 %-98 %] 98 %    Physical Exam  Vitals and nursing note reviewed. Exam conducted with a chaperone present.   Constitutional:       General: She is not in acute distress.     Appearance: She is not diaphoretic.      Comments: She appears chronically ill   HENT:      Head: Normocephalic.      Nose: Nose normal.      Mouth/Throat:      Mouth: Mucous membranes are moist.   Eyes:       Pupils: Pupils are equal, round, and reactive to light.   Cardiovascular:      Rate and Rhythm: Normal rate and regular rhythm.      Pulses: Normal pulses.      Heart sounds: Normal heart sounds.   Pulmonary:      Effort: Pulmonary effort is normal.      Breath sounds: Normal breath sounds.   Abdominal:      General: Abdomen is flat. Bowel sounds are normal.      Palpations: Abdomen is soft.   Musculoskeletal:         General: No swelling or deformity. Normal range of motion.   Skin:     General: Skin is warm and dry.      Capillary Refill: Capillary refill takes less than 2 seconds.   Neurological:      General: No focal deficit present.      Mental Status: She is alert.      Cranial Nerves: No cranial nerve deficit.   Psychiatric:         Mood and Affect: Mood normal.         Behavior: Behavior normal.         Fluids    Intake/Output Summary (Last 24 hours) at 5/31/2023 1238  Last data filed at 5/30/2023 2000  Gross per 24 hour   Intake 2481.18 ml   Output 400 ml   Net 2081.18 ml       Laboratory  Recent Labs     05/29/23  0050 05/29/23  0702 05/30/23  0150 05/31/23  0514   WBC 6.8  --  7.6 8.9   RBC 2.39*  --  2.26* 2.58*   HEMOGLOBIN 7.9* 7.6* 7.6* 8.7*   HEMATOCRIT 23.0* 21.8* 22.4* 25.9*   MCV 96.2  --  99.1* 100.4*   MCH 33.1*  --  33.6* 33.7*   MCHC 34.3  --  33.9 33.6   RDW 49.9  --  53.0* 51.9*   PLATELETCT 180  --  186 188   MPV 11.3  --  10.4 10.3     Recent Labs     05/29/23  0050 05/29/23  1139 05/30/23  0150   SODIUM 141 141 140   POTASSIUM 3.0* 3.0* 3.9   CHLORIDE 102 104 108   CO2 24 22 19*   GLUCOSE 93 108* 75   BUN 30* 25* 19   CREATININE 0.91 0.82 0.76   CALCIUM 9.4 9.0 8.4*                     Imaging  DX-CHEST-PORTABLE (1 VIEW)   Final Result         No acute cardiac or pulmonary abnormality is identified.           Assessment/Plan  * Severe sepsis (HCC)- (present on admission)  Assessment & Plan  This is Severe Sepsis Present on admission  SIRS criteria identified on my evaluation  include: Tachycardia, with heart rate greater than 90 BPM and Tachypnea, with respirations greater than 20 per minute  Clinical indicators of end organ dysfunction include Lactate >2 mmol/L (18.0 mg/dL)  Source is urinary   Sepsis protocol initiated  Crystalloid Fluid Administration: Fluid resuscitation ordered per standard protocol - 30 mL/kg per current or ideal body weight  IV antibiotics as appropriate for source of sepsis  Reassessment: I have reassessed the patient's hemodynamic status  Slight improvement today, continue to follow closely   Cultures so far negative  With secondary encephalopathy  Continue rocephin   IVF     Lactic acidosis- (present on admission)  Assessment & Plan  Lactic > 2 on admission   Nw resolved    Tobacco use disorder- (present on admission)  Assessment & Plan  Reports she is still smoking, not interested in quitting   Does not want nicotine replacement at this time   Cessation counseling performed, 4 minutes     Crystal-Payan tear- (present on admission)  Assessment & Plan  Hx of   Omeprazole   No s/o bleeding on exam, h/h did drop overnight but suspect this is due to hemodilution, following closely   Serial h/h  Consult GI if s/o acute bleed    History of pulmonary embolus (PE)- (present on admission)  Assessment & Plan  Patient had admission x2 in July/ August 2022 for acute blood loss anemia secondary to Crystal-Payan tear treated with hemoclipping and epi injection.  Patient discharged with Carafate and PPI and was resumed on her Xarelto.  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  IVC filter was placed during that admission, patient discontinued on her Xarelto.    High anion gap metabolic acidosis- (present on admission)  Assessment & Plan  Present on admission   Now resolved    Acute cystitis without hematuria- (present on admission)  Assessment & Plan  Continue iv rocephin  Cultures so far negative,  following      Encephalopathy acute- (present on admission)  Assessment & Plan  2/2 infection   Reports alcohol use, hx of withdrawals   IV abx   IVF  Per her friend this is improving, suspect metabolic encephalopathy from infection and toxic encephalopathy from etoh  Non focal exam, following closely  If does not improve consider CT/MRI     Severe protein-calorie malnutrition ([place]: less than 60% of standard weight) ([facility])- (present on admission)  Assessment & Plan  Muscle wasting on exam   nutritionist consulted     YAMIL (acute kidney injury) ([facility])- (present on admission)  Assessment & Plan  2/2 sepsis and dehydration   This has resolved  Continue to follow  Avoid nephrotoxic medications       Hypokalemia- (present on admission)  Assessment & Plan  Low mag contributing  Replacing mag and K  Will repeat both levels in am    Alcohol abuse- (present on admission)  Assessment & Plan  Long history of alcohol abuse, multiple prior admissions for withdrawal   No current s/o withdrawal but this is likely contributing to her confusion  Thinks her last drink was 2 days ago but very poor historian   Alcohol level negative in ER   Continue CIWA  Detox bag   MVI, folate, thiamine          VTE prophylaxis: enoxaparin ppx    I have performed a physical exam and reviewed and updated ROS and Plan today (5/31/2023). In review of yesterday's note (5/30/2023), there are no changes except as documented above.

## 2023-05-31 NOTE — CARE PLAN
The patient is Watcher - Medium risk of patient condition declining or worsening    Shift Goals  Clinical Goals: increase LOC, CIWA  Patient Goals: PATTI  Family Goals: PATTI    Progress made toward(s) clinical / shift goals:    Problem: Knowledge Deficit - Standard  Goal: Patient and family/care givers will demonstrate understanding of plan of care, disease process/condition, diagnostic tests and medications  Description: Target End Date:  1-3 days or as soon as patient condition allows    Document in Patient Education    1.  Patient and family/caregiver oriented to unit, equipment, visitation policy and means for communicating concern  2.  Complete/review Learning Assessment  3.  Assess knowledge level of disease process/condition, treatment plan, diagnostic tests and medications  4.  Explain disease process/condition, treatment plan, diagnostic tests and medications  Outcome: Progressing     Problem: Optimal Care for Alcohol Withdrawal  Goal: Optimal Care for the alcohol withdrawal patient  Description: Target End Date:  1 to 3 days    1.  Alcohol history screening done on admission  2.  CIWA-AR score assessment (includes assessment of nausea/vomiting, tremor, sweats, anxiety, agitation, tactile, visual and auditory disturbances, headache and orientation/sensorium).  Document on CIWA flowsheet.  3.  Follow CIWA-AR score protocol  4.  Frequent reorientation  5.  Monitor for fluid and electrolyte imbalance.  6.  Assess for respiratory depression due to sedation (pulse ox)  7.  Consider thiamine, multivitamins, folic acid and magnesium sulfate per provider order  8.  Collaborate with Social Workers/Case Management  9.  Collaborate with mental health  Outcome: Progressing     Problem: Seizure Precautions  Goal: Implementation of seizure precautions  Description: Target End Date:  Prior to discharge or change in level of care    1.  Padded side rails up at all times  2.  Suction equipment and oxygen delivery system at  bedside  3.  Continuous pulse oximeter in use  4.  Implement fall precautions, bed alarm on, bed in lowest position  5.  IV access (per order)  6.  Provide low stimulus environment, avoid exposure to triggers  7.  Instruct patient to use call light/seizure button if having warning signs of impending seizure  Outcome: Progressing       Patient is not progressing towards the following goals:

## 2023-05-31 NOTE — THERAPY
"Speech Language Pathology   Clinical Swallow Evaluation     Patient Name: Jaymie Peacock  AGE:  67 y.o., SEX:  female  Medical Record #: [id number]  Date of Service: 5/31/2023      History of Present Illness  Per H&P \" patient was brought in by EMS from home as her neighbor called as he reported she has been having hallucinations for the last 1 and half weeks.  Patient does report she is drinking alcohol, cannot quantify how much.  Also reports she has not been eating very well. Is still smoking cigarettes cant tell me how much.\"      PMHx: per charting: \"hypertension, alcohol and tobacco use disorder, history of pulmonary embolism, LE DVT on Xarelto \"  Patient was last seen by SLP in this hospital in May of 2021 in which she reported \"she primarily consumes a liquid diet at baseline due to preference and poor appetite\".  Patient was placed on a regular diet with thin liquids at that time.       CXR -\"No acute cardiac or pulmonary abnormality is identified.\"    General Information:  Vitals  O2 (LPM): 0  O2 Delivery Device: None - Room Air  Level of Consciousness: Alert, Awake  Patient Behaviors: Confused, Fatigue  Orientation: Self  Follows Directives: No      Prior Living Situation & Level of Function:   Unknown-patient is unable to answer these questions.      Communication: Unknown  Swallowing: Unknown       Oral Mechanism Evaluation:  Dentition: Fair   Facial Symmetry: Equal  Facial Sensation: Pt did not follow commands to assess     Labial Observations: WFL (Observed with smile, she did not follow directions for OMExam)   Lingual Observations: Pt did not follow commands to assess     Comments: confused, tangential speech.         Laryngeal Function:  Secretion Management:  (dry mouth on evaluation.)  Voice Quality: Whisper, Other (see comments) (breathy vocal quality intermittently.)        Cough: Pt did not follow commands to assess.  No reflexive cough noted during this exam.         Subjective  Patient " "alerted and was agreeable to evaluation stating \"I need water\".      Assessment  Current Method of Nutrition: NPO until cleared by speech pathology  Positioning: Mariscal's (60-90 degrees)  Bolus Administration: SLP  O2 (LPM): 0 O2 Delivery Device: None - Room Air  Factor(s) Affecting Performance: Impaired command following, Impaired endurance, Impaired mental status  Tracheostomy : No        Swallowing Trials:  Swallowing Trials  Ice: Not tested  Thin Liquid (TN0): WFL (when given by spoon)  Mildly Thick Liquid (MT2): Not tested  Liquidised (LQ3): WFL  Pureed (PU4): WFL  Minced & Moist (MM5): Not tested  Soft & Bite Sized (SB6): Not tested  Easy to Chew (EC7): Not tested  Regular (RG7): Not tested      Comments: Patient alerted to name and was agreeable to evaluation stating \"I need water\".  She was unable to follow directions for complete oral motor examination.  She is noted to keep her teeth closed tightly when asked to open her mouth.  I attempted to assess with ice, patient could not follow direction to open mouth for ice chip.  She was unable to hold the cup or bring it to her mouth.  She could not utilize a straw (did not open teeth), when offered cup, she did open, but did not sip from the cup.  She was given water by spoon and tolerated it well x7 trials without overt s/s of aspiration.  Patient took pudding and applesauce by spoon without overt s/s of aspiration.  She refused further after approximately 2 oz total.  No oral residue noted, adequate bolus stripping from the spoon and oral containment noted. Patient is delayed in A-P propulsion of bolus, but swallow appears timely once initiated. Patient fatigued quickly so evaluation was discontinued.        Clinical Impressions  Patient presents with at least mildly delayed oral phase dysphagia.  No overt s/s of aspiration noted during this assessment, but patient is at increased risk of aspiration given lethargy and confusion.  She is unable to follow " "directions for airway protection, requiring 1:1 for all po. Recommend advancement to puree with thin liquids by spoon only.  Please hold PO if patient is not awake and actively participating or with change in mental status. SLP to follow for skilled dysphagia tx and trials of advanced diet when able.      Recommendations  Diet Consistency: Puree with thin liquids by spoon.  Instrumentation: Instrumental swallow study pending clinical progress  Medication: Crush with pudding/puree, as appropriate, Crush with applesauce, as appropriate  Supervision: 1:1 feeding with constant supervision  Positioning: Fully upright and midline during oral intake  Risk Management : Small bites/sips, Slow rate of intake, Liquids by teaspoon only, Reduce environmental distractions  Oral Care: Pre- and post-meals         SLP Treatment Plan  Treatment Plan: Dysphagia Treatment, Patient/Family/Caregiver Training  SLP Frequency: 3x Per Week  Estimated Duration: Until Therapy Goals Met      Anticipated Discharge Needs  Discharge Recommendations: Recommend post-acute placement for additional speech therapy services prior to discharge home   Therapy Recommendations Upon DC: Dysphagia Training, Patient / Family / Caregiver Education        Patient / Family Goals  Patient / Family Goal #1: \" I need water\"  Short Term Goals  Short Term Goal # 1: Patient will consume puree with thin liquids without overt s/s of aspiration.      Sakina Soto, SLP   "

## 2023-06-01 LAB
ANION GAP SERPL CALC-SCNC: 11 MMOL/L (ref 7–16)
BASOPHILS # BLD AUTO: 1 % (ref 0–1.8)
BASOPHILS # BLD: 0.08 K/UL (ref 0–0.12)
BUN SERPL-MCNC: 9 MG/DL (ref 8–22)
CALCIUM SERPL-MCNC: 8.1 MG/DL (ref 8.5–10.5)
CHLORIDE SERPL-SCNC: 110 MMOL/L (ref 96–112)
CO2 SERPL-SCNC: 18 MMOL/L (ref 20–33)
CREAT SERPL-MCNC: 0.53 MG/DL (ref 0.5–1.4)
EOSINOPHIL # BLD AUTO: 0.22 K/UL (ref 0–0.51)
EOSINOPHIL NFR BLD: 2.8 % (ref 0–6.9)
ERYTHROCYTE [DISTWIDTH] IN BLOOD BY AUTOMATED COUNT: 55 FL (ref 35.9–50)
GFR SERPLBLD CREATININE-BSD FMLA CKD-EPI: 100 ML/MIN/1.73 M 2
GLUCOSE SERPL-MCNC: 81 MG/DL (ref 65–99)
HCT VFR BLD AUTO: 25.8 % (ref 37–47)
HGB BLD-MCNC: 8.3 G/DL (ref 12–16)
IMM GRANULOCYTES # BLD AUTO: 0.17 K/UL (ref 0–0.11)
IMM GRANULOCYTES NFR BLD AUTO: 2.2 % (ref 0–0.9)
LYMPHOCYTES # BLD AUTO: 1.06 K/UL (ref 1–4.8)
LYMPHOCYTES NFR BLD: 13.4 % (ref 22–41)
MCH RBC QN AUTO: 32.9 PG (ref 27–33)
MCHC RBC AUTO-ENTMCNC: 32.2 G/DL (ref 32.2–35.5)
MCV RBC AUTO: 102.4 FL (ref 81.4–97.8)
MONOCYTES # BLD AUTO: 0.42 K/UL (ref 0–0.85)
MONOCYTES NFR BLD AUTO: 5.3 % (ref 0–13.4)
NEUTROPHILS # BLD AUTO: 5.94 K/UL (ref 1.82–7.42)
NEUTROPHILS NFR BLD: 75.3 % (ref 44–72)
NRBC # BLD AUTO: 0.04 K/UL
NRBC BLD-RTO: 0.5 /100 WBC (ref 0–0.2)
PLATELET # BLD AUTO: 199 K/UL (ref 164–446)
PMV BLD AUTO: 10.9 FL (ref 9–12.9)
POTASSIUM SERPL-SCNC: 3.5 MMOL/L (ref 3.6–5.5)
RBC # BLD AUTO: 2.52 M/UL (ref 4.2–5.4)
SODIUM SERPL-SCNC: 139 MMOL/L (ref 135–145)
WBC # BLD AUTO: 7.9 K/UL (ref 4.8–10.8)

## 2023-06-01 PROCEDURE — 700102 HCHG RX REV CODE 250 W/ 637 OVERRIDE(OP): Performed by: INTERNAL MEDICINE

## 2023-06-01 PROCEDURE — 97530 THERAPEUTIC ACTIVITIES: CPT

## 2023-06-01 PROCEDURE — A9270 NON-COVERED ITEM OR SERVICE: HCPCS | Performed by: INTERNAL MEDICINE

## 2023-06-01 PROCEDURE — 700102 HCHG RX REV CODE 250 W/ 637 OVERRIDE(OP): Performed by: HOSPITALIST

## 2023-06-01 PROCEDURE — 85025 COMPLETE CBC W/AUTO DIFF WBC: CPT

## 2023-06-01 PROCEDURE — 700111 HCHG RX REV CODE 636 W/ 250 OVERRIDE (IP): Performed by: INTERNAL MEDICINE

## 2023-06-01 PROCEDURE — 700105 HCHG RX REV CODE 258: Performed by: INTERNAL MEDICINE

## 2023-06-01 PROCEDURE — 80048 BASIC METABOLIC PNL TOTAL CA: CPT

## 2023-06-01 PROCEDURE — 770020 HCHG ROOM/CARE - TELE (206)

## 2023-06-01 PROCEDURE — A9270 NON-COVERED ITEM OR SERVICE: HCPCS | Performed by: HOSPITALIST

## 2023-06-01 PROCEDURE — 99232 SBSQ HOSP IP/OBS MODERATE 35: CPT | Performed by: INTERNAL MEDICINE

## 2023-06-01 PROCEDURE — 36415 COLL VENOUS BLD VENIPUNCTURE: CPT

## 2023-06-01 PROCEDURE — 97167 OT EVAL HIGH COMPLEX 60 MIN: CPT

## 2023-06-01 RX ADMIN — DOCUSATE SODIUM 50 MG AND SENNOSIDES 8.6 MG 2 TABLET: 8.6; 5 TABLET, FILM COATED ORAL at 17:43

## 2023-06-01 RX ADMIN — CEFTRIAXONE SODIUM 2000 MG: 10 INJECTION, POWDER, FOR SOLUTION INTRAVENOUS at 12:23

## 2023-06-01 RX ADMIN — FOLIC ACID 1 MG: 1 TABLET ORAL at 05:47

## 2023-06-01 RX ADMIN — ENOXAPARIN SODIUM 30 MG: 100 INJECTION SUBCUTANEOUS at 17:42

## 2023-06-01 RX ADMIN — THERA TABS 1 TABLET: TAB at 05:47

## 2023-06-01 RX ADMIN — POTASSIUM CHLORIDE 40 MEQ: 1500 TABLET, EXTENDED RELEASE ORAL at 05:47

## 2023-06-01 RX ADMIN — OMEPRAZOLE 20 MG: 20 CAPSULE, DELAYED RELEASE ORAL at 05:47

## 2023-06-01 RX ADMIN — SODIUM CHLORIDE: 9 INJECTION, SOLUTION INTRAVENOUS at 05:48

## 2023-06-01 RX ADMIN — Medication 100 MG: at 05:47

## 2023-06-01 RX ADMIN — DOCUSATE SODIUM 50 MG AND SENNOSIDES 8.6 MG 2 TABLET: 8.6; 5 TABLET, FILM COATED ORAL at 05:47

## 2023-06-01 ASSESSMENT — LIFESTYLE VARIABLES
TREMOR: NO TREMOR
VISUAL DISTURBANCES: NOT PRESENT
AGITATION: NORMAL ACTIVITY
HEADACHE, FULLNESS IN HEAD: NOT PRESENT
AUDITORY DISTURBANCES: NOT PRESENT
NAUSEA AND VOMITING: NO NAUSEA AND NO VOMITING
AGITATION: NORMAL ACTIVITY
TOTAL SCORE: 4
NAUSEA AND VOMITING: NO NAUSEA AND NO VOMITING
TREMOR: NO TREMOR
NAUSEA AND VOMITING: NO NAUSEA AND NO VOMITING
PAROXYSMAL SWEATS: NO SWEAT VISIBLE
SUBSTANCE_ABUSE: 0
PAROXYSMAL SWEATS: NO SWEAT VISIBLE
AUDITORY DISTURBANCES: NOT PRESENT
HEADACHE, FULLNESS IN HEAD: NOT PRESENT
NAUSEA AND VOMITING: NO NAUSEA AND NO VOMITING
HEADACHE, FULLNESS IN HEAD: NOT PRESENT
TREMOR: NO TREMOR
ORIENTATION AND CLOUDING OF SENSORIUM: DISORIENTED FOR PLACE AND / OR PERSON
TOTAL SCORE: 4
PAROXYSMAL SWEATS: NO SWEAT VISIBLE
AUDITORY DISTURBANCES: NOT PRESENT
VISUAL DISTURBANCES: NOT PRESENT
TREMOR: NO TREMOR
ANXIETY: NO ANXIETY (AT EASE)
ORIENTATION AND CLOUDING OF SENSORIUM: DISORIENTED FOR PLACE AND / OR PERSON
TOTAL SCORE: 4
TOTAL SCORE: 4
AUDITORY DISTURBANCES: NOT PRESENT
HEADACHE, FULLNESS IN HEAD: NOT PRESENT
ORIENTATION AND CLOUDING OF SENSORIUM: DISORIENTED FOR PLACE AND / OR PERSON
AGITATION: NORMAL ACTIVITY
VISUAL DISTURBANCES: NOT PRESENT
ANXIETY: NO ANXIETY (AT EASE)
PAROXYSMAL SWEATS: NO SWEAT VISIBLE
ANXIETY: NO ANXIETY (AT EASE)
ORIENTATION AND CLOUDING OF SENSORIUM: DISORIENTED FOR PLACE AND / OR PERSON
AGITATION: NORMAL ACTIVITY
VISUAL DISTURBANCES: NOT PRESENT
ANXIETY: NO ANXIETY (AT EASE)

## 2023-06-01 ASSESSMENT — ENCOUNTER SYMPTOMS
EYES NEGATIVE: 1
MYALGIAS: 0
DIZZINESS: 0
PALPITATIONS: 0
RESPIRATORY NEGATIVE: 1
GASTROINTESTINAL NEGATIVE: 1
FOCAL WEAKNESS: 0
NAUSEA: 0
SORE THROAT: 0
WEIGHT LOSS: 0
MEMORY LOSS: 1
CHILLS: 0
DEPRESSION: 0
ABDOMINAL PAIN: 0
MUSCULOSKELETAL NEGATIVE: 1
COUGH: 0
NEUROLOGICAL NEGATIVE: 1
VOMITING: 0
CARDIOVASCULAR NEGATIVE: 1
FEVER: 0
DIAPHORESIS: 0
HEADACHES: 0
SHORTNESS OF BREATH: 0
WEAKNESS: 0
BLURRED VISION: 0
BRUISES/BLEEDS EASILY: 0

## 2023-06-01 ASSESSMENT — COGNITIVE AND FUNCTIONAL STATUS - GENERAL
MOBILITY SCORE: 6
WALKING IN HOSPITAL ROOM: TOTAL
TOILETING: TOTAL
HELP NEEDED FOR BATHING: TOTAL
SUGGESTED CMS G CODE MODIFIER MOBILITY: CN
PERSONAL GROOMING: A LOT
STANDING UP FROM CHAIR USING ARMS: TOTAL
EATING MEALS: TOTAL
SUGGESTED CMS G CODE MODIFIER DAILY ACTIVITY: CM
DRESSING REGULAR UPPER BODY CLOTHING: A LOT
MOVING TO AND FROM BED TO CHAIR: UNABLE
DRESSING REGULAR LOWER BODY CLOTHING: TOTAL
DAILY ACTIVITIY SCORE: 8
TURNING FROM BACK TO SIDE WHILE IN FLAT BAD: UNABLE
MOVING FROM LYING ON BACK TO SITTING ON SIDE OF FLAT BED: UNABLE
CLIMB 3 TO 5 STEPS WITH RAILING: TOTAL

## 2023-06-01 ASSESSMENT — GAIT ASSESSMENTS: GAIT LEVEL OF ASSIST: UNABLE TO PARTICIPATE

## 2023-06-01 ASSESSMENT — FIBROSIS 4 INDEX: FIB4 SCORE: 2.98

## 2023-06-01 NOTE — THERAPY
Physical Therapy   Daily Treatment     Patient Name: Jaymie Peacock  Age:  67 y.o., Sex:  female  Medical Record #: [id number]  Today's Date: 6/1/2023     Precautions  Precautions: Fall Risk;Swallow Precautions  Comments: ETOH w/d    Assessment    Pt remains significantly confused, Aox1, and impaired spatial awareness impacting performance and functional mobility. Pt currently requires modA for bed mobility, and required min-maxA for sitting balance. Fair improvements in sitting balance w/heavy cuing this date. Recommend D/C to post-acute rehab once medically appropriate.     Plan    Treatment Plan Status:    Type of Treatment: Bed Mobility, Equipment, Gait Training, Manual Therapy, Neuro Re-Education / Balance, Self Care / Home Evaluation, Therapeutic Activities, Therapeutic Exercise, Stair Training  Treatment Frequency: 3 Times per Week  Treatment Duration: Until Therapy Goals Met    DC Equipment Recommendations: Unable to determine at this time  Discharge Recommendations: Recommend post-acute placement for additional physical therapy services prior to discharge home      Subjective    Pt reported being tired and reluctant to activity.      Objective       06/01/23 1401   Precautions   Precautions Fall Risk;Swallow Precautions   Comments ETOH w/d   Vitals   Pulse (!) 108   Blood Pressure  122/82   Pulse Oximetry 97 %   O2 Delivery Device None - Room Air   Vitals Comments HR increased to 138-140 bpm while sitting EOB   Pain   Pain Scales 0 to 10 Scale    Pain 0 - 10 Group   Pain Rating Scale (NPRS) 0   Cognition    Speech/ Communication Delayed Responses;Word Finding Impairment   Orientation Level Not Oriented to Month;Not Oriented to Year;Not Oriented to Place;Not Oriented to Reason   Comments Able to follow 50% of simple commands with additional verbal/tactile cues this date. Poor spatial awareness noted, and extremely poor insight   Neurological Concerns   Neurological Concerns Yes   Comments BUE severely  tremulous this date   Balance   Sitting Balance (Static) Poor   Skilled Intervention Verbal Cuing;Sequencing;Postural Facilitation   Comments Pt with significant L posterolateral lean while sitting EOB w/poor awareness of positioning/imbalance. Max cues for attention to positioning, and for hand placement. Significant difficulty self correcting as response to cues, but pt able to initiate anterior weight shift automatically when attempting scooting forward. Initially required maxA for sitting balance and progressed to Amanda w/constant posterior LOB w/removal of support   Bed Mobility    Supine to Sit Moderate Assist  (assist trunk upright and reposition hips)   Sit to Supine Moderate Assist  (clear BLE onto the bed)   Skilled Intervention Verbal Cuing;Sequencing;Postural Facilitation   Gait Analysis   Gait Level Of Assist Unable to Participate   Functional Mobility   Sit to Stand Unable to Participate   How much difficulty does the patient currently have...   Turning over in bed (including adjusting bedclothes, sheets and blankets)? 1   Sitting down on and standing up from a chair with arms (e.g., wheelchair, bedside commode, etc.) 1   Moving from lying on back to sitting on the side of the bed? 1   How much help from another person does the patient currently need...   Moving to and from a bed to a chair (including a wheelchair)? 1   Need to walk in a hospital room? 1   Climbing 3-5 steps with a railing? 1   6 clicks Mobility Score 6   Short Term Goals    Short Term Goal # 1 pt will go supine<>sit w/hob elevated and rails up w/spv in 6tx   Goal Outcome # 1 Progressing as expected   Short Term Goal # 2 pt will go sit<>stand w/LRD w/spv in 6tx   Goal Outcome # 2 Progressing as expected   Short Term Goal # 3 pt will transfer bed<>chair w/LRD w/spv in 6tx   Goal Outcome # 3 Progressing as expected   Short Term Goal # 4 pt will ambulate for 100ft w/LRD w/spv in 6tx   Goal Outcome # 4 Progressing as expected   Education  Group   Education Provided Role of Physical Therapist   Role of Physical Therapist Patient Response Verbal Demonstration   Anticipated Discharge Equipment and Recommendations   DC Equipment Recommendations Unable to determine at this time   Discharge Recommendations Recommend post-acute placement for additional physical therapy services prior to discharge home   Interdisciplinary Plan of Care Collaboration   IDT Collaboration with  Nursing   Patient Position at End of Therapy In Bed;Bed Alarm On;Call Light within Reach   Collaboration Comments updated RN of performance   Session Information   Date / Session Number  6/1- 2(2/3, 6/4)

## 2023-06-01 NOTE — DISCHARGE PLANNING
1038  Received Choice form at: Obtained from media  Agency/Facility Name: [facility]  Referral sent per Choice form @ 1038     1100  Per Epic response [facility] accepted.     1104  Agency/Facility Name: [facility]   Outcome: janelle left VM to inform DPA that referral will remain pending as Pt is CIWA and is currently hallucinating.

## 2023-06-01 NOTE — DIETARY
Nutrition Update:    Day 4 of admit.  Jaymie Peacock is a 67 y.o. female with admitting DX of Severe sepsis.  Patient being followed to optimize nutrition.    Current Diet: Pureed thin liquids with 1:1 supervision.  Per ADL, PO low eating 25-50% of last three out of four meals.  Reviewed with RN, pt still disoriented and not appropriate for interview.   Per MD notes, suspect metabolic encephalopathy from infection and toxic encephalopathy from etoh.   Pertinent Labs: K+ 3.5, Phos was 1.9 on 5/29. Pt at increased risk for refeeding.   Pertinent Meds: Kdur, Thiamine, Multivitamin, Folic Acid.     Problem: Nutritional:  Goal: Achieve adequate nutritional intake  Description: Patient will consume >50% of meals  Outcome: Not met.      Plan/Rec:   Will add Boost Plus with all meals, reviewed with CNA to encourage supplements as able.   Monitor for refeeding labs (K+, Phos, and Mg): Ordered new Mg and Phos x 5 days. Replete K, Phos and Mg prn. Continue to Supplement 100 mg Thiamine x 7 days to reduce risk of refeeding.     RD following.

## 2023-06-01 NOTE — DISCHARGE PLANNING
HTH/SCP TCN chart review completed. Collaborated with CM, advised her that blanket referral was scanned yesterday. Appreciate [facility] for sending out referrals. Per chart review, [facility] has accepted. Patient is not medically clear at this time, she is CIWA and hallucinating.     TCN will continue to follow and collaborate with discharge planning team as additional post acute needs arise. Thank you.    Completed:  PT recommending post-acute placement  OT recommendation pending  Choice obtained: HH (5/29), IRF, SNF (5/30), expanded SNF choice 5/31  [facility]  [facility] referral (Y), sent 5/29/23

## 2023-06-01 NOTE — PROGRESS NOTES
[facility] Medicine Daily Progress Note    Date of Service  6/1/2023    Chief Complaint  Jaymie Peacock is a 67 y.o. female admitted 5/28/2023 with confusion and altered menation    Hospital Course  Patient is a 67 year old female with history of hypertension, alcohol and tobacco use, pulmonary embolism, and LE DVT on Xarelto. She presented to [facility] on 5/28/2023 with altered mental status.  Patient was oriented to self and year, believed she was in [place] and did not know why she was in the hospital.  Patient complained of urinary frequency, denied fevers, chills, dysuria, chest pain or shortness of breath.  Patient answered questions however her answers did not make sense.  Per previous notes patient was brought in by EMS from home as her neighbor called as he reported she had been having hallucinations for the last 1 and half weeks.  Patient did report she regularly drinks alcohol, but could not quantify how much.  Also she reported she had not been eating very well     In the ER patient tachycardic, tachypneic and hypertensive.  Labs significant for hemoglobin 10.3, hematocrit 31.5, potassium 2.2, CO2 19, anion gap 26, lactic acid 2.9, creatinine 1.39, GFR 41 and alkaline phosphatase 247 with a negative alcohol.  UA with positive nitrites and leukocyte esterase.  Chest x-ray interpreted by myself shows no acute findings.    Interval Problem Update  Axox2, poor historian. A friend is at bedside, he states he was a good friend of her  before he passed and he helps her as much as he can. She has no local family. She does have family in a different state that has expressed interest in moving her there to assist her. He reports she remains confused but this is improving, he reports a cycle of recurrent infections. Patient denies pain, denies sob, stable on RA. Had urinary retention overnight. ROS otherwise negative.   5/30: Patient seen and examined, resting in bed, confused , on CIWA protocol for alcohol  withdraws.  PT/OT eval will need placement   5/31: Patient seen and examined, going through alcohol withdraws with high CIWA score, cont on CIWA protocol   6/1: patient resting in bed, still going through alcohol withdraws, cont on ciwa prtocol  Hypokalemia: potassium of 3.5 replete    I have discussed this patient's plan of care and discharge plan at IDT rounds today with Case Management, Nursing, Nursing leadership, and other members of the IDT team.    Consultants/Specialty      Code Status  Full Code    Disposition    I have placed the appropriate orders for post-discharge needs.    Review of Systems  Review of Systems   Constitutional:  Positive for malaise/fatigue. Negative for chills, diaphoresis, fever and weight loss.   HENT: Negative.  Negative for sore throat.    Eyes: Negative.  Negative for blurred vision.   Respiratory: Negative.  Negative for cough and shortness of breath.    Cardiovascular: Negative.  Negative for chest pain, palpitations and leg swelling.   Gastrointestinal: Negative.  Negative for abdominal pain, nausea and vomiting.   Genitourinary: Negative.  Negative for dysuria.   Musculoskeletal: Negative.  Negative for myalgias.   Skin: Negative.  Negative for itching and rash.   Neurological: Negative.  Negative for dizziness, focal weakness, weakness and headaches.   Endo/Heme/Allergies: Negative.  Does not bruise/bleed easily.   Psychiatric/Behavioral:  Positive for memory loss. Negative for depression, substance abuse and suicidal ideas.    All other systems reviewed and are negative.       Physical Exam  Temp:  [36.3 °C (97.3 °F)-36.4 °C (97.5 °F)] 36.3 °C (97.3 °F)  Pulse:  [] 92  Resp:  [12-19] 17  BP: (101-128)/(68-84) 115/84  SpO2:  [91 %-98 %] 92 %    Physical Exam  Vitals and nursing note reviewed. Exam conducted with a chaperone present.   Constitutional:       General: She is not in acute distress.     Appearance: She is not diaphoretic.      Comments: She appears  chronically ill   HENT:      Head: Normocephalic.      Nose: Nose normal.      Mouth/Throat:      Mouth: Mucous membranes are moist.   Eyes:      Pupils: Pupils are equal, round, and reactive to light.   Cardiovascular:      Rate and Rhythm: Normal rate and regular rhythm.      Pulses: Normal pulses.      Heart sounds: Normal heart sounds.   Pulmonary:      Effort: Pulmonary effort is normal.      Breath sounds: Normal breath sounds.   Abdominal:      General: Abdomen is flat. Bowel sounds are normal.      Palpations: Abdomen is soft.   Musculoskeletal:         General: No swelling or deformity. Normal range of motion.   Skin:     General: Skin is warm and dry.      Capillary Refill: Capillary refill takes less than 2 seconds.   Neurological:      General: No focal deficit present.      Mental Status: She is alert.      Cranial Nerves: No cranial nerve deficit.   Psychiatric:         Mood and Affect: Mood normal.         Behavior: Behavior normal.         Fluids    Intake/Output Summary (Last 24 hours) at 6/1/2023 1253  Last data filed at 6/1/2023 0900  Gross per 24 hour   Intake 120 ml   Output 800 ml   Net -680 ml       Laboratory  Recent Labs     05/30/23  0150 05/31/23  0514 06/01/23  0254   WBC 7.6 8.9 7.9   RBC 2.26* 2.58* 2.52*   HEMOGLOBIN 7.6* 8.7* 8.3*   HEMATOCRIT 22.4* 25.9* 25.8*   MCV 99.1* 100.4* 102.4*   MCH 33.6* 33.7* 32.9   MCHC 33.9 33.6 32.2   RDW 53.0* 51.9* 55.0*   PLATELETCT 186 188 199   MPV 10.4 10.3 10.9     Recent Labs     05/30/23  0150 06/01/23  0254   SODIUM 140 139   POTASSIUM 3.9 3.5*   CHLORIDE 108 110   CO2 19* 18*   GLUCOSE 75 81   BUN 19 9   CREATININE 0.76 0.53   CALCIUM 8.4* 8.1*                     Imaging  DX-CHEST-PORTABLE (1 VIEW)   Final Result         No acute cardiac or pulmonary abnormality is identified.           Assessment/Plan  * Severe sepsis (HCC)- (present on admission)  Assessment & Plan  This is Severe Sepsis Present on admission  SIRS criteria identified on my  evaluation include: Tachycardia, with heart rate greater than 90 BPM and Tachypnea, with respirations greater than 20 per minute  Clinical indicators of end organ dysfunction include Lactate >2 mmol/L (18.0 mg/dL)  Source is urinary   Sepsis protocol initiated  Crystalloid Fluid Administration: Fluid resuscitation ordered per standard protocol - 30 mL/kg per current or ideal body weight  IV antibiotics as appropriate for source of sepsis  Reassessment: I have reassessed the patient's hemodynamic status  Slight improvement today, continue to follow closely   Cultures so far negative  With secondary encephalopathy  Continue rocephin   IVF     Lactic acidosis- (present on admission)  Assessment & Plan  Lactic > 2 on admission   Nw resolved    Tobacco use disorder- (present on admission)  Assessment & Plan  Reports she is still smoking, not interested in quitting   Does not want nicotine replacement at this time   Cessation counseling performed, 4 minutes     Crystal-Payan tear- (present on admission)  Assessment & Plan  Hx of   Omeprazole   No s/o bleeding on exam, h/h did drop overnight but suspect this is due to hemodilution, following closely   Serial h/h  Consult GI if s/o acute bleed    History of pulmonary embolus (PE)- (present on admission)  Assessment & Plan  Patient had admission x2 in July/ August 2022 for acute blood loss anemia secondary to Crystal-Payan tear treated with hemoclipping and epi injection.  Patient discharged with Carafate and PPI and was resumed on her Xarelto.  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  IVC filter was placed during that admission, patient discontinued on her Xarelto.    High anion gap metabolic acidosis- (present on admission)  Assessment & Plan  Present on admission   Now resolved    Acute cystitis without hematuria- (present on admission)  Assessment & Plan  Continue iv rocephin  Cultures so far negative,  following      Encephalopathy acute- (present on admission)  Assessment & Plan  2/2 infection   Reports alcohol use, hx of withdrawals   IV abx   IVF  Per her friend this is improving, suspect metabolic encephalopathy from infection and toxic encephalopathy from etoh  Non focal exam, following closely  If does not improve consider CT/MRI     Severe protein-calorie malnutrition ([place]: less than 60% of standard weight) ([facility])- (present on admission)  Assessment & Plan  Muscle wasting on exam   nutritionist consulted     YAMIL (acute kidney injury) ([facility])- (present on admission)  Assessment & Plan  2/2 sepsis and dehydration   This has resolved  Continue to follow  Avoid nephrotoxic medications       Hypokalemia- (present on admission)  Assessment & Plan  Low mag contributing  Replacing mag and K  Will repeat both levels in am    Alcohol abuse- (present on admission)  Assessment & Plan  Long history of alcohol abuse, multiple prior admissions for withdrawal   No current s/o withdrawal but this is likely contributing to her confusion  Thinks her last drink was 2 days ago but very poor historian   Alcohol level negative in ER   Continue CIWA  Detox bag   MVI, folate, thiamine          VTE prophylaxis: enoxaparin ppx    I have performed a physical exam and reviewed and updated ROS and Plan today (6/1/2023). In review of yesterday's note (5/31/2023), there are no changes except as documented above.

## 2023-06-01 NOTE — THERAPY
Occupational Therapy   Initial Evaluation     Patient Name: Jaymie Peacock  Age:  67 y.o., Sex:  female  Medical Record #: [id number]  Today's Date: 6/1/2023     Precautions  Precautions: Fall Risk, Swallow Precautions  Comments: confused    Assessment  Patient is 67 y.o. female with a diagnosis of ALOC, hallucinations.  Pt currently limited by decreased functional mobility, activity tolerance, cognition, strength, AROM, coordination, balance, and pain which are affecting pt's ability to complete ADLs/IADLs at baseline. Pt would benefit from OT services in the acute care setting to maximize functional recovery.      Plan    Occupational Therapy Initial Treatment Plan   Treatment Interventions: (P) Self Care / Activities of Daily Living, Therapeutic Activity  Treatment Frequency: (P) 3 Times per Week  Duration: (P) Until Therapy Goals Met       Discharge Recommendations: (P) Recommend post-acute placement for additional occupational therapy services prior to discharge home        06/01/23 0812   Prior Living Situation   Prior Services None   Housing / Facility 1 Story House   Steps Into Home 1   Steps In Home 0   Equipment Owned 4-Wheel Walker   Lives with - Patient's Self Care Capacity Alone and Unable to Care For Self   Prior Level of ADL Function   Self Feeding Unable To Determine At This Time   Dressing Unable To Determine At This Time   ADL Assessment   Grooming Maximal Assist   Upper Body Dressing Maximal Assist   Lower Body Dressing Total Assist   Toileting Total Assist   Functional Mobility   Sit to Stand Maximal Assist   Bed, Chair, Wheelchair Transfer Unable to Participate   Short Term Goals   Short Term Goal # 1 mod A with UB dressing   Short Term Goal # 2 mod A with LB dressing   Short Term Goal # 3 mod A with ADL txfs   Occupational Therapy Initial Treatment Plan    Treatment Interventions Self Care / Activities of Daily Living;Therapeutic Activity   Treatment Frequency 3 Times per Week   Duration Until  Therapy Goals Met   Anticipated Discharge Equipment and Recommendations   Discharge Recommendations Recommend post-acute placement for additional occupational therapy services prior to discharge home

## 2023-06-01 NOTE — CARE PLAN
The patient is Watcher - Medium risk of patient condition declining or worsening    Shift Goals  Clinical Goals: reorient, safety  Patient Goals: na  Family Goals: na    Progress made toward(s) clinical / shift goals:    Problem: Knowledge Deficit - Standard  Goal: Patient and family/care givers will demonstrate understanding of plan of care, disease process/condition, diagnostic tests and medications  Outcome: Progressing     Problem: Seizure Precautions  Goal: Implementation of seizure precautions  Outcome: Progressing     Problem: Lifestyle Changes  Goal: Patient's ability to identify lifestyle changes and available resources to help reduce recurrence of condition will improve  Outcome: Progressing     Problem: Psychosocial  Goal: Patient's level of anxiety will decrease  Outcome: Progressing       Patient is not progressing towards the following goals:

## 2023-06-01 NOTE — THERAPY
Physical Therapy Contact Note    Patient Name: Jaymie Peacock  Age:  67 y.o., Sex:  female  Medical Record #: [id number]  Today's Date: 6/1/2023 06/01/23 0955   Interdisciplinary Plan of Care Collaboration   Collaboration Comments Attempted to initiate PT tx this date. Pt w/difficulties articulating speech, and continued confusion. Provided max encouragement to mobilize/participate with therapy; however, pt politely declined and expressed being too weak. Pt requesting therapist return at a later time. Will re-attempt as time allows/pt agreeable.

## 2023-06-02 LAB
ANION GAP SERPL CALC-SCNC: 9 MMOL/L (ref 7–16)
BACTERIA BLD CULT: NORMAL
BACTERIA BLD CULT: NORMAL
BUN SERPL-MCNC: 8 MG/DL (ref 8–22)
CALCIUM SERPL-MCNC: 8 MG/DL (ref 8.5–10.5)
CHLORIDE SERPL-SCNC: 115 MMOL/L (ref 96–112)
CO2 SERPL-SCNC: 18 MMOL/L (ref 20–33)
CREAT SERPL-MCNC: 0.51 MG/DL (ref 0.5–1.4)
ERYTHROCYTE [DISTWIDTH] IN BLOOD BY AUTOMATED COUNT: 55.8 FL (ref 35.9–50)
GFR SERPLBLD CREATININE-BSD FMLA CKD-EPI: 101 ML/MIN/1.73 M 2
GLUCOSE SERPL-MCNC: 84 MG/DL (ref 65–99)
HCT VFR BLD AUTO: 23.8 % (ref 37–47)
HGB BLD-MCNC: 7.8 G/DL (ref 12–16)
MAGNESIUM SERPL-MCNC: 1.1 MG/DL (ref 1.5–2.5)
MCH RBC QN AUTO: 33.5 PG (ref 27–33)
MCHC RBC AUTO-ENTMCNC: 32.8 G/DL (ref 32.2–35.5)
MCV RBC AUTO: 102.1 FL (ref 81.4–97.8)
PHOSPHATE SERPL-MCNC: 2.7 MG/DL (ref 2.5–4.5)
PLATELET # BLD AUTO: 204 K/UL (ref 164–446)
PMV BLD AUTO: 10.9 FL (ref 9–12.9)
POTASSIUM SERPL-SCNC: 3.9 MMOL/L (ref 3.6–5.5)
RBC # BLD AUTO: 2.33 M/UL (ref 4.2–5.4)
SIGNIFICANT IND 70042: NORMAL
SIGNIFICANT IND 70042: NORMAL
SITE SITE: NORMAL
SITE SITE: NORMAL
SODIUM SERPL-SCNC: 142 MMOL/L (ref 135–145)
SOURCE SOURCE: NORMAL
SOURCE SOURCE: NORMAL
WBC # BLD AUTO: 6.3 K/UL (ref 4.8–10.8)

## 2023-06-02 PROCEDURE — 700105 HCHG RX REV CODE 258: Performed by: INTERNAL MEDICINE

## 2023-06-02 PROCEDURE — 700102 HCHG RX REV CODE 250 W/ 637 OVERRIDE(OP): Performed by: HOSPITALIST

## 2023-06-02 PROCEDURE — 700102 HCHG RX REV CODE 250 W/ 637 OVERRIDE(OP): Performed by: INTERNAL MEDICINE

## 2023-06-02 PROCEDURE — 83735 ASSAY OF MAGNESIUM: CPT

## 2023-06-02 PROCEDURE — 80048 BASIC METABOLIC PNL TOTAL CA: CPT

## 2023-06-02 PROCEDURE — 99232 SBSQ HOSP IP/OBS MODERATE 35: CPT | Performed by: INTERNAL MEDICINE

## 2023-06-02 PROCEDURE — A9270 NON-COVERED ITEM OR SERVICE: HCPCS | Performed by: HOSPITALIST

## 2023-06-02 PROCEDURE — 36415 COLL VENOUS BLD VENIPUNCTURE: CPT

## 2023-06-02 PROCEDURE — 700111 HCHG RX REV CODE 636 W/ 250 OVERRIDE (IP)

## 2023-06-02 PROCEDURE — A9270 NON-COVERED ITEM OR SERVICE: HCPCS | Performed by: INTERNAL MEDICINE

## 2023-06-02 PROCEDURE — 84100 ASSAY OF PHOSPHORUS: CPT

## 2023-06-02 PROCEDURE — 85027 COMPLETE CBC AUTOMATED: CPT

## 2023-06-02 PROCEDURE — 770020 HCHG ROOM/CARE - TELE (206)

## 2023-06-02 PROCEDURE — 700111 HCHG RX REV CODE 636 W/ 250 OVERRIDE (IP): Performed by: INTERNAL MEDICINE

## 2023-06-02 RX ORDER — MAGNESIUM SULFATE HEPTAHYDRATE 40 MG/ML
4 INJECTION, SOLUTION INTRAVENOUS ONCE
Status: COMPLETED | OUTPATIENT
Start: 2023-06-02 | End: 2023-06-02

## 2023-06-02 RX ORDER — MAGNESIUM SULFATE HEPTAHYDRATE 40 MG/ML
2 INJECTION, SOLUTION INTRAVENOUS ONCE
Status: DISCONTINUED | OUTPATIENT
Start: 2023-06-02 | End: 2023-06-02

## 2023-06-02 RX ADMIN — ENOXAPARIN SODIUM 30 MG: 100 INJECTION SUBCUTANEOUS at 17:24

## 2023-06-02 RX ADMIN — SODIUM CHLORIDE: 9 INJECTION, SOLUTION INTRAVENOUS at 01:02

## 2023-06-02 RX ADMIN — DOCUSATE SODIUM 50 MG AND SENNOSIDES 8.6 MG 2 TABLET: 8.6; 5 TABLET, FILM COATED ORAL at 17:24

## 2023-06-02 RX ADMIN — MAGNESIUM SULFATE HEPTAHYDRATE 4 G: 4 INJECTION, SOLUTION INTRAVENOUS at 03:03

## 2023-06-02 ASSESSMENT — ENCOUNTER SYMPTOMS
MEMORY LOSS: 1
ABDOMINAL PAIN: 0
WEIGHT LOSS: 0
HEADACHES: 0
NAUSEA: 0
MUSCULOSKELETAL NEGATIVE: 1
GASTROINTESTINAL NEGATIVE: 1
VOMITING: 0
NEUROLOGICAL NEGATIVE: 1
EYES NEGATIVE: 1
DIAPHORESIS: 0
DIZZINESS: 0
RESPIRATORY NEGATIVE: 1
CARDIOVASCULAR NEGATIVE: 1
FOCAL WEAKNESS: 0
PALPITATIONS: 0
SORE THROAT: 0
BLURRED VISION: 0
CHILLS: 0
WEAKNESS: 0
MYALGIAS: 0
COUGH: 0
BRUISES/BLEEDS EASILY: 0
SHORTNESS OF BREATH: 0
FEVER: 0
DEPRESSION: 0

## 2023-06-02 ASSESSMENT — LIFESTYLE VARIABLES
NAUSEA AND VOMITING: NO NAUSEA AND NO VOMITING
HEADACHE, FULLNESS IN HEAD: NOT PRESENT
ORIENTATION AND CLOUDING OF SENSORIUM: DISORIENTED FOR PLACE AND / OR PERSON
SUBSTANCE_ABUSE: 0
AUDITORY DISTURBANCES: NOT PRESENT
NAUSEA AND VOMITING: NO NAUSEA AND NO VOMITING
ANXIETY: NO ANXIETY (AT EASE)
VISUAL DISTURBANCES: NOT PRESENT
TREMOR: NO TREMOR
AUDITORY DISTURBANCES: NOT PRESENT
ORIENTATION AND CLOUDING OF SENSORIUM: DISORIENTED FOR PLACE AND / OR PERSON
VISUAL DISTURBANCES: NOT PRESENT
ANXIETY: NO ANXIETY (AT EASE)
AGITATION: NORMAL ACTIVITY
AGITATION: NORMAL ACTIVITY
PAROXYSMAL SWEATS: NO SWEAT VISIBLE
PAROXYSMAL SWEATS: NO SWEAT VISIBLE
TOTAL SCORE: 4
HEADACHE, FULLNESS IN HEAD: NOT PRESENT
TREMOR: NO TREMOR
TOTAL SCORE: 4

## 2023-06-02 NOTE — PROGRESS NOTES
[facility] Medicine Daily Progress Note    Date of Service  6/2/2023    Chief Complaint  Jaymie Peacock is a 67 y.o. female admitted 5/28/2023 with confusion and altered menation    Hospital Course  Patient is a 67 year old female with history of hypertension, alcohol and tobacco use, pulmonary embolism, and LE DVT on Xarelto. She presented to [facility] on 5/28/2023 with altered mental status.  Patient was oriented to self and year, believed she was in [place] and did not know why she was in the hospital.  Patient complained of urinary frequency, denied fevers, chills, dysuria, chest pain or shortness of breath.  Patient answered questions however her answers did not make sense.  Per previous notes patient was brought in by EMS from home as her neighbor called as he reported she had been having hallucinations for the last 1 and half weeks.  Patient did report she regularly drinks alcohol, but could not quantify how much.  Also she reported she had not been eating very well     In the ER patient tachycardic, tachypneic and hypertensive.  Labs significant for hemoglobin 10.3, hematocrit 31.5, potassium 2.2, CO2 19, anion gap 26, lactic acid 2.9, creatinine 1.39, GFR 41 and alkaline phosphatase 247 with a negative alcohol.  UA with positive nitrites and leukocyte esterase.  Chest x-ray interpreted by myself shows no acute findings.    Interval Problem Update  Axox2, poor historian. A friend is at bedside, he states he was a good friend of her  before he passed and he helps her as much as he can. She has no local family. She does have family in a different state that has expressed interest in moving her there to assist her. He reports she remains confused but this is improving, he reports a cycle of recurrent infections. Patient denies pain, denies sob, stable on RA. Had urinary retention overnight. ROS otherwise negative.   5/30: Patient seen and examined, resting in bed, confused , on CIWA protocol for alcohol  withdraws.  PT/OT eval will need placement   5/31: Patient seen and examined, going through alcohol withdraws with high CIWA score, cont on CIWA protocol   6/1: patient resting in bed, still going through alcohol withdraws, cont on ciwa prtocol  Hypokalemia: potassium of 3.5 replete  6/2: Patient seen and examined, confused, cont on CIWA protocol for her alcohol withdraws, monitor electrolyte and replete as needed   Hypomagnesemia: magnesium of 1.1 will replete with IV mag   Will need placement   I have discussed this patient's plan of care and discharge plan at IDT rounds today with Case Management, Nursing, Nursing leadership, and other members of the IDT team.    Consultants/Specialty      Code Status  Full Code    Disposition    I have placed the appropriate orders for post-discharge needs.    Review of Systems  Review of Systems   Constitutional:  Positive for malaise/fatigue. Negative for chills, diaphoresis, fever and weight loss.   HENT: Negative.  Negative for sore throat.    Eyes: Negative.  Negative for blurred vision.   Respiratory: Negative.  Negative for cough and shortness of breath.    Cardiovascular: Negative.  Negative for chest pain, palpitations and leg swelling.   Gastrointestinal: Negative.  Negative for abdominal pain, nausea and vomiting.   Genitourinary: Negative.  Negative for dysuria.   Musculoskeletal: Negative.  Negative for myalgias.   Skin: Negative.  Negative for itching and rash.   Neurological: Negative.  Negative for dizziness, focal weakness, weakness and headaches.   Endo/Heme/Allergies: Negative.  Does not bruise/bleed easily.   Psychiatric/Behavioral:  Positive for memory loss. Negative for depression, substance abuse and suicidal ideas.    All other systems reviewed and are negative.       Physical Exam  Temp:  [36.2 °C (97.1 °F)-36.4 °C (97.6 °F)] 36.3 °C (97.3 °F)  Pulse:  [] 105  Resp:  [12-16] 12  BP: (113-134)/(69-93) 122/87  SpO2:  [95 %-98 %] 96 %    Physical  Exam  Vitals and nursing note reviewed. Exam conducted with a chaperone present.   Constitutional:       General: She is not in acute distress.     Appearance: She is not diaphoretic.      Comments: She appears chronically ill   HENT:      Head: Normocephalic.      Nose: Nose normal.      Mouth/Throat:      Mouth: Mucous membranes are moist.   Eyes:      Pupils: Pupils are equal, round, and reactive to light.   Cardiovascular:      Rate and Rhythm: Normal rate and regular rhythm.      Pulses: Normal pulses.      Heart sounds: Normal heart sounds.   Pulmonary:      Effort: Pulmonary effort is normal.      Breath sounds: Normal breath sounds.   Abdominal:      General: Abdomen is flat. Bowel sounds are normal.      Palpations: Abdomen is soft.   Musculoskeletal:         General: No swelling or deformity. Normal range of motion.   Skin:     General: Skin is warm and dry.      Capillary Refill: Capillary refill takes less than 2 seconds.   Neurological:      General: No focal deficit present.      Mental Status: She is alert.      Cranial Nerves: No cranial nerve deficit.   Psychiatric:         Mood and Affect: Mood normal.         Behavior: Behavior normal.         Fluids    Intake/Output Summary (Last 24 hours) at 6/2/2023 1327  Last data filed at 6/2/2023 1158  Gross per 24 hour   Intake 300 ml   Output 850 ml   Net -550 ml       Laboratory  Recent Labs     05/31/23  0514 06/01/23  0254 06/02/23  0104   WBC 8.9 7.9 6.3   RBC 2.58* 2.52* 2.33*   HEMOGLOBIN 8.7* 8.3* 7.8*   HEMATOCRIT 25.9* 25.8* 23.8*   .4* 102.4* 102.1*   MCH 33.7* 32.9 33.5*   MCHC 33.6 32.2 32.8   RDW 51.9* 55.0* 55.8*   PLATELETCT 188 199 204   MPV 10.3 10.9 10.9     Recent Labs     06/01/23  0254 06/02/23  0104   SODIUM 139 142   POTASSIUM 3.5* 3.9   CHLORIDE 110 115*   CO2 18* 18*   GLUCOSE 81 84   BUN 9 8   CREATININE 0.53 0.51   CALCIUM 8.1* 8.0*                     Imaging  DX-CHEST-PORTABLE (1 VIEW)   Final Result         No acute  cardiac or pulmonary abnormality is identified.           Assessment/Plan  * Severe sepsis (HCC)- (present on admission)  Assessment & Plan  This is Severe Sepsis Present on admission  SIRS criteria identified on my evaluation include: Tachycardia, with heart rate greater than 90 BPM and Tachypnea, with respirations greater than 20 per minute  Clinical indicators of end organ dysfunction include Lactate >2 mmol/L (18.0 mg/dL)  Source is urinary   Sepsis protocol initiated  Crystalloid Fluid Administration: Fluid resuscitation ordered per standard protocol - 30 mL/kg per current or ideal body weight  IV antibiotics as appropriate for source of sepsis  Reassessment: I have reassessed the patient's hemodynamic status  Slight improvement today, continue to follow closely   Cultures so far negative  With secondary encephalopathy  Continue rocephin   IVF     Lactic acidosis- (present on admission)  Assessment & Plan  Lactic > 2 on admission   Nw resolved    Tobacco use disorder- (present on admission)  Assessment & Plan  Reports she is still smoking, not interested in quitting   Does not want nicotine replacement at this time   Cessation counseling performed, 4 minutes     Crystal-Payan tear- (present on admission)  Assessment & Plan  Hx of   Omeprazole   No s/o bleeding on exam, h/h did drop overnight but suspect this is due to hemodilution, following closely   Serial h/h  Consult GI if s/o acute bleed    History of pulmonary embolus (PE)- (present on admission)  Assessment & Plan  Patient had admission x2 in July/ August 2022 for acute blood loss anemia secondary to Crystal-Payan tear treated with hemoclipping and epi injection.  Patient discharged with Carafate and PPI and was resumed on her Xarelto.  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  IVC filter was placed during that admission, patient discontinued on her Xarelto.    High anion gap metabolic  acidosis- (present on admission)  Assessment & Plan  Present on admission   Now resolved    Acute cystitis without hematuria- (present on admission)  Assessment & Plan  Continue iv rocephin  Cultures so far negative, following      Encephalopathy acute- (present on admission)  Assessment & Plan  2/2 infection   Reports alcohol use, hx of withdrawals   IV abx   IVF  Per her friend this is improving, suspect metabolic encephalopathy from infection and toxic encephalopathy from etoh  Non focal exam, following closely  If does not improve consider CT/MRI     Severe protein-calorie malnutrition ([place]: less than 60% of standard weight) ([facility])- (present on admission)  Assessment & Plan  Muscle wasting on exam   nutritionist consulted     YAMIL (acute kidney injury) ([facility])- (present on admission)  Assessment & Plan  2/2 sepsis and dehydration   This has resolved  Continue to follow  Avoid nephrotoxic medications       Hypokalemia- (present on admission)  Assessment & Plan  Low mag contributing  Replacing mag and K  Will repeat both levels in am    Alcohol abuse- (present on admission)  Assessment & Plan  Long history of alcohol abuse, multiple prior admissions for withdrawal   No current s/o withdrawal but this is likely contributing to her confusion  Thinks her last drink was 2 days ago but very poor historian   Alcohol level negative in ER   Continue CIWA  Detox bag   MVI, folate, thiamine          VTE prophylaxis: enoxaparin ppx    I have performed a physical exam and reviewed and updated ROS and Plan today (6/2/2023). In review of yesterday's note (6/1/2023), there are no changes except as documented above.

## 2023-06-02 NOTE — DISCHARGE PLANNING
1125  Agency/Facility Name: [facility]   Spoke To: Rosa   Outcome: Per Rosa no bed available today, possible bed tomorrow. DPA to call back.     1127  Agency/Facility Name: [facility]   Spoke To: Al   Outcome: DPA called for bed availability, Per Al, she will verify with DON since Pt is at A&Ox1. Al to contact DPA with updates.     1145  Agency/Facility Name: [facility]   Spoke To: Al   Outcome: Al requesting to send progress note for 6/1. DPA sent progress note and Al will call DPA with updates.     1200  Agency/Facility Name: [facility]   Spoke To: Al   Outcome: Al called to notify DPA unable to take Pt today, as they cannot accommodate Pt's emendation.      1201  Agency/Facility Name: [facility]   Spoke To: Rosa  Outcome: DPA called on bed availability this weekend, per Rosa no bed available  tomorrow 6/3, but will have the bed and staffing available on Sunday 6/4. Rosa requesting a 1200 transport time.      SE RN notified

## 2023-06-02 NOTE — DISCHARGE PLANNING
DC Transport Scheduled    Received request at: 6/2/2023 AT 1231    Transport Company Scheduled:  [facility]    Scheduled Date: 6/4/2023  Scheduled Time: 1200    Destination: [facility] at [address] [facility] [address]     Notified care team of scheduled transport via Voalte.     If there are any changes needed to the DC transportation scheduled, please contact [facility] at ext. [phone] between the hours of [id number] Mon-Fri. If outside those hours, contact the ED Case Manager at ext. [phone].

## 2023-06-02 NOTE — DISCHARGE PLANNING
"Patient accepted for rehab at [facility] and they can accept patient on Sunday, 6/4. RN CM called and updated niece \"Nena\" who is in agreement with plan.   "

## 2023-06-03 LAB
MAGNESIUM SERPL-MCNC: 1.7 MG/DL (ref 1.5–2.5)
PHOSPHATE SERPL-MCNC: 2.7 MG/DL (ref 2.5–4.5)

## 2023-06-03 PROCEDURE — 83735 ASSAY OF MAGNESIUM: CPT

## 2023-06-03 PROCEDURE — 700105 HCHG RX REV CODE 258: Performed by: INTERNAL MEDICINE

## 2023-06-03 PROCEDURE — 99232 SBSQ HOSP IP/OBS MODERATE 35: CPT | Performed by: STUDENT IN AN ORGANIZED HEALTH CARE EDUCATION/TRAINING PROGRAM

## 2023-06-03 PROCEDURE — A9270 NON-COVERED ITEM OR SERVICE: HCPCS | Performed by: INTERNAL MEDICINE

## 2023-06-03 PROCEDURE — 700111 HCHG RX REV CODE 636 W/ 250 OVERRIDE (IP): Performed by: INTERNAL MEDICINE

## 2023-06-03 PROCEDURE — A9270 NON-COVERED ITEM OR SERVICE: HCPCS | Performed by: HOSPITALIST

## 2023-06-03 PROCEDURE — 700102 HCHG RX REV CODE 250 W/ 637 OVERRIDE(OP): Performed by: INTERNAL MEDICINE

## 2023-06-03 PROCEDURE — 700102 HCHG RX REV CODE 250 W/ 637 OVERRIDE(OP): Performed by: HOSPITALIST

## 2023-06-03 PROCEDURE — 36415 COLL VENOUS BLD VENIPUNCTURE: CPT

## 2023-06-03 PROCEDURE — 770001 HCHG ROOM/CARE - MED/SURG/GYN PRIV*

## 2023-06-03 PROCEDURE — 84100 ASSAY OF PHOSPHORUS: CPT

## 2023-06-03 PROCEDURE — 700111 HCHG RX REV CODE 636 W/ 250 OVERRIDE (IP): Performed by: STUDENT IN AN ORGANIZED HEALTH CARE EDUCATION/TRAINING PROGRAM

## 2023-06-03 RX ORDER — MAGNESIUM SULFATE HEPTAHYDRATE 40 MG/ML
2 INJECTION, SOLUTION INTRAVENOUS ONCE
Status: COMPLETED | OUTPATIENT
Start: 2023-06-03 | End: 2023-06-03

## 2023-06-03 RX ADMIN — SODIUM CHLORIDE: 9 INJECTION, SOLUTION INTRAVENOUS at 02:46

## 2023-06-03 RX ADMIN — ENOXAPARIN SODIUM 30 MG: 100 INJECTION SUBCUTANEOUS at 17:48

## 2023-06-03 RX ADMIN — DOCUSATE SODIUM 50 MG AND SENNOSIDES 8.6 MG 2 TABLET: 8.6; 5 TABLET, FILM COATED ORAL at 05:50

## 2023-06-03 RX ADMIN — POTASSIUM CHLORIDE 40 MEQ: 1500 TABLET, EXTENDED RELEASE ORAL at 05:50

## 2023-06-03 RX ADMIN — OMEPRAZOLE 20 MG: 20 CAPSULE, DELAYED RELEASE ORAL at 05:50

## 2023-06-03 RX ADMIN — MAGNESIUM SULFATE HEPTAHYDRATE 2 G: 2 INJECTION, SOLUTION INTRAVENOUS at 12:00

## 2023-06-03 ASSESSMENT — ENCOUNTER SYMPTOMS
WEAKNESS: 0
DIZZINESS: 0
WEIGHT LOSS: 0
EYES NEGATIVE: 1
SORE THROAT: 0
GASTROINTESTINAL NEGATIVE: 1
HEADACHES: 0
DEPRESSION: 0
MEMORY LOSS: 1
SHORTNESS OF BREATH: 0
CHILLS: 0
ABDOMINAL PAIN: 0
COUGH: 0
PALPITATIONS: 0
DIAPHORESIS: 0
NAUSEA: 0
FOCAL WEAKNESS: 0
BLURRED VISION: 0
VOMITING: 0
RESPIRATORY NEGATIVE: 1
CARDIOVASCULAR NEGATIVE: 1
MYALGIAS: 0
MUSCULOSKELETAL NEGATIVE: 1
BRUISES/BLEEDS EASILY: 0
FEVER: 0
NEUROLOGICAL NEGATIVE: 1

## 2023-06-03 ASSESSMENT — FIBROSIS 4 INDEX: FIB4 SCORE: 2.91

## 2023-06-03 ASSESSMENT — LIFESTYLE VARIABLES: SUBSTANCE_ABUSE: 0

## 2023-06-03 NOTE — PROGRESS NOTES
Received report from nightshift RN  Assumed patients care.  Assessment performed and done  VSS  Patient is alert and oriented x 1  In no apparent distress  Denies chest pain. SOB and N/V  Reports 0/10 pain   Repositioned  Telemetry box on. Rate and rhythm verified.   Plan of care discussed with the patient, labs and chart reviewed.   All questions and needs are answered and provided.   Bed locked and in lowest position  Bed alarm on.  Call light within reach.  No further needs at this time, will continue to monitor.

## 2023-06-03 NOTE — DISCHARGE PLANNING
HTH/SCP TCN chart review completed. Collaborated with SE Montesinos. Current discharge considerations are SNF. Note patient accepted to [facility]. SCP auth obtained. Transport scheduled via [facility] for 1200 Sunday 6/4/23.    TCN will continue to follow and collaborate with discharge planning team as additional post acute needs arise. Thank you.    Completed:  PT/OT/SLP recommending post-acute placement  Choice obtained: HH (5/29), IRF, SNF (5/30), expanded SNF choice 5/31  [facility] accepted  [facility] referral (Y), sent 5/29/23

## 2023-06-03 NOTE — CARE PLAN
The patient is Watcher - Medium risk of patient condition declining or worsening    Shift Goals  Clinical Goals: reorient, safety, dc plan  Patient Goals: na  Family Goals: na    Progress made toward(s) clinical / shift goals:    Problem: Knowledge Deficit - Standard  Goal: Patient and family/care givers will demonstrate understanding of plan of care, disease process/condition, diagnostic tests and medications  Outcome: Progressing     Problem: Seizure Precautions  Goal: Implementation of seizure precautions  Outcome: Progressing     Problem: Lifestyle Changes  Goal: Patient's ability to identify lifestyle changes and available resources to help reduce recurrence of condition will improve  Outcome: Progressing     Problem: Psychosocial  Goal: Patient's level of anxiety will decrease  Outcome: Progressing       Patient is not progressing towards the following goals:

## 2023-06-03 NOTE — PROGRESS NOTES
[facility] Medicine Daily Progress Note    Date of Service  6/3/2023    Chief Complaint  Jaymie Peacock is a 67 y.o. female admitted 5/28/2023 with confusion and altered menation    Hospital Course  Patient is a 67 year old female with history of hypertension, alcohol and tobacco use, pulmonary embolism, and LE DVT on Xarelto. She presented to [facility] on 5/28/2023 with altered mental status.  Patient was oriented to self and year, believed she was in [place] and did not know why she was in the hospital.  Patient complained of urinary frequency, denied fevers, chills, dysuria, chest pain or shortness of breath.  Patient answered questions however her answers did not make sense.  Per previous notes patient was brought in by EMS from home as her neighbor called as he reported she had been having hallucinations for the last 1 and half weeks.  Patient did report she regularly drinks alcohol, but could not quantify how much.  Also she reported she had not been eating very well     In the ER patient tachycardic, tachypneic and hypertensive.  Labs significant for hemoglobin 10.3, hematocrit 31.5, potassium 2.2, CO2 19, anion gap 26, lactic acid 2.9, creatinine 1.39, GFR 41 and alkaline phosphatase 247 with a negative alcohol.  UA with positive nitrites and leukocyte esterase.  Chest x-ray interpreted by myself shows no acute findings.    Interval Problem Update  Axox2, poor historian. A friend is at bedside, he states he was a good friend of her  before he passed and he helps her as much as he can. She has no local family. She does have family in a different state that has expressed interest in moving her there to assist her. He reports she remains confused but this is improving, he reports a cycle of recurrent infections. Patient denies pain, denies sob, stable on RA. Had urinary retention overnight. ROS otherwise negative.   5/30: Patient seen and examined, resting in bed, confused , on CIWA protocol for alcohol  withdraws.  PT/OT eval will need placement   5/31: Patient seen and examined, going through alcohol withdraws with high CIWA score, cont on CIWA protocol   6/1: patient resting in bed, still going through alcohol withdraws, cont on ciwa prtocol  Hypokalemia: potassium of 3.5 replete  6/2: Patient seen and examined, confused, cont on CIWA protocol for her alcohol withdraws, monitor electrolyte and replete as needed   Hypomagnesemia: magnesium of 1.1 will replete with IV mag   Will need placement   6/3: Patient was evaluated bedside, sitting in bed in no acute distress.  Stable vitals and saturating well room air.  CIWA has remained low and thus CIWA monitoring and as needed Ativan's were discontinued.  Patient accepted to SNF and set up for discharge to skilled nursing facility in the morning.    Aim for discharge to SNF at 12 PM tomorrow    I have discussed this patient's plan of care and discharge plan at IDT rounds today with Case Management, Nursing, Nursing leadership, and other members of the IDT team.    Consultants/Specialty      Code Status  Full Code    Disposition    I have placed the appropriate orders for post-discharge needs.    Review of Systems  Review of Systems   Constitutional:  Positive for malaise/fatigue. Negative for chills, diaphoresis, fever and weight loss.   HENT: Negative.  Negative for sore throat.    Eyes: Negative.  Negative for blurred vision.   Respiratory: Negative.  Negative for cough and shortness of breath.    Cardiovascular: Negative.  Negative for chest pain, palpitations and leg swelling.   Gastrointestinal: Negative.  Negative for abdominal pain, nausea and vomiting.   Genitourinary: Negative.  Negative for dysuria.   Musculoskeletal: Negative.  Negative for myalgias.   Skin: Negative.  Negative for itching and rash.   Neurological: Negative.  Negative for dizziness, focal weakness, weakness and headaches.   Endo/Heme/Allergies: Negative.  Does not bruise/bleed easily.    Psychiatric/Behavioral:  Positive for memory loss. Negative for depression, substance abuse and suicidal ideas.    All other systems reviewed and are negative.       Physical Exam  Temp:  [36.2 °C (97.2 °F)-36.5 °C (97.7 °F)] 36.5 °C (97.7 °F)  Pulse:  [] 95  Resp:  [16-18] 16  BP: (121-134)/(78-93) 121/78  SpO2:  [94 %-97 %] 97 %    Physical Exam  Vitals and nursing note reviewed. Exam conducted with a chaperone present.   Constitutional:       General: She is not in acute distress.     Appearance: She is not diaphoretic.      Comments: She appears chronically ill   HENT:      Head: Normocephalic.      Nose: Nose normal.      Mouth/Throat:      Mouth: Mucous membranes are moist.   Eyes:      Pupils: Pupils are equal, round, and reactive to light.   Cardiovascular:      Rate and Rhythm: Normal rate and regular rhythm.      Pulses: Normal pulses.      Heart sounds: Normal heart sounds.   Pulmonary:      Effort: Pulmonary effort is normal.      Breath sounds: Normal breath sounds.   Abdominal:      General: Abdomen is flat. Bowel sounds are normal.      Palpations: Abdomen is soft.   Musculoskeletal:         General: No swelling or deformity. Normal range of motion.   Skin:     General: Skin is warm and dry.      Capillary Refill: Capillary refill takes less than 2 seconds.   Neurological:      General: No focal deficit present.      Mental Status: She is alert.      Cranial Nerves: No cranial nerve deficit.   Psychiatric:         Mood and Affect: Mood normal.         Behavior: Behavior normal.         Fluids    Intake/Output Summary (Last 24 hours) at 6/3/2023 1247  Last data filed at 6/3/2023 0900  Gross per 24 hour   Intake 340 ml   Output 300 ml   Net 40 ml       Laboratory  Recent Labs     06/01/23  0254 06/02/23  0104   WBC 7.9 6.3   RBC 2.52* 2.33*   HEMOGLOBIN 8.3* 7.8*   HEMATOCRIT 25.8* 23.8*   .4* 102.1*   MCH 32.9 33.5*   MCHC 32.2 32.8   RDW 55.0* 55.8*   PLATELETCT 199 204   MPV 10.9 10.9      Recent Labs     06/01/23  0254 06/02/23  0104   SODIUM 139 142   POTASSIUM 3.5* 3.9   CHLORIDE 110 115*   CO2 18* 18*   GLUCOSE 81 84   BUN 9 8   CREATININE 0.53 0.51   CALCIUM 8.1* 8.0*                     Imaging  DX-CHEST-PORTABLE (1 VIEW)   Final Result         No acute cardiac or pulmonary abnormality is identified.           Assessment/Plan  * Severe sepsis (HCC)- (present on admission)  Assessment & Plan  This is Severe Sepsis Present on admission  SIRS criteria identified on my evaluation include: Tachycardia, with heart rate greater than 90 BPM and Tachypnea, with respirations greater than 20 per minute  Clinical indicators of end organ dysfunction include Lactate >2 mmol/L (18.0 mg/dL)  Source is urinary   Sepsis protocol initiated  Crystalloid Fluid Administration: Fluid resuscitation ordered per standard protocol - 30 mL/kg per current or ideal body weight  IV antibiotics as appropriate for source of sepsis  Reassessment: I have reassessed the patient's hemodynamic status  Slight improvement today, continue to follow closely   Cultures so far negative  With secondary encephalopathy  Continue rocephin   IVF     Severe protein-calorie malnutrition ([place]: less than 60% of standard weight) ([facility])- (present on admission)  Assessment & Plan  Muscle wasting on exam   nutritionist consulted     YAMIL (acute kidney injury) ([facility])- (present on admission)  Assessment & Plan  2/2 sepsis and dehydration   This has resolved  Continue to follow  Avoid nephrotoxic medications       Alcohol abuse- (present on admission)  Assessment & Plan  Long history of alcohol abuse, multiple prior admissions for withdrawal   No current s/o withdrawal but this is likely contributing to her confusion  Thinks her last drink was 2 days ago but very poor historian   Alcohol level negative in ER   Continue CIWA  Detox bag   MVI, folate, thiamine     Lactic acidosis- (present on admission)  Assessment & Plan  Lactic > 2 on admission   Nw  resolved    Tobacco use disorder- (present on admission)  Assessment & Plan  Reports she is still smoking, not interested in quitting   Does not want nicotine replacement at this time   Cessation counseling performed, 4 minutes     Crystal-Payan tear- (present on admission)  Assessment & Plan  Hx of   Omeprazole   No s/o bleeding on exam, h/h did drop overnight but suspect this is due to hemodilution, following closely   Serial h/h  Consult GI if s/o acute bleed    History of pulmonary embolus (PE)- (present on admission)  Assessment & Plan  Patient had admission x2 in July/ August 2022 for acute blood loss anemia secondary to Crystal-Payan tear treated with hemoclipping and epi injection.  Patient discharged with Carafate and PPI and was resumed on her Xarelto.  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  IVC filter was placed during that admission, patient discontinued on her Xarelto.    High anion gap metabolic acidosis- (present on admission)  Assessment & Plan  Present on admission   Now resolved    Acute cystitis without hematuria- (present on admission)  Assessment & Plan  Continue iv rocephin  Cultures so far negative, following      Encephalopathy acute- (present on admission)  Assessment & Plan  2/2 infection   Reports alcohol use, hx of withdrawals   IV abx   IVF  Per her friend this is improving, suspect metabolic encephalopathy from infection and toxic encephalopathy from etoh  Non focal exam, following closely  If does not improve consider CT/MRI     Hypokalemia- (present on admission)  Assessment & Plan  Low mag contributing  Replacing mag and K  Will repeat both levels in am         VTE prophylaxis: enoxaparin ppx    I have performed a physical exam and reviewed and updated ROS and Plan today (6/3/2023). In review of yesterday's note (6/2/2023), there are no changes except as documented above.

## 2023-06-03 NOTE — RESPIRATORY CARE
COPD EDUCATION by COPD CLINICAL EDUCATOR  6/2/2023 at 5:13 PM by Karen Simon, RRT     Patient interviewed by COPD education team. Patient refused COPD program at this time. No Action Plan was completed in the EMR -no current Respiratory Medication use.  She is poor historian COPD per her EMR. No PFT on file has plan to discharge to [facility]. Continues to smoke and declined cessation discussion.            COPD Assessment  COPD Clinical Specialists ONLY  COPD Education Initiated: Yes--Short Intervention (very confused denies medications denied history; EMR states COPD no PFT continues to smoke doesn't want to quit. Still on CIWA per RN)    PFT Results  No results found for: PFT

## 2023-06-04 LAB
ANION GAP SERPL CALC-SCNC: 14 MMOL/L (ref 7–16)
BASOPHILS # BLD AUTO: 0.8 % (ref 0–1.8)
BASOPHILS # BLD: 0.06 K/UL (ref 0–0.12)
BUN SERPL-MCNC: 5 MG/DL (ref 8–22)
CALCIUM SERPL-MCNC: 8.8 MG/DL (ref 8.5–10.5)
CHLORIDE SERPL-SCNC: 110 MMOL/L (ref 96–112)
CO2 SERPL-SCNC: 16 MMOL/L (ref 20–33)
CREAT SERPL-MCNC: 0.47 MG/DL (ref 0.5–1.4)
EOSINOPHIL # BLD AUTO: 0.16 K/UL (ref 0–0.51)
EOSINOPHIL NFR BLD: 2.3 % (ref 0–6.9)
ERYTHROCYTE [DISTWIDTH] IN BLOOD BY AUTOMATED COUNT: 55.1 FL (ref 35.9–50)
GFR SERPLBLD CREATININE-BSD FMLA CKD-EPI: 103 ML/MIN/1.73 M 2
GLUCOSE SERPL-MCNC: 76 MG/DL (ref 65–99)
HCT VFR BLD AUTO: 23.6 % (ref 37–47)
HGB BLD-MCNC: 7.9 G/DL (ref 12–16)
IMM GRANULOCYTES # BLD AUTO: 0.06 K/UL (ref 0–0.11)
IMM GRANULOCYTES NFR BLD AUTO: 0.8 % (ref 0–0.9)
LYMPHOCYTES # BLD AUTO: 0.83 K/UL (ref 1–4.8)
LYMPHOCYTES NFR BLD: 11.7 % (ref 22–41)
MAGNESIUM SERPL-MCNC: 1.8 MG/DL (ref 1.5–2.5)
MCH RBC QN AUTO: 33.9 PG (ref 27–33)
MCHC RBC AUTO-ENTMCNC: 33.5 G/DL (ref 32.2–35.5)
MCV RBC AUTO: 101.3 FL (ref 81.4–97.8)
MONOCYTES # BLD AUTO: 0.58 K/UL (ref 0–0.85)
MONOCYTES NFR BLD AUTO: 8.2 % (ref 0–13.4)
NEUTROPHILS # BLD AUTO: 5.39 K/UL (ref 1.82–7.42)
NEUTROPHILS NFR BLD: 76.2 % (ref 44–72)
NRBC # BLD AUTO: 0 K/UL
NRBC BLD-RTO: 0 /100 WBC (ref 0–0.2)
PHOSPHATE SERPL-MCNC: 2.9 MG/DL (ref 2.5–4.5)
PLATELET # BLD AUTO: 263 K/UL (ref 164–446)
PMV BLD AUTO: 10.5 FL (ref 9–12.9)
POTASSIUM SERPL-SCNC: 4.1 MMOL/L (ref 3.6–5.5)
RBC # BLD AUTO: 2.33 M/UL (ref 4.2–5.4)
SODIUM SERPL-SCNC: 140 MMOL/L (ref 135–145)
WBC # BLD AUTO: 7.1 K/UL (ref 4.8–10.8)

## 2023-06-04 PROCEDURE — 700102 HCHG RX REV CODE 250 W/ 637 OVERRIDE(OP): Performed by: INTERNAL MEDICINE

## 2023-06-04 PROCEDURE — A9270 NON-COVERED ITEM OR SERVICE: HCPCS | Performed by: INTERNAL MEDICINE

## 2023-06-04 PROCEDURE — A9270 NON-COVERED ITEM OR SERVICE: HCPCS | Performed by: HOSPITALIST

## 2023-06-04 PROCEDURE — 700102 HCHG RX REV CODE 250 W/ 637 OVERRIDE(OP): Performed by: HOSPITALIST

## 2023-06-04 PROCEDURE — 36415 COLL VENOUS BLD VENIPUNCTURE: CPT

## 2023-06-04 PROCEDURE — 83735 ASSAY OF MAGNESIUM: CPT

## 2023-06-04 PROCEDURE — 80048 BASIC METABOLIC PNL TOTAL CA: CPT

## 2023-06-04 PROCEDURE — 85025 COMPLETE CBC W/AUTO DIFF WBC: CPT

## 2023-06-04 PROCEDURE — 770001 HCHG ROOM/CARE - MED/SURG/GYN PRIV*

## 2023-06-04 PROCEDURE — 700111 HCHG RX REV CODE 636 W/ 250 OVERRIDE (IP): Performed by: INTERNAL MEDICINE

## 2023-06-04 PROCEDURE — 84100 ASSAY OF PHOSPHORUS: CPT

## 2023-06-04 RX ADMIN — POTASSIUM CHLORIDE 40 MEQ: 1500 TABLET, EXTENDED RELEASE ORAL at 05:17

## 2023-06-04 RX ADMIN — OMEPRAZOLE 20 MG: 20 CAPSULE, DELAYED RELEASE ORAL at 05:18

## 2023-06-04 RX ADMIN — ENOXAPARIN SODIUM 30 MG: 100 INJECTION SUBCUTANEOUS at 18:31

## 2023-06-04 ASSESSMENT — ENCOUNTER SYMPTOMS
SORE THROAT: 0
NAUSEA: 0
DEPRESSION: 0
SHORTNESS OF BREATH: 0
WEAKNESS: 0
COUGH: 0
FOCAL WEAKNESS: 0
CARDIOVASCULAR NEGATIVE: 1
FEVER: 0
DIZZINESS: 0
PALPITATIONS: 0
DIAPHORESIS: 0
MYALGIAS: 0
HEADACHES: 0
BRUISES/BLEEDS EASILY: 0
MUSCULOSKELETAL NEGATIVE: 1
RESPIRATORY NEGATIVE: 1
CHILLS: 0
MEMORY LOSS: 1
EYES NEGATIVE: 1
BLURRED VISION: 0
GASTROINTESTINAL NEGATIVE: 1
VOMITING: 0
NEUROLOGICAL NEGATIVE: 1
ABDOMINAL PAIN: 0
WEIGHT LOSS: 0

## 2023-06-04 ASSESSMENT — FIBROSIS 4 INDEX: FIB4 SCORE: 2.25

## 2023-06-04 ASSESSMENT — LIFESTYLE VARIABLES: SUBSTANCE_ABUSE: 0

## 2023-06-04 NOTE — CARE PLAN
The patient is Stable - Low risk of patient condition declining or worsening    Shift Goals  Clinical Goals: Hemodynamically stable  Patient Goals: comfort/rest  Family Goals: na    Progress made toward(s) clinical / shift goals:        Problem: Knowledge Deficit - Standard  Goal: Patient and family/care givers will demonstrate understanding of plan of care, disease process/condition, diagnostic tests and medications  Outcome: Progressing     Problem: Optimal Care for Alcohol Withdrawal  Goal: Optimal Care for the alcohol withdrawal patient  Outcome: Progressing       Patient is not progressing towards the following goals: N/A

## 2023-06-04 NOTE — DISCHARGE PLANNING
Anticipated Discharge Disposition: [facility]    Action: Spoke with Britni at [facility] admissions. Per Britni, the patient will need notes placed by MD stating that CIWA protocol orders have been discontinued.  MD notified.    Barriers to Discharge: [facility] acceptance    Plan: Will need to follow up with [facility] in the morning

## 2023-06-04 NOTE — DISCHARGE PLANNING
Agency/Facility Name: [facility]  Spoke To: Rosa  Outcome: They have cancelled admission today due to CWIA  level. MERRY Adkins advised.

## 2023-06-04 NOTE — CARE PLAN
The patient is Stable - Low risk of patient condition declining or worsening    Shift Goals  Clinical Goals: Monitor lab & vitals  Patient Goals: sleep  Family Goals: no family present    Progress made toward(s) clinical / shift goals:      Problem: Optimal Care for Alcohol Withdrawal  Goal: Optimal Care for the alcohol withdrawal patient  Outcome: Met  Note: CIWA protocol completed. Patients vitals and labs remain stable      Problem: Seizure Precautions  Goal: Implementation of seizure precautions  Outcome: Progressing     Problem: Risk for Aspiration  Goal: Patient's risk for aspiration will be absent or decrease  Outcome: Progressing       Patient is not progressing towards the following goals:

## 2023-06-04 NOTE — PROGRESS NOTES
[facility] Medicine Daily Progress Note    Date of Service  6/4/2023    Chief Complaint  Jaymie Peacock is a 67 y.o. female admitted 5/28/2023 with confusion and altered menation    Hospital Course  Patient is a 67 year old female with history of hypertension, alcohol and tobacco use, pulmonary embolism, and LE DVT on Xarelto. She presented to [facility] on 5/28/2023 with altered mental status.  Patient was oriented to self and year, believed she was in [place] and did not know why she was in the hospital.  Patient complained of urinary frequency, denied fevers, chills, dysuria, chest pain or shortness of breath.  Patient answered questions however her answers did not make sense.  Per previous notes patient was brought in by EMS from home as her neighbor called as he reported she had been having hallucinations for the last 1 and half weeks.  Patient did report she regularly drinks alcohol, but could not quantify how much.  Also she reported she had not been eating very well     In the ER patient tachycardic, tachypneic and hypertensive.  Labs significant for hemoglobin 10.3, hematocrit 31.5, potassium 2.2, CO2 19, anion gap 26, lactic acid 2.9, creatinine 1.39, GFR 41 and alkaline phosphatase 247 with a negative alcohol.  UA with positive nitrites and leukocyte esterase.  Chest x-ray interpreted by myself shows no acute findings.    Interval Problem Update  Axox2, poor historian. A friend is at bedside, he states he was a good friend of her  before he passed and he helps her as much as he can. She has no local family. She does have family in a different state that has expressed interest in moving her there to assist her. He reports she remains confused but this is improving, he reports a cycle of recurrent infections. Patient denies pain, denies sob, stable on RA. Had urinary retention overnight. ROS otherwise negative.   5/30: Patient seen and examined, resting in bed, confused , on CIWA protocol for alcohol  withdraws.  PT/OT eval will need placement   5/31: Patient seen and examined, going through alcohol withdraws with high CIWA score, cont on CIWA protocol   6/1: patient resting in bed, still going through alcohol withdraws, cont on ciwa prtocol  Hypokalemia: potassium of 3.5 replete  6/2: Patient seen and examined, confused, cont on CIWA protocol for her alcohol withdraws, monitor electrolyte and replete as needed   Hypomagnesemia: magnesium of 1.1 will replete with IV mag   Will need placement   6/3: Patient was evaluated bedside, sitting in bed in no acute distress.  Stable vitals and saturating well room air.  CIWA has remained low and thus CIWA monitoring and as needed Ativan's were discontinued.  Patient accepted to SNF and set up for discharge to skilled nursing facility in the morning.  6/4: Patient seen and examined, discontinuing CIWA protocol, no more going through alcohol withdraws   Will need placement awaiting acceptance to  SNF     I have discussed this patient's plan of care and discharge plan at IDT rounds today with Case Management, Nursing, Nursing leadership, and other members of the IDT team.    Consultants/Specialty      Code Status  Full Code    Disposition    I have placed the appropriate orders for post-discharge needs.    Review of Systems  Review of Systems   Constitutional:  Positive for malaise/fatigue. Negative for chills, diaphoresis, fever and weight loss.   HENT: Negative.  Negative for sore throat.    Eyes: Negative.  Negative for blurred vision.   Respiratory: Negative.  Negative for cough and shortness of breath.    Cardiovascular: Negative.  Negative for chest pain, palpitations and leg swelling.   Gastrointestinal: Negative.  Negative for abdominal pain, nausea and vomiting.   Genitourinary: Negative.  Negative for dysuria.   Musculoskeletal: Negative.  Negative for myalgias.   Skin: Negative.  Negative for itching and rash.   Neurological: Negative.  Negative for dizziness,  focal weakness, weakness and headaches.   Endo/Heme/Allergies: Negative.  Does not bruise/bleed easily.   Psychiatric/Behavioral:  Positive for memory loss. Negative for depression, substance abuse and suicidal ideas.    All other systems reviewed and are negative.       Physical Exam  Temp:  [35.1 °C (95.2 °F)-36.6 °C (97.9 °F)] 36.6 °C (97.9 °F)  Pulse:  [] 98  Resp:  [16-18] 17  BP: (117-128)/(75-98) 128/85  SpO2:  [95 %-96 %] 95 %    Physical Exam  Vitals and nursing note reviewed. Exam conducted with a chaperone present.   Constitutional:       General: She is not in acute distress.     Appearance: She is not diaphoretic.      Comments: She appears chronically ill   HENT:      Head: Normocephalic.      Nose: Nose normal.      Mouth/Throat:      Mouth: Mucous membranes are moist.   Eyes:      Pupils: Pupils are equal, round, and reactive to light.   Cardiovascular:      Rate and Rhythm: Normal rate and regular rhythm.      Pulses: Normal pulses.      Heart sounds: Normal heart sounds.   Pulmonary:      Effort: Pulmonary effort is normal.      Breath sounds: Normal breath sounds.   Abdominal:      General: Abdomen is flat. Bowel sounds are normal.      Palpations: Abdomen is soft.   Musculoskeletal:         General: No swelling or deformity. Normal range of motion.   Skin:     General: Skin is warm and dry.      Capillary Refill: Capillary refill takes less than 2 seconds.   Neurological:      General: No focal deficit present.      Mental Status: She is alert.      Cranial Nerves: No cranial nerve deficit.   Psychiatric:         Mood and Affect: Mood normal.         Behavior: Behavior normal.         Fluids    Intake/Output Summary (Last 24 hours) at 6/4/2023 1432  Last data filed at 6/4/2023 0900  Gross per 24 hour   Intake 300 ml   Output 1002 ml   Net -702 ml       Laboratory  Recent Labs     06/02/23  0104 06/04/23  0059   WBC 6.3 7.1   RBC 2.33* 2.33*   HEMOGLOBIN 7.8* 7.9*   HEMATOCRIT 23.8* 23.6*    .1* 101.3*   MCH 33.5* 33.9*   MCHC 32.8 33.5   RDW 55.8* 55.1*   PLATELETCT 204 263   MPV 10.9 10.5     Recent Labs     06/02/23  0104 06/04/23  0059   SODIUM 142 140   POTASSIUM 3.9 4.1   CHLORIDE 115* 110   CO2 18* 16*   GLUCOSE 84 76   BUN 8 5*   CREATININE 0.51 0.47*   CALCIUM 8.0* 8.8                     Imaging  DX-CHEST-PORTABLE (1 VIEW)   Final Result         No acute cardiac or pulmonary abnormality is identified.           Assessment/Plan  * Severe sepsis (HCC)- (present on admission)  Assessment & Plan  This is Severe Sepsis Present on admission  SIRS criteria identified on my evaluation include: Tachycardia, with heart rate greater than 90 BPM and Tachypnea, with respirations greater than 20 per minute  Clinical indicators of end organ dysfunction include Lactate >2 mmol/L (18.0 mg/dL)  Source is urinary   Sepsis protocol initiated  Crystalloid Fluid Administration: Fluid resuscitation ordered per standard protocol - 30 mL/kg per current or ideal body weight  IV antibiotics as appropriate for source of sepsis  Reassessment: I have reassessed the patient's hemodynamic status  Slight improvement today, continue to follow closely   Cultures so far negative  With secondary encephalopathy  Continue rocephin   IVF     Lactic acidosis- (present on admission)  Assessment & Plan  Lactic > 2 on admission   Nw resolved    Tobacco use disorder- (present on admission)  Assessment & Plan  Reports she is still smoking, not interested in quitting   Does not want nicotine replacement at this time   Cessation counseling performed, 4 minutes     Crystal-Payan tear- (present on admission)  Assessment & Plan  Hx of   Omeprazole   No s/o bleeding on exam, h/h did drop overnight but suspect this is due to hemodilution, following closely   Serial h/h  Consult GI if s/o acute bleed    History of pulmonary embolus (PE)- (present on admission)  Assessment & Plan  Patient had admission x2 in July/ August 2022 for acute  blood loss anemia secondary to Crystal-Payan tear treated with hemoclipping and epi injection.  Patient discharged with Carafate and PPI and was resumed on her Xarelto.  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  IVC filter was placed during that admission, patient discontinued on her Xarelto.    High anion gap metabolic acidosis- (present on admission)  Assessment & Plan  Present on admission   Now resolved    Acute cystitis without hematuria- (present on admission)  Assessment & Plan  Continue iv rocephin  Cultures so far negative, following      Encephalopathy acute- (present on admission)  Assessment & Plan  2/2 infection   Reports alcohol use, hx of withdrawals   IV abx   IVF  Per her friend this is improving, suspect metabolic encephalopathy from infection and toxic encephalopathy from etoh  Non focal exam, following closely  If does not improve consider CT/MRI     Severe protein-calorie malnutrition ([place]: less than 60% of standard weight) ([facility])- (present on admission)  Assessment & Plan  Muscle wasting on exam   nutritionist consulted     YAMIL (acute kidney injury) ([facility])- (present on admission)  Assessment & Plan  2/2 sepsis and dehydration   This has resolved  Continue to follow  Avoid nephrotoxic medications       Hypokalemia- (present on admission)  Assessment & Plan  Low mag contributing  Replacing mag and K  Will repeat both levels in am    Alcohol abuse- (present on admission)  Assessment & Plan  Long history of alcohol abuse, multiple prior admissions for withdrawal   No current s/o withdrawal but this is likely contributing to her confusion  Thinks her last drink was 2 days ago but very poor historian   Alcohol level negative in ER     [facility] protocol d/c          VTE prophylaxis: enoxaparin ppx    I have performed a physical exam and reviewed and updated ROS and Plan today (6/4/2023). In review of yesterday's note (6/3/2023), there are  no changes except as documented above.

## 2023-06-04 NOTE — PROGRESS NOTES
Received bedside report from RN, pt care assumed, VSS, pt assessment complete. Pt AAOx1, oriented to self. No signs of acute distress noted at this time. Plan of care discussed with pt and verbalizes no questions. Pt denies any additional needs at this time. Bed locked/in lowest position, bed alarm on, pt educated on fall risk, call light within reach, hourly rounding initiated.

## 2023-06-05 ENCOUNTER — PATIENT OUTREACH (OUTPATIENT)
Dept: SCHEDULING | Facility: IMAGING CENTER | Age: 68
End: 2023-06-05
Payer: MEDICARE

## 2023-06-05 VITALS
WEIGHT: 107.14 LBS | HEIGHT: 66 IN | OXYGEN SATURATION: 95 % | HEART RATE: 100 BPM | SYSTOLIC BLOOD PRESSURE: 120 MMHG | BODY MASS INDEX: 17.22 KG/M2 | TEMPERATURE: 97.9 F | RESPIRATION RATE: 18 BRPM | DIASTOLIC BLOOD PRESSURE: 82 MMHG

## 2023-06-05 LAB
MAGNESIUM SERPL-MCNC: 1.2 MG/DL (ref 1.5–2.5)
PHOSPHATE SERPL-MCNC: 3.2 MG/DL (ref 2.5–4.5)

## 2023-06-05 PROCEDURE — 700102 HCHG RX REV CODE 250 W/ 637 OVERRIDE(OP): Performed by: INTERNAL MEDICINE

## 2023-06-05 PROCEDURE — A9270 NON-COVERED ITEM OR SERVICE: HCPCS | Performed by: INTERNAL MEDICINE

## 2023-06-05 PROCEDURE — 36415 COLL VENOUS BLD VENIPUNCTURE: CPT

## 2023-06-05 PROCEDURE — 83735 ASSAY OF MAGNESIUM: CPT

## 2023-06-05 PROCEDURE — 700102 HCHG RX REV CODE 250 W/ 637 OVERRIDE(OP): Performed by: HOSPITALIST

## 2023-06-05 PROCEDURE — 84100 ASSAY OF PHOSPHORUS: CPT

## 2023-06-05 PROCEDURE — A9270 NON-COVERED ITEM OR SERVICE: HCPCS | Performed by: HOSPITALIST

## 2023-06-05 PROCEDURE — 99239 HOSP IP/OBS DSCHRG MGMT >30: CPT | Performed by: INTERNAL MEDICINE

## 2023-06-05 RX ORDER — OMEPRAZOLE 20 MG/1
20 CAPSULE, DELAYED RELEASE ORAL DAILY
Qty: 30 CAPSULE | Status: SHIPPED
Start: 2023-06-06 | End: 2023-07-31 | Stop reason: SDUPTHER

## 2023-06-05 RX ADMIN — POTASSIUM CHLORIDE 40 MEQ: 1500 TABLET, EXTENDED RELEASE ORAL at 05:11

## 2023-06-05 RX ADMIN — OMEPRAZOLE 20 MG: 20 CAPSULE, DELAYED RELEASE ORAL at 05:12

## 2023-06-05 RX ADMIN — DOCUSATE SODIUM 50 MG AND SENNOSIDES 8.6 MG 2 TABLET: 8.6; 5 TABLET, FILM COATED ORAL at 05:11

## 2023-06-05 NOTE — DISCHARGE PLANNING
DC Transport Scheduled    Received request at: 6/5/2023 AT 0942    Transport Company Scheduled:  [employer]    Scheduled Date: 6/5/2023  Scheduled Time: 1300    Destination: [facility] at [address] [place]     Notified care team of scheduled transport via Voalte.     If there are any changes needed to the DC transportation scheduled, please contact [facility] at ext. [phone] between the hours of 3602-5638 Mon-Fri. If outside those hours, contact the ED Case Manager at ext. [phone].

## 2023-06-05 NOTE — CARE PLAN
The patient is Watcher - Medium risk of patient condition declining or worsening    Shift Goals  Clinical Goals: dc  Patient Goals: na  Family Goals: na    Progress made toward(s) clinical / shift goals:    Problem: Knowledge Deficit - Standard  Goal: Patient and family/care givers will demonstrate understanding of plan of care, disease process/condition, diagnostic tests and medications  Outcome: Progressing     Problem: Lifestyle Changes  Goal: Patient's ability to identify lifestyle changes and available resources to help reduce recurrence of condition will improve  Outcome: Progressing     Problem: Psychosocial  Goal: Patient's level of anxiety will decrease  Outcome: Progressing       Patient is not progressing towards the following goals:

## 2023-06-05 NOTE — CARE PLAN
The patient is Stable - Low risk of patient condition declining or worsening    Shift Goals  Clinical Goals: patient to remain stable  Patient Goals: go home soon  Family Goals: arnie    Progress made toward(s) clinical / shift goals:      Problem: Seizure Precautions  Goal: Implementation of seizure precautions  Outcome: Progressing  Note: Free of seizures      Problem: Hemodynamics  Goal: Patient's hemodynamics, fluid balance and neurologic status will be stable or improve  Outcome: Progressing  Note: Hemodynamically stable     Problem: Skin Integrity  Goal: Skin integrity is maintained or improved  Outcome: Progressing  Note: Maintained      Problem: Fall Risk  Goal: Patient will remain free from falls  Outcome: Progressing  Note: Free of falls     Problem: Pain - Standard  Goal: Alleviation of pain or a reduction in pain to the patient’s comfort goal  Outcome: Progressing  Note: Reports little to no pain       Patient is not progressing towards the following goals:      Problem: Knowledge Deficit - Standard  Goal: Patient and family/care givers will demonstrate understanding of plan of care, disease process/condition, diagnostic tests and medications  Outcome: Not Progressing  Note: Patient still confused

## 2023-06-05 NOTE — DISCHARGE PLANNING
Case Management Discharge Planning    Admission Date: 5/28/2023  GMLOS: 5  ALOS: 8    6-Clicks ADL Score: 8  6-Clicks Mobility Score: 6        Anticipated Discharge Dispo: Discharge Disposition:   D/T to SNF with Medicare cert in anticipation of skilled care (03)      Action(s) Taken:   Pt accepted to [facility] and bed available today.  Approved services form faxed to [place].  GMT transport confirmed for 1300 today.  Pt's Nena germain agreeable.  Verbal for Cobra obtained.  Verbal for Cobra obtained from Dr. Roberson.  DC packet given to bedside RNBrett.    Medically Clear: Yes    Next Steps: DC    Barriers to Discharge: None             normal...

## 2023-06-05 NOTE — DISCHARGE SUMMARY
Discharge Summary    CHIEF COMPLAINT ON ADMISSION  Chief Complaint   Patient presents with    ALOC     FTT    Hallucinations       Reason for Admission  ems    Admission Date  5/28/2023     CODE STATUS  Full Code    HPI & HOSPITAL COURSE  This is a 67 y.o. female with history of hypertension, alcohol and tobacco use, pulmonary embolism, and LE DVT not on xarelto anymore due to GI bleeds who presented to ER on 5/28/23 with alterm mentation. Patient was oriented to self and year, believed she was in [place] and did not know why she was in the hospital.  Patient complained of urinary frequency, denied fevers, chills, dysuria, chest pain or shortness of breath.  Patient answered questions however her answers did not make sense.  Per previous notes patient was brought in by EMS from home as her neighbor called as he reported she had been having hallucinations for the last 1 and half weeks.  Patient did report she regularly drinks alcohol, but could not quantify how much.  Also she reported she had not been eating very well     In the ER patient tachycardic, tachypneic and hypertensive.  Labs significant for hemoglobin 10.3, hematocrit 31.5, potassium 2.2, CO2 19, anion gap 26, lactic acid 2.9, creatinine 1.39, GFR 41 and alkaline phosphatase 247.  UA with positive nitrites and leukocyte esterase.  Patient admitted for further treatment he was started on ceftriaxone for her UTI and finished abx course, she was also started on CIWA protocol as she went through alcohol withdraws which has now resolved. Patient still with some confusion which might be her baseline possible wernicke-korsakoff syndrome due to  her alcohol abuse.  Patient was seen by physical therapy and placement has been recommended   Patient will be discharged to SNF today      The patient met 2-midnight criteria for an inpatient stay at the time of discharge.      FOLLOW UP ITEMS POST DISCHARGE  PCP     DISCHARGE DIAGNOSES  Principal Problem:    Severe  sepsis (HCC) (POA: Yes)  Active Problems:    Alcohol abuse (POA: Yes)    Hypokalemia (POA: Yes)    YAMIL (acute kidney injury) (HCC) (POA: Yes)    Severe protein-calorie malnutrition ([place]: less than 60% of standard weight) (HCC) (POA: Yes)    Encephalopathy acute (POA: Yes)    Acute cystitis without hematuria (POA: Yes)    High anion gap metabolic acidosis (POA: Yes)    History of pulmonary embolus (PE) (POA: Yes)      Overview: Hx of PE      Crystal-Payan tear (POA: Yes)    Tobacco use disorder (POA: Yes)    Lactic acidosis (POA: Yes)  Resolved Problems:    * No resolved hospital problems. *      FOLLOW UP  No future appointments.  [facility]  [address] [place] [address]  [phone]          MEDICATIONS ON DISCHARGE     Medication List        START taking these medications        Instructions   omeprazole 20 MG delayed-release capsule  Start taking on: June 6, 2023  Commonly known as: PRILOSEC   Take 1 Capsule by mouth every day.  Dose: 20 mg              Allergies  No Known Allergies    DIET  Orders Placed This Encounter   Procedures    Diet Order Diet: Level 4 - Pureed (Liquids by spoon); Liquid level: Level 0 - Thin; Tray Modifications (optional): SLP - 1:1 Supervision by Nursing, SLP - Deliver to Nursing Station     Standing Status:   Standing     Number of Occurrences:   1     Order Specific Question:   Diet:     Answer:   Level 4 - Pureed [25]     Comments:   Liquids by spoon     Order Specific Question:   Liquid level     Answer:   Level 0 - Thin     Order Specific Question:   Tray Modifications (optional)     Answer:   SLP - 1:1 Supervision by Nursing     Order Specific Question:   Tray Modifications (optional)     Answer:   SLP - Deliver to Nursing Station       ACTIVITY  As tolerated.  Weight bearing as tolerated    LINES, DRAINS, AND WOUNDS  This is an automated list. Peripheral IVs will be removed prior to discharge.     External Urinary Catheter (Active)    Collection Container Suction container 06/04/23 0623   Output (mL) 500 mL 06/04/23 0623                    MENTAL STATUS ON TRANSFER             CONSULTATIONS  None     PROCEDURES  None     LABORATORY  Lab Results   Component Value Date    SODIUM 140 06/04/2023    POTASSIUM 4.1 06/04/2023    CHLORIDE 110 06/04/2023    CO2 16 (L) 06/04/2023    GLUCOSE 76 06/04/2023    BUN 5 (L) 06/04/2023    CREATININE 0.47 (L) 06/04/2023        Lab Results   Component Value Date    WBC 7.1 06/04/2023    HEMOGLOBIN 7.9 (L) 06/04/2023    HEMATOCRIT 23.6 (L) 06/04/2023    PLATELETCT 263 06/04/2023        Total time of the discharge process exceeds 43 minutes.

## 2023-06-05 NOTE — PROGRESS NOTES
Discharge instructions given to patient at bedside, verbalizes understanding and states plans for follow-up with PCP and is going to [facility]. Telemetry monitor/IV cathlon removed. All belongings accounted for, all questions answered at this time. Patient discharged to [facility] with [facility] transport.

## 2023-06-05 NOTE — DISCHARGE PLANNING
[facility]/SCP TCN chart review completed. Collaborated with SE Monge.  Current discharge considerations are SNF.   Patient has been accepted to [facility].   SCP auth obtained.  Per chart review plan was to go to [facility] on Sunday however it was cancelled secondary to CIWA precautions.  Patient is now medically cleared to discharge to [facility] today at 1 PM.  Patient seen at bedside and is in agreement with discharge to SNF.  She states her spouse will be here shortly.  She has no other TCN concerns.  TCN will continue to follow and collaborate with discharge planning team as additional post acute needs arise. Thank you    Completed:  PT/OT/SLP recommending post-acute placement  Choice obtained: HH (5/29), IRF, SNF (5/30), expanded SNF choice 5/31  [facility] accepted  [facility] referral (Y), sent 5/29/23

## 2023-06-05 NOTE — DISCHARGE PLANNING
0920  Agency/Facility Name: [facility]   Outcome: DPA left VM regarding MD's note of Pt now off CIWA protocol. DPA awaiting call back.     [id number]  Agency/Facility Name: [facility]   Spoke To: Rosa   Outcome: [facility] called back to inform DPA, Pt can be accepted today and requesting transport to be set up at 1300.     SE RN notified.

## 2023-06-05 NOTE — DIETARY
Nutrition Update:    Day 8 of admit.  Jaymie Peacock is a 67 y.o. female with admitting DX of Severe sepsis (HCC) [A41.9, R65.20].  Patient being followed to optimize nutrition.    Current Diet: Level 4 - Pureed (Liquids by spoon); Liquid level: Level 0 - Thin; Tray Modifications (optional): SLP - 1:1 Supervision by Nursing, SLP - Deliver to Nursing Station    Problem: Nutritional:  Goal: Achieve adequate nutritional intake  Description: Patient will consume >50% of meals  Outcome: Not Met. Pt PO has been variable over the last few days with some refusals, 25-50%, and %. Will switch Boost plus to [facility] for added caloric intake and protein.      RD continues to follow.

## 2023-06-05 NOTE — PROGRESS NOTES
Received bedside report from RN, pt care assumed, VSS, pt assessment complete. Pt AAOx2, disoriented to time and situation, with no c/o of pain at this time. No signs of acute distress noted at this time. Plan of care discussed with pt and verbalizes no questions. Pt denies any additional needs at this time. Bed locked/in lowest position, bed alarm on, pt educated on fall risk and verbalized understanding, call light within reach, hourly rounding initiated.

## 2023-07-07 ENCOUNTER — APPOINTMENT (OUTPATIENT)
Dept: RADIOLOGY | Facility: MEDICAL CENTER | Age: 68
DRG: 481 | End: 2023-07-07
Attending: EMERGENCY MEDICINE
Payer: MEDICARE

## 2023-07-07 ENCOUNTER — HOSPITAL ENCOUNTER (INPATIENT)
Facility: MEDICAL CENTER | Age: 68
LOS: 4 days | DRG: 481 | End: 2023-07-11
Attending: EMERGENCY MEDICINE | Admitting: FAMILY MEDICINE
Payer: MEDICARE

## 2023-07-07 ENCOUNTER — ANESTHESIA EVENT (OUTPATIENT)
Dept: SURGERY | Facility: MEDICAL CENTER | Age: 68
DRG: 481 | End: 2023-07-07
Payer: MEDICARE

## 2023-07-07 DIAGNOSIS — S72.002A CLOSED FRACTURE OF LEFT HIP REQUIRING OPERATIVE REPAIR, INITIAL ENCOUNTER (HCC): ICD-10-CM
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
## 2023-07-07 DIAGNOSIS — M79.605 LEFT LEG PAIN: ICD-10-CM

## 2023-07-07 DIAGNOSIS — F10.11 HISTORY OF ALCOHOL ABUSE: ICD-10-CM

## 2023-07-07 DIAGNOSIS — S72.002A CLOSED FRACTURE OF LEFT HIP, INITIAL ENCOUNTER (HCC): Primary | ICD-10-CM

## 2023-07-07 PROBLEM — D64.89 OTHER SPECIFIED ANEMIAS: Status: ACTIVE | Noted: 2022-11-16

## 2023-07-07 LAB
ALBUMIN SERPL BCP-MCNC: 3.2 G/DL (ref 3.2–4.9)
ALBUMIN/GLOB SERPL: 0.9 G/DL
ALP SERPL-CCNC: 157 U/L (ref 30–99)
ALT SERPL-CCNC: 11 U/L (ref 2–50)
ANION GAP SERPL CALC-SCNC: 13 MMOL/L (ref 7–16)
AST SERPL-CCNC: 26 U/L (ref 12–45)
BASOPHILS # BLD AUTO: 0.3 % (ref 0–1.8)
BASOPHILS # BLD: 0.04 K/UL (ref 0–0.12)
BILIRUB SERPL-MCNC: 0.6 MG/DL (ref 0.1–1.5)
BUN SERPL-MCNC: 12 MG/DL (ref 8–22)
CALCIUM ALBUM COR SERPL-MCNC: 9.9 MG/DL (ref 8.5–10.5)
CALCIUM SERPL-MCNC: 9.3 MG/DL (ref 8.5–10.5)
CHLORIDE SERPL-SCNC: 103 MMOL/L (ref 96–112)
CO2 SERPL-SCNC: 20 MMOL/L (ref 20–33)
CREAT SERPL-MCNC: 1.01 MG/DL (ref 0.5–1.4)
EOSINOPHIL # BLD AUTO: 0.02 K/UL (ref 0–0.51)
EOSINOPHIL NFR BLD: 0.2 % (ref 0–6.9)
ERYTHROCYTE [DISTWIDTH] IN BLOOD BY AUTOMATED COUNT: 61.7 FL (ref 35.9–50)
ETHANOL BLD-MCNC: <10.1 MG/DL
GFR SERPLBLD CREATININE-BSD FMLA CKD-EPI: 60 ML/MIN/1.73 M 2
GLOBULIN SER CALC-MCNC: 3.4 G/DL (ref 1.9–3.5)
GLUCOSE SERPL-MCNC: 107 MG/DL (ref 65–99)
HCT VFR BLD AUTO: 28 % (ref 37–47)
HGB BLD-MCNC: 8.5 G/DL (ref 12–16)
IMM GRANULOCYTES # BLD AUTO: 0.05 K/UL (ref 0–0.11)
IMM GRANULOCYTES NFR BLD AUTO: 0.4 % (ref 0–0.9)
LYMPHOCYTES # BLD AUTO: 0.98 K/UL (ref 1–4.8)
LYMPHOCYTES NFR BLD: 8.5 % (ref 22–41)
MCH RBC QN AUTO: 31.3 PG (ref 27–33)
MCHC RBC AUTO-ENTMCNC: 30.4 G/DL (ref 32.2–35.5)
MCV RBC AUTO: 102.9 FL (ref 81.4–97.8)
MONOCYTES # BLD AUTO: 0.78 K/UL (ref 0–0.85)
MONOCYTES NFR BLD AUTO: 6.8 % (ref 0–13.4)
NEUTROPHILS # BLD AUTO: 9.6 K/UL (ref 1.82–7.42)
NEUTROPHILS NFR BLD: 83.8 % (ref 44–72)
NRBC # BLD AUTO: 0 K/UL
NRBC BLD-RTO: 0 /100 WBC (ref 0–0.2)
PLATELET # BLD AUTO: 328 K/UL (ref 164–446)
PMV BLD AUTO: 10.8 FL (ref 9–12.9)
POTASSIUM SERPL-SCNC: 3.9 MMOL/L (ref 3.6–5.5)
PROT SERPL-MCNC: 6.6 G/DL (ref 6–8.2)
RBC # BLD AUTO: 2.72 M/UL (ref 4.2–5.4)
SODIUM SERPL-SCNC: 136 MMOL/L (ref 135–145)
TROPONIN T SERPL-MCNC: 14 NG/L (ref 6–19)
WBC # BLD AUTO: 11.5 K/UL (ref 4.8–10.8)

## 2023-07-07 PROCEDURE — 93005 ELECTROCARDIOGRAM TRACING: CPT | Performed by: EMERGENCY MEDICINE

## 2023-07-07 PROCEDURE — 85025 COMPLETE CBC W/AUTO DIFF WBC: CPT

## 2023-07-07 PROCEDURE — 94760 N-INVAS EAR/PLS OXIMETRY 1: CPT

## 2023-07-07 PROCEDURE — 72170 X-RAY EXAM OF PELVIS: CPT

## 2023-07-07 PROCEDURE — 71045 X-RAY EXAM CHEST 1 VIEW: CPT

## 2023-07-07 PROCEDURE — 770001 HCHG ROOM/CARE - MED/SURG/GYN PRIV*

## 2023-07-07 PROCEDURE — 99285 EMERGENCY DEPT VISIT HI MDM: CPT

## 2023-07-07 PROCEDURE — 82077 ASSAY SPEC XCP UR&BREATH IA: CPT

## 2023-07-07 PROCEDURE — 36415 COLL VENOUS BLD VENIPUNCTURE: CPT

## 2023-07-07 PROCEDURE — 700111 HCHG RX REV CODE 636 W/ 250 OVERRIDE (IP): Mod: JZ | Performed by: EMERGENCY MEDICINE

## 2023-07-07 PROCEDURE — 73552 X-RAY EXAM OF FEMUR 2/>: CPT | Mod: LT

## 2023-07-07 PROCEDURE — 700105 HCHG RX REV CODE 258: Performed by: EMERGENCY MEDICINE

## 2023-07-07 PROCEDURE — 700101 HCHG RX REV CODE 250: Performed by: EMERGENCY MEDICINE

## 2023-07-07 PROCEDURE — 700101 HCHG RX REV CODE 250: Performed by: STUDENT IN AN ORGANIZED HEALTH CARE EDUCATION/TRAINING PROGRAM

## 2023-07-07 PROCEDURE — 64447 NJX AA&/STRD FEMORAL NRV IMG: CPT

## 2023-07-07 PROCEDURE — 80053 COMPREHEN METABOLIC PANEL: CPT

## 2023-07-07 PROCEDURE — 84484 ASSAY OF TROPONIN QUANT: CPT

## 2023-07-07 RX ORDER — GAUZE BANDAGE 2" X 2"
100 BANDAGE TOPICAL DAILY
Status: COMPLETED | OUTPATIENT
Start: 2023-07-08 | End: 2023-07-11

## 2023-07-07 RX ORDER — HYDRALAZINE HYDROCHLORIDE 20 MG/ML
10 INJECTION INTRAMUSCULAR; INTRAVENOUS EVERY 4 HOURS PRN
Status: DISCONTINUED | OUTPATIENT
Start: 2023-07-07 | End: 2023-07-09

## 2023-07-07 RX ORDER — OMEPRAZOLE 20 MG/1
20 CAPSULE, DELAYED RELEASE ORAL DAILY
Status: DISCONTINUED | OUTPATIENT
Start: 2023-07-08 | End: 2023-07-11 | Stop reason: HOSPADM

## 2023-07-07 RX ORDER — SODIUM CHLORIDE 9 MG/ML
1000 INJECTION, SOLUTION INTRAVENOUS ONCE
Status: COMPLETED | OUTPATIENT
Start: 2023-07-07 | End: 2023-07-07

## 2023-07-07 RX ORDER — OXYCODONE HYDROCHLORIDE 5 MG/1
5 TABLET ORAL
Status: DISCONTINUED | OUTPATIENT
Start: 2023-07-07 | End: 2023-07-08

## 2023-07-07 RX ORDER — FOLIC ACID 1 MG/1
1 TABLET ORAL DAILY
Status: COMPLETED | OUTPATIENT
Start: 2023-07-08 | End: 2023-07-11

## 2023-07-07 RX ORDER — HYDROMORPHONE HYDROCHLORIDE 1 MG/ML
0.5 INJECTION, SOLUTION INTRAMUSCULAR; INTRAVENOUS; SUBCUTANEOUS
Status: DISCONTINUED | OUTPATIENT
Start: 2023-07-07 | End: 2023-07-08

## 2023-07-07 RX ORDER — OXYCODONE HYDROCHLORIDE 10 MG/1
10 TABLET ORAL
Status: DISCONTINUED | OUTPATIENT
Start: 2023-07-07 | End: 2023-07-08

## 2023-07-07 RX ORDER — POLYETHYLENE GLYCOL 3350 17 G/17G
1 POWDER, FOR SOLUTION ORAL
Status: DISCONTINUED | OUTPATIENT
Start: 2023-07-07 | End: 2023-07-08

## 2023-07-07 RX ORDER — BUPIVACAINE HYDROCHLORIDE AND EPINEPHRINE 5; 5 MG/ML; UG/ML
30 INJECTION, SOLUTION EPIDURAL; INTRACAUDAL; PERINEURAL ONCE
Status: COMPLETED | OUTPATIENT
Start: 2023-07-07 | End: 2023-07-07

## 2023-07-07 RX ORDER — AMOXICILLIN 250 MG
2 CAPSULE ORAL 2 TIMES DAILY
Status: DISCONTINUED | OUTPATIENT
Start: 2023-07-07 | End: 2023-07-08

## 2023-07-07 RX ORDER — ACETAMINOPHEN 325 MG/1
650 TABLET ORAL EVERY 6 HOURS PRN
Status: DISCONTINUED | OUTPATIENT
Start: 2023-07-07 | End: 2023-07-08

## 2023-07-07 RX ORDER — BISACODYL 10 MG
10 SUPPOSITORY, RECTAL RECTAL
Status: DISCONTINUED | OUTPATIENT
Start: 2023-07-07 | End: 2023-07-08

## 2023-07-07 RX ORDER — ONDANSETRON 2 MG/ML
4 INJECTION INTRAMUSCULAR; INTRAVENOUS EVERY 4 HOURS PRN
Status: DISCONTINUED | OUTPATIENT
Start: 2023-07-07 | End: 2023-07-11 | Stop reason: HOSPADM

## 2023-07-07 RX ORDER — ONDANSETRON 4 MG/1
4 TABLET, ORALLY DISINTEGRATING ORAL EVERY 4 HOURS PRN
Status: DISCONTINUED | OUTPATIENT
Start: 2023-07-07 | End: 2023-07-11 | Stop reason: HOSPADM

## 2023-07-07 RX ADMIN — BUPIVACAINE HYDROCHLORIDE 30 ML: 5 INJECTION, SOLUTION EPIDURAL; INTRACAUDAL at 20:31

## 2023-07-07 RX ADMIN — THIAMINE HYDROCHLORIDE: 100 INJECTION, SOLUTION INTRAMUSCULAR; INTRAVENOUS at 22:58

## 2023-07-07 RX ADMIN — SODIUM CHLORIDE 1000 ML: 9 INJECTION, SOLUTION INTRAVENOUS at 19:34

## 2023-07-07 ASSESSMENT — COGNITIVE AND FUNCTIONAL STATUS - GENERAL
STANDING UP FROM CHAIR USING ARMS: A LOT
DRESSING REGULAR UPPER BODY CLOTHING: A LOT
MOVING TO AND FROM BED TO CHAIR: A LOT
DAILY ACTIVITIY SCORE: 14
PERSONAL GROOMING: A LITTLE
TURNING FROM BACK TO SIDE WHILE IN FLAT BAD: A LOT
EATING MEALS: A LITTLE
TOILETING: A LOT
MOVING FROM LYING ON BACK TO SITTING ON SIDE OF FLAT BED: A LOT
MOBILITY SCORE: 10
WALKING IN HOSPITAL ROOM: TOTAL
SUGGESTED CMS G CODE MODIFIER DAILY ACTIVITY: CK
DRESSING REGULAR LOWER BODY CLOTHING: A LOT
HELP NEEDED FOR BATHING: A LOT
CLIMB 3 TO 5 STEPS WITH RAILING: TOTAL
SUGGESTED CMS G CODE MODIFIER MOBILITY: CL

## 2023-07-07 ASSESSMENT — FIBROSIS 4 INDEX: FIB4 SCORE: 2.25

## 2023-07-07 ASSESSMENT — PAIN DESCRIPTION - PAIN TYPE: TYPE: ACUTE PAIN

## 2023-07-08 ENCOUNTER — ANESTHESIA (OUTPATIENT)
Dept: SURGERY | Facility: MEDICAL CENTER | Age: 68
DRG: 481 | End: 2023-07-08
Payer: MEDICARE

## 2023-07-08 ENCOUNTER — APPOINTMENT (OUTPATIENT)
Dept: RADIOLOGY | Facility: MEDICAL CENTER | Age: 68
DRG: 481 | End: 2023-07-08
Attending: ORTHOPAEDIC SURGERY
Payer: MEDICARE

## 2023-07-08 LAB
ANION GAP SERPL CALC-SCNC: 12 MMOL/L (ref 7–16)
BUN SERPL-MCNC: 10 MG/DL (ref 8–22)
CALCIUM SERPL-MCNC: 8.4 MG/DL (ref 8.5–10.5)
CHLORIDE SERPL-SCNC: 110 MMOL/L (ref 96–112)
CO2 SERPL-SCNC: 22 MMOL/L (ref 20–33)
CREAT SERPL-MCNC: 0.95 MG/DL (ref 0.5–1.4)
EKG IMPRESSION: NORMAL
ERYTHROCYTE [DISTWIDTH] IN BLOOD BY AUTOMATED COUNT: 59.4 FL (ref 35.9–50)
GFR SERPLBLD CREATININE-BSD FMLA CKD-EPI: 65 ML/MIN/1.73 M 2
GLUCOSE SERPL-MCNC: 143 MG/DL (ref 65–99)
HCT VFR BLD AUTO: 23.6 % (ref 37–47)
HGB BLD-MCNC: 7.5 G/DL (ref 12–16)
IRON SATN MFR SERPL: 17 % (ref 15–55)
IRON SERPL-MCNC: 24 UG/DL (ref 40–170)
MAGNESIUM SERPL-MCNC: 1 MG/DL (ref 1.5–2.5)
MCH RBC QN AUTO: 31 PG (ref 27–33)
MCHC RBC AUTO-ENTMCNC: 31.8 G/DL (ref 32.2–35.5)
MCV RBC AUTO: 97.5 FL (ref 81.4–97.8)
PHOSPHATE SERPL-MCNC: 2.9 MG/DL (ref 2.5–4.5)
PLATELET # BLD AUTO: 363 K/UL (ref 164–446)
PMV BLD AUTO: 10.9 FL (ref 9–12.9)
POTASSIUM SERPL-SCNC: 3.6 MMOL/L (ref 3.6–5.5)
RBC # BLD AUTO: 2.42 M/UL (ref 4.2–5.4)
SODIUM SERPL-SCNC: 144 MMOL/L (ref 135–145)
TIBC SERPL-MCNC: 144 UG/DL (ref 250–450)
UIBC SERPL-MCNC: 120 UG/DL (ref 110–370)
WBC # BLD AUTO: 9.4 K/UL (ref 4.8–10.8)

## 2023-07-08 PROCEDURE — 160041 HCHG SURGERY MINUTES - EA ADDL 1 MIN LEVEL 4: Performed by: ORTHOPAEDIC SURGERY

## 2023-07-08 PROCEDURE — 160029 HCHG SURGERY MINUTES - 1ST 30 MINS LEVEL 4: Performed by: ORTHOPAEDIC SURGERY

## 2023-07-08 PROCEDURE — 700111 HCHG RX REV CODE 636 W/ 250 OVERRIDE (IP): Performed by: ANESTHESIOLOGY

## 2023-07-08 PROCEDURE — 83735 ASSAY OF MAGNESIUM: CPT

## 2023-07-08 PROCEDURE — 160036 HCHG PACU - EA ADDL 30 MINS PHASE I: Performed by: ORTHOPAEDIC SURGERY

## 2023-07-08 PROCEDURE — 700111 HCHG RX REV CODE 636 W/ 250 OVERRIDE (IP)

## 2023-07-08 PROCEDURE — 700111 HCHG RX REV CODE 636 W/ 250 OVERRIDE (IP): Mod: JZ | Performed by: ANESTHESIOLOGY

## 2023-07-08 PROCEDURE — 83550 IRON BINDING TEST: CPT

## 2023-07-08 PROCEDURE — 85027 COMPLETE CBC AUTOMATED: CPT

## 2023-07-08 PROCEDURE — 73552 X-RAY EXAM OF FEMUR 2/>: CPT | Mod: LT

## 2023-07-08 PROCEDURE — 80048 BASIC METABOLIC PNL TOTAL CA: CPT

## 2023-07-08 PROCEDURE — 0QH736Z INSERTION OF INTRAMEDULLARY INTERNAL FIXATION DEVICE INTO LEFT UPPER FEMUR, PERCUTANEOUS APPROACH: ICD-10-PCS | Performed by: ORTHOPAEDIC SURGERY

## 2023-07-08 PROCEDURE — 160002 HCHG RECOVERY MINUTES (STAT): Performed by: ORTHOPAEDIC SURGERY

## 2023-07-08 PROCEDURE — 83540 ASSAY OF IRON: CPT

## 2023-07-08 PROCEDURE — A9270 NON-COVERED ITEM OR SERVICE: HCPCS | Performed by: STUDENT IN AN ORGANIZED HEALTH CARE EDUCATION/TRAINING PROGRAM

## 2023-07-08 PROCEDURE — 700105 HCHG RX REV CODE 258: Performed by: ORTHOPAEDIC SURGERY

## 2023-07-08 PROCEDURE — 27245 TREAT THIGH FRACTURE: CPT | Mod: LT | Performed by: ORTHOPAEDIC SURGERY

## 2023-07-08 PROCEDURE — 36415 COLL VENOUS BLD VENIPUNCTURE: CPT

## 2023-07-08 PROCEDURE — 700111 HCHG RX REV CODE 636 W/ 250 OVERRIDE (IP): Performed by: ORTHOPAEDIC SURGERY

## 2023-07-08 PROCEDURE — 770001 HCHG ROOM/CARE - MED/SURG/GYN PRIV*

## 2023-07-08 PROCEDURE — A9270 NON-COVERED ITEM OR SERVICE: HCPCS | Performed by: ORTHOPAEDIC SURGERY

## 2023-07-08 PROCEDURE — C1713 ANCHOR/SCREW BN/BN,TIS/BN: HCPCS | Performed by: ORTHOPAEDIC SURGERY

## 2023-07-08 PROCEDURE — 700102 HCHG RX REV CODE 250 W/ 637 OVERRIDE(OP): Performed by: ORTHOPAEDIC SURGERY

## 2023-07-08 PROCEDURE — 84100 ASSAY OF PHOSPHORUS: CPT

## 2023-07-08 PROCEDURE — 99223 1ST HOSP IP/OBS HIGH 75: CPT | Mod: GC | Performed by: FAMILY MEDICINE

## 2023-07-08 PROCEDURE — 700102 HCHG RX REV CODE 250 W/ 637 OVERRIDE(OP): Performed by: STUDENT IN AN ORGANIZED HEALTH CARE EDUCATION/TRAINING PROGRAM

## 2023-07-08 PROCEDURE — 01230 ANES OPN UPPER 2/3 FEMUR NOS: CPT | Performed by: ANESTHESIOLOGY

## 2023-07-08 PROCEDURE — 700105 HCHG RX REV CODE 258: Mod: JZ | Performed by: ANESTHESIOLOGY

## 2023-07-08 PROCEDURE — 160035 HCHG PACU - 1ST 60 MINS PHASE I: Performed by: ORTHOPAEDIC SURGERY

## 2023-07-08 PROCEDURE — A9270 NON-COVERED ITEM OR SERVICE: HCPCS | Performed by: ANESTHESIOLOGY

## 2023-07-08 PROCEDURE — 99222 1ST HOSP IP/OBS MODERATE 55: CPT | Mod: 57 | Performed by: ORTHOPAEDIC SURGERY

## 2023-07-08 PROCEDURE — 160048 HCHG OR STATISTICAL LEVEL 1-5: Performed by: ORTHOPAEDIC SURGERY

## 2023-07-08 PROCEDURE — 160009 HCHG ANES TIME/MIN: Performed by: ORTHOPAEDIC SURGERY

## 2023-07-08 PROCEDURE — 700102 HCHG RX REV CODE 250 W/ 637 OVERRIDE(OP): Performed by: ANESTHESIOLOGY

## 2023-07-08 DEVICE — SCREW LAG 10.5MM X 95MM (4TX2=8): Type: IMPLANTABLE DEVICE | Site: HIP | Status: FUNCTIONAL

## 2023-07-08 DEVICE — NAIL HIP 127.5 DEGREE 10MM X 180MM (4TX2=8): Type: IMPLANTABLE DEVICE | Site: HIP | Status: FUNCTIONAL

## 2023-07-08 DEVICE — SCREW CROSS LOCK 5MM X 32.5MM (4TX5=20): Type: IMPLANTABLE DEVICE | Site: HIP | Status: FUNCTIONAL

## 2023-07-08 RX ORDER — IBUPROFEN 800 MG/1
800 TABLET ORAL 3 TIMES DAILY PRN
Status: DISCONTINUED | OUTPATIENT
Start: 2023-07-11 | End: 2023-07-10

## 2023-07-08 RX ORDER — SODIUM CHLORIDE, SODIUM LACTATE, POTASSIUM CHLORIDE, CALCIUM CHLORIDE 600; 310; 30; 20 MG/100ML; MG/100ML; MG/100ML; MG/100ML
INJECTION, SOLUTION INTRAVENOUS
Status: DISCONTINUED | OUTPATIENT
Start: 2023-07-08 | End: 2023-07-08 | Stop reason: SURG

## 2023-07-08 RX ORDER — LABETALOL HYDROCHLORIDE 5 MG/ML
10 INJECTION, SOLUTION INTRAVENOUS
Status: DISCONTINUED | OUTPATIENT
Start: 2023-07-08 | End: 2023-07-09

## 2023-07-08 RX ORDER — POLYETHYLENE GLYCOL 3350 17 G/17G
1 POWDER, FOR SOLUTION ORAL 2 TIMES DAILY PRN
Status: DISCONTINUED | OUTPATIENT
Start: 2023-07-08 | End: 2023-07-11 | Stop reason: HOSPADM

## 2023-07-08 RX ORDER — AMOXICILLIN 250 MG
1 CAPSULE ORAL NIGHTLY
Status: DISCONTINUED | OUTPATIENT
Start: 2023-07-08 | End: 2023-07-11 | Stop reason: HOSPADM

## 2023-07-08 RX ORDER — LORAZEPAM 2 MG/ML
2 INJECTION INTRAMUSCULAR
Status: DISCONTINUED | OUTPATIENT
Start: 2023-07-08 | End: 2023-07-11 | Stop reason: HOSPADM

## 2023-07-08 RX ORDER — LORAZEPAM 2 MG/1
2 TABLET ORAL
Status: DISCONTINUED | OUTPATIENT
Start: 2023-07-08 | End: 2023-07-11 | Stop reason: HOSPADM

## 2023-07-08 RX ORDER — ONDANSETRON 2 MG/ML
4 INJECTION INTRAMUSCULAR; INTRAVENOUS
Status: DISCONTINUED | OUTPATIENT
Start: 2023-07-08 | End: 2023-07-08 | Stop reason: HOSPADM

## 2023-07-08 RX ORDER — ENOXAPARIN SODIUM 100 MG/ML
40 INJECTION SUBCUTANEOUS EVERY EVENING
Status: DISCONTINUED | OUTPATIENT
Start: 2023-07-09 | End: 2023-07-11 | Stop reason: HOSPADM

## 2023-07-08 RX ORDER — OXYCODONE HYDROCHLORIDE 10 MG/1
10 TABLET ORAL
Status: DISCONTINUED | OUTPATIENT
Start: 2023-07-08 | End: 2023-07-11 | Stop reason: HOSPADM

## 2023-07-08 RX ORDER — HALOPERIDOL 5 MG/ML
1 INJECTION INTRAMUSCULAR
Status: DISCONTINUED | OUTPATIENT
Start: 2023-07-08 | End: 2023-07-08 | Stop reason: HOSPADM

## 2023-07-08 RX ORDER — HYDRALAZINE HYDROCHLORIDE 20 MG/ML
5 INJECTION INTRAMUSCULAR; INTRAVENOUS
Status: DISCONTINUED | OUTPATIENT
Start: 2023-07-08 | End: 2023-07-08 | Stop reason: HOSPADM

## 2023-07-08 RX ORDER — ENEMA 19; 7 G/133ML; G/133ML
1 ENEMA RECTAL
Status: DISCONTINUED | OUTPATIENT
Start: 2023-07-08 | End: 2023-07-11 | Stop reason: HOSPADM

## 2023-07-08 RX ORDER — SCOLOPAMINE TRANSDERMAL SYSTEM 1 MG/1
1 PATCH, EXTENDED RELEASE TRANSDERMAL
Status: DISCONTINUED | OUTPATIENT
Start: 2023-07-08 | End: 2023-07-11 | Stop reason: HOSPADM

## 2023-07-08 RX ORDER — KETOROLAC TROMETHAMINE 30 MG/ML
15 INJECTION, SOLUTION INTRAMUSCULAR; INTRAVENOUS EVERY 6 HOURS
Status: DISCONTINUED | OUTPATIENT
Start: 2023-07-08 | End: 2023-07-10

## 2023-07-08 RX ORDER — LORAZEPAM 2 MG/ML
0.5 INJECTION INTRAMUSCULAR EVERY 4 HOURS PRN
Status: DISCONTINUED | OUTPATIENT
Start: 2023-07-08 | End: 2023-07-11 | Stop reason: HOSPADM

## 2023-07-08 RX ORDER — HYDROMORPHONE HYDROCHLORIDE 1 MG/ML
0.1 INJECTION, SOLUTION INTRAMUSCULAR; INTRAVENOUS; SUBCUTANEOUS
Status: DISCONTINUED | OUTPATIENT
Start: 2023-07-08 | End: 2023-07-08 | Stop reason: HOSPADM

## 2023-07-08 RX ORDER — DOCUSATE SODIUM 100 MG/1
100 CAPSULE, LIQUID FILLED ORAL 2 TIMES DAILY
Status: DISCONTINUED | OUTPATIENT
Start: 2023-07-08 | End: 2023-07-11 | Stop reason: HOSPADM

## 2023-07-08 RX ORDER — ACETAMINOPHEN 325 MG/1
650 TABLET ORAL EVERY 6 HOURS
Status: DISCONTINUED | OUTPATIENT
Start: 2023-07-08 | End: 2023-07-11 | Stop reason: HOSPADM

## 2023-07-08 RX ORDER — LORAZEPAM 1 MG/1
1 TABLET ORAL EVERY 4 HOURS PRN
Status: DISCONTINUED | OUTPATIENT
Start: 2023-07-08 | End: 2023-07-11 | Stop reason: HOSPADM

## 2023-07-08 RX ORDER — LABETALOL HYDROCHLORIDE 5 MG/ML
5 INJECTION, SOLUTION INTRAVENOUS
Status: DISCONTINUED | OUTPATIENT
Start: 2023-07-08 | End: 2023-07-08 | Stop reason: HOSPADM

## 2023-07-08 RX ORDER — AMOXICILLIN 250 MG
1 CAPSULE ORAL
Status: DISCONTINUED | OUTPATIENT
Start: 2023-07-08 | End: 2023-07-11 | Stop reason: HOSPADM

## 2023-07-08 RX ORDER — LORAZEPAM 2 MG/ML
1 INJECTION INTRAMUSCULAR
Status: DISCONTINUED | OUTPATIENT
Start: 2023-07-08 | End: 2023-07-11 | Stop reason: HOSPADM

## 2023-07-08 RX ORDER — LORAZEPAM 2 MG/1
4 TABLET ORAL
Status: DISCONTINUED | OUTPATIENT
Start: 2023-07-08 | End: 2023-07-11 | Stop reason: HOSPADM

## 2023-07-08 RX ORDER — OXYCODONE HYDROCHLORIDE 5 MG/1
5 TABLET ORAL
Status: DISCONTINUED | OUTPATIENT
Start: 2023-07-08 | End: 2023-07-11 | Stop reason: HOSPADM

## 2023-07-08 RX ORDER — MIDAZOLAM HYDROCHLORIDE 1 MG/ML
INJECTION INTRAMUSCULAR; INTRAVENOUS PRN
Status: DISCONTINUED | OUTPATIENT
Start: 2023-07-08 | End: 2023-07-08 | Stop reason: SURG

## 2023-07-08 RX ORDER — DEXAMETHASONE SODIUM PHOSPHATE 4 MG/ML
4 INJECTION, SOLUTION INTRA-ARTICULAR; INTRALESIONAL; INTRAMUSCULAR; INTRAVENOUS; SOFT TISSUE
Status: DISCONTINUED | OUTPATIENT
Start: 2023-07-08 | End: 2023-07-09

## 2023-07-08 RX ORDER — LORAZEPAM 0.5 MG/1
0.5 TABLET ORAL EVERY 4 HOURS PRN
Status: DISCONTINUED | OUTPATIENT
Start: 2023-07-08 | End: 2023-07-11 | Stop reason: HOSPADM

## 2023-07-08 RX ORDER — DIPHENHYDRAMINE HYDROCHLORIDE 50 MG/ML
25 INJECTION INTRAMUSCULAR; INTRAVENOUS EVERY 6 HOURS PRN
Status: DISCONTINUED | OUTPATIENT
Start: 2023-07-08 | End: 2023-07-09

## 2023-07-08 RX ORDER — DEXAMETHASONE SODIUM PHOSPHATE 4 MG/ML
INJECTION, SOLUTION INTRA-ARTICULAR; INTRALESIONAL; INTRAMUSCULAR; INTRAVENOUS; SOFT TISSUE PRN
Status: DISCONTINUED | OUTPATIENT
Start: 2023-07-08 | End: 2023-07-08 | Stop reason: SURG

## 2023-07-08 RX ORDER — ACETAMINOPHEN 325 MG/1
650 TABLET ORAL EVERY 6 HOURS PRN
Status: DISCONTINUED | OUTPATIENT
Start: 2023-07-13 | End: 2023-07-11 | Stop reason: HOSPADM

## 2023-07-08 RX ORDER — ONDANSETRON 2 MG/ML
4 INJECTION INTRAMUSCULAR; INTRAVENOUS EVERY 4 HOURS PRN
Status: DISCONTINUED | OUTPATIENT
Start: 2023-07-08 | End: 2023-07-08

## 2023-07-08 RX ORDER — MAGNESIUM SULFATE HEPTAHYDRATE 40 MG/ML
4 INJECTION, SOLUTION INTRAVENOUS ONCE
Status: COMPLETED | OUTPATIENT
Start: 2023-07-08 | End: 2023-07-08

## 2023-07-08 RX ORDER — HALOPERIDOL 5 MG/ML
1 INJECTION INTRAMUSCULAR EVERY 6 HOURS PRN
Status: DISCONTINUED | OUTPATIENT
Start: 2023-07-08 | End: 2023-07-09

## 2023-07-08 RX ORDER — ONDANSETRON 2 MG/ML
INJECTION INTRAMUSCULAR; INTRAVENOUS PRN
Status: DISCONTINUED | OUTPATIENT
Start: 2023-07-08 | End: 2023-07-08 | Stop reason: SURG

## 2023-07-08 RX ORDER — BISACODYL 10 MG
10 SUPPOSITORY, RECTAL RECTAL
Status: DISCONTINUED | OUTPATIENT
Start: 2023-07-08 | End: 2023-07-11 | Stop reason: HOSPADM

## 2023-07-08 RX ORDER — HYDROMORPHONE HYDROCHLORIDE 1 MG/ML
0.2 INJECTION, SOLUTION INTRAMUSCULAR; INTRAVENOUS; SUBCUTANEOUS
Status: DISCONTINUED | OUTPATIENT
Start: 2023-07-08 | End: 2023-07-08 | Stop reason: HOSPADM

## 2023-07-08 RX ORDER — PHENYLEPHRINE HCL IN 0.9% NACL 0.5 MG/5ML
SYRINGE (ML) INTRAVENOUS PRN
Status: DISCONTINUED | OUTPATIENT
Start: 2023-07-08 | End: 2023-07-08 | Stop reason: SURG

## 2023-07-08 RX ORDER — OXYCODONE HCL 5 MG/5 ML
10 SOLUTION, ORAL ORAL
Status: COMPLETED | OUTPATIENT
Start: 2023-07-08 | End: 2023-07-08

## 2023-07-08 RX ORDER — MIDAZOLAM HYDROCHLORIDE 1 MG/ML
1 INJECTION INTRAMUSCULAR; INTRAVENOUS
Status: DISCONTINUED | OUTPATIENT
Start: 2023-07-08 | End: 2023-07-08 | Stop reason: HOSPADM

## 2023-07-08 RX ORDER — OXYCODONE HCL 5 MG/5 ML
5 SOLUTION, ORAL ORAL
Status: COMPLETED | OUTPATIENT
Start: 2023-07-08 | End: 2023-07-08

## 2023-07-08 RX ORDER — SODIUM CHLORIDE, SODIUM LACTATE, POTASSIUM CHLORIDE, CALCIUM CHLORIDE 600; 310; 30; 20 MG/100ML; MG/100ML; MG/100ML; MG/100ML
INJECTION, SOLUTION INTRAVENOUS CONTINUOUS
Status: DISCONTINUED | OUTPATIENT
Start: 2023-07-08 | End: 2023-07-08 | Stop reason: HOSPADM

## 2023-07-08 RX ORDER — LABETALOL 100 MG/1
200 TABLET, FILM COATED ORAL EVERY 6 HOURS PRN
Status: DISCONTINUED | OUTPATIENT
Start: 2023-07-08 | End: 2023-07-09

## 2023-07-08 RX ORDER — CEFAZOLIN SODIUM 1 G/3ML
INJECTION, POWDER, FOR SOLUTION INTRAMUSCULAR; INTRAVENOUS PRN
Status: DISCONTINUED | OUTPATIENT
Start: 2023-07-08 | End: 2023-07-08 | Stop reason: SURG

## 2023-07-08 RX ORDER — HYDROMORPHONE HYDROCHLORIDE 1 MG/ML
0.4 INJECTION, SOLUTION INTRAMUSCULAR; INTRAVENOUS; SUBCUTANEOUS
Status: DISCONTINUED | OUTPATIENT
Start: 2023-07-08 | End: 2023-07-08 | Stop reason: HOSPADM

## 2023-07-08 RX ORDER — LORAZEPAM 2 MG/ML
1.5 INJECTION INTRAMUSCULAR
Status: DISCONTINUED | OUTPATIENT
Start: 2023-07-08 | End: 2023-07-11 | Stop reason: HOSPADM

## 2023-07-08 RX ORDER — HYDROMORPHONE HYDROCHLORIDE 1 MG/ML
0.5 INJECTION, SOLUTION INTRAMUSCULAR; INTRAVENOUS; SUBCUTANEOUS
Status: DISCONTINUED | OUTPATIENT
Start: 2023-07-08 | End: 2023-07-11 | Stop reason: HOSPADM

## 2023-07-08 RX ADMIN — KETOROLAC TROMETHAMINE 15 MG: 30 INJECTION, SOLUTION INTRAMUSCULAR; INTRAVENOUS at 14:02

## 2023-07-08 RX ADMIN — MIDAZOLAM 2 MG: 1 INJECTION, SOLUTION INTRAMUSCULAR; INTRAVENOUS at 11:10

## 2023-07-08 RX ADMIN — FENTANYL CITRATE 50 MCG: 50 INJECTION, SOLUTION INTRAMUSCULAR; INTRAVENOUS at 11:12

## 2023-07-08 RX ADMIN — ONDANSETRON 4 MG: 2 INJECTION INTRAMUSCULAR; INTRAVENOUS at 11:29

## 2023-07-08 RX ADMIN — PROPOFOL 50 MG: 10 INJECTION, EMULSION INTRAVENOUS at 11:13

## 2023-07-08 RX ADMIN — Medication 100 MCG: at 11:22

## 2023-07-08 RX ADMIN — Medication 100 MG: at 05:46

## 2023-07-08 RX ADMIN — FENTANYL CITRATE 25 MCG: 50 INJECTION, SOLUTION INTRAMUSCULAR; INTRAVENOUS at 12:14

## 2023-07-08 RX ADMIN — CEFAZOLIN 2 G: 2 INJECTION, POWDER, FOR SOLUTION INTRAMUSCULAR; INTRAVENOUS at 16:15

## 2023-07-08 RX ADMIN — CEFAZOLIN 2 G: 1 INJECTION, POWDER, FOR SOLUTION INTRAMUSCULAR; INTRAVENOUS at 11:13

## 2023-07-08 RX ADMIN — DEXAMETHASONE SODIUM PHOSPHATE 4 MG: 4 INJECTION INTRA-ARTICULAR; INTRALESIONAL; INTRAMUSCULAR; INTRAVENOUS; SOFT TISSUE at 11:16

## 2023-07-08 RX ADMIN — THERA TABS 1 TABLET: TAB at 05:46

## 2023-07-08 RX ADMIN — OXYCODONE HYDROCHLORIDE 10 MG: 10 TABLET ORAL at 07:52

## 2023-07-08 RX ADMIN — FENTANYL CITRATE 50 MCG: 50 INJECTION, SOLUTION INTRAMUSCULAR; INTRAVENOUS at 12:44

## 2023-07-08 RX ADMIN — DOCUSATE SODIUM 100 MG: 100 CAPSULE, LIQUID FILLED ORAL at 17:59

## 2023-07-08 RX ADMIN — OXYCODONE HYDROCHLORIDE 5 MG: 5 SOLUTION ORAL at 12:07

## 2023-07-08 RX ADMIN — SODIUM CHLORIDE, POTASSIUM CHLORIDE, SODIUM LACTATE AND CALCIUM CHLORIDE: 600; 310; 30; 20 INJECTION, SOLUTION INTRAVENOUS at 11:09

## 2023-07-08 RX ADMIN — KETOROLAC TROMETHAMINE 15 MG: 30 INJECTION, SOLUTION INTRAMUSCULAR; INTRAVENOUS at 17:59

## 2023-07-08 RX ADMIN — OMEPRAZOLE 20 MG: 20 CAPSULE, DELAYED RELEASE ORAL at 05:47

## 2023-07-08 RX ADMIN — MAGNESIUM SULFATE IN WATER 4 G: 40 INJECTION, SOLUTION INTRAVENOUS at 08:04

## 2023-07-08 RX ADMIN — OXYCODONE HYDROCHLORIDE 10 MG: 10 TABLET ORAL at 16:14

## 2023-07-08 RX ADMIN — Medication 100 MCG: at 11:19

## 2023-07-08 RX ADMIN — ACETAMINOPHEN 650 MG: 325 TABLET, FILM COATED ORAL at 14:02

## 2023-07-08 RX ADMIN — ACETAMINOPHEN 650 MG: 325 TABLET, FILM COATED ORAL at 17:59

## 2023-07-08 RX ADMIN — FOLIC ACID 1 MG: 1 TABLET ORAL at 05:46

## 2023-07-08 ASSESSMENT — LIFESTYLE VARIABLES
HEADACHE, FULLNESS IN HEAD: NOT PRESENT
AGITATION: NORMAL ACTIVITY
TREMOR: NO TREMOR
NAUSEA AND VOMITING: NO NAUSEA AND NO VOMITING
AUDITORY DISTURBANCES: NOT PRESENT
HAVE YOU EVER FELT YOU SHOULD CUT DOWN ON YOUR DRINKING: NO
NAUSEA AND VOMITING: NO NAUSEA AND NO VOMITING
TOTAL SCORE: 2
AUDITORY DISTURBANCES: NOT PRESENT
AGITATION: SOMEWHAT MORE THAN NORMAL ACTIVITY
ON A TYPICAL DAY WHEN YOU DRINK ALCOHOL HOW MANY DRINKS DO YOU HAVE: 0
ANXIETY: MILDLY ANXIOUS
TREMOR: NO TREMOR
VISUAL DISTURBANCES: NOT PRESENT
AUDITORY DISTURBANCES: NOT PRESENT
VISUAL DISTURBANCES: NOT PRESENT
HEADACHE, FULLNESS IN HEAD: NOT PRESENT
EVER FELT BAD OR GUILTY ABOUT YOUR DRINKING: NO
HEADACHE, FULLNESS IN HEAD: NOT PRESENT
NAUSEA AND VOMITING: NO NAUSEA AND NO VOMITING
AUDITORY DISTURBANCES: NOT PRESENT
TOTAL SCORE: 0
VISUAL DISTURBANCES: NOT PRESENT
PAROXYSMAL SWEATS: NO SWEAT VISIBLE
NAUSEA AND VOMITING: NO NAUSEA AND NO VOMITING
AGITATION: NORMAL ACTIVITY
TOTAL SCORE: 0
TREMOR: NO TREMOR
TREMOR: NO TREMOR
NAUSEA AND VOMITING: NO NAUSEA AND NO VOMITING
PAROXYSMAL SWEATS: NO SWEAT VISIBLE
PAROXYSMAL SWEATS: NO SWEAT VISIBLE
AGITATION: NORMAL ACTIVITY
VISUAL DISTURBANCES: NOT PRESENT
TREMOR: NO TREMOR
AGITATION: NORMAL ACTIVITY
HEADACHE, FULLNESS IN HEAD: NOT PRESENT
CONSUMPTION TOTAL: NEGATIVE
AVERAGE NUMBER OF DAYS PER WEEK YOU HAVE A DRINK CONTAINING ALCOHOL: 0
TOTAL SCORE: 2
AUDITORY DISTURBANCES: NOT PRESENT
PAROXYSMAL SWEATS: NO SWEAT VISIBLE
ANXIETY: MILDLY ANXIOUS
ORIENTATION AND CLOUDING OF SENSORIUM: CANNOT DO SERIAL ADDITIONS OR IS UNCERTAIN ABOUT DATE
NAUSEA AND VOMITING: NO NAUSEA AND NO VOMITING
ORIENTATION AND CLOUDING OF SENSORIUM: CANNOT DO SERIAL ADDITIONS OR IS UNCERTAIN ABOUT DATE
AUDITORY DISTURBANCES: NOT PRESENT
HEADACHE, FULLNESS IN HEAD: NOT PRESENT
HEADACHE, FULLNESS IN HEAD: NOT PRESENT
HAVE PEOPLE ANNOYED YOU BY CRITICIZING YOUR DRINKING: NO
TOTAL SCORE: 2
TOTAL SCORE: 0
ORIENTATION AND CLOUDING OF SENSORIUM: CANNOT DO SERIAL ADDITIONS OR IS UNCERTAIN ABOUT DATE
ORIENTATION AND CLOUDING OF SENSORIUM: CANNOT DO SERIAL ADDITIONS OR IS UNCERTAIN ABOUT DATE
PAROXYSMAL SWEATS: NO SWEAT VISIBLE
VISUAL DISTURBANCES: NOT PRESENT
TOTAL SCORE: 2
ORIENTATION AND CLOUDING OF SENSORIUM: CANNOT DO SERIAL ADDITIONS OR IS UNCERTAIN ABOUT DATE
TOTAL SCORE: 2
ANXIETY: MILDLY ANXIOUS
TOTAL SCORE: 3
ALCOHOL_USE: NO
PAROXYSMAL SWEATS: NO SWEAT VISIBLE
TREMOR: NO TREMOR
AGITATION: NORMAL ACTIVITY
ORIENTATION AND CLOUDING OF SENSORIUM: CANNOT DO SERIAL ADDITIONS OR IS UNCERTAIN ABOUT DATE
VISUAL DISTURBANCES: NOT PRESENT
EVER HAD A DRINK FIRST THING IN THE MORNING TO STEADY YOUR NERVES TO GET RID OF A HANGOVER: NO
HOW MANY TIMES IN THE PAST YEAR HAVE YOU HAD 5 OR MORE DRINKS IN A DAY: 0
ANXIETY: MILDLY ANXIOUS

## 2023-07-08 ASSESSMENT — PAIN DESCRIPTION - PAIN TYPE
TYPE: SURGICAL PAIN
TYPE: ACUTE PAIN
TYPE: SURGICAL PAIN
TYPE: SURGICAL PAIN

## 2023-07-08 ASSESSMENT — FIBROSIS 4 INDEX: FIB4 SCORE: 1.47

## 2023-07-08 NOTE — H&P
FAMILY MEDICINE HISTORY AND PHYSICAL       PATIENT ID:  NAME:  Jaymie Peacock  MRN:               [id number]  YOB: 1955    Date of Admission: 7/7/2023     Attending: Sabiha Rowan M.d.     Resident: Claudio Ac M.D.    Primary Care Physician:  Toña Tejada P.A.-C.    CC:    Chief Complaint   Patient presents with    Leg Pain     Pt BIB EMS from [facility] and [facility]. Per report pt had GLF sometime earlier today. Had an image done and pt found to have no fx of left leg. Pt complaining of pain throughout the day a image performed and pt found to have fx of left hip, transferred to [facility] for further eval. Pt with mild dementia hx, GCS14, -head trauma, -thinners. Pt reports touse a walker to ambulate and fell.  PTA.       HPI: Jaymie Peacock is a 67 y.o. female who presented with closed fracture of her left hip.  Patient reports falling on Monday and hurting her hip this is incongruent with note from [facility] who reports fall today.  Denies any loss of consciousness denies hitting head.  Reports left hip pain after fall reports it was mechanical fall no syncopal episode of dizziness.    Patient denies any chest pain, shortness of breath fever or chills denies any other concerns at this time.    Patient will be a resident of [facility] skilled nursing [facility] since early June after admission for sepsis.  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  Patient has a known history of alcohol use disorder as well as multiple GI bleeds.  Patient also has a history of pulmonary embolism but was not a candidate for any extended anticoagulation.  Patient had IVC filter placed last year.    Patient is chronically anemic during her last admission she fluctuated from 7.6-8.7 hemoglobin.  Patient has been through withdrawals in the past she reports that her last drink was approximately 2-3 days ago that she drinks 5-6  drinks per day but patient has been a resident of [facility] nursing [facility] so unsure if she has currently been drinking.  She went through withdrawals at last admission in June.      ERCourse:  In the emergency department patient in significant mount of discomfort x-ray with intertrochanteric hip fracture mildly displaced.  Orthopedic was consulted and plan for surgery in a.m.  Nerve block was performed on left hip.  CHEM panel was normal negative alcohol negative troponin.    REVIEW OF SYSTEMS:   Ten systems reviewed and were negative except as noted in the HPI.                PAST MEDICAL HISTORY:   has a past medical history of DVT (deep venous thrombosis) (HCC), ETOH abuse, GI bleed, High cholesterol, Hypertension, Osteoporosis, Pulmonary embolism (HCC), and Smoking.     PAST SURGICAL HISTORY:   has a past surgical history that includes us-needle core bx-breast panel (Right); pr upper gi endoscopy,diagnosis (04/21/2022); pr colonoscopy,diagnostic (04/21/2022); pr upper gi endoscopy,biopsy (04/21/2022); pr upper gi endoscopy,ctrl bleed (04/21/2022); pr upper gi endoscopy,ctrl bleed (N/A, 05/11/2022); gastroscopy w/push enterscopy (N/A, 05/11/2022); hip replacement, partial (Right); pr upper gi endoscopy,diagnosis (N/A, 6/15/2022); pr upper gi endoscopy,diagnosis (N/A, 7/31/2022); pr upper gi endoscopy,scler inject (N/A, 7/31/2022); pr upper gi endoscopy,ctrl bleed (N/A, 7/31/2022); pr upper gi endoscopy,diagnosis (N/A, 8/20/2022); and pr upper gi endoscopy,ctrl bleed (N/A, 8/20/2022).     FAMILY HISTORY:  family history is not on file.     SOCIAL HISTORY:   Employment: Works as  at [employer]  Living Situation: Lives in [place] but has been at [facility] recently  Smoking: Current, did not answer how many per day, reports used to smoke pack a day for 10 years  Etoh: Daily, reports 5-6 drinks.  Recreational Drug: Denies    DIET:   Orders Placed This Encounter   Procedures    Diet  Order Diet: Regular     Standing Status:   Standing     Number of Occurrences:   1     Order Specific Question:   Diet:     Answer:   Regular [1]    Diet NPO Restrict to: Sips with Medications     Standing Status:   Standing     Number of Occurrences:   8     Order Specific Question:   Diet NPO Restrict to:     Answer:   Sips with Medications [3]       ALLERGIES:  No Known Allergies    OUTPATIENT MEDICATIONS:    Current Facility-Administered Medications:     [START ON 7/8/2023] omeprazole (PriLOSEC) capsule 20 mg, 20 mg, Oral, DAILY, Claudio Ac M.D.    senna-docusate (Pericolace Or Senokot S) 8.6-50 MG per tablet 2 Tablet, 2 Tablet, Oral, BID **AND** polyethylene glycol/lytes (Miralax) PACKET 1 Packet, 1 Packet, Oral, QDAY PRN **AND** magnesium hydroxide (Milk Of Magnesia) suspension 30 mL, 30 mL, Oral, QDAY PRN **AND** bisacodyl (Dulcolax) suppository 10 mg, 10 mg, Rectal, QDAY PRN, Claudio Ac M.D.    acetaminophen (Tylenol) tablet 650 mg, 650 mg, Oral, Q6HRS PRN, Claudio Ac M.D.    Notify provider if pain remains uncontrolled, , , CONTINUOUS **AND** Use the Numeric Rating Scale (NRS), Gray-Baker Faces (WBF), or FLACC on regular floors and Critical-Care Pain Observation Tool (CPOT) on ICUs/Trauma to assess pain, , , CONTINUOUS **AND** Pulse Ox, , , CONTINUOUS **AND** Pharmacy Consult Request ...Pain Management Review 1 Each, 1 Each, Other, PHARMACY TO DOSE **AND** If patient difficult to arouse and/or has respiratory depression (respiratory rate of 10 or less), stop any opiates that are currently infusing and call a Rapid Response., , , CONTINUOUS, Claudio Ac M.D.    oxyCODONE immediate-release (Roxicodone) tablet 5 mg, 5 mg, Oral, Q3HRS PRN **OR** oxyCODONE immediate release (Roxicodone) tablet 10 mg, 10 mg, Oral, Q3HRS PRN **OR** HYDROmorphone (Dilaudid) injection 0.5 mg, 0.5 mg, Intravenous, Q3HRS PRN, Claudio Ac M.D.    hydrALAZINE (Apresoline) injection 10 mg, 10 mg, Intravenous,  "Q4HRS PRN, Claudio Ac M.D.    ondansetron (Zofran) syringe/vial injection 4 mg, 4 mg, Intravenous, Q4HRS PRN, Claudio Ac M.D.    ondansetron (Zofran ODT) dispertab 4 mg, 4 mg, Oral, Q4HRS PRN, Claudio Ac M.D.    detox IV 1000 mL (D5Lr + Magnesium 1 g + Thiamine 100 mg + Folic Acid 1 mg) infusion, , Intravenous, Once **AND** [START ON 2023] thiamine (Vitamin B-1) tablet 100 mg, 100 mg, Oral, DAILY **AND** [START ON 2023] multivitamin tablet 1 Tablet, 1 Tablet, Oral, DAILY **AND** [START ON 2023] folic acid (Folvite) tablet 1 mg, 1 mg, Oral, DAILY, Claudio Ac M.D.    PHYSICAL EXAM:  Vitals:    23 1832 23 1924 23   BP: 118/70 134/78 139/79   Pulse: (!) 118 (!) 109 (!) 109   Resp: 18 17 13   Temp: 37.2 °C (99 °F) 36.9 °C (98.4 °F)    TempSrc: Temporal Temporal    SpO2: 99% 95% 93%   Weight: 48.1 kg (106 lb)     Height: 1.676 m (5' 6\")     , Temp (24hrs), Av.1 °C (98.7 °F), Min:36.9 °C (98.4 °F), Max:37.2 °C (99 °F)  , Pulse Oximetry: 93 %, O2 Delivery Device: None - Room Air    Physical Exam  Constitutional:       Appearance: Normal appearance. She is ill-appearing.   HENT:      Head: Normocephalic and atraumatic.      Nose: Nose normal.      Mouth/Throat:      Mouth: Mucous membranes are moist.   Eyes:      Extraocular Movements: Extraocular movements intact.      Conjunctiva/sclera: Conjunctivae normal.   Cardiovascular:      Rate and Rhythm: Normal rate and regular rhythm.      Pulses: Normal pulses.      Heart sounds: Normal heart sounds.   Pulmonary:      Effort: Pulmonary effort is normal.      Breath sounds: Normal breath sounds.   Abdominal:      General: Abdomen is flat. There is no distension.      Palpations: Abdomen is soft.      Tenderness: There is no abdominal tenderness.   Musculoskeletal:         General: Signs of injury present. No swelling.      Cervical back: Normal range of motion.      Right hip: Normal. No deformity or tenderness. " Normal range of motion.      Left hip: Tenderness present. Decreased range of motion.      Right lower leg: No edema.      Left lower leg: No edema.   Skin:     General: Skin is warm and dry.      Capillary Refill: Capillary refill takes less than 2 seconds.           LAB TESTS:   Recent Labs     07/07/23  1833   WBC 11.5*   RBC 2.72*   HEMOGLOBIN 8.5*   HEMATOCRIT 28.0*   .9*   MCH 31.3   MCHC 30.4*   RDW 61.7*   PLATELETCT 328   MPV 10.8     Recent Labs     07/07/23  1833   SODIUM 136   POTASSIUM 3.9   CHLORIDE 103   CO2 20   GLUCOSE 107*   BUN 12   CREATININE 1.01   CALCIUM 9.3     Recent Labs     07/07/23  1833   ALTSGPT 11   ASTSGOT 26   ALKPHOSPHAT 157*   TBILIRUBIN 0.6   GLUCOSE 107*         No results for input(s): NTPROBNP in the last 72 hours.      Recent Labs     07/07/23  1833   TROPONINT 14       CULTURES:   Results       ** No results found for the last 168 hours. **            IMAGES:  DX-FEMUR-2+ LEFT   Final Result      1.  Mildly displaced intertrochanteric left femoral fracture.   2.  Mild to moderate degenerative changes of the left hip joint.   3.  Osteopenia.      DX-PELVIS-1 OR 2 VIEWS   Final Result      1.  Mildly displaced intertrochanteric left femoral fracture.   2.  Mild to moderate degenerative changes of the left hip joint.   3.  Osteopenia.      DX-CHEST-LIMITED (1 VIEW)   Final Result      1.  No acute cardiac or pulmonary abnormalities are identified.   2.  Nodular opacity at the peripheral right base, likely nipple shadow.. If there is clinical concern, exam can be repeated with nipple markers.          CONSULTS:   Orthopedic Surgery    ASSESSMENT/PLAN:   67 y.o. female admitted for left hip fracture    I anticipate this patient will require at least two midnights for appropriate medical management, necessitating inpatient admission because Hip fracture requiring surgical repair      * Closed fracture of left hip requiring operative repair, initial encounter (HCC)- (present  on admission)  Assessment & Plan  Patient reports fall Monday of this week with significant pain and discomfort.  X-ray with intertrochanteric fracture of the left hip  -Orthopedic surgery consulted plan on OR in a.m.  -N.p.o. midnight  -Patient received nerve block in ED continue with multimodal pain management at this time.  -PT OT consulted    Other specified anemias  Assessment & Plan  Patient with anemia hemoglobin 8.5 on admission.  History of GI bleed in the past and Crystal-Payan tears.  Not on anticoagulation currently.  On last admission patient's hemoglobin ranged in the 7 or 8's. currently stable.  Patient denies any hematemesis or melena  -Macrocytic   -Possibly secondary to dietary and alcohol use.  -Iron studies pending    History of pulmonary embolism  Assessment & Plan  Patient has had DVT as well as pulm embolism in the past.  Was on Xarelto in the past then had 2 admissions for acute blood loss anemia secondary to Crystal-Payan tears that were treated in July and August 2022.  Then patient was discharged with PPI, Carafate and resumed on her Xarelto.  On repeat EGD known to have angiodysplasia of the esophagus thus IVC filter was placed and patient was discontinued off of Xarelto.  -SCDs for DVT prophylaxis, anticoagulation held secondary to bleed risk and anemia as well as need for surgery in a.m.    Severe protein-calorie malnutrition ([place]: less than 60% of standard weight) (HCC)- (present on admission)  Assessment & Plan  Patient with significant mount of alcohol drinking per day.  Likely source of malnutrition detox bag started as well as a daily vitamins.  -Recommend nutritional consult  -Has been on mirtazapine in the past    Alcohol abuse- (present on admission)  Assessment & Plan  History alcohol abuse reports drinking 5-6 drinks minimum a day last drink was yesterday.  Has gone through withdrawal in the past but denies any history of seizures.  At last admission she was confused  and altered even at time of discharge concern for possibly Warnicke Korsakoff.  -Admit with CIWA monitoring  -Diagnostic alcohol negative on admission  -Detox bag started on admission we will continue with daily vitamins orally after.          Core Measures:  Fluids: Detox Bag, Will continue slow MIVF after  Lines: PIV  Abx: None  Diet: NPO midnight  PPX: SCDs/TEDs    CODE Status: Full Code

## 2023-07-08 NOTE — ED NOTES
Med Rec complete per Pt's MAR from [facility].  Allergies reviewed.

## 2023-07-08 NOTE — THERAPY
Physical Therapy Contact Note    Patient Name: Jaymie Peacock  Age:  67 y.o., Sex:  female  Medical Record #: [id number]  Today's Date: 7/8/2023    PT consult received. Pt to go to OR today for L hip fx fixation. Will hold PT eval at this time and follow up post op as appropriate.    Nitin Martin, PT, DPT

## 2023-07-08 NOTE — CARE PLAN
The patient is Stable - Low risk of patient condition declining or worsening      Problem: Pain - Standard  Goal: Alleviation of pain or a reduction in pain to the patient’s comfort goal  Outcome: Progressing  Pain managed well at this time.  Will implement appropriate interventions PRN.       Problem: Fall Risk  Goal: Patient will remain free from falls  Outcome: Progressing  Pt is instructed to call when in need of assistance   Bed alarm in place.  Call light w/in reach.

## 2023-07-08 NOTE — DISCHARGE PLANNING
HTH/SCP TCN chart review completed. Collaborated with CM post to meeting with the pt. The most current review of medical record, knowledge of pt's PLOF and social support, LACE+ score of 72, 6 clicks scores of 10 mobility were considered.      Pt seen at bedside. Introduced TCN program. Did not provided education regarding post acute levels of care as patient was admitted from [facility], and reports is aware of levels of care.    Patient reports she is agreeable to return to [facility] once medically cleared.  Unable to discuss any other choice, secondary to patient living floor for OR.  Awaiting PT/OT recs once post op for d/c planning purposes.  No further TCN needs at this time.     Choice proactively obtained for SNF and faxed to [facility]. TCN will continue to follow and collaborate with discharge planning team as additional post acute needs arise. Thank you.     Completed today:  Awaiting PT/OT recommendations.    Choice obtained: [facility]  GSC referral not sent, likely post acute placement

## 2023-07-08 NOTE — CONSULTS
7/8/2023    The patient was seen at the request of Dr Anand     HPI: Jaymie Peacock is a 67 y.o. female who presents with complaints of pain to left hip.  This started yesterday after fall.  The pain is 5/10 and is described as sharp.  The pain is made worse by palpation of the area and made better by rest and immobilization.    Past Medical History:   Diagnosis Date    DVT (deep venous thrombosis) (HCC)     ETOH abuse     GI bleed     High cholesterol     Hypertension     Osteoporosis     Pulmonary embolism (HCC)     Smoking        Past Surgical History:   Procedure Laterality Date    WI UPPER GI ENDOSCOPY,DIAGNOSIS N/A 8/20/2022    Procedure: GASTROSCOPY;  Surgeon: rKanthi Fernandez M.D.;  Location: [facility];  Service: Gastroenterology    WI UPPER GI ENDOSCOPY,CTRL BLEED N/A 8/20/2022    Procedure: GASTROSCOPY, WITH ARGON PLASMA COAGULATION;  Surgeon: Kranthi Fernandez M.D.;  Location: [facility];  Service: Gastroenterology    WI UPPER GI ENDOSCOPY,DIAGNOSIS N/A 7/31/2022    Procedure: GASTROSCOPY;  Surgeon: Cyn Slade M.D.;  Location: SURGERY [facility];  Service: Gastroenterology    WI UPPER GI ENDOSCOPY,SCLER INJECT N/A 7/31/2022    Procedure: GASTROSCOPY, WITH SCLEROTHERAPY;  Surgeon: Cyn Slade M.D.;  Location: [facility];  Service: Gastroenterology    WI UPPER GI ENDOSCOPY,CTRL BLEED N/A 7/31/2022    Procedure: EGD, WITH CLIP PLACEMENT;  Surgeon: Cyn Slade M.D.;  Location: [facility];  Service: Gastroenterology    WI UPPER GI ENDOSCOPY,DIAGNOSIS N/A 6/15/2022    Procedure: GASTROSCOPY WITH PUSH ENDOSCOPY;  Surgeon: Claudio Guerrier M.D.;  Location: SURGERY [facility];  Service: Gastroenterology    WI UPPER GI ENDOSCOPY,CTRL BLEED N/A 05/11/2022    Procedure: GASTROSCOPY, WITH ARGON PLASMA COAGULATION;  Surgeon: Yariel Pagan M.D.;  Location: SURGERY SAME DAY [facility];  Service: Gastroenterology    GASTROSCOPY W/PUSH  "ENTERSCOPY N/A 05/11/2022    Procedure: GASTROSCOPY, WITH PUSH ENTEROSCOPY;  Surgeon: Yariel Pagan M.D.;  Location: SURGERY SAME DAY [facility];  Service: Gastroenterology    DC UPPER GI ENDOSCOPY,DIAGNOSIS  04/21/2022    Procedure: GASTROSCOPY;  Surgeon: Rell Rivas M.D.;  Location: SURGERY SAME DAY [facility];  Service: Gastroenterology    DC COLONOSCOPY,DIAGNOSTIC  04/21/2022    Procedure: COLONOSCOPY;  Surgeon: Rell Rivas M.D.;  Location: SURGERY SAME DAY [facility];  Service: Gastroenterology    DC UPPER GI ENDOSCOPY,BIOPSY  04/21/2022    Procedure: GASTROSCOPY, WITH BIOPSY;  Surgeon: Rell Rivas M.D.;  Location: SURGERY SAME DAY [facility];  Service: Gastroenterology    DC UPPER GI ENDOSCOPY,CTRL BLEED  04/21/2022    Procedure: GASTROSCOPY, WITH ARGON PLASMA COAGULATION;  Surgeon: Rell Rivas M.D.;  Location: SURGERY SAME DAY [facility];  Service: Gastroenterology    HIP REPLACEMENT, PARTIAL Right     US-NEEDLE CORE BX-BREAST PANEL Right        Medications  No current facility-administered medications on file prior to encounter.     Current Outpatient Medications on File Prior to Encounter   Medication Sig Dispense Refill    omeprazole (PRILOSEC) 20 MG delayed-release capsule Take 1 Capsule by mouth every day. 30 Capsule        Allergies  Patient has no known allergies.    ROS  Left hip pain. All other systems were reviewed and found to be negative    No family history on file.    Social History     Socioeconomic History    Marital status:    Tobacco Use    Smoking status: Every Day     Packs/day: 0.50     Types: Cigarettes    Smokeless tobacco: Never   Vaping Use    Vaping Use: Never used   Substance and Sexual Activity    Alcohol use: Yes     Alcohol/week: 9.6 oz     Types: 16 Shots of liquor per week     Comment: vodka daily    Drug use: Not Currently       Physical Exam  Vitals  /88   Pulse 100   Temp 36.6 °C (97.9 °F) (Temporal)   Resp 18   Ht 1.676 m (5' 6\")  "  Wt 48.1 kg (106 lb)   SpO2 95%   General: Well Developed, Well Nourished, Age appropriate appearance  HEENT: Normocephalic, atraumatic  Psych: Normal mood and affect  Neck: Supple, nontender, no masses  Lungs: Breathing unlabored, No audible wheezing  Heart: Regular heart rate and rhythm  Abdomen: Soft, NT, ND  Neuro: Sensation grossly intact to BUE and BLE, moving all four extremities  Skin: Intact, no open wounds  Vascular: 2+DP/PT, Capillary refill <2 seconds  MSK: Left hip pain and deformity      Radiographs:  Left IT femur fracture  DX-FEMUR-2+ LEFT   Final Result      1.  Mildly displaced intertrochanteric left femoral fracture.   2.  Mild to moderate degenerative changes of the left hip joint.   3.  Osteopenia.      DX-PELVIS-1 OR 2 VIEWS   Final Result      1.  Mildly displaced intertrochanteric left femoral fracture.   2.  Mild to moderate degenerative changes of the left hip joint.   3.  Osteopenia.      DX-CHEST-LIMITED (1 VIEW)   Final Result      1.  No acute cardiac or pulmonary abnormalities are identified.   2.  Nodular opacity at the peripheral right base, likely nipple shadow.. If there is clinical concern, exam can be repeated with nipple markers.      DX-PORTABLE FLUORO > 1 HOUR    (Results Pending)   DX-FEMUR-2+ LEFT    (Results Pending)       Laboratory Values  Recent Labs     07/07/23  1833 07/08/23  0006   WBC 11.5* 9.4   RBC 2.72* 2.42*   HEMOGLOBIN 8.5* 7.5*   HEMATOCRIT 28.0* 23.6*   .9* 97.5   MCH 31.3 31.0   MCHC 30.4* 31.8*   RDW 61.7* 59.4*   PLATELETCT 328 363   MPV 10.8 10.9     Recent Labs     07/07/23  1833 07/08/23  0006   SODIUM 136 144   POTASSIUM 3.9 3.6   CHLORIDE 103 110   CO2 20 22   GLUCOSE 107* 143*   BUN 12 10             Impression:Left intertrochanteric femur fracture    Plan:We discussed the diagnosis and findings with the patient at length.  We reviewed possible non operative and operative interventions and the risks and benefits of each of these.  she had a  chance to ask questions and all of these were answered to her satisfaction. The patient chose to proceed with  operative intervention. Risks and benefits of surgery were discussed which include but are not limited to bleeding, infection, neurovascular damage, malunion, nonunion, instability, limb length discrepancy, DVT, PE, MI, Stroke and death. They understand these risks and wish to proceed.

## 2023-07-08 NOTE — PROGRESS NOTES
Attending:   Sabiha Rowan M.d.      Resident:   Jens Marte M.D.    PATIENT:   Jaymie Peacock; [id number]; 1955    ID:   67 y.o. female admitted for a closed fracture of her left hip secondary to a mechanical fall.     SUBJECTIVE:   No acute events overnight, patient NPO since midnight - plan for OR this AM. Reports moderate pain of L hip.     OBJECTIVE:  Vitals:    07/08/23 0130 07/08/23 0440 07/08/23 0719 07/08/23 1033   BP:  130/87 129/88 122/79   Pulse:  (!) 107 100 96   Resp:  18 18 16   Temp:  36.5 °C (97.7 °F) 36.6 °C (97.9 °F) 36.4 °C (97.6 °F)   TempSrc:  Temporal Temporal Temporal   SpO2:  95% 95% 92%   Weight: 48.1 kg (106 lb)      Height:         No intake or output data in the 24 hours ending 07/08/23 0643    PHYSICAL EXAM:  General: No acute distress, afebrile, resting comfortably  HEENT: NC/AT. +ophthalmoplegia    Cardiovascular: RRR without murmurs. Normal capillary refill   Respiratory: CTAB  Abdomen: soft, nontender, nondistended, no masses.  EXT:  +L hip tender to palpation, limited ROM  Skin: No erythema/lesions   Neuro: Non-focal, sensations intact b/l UE and LE. +gait unstable    LABS:  Recent Labs     07/07/23 1833 07/08/23  0006   WBC 11.5* 9.4   RBC 2.72* 2.42*   HEMOGLOBIN 8.5* 7.5*   HEMATOCRIT 28.0* 23.6*   .9* 97.5   MCH 31.3 31.0   RDW 61.7* 59.4*   PLATELETCT 328 363   MPV 10.8 10.9   NEUTSPOLYS 83.80*  --    LYMPHOCYTES 8.50*  --    MONOCYTES 6.80  --    EOSINOPHILS 0.20  --    BASOPHILS 0.30  --      Recent Labs     07/07/23 1833 07/08/23  0006   SODIUM 136 144   POTASSIUM 3.9 3.6   CHLORIDE 103 110   CO2 20 22   BUN 12 10   CREATININE 1.01 0.95   CALCIUM 9.3 8.4*   MAGNESIUM  --  1.0*   PHOSPHORUS  --  2.9   ALBUMIN 3.2  --      Estimated GFR/CRCL = Estimated Creatinine Clearance: 43 mL/min (by C-G formula based on SCr of 0.95 mg/dL).  Recent Labs     07/07/23 1833 07/08/23  0006   GLUCOSE 107* 143*     Recent Labs     07/07/23 1833   ASTSGOT 26   ALTSGPT 11    TBILIRUBIN 0.6   ALKPHOSPHAT 157*   GLOBULIN 3.4             No results for input(s): INR, APTT, FIBRINOGEN in the last 72 hours.    Invalid input(s): DIMER      IMAGING:  DX-FEMUR-2+ LEFT   Final Result      1.  Mildly displaced intertrochanteric left femoral fracture.   2.  Mild to moderate degenerative changes of the left hip joint.   3.  Osteopenia.      DX-PELVIS-1 OR 2 VIEWS   Final Result      1.  Mildly displaced intertrochanteric left femoral fracture.   2.  Mild to moderate degenerative changes of the left hip joint.   3.  Osteopenia.      DX-CHEST-LIMITED (1 VIEW)   Final Result      1.  No acute cardiac or pulmonary abnormalities are identified.   2.  Nodular opacity at the peripheral right base, likely nipple shadow.. If there is clinical concern, exam can be repeated with nipple markers.      DX-PORTABLE FLUORO > 1 HOUR    (Results Pending)   DX-FEMUR-2+ LEFT    (Results Pending)       MEDS:  Current Facility-Administered Medications   Medication Last Admin    magnesium sulfate IVPB premix 4 g 4 g at 07/08/23 0804    [MAR Hold] LORazepam (Ativan) tablet 0.5 mg      [MAR Hold] LORazepam (Ativan) tablet 1 mg      Or    [MAR Hold] LORazepam (Ativan) injection 0.5 mg      [MAR Hold] LORazepam (Ativan) tablet 2 mg      Or    [MAR Hold] LORazepam (Ativan) injection 1 mg      [MAR Hold] LORazepam (Ativan) tablet 3 mg      Or    [MAR Hold] LORazepam (Ativan) injection 1.5 mg      [MAR Hold] LORazepam (Ativan) tablet 4 mg      Or    [MAR Hold] LORazepam (Ativan) injection 2 mg      [MAR Hold] labetalol (Normodyne/Trandate) injection 10 mg      Or    [MAR Hold] labetalol (Normodyne) tablet 200 mg      [MAR Hold] omeprazole (PriLOSEC) capsule 20 mg 20 mg at 07/08/23 0547    [MAR Hold] senna-docusate (Pericolace Or Senokot S) 8.6-50 MG per tablet 2 Tablet      And    [MAR Hold] polyethylene glycol/lytes (Miralax) PACKET 1 Packet      And    [MAR Hold] magnesium hydroxide (Milk Of Magnesia) suspension 30 mL       And    [MAR Hold] bisacodyl (Dulcolax) suppository 10 mg      [MAR Hold] acetaminophen (Tylenol) tablet 650 mg      [MAR Hold] Pharmacy Consult Request ...Pain Management Review 1 Each      [MAR Hold] oxyCODONE immediate-release (Roxicodone) tablet 5 mg      Or    [MAR Hold] oxyCODONE immediate release (Roxicodone) tablet 10 mg 10 mg at 07/08/23 0752    Or    [MAR Hold] HYDROmorphone (Dilaudid) injection 0.5 mg      [MAR Hold] hydrALAZINE (Apresoline) injection 10 mg      [MAR Hold] ondansetron (Zofran) syringe/vial injection 4 mg      [MAR Hold] ondansetron (Zofran ODT) dispertab 4 mg      [MAR Hold] thiamine (Vitamin B-1) tablet 100 mg 100 mg at 07/08/23 0546    And    [MAR Hold] multivitamin tablet 1 Tablet 1 Tablet at 07/08/23 0546    And    [MAR Hold] folic acid (Folvite) tablet 1 mg 1 mg at 07/08/23 0546       ASSESSMENT/PLAN:    Problem   Closed Fracture of Left Hip Requiring Operative Repair, Initial Encounter ([facility])   Other Specified Anemias   History of Pulmonary Embolism    Hx of PE       Severe Protein-Calorie Malnutrition ([place]: Less Than 60% of Standard Weight) ([facility])   Alcohol Abuse       Closed fracture of left hip requiring operative repair, initial encounter ([facility])  Patient reports fall Monday of this week with significant pain and discomfort but reported by EMS who brought her in was patient fell this a.m at [facility] with continued pain afterwards.  No LOC no head trauma.  X-ray with intertrochanteric fracture of the left hip    -Orthopedic surgery consulted plan on OR in a.m.  -N.p.o. midnight  -Patient received nerve block in ED continue with multimodal pain management at this time.  -PT OT consulted    Alcohol abuse  History alcohol abuse reports drinking 5-6 drinks minimum a day. Last drink per patient 3 days ago, also said 2 weeks ago though she has been at [facility] skilled nursing [facility] so unsure of timeframe.  Has gone through withdrawal in the past but denies any history of seizures.   At last admission she was confused and altered even at time of discharge concern for possibly Warnicke Korsakoff.     >ETOH neg on admission  > Detox bag on admission  - CIWA monitoring (CIWA score 3 this morning; only oriented to self)  - Cont. with daily vitamins orally  - Consider MRI for diagnosis of Wernicke-Korsakoff    Other specified anemias  Patient with anemia hemoglobin 8.5 on admission.  History of GI bleed in the past and Crystal-Payan tears.  Not on anticoagulation currently.  On last admission patient's hemoglobin ranged in the 7 or 8's. currently stable.  Patient denies any hematemesis or melena.    > Macrocytic   > Iron studies show low iron and TIBC  - Likely secondary to diet and alcohol use.  - Cont. Multivitiman, folate supplement    Severe protein-calorie malnutrition ([place]: less than 60% of standard weight) (HCC)  Patient with significant amount of alcohol drinking per day which is the likely source of malnutrition. BMI 17.1  States she only eats 1 meal/day  Has been on mirtazapine in the past    - Nutrition consulted    History of pulmonary embolism  Patient has had DVT as well as pulm embolism in the past.  Was on Xarelto in the past then had 2 admissions for acute blood loss anemia secondary to Crystal-Payan tears that were treated in July and August 2022.  Then patient was discharged with PPI, Carafate and resumed on her Xarelto.  On repeat EGD known to have angiodysplasia of the esophagus thus IVC filter was placed and patient was discontinued off of Xarelto.    > IVC filter placed (2022)  -SCDs for DVT prophylaxis, anticoagulation held secondary to bleed risk and anemia as well as need for surgery in a.m.          Jens Marte M.D.  PGY-1  [facility]

## 2023-07-08 NOTE — ED NOTES
Report given to the assigned RN in T304/02 for continuity of care and management.  Provided opportunity to asks questions.  All pt belongings at bedside

## 2023-07-08 NOTE — ANESTHESIA PROCEDURE NOTES
Airway    Date/Time: 7/8/2023 11:14 AM    Performed by: Ena Blackwell M.D.  Authorized by: Ena Blackwell M.D.    Location:  OR  Urgency:  Elective  Difficult Airway: No    Indications for Airway Management:  Anesthesia      Spontaneous Ventilation: absent    Sedation Level:  Deep  Preoxygenated: Yes    Mask Difficulty Assessment:  1 - vent by mask  Final Airway Type:  Supraglottic airway  Final Supraglottic Airway:  Standard LMA    SGA Size:  3  Number of Attempts at Approach:  1

## 2023-07-08 NOTE — ED TRIAGE NOTES
Chief Complaint   Patient presents with    Leg Pain     Pt BIB EMS from [facility]. Per report pt had GLF sometime earlier today. Had an image done and pt found to have no fx of left leg. Pt complaining of pain throughout the day a image performed and pt found to have fx of left hip, transferred to [facility] for further eval. Pt with mild dementia hx, GCS14, -head trauma, -thinners. Pt reports touse a walker to ambulate and fell.  PTA.

## 2023-07-08 NOTE — ED PROVIDER NOTES
ED Provider Note    Scribed for Joel Anand by Francois Flores. 7/7/2023  7:24 PM    Primary care provider: Toña Tejada P.A.-C.  Means of arrival: EMS  History obtained from: Patient  History limited by: None    CHIEF COMPLAINT  Chief Complaint   Patient presents with    Leg Pain     Pt BIB EMS from [facility] and [facility]. Per report pt had GLF sometime earlier today. Had an image done and pt found to have no fx of left leg. Pt complaining of pain throughout the day a image performed and pt found to have fx of left hip, transferred to [facility] for further eval. Pt with mild dementia hx, GCS14, -head trauma, -thinners. Pt reports touse a walker to ambulate and fell.  PTA.     EXTERNAL RECORDS REVIEWED  Inpatient Notes admitted for 8 days in May for severe sepsis and altered level of consciousness. History of hypertension, alcohol/tobacco abuse, PE, DVT. No longer on Xarelto due to GI bleeds. Was diagnosed with UTI    HPI/ROS    LIMITATION TO HISTORY   Select: : None    HPI  Jaymie Peacock is a 67 y.o. female who presents to the Emergency Department for leg pain onset yesterday. 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 Today she was seen at [facility] where she had a negative x-ray of the left leg, she continued to complain of leg pain and another x-ray showed a fracture of the left hip. She denies any alcohol or tobacco use. She denies any drug use. She has a histry of DVT and GI bleeding, is no longer on Xarelto for this.     REVIEW OF SYSTEMS  As above, all other systems reviewed and are negative.   See HPI for further details.     PAST MEDICAL HISTORY   has a past medical history of DVT (deep venous thrombosis) (HCC), ETOH abuse, GI bleed, High cholesterol, Hypertension, Osteoporosis, Pulmonary embolism (HCC), and Smoking.    SURGICAL HISTORY   has a past surgical history that includes us-needle core bx-breast panel (Right); upper gi  "endoscopy,diagnosis (04/21/2022); colonoscopy,diagnostic (04/21/2022); upper gi endoscopy,biopsy (04/21/2022); upper gi endoscopy,ctrl bleed (04/21/2022); upper gi endoscopy,ctrl bleed (N/A, 05/11/2022); gastroscopy w/push enterscopy (N/A, 05/11/2022); hip replacement, partial (Right); upper gi endoscopy,diagnosis (N/A, 6/15/2022); upper gi endoscopy,diagnosis (N/A, 7/31/2022); upper gi endoscopy,scler inject (N/A, 7/31/2022); upper gi endoscopy,ctrl bleed (N/A, 7/31/2022); upper gi endoscopy,diagnosis (N/A, 8/20/2022); and upper gi endoscopy,ctrl bleed (N/A, 8/20/2022).    SOCIAL HISTORY  Social History     Tobacco Use    Smoking status: Every Day     Packs/day: 0.50     Types: Cigarettes    Smokeless tobacco: Never   Vaping Use    Vaping Use: Never used   Substance Use Topics    Alcohol use: Yes     Alcohol/week: 9.6 oz     Types: 16 Shots of liquor per week     Comment: vodka daily    Drug use: Not Currently      Social History     Substance and Sexual Activity   Drug Use Not Currently     FAMILY HISTORY  None noted    CURRENT MEDICATIONS  Current Outpatient Medications   Medication Instructions    omeprazole (PRILOSEC) 20 mg, Oral, DAILY     ALLERGIES  No Known Allergies    PHYSICAL EXAM    VITAL SIGNS:   Vitals:    07/07/23 1832 07/07/23 1924 07/07/23 2003 07/07/23 2119   BP: 118/70 134/78 139/79 126/76   Pulse: (!) 118 (!) 109 (!) 109 (!) 108   Resp: 18 17 13 16   Temp: 37.2 °C (99 °F) 36.9 °C (98.4 °F)  36.6 °C (97.9 °F)   TempSrc: Temporal Temporal  Temporal   SpO2: 99% 95% 93% 92%   Weight: 48.1 kg (106 lb)      Height: 1.676 m (5' 6\")        Vitals: My interpretation: normotensive, tachycardic, afebrile, not hypoxic    Reinterpretation of vitals: Otherwise unremarkable other than tachycardia    Cardiac Monitor Interpretation: The cardiac monitor revealed normal Sinus Rhythm as interpreted by me. The cardiac monitor was ordered secondary to the patient's history of leg pain and to monitor for dysrhythmia " and/or tachycardia.    PE:   Gen: sitting comfortably, speaking clearly, appears in no acute distress   ENT: Mucous membranes moist, posterior pharynx clear, uvula midline, nares patent bilaterally   Neck: Supple, FROM  Pulmonary: Lungs are clear to auscultation bilaterally. No tachypnea  CV:  RRR, no murmur appreciated, pulses 2+ in both upper and lower extremities  Abdomen: soft, NT/ND; no rebound/guarding  : no CVA or suprapubic tenderness   Musco: Decreased ROM of the left hip secondary to pain. No obvious deformity but generalized tenderness of the left hip. NVI distally. No other signs of trauma.   Neuro: A&Ox4 (person, place, time, situation), speech fluent, gait steady, no focal deficits appreciated  Skin: No rash or lesions.  No pallor or jaundice.  No cyanosis.  Warm and dry.     DIAGNOSTIC STUDIES / PROCEDURES    LABS  Results for orders placed or performed during the hospital encounter of 07/07/23   CBC WITH DIFFERENTIAL   Result Value Ref Range    WBC 11.5 (H) 4.8 - 10.8 K/uL    RBC 2.72 (L) 4.20 - 5.40 M/uL    Hemoglobin 8.5 (L) 12.0 - 16.0 g/dL    Hematocrit 28.0 (L) 37.0 - 47.0 %    .9 (H) 81.4 - 97.8 fL    MCH 31.3 27.0 - 33.0 pg    MCHC 30.4 (L) 32.2 - 35.5 g/dL    RDW 61.7 (H) 35.9 - 50.0 fL    Platelet Count 328 164 - 446 K/uL    MPV 10.8 9.0 - 12.9 fL    Neutrophils-Polys 83.80 (H) 44.00 - 72.00 %    Lymphocytes 8.50 (L) 22.00 - 41.00 %    Monocytes 6.80 0.00 - 13.40 %    Eosinophils 0.20 0.00 - 6.90 %    Basophils 0.30 0.00 - 1.80 %    Immature Granulocytes 0.40 0.00 - 0.90 %    Nucleated RBC 0.00 0.00 - 0.20 /100 WBC    Neutrophils (Absolute) 9.60 (H) 1.82 - 7.42 K/uL    Lymphs (Absolute) 0.98 (L) 1.00 - 4.80 K/uL    Monos (Absolute) 0.78 0.00 - 0.85 K/uL    Eos (Absolute) 0.02 0.00 - 0.51 K/uL    Baso (Absolute) 0.04 0.00 - 0.12 K/uL    Immature Granulocytes (abs) 0.05 0.00 - 0.11 K/uL    NRBC (Absolute) 0.00 K/uL   COMP METABOLIC PANEL   Result Value Ref Range    Sodium 136 135 -  145 mmol/L    Potassium 3.9 3.6 - 5.5 mmol/L    Chloride 103 96 - 112 mmol/L    Co2 20 20 - 33 mmol/L    Anion Gap 13.0 7.0 - 16.0    Glucose 107 (H) 65 - 99 mg/dL    Bun 12 8 - 22 mg/dL    Creatinine 1.01 0.50 - 1.40 mg/dL    Calcium 9.3 8.5 - 10.5 mg/dL    AST(SGOT) 26 12 - 45 U/L    ALT(SGPT) 11 2 - 50 U/L    Alkaline Phosphatase 157 (H) 30 - 99 U/L    Total Bilirubin 0.6 0.1 - 1.5 mg/dL    Albumin 3.2 3.2 - 4.9 g/dL    Total Protein 6.6 6.0 - 8.2 g/dL    Globulin 3.4 1.9 - 3.5 g/dL    A-G Ratio 0.9 g/dL   TROPONIN   Result Value Ref Range    Troponin T 14 6 - 19 ng/L   DIAGNOSTIC ALCOHOL   Result Value Ref Range    Diagnostic Alcohol <10.1 <10.1 mg/dL   CORRECTED CALCIUM   Result Value Ref Range    Correct Calcium 9.9 8.5 - 10.5 mg/dL   ESTIMATED GFR   Result Value Ref Range    GFR (CKD-EPI) 60 >60 mL/min/1.73 m 2   EKG (NOW)   Result Value Ref Range    Report       [facility] Emergency Dept.    Test Date:  2023  Pt Name:    VALENTINE ALANIZ                Department: ER  MRN:        [id number]                      Room:       BL 15  Gender:     Female                       Technician: [id number]  :        [id number]                   Requested By:ABBI SCHILLING  Order #:    [id number]                    Reading MD:    Measurements  Intervals                                Axis  Rate:       110                          P:          71  IL:         167                          QRS:        50  QRSD:       86                           T:          26  QT:         320  QTc:        433    Interpretive Statements  Sinus tachycardia  Low voltage, extremity leads  Compared to ECG 2023 14:29:29  Low QRS voltage now present  Sinus rhythm no longer present  ST (T wave) deviation no longer present  Possible ischemia no longer present        All labs reviewed by me. Labs were compared to prior labs if they were available. Significant for mild leukocytosis, moderate anemia, electrolytes unremarkable,  normal glucose, normal renal function, normal liver enzymes, no bilirubin, troponin negative, alcohol negative.    RADIOLOGY  I have independently interpreted the diagnostic imaging associated with this visit and am waiting the final reading from the radiologist.   My preliminary interpretation is a follows: Obvious left hip fracture  Radiologist interpretation is as follows:  DX-FEMUR-2+ LEFT   Final Result      1.  Mildly displaced intertrochanteric left femoral fracture.   2.  Mild to moderate degenerative changes of the left hip joint.   3.  Osteopenia.      DX-PELVIS-1 OR 2 VIEWS   Final Result      1.  Mildly displaced intertrochanteric left femoral fracture.   2.  Mild to moderate degenerative changes of the left hip joint.   3.  Osteopenia.      DX-CHEST-LIMITED (1 VIEW)   Final Result      1.  No acute cardiac or pulmonary abnormalities are identified.   2.  Nodular opacity at the peripheral right base, likely nipple shadow.. If there is clinical concern, exam can be repeated with nipple markers.        Ultrasound Guided Fascia Iliaca Block Note: CPT code 44807 [Injection, anesthetic agent (femoral nerve, single)]    Indication: Pain control   Consent: I have discussed with the patient and/or the patient representative the indication, alternatives, and the possible risks and/or complications of the planned procedure and the anesthesia methods. The patient and/or patient representative appear to understand and agree to proceed.  Procedure: The patient was placed in the appropriate position. The procedure was performed under ultrasound guidance of the left hip with a fascia iliaca block. The area was sterilized using ChloraPrep. Anaesthesia was obtained using 30 cc of 0.5% Bupivacaine with epinephrine.   The patient tolerated the procedure well.  Complications: None    Ultrasound guidance was used during this procedure for visualization of needle placement and injection location   CPT code 37816 [Ultrasonic  guidance for needle placement (e.g. biopsy, aspiration, injection, localization device) imaging supervision and interpretation]    COURSE & MEDICAL DECISION MAKING  Nursing notes, VS, PMSFHx, labs, imaging, EKG reviewed in chart.    Heart Score: Monitor for symptom management and diagnostic results      ED Observation Status? Yes; I am placing the patient in to an observation status due to a diagnostic uncertainty as well as therapeutic intensity. Patient placed in observation status at 7:34 PM, 7/7/2023.     Observation plan is as follows: Monitor for symptom management and diagnostic results     Upon Reevaluation, the patient's condition has: not improved; and will be escalated to hospitalization.    Patient discharged from ED Observation status at 7:57 PM (Time) 7/7/2023 (Date).     Ddx: strain vs sprain vs fracture vs dislocation    MDM: 7:25 PM Jaymie Peacock is a 67 y.o. female who presented with acute severe left hip pain after ground-level fall last night and this morning.  Patient no longer able to ambulate on it.  Was brought him for evaluation.  She has a history of heavy alcohol abuse, some questionable dementia and comments earlier on admissions of possible Warnicke's encephalopathy.  She has obvious left hip pain and hip pain fracture order set ordered.  Independent interpretation of her hip x-ray shows an intertrochanteric left femoral fracture.  Patient was having significant pain and thus underwent a fascia iliac a block, see procedure note.  She had near complete relief of pain after block was done and did not require opiates.  Her vital signs do show some mild tachycardia but otherwise unremarkable, she was started on IV fluids, I suspect dehydration and concomitant chronic alcohol use.  Her other x-rays are fairly unremarkable including her chest and the rest of her completion femur x-ray.  Preoperative EKG was ordered which was unremarkable for ischemic changes.  Preoperative labs were  ordered.  All labs reviewed by me. Labs were compared to prior labs if they were available. Significant for mild leukocytosis, moderate anemia, electrolytes unremarkable, normal glucose, normal renal function, normal liver enzymes, no bilirubin, troponin negative, alcohol negative.  Likely anemia secondary to chronic disease and alcoholism as she has been anemic in the past.  Discussed with orthopedic surgery and they will see the patient first in the morning for operative fixation.  Patient will be admitted to the hospitalist for medical management considering her multiple comorbidities.  Thiamine ordered for possible Warnicke's.  Patient resting comfortably after fascia iliaca block and verbalized understanding strict return precautions outpatient follow-up plan.    HYDRATION: Based on the patient's presentation of Tachycardia the patient was given IV fluids. IV Hydration was used because oral hydration was not adequate alone. Upon recheck following hydration, the patient was improved.     ADDITIONAL PROBLEM LIST AND DISPOSITION    I have discussed management of the patient with the following physicians and INGA's:  Dr. Singletary (Ortho), Dr. Rowan (Hospitalist)    Discussion of management with other [facility] or appropriate source(s): None     Barriers to care at this time, including but not limited to: None    FINAL IMPRESSION  1. Closed fracture of left hip, initial encounter (HCC) Acute   2. Left leg pain Acute   3. Fall from ground level Acute   4. History of alcohol abuse Acute      Francois HORTON (Melvin), am scribing for, and in the presence of, Joel Anand.    Electronically signed by: Francois Flores (Melvin), 7/7/2023    IJoel personally performed the services described in this documentation, as scribed by Francois Flores in my presence, and it is both accurate and complete.    The note accurately reflects work and decisions made by me.  Joel Anand  7/8/2023  12:12 AM

## 2023-07-08 NOTE — ASSESSMENT & PLAN NOTE
Patient has had DVT as well as pulm embolism in the past.  Was on Xarelto in the past then had 2 admissions for acute blood loss anemia secondary to Crystal-Payan tears that were treated in July and August 2022.  Then patient was discharged with PPI, Carafate and resumed on her Xarelto.  On repeat EGD known to have angiodysplasia of the esophagus thus IVC filter was placed and patient was discontinued off of Xarelto.    > IVC filter placed (2022). As per chart review, pt discussed with vascular team and opted to keep IVC filter in place permanently. (see 11/29/2022 note)  -Per Ortho: DVT prophylaxis - SCD's and Lovenox 40 mg SQ daily while inpatient.  The patient may transition to Aspirin 325 mg PO BID as an outpatient. Will discuss plan for ASA on discharge with Ortho given the patient's hx of GI bleed requiring urgent transfusion.  - NSAID's discontinued given hx of GI bleed

## 2023-07-08 NOTE — ANESTHESIA POSTPROCEDURE EVALUATION
Patient: Jaymie Peacock    Procedure Summary     Date: 07/08/23 Room / Location: [place] [address] / SURGERY [facility]    Anesthesia Start:  Anesthesia Stop:     Procedure: INSERTION, INTRAMEDULLARY LIAN, FEMUR, PROXIMAL (Left) Diagnosis: (left intertrochanteric femur fracture)    Surgeons: Surya Singletary M.D. Responsible Provider:     Anesthesia Type: general ASA Status: 3          Final Anesthesia Type: general  Last vitals  BP   Blood Pressure : 128/71    Temp   35.9 °C (96.6 °F)    Pulse   91   Resp   12    SpO2   100 %      Anesthesia Post Evaluation    Patient location during evaluation: PACU  Patient participation: complete - patient participated  Level of consciousness: awake and alert    Airway patency: patent  Anesthetic complications: no  Cardiovascular status: hemodynamically stable  Respiratory status: acceptable  Hydration status: euvolemic    PONV: none          No notable events documented.     Nurse Pain Score: 7 (NPRS)

## 2023-07-08 NOTE — OR NURSING
Per floor nurse pt oriented x2. Upon arrival to PreOp pt oriented x 4 and consentable. Able to describe surgery and answer questions appropriately.

## 2023-07-08 NOTE — PROGRESS NOTES
4 Eyes Skin Assessment Completed by ELROY Ho and ELROY Monge.    Head WDL  Ears WDL  Nose WDL  Mouth WDL  Neck WDL  Breast/Chest WDL  Shoulder Blades WDL  Spine WDL  (R) Arm/Elbow/Hand WDL  (L) Arm/Elbow/Hand L lower FA skin tear   Abdomen WDL  Groin WDL  Scrotum/Coccyx/Buttocks Redness and Blanching  (R) Leg WDL  (L) Leg WDL- L hip fx  (R) Heel/Foot/Toe Swelling  (L) Heel/Foot/Toe Swelling          Devices In Places Blood Pressure Cuff      Interventions In Place Pillows in use for comfort and support.    Possible Skin Injury Yes    Pictures Uploaded Into Epic N/A  Wound Consult Placed N/A  RN Wound Prevention Protocol Ordered No

## 2023-07-08 NOTE — ASSESSMENT & PLAN NOTE
Patient with anemia hemoglobin 8.5 on admission.  History of GI bleed in the past and Crystal-Payan tears. On last admission patient's hemoglobin ranged in the 7 or 8's. Post surgery patient had a Hb drop to 6.9 so got 1U blood.  .    > s/p transfusion 1U PRBC on 7/8 due to Hb 6.9  > Macrocytic   > Iron studies show low iron and TIBC, Ferrtin elevated   - Likely due to low food intake  - Cont. Multivitiman, folate supplement

## 2023-07-08 NOTE — ANESTHESIA TIME REPORT
Anesthesia Start and Stop Event Times     Date Time Event    7/8/2023 1048 Ready for Procedure     1109 Anesthesia Start     1148 Anesthesia Stop        Responsible Staff  07/08/23    Name Role Begin End    Ena Blackwell M.D. Anesth 1109 1148        Overtime Reason:  no overtime (within assigned shift)    Comments:

## 2023-07-08 NOTE — CARE PLAN
The patient is Stable - Low risk of patient condition declining or worsening    Shift Goals  Clinical Goals: npo for procedure, pain management, comfort, safety  Patient Goals: procedure, rest  Family Goals: n/a    Progress made toward(s) clinical / shift goals    Problem: Pain - Standard  Goal: Alleviation of pain or a reduction in pain to the patient’s comfort goal  Outcome: Progressing  Pt found moaning with movement, but denies pain at rest. Pt denies the need for prn pain medication. Pt resting comfortable.      Problem: Knowledge Deficit - Standard  Goal: Patient and family/care givers will demonstrate understanding of plan of care, disease process/condition, diagnostic tests and medications  Outcome: Progressing  Scheduled medications administered per MAR. Detox IVF at 250 ml/h. A&Ox2, confused in conversation. Plan of care reviewed with the pt. Pt states she verbally understands, reinforcement needed. Reoriented Prn. Pt kept npo for procedure today.    Problem: Fall Risk  Goal: Patient will remain free from falls  Outcome: Progressing  Fall and safety education provided to the pt and precautions maintained. Bed kept in lowest position, call light within reach, and bed alarm on. Pt calls appropriately. Pt remains free from falls.      Patient is not progressing towards the following goals:

## 2023-07-08 NOTE — PROGRESS NOTES
Patient back on unit from surgery.   Drowsy, but arousable to voice. Oriented to self.  Pt updated on POC. All needs met at this time. Pt is instructed to call when in need of assistance. Bed in lowest and locked position. Call light within reach.

## 2023-07-08 NOTE — OR NURSING
1144: Pt arrives to PACU asleep and calm. VSS. 3 dressings to left hip all CDI. Ice pack applied.  Left pedal pulse 2+.     1300: Dressing CDI. Pt states pain feels okay and is tolerable for her. Report called to Anna Marie ABBASI.

## 2023-07-08 NOTE — ASSESSMENT & PLAN NOTE
History alcohol abuse reports drinking 5-6 drinks minimum a day. Last drink per patient 3 days ago, also said 2 weeks ago though she has been at [facility] so unsure of timeframe.  Has gone through withdrawal in the past but denies any history of seizures.        > ETOH neg on admission  > Detox bag on admission  - Cont. with daily vitamins orally  - Consider MRI for diagnosis of Wernicke-Korsakoff

## 2023-07-08 NOTE — ASSESSMENT & PLAN NOTE
Patient reports fall Monday of this week with significant pain and discomfort but reported by EMS who brought her in was patient fell this a.m at [facility] with continued pain afterwards.  No LOC no head trauma.  X-ray with intertrochanteric fracture of the left hip    > s/p surgery left intertrochanteric femur fracture with intramedullary device 7/8 w Dr. Singletary  - Bear weight as tolerated - gait aids (crutch or crutches, cane, walker) may be used as needed, and may be discontinued when no longer required.  - DVT prophylaxis - SCD's and Lovenox 40 mg SQ daily while inpatient.  The patient may transition to Aspirin 325 mg PO BID as an outpatient  -PT OT rec SNF upon discharge  -PM&R states patient is not a candidate for IRF level therapy due to lack of support and likely need for supervision and recommends return to [facility] upon discharge

## 2023-07-08 NOTE — THERAPY
Occupational Therapy Contact Note    Patient Name: Jaymie Peacock  Age:  67 y.o., Sex:  female  Medical Record #: [id number]  Today's Date: 7/8/2023 07/08/23 0751   Initial Contact Note    Initial Contact Note Order Received and Verified, Occupational Therapy Evaluation in Progress with Full Report to Follow.   Interdisciplinary Plan of Care Collaboration   Collaboration Comments OT order received hold for sx today will follow up post op as medically indicated   Session Information   Date / Session Number  7/8 HOLD sx  (needs eval)

## 2023-07-08 NOTE — ASSESSMENT & PLAN NOTE
Patient with significant amount of alcohol drinking per day which is the likely source of malnutrition. BMI 17.1  States she only eats 1 meal/day  Has been on mirtazapine in the past    - Nutrition consulted and following

## 2023-07-08 NOTE — DISCHARGE PLANNING
Agency/Facility name: [facility]  Spoke to: Rosa Aguilar  Outcome: Facility can take the Pt back tomorrow, will need a referrral

## 2023-07-08 NOTE — OP REPORT
DATE OF OPERATION: 7/8/2023     PREOPERATIVE DIAGNOSIS: Left intertrochanteric femur fracture    POSTOPERATIVE DIAGNOSIS: Same    PROCEDURE PERFORMED: Surgical treatment of left intertrochanteric femur fracture with intramedullary device    SURGEON: Surya Singletary M.D.     ASSISTANT: None    ANESTHESIA: General    ESTIMATED BLOOD LOSS: 25 mL    INDICATIONS: The patient is a 67 y.o. female with a left intertrochanteric femur fracture resulting from a ground level fall.  The patient denies antecedent pain, and was found to have a normal neurovascular exam and skin envelope.  Radiographs reviewed by myself demonstrated the intertrochanteric femur fracture.  Given these findings, surgical treatment of the intertrochanteric fracture with an intramedullary device was indicated.  I discussed the risks and benefits of the procedure, including the risks of infection, wound healing complication, neurovascular injury, persistent hip pain, malunion, non-union, malrotation, and the medical risks of anesthesia including DVT, PE, MI, stroke, and death.  Benefits include early mobilization, improved chance of union, and reduction in the medical risks of hip fractures.  Alternatives to surgery were also discussed, including non-operative management.  The patient signed the informed consent and the operative extremity was marked.      PROCEDURE:  The patient underwent anesthesia, and was positioned supine on the fracture table and all bony prominences were well padded.  Preoperative antibiotics were administered. Sequential compression devices were employed.  Preoperative imaging was obtained, demonstrated reduction of the fracture using the table. The correct operative site was prepped and draped into a sterile field. A procedural pause was conducted to verify correct patient, correct extremity, presence of the surgeons initials on the operative extremity.    The guide pin for the OIC hip nail was placed into the tip of the  greater trochanter and advanced under fluoroscopic guidance to the appropriate position. An incision was made around the pin, and the entry reamer was then used to gain access to the femoral canal.  The guide pin was then removed.    A long ball-tip guide wire was advanced down the femur to the knee, its position confirmed on fluoroscopy.  We then measured for, and selected a 180 x 11 x 127.5 OIC hip nail.  We then sequentially reamed to a size 12.5 mm reamer, and advanced the nail over the guide pin to an appropriate depth, confirmed by fluoroscopy.    The soft tissue sleeve for the lag screw was then advanced down to bone through a lateral thigh incision.  The guide pin was advanced to a central position within the femoral neck/head, confirmed by fluoroscopy.  We measured our lag screw and reamed for our lag screw.  We then placed the lag screw by hand.  The fracture was compressed, and the set screw was locked proximally.  All instruments were removed from the proximal femur, and final fluoroscopic images obtained.    We then obtained perfect [place] imaging distally, and placed one statically interlocked distal screw, obtaining good bicortical purchase.    All wounds were irrigated  with normal saline, and closed in layers, with 2-0 Vicryl in the subcutaneous tissue, and staples in the skin.  Sterile dressings were applied.       The patient tolerated the procedure well. There were no apparent complications. All sponge, needle, and instrument counts were correct on two separate occasions. She was awakened, extubated, and transferred to the recovery room in satisfactory condition.     Post-Operative Plan:    1.  The patient should bear weight as tolerated on their operative extremity.  Gait aids (crutch or crutches, cane, walker) may be used as needed, and may be discontinued when no longer required.  2.  IV antibiotics - may be continued for 24 hours, but are not required.  3.  DVT prophylaxis - SCD's and Lovenox  40 mg SQ daily while inpatient.  The patient may transition to Aspirin 325 mg PO BID as an outpatient  4.  Discharge planning  ____________________________________   Surya Singletary M.D.   DD: 7/8/2023  11:44 AM

## 2023-07-08 NOTE — ANESTHESIA PREPROCEDURE EVALUATION
Case: [id number] Date/Time: 07/08/23 1032    Procedure: INSERTION, INTRAMEDULLARY LIAN, FEMUR, PROXIMAL (Left)    Location: [facility] OR 15 / SURGERY [facility]    Surgeons: Surya Singletary M.D.        64 yo F with history of dilated cardiomyopathy, HFrEF, NSTEMI, COPD, alcohol abuse presents after GLF. Denies problems with anesthesia in the past.  No current CP/SOB/N/V symptoms.    3/2022 TTE:  CONCLUSIONS  Compared to the prior echo on 02/19/22, ejection fraction is improved.  The left ventricular ejection fraction is visually estimated to be 55%.  Normal diastolic function.  Normal right ventricular size and systolic function.  Estimated right ventricular systolic pressure is 30 mmHg.     NPO  Relevant Problems   PULMONARY   (positive) Emphysema lung (HCC)   (positive) Necrotizing pneumonia (HCC)      CARDIAC   (positive) Acute deep vein thrombosis (DVT) of left lower extremity with PTE    (positive) DVT (deep venous thrombosis) (HCC)   (positive) NSTEMI (non-ST elevation myocardial infarction) (HCC)         (positive) YAMIL (acute kidney injury) ([facility])   (positive) Alcoholic hepatitis      Other   (positive) Alcohol abuse   (positive) Closed fracture of left hip requiring operative repair, initial encounter ([facility])   (positive) Dilated cardiomyopathy (HCC)   (positive) HFrEF (heart failure with reduced ejection fraction) ([facility])   (positive) History of pulmonary embolism   (positive) Other specified anemias   (positive) Severe protein-calorie malnutrition ([place]: less than 60% of standard weight) ([facility])   (positive) Smoking       Physical Exam    Airway   Mallampati: II  TM distance: >3 FB  Neck ROM: full       Cardiovascular - normal exam  Rhythm: regular  Rate: normal  (-) murmur     Dental - normal exam           Pulmonary - normal exam  Breath sounds clear to auscultation     Abdominal    Neurological - normal exam                 Anesthesia Plan    ASA 3   ASA physical status 3 criteria: alcohol and/or substance  dependence or abuse, COPD - poorly controlled and CAD/stents (> 3 months)    Plan - general       Airway plan will be LMA          Induction: intravenous    Postoperative Plan: Postoperative administration of opioids is intended.    Pertinent diagnostic labs and testing reviewed    Informed Consent:    Anesthetic plan and risks discussed with patient.    Use of blood products discussed with: patient whom consented to blood products.

## 2023-07-09 PROBLEM — F03.90 DEMENTIA (HCC): Status: ACTIVE | Noted: 2023-07-09

## 2023-07-09 LAB
ABO GROUP BLD: NORMAL
ALBUMIN SERPL BCP-MCNC: 2.5 G/DL (ref 3.2–4.9)
ALBUMIN/GLOB SERPL: 0.8 G/DL
ALP SERPL-CCNC: 128 U/L (ref 30–99)
ALT SERPL-CCNC: 13 U/L (ref 2–50)
ANION GAP SERPL CALC-SCNC: 12 MMOL/L (ref 7–16)
AST SERPL-CCNC: 27 U/L (ref 12–45)
BARCODED ABORH UBTYP: 6200
BARCODED PRD CODE UBPRD: NORMAL
BARCODED UNIT NUM UBUNT: NORMAL
BILIRUB SERPL-MCNC: 0.6 MG/DL (ref 0.1–1.5)
BLD GP AB SCN SERPL QL: NORMAL
BUN SERPL-MCNC: 16 MG/DL (ref 8–22)
CALCIUM ALBUM COR SERPL-MCNC: 9.7 MG/DL (ref 8.5–10.5)
CALCIUM SERPL-MCNC: 8.5 MG/DL (ref 8.5–10.5)
CHLORIDE SERPL-SCNC: 106 MMOL/L (ref 96–112)
CO2 SERPL-SCNC: 21 MMOL/L (ref 20–33)
COMPONENT R 8504R: NORMAL
CREAT SERPL-MCNC: 1.48 MG/DL (ref 0.5–1.4)
ERYTHROCYTE [DISTWIDTH] IN BLOOD BY AUTOMATED COUNT: 59.3 FL (ref 35.9–50)
ERYTHROCYTE [DISTWIDTH] IN BLOOD BY AUTOMATED COUNT: 59.7 FL (ref 35.9–50)
FERRITIN SERPL-MCNC: 705 NG/ML (ref 10–291)
GFR SERPLBLD CREATININE-BSD FMLA CKD-EPI: 38 ML/MIN/1.73 M 2
GLOBULIN SER CALC-MCNC: 3.3 G/DL (ref 1.9–3.5)
GLUCOSE SERPL-MCNC: 102 MG/DL (ref 65–99)
HCT VFR BLD AUTO: 21.2 % (ref 37–47)
HCT VFR BLD AUTO: 27.5 % (ref 37–47)
HGB BLD-MCNC: 6.9 G/DL (ref 12–16)
HGB BLD-MCNC: 8.9 G/DL (ref 12–16)
MAGNESIUM SERPL-MCNC: 2.3 MG/DL (ref 1.5–2.5)
MCH RBC QN AUTO: 30.9 PG (ref 27–33)
MCH RBC QN AUTO: 31.4 PG (ref 27–33)
MCHC RBC AUTO-ENTMCNC: 32.4 G/DL (ref 32.2–35.5)
MCHC RBC AUTO-ENTMCNC: 32.5 G/DL (ref 32.2–35.5)
MCV RBC AUTO: 95.5 FL (ref 81.4–97.8)
MCV RBC AUTO: 96.4 FL (ref 81.4–97.8)
PHOSPHATE SERPL-MCNC: 3.7 MG/DL (ref 2.5–4.5)
PLATELET # BLD AUTO: 347 K/UL (ref 164–446)
PLATELET # BLD AUTO: 373 K/UL (ref 164–446)
PMV BLD AUTO: 10.3 FL (ref 9–12.9)
PMV BLD AUTO: 10.9 FL (ref 9–12.9)
POTASSIUM SERPL-SCNC: 3.8 MMOL/L (ref 3.6–5.5)
PRODUCT TYPE UPROD: NORMAL
PROT SERPL-MCNC: 5.8 G/DL (ref 6–8.2)
RBC # BLD AUTO: 2.2 M/UL (ref 4.2–5.4)
RBC # BLD AUTO: 2.88 M/UL (ref 4.2–5.4)
RH BLD: NORMAL
SODIUM SERPL-SCNC: 139 MMOL/L (ref 135–145)
UNIT STATUS USTAT: NORMAL
WBC # BLD AUTO: 11.1 K/UL (ref 4.8–10.8)
WBC # BLD AUTO: 12.5 K/UL (ref 4.8–10.8)

## 2023-07-09 PROCEDURE — 97162 PT EVAL MOD COMPLEX 30 MIN: CPT

## 2023-07-09 PROCEDURE — 97167 OT EVAL HIGH COMPLEX 60 MIN: CPT

## 2023-07-09 PROCEDURE — 82728 ASSAY OF FERRITIN: CPT

## 2023-07-09 PROCEDURE — 84100 ASSAY OF PHOSPHORUS: CPT

## 2023-07-09 PROCEDURE — 86901 BLOOD TYPING SEROLOGIC RH(D): CPT

## 2023-07-09 PROCEDURE — A9270 NON-COVERED ITEM OR SERVICE: HCPCS | Performed by: STUDENT IN AN ORGANIZED HEALTH CARE EDUCATION/TRAINING PROGRAM

## 2023-07-09 PROCEDURE — 86923 COMPATIBILITY TEST ELECTRIC: CPT

## 2023-07-09 PROCEDURE — 83735 ASSAY OF MAGNESIUM: CPT

## 2023-07-09 PROCEDURE — 85027 COMPLETE CBC AUTOMATED: CPT

## 2023-07-09 PROCEDURE — 99232 SBSQ HOSP IP/OBS MODERATE 35: CPT | Mod: GC | Performed by: FAMILY MEDICINE

## 2023-07-09 PROCEDURE — 80053 COMPREHEN METABOLIC PANEL: CPT

## 2023-07-09 PROCEDURE — A9270 NON-COVERED ITEM OR SERVICE: HCPCS | Performed by: ORTHOPAEDIC SURGERY

## 2023-07-09 PROCEDURE — P9016 RBC LEUKOCYTES REDUCED: HCPCS

## 2023-07-09 PROCEDURE — 700102 HCHG RX REV CODE 250 W/ 637 OVERRIDE(OP): Performed by: STUDENT IN AN ORGANIZED HEALTH CARE EDUCATION/TRAINING PROGRAM

## 2023-07-09 PROCEDURE — 36415 COLL VENOUS BLD VENIPUNCTURE: CPT

## 2023-07-09 PROCEDURE — 99223 1ST HOSP IP/OBS HIGH 75: CPT | Performed by: PHYSICAL MEDICINE & REHABILITATION

## 2023-07-09 PROCEDURE — 770001 HCHG ROOM/CARE - MED/SURG/GYN PRIV*

## 2023-07-09 PROCEDURE — 86900 BLOOD TYPING SEROLOGIC ABO: CPT

## 2023-07-09 PROCEDURE — 700111 HCHG RX REV CODE 636 W/ 250 OVERRIDE (IP): Performed by: ORTHOPAEDIC SURGERY

## 2023-07-09 PROCEDURE — 700102 HCHG RX REV CODE 250 W/ 637 OVERRIDE(OP): Performed by: ORTHOPAEDIC SURGERY

## 2023-07-09 PROCEDURE — 86850 RBC ANTIBODY SCREEN: CPT

## 2023-07-09 PROCEDURE — 36430 TRANSFUSION BLD/BLD COMPNT: CPT

## 2023-07-09 RX ADMIN — ACETAMINOPHEN 650 MG: 325 TABLET, FILM COATED ORAL at 18:14

## 2023-07-09 RX ADMIN — Medication 100 MG: at 04:56

## 2023-07-09 RX ADMIN — DOCUSATE SODIUM 100 MG: 100 CAPSULE, LIQUID FILLED ORAL at 18:14

## 2023-07-09 RX ADMIN — KETOROLAC TROMETHAMINE 15 MG: 30 INJECTION, SOLUTION INTRAMUSCULAR; INTRAVENOUS at 12:30

## 2023-07-09 RX ADMIN — THERA TABS 1 TABLET: TAB at 04:56

## 2023-07-09 RX ADMIN — KETOROLAC TROMETHAMINE 15 MG: 30 INJECTION, SOLUTION INTRAMUSCULAR; INTRAVENOUS at 04:56

## 2023-07-09 RX ADMIN — ENOXAPARIN SODIUM 40 MG: 100 INJECTION SUBCUTANEOUS at 18:14

## 2023-07-09 RX ADMIN — ACETAMINOPHEN 650 MG: 325 TABLET, FILM COATED ORAL at 12:30

## 2023-07-09 RX ADMIN — FOLIC ACID 1 MG: 1 TABLET ORAL at 04:56

## 2023-07-09 RX ADMIN — ACETAMINOPHEN 650 MG: 325 TABLET, FILM COATED ORAL at 04:56

## 2023-07-09 RX ADMIN — OMEPRAZOLE 20 MG: 20 CAPSULE, DELAYED RELEASE ORAL at 04:56

## 2023-07-09 ASSESSMENT — LIFESTYLE VARIABLES
ANXIETY: MILDLY ANXIOUS
AGITATION: NORMAL ACTIVITY
NAUSEA AND VOMITING: NO NAUSEA AND NO VOMITING
VISUAL DISTURBANCES: NOT PRESENT
AUDITORY DISTURBANCES: NOT PRESENT
VISUAL DISTURBANCES: NOT PRESENT
ORIENTATION AND CLOUDING OF SENSORIUM: CANNOT DO SERIAL ADDITIONS OR IS UNCERTAIN ABOUT DATE
AUDITORY DISTURBANCES: NOT PRESENT
TOTAL SCORE: 1
NAUSEA AND VOMITING: NO NAUSEA AND NO VOMITING
ORIENTATION AND CLOUDING OF SENSORIUM: CANNOT DO SERIAL ADDITIONS OR IS UNCERTAIN ABOUT DATE
PAROXYSMAL SWEATS: NO SWEAT VISIBLE
VISUAL DISTURBANCES: NOT PRESENT
AUDITORY DISTURBANCES: NOT PRESENT
ANXIETY: NO ANXIETY (AT EASE)
PAROXYSMAL SWEATS: NO SWEAT VISIBLE
ORIENTATION AND CLOUDING OF SENSORIUM: CANNOT DO SERIAL ADDITIONS OR IS UNCERTAIN ABOUT DATE
HEADACHE, FULLNESS IN HEAD: NOT PRESENT
AUDITORY DISTURBANCES: NOT PRESENT
HEADACHE, FULLNESS IN HEAD: NOT PRESENT
TREMOR: TREMOR NOT VISIBLE BUT CAN BE FELT, FINGERTIP TO FINGERTIP
TOTAL SCORE: 1
PAROXYSMAL SWEATS: NO SWEAT VISIBLE
NAUSEA AND VOMITING: NO NAUSEA AND NO VOMITING
TREMOR: NO TREMOR
VISUAL DISTURBANCES: NOT PRESENT
AUDITORY DISTURBANCES: NOT PRESENT
HEADACHE, FULLNESS IN HEAD: NOT PRESENT
NAUSEA AND VOMITING: NO NAUSEA AND NO VOMITING
AGITATION: NORMAL ACTIVITY
HEADACHE, FULLNESS IN HEAD: NOT PRESENT
TOTAL SCORE: 2
VISUAL DISTURBANCES: NOT PRESENT
PAROXYSMAL SWEATS: NO SWEAT VISIBLE
PAROXYSMAL SWEATS: NO SWEAT VISIBLE
AGITATION: SOMEWHAT MORE THAN NORMAL ACTIVITY
TOTAL SCORE: 3
NAUSEA AND VOMITING: NO NAUSEA AND NO VOMITING
ANXIETY: NO ANXIETY (AT EASE)
ANXIETY: MILDLY ANXIOUS
TREMOR: NO TREMOR
AGITATION: NORMAL ACTIVITY
NAUSEA AND VOMITING: NO NAUSEA AND NO VOMITING
ORIENTATION AND CLOUDING OF SENSORIUM: CANNOT DO SERIAL ADDITIONS OR IS UNCERTAIN ABOUT DATE
AUDITORY DISTURBANCES: NOT PRESENT
HEADACHE, FULLNESS IN HEAD: NOT PRESENT
VISUAL DISTURBANCES: NOT PRESENT
AGITATION: SOMEWHAT MORE THAN NORMAL ACTIVITY
ANXIETY: NO ANXIETY (AT EASE)
AGITATION: NORMAL ACTIVITY
PAROXYSMAL SWEATS: NO SWEAT VISIBLE
ORIENTATION AND CLOUDING OF SENSORIUM: CANNOT DO SERIAL ADDITIONS OR IS UNCERTAIN ABOUT DATE
HEADACHE, FULLNESS IN HEAD: NOT PRESENT
TREMOR: NO TREMOR
TOTAL SCORE: 4
ANXIETY: MILDLY ANXIOUS
ORIENTATION AND CLOUDING OF SENSORIUM: CANNOT DO SERIAL ADDITIONS OR IS UNCERTAIN ABOUT DATE
TREMOR: NO TREMOR
TOTAL SCORE: 1
TREMOR: NO TREMOR

## 2023-07-09 ASSESSMENT — GAIT ASSESSMENTS
GAIT LEVEL OF ASSIST: CONTACT GUARD ASSIST
DISTANCE (FEET): 20
ASSISTIVE DEVICE: FRONT WHEEL WALKER
DEVIATION: ANTALGIC;STEP TO

## 2023-07-09 ASSESSMENT — COGNITIVE AND FUNCTIONAL STATUS - GENERAL
PERSONAL GROOMING: A LITTLE
HELP NEEDED FOR BATHING: A LITTLE
DAILY ACTIVITIY SCORE: 19
MOVING TO AND FROM BED TO CHAIR: A LITTLE
SUGGESTED CMS G CODE MODIFIER MOBILITY: CK
SUGGESTED CMS G CODE MODIFIER DAILY ACTIVITY: CK
MOBILITY SCORE: 17
WALKING IN HOSPITAL ROOM: A LITTLE
TOILETING: A LITTLE
DRESSING REGULAR UPPER BODY CLOTHING: A LITTLE
CLIMB 3 TO 5 STEPS WITH RAILING: A LITTLE
DRESSING REGULAR LOWER BODY CLOTHING: A LITTLE
MOVING FROM LYING ON BACK TO SITTING ON SIDE OF FLAT BED: A LOT
TURNING FROM BACK TO SIDE WHILE IN FLAT BAD: A LITTLE
STANDING UP FROM CHAIR USING ARMS: A LITTLE

## 2023-07-09 ASSESSMENT — PAIN DESCRIPTION - PAIN TYPE
TYPE: ACUTE PAIN;SURGICAL PAIN
TYPE: ACUTE PAIN;SURGICAL PAIN

## 2023-07-09 ASSESSMENT — ACTIVITIES OF DAILY LIVING (ADL): TOILETING: REQUIRES ASSIST

## 2023-07-09 NOTE — THERAPY
"Occupational Therapy   Initial Evaluation     Patient Name: Jaymie Peacock  Age:  67 y.o., Sex:  female  Medical Record #: [id number]  Today's Date: 7/9/2023       Precautions: Fall Risk, Weight Bearing As Tolerated Left Lower Extremity  Comments: hx of ETOH abuse/use; impulsive/confused    Assessment  Patient is 67 y.o. female admitted after fall at SNF. PMHx: Alcohol abuse, YAMIL, NSTEMI, Encephalopathy, compression fx, DVT's, alcoholic hepatis, HFrEF and sepsis. Pt has been at SNF since 6/5. Pt also appears to have underlying cognitive impairments and is not a reliable historian.   This admission pt is dx w/closed fx of L hip s/p im nail WBAT , anemia, and severe protein-calorie malnutrition. Pt is demonstrating post pain, decreased balance and limited activity tolerance as well as generally poor attention and safety awareness. Recommend resuming rehabilitation at SNF.     Plan  Occupational Therapy Initial Treatment Plan   Treatment Interventions: Self Care / Activities of Daily Living, Adaptive Equipment, Cognitive Skill Development, Community Re-Integration, Manual Therapy Techniques, Neuro Re-Education / Balance, Therapeutic Exercises  Treatment Frequency: 3 Times per Week  Duration: Until Therapy Goals Met    DC Equipment Recommendations: Unable to determine at this time  Discharge Recommendations: Recommend post-acute placement for additional occupational therapy services prior to discharge home     Subjective  \" I just need my pants and I can go home\"      Objective     07/09/23 1010   Charge Group   OT Evaluation OT Evaluation High   Total Time Spent   OT Time Spent Yes   OT Evaluation (Minutes) 16   OT Total Time Spent (Calculated) 16   Initial Contact Note    Initial Contact Note Order Received and Verified, Occupational Therapy Evaluation in Progress with Full Report to Follow.   Prior Living Situation   Prior Services Other (Comments)   Housing / Facility Skilled Nursing Facility   Lives with - " Patient's Self Care Capacity Other (Comments)   Comments Pt most recently has been at SNF for rehabilitation since 6/5; Prior to that reports living alone w/friend/family assist intermittently pt is a poor historian   Prior Level of ADL Function   Self Feeding Requires Assist   Grooming / Hygiene Requires Assist   Bathing Requires Assist   Dressing Requires Assist   Toileting Requires Assist   Comments has been needing assist at SNF for at least the past month   Prior Level of IADL Function   Medication Management Requires Assist   Laundry Requires Assist   Kitchen Mobility Requires Assist   Finances Requires Assist   Home Management Requires Assist   Shopping Requires Assist   Prior Level Of Mobility Unable to Determine At This Time   Comments pt reports when living alone her friends/family would assist w/IADL's   History of Falls   History of Falls Yes   Precautions   Precautions Fall Risk;Weight Bearing As Tolerated Left Lower Extremity   Comments hx of ETOH abuse/use; impulsive/confused   Pain 0 - 10 Group   Location Hip   Location Orientation Left   Therapist Pain Assessment Nurse Notified;4   Cognition    Cognition / Consciousness X   Orientation Level Not Oriented to Time;Not Oriented to Reason;Not Oriented to Place   Level of Consciousness Alert   Ability To Follow Commands 1 Step   Safety Awareness Impulsive;Impaired   New Learning Impaired   Comments waxing and waning w/orientation, at times knew she was at [facility], but would forget quickly and was generally tangential and inattentive   Passive ROM Upper Body   Passive ROM Upper Body WDL   Active ROM Upper Body   Active ROM Upper Body  WDL   Strength Upper Body   Upper Body Strength  WDL   Sensation Upper Body   Upper Extremity Sensation  WDL   Upper Body Muscle Tone   Upper Body Muscle Tone  WDL   Neurological Concerns   Neurological Concerns No   Coordination Upper Body   Coordination WDL   Balance Assessment   Sitting Balance (Static) Good   Sitting  Balance (Dynamic) Fair +   Standing Balance (Static) Fair   Standing Balance (Dynamic) Fair -   Weight Shift Sitting Fair   Weight Shift Standing Fair   Comments w/fww   Bed Mobility    Supine to Sit Standby Assist   Comments up to chair   ADL Assessment   Grooming Standby Assist;Seated;Minimal Assist;Standing   Upper Body Dressing Minimal Assist   Lower Body Dressing Minimal Assist   Toileting Minimal Assist   How much help from another person does the patient currently need...   6 Clicks Daily Activity Score 19   Functional Mobility   Sit to Stand Contact Guard Assist   Bed, Chair, Wheelchair Transfer Contact Guard Assist   Toilet Transfers Contact Guard Assist   Mobility walking in room w/fww   Activity Tolerance   Comments mild c/o pain   Patient / Family Goals   Patient / Family Goal #1 to go home   Short Term Goals   Short Term Goal # 1 pt will complete grooming standing at sink w/spv   Short Term Goal # 2 pt will complete toilet txf w/spv   Short Term Goal # 3 pt will complete FB dressing w/spv   Education Group   Role of Occupational Therapist Patient Response Patient;Acceptance;Demonstration;Reinforcement Needed   Occupational Therapy Initial Treatment Plan    Treatment Interventions Self Care / Activities of Daily Living;Adaptive Equipment;Cognitive Skill Development;Community Re-Integration;Manual Therapy Techniques;Neuro Re-Education / Balance;Therapeutic Exercises   Treatment Frequency 3 Times per Week   Duration Until Therapy Goals Met   Problem List   Problem List Decreased Active Daily Living Skills;Decreased Functional Mobility;Decreased Activity Tolerance;Safety Awareness Deficits / Cognition;Impaired Postural Control / Balance;Limited Knowledge of Post Op Precautions   Anticipated Discharge Equipment and Recommendations   DC Equipment Recommendations Unable to determine at this time   Discharge Recommendations Recommend post-acute placement for additional occupational therapy services prior to  discharge home   Interdisciplinary Plan of Care Collaboration   IDT Collaboration with  Nursing;Physical Therapist   Patient Position at End of Therapy In Bed;Call Light within Reach;Tray Table within Reach;Phone within Reach   Collaboration Comments RN aware of OT eval and pts efforts   Session Information   Date / Session Number  7/9 #1 (1/3, 7/15)

## 2023-07-09 NOTE — PROGRESS NOTES
On bedside exam, patient appeared confused (reported baseline) but stable.  Saturating well on RA. A few hours later, Hb resulted at 6.9. Patient currently receiving 1 unit transfusion of blood. Tolerating well. RN to continue to monitor for any signs of transfusion rxn.     Ferritin resulted elevated at 705 (iron acquired on 7/8 low at 24).  Day team to consider further workup.

## 2023-07-09 NOTE — PROGRESS NOTES
Attending:   Sabiha Rowan M.d.      Resident:   Jens Marte M.D.    PATIENT:   Jaymie Peacock; [id number]; 1955    ID:   67 y.o. female admitted for a closed fracture of her left hip secondary to a mechanical fall.     SUBJECTIVE:   7/8: Patient went to the OR this AM. Reports moderate pain of L hip.   7/9: Post-op Day 1, patient reports she feels well - pain is improved, good range of motion, eating and having bowel mvmts    OBJECTIVE:  Vitals:    07/09/23 0251 07/09/23 0501 07/09/23 0832 07/09/23 1515   BP: 98/62 112/67 119/70 121/86   Pulse: 92 96 99 100   Resp: 16 16 16 16   Temp: 36.6 °C (97.9 °F) 36.6 °C (97.9 °F) 37.1 °C (98.8 °F) 36.7 °C (98 °F)   TempSrc: Temporal Temporal Temporal Temporal   SpO2: 93% 93% 92% 93%   Weight:       Height:         No intake or output data in the 24 hours ending 07/08/23 0643    PHYSICAL EXAM:  General: No acute distress, afebrile, resting comfortably  HEENT: NC/AT. EOMI  Cardiovascular: RRR without murmurs. Normal capillary refill   Respiratory: CTAB  Abdomen: soft, nontender, nondistended, no masses.  EXT:  +L hip tender to palpation, limited ROM  Skin: No erythema/lesions   Neuro: A&Ox1. Non-focal, sensations intact b/l UE and LE. Finger-nose test intact.     LABS:  Recent Labs     07/07/23  1833 07/08/23  0006 07/09/23  0013 07/09/23  0606   WBC 11.5* 9.4 11.1* 12.5*   RBC 2.72* 2.42* 2.20* 2.88*   HEMOGLOBIN 8.5* 7.5* 6.9* 8.9*   HEMATOCRIT 28.0* 23.6* 21.2* 27.5*   .9* 97.5 96.4 95.5   MCH 31.3 31.0 31.4 30.9   RDW 61.7* 59.4* 59.3* 59.7*   PLATELETCT 328 363 347 373   MPV 10.8 10.9 10.3 10.9   NEUTSPOLYS 83.80*  --   --   --    LYMPHOCYTES 8.50*  --   --   --    MONOCYTES 6.80  --   --   --    EOSINOPHILS 0.20  --   --   --    BASOPHILS 0.30  --   --   --      Recent Labs     07/07/23  1833 07/08/23  0006 07/09/23  0013 07/09/23  0606   SODIUM 136 144  --  139   POTASSIUM 3.9 3.6  --  3.8   CHLORIDE 103 110  --  106   CO2 20 22  --  21   BUN 12 10  --   16   CREATININE 1.01 0.95  --  1.48*   CALCIUM 9.3 8.4*  --  8.5   MAGNESIUM  --  1.0* 2.3  --    PHOSPHORUS  --  2.9 3.7  --    ALBUMIN 3.2  --   --  2.5*     Estimated GFR/CRCL = Estimated Creatinine Clearance: 27.6 mL/min (A) (by C-G formula based on SCr of 1.48 mg/dL (H)).  Recent Labs     07/07/23 1833 07/08/23  0006 07/09/23  0606   GLUCOSE 107* 143* 102*     Recent Labs     07/07/23 1833 07/09/23  0606   ASTSGOT 26 27   ALTSGPT 11 13   TBILIRUBIN 0.6 0.6   ALKPHOSPHAT 157* 128*   GLOBULIN 3.4 3.3             No results for input(s): INR, APTT, FIBRINOGEN in the last 72 hours.    Invalid input(s): DIMER      IMAGING:  DX-FEMUR-2+ LEFT   Final Result      Digitized intraoperative radiograph is submitted for review.  This examination is not for diagnostic purpose but for guidance during a surgical procedure. Please see the patient's chart for full procedural details.      DX-FEMUR-2+ LEFT   Final Result      1.  Mildly displaced intertrochanteric left femoral fracture.   2.  Mild to moderate degenerative changes of the left hip joint.   3.  Osteopenia.      DX-PELVIS-1 OR 2 VIEWS   Final Result      1.  Mildly displaced intertrochanteric left femoral fracture.   2.  Mild to moderate degenerative changes of the left hip joint.   3.  Osteopenia.      DX-CHEST-LIMITED (1 VIEW)   Final Result      1.  No acute cardiac or pulmonary abnormalities are identified.   2.  Nodular opacity at the peripheral right base, likely nipple shadow.. If there is clinical concern, exam can be repeated with nipple markers.      DX-PORTABLE FLUORO > 1 HOUR    (Results Pending)       MEDS:  Current Facility-Administered Medications   Medication Last Admin    LORazepam (Ativan) tablet 0.5 mg      LORazepam (Ativan) tablet 1 mg      Or    LORazepam (Ativan) injection 0.5 mg      LORazepam (Ativan) tablet 2 mg      Or    LORazepam (Ativan) injection 1 mg      LORazepam (Ativan) tablet 3 mg      Or    LORazepam (Ativan) injection 1.5 mg       LORazepam (Ativan) tablet 4 mg      Or    LORazepam (Ativan) injection 2 mg      enoxaparin (Lovenox) inj 40 mg      Pharmacy Consult Request ...Pain Management Review 1 Each      scopolamine (Transderm-Scop) patch 1 Patch      docusate sodium (Colace) capsule 100 mg 100 mg at 07/08/23 1759    senna-docusate (Pericolace Or Senokot S) 8.6-50 MG per tablet 1 Tablet      senna-docusate (Pericolace Or Senokot S) 8.6-50 MG per tablet 1 Tablet      polyethylene glycol/lytes (Miralax) PACKET 1 Packet      magnesium hydroxide (Milk Of Magnesia) suspension 30 mL      bisacodyl (Dulcolax) suppository 10 mg      sodium phosphate (Fleet) enema 133 mL      acetaminophen (Tylenol) tablet 650 mg 650 mg at 07/09/23 1230    Followed by    [START ON 7/13/2023] acetaminophen (Tylenol) tablet 650 mg      ketorolac (Toradol) injection 15 mg 15 mg at 07/09/23 1230    Followed by    [START ON 7/11/2023] ibuprofen (Motrin) tablet 800 mg      oxyCODONE immediate-release (Roxicodone) tablet 5 mg      Or    oxyCODONE immediate release (Roxicodone) tablet 10 mg 10 mg at 07/08/23 1614    Or    HYDROmorphone (Dilaudid) injection 0.5 mg      omeprazole (PriLOSEC) capsule 20 mg 20 mg at 07/09/23 0456    ondansetron (Zofran) syringe/vial injection 4 mg      ondansetron (Zofran ODT) dispertab 4 mg      thiamine (Vitamin B-1) tablet 100 mg 100 mg at 07/09/23 0456    And    multivitamin tablet 1 Tablet 1 Tablet at 07/09/23 0456    And    folic acid (Folvite) tablet 1 mg 1 mg at 07/09/23 0456       ASSESSMENT/PLAN:    Problem   Closed Fracture of Left Hip Requiring Operative Repair, Initial Encounter ([facility])   Severe Protein-Calorie Malnutrition ([place]: Less Than 60% of Standard Weight) ([facility])   Alcohol Abuse   Other Specified Anemias   History of Pulmonary Embolism    Hx of PE       Dementia ([facility])       * Closed fracture of left hip requiring operative repair, initial encounter ([facility])- (present on admission)  Assessment & Plan  Patient reports fall  Monday of this week with significant pain and discomfort but reported by EMS who brought her in was patient fell this a.m at [facility] with continued pain afterwards.  No LOC no head trauma.  X-ray with intertrochanteric fracture of the left hip    > s/p surgery left intertrochanteric femur fracture with intramedullary device 7/8 w Dr. Singletary  - Bear weight as tolerated - gait aids (crutch or crutches, cane, walker) may be used as needed, and may be discontinued when no longer required.  - DVT prophylaxis - SCD's and Lovenox 40 mg SQ daily while inpatient.  The patient may transition to Aspirin 325 mg PO BID as an outpatient  -PT OT rec SNF upon discharge  -PM&R states patient is not a candidate for IRF level therapy due to lack of support and likely need for supervision and recommends return to [facility] upon discharge    Severe protein-calorie malnutrition ([place]: less than 60% of standard weight) (HCC)- (present on admission)  Assessment & Plan  Patient with significant amount of alcohol drinking per day which is the likely source of malnutrition. BMI 17.1  States she only eats 1 meal/day  Has been on mirtazapine in the past    - Nutrition consulted and following      Alcohol abuse- (present on admission)  Assessment & Plan  History alcohol abuse reports drinking 5-6 drinks minimum a day. Last drink per patient 3 days ago, also said 2 weeks ago though she has been at [facility] skilled nursing facility so unsure of timeframe.  Has gone through withdrawal in the past but denies any history of seizures.        > ETOH neg on admission  > Detox bag on admission  - Cont. with daily vitamins orally  - Consider MRI for diagnosis of Wernicke-Korsakoff    Dementia (HCC)  Assessment & Plan  Patient has been consistently A&Ox2. There is also concern from nurses and other providers regarding hallucinations. Patient making confabulations regarding why she is here. At last admission she was confused and altered even at  time of discharge concern for possibly Warnicke Korsakoff. Difficult to get a baseline as family members listed as her contact not answering.     DDx: Dementia vs. Wernicke Korsakoff  - Consider MRI for diagnosis   - Outpatient f/u with neurology      Other specified anemias  Assessment & Plan  Patient with anemia hemoglobin 8.5 on admission.  History of GI bleed in the past and Crystal-Payan tears. On last admission patient's hemoglobin ranged in the 7 or 8's. Post surgery patient had a Hb drop to 6.9 so got 1U blood.  .    > s/p transfusion 1U PRBC on 7/8 due to Hb 6.9  > Macrocytic   > Iron studies show low iron and TIBC, Ferrtin elevated   - Likely due to low food intake  - Cont. Multivitiman, folate supplement    History of pulmonary embolism  Assessment & Plan  Patient has had DVT as well as pulm embolism in the past.  Was on Xarelto in the past then had 2 admissions for acute blood loss anemia secondary to Crystal-Payan tears that were treated in July and August 2022.  Then patient was discharged with PPI, Carafate and resumed on her Xarelto.  On repeat EGD known to have angiodysplasia of the esophagus thus IVC filter was placed and patient was discontinued off of Xarelto.    > IVC filter placed (2022)  -Per Ortho: DVT prophylaxis - SCD's and Lovenox 40 mg SQ daily while inpatient.  The patient may transition to Aspirin 325 mg PO BID as an outpatient               Jens Marte M.D.  PGY-1  [facility]    No

## 2023-07-09 NOTE — CONSULTS
"    Physical Medicine and Rehabilitation Consultation              Date of initial consultation: 7/9/2023  Requesting provider: ordered by Surya Singletary M.D. at 07/10/23 9105  Consulting provider: Maya Buchanan D.O.  Reason for consultation: assess for acute inpatient rehab appropriateness  LOS: 2 Day(s)    Chief complaint: LLE pain after fall     HPI: The patient is a 67 y.o. female with a past medical history of HTN, hx of DVT /PE (not a candidate for AC), s/p IVC filter placement  EtOH abuse, history of EtOH wi/d in June 2023, GI bleed, and cognitive impairment due to dementia vs Warnicke//Korsakoff;  who presented on 7/7/2023  6:20 PM with LLE pain after a fall at [facility]. Per documentation patient had a fall with left pain , unknown if witnessed or unwitnessed fall at SNF. Upo eval in the ED, images showed a mildly displaced intertrochanteric left femoral fracture. Ortho was consulted, and patient was taken to the OR on 7/8 for Surgical treatment of left intertrochanteric femur fracture with intramedullary device performed by Dr. Singletary. Post op, patient is WBAT LLE. Patient has post op ABLA, with Hgb down to 6.9, improved to  8.9 after transfused 1 unit PRBC. She also has YAMIL with Cr at 1.48 .     Patient seen and examined at bedside. Patient tells me she fine, except she saw \"someone trying to come into her room a gun made of pink orchids.\" Discussed with nursing. Patient's room is near nursing station with large pink flowers on counter which may be confusing patient. Patient has had no visitors today. Besides concern for hallucinations, patient reports she is ok. Does not report HA, lightheadedness, SOB, CP, abdominal pain, or changes in numbness/tingling/weakness.  + L hip pain       Social Hx:  Patient lives alone in a 1 story houe with 2 THERESA. Has friends that help intermittently   2 THERESA  At prior level of function was MOD I with FWW prior to may 2023       Tobacco: at least 10 pack year " history   Alcohol: history of alcohol abuse   Drugs: Denies     THERAPY:  Restrictions: WBAT LLE   PT: Functional mobility   7/9 CGA with FWW x 20 ft x 2 , CGA sit to stand     OT: ADLs  7/9 Min A upper and lower body dressing     SLP:   Pending     IMAGING:  DX-FEMUR-2+ LEFT   Final Result       1.  Mildly displaced intertrochanteric left femoral fracture.   2.  Mild to moderate degenerative changes of the left hip joint.   3.  Osteopenia.       DX-PELVIS-1 OR 2 VIEWS   Final Result       1.  Mildly displaced intertrochanteric left femoral fracture.   2.  Mild to moderate degenerative changes of the left hip joint.   3.  Osteopenia.       DX-CHEST-LIMITED (1 VIEW)   Final Result       1.  No acute cardiac or pulmonary abnormalities are identified.   2.  Nodular opacity at the peripheral right base, likely nipple shadow.. If there is clinical concern, exam can be repeated with nipple markers.       PROCEDURES:  7/8 Surgical treatment of left intertrochanteric femur fracture with intramedullary device performed by Dr. Singletary     PMH:  Past Medical History:   Diagnosis Date    DVT (deep venous thrombosis) (HCC)     ETOH abuse     GI bleed     High cholesterol     Hypertension     Osteoporosis     Pulmonary embolism (HCC)     Smoking        PSH:  Past Surgical History:   Procedure Laterality Date    MT UPPER GI ENDOSCOPY,DIAGNOSIS N/A 8/20/2022    Procedure: GASTROSCOPY;  Surgeon: Kranthi Fernandez M.D.;  Location: SURGERY [facility];  Service: Gastroenterology    MT UPPER GI ENDOSCOPY,CTRL BLEED N/A 8/20/2022    Procedure: GASTROSCOPY, WITH ARGON PLASMA COAGULATION;  Surgeon: Kranthi Fernandez M.D.;  Location: SURGERY [facility];  Service: Gastroenterology    MT UPPER GI ENDOSCOPY,DIAGNOSIS N/A 7/31/2022    Procedure: GASTROSCOPY;  Surgeon: Cyn Slade M.D.;  Location: SURGERY [facility];  Service: Gastroenterology    MT UPPER GI ENDOSCOPY,SCLER INJECT N/A 7/31/2022    Procedure: GASTROSCOPY,  WITH SCLEROTHERAPY;  Surgeon: Cyn Slade M.D.;  Location: SURGERY [facility];  Service: Gastroenterology    OK UPPER GI ENDOSCOPY,CTRL BLEED N/A 7/31/2022    Procedure: EGD, WITH CLIP PLACEMENT;  Surgeon: Cyn Slade M.D.;  Location: SURGERY [facility];  Service: Gastroenterology    OK UPPER GI ENDOSCOPY,DIAGNOSIS N/A 6/15/2022    Procedure: GASTROSCOPY WITH PUSH ENDOSCOPY;  Surgeon: Claudio Guerrier M.D.;  Location: [facility];  Service: Gastroenterology    OK UPPER GI ENDOSCOPY,CTRL BLEED N/A 05/11/2022    Procedure: GASTROSCOPY, WITH ARGON PLASMA COAGULATION;  Surgeon: Yariel Pagan M.D.;  Location: SURGERY SAME DAY [facility];  Service: Gastroenterology    GASTROSCOPY W/PUSH ENTERSCOPY N/A 05/11/2022    Procedure: GASTROSCOPY, WITH PUSH ENTEROSCOPY;  Surgeon: Yariel Pagan M.D.;  Location: SURGERY SAME DAY [facility];  Service: Gastroenterology    OK UPPER GI ENDOSCOPY,DIAGNOSIS  04/21/2022    Procedure: GASTROSCOPY;  Surgeon: Rell Rivas M.D.;  Location: SURGERY SAME DAY [facility];  Service: Gastroenterology    OK COLONOSCOPY,DIAGNOSTIC  04/21/2022    Procedure: COLONOSCOPY;  Surgeon: Rell Rivas M.D.;  Location: SURGERY SAME DAY [facility];  Service: Gastroenterology    OK UPPER GI ENDOSCOPY,BIOPSY  04/21/2022    Procedure: GASTROSCOPY, WITH BIOPSY;  Surgeon: Rell Rivas M.D.;  Location: SURGERY SAME DAY [facility];  Service: Gastroenterology    OK UPPER GI ENDOSCOPY,CTRL BLEED  04/21/2022    Procedure: GASTROSCOPY, WITH ARGON PLASMA COAGULATION;  Surgeon: Rell Rivas M.D.;  Location: SURGERY SAME DAY [facility];  Service: Gastroenterology    HIP REPLACEMENT, PARTIAL Right     US-NEEDLE CORE BX-BREAST PANEL Right        FHX:  History reviewed. No pertinent family history.    Medications:  Current Facility-Administered Medications   Medication Dose    LORazepam (Ativan) tablet 0.5 mg  0.5 mg    LORazepam (Ativan) tablet 1 mg  1 mg    Or    LORazepam  "(Ativan) injection 0.5 mg  0.5 mg    LORazepam (Ativan) tablet 2 mg  2 mg    Or    LORazepam (Ativan) injection 1 mg  1 mg    LORazepam (Ativan) tablet 3 mg  3 mg    Or    LORazepam (Ativan) injection 1.5 mg  1.5 mg    LORazepam (Ativan) tablet 4 mg  4 mg    Or    LORazepam (Ativan) injection 2 mg  2 mg    enoxaparin (Lovenox) inj 40 mg  40 mg    Pharmacy Consult Request ...Pain Management Review 1 Each  1 Each    scopolamine (Transderm-Scop) patch 1 Patch  1 Patch    docusate sodium (Colace) capsule 100 mg  100 mg    senna-docusate (Pericolace Or Senokot S) 8.6-50 MG per tablet 1 Tablet  1 Tablet    senna-docusate (Pericolace Or Senokot S) 8.6-50 MG per tablet 1 Tablet  1 Tablet    polyethylene glycol/lytes (Miralax) PACKET 1 Packet  1 Packet    magnesium hydroxide (Milk Of Magnesia) suspension 30 mL  30 mL    bisacodyl (Dulcolax) suppository 10 mg  10 mg    sodium phosphate (Fleet) enema 133 mL  1 Each    acetaminophen (Tylenol) tablet 650 mg  650 mg    Followed by    [START ON 7/13/2023] acetaminophen (Tylenol) tablet 650 mg  650 mg    ketorolac (Toradol) injection 15 mg  15 mg    Followed by    [START ON 7/11/2023] ibuprofen (Motrin) tablet 800 mg  800 mg    oxyCODONE immediate-release (Roxicodone) tablet 5 mg  5 mg    Or    oxyCODONE immediate release (Roxicodone) tablet 10 mg  10 mg    Or    HYDROmorphone (Dilaudid) injection 0.5 mg  0.5 mg    omeprazole (PriLOSEC) capsule 20 mg  20 mg    ondansetron (Zofran) syringe/vial injection 4 mg  4 mg    ondansetron (Zofran ODT) dispertab 4 mg  4 mg    thiamine (Vitamin B-1) tablet 100 mg  100 mg    And    multivitamin tablet 1 Tablet  1 Tablet    And    folic acid (Folvite) tablet 1 mg  1 mg       Allergies:  No Known Allergies    Physical Exam:  Vitals: /70   Pulse 99   Temp 37.1 °C (98.8 °F) (Temporal)   Resp 16   Ht 1.676 m (5' 6\")   Wt 48.1 kg (106 lb)   SpO2 92%   Gen: NAD, seated comfortably in chair   Head:  NC/AT  Eyes/ Nose/ Mouth: PERRLA, moist " mucous membranes  Cardio: RRR, good distal perfusion, warm extremities  Pulm: normal respiratory effort, no cyanosis, on RA   Abd: Soft NTND, negative borborygmi   Ext: No peripheral edema. No calf tenderness. No clubbing.    Mental status:  A&Ox4 (person, place, date, situation) follows commands, + baseline cognitive impairment, + hallucinations   Speech: fluent, no aphasia or dysarthria    CRANIAL NERVES:  2,3: visual acuity grossly intact, PERRL  3,4,6: EOMI bilaterally, no nystagmus or diplopia  5: sensation intact to light touch bilaterally and symmetric  7: no facial asymmetry  8: hearing grossly intact      Motor:      Upper Extremity  Myotome R L   Shoulder flexion C5 5/5 5/5   Elbow flexion C5 5/5 5/5   Wrist extension C6 5/5 5/5   Elbow extension C7 5/5 5/5   Finger flexion C8 5/5 5/5   Finger abduction T1 5/5 5/5     Lower Extremity Myotome R L   Hip flexion L2 5/5 2/5   Knee extension L3 5/5 2/5   Ankle dorsiflexion L4 5/5 5/5   Toe extension L5 5/5 5/5   Ankle plantarflexion S1 5/5 5/5       Negative Pronator drift bilaterally    Sensory:   intact to light touch through out      DTRs: 2+ in bilateral  biceps  No clonus at bilateral ankles  Negative Barrera b/l     Tone: no spasticity noted, no cogwheeling noted    Coordination:   intact finger to nose bilaterally  intact fine motor with fingers bilaterally      Labs: Reviewed and significant for   Recent Labs     07/08/23  0006 07/09/23  0013 07/09/23  0606   RBC 2.42* 2.20* 2.88*   HEMOGLOBIN 7.5* 6.9* 8.9*   HEMATOCRIT 23.6* 21.2* 27.5*   PLATELETCT 363 347 373   IRON 24*  --   --    FERRITIN  --  705.0*  --    TOTIRONBC 144*  --   --      Recent Labs     07/07/23  1833 07/08/23  0006 07/09/23  0606   SODIUM 136 144 139   POTASSIUM 3.9 3.6 3.8   CHLORIDE 103 110 106   CO2 20 22 21   GLUCOSE 107* 143* 102*   BUN 12 10 16   CREATININE 1.01 0.95 1.48*   CALCIUM 9.3 8.4* 8.5     Recent Results (from the past 24 hour(s))   Magnesium    Collection Time:  07/09/23 12:13 AM   Result Value Ref Range    Magnesium 2.3 1.5 - 2.5 mg/dL   Phosphorus    Collection Time: 07/09/23 12:13 AM   Result Value Ref Range    Phosphorus 3.7 2.5 - 4.5 mg/dL   CBC WITHOUT DIFFERENTIAL    Collection Time: 07/09/23 12:13 AM   Result Value Ref Range    WBC 11.1 (H) 4.8 - 10.8 K/uL    RBC 2.20 (L) 4.20 - 5.40 M/uL    Hemoglobin 6.9 (L) 12.0 - 16.0 g/dL    Hematocrit 21.2 (L) 37.0 - 47.0 %    MCV 96.4 81.4 - 97.8 fL    MCH 31.4 27.0 - 33.0 pg    MCHC 32.5 32.2 - 35.5 g/dL    RDW 59.3 (H) 35.9 - 50.0 fL    Platelet Count 347 164 - 446 K/uL    MPV 10.3 9.0 - 12.9 fL   FERRITIN    Collection Time: 07/09/23 12:13 AM   Result Value Ref Range    Ferritin 705.0 (H) 10.0 - 291.0 ng/mL   COD - Adult (Type and Screen)    Collection Time: 07/09/23 12:13 AM   Result Value Ref Range    ABO Grouping Only A     Rh Grouping Only POS     Antibody Screen-Cod NEG     Component R       R99                 Red Cells, LR       [id number]   issued       07/09/23   02:20      Product Type R99     Dispense Status issued     Unit Number (Barcoded) [id number]     Product Code (Barcoded) N0032D62     Blood Type (Barcoded) 6200    Comp Metabolic Panel    Collection Time: 07/09/23  6:06 AM   Result Value Ref Range    Sodium 139 135 - 145 mmol/L    Potassium 3.8 3.6 - 5.5 mmol/L    Chloride 106 96 - 112 mmol/L    Co2 21 20 - 33 mmol/L    Anion Gap 12.0 7.0 - 16.0    Glucose 102 (H) 65 - 99 mg/dL    Bun 16 8 - 22 mg/dL    Creatinine 1.48 (H) 0.50 - 1.40 mg/dL    Calcium 8.5 8.5 - 10.5 mg/dL    AST(SGOT) 27 12 - 45 U/L    ALT(SGPT) 13 2 - 50 U/L    Alkaline Phosphatase 128 (H) 30 - 99 U/L    Total Bilirubin 0.6 0.1 - 1.5 mg/dL    Albumin 2.5 (L) 3.2 - 4.9 g/dL    Total Protein 5.8 (L) 6.0 - 8.2 g/dL    Globulin 3.3 1.9 - 3.5 g/dL    A-G Ratio 0.8 g/dL   CBC WITHOUT DIFFERENTIAL    Collection Time: 07/09/23  6:06 AM   Result Value Ref Range    WBC 12.5 (H) 4.8 - 10.8 K/uL    RBC 2.88 (L) 4.20 - 5.40 M/uL    Hemoglobin  8.9 (L) 12.0 - 16.0 g/dL    Hematocrit 27.5 (L) 37.0 - 47.0 %    MCV 95.5 81.4 - 97.8 fL    MCH 30.9 27.0 - 33.0 pg    MCHC 32.4 32.2 - 35.5 g/dL    RDW 59.7 (H) 35.9 - 50.0 fL    Platelet Count 373 164 - 446 K/uL    MPV 10.9 9.0 - 12.9 fL   CORRECTED CALCIUM    Collection Time: 07/09/23  6:06 AM   Result Value Ref Range    Correct Calcium 9.7 8.5 - 10.5 mg/dL   ESTIMATED GFR    Collection Time: 07/09/23  6:06 AM   Result Value Ref Range    GFR (CKD-EPI) 38 (A) >60 mL/min/1.73 m 2         ASSESSMENT:  Patient is a 67 y.o. female admitted with left hip fracture     [facility] Code / Diagnosis to Support: 0008.11 - Orthopaedic Disorders: Status Post Unilateral Hip Fracture    Rehabilitation: Impaired ADLs and mobility  Patient is a poor candidate for inpatient rehab based on lack of support at home and likely need for supervision.     Barriers to transfer include: Insurance authorization, TCCs to verify disposition, medical clearance and bed availability     Additional Recommendations:  Left hip fracture   - sustained during GLF at [facility]   - images showed a mildly displaced intertrochanteric left femoral fracture   - ortho consulted   - s/p 7/8 Surgical treatment of left intertrochanteric femur fracture with intramedullary device by Dr. Singletary   -  WBAT LLE   - PT/OT evals pending     ABLA   - post op Hgb 6.9   -s/p transfused 1 unit PRBC, Hgb improved to 8.9     History of alcohol abuse  - previously admitted with alcohol w/d in June 2023 , was discharged to [facility]   - low risk for alcohol w/d , patient has not been in the community since before prior admit   - continue with mutli vit and folate     Cognitive impairment  --cognitive impairment due to dementia vs Wernicke/korsakoff   - patient with states that possible hallucinations, nursing notified   - SLP cog eval ordered, patient likely to require supervision for safety   - lights on and blinds open during the day  - minimize lights and sounds at  night      History of DVT/ PE   - not a candidate for AC   - IVC filter placed 2022     Dispo:  - patient is currently functioning below their level of baseline, recommend post acute rehab  - patient is not a candidate for IRF level therapy due to lack of support and likely need for supervision   - agree with therapy plan for return to [facility] for prolonged rehab course > 14 days   - PM&R will sign off at this time         Medical Complexity:  Left hip fracture   ABLA   History of alcohol abuse   History of PE   Impaired mobility and ADLs       DVT PPX:  Lovenox       Thank you for allowing us to participate in the care of this patient.     Patient was seen for 81 minutes on unit/floor of which > 50% of time was spent on counseling and coordination of care regarding the above, including prognosis, risk reduction, benefits of treatment, and options for next stage of care.    Maya Buchanan D.O.   Physical Medicine and Rehabilitation     Please note that this dictation was created using voice recognition software. I have made every reasonable attempt to correct obvious errors, but there may be errors of grammar and possibly content that I did not discover before finalizing the note.

## 2023-07-09 NOTE — CARE PLAN
Problem: Pain - Standard  Goal: Alleviation of pain or a reduction in pain to the patient’s comfort goal  Outcome: Progressing  Note: Pt states pain 2/10. Medications administered per MAR, ice pack applied, pillows for comfort and repositioning.      Problem: Fall Risk  Goal: Patient will remain free from falls  Outcome: Progressing  Note: Bed is locked and in lowest position, treaded socks on, bed alarm on, DME out of sight, call light and belongings within reach, pt calls appropriately for assistance, hourly rounding in place.    The patient is Stable - Low risk of patient condition declining or worsening    Shift Goals  Clinical Goals: pain management, comfort, safety  Patient Goals: rest  Family Goals: n/a    Progress made toward(s) clinical / shift goals:      Patient is not progressing towards the following goals:

## 2023-07-09 NOTE — ASSESSMENT & PLAN NOTE
Patient has been consistently A&Ox2. Appears chronic as per recent documentation. There is also concern from nurses and other providers regarding hallucinations. Patient making confabulations regarding why she is here. At last admission she was confused and altered even at time of discharge concern for possibly Warnicke Korsakoff. Difficult to get a true baseline as family members/friends listed as her contact not answering.     DDx: Dementia vs. Wernicke Korsakoff  - Consider MRI for diagnosis   - Outpatient f/u with neurology

## 2023-07-09 NOTE — RESPIRATORY CARE
COPD EDUCATION by COPD CLINICAL EDUCATOR  7/9/2023  at  7:32 AM by Renny Quinonez RRT     Confused per physician note 7/9/23 early AM.       COPD Assessment  COPD Clinical Specialists ONLY  COPD Education Initiated: Yes--Short Intervention (Confused per physician note 7/9/23 early AM.)    PFT Results    No results found for: PFT    Meds to Beds  Would the patient like to opt in for Bedside Medication Delivery at Discharge?: No

## 2023-07-09 NOTE — DIETARY
"Nutrition services: Day 2 of admit.  Jaymie Peacock is a 67 y.o. female with admitting DX of closed fracture of left hip requiring operative repair, initial encounter.    Consult received for \"low BMI, alcohol abuse disorder\". Met with pt at bedside. Pt was sitting up in chair, she appeared adequately nourished. She declined Nutrition Focused Physical Exam, stating she wants to go home.  Pt reports a fair to good appetite. She stated she was able to eat most of breakfast. Lunch tray observed at bedside was <50% consumed, pt stated she was not hungry. Discussed optimizing nutrition with pt, particularly protein in the setting of healing.  She declined oral nutrition supplements at this time. She has consumed yogurt and fruit during prior admits. RD will adjust daily menu. Pt stated her UBW is ~ 107-120 lbs. She denied any recent wt loss.     Assessment:  Height: 167.6 cm (5' 6\")  Weight: 48.1 kg (106 lb)  Body mass index is 17.11 kg/m²., BMI classification: underweight  Diet/Intake: Regular; PO % for one meal thus far    Evaluation:   Surgical treatment of left intertrochanteric femur fracture with intramedullary device 7/8.  PMH: DVT, ETOH abuse, GI bleed, high cholesterol, hypertension, osteoporosis, pulmonary embolism.  Thiamine, Folvite and Multivitamin per MAR.  Wt per chart review: 103 lbs 5/29/23, 105 lbs 11/16/22, 101 lbs 8/1/22, 93 lbs 5/11/22, 89 lbs 3/29/22. No wt loss noted.    Malnutrition Risk: Does not meet criteria per ASPEN guidelines.    Recommendations/Plan:  Snacks per pt preference.  Encourage intake of meals and snacks.  Document intake of all meals and snacks as % taken in ADLs to provide interdisciplinary communication across all shifts.   Monitor weight.  Nutrition rep will continue to see patient for ongoing meal and snack preferences.       RD will follow per dept guidelines.    "

## 2023-07-09 NOTE — THERAPY
Physical Therapy   Initial Evaluation     Patient Name: Jaymie Peacock  Age:  67 y.o., Sex:  female  Medical Record #: [id number]  Today's Date: 7/9/2023     Precautions: Fall Risk;Weight Bearing As Tolerated Left Lower Extremity    Assessment  Patient is a 67 y.o. female admitted from [facility] following GLF, now s/p IMN for L intertrochanteric femur fx on 7/8. Pt was admitted in May for AMS and alc abuse and discharged to [facility] in June. Prior to last admit, she was living alone independently with a walker, per report. Pt seen for PT evaluation at this time. Pt required CGA for ambulation with FWW d/t impulsive movement and some unsteadiness. Demos antalgic gait and only tolerated 20ft x2 today. Currently pt would benefit from return to SNF for continued PT prior to return home. Also concern for living alone given cog impairments. PT will follow. Encouraged up to chair and amb with nursing to promote incr activity tolerance.     Plan  Treatment Plan : Bed Mobility, Gait Training, Neuro Re-Education / Balance, Self Care / Home Evaluation, Stair Training, Therapeutic Activities, Therapeutic Exercise  Treatment Frequency: 3 Times per Week  Duration: Until Therapy Goals Met    DC Equipment Recommendations: Unable to determine at this time  Discharge Recommendations: Recommend post-acute placement for additional physical therapy services prior to discharge home      07/09/23 1013   Prior Living Situation   Housing / Facility 1 Story House   Steps Into Home 1   Steps In Home 0   Equipment Owned 4-Wheel Walker   Lives with -  Alone and Able to Care For Self   Comments pt was admitted from [facility] after DC from [facility] 6/5. prior to admit in May, she was living alone and reports was indep with supportive neighbors. reports used a 4WW and WC.   Prior Level of Functional Mobility   Comments as above, was indep prior to last admit in May, has been working with therapy at [facility].   Cognition    Level of  Consciousness Alert   Comments intermittent confusion regarding hospitalization and timeline, odd comments unrelated to conversation. overall pleasant and cooperative   Balance Assessment   Sitting Balance (Static) Fair   Sitting Balance (Dynamic) Fair -   Standing Balance (Static) Fair -   Standing Balance (Dynamic) Poor +   Weight Shift Sitting Fair   Weight Shift Standing Fair   Comments standing with FWW   Bed Mobility    Supine to Sit Standby Assist   Scooting Standby Assist   Gait Analysis   Gait Level Of Assist Contact Guard Assist   Assistive Device Front Wheel Walker   Distance (Feet) 20   # of Times Distance Traveled 2   Deviation Antalgic;Step To   Weight Bearing Status LLE WBAT   Comments moves impulsively, no overt LOB but incr unsteadiness with turning and when backing up to seats   Functional Mobility   Sit to Stand Contact Guard Assist   Bed, Chair, Wheelchair Transfer Contact Guard Assist   Short Term Goals    Short Term Goal # 1 pt will perform all functional xfrs with SPV in 6 visits for improved independence   Short Term Goal # 2 pt will ambulate 150ft with FWW and SPV in 6 visits to access home   Short Term Goal # 3 pt will negotiate 1 step with SPV in 6 visits to access home

## 2023-07-10 LAB
ANION GAP SERPL CALC-SCNC: 11 MMOL/L (ref 7–16)
BUN SERPL-MCNC: 19 MG/DL (ref 8–22)
CALCIUM SERPL-MCNC: 8.7 MG/DL (ref 8.5–10.5)
CHLORIDE SERPL-SCNC: 106 MMOL/L (ref 96–112)
CO2 SERPL-SCNC: 19 MMOL/L (ref 20–33)
CREAT SERPL-MCNC: 1.01 MG/DL (ref 0.5–1.4)
ERYTHROCYTE [DISTWIDTH] IN BLOOD BY AUTOMATED COUNT: 61.3 FL (ref 35.9–50)
GFR SERPLBLD CREATININE-BSD FMLA CKD-EPI: 60 ML/MIN/1.73 M 2
GLUCOSE SERPL-MCNC: 87 MG/DL (ref 65–99)
HCT VFR BLD AUTO: 26.7 % (ref 37–47)
HGB BLD-MCNC: 8.8 G/DL (ref 12–16)
MCH RBC QN AUTO: 30.4 PG (ref 27–33)
MCHC RBC AUTO-ENTMCNC: 33 G/DL (ref 32.2–35.5)
MCV RBC AUTO: 92.4 FL (ref 81.4–97.8)
PLATELET # BLD AUTO: 398 K/UL (ref 164–446)
PMV BLD AUTO: 10.3 FL (ref 9–12.9)
POTASSIUM SERPL-SCNC: 4.4 MMOL/L (ref 3.6–5.5)
RBC # BLD AUTO: 2.89 M/UL (ref 4.2–5.4)
SODIUM SERPL-SCNC: 136 MMOL/L (ref 135–145)
WBC # BLD AUTO: 14.5 K/UL (ref 4.8–10.8)

## 2023-07-10 PROCEDURE — 97530 THERAPEUTIC ACTIVITIES: CPT

## 2023-07-10 PROCEDURE — 92523 SPEECH SOUND LANG COMPREHEN: CPT

## 2023-07-10 PROCEDURE — A9270 NON-COVERED ITEM OR SERVICE: HCPCS | Performed by: ORTHOPAEDIC SURGERY

## 2023-07-10 PROCEDURE — 97116 GAIT TRAINING THERAPY: CPT

## 2023-07-10 PROCEDURE — A9270 NON-COVERED ITEM OR SERVICE: HCPCS | Performed by: STUDENT IN AN ORGANIZED HEALTH CARE EDUCATION/TRAINING PROGRAM

## 2023-07-10 PROCEDURE — 85027 COMPLETE CBC AUTOMATED: CPT

## 2023-07-10 PROCEDURE — 770001 HCHG ROOM/CARE - MED/SURG/GYN PRIV*

## 2023-07-10 PROCEDURE — 700102 HCHG RX REV CODE 250 W/ 637 OVERRIDE(OP): Performed by: STUDENT IN AN ORGANIZED HEALTH CARE EDUCATION/TRAINING PROGRAM

## 2023-07-10 PROCEDURE — 99232 SBSQ HOSP IP/OBS MODERATE 35: CPT | Mod: GC | Performed by: FAMILY MEDICINE

## 2023-07-10 PROCEDURE — 80048 BASIC METABOLIC PNL TOTAL CA: CPT

## 2023-07-10 PROCEDURE — 700102 HCHG RX REV CODE 250 W/ 637 OVERRIDE(OP)

## 2023-07-10 PROCEDURE — 700111 HCHG RX REV CODE 636 W/ 250 OVERRIDE (IP): Mod: JZ | Performed by: ORTHOPAEDIC SURGERY

## 2023-07-10 PROCEDURE — A9270 NON-COVERED ITEM OR SERVICE: HCPCS

## 2023-07-10 PROCEDURE — 36415 COLL VENOUS BLD VENIPUNCTURE: CPT

## 2023-07-10 PROCEDURE — 700102 HCHG RX REV CODE 250 W/ 637 OVERRIDE(OP): Performed by: ORTHOPAEDIC SURGERY

## 2023-07-10 RX ORDER — FERROUS SULFATE 325(65) MG
325 TABLET ORAL
Status: DISCONTINUED | OUTPATIENT
Start: 2023-07-10 | End: 2023-07-11 | Stop reason: HOSPADM

## 2023-07-10 RX ADMIN — FERROUS SULFATE TAB 325 MG (65 MG ELEMENTAL FE) 325 MG: 325 (65 FE) TAB at 08:14

## 2023-07-10 RX ADMIN — ACETAMINOPHEN 650 MG: 325 TABLET, FILM COATED ORAL at 05:27

## 2023-07-10 RX ADMIN — DOCUSATE SODIUM 100 MG: 100 CAPSULE, LIQUID FILLED ORAL at 18:20

## 2023-07-10 RX ADMIN — ENOXAPARIN SODIUM 40 MG: 100 INJECTION SUBCUTANEOUS at 18:22

## 2023-07-10 RX ADMIN — THERA TABS 1 TABLET: TAB at 05:27

## 2023-07-10 RX ADMIN — OMEPRAZOLE 20 MG: 20 CAPSULE, DELAYED RELEASE ORAL at 05:27

## 2023-07-10 RX ADMIN — ACETAMINOPHEN 650 MG: 325 TABLET, FILM COATED ORAL at 18:20

## 2023-07-10 RX ADMIN — Medication 100 MG: at 05:27

## 2023-07-10 RX ADMIN — ACETAMINOPHEN 650 MG: 325 TABLET, FILM COATED ORAL at 11:29

## 2023-07-10 RX ADMIN — OXYCODONE HYDROCHLORIDE 5 MG: 5 TABLET ORAL at 08:23

## 2023-07-10 RX ADMIN — FOLIC ACID 1 MG: 1 TABLET ORAL at 05:27

## 2023-07-10 RX ADMIN — ACETAMINOPHEN 650 MG: 325 TABLET, FILM COATED ORAL at 00:02

## 2023-07-10 RX ADMIN — OXYCODONE HYDROCHLORIDE 5 MG: 5 TABLET ORAL at 18:27

## 2023-07-10 ASSESSMENT — COGNITIVE AND FUNCTIONAL STATUS - GENERAL
SUGGESTED CMS G CODE MODIFIER MOBILITY: CK
CLIMB 3 TO 5 STEPS WITH RAILING: TOTAL
MOVING TO AND FROM BED TO CHAIR: A LITTLE
MOBILITY SCORE: 16
MOVING FROM LYING ON BACK TO SITTING ON SIDE OF FLAT BED: A LITTLE
STANDING UP FROM CHAIR USING ARMS: A LITTLE
TURNING FROM BACK TO SIDE WHILE IN FLAT BAD: A LITTLE
WALKING IN HOSPITAL ROOM: A LITTLE

## 2023-07-10 ASSESSMENT — GAIT ASSESSMENTS
GAIT LEVEL OF ASSIST: CONTACT GUARD ASSIST
DISTANCE (FEET): 15
ASSISTIVE DEVICE: FRONT WHEEL WALKER
DEVIATION: ANTALGIC;STEP TO;DECREASED HEEL STRIKE;DECREASED TOE OFF;SHUFFLED GAIT

## 2023-07-10 ASSESSMENT — PAIN DESCRIPTION - PAIN TYPE
TYPE: SURGICAL PAIN
TYPE: SURGICAL PAIN

## 2023-07-10 NOTE — CARE PLAN
The patient is Stable - Low risk of patient condition declining or worsening    Shift Goals  Clinical Goals: Patient safety, skin integrity  Patient Goals: Pain control, keep clean      Progress made toward(s) clinical / shift goals:     Problem: Pain - Standard  Goal: Alleviation of pain or a reduction in pain to the patient’s comfort goal  Outcome: Progressing     Problem: Optimal Care for Alcohol Withdrawal  Goal: Optimal Care for the alcohol withdrawal patient  Outcome: Progressing     Problem: Psychosocial  Goal: Patient's level of anxiety will decrease  Outcome: Progressing     Problem: Skin Integrity  Goal: Skin integrity is maintained or improved  Outcome: Progressing     Problem: Fall Risk  Goal: Patient will remain free from falls  Outcome: Progressing

## 2023-07-10 NOTE — THERAPY
"Speech Language Pathology   Cognitive Evaluation     Patient Name: Jaymie Peacock  AGE:  67 y.o., SEX:  female  Medical Record #: [id number]  Date of Service: 7/10/2023      History of Present Illness  Admitted 23 for LLE pain after fall   PMHx: HTN, hx of DVT /PE (not a candidate for AC), s/p IVC filter placement  EtOH abuse, history of EtOH wi/d in 2023, GI bleed, and cognitive impairment due to dementia vs Warnicke//Korsakoff    No current brain imaging.      General Information  Vitals  O2 Delivery Device: None - Room Air  Level of Consciousness: Alert, Awake  Patient Behaviors: Confused  Orientation: Self, , General place, Current month       Prior Living Situation & Level of Function  Communication: Impaired - per friend ~1 month ago since bladder infection       Subjective  Patient received awake and sitting up in bed. Pt reported friend was going to join her for dinner (although pt seen around noon with lunch tray at bedside) but agreeable to participate in therapy objectives with explanation. Pt reported she is in the hospital because she broke her \"two left hips\" and was unable to state current living address stating, \"right behind this house.\" Pt reported she was independent with IADLs prior to this hospitalization and lives with her boyfriend who helps pay her bills.    Family friend (Irwin Ashby) arrived at the end of session who reported he was pt's 's best friend who passed away. Irwin has been assisting pt with some IADLs since most recent cognitive decline ~1 month ago following hospitalization for bladder infection. Prior to that, Irwin reported pt was independent with IADLs (pt would manage medical appts/bills, discuss POC w/ her providers).       Communication Domain(s)  Expressive Language: Mild  Receptive Language: Moderate  Cognitive-Linguistic: Moderate (MODERATE-SEVERE)  Reading: Moderate  Social/Pragmatic: Mild      Assessment  The patient was seen this date for a " cognitive evaluation. The Cognistat and other formal and informal measures were administered. See results below:       Cognistat  Orientation: Moderate (MODERATE-SEVERE)  Attention: Mild  Comprehension: Moderate  Repetition: Average  Naming: Average  Memory: Severe  Calculations: Average  Similarities: Mild  Judgement: Moderate      CLQT  Clock Drawing: Moderate (Poor Planning, Disorganization, Impaired Hand Placement)    Informal measures  Reading comprehension: Moderate  Writing: Fair legibility   Medication management: Moderate      Clinical Impressions  Patient is presenting with moderate-severe cognitive impairment suspect subacute to chronic r/t dementia vs Wernicke/korsakoff per MD. No further acute SLP services indicated; however, pt may benefit from SLP services at the next level of care, in a more structured environment, to determine carryover of functional strategies.       Recommendations  Supervision Needs Upons Discharge: Direct assistance with IADLs (see below)  IADLs: Medication management, Financial management, Appointment management       SLP Treatment Plan  Treatment Plan: Patient/Family/Caregiver Training, Cognitive Treatment  SLP Frequency: N/A - Evaluation Only  Estimated Duration: N/A - Evaluation Only      Anticipated Discharge Needs  Discharge Recommendations: Recommend post-acute placement for additional speech therapy services prior to discharge home  Therapy Recommendations Upon DC: Cognitive-Linguistic Training, Patient / Family / Caregiver Education      Rosana Gordon, SLP

## 2023-07-10 NOTE — DISCHARGE PLANNING
HTH/SCP TCN chart review completed. Collaborated with CM. Current discharge considerations are return to SNF. [facility] has accepted patient back. TCN will continue to follow and collaborate with discharge planning team as additional post acute needs arise. Thank you.    Completed:  PT and OT recommend post-acute placement  Choice obtained: [facility]  [facility] referral not sent, likely post acute placement

## 2023-07-10 NOTE — DISCHARGE PLANNING
[facility] Transitional Care Coordination    Referral from: Dr. Singletary    Insurance Provider on Facesheet: SCP    Potential Rehab Diagnosis: Hip Fx    Chart review indicates patient may have on going medical management and may have therapy needs to possibly meet inpatient rehab facility criteria with the goal of returning to community.    D/C support will need to be verified: Friend    Physiatry consultation forwarded per protocol.      Thank you for the referral.

## 2023-07-10 NOTE — PROGRESS NOTES
Attending:   Gin Christopher M.d.      Resident:   Jens Marte M.D.    PATIENT:   Jaymie Peacock; [id number]; 1955    ID:   67 y.o. female admitted for a closed fracture of her left hip secondary to a mechanical fall.     SUBJECTIVE:   7/8: Patient went to the OR this AM. Reports moderate pain of L hip.   7/9: Post-op Day 1, patient reports she feels well - pain is improved, good range of motion, eating and having bowel mvmts  7/10: Pain is improving. Still pain with movement. No change in neuro status.    OBJECTIVE:  Vitals:    07/09/23 1515 07/09/23 1936 07/10/23 0316 07/10/23 0713   BP: 121/86 128/88 99/66 122/72   Pulse: 100 (!) 117 (!) 102 (!) 108   Resp: 16 17 15 16   Temp: 36.7 °C (98 °F) 36.5 °C (97.7 °F) 36.9 °C (98.4 °F) 37.1 °C (98.8 °F)   TempSrc: Temporal Temporal Temporal Temporal   SpO2: 93% 97% 91% 94%   Weight:       Height:         No intake or output data in the 24 hours ending 07/08/23 0643    PHYSICAL EXAM:  General: No acute distress, afebrile, resting comfortably  HEENT: NC/AT. EOMI  Cardiovascular: RRR without murmurs. Normal capillary refill   Respiratory: CTAB  Abdomen: soft, nontender, nondistended, no masses.  EXT:  +L hip tender to palpation, limited ROM  Skin: No erythema/lesions   Neuro: A&Ox1. Non-focal, sensations intact b/l UE and LE. Finger-nose test intact.  Confabulates.    LABS:  Recent Labs     07/07/23  1833 07/08/23  0006 07/09/23  0013 07/09/23  0606 07/10/23  0312   WBC 11.5*   < > 11.1* 12.5* 14.5*   RBC 2.72*   < > 2.20* 2.88* 2.89*   HEMOGLOBIN 8.5*   < > 6.9* 8.9* 8.8*   HEMATOCRIT 28.0*   < > 21.2* 27.5* 26.7*   .9*   < > 96.4 95.5 92.4   MCH 31.3   < > 31.4 30.9 30.4   RDW 61.7*   < > 59.3* 59.7* 61.3*   PLATELETCT 328   < > 347 373 398   MPV 10.8   < > 10.3 10.9 10.3   NEUTSPOLYS 83.80*  --   --   --   --    LYMPHOCYTES 8.50*  --   --   --   --    MONOCYTES 6.80  --   --   --   --    EOSINOPHILS 0.20  --   --   --   --    BASOPHILS 0.30  --   --    --   --     < > = values in this interval not displayed.     Recent Labs     07/07/23  1833 07/08/23  0006 07/09/23  0013 07/09/23  0606 07/10/23  0312   SODIUM 136 144  --  139 136   POTASSIUM 3.9 3.6  --  3.8 4.4   CHLORIDE 103 110  --  106 106   CO2 20 22  --  21 19*   BUN 12 10  --  16 19   CREATININE 1.01 0.95  --  1.48* 1.01   CALCIUM 9.3 8.4*  --  8.5 8.7   MAGNESIUM  --  1.0* 2.3  --   --    PHOSPHORUS  --  2.9 3.7  --   --    ALBUMIN 3.2  --   --  2.5*  --      Estimated GFR/CRCL = Estimated Creatinine Clearance: 40.5 mL/min (by C-G formula based on SCr of 1.01 mg/dL).  Recent Labs     07/08/23  0006 07/09/23  0606 07/10/23  0312   GLUCOSE 143* 102* 87     Recent Labs     07/07/23 1833 07/09/23  0606   ASTSGOT 26 27   ALTSGPT 11 13   TBILIRUBIN 0.6 0.6   ALKPHOSPHAT 157* 128*   GLOBULIN 3.4 3.3             No results for input(s): INR, APTT, FIBRINOGEN in the last 72 hours.    Invalid input(s): DIMER      IMAGING:  DX-FEMUR-2+ LEFT   Final Result      Digitized intraoperative radiograph is submitted for review.  This examination is not for diagnostic purpose but for guidance during a surgical procedure. Please see the patient's chart for full procedural details.      DX-FEMUR-2+ LEFT   Final Result      1.  Mildly displaced intertrochanteric left femoral fracture.   2.  Mild to moderate degenerative changes of the left hip joint.   3.  Osteopenia.      DX-PELVIS-1 OR 2 VIEWS   Final Result      1.  Mildly displaced intertrochanteric left femoral fracture.   2.  Mild to moderate degenerative changes of the left hip joint.   3.  Osteopenia.      DX-CHEST-LIMITED (1 VIEW)   Final Result      1.  No acute cardiac or pulmonary abnormalities are identified.   2.  Nodular opacity at the peripheral right base, likely nipple shadow.. If there is clinical concern, exam can be repeated with nipple markers.      DX-PORTABLE FLUORO > 1 HOUR    (Results Pending)       MEDS:  Current Facility-Administered Medications    Medication Last Admin    ferrous sulfate tablet 325 mg      LORazepam (Ativan) tablet 0.5 mg      LORazepam (Ativan) tablet 1 mg      Or    LORazepam (Ativan) injection 0.5 mg      LORazepam (Ativan) tablet 2 mg      Or    LORazepam (Ativan) injection 1 mg      LORazepam (Ativan) tablet 3 mg      Or    LORazepam (Ativan) injection 1.5 mg      LORazepam (Ativan) tablet 4 mg      Or    LORazepam (Ativan) injection 2 mg      enoxaparin (Lovenox) inj 40 mg 40 mg at 07/09/23 1814    Pharmacy Consult Request ...Pain Management Review 1 Each      scopolamine (Transderm-Scop) patch 1 Patch      docusate sodium (Colace) capsule 100 mg 100 mg at 07/09/23 1814    senna-docusate (Pericolace Or Senokot S) 8.6-50 MG per tablet 1 Tablet      senna-docusate (Pericolace Or Senokot S) 8.6-50 MG per tablet 1 Tablet      polyethylene glycol/lytes (Miralax) PACKET 1 Packet      magnesium hydroxide (Milk Of Magnesia) suspension 30 mL      bisacodyl (Dulcolax) suppository 10 mg      sodium phosphate (Fleet) enema 133 mL      acetaminophen (Tylenol) tablet 650 mg 650 mg at 07/10/23 0527    Followed by    [START ON 7/13/2023] acetaminophen (Tylenol) tablet 650 mg      oxyCODONE immediate-release (Roxicodone) tablet 5 mg      Or    oxyCODONE immediate release (Roxicodone) tablet 10 mg 10 mg at 07/08/23 1614    Or    HYDROmorphone (Dilaudid) injection 0.5 mg      omeprazole (PriLOSEC) capsule 20 mg 20 mg at 07/10/23 0527    ondansetron (Zofran) syringe/vial injection 4 mg      ondansetron (Zofran ODT) dispertab 4 mg      thiamine (Vitamin B-1) tablet 100 mg 100 mg at 07/10/23 0527    And    multivitamin tablet 1 Tablet 1 Tablet at 07/10/23 0527    And    folic acid (Folvite) tablet 1 mg 1 mg at 07/10/23 0527       ASSESSMENT/PLAN:    Problem   Dementia (Hcc)   Other Specified Anemias   History of Pulmonary Embolism    Hx of PE           * Closed fracture of left hip requiring operative repair, initial encounter ([facility])- (present on  admission)  Assessment & Plan  Patient reports fall Monday of this week with significant pain and discomfort but reported by EMS who brought her in was patient fell this a.m at [facility] with continued pain afterwards.  No LOC no head trauma.  X-ray with intertrochanteric fracture of the left hip    > s/p surgery left intertrochanteric femur fracture with intramedullary device 7/8 w Dr. Singletary  - Bear weight as tolerated - gait aids (crutch or crutches, cane, walker) may be used as needed, and may be discontinued when no longer required.  - DVT prophylaxis - SCD's and Lovenox 40 mg SQ daily while inpatient.  The patient may transition to Aspirin 325 mg PO BID as an outpatient  -PT OT rec SNF upon discharge  -PM&R states patient is not a candidate for IRF level therapy due to lack of support and likely need for supervision and recommends return to [facility] SNF upon discharge    Dementia (HCC)- (present on admission)  Assessment & Plan  Patient has been consistently A&Ox2. Appears chronic as per recent documentation. There is also concern from nurses and other providers regarding hallucinations. Patient making confabulations regarding why she is here. At last admission she was confused and altered even at time of discharge concern for possibly Warnicke Korsakoff. Difficult to get a true baseline as family members/friends listed as her contact not answering.     DDx: Dementia vs. Wernicke Korsakoff  - Consider MRI for diagnosis   - Outpatient f/u with neurology      Other specified anemias- (present on admission)  Assessment & Plan  Patient with anemia hemoglobin 8.5 on admission.  History of GI bleed in the past and Crystal-Payan tears. On last admission patient's hemoglobin ranged in the 7 or 8's. Post surgery patient had a Hb drop to 6.9 so got 1U blood.  .    > s/p transfusion 1U PRBC on 7/8 due to Hb 6.9  > Macrocytic   > Iron studies show low iron and TIBC, Ferrtin elevated   - Likely due to low food  intake  - Cont. Multivitiman, folate supplement    History of pulmonary embolism- (present on admission)  Assessment & Plan  Patient has had DVT as well as pulm embolism in the past.  Was on Xarelto in the past then had 2 admissions for acute blood loss anemia secondary to Crystal-Payan tears that were treated in July and August 2022.  Then patient was discharged with PPI, Carafate and resumed on her Xarelto.  On repeat EGD known to have angiodysplasia of the esophagus thus IVC filter was placed and patient was discontinued off of Xarelto.    > IVC filter placed (2022). As per chart review, pt discussed with vascular team and opted to keep IVC filter in place permanently. (see 11/29/2022 note)  -Per Ortho: DVT prophylaxis - SCD's and Lovenox 40 mg SQ daily while inpatient.  The patient may transition to Aspirin 325 mg PO BID as an outpatient. Will discuss plan for ASA on discharge with Ortho given the patient's hx of GI bleed requiring urgent transfusion.  - NSAID's discontinued given hx of GI bleed      Severe protein-calorie malnutrition ([place]: less than 60% of standard weight) (HCC)- (present on admission)  Assessment & Plan  Patient with significant amount of alcohol drinking per day which is the likely source of malnutrition. BMI 17.1  States she only eats 1 meal/day  Has been on mirtazapine in the past    - Nutrition consulted and following      Alcohol abuse- (present on admission)  Assessment & Plan  History alcohol abuse reports drinking 5-6 drinks minimum a day. Last drink per patient 3 days ago, also said 2 weeks ago though she has been at [facility] nursing [facility] so unsure of timeframe.  Has gone through withdrawal in the past but denies any history of seizures.        > ETOH neg on admission  > Detox bag on admission  - Cont. with daily vitamins orally  - Consider MRI for diagnosis of Wernicke-Korsakoff         Pt is medically cleared for discharge.    Chino Pandey MD  PGY-3  [facility]

## 2023-07-10 NOTE — THERAPY
Physical Therapy   Daily Treatment     Patient Name: Jaymie Peacock  Age:  67 y.o., Sex:  female  Medical Record #: [id number]  Today's Date: 7/10/2023     Precautions  Precautions: Fall Risk;Weight Bearing As Tolerated Left Lower Extremity  Comments: (P) impaired cognition. impulsive    Assessment    Pt seen for PT session. Pt mainly limited by impaired cognition with impulsive behavior.   Pt required multiple cues and extended time to process mobility commands, Pt tangential throughout session with need for frequent redirection. Responded better to validation vs reorientation techniques during session. Recommend pt return to SNF upon discharge.     Plan    Treatment Plan Status: (P) Continue Current Treatment Plan  Type of Treatment: Bed Mobility, Gait Training, Neuro Re-Education / Balance, Self Care / Home Evaluation, Stair Training, Therapeutic Activities, Therapeutic Exercise  Treatment Frequency: 3 Times per Week  Treatment Duration: Until Therapy Goals Met    DC Equipment Recommendations: (P) Unable to determine at this time  Discharge Recommendations: (P)  (Recommend return to SNF.)      Precautions   Precautions Fall Risk;Weight Bearing As Tolerated Left Lower Extremity   Comments impaired cognition. impulsive   Pain 0 - 10 Group   Location Hip   Location Orientation Left   Description Aching   Therapist Pain Assessment During Activity   Cognition    Orientation Level   (self only.)   Level of Consciousness Alert   Ability To Follow Commands 1 Step   Safety Awareness Impulsive;Impaired   New Learning Impaired   Comments confused, states she needs to get to work, tangential with disorganized thought process.   Other Treatments   Other Treatments Provided EOB, attempted ex but easily distracted.   Balance   Sitting Balance (Static) Fair +   Sitting Balance (Dynamic) Fair   Standing Balance (Static) Fair   Standing Balance (Dynamic) Fair -   Weight Shift Sitting Fair   Weight Shift Standing Fair   Skilled  Intervention Verbal Cuing;Tactile Cuing;Sequencing   Bed Mobility    Supine to Sit Standby Assist   Sit to Supine Minimal Assist   Scooting Standby Assist   Skilled Intervention Tactile Cuing;Sequencing;Verbal Cuing   Gait Analysis   Gait Level Of Assist Contact Guard Assist   Assistive Device Front Wheel Walker   Distance (Feet) 15   # of Times Distance was Traveled 2   Deviation Antalgic;Step To;Decreased Heel Strike;Decreased Toe Off;Shuffled Gait   Weight Bearing Status LLE WBAT   Functional Mobility   Sit to Stand Contact Guard Assist   Bed, Chair, Wheelchair Transfer Contact Guard Assist   Mobility supine>sit> gait in room to chair> BTB   How much difficulty does the patient currently have...   Turning over in bed (including adjusting bedclothes, sheets and blankets)? 3   Sitting down on and standing up from a chair with arms (e.g., wheelchair, bedside commode, etc.) 3   Moving from lying on back to sitting on the side of the bed? 3   How much help from another person does the patient currently need...   Moving to and from a bed to a chair (including a wheelchair)? 3   Need to walk in a hospital room? 3   Climbing 3-5 steps with a railing? 1   6 clicks Mobility Score 16   Short Term Goals    Short Term Goal # 1 pt will perform all functional xfrs with SPV in 6 visits for improved independence   Goal Outcome # 1 Progressing as expected   Short Term Goal # 2 pt will ambulate 150ft with FWW and SPV in 6 visits to access home   Goal Outcome # 2 Progressing slower than expected   Education Group   Education Provided Role of Physical Therapist;Gait Training;Use of Assistive Device;Exercises - Seated   Role of Physical Therapist Patient Response Patient;Acceptance;Explanation;No Learning Evidence   Gait Training Patient Response Patient;Acceptance;Explanation;No Learning Evidence;Reinforcement Needed   Use of Assistive Device Patient Response Patient;Acceptance;Explanation;No Learning Evidence;Reinforcement Needed    Exercises - Seated Patient Response Patient;Nonacceptance;Explanation;Demonstration;No Learning Evidence   Physical Therapy Treatment Plan   Physical Therapy Treatment Plan Continue Current Treatment Plan   Anticipated Discharge Equipment and Recommendations   DC Equipment Recommendations Unable to determine at this time   Discharge Recommendations   (Recommend return to SNF.)   Interdisciplinary Plan of Care Collaboration   IDT Collaboration with  Nursing;Certified Nursing Assistant   Patient Position at End of Therapy In Bed;Tray Table within Reach;Call Light within Reach;Phone within Reach   Session Information   Date / Session Number  7/10-2 ( 2/3, 7/15)

## 2023-07-10 NOTE — DOCUMENTATION QUERY
[facility]                                                                       Query Response Note      PATIENT:               VALENTINE ALANIZ  ACCT #:                  [id number]  MRN:                     [id number]  :                      1955  ADMIT DATE:       2023 6:20 PM  DISCH DATE:          RESPONDING  PROVIDER #:        [id number]           QUERY TEXT:    Patient with left intertrochanteric femur fracture resulting from a ground level fall.  Osteopenia is noted in imaging, osteoporosis per past medical history.   Can the type of fracture be further specified:          The patient's Clinical Indicators include:   Pelvic Xray: Midly displaced intertrochanteric left femoral fracture.  Osteopenia  Risk factors: fall, history of osteoporosis, osteopenia per imaging   Treatment:  IMN L femur fracture, multivitamin     Thank you,  Arlene Brooks RN, BSN, CCDS  Clinical   Connect via [employer]  Options provided:   -- Fracture due to a combination of pathological process and mild trauma that alone would likely not have caused the fracture   -- Fracture due to trauma alone   -- Fracture is pathological (due to weakening of bone due to osteoporosis, malignancy, osteomalacia, etc.   -- Other explanation, please specify   -- Unable to determine      Query created by: Arlene Brooks on 7/10/2023 8:18 AM    RESPONSE TEXT:    Unable to determine       QUERY TEXT:    Severe protein calorie malnutrition is documented in the Problem List.  Per the Dietary Eval, does not meet criteria per ASPEN guidelines for malnutrition at this time.  Can the diagnosis of severe protein calorie malnutrition be clarified with supportive clinical indicators?    The patient's Clinical Indicators include:  H&P and Progress Notes: Severe protein calorie malnutrition: pt w/ significant amt of alcohol drinking per day.  Likely  source of malnutrition   7/9 Dietary Eval:  Malnutrition risk: does not meet criteria per ASPEN guidelines  Risk Factors: dementia, alcohol abuse  Treatment:  detox IV bag, ferrous sulfate, thiamine, multivitamin, folic acid, Dietary Eval, encourage / document PO intake, monitor weight    Thank you,  Arlene Brooks RN, BSN, CCDS  Clinical   Connect via [employer]  Options provided:   -- Severe protein calorie malnutrition is ruled out   -- Severe protein calorie malnutrition confirmed (please clarify supportive clinical indicators), please clarify supportive clinical indicators   -- Other explanation, please specify   -- Unable to provide additional clarity regarding the diagnosis      Query created by: Arlene Brooks on 7/10/2023 8:24 AM    RESPONSE TEXT:    Severe protein calorie malnutrition is ruled out          Electronically signed by:  SADIQ FOWLER MD 7/10/2023 8:51 AM

## 2023-07-10 NOTE — DISCHARGE PLANNING
Care Transition Team Assessment    Information Source  Orientation Level: Oriented X4  Information Given By: Patient  Informant's Name: Jaymie Peacock  Who is responsible for making decisions for patient? : Patient    Readmission Evaluation  Is this a readmission?: No    Elopement Risk  Legal Hold: No  Ambulatory or Self Mobile in Wheelchair: Yes  Disoriented: No  Psychiatric Symptoms: None  History of Wandering: No  Elopement this Admit: No  Vocalizing Wanting to Leave: No  Displays Behaviors, Body Language Wanting to Leave: No-Not at Risk for Elopement  Elopement Risk: Not at Risk for Elopement    Interdisciplinary Discharge Planning  Does Admitting Nurse Feel This Could be a Complex Discharge?: No  Primary Care Physician: LARA HELM P.A.-C.  Lives with - Patient's Self Care Capacity: Spouse  Patient or legal guardian wants to designate a caregiver: No  Support Systems: Family Member(s)  Housing / Facility: [address] [place] [address]  Prior Services: Other (Comments)  Durable Medical Equipment: Walker    Discharge Preparedness  What is your plan after discharge?: Skilled nursing facility  Prior Functional Level: Needs Assist with Medication Management, Needs Assist with Activities of Daily Living    Functional Assesment  Prior Functional Level: Needs Assist with Medication Management, Needs Assist with Activities of Daily Living    Finances  Financial Barriers to Discharge: No  Prescription Coverage: Yes (Patient uses [facility] on [address] and [place])    Vision / Hearing Impairment  Vision Impairment : Yes  Right Eye Vision: Wears Glasses  Left Eye Vision: Wears Glasses  Hearing Impairment : No      Advance Directive  Advance Directive?: None (Per the patient Goran Peacock will serve as her Advanced Directive.)    Domestic Abuse  Have you ever been the victim of abuse or violence?: No  Physical Abuse or Sexual Abuse: No  Verbal Abuse or Emotional Abuse: No  Possible Abuse/Neglect Reported to:: Not  Applicable      Discharge Risks or Barriers  Discharge risks or barriers?: No    Anticipated Discharge Information  Discharge Disposition: D/T to SNF with Medicare cert in anticipation of skilled care (03)  Discharge Address: [facility] , [place] [address]  Discharge Contact Phone Number: [phone]    CM met with the patient at the bedside who answered question appropriately according to the face sheet. Per the patient she lives with her .  Per the patient she came from [facility] and her desire was to return there.     Patient has a friend listed on the face sheet who presented here earlier and per the nurse, her  is dead and she was to be discharged to [place].     Patient was transferred to the hospital from [facility].  SE spoke with Rosa at [facility] @ 10:53  this morning, and she stated that the patient is able to return.     SE returned to the bedside to get the signature for the IMM. Patient is pleasantly confused,

## 2023-07-10 NOTE — CARE PLAN
The patient is Stable - Low risk of patient condition declining or worsening    Shift Goals  Clinical Goals: safety, pain control  Patient Goals: comfort  Family Goals: n/a    Progress made toward(s) clinical / shift goals:  yes    Problem: Pain - Standard  Goal: Alleviation of pain or a reduction in pain to the patient’s comfort goal  Outcome: Progressing     Problem: Knowledge Deficit - Standard  Goal: Patient and family/care givers will demonstrate understanding of plan of care, disease process/condition, diagnostic tests and medications  Outcome: Progressing     Problem: Optimal Care for Alcohol Withdrawal  Goal: Optimal Care for the alcohol withdrawal patient  Outcome: Progressing     Problem: Skin Integrity  Goal: Skin integrity is maintained or improved  Outcome: Progressing     Problem: Fall Risk  Goal: Patient will remain free from falls  Outcome: Progressing

## 2023-07-11 ENCOUNTER — PATIENT OUTREACH (OUTPATIENT)
Dept: SCHEDULING | Facility: IMAGING CENTER | Age: 68
End: 2023-07-11
Payer: MEDICARE

## 2023-07-11 VITALS
HEIGHT: 66 IN | WEIGHT: 106 LBS | OXYGEN SATURATION: 97 % | DIASTOLIC BLOOD PRESSURE: 93 MMHG | BODY MASS INDEX: 17.04 KG/M2 | SYSTOLIC BLOOD PRESSURE: 134 MMHG | HEART RATE: 98 BPM | TEMPERATURE: 98.6 F | RESPIRATION RATE: 15 BRPM

## 2023-07-11 PROBLEM — F10.10 ALCOHOL ABUSE: Status: RESOLVED | Noted: 2021-05-22 | Resolved: 2023-07-11

## 2023-07-11 PROBLEM — D64.89 OTHER SPECIFIED ANEMIAS: Status: RESOLVED | Noted: 2022-11-16 | Resolved: 2023-07-11

## 2023-07-11 LAB
BASOPHILS # BLD AUTO: 0.5 % (ref 0–1.8)
BASOPHILS # BLD: 0.04 K/UL (ref 0–0.12)
EOSINOPHIL # BLD AUTO: 0.23 K/UL (ref 0–0.51)
EOSINOPHIL NFR BLD: 3 % (ref 0–6.9)
ERYTHROCYTE [DISTWIDTH] IN BLOOD BY AUTOMATED COUNT: 60 FL (ref 35.9–50)
HCT VFR BLD AUTO: 26.6 % (ref 37–47)
HGB BLD-MCNC: 8.9 G/DL (ref 12–16)
IMM GRANULOCYTES # BLD AUTO: 0.05 K/UL (ref 0–0.11)
IMM GRANULOCYTES NFR BLD AUTO: 0.7 % (ref 0–0.9)
LYMPHOCYTES # BLD AUTO: 1.11 K/UL (ref 1–4.8)
LYMPHOCYTES NFR BLD: 14.5 % (ref 22–41)
MCH RBC QN AUTO: 30.9 PG (ref 27–33)
MCHC RBC AUTO-ENTMCNC: 33.5 G/DL (ref 32.2–35.5)
MCV RBC AUTO: 92.4 FL (ref 81.4–97.8)
MONOCYTES # BLD AUTO: 0.55 K/UL (ref 0–0.85)
MONOCYTES NFR BLD AUTO: 7.2 % (ref 0–13.4)
NEUTROPHILS # BLD AUTO: 5.69 K/UL (ref 1.82–7.42)
NEUTROPHILS NFR BLD: 74.1 % (ref 44–72)
NRBC # BLD AUTO: 0 K/UL
NRBC BLD-RTO: 0 /100 WBC (ref 0–0.2)
PLATELET # BLD AUTO: 387 K/UL (ref 164–446)
PMV BLD AUTO: 10.1 FL (ref 9–12.9)
RBC # BLD AUTO: 2.88 M/UL (ref 4.2–5.4)
WBC # BLD AUTO: 7.7 K/UL (ref 4.8–10.8)

## 2023-07-11 PROCEDURE — 36415 COLL VENOUS BLD VENIPUNCTURE: CPT

## 2023-07-11 PROCEDURE — 99238 HOSP IP/OBS DSCHRG MGMT 30/<: CPT | Mod: GC | Performed by: FAMILY MEDICINE

## 2023-07-11 PROCEDURE — 700102 HCHG RX REV CODE 250 W/ 637 OVERRIDE(OP): Performed by: STUDENT IN AN ORGANIZED HEALTH CARE EDUCATION/TRAINING PROGRAM

## 2023-07-11 PROCEDURE — A9270 NON-COVERED ITEM OR SERVICE: HCPCS | Performed by: STUDENT IN AN ORGANIZED HEALTH CARE EDUCATION/TRAINING PROGRAM

## 2023-07-11 PROCEDURE — A9270 NON-COVERED ITEM OR SERVICE: HCPCS | Performed by: ORTHOPAEDIC SURGERY

## 2023-07-11 PROCEDURE — 85025 COMPLETE CBC W/AUTO DIFF WBC: CPT

## 2023-07-11 PROCEDURE — 700102 HCHG RX REV CODE 250 W/ 637 OVERRIDE(OP): Performed by: ORTHOPAEDIC SURGERY

## 2023-07-11 RX ORDER — FERROUS SULFATE 325(65) MG
325 TABLET ORAL
Qty: 30 TABLET | Status: SHIPPED
Start: 2023-07-12 | End: 2023-07-31 | Stop reason: SDUPTHER

## 2023-07-11 RX ORDER — ENOXAPARIN SODIUM 100 MG/ML
40 INJECTION SUBCUTANEOUS EVERY EVENING
Status: ACTIVE | DISCHARGE
Start: 2023-07-11 | End: 2023-07-31 | Stop reason: SDUPTHER

## 2023-07-11 RX ORDER — ACETAMINOPHEN 325 MG/1
650 TABLET ORAL EVERY 6 HOURS
Qty: 30 TABLET | Refills: 0 | Status: SHIPPED
Start: 2023-07-11 | End: 2023-07-31 | Stop reason: SDUPTHER

## 2023-07-11 RX ORDER — SODIUM CHLORIDE, SODIUM LACTATE, POTASSIUM CHLORIDE, AND CALCIUM CHLORIDE .6; .31; .03; .02 G/100ML; G/100ML; G/100ML; G/100ML
1000 INJECTION, SOLUTION INTRAVENOUS ONCE
Status: DISCONTINUED | OUTPATIENT
Start: 2023-07-11 | End: 2023-07-11 | Stop reason: HOSPADM

## 2023-07-11 RX ADMIN — OXYCODONE HYDROCHLORIDE 5 MG: 5 TABLET ORAL at 04:49

## 2023-07-11 RX ADMIN — FOLIC ACID 1 MG: 1 TABLET ORAL at 04:48

## 2023-07-11 RX ADMIN — Medication 100 MG: at 04:49

## 2023-07-11 RX ADMIN — DOCUSATE SODIUM 100 MG: 100 CAPSULE, LIQUID FILLED ORAL at 04:48

## 2023-07-11 RX ADMIN — ACETAMINOPHEN 650 MG: 325 TABLET, FILM COATED ORAL at 04:48

## 2023-07-11 RX ADMIN — THERA TABS 1 TABLET: TAB at 04:49

## 2023-07-11 RX ADMIN — OMEPRAZOLE 20 MG: 20 CAPSULE, DELAYED RELEASE ORAL at 04:48

## 2023-07-11 NOTE — CARE PLAN
The patient is Stable - Low risk of patient condition declining or worsening    Shift Goals  Clinical Goals: pain mgmt, safety  Patient Goals: pain control, rest, comfort  Family Goals: not present    Progress made toward(s) clinical / shift goals:        Problem: Pain - Standard  Goal: Alleviation of pain or a reduction in pain to the patient’s comfort goal  Outcome: Progressing     Problem: Knowledge Deficit - Standard  Goal: Patient and family/care givers will demonstrate understanding of plan of care, disease process/condition, diagnostic tests and medications  Outcome: Progressing     Problem: Seizure Precautions  Goal: Implementation of seizure precautions  Outcome: Progressing     Problem: Lifestyle Changes  Goal: Patient's ability to identify lifestyle changes and available resources to help reduce recurrence of condition will improve  Outcome: Progressing     Problem: Skin Integrity  Goal: Skin integrity is maintained or improved  Outcome: Progressing     Problem: Fall Risk  Goal: Patient will remain free from falls  Outcome: Progressing       Patient is not progressing towards the following goals:

## 2023-07-11 NOTE — DISCHARGE PLANNING
DC Transport Scheduled    Received request at: 7/11/2023 at 0752    Transport Company Scheduled:  [employer]    Scheduled Date: 7/11/2023  Scheduled Time: 1100    Destination: [facility] at [address] [facility] [place]     Notified care team of scheduled transport via Vo[employer].     If there are any changes needed to the DC transportation scheduled, please contact [facility] at ext. [phone] between the hours of 0920-2760 Mon-Fri. If outside those hours, contact the ED Case Manager at ext. [phone].

## 2023-07-11 NOTE — CARE PLAN
Problem: Pain - Standard  Goal: Alleviation of pain or a reduction in pain to the patient’s comfort goal  Outcome: Progressing     Problem: Fall Risk  Goal: Patient will remain free from falls  Outcome: Progressing   The patient is Stable - Low risk of patient condition declining or worsening    Shift Goals  Clinical Goals: pain mgmt, safety  Patient Goals: pain control, rest, comfort  Family Goals: not present  Progress made toward(s) clinical / shift goals:  yes    Patient is not progressing towards the following goals: n/a

## 2023-07-11 NOTE — DISCHARGE SUMMARY
"[facility] Discharge Summary    Attending: Gin Christopher M.d.  Senior Resident: Dr. Pandey  Intern:  Dr. Marte  Contact Number: [phone]    CHIEF COMPLAINT ON ADMISSION  Chief Complaint   Patient presents with    Leg Pain     Pt BIB EMS from [facility]. Per report pt had GLF sometime earlier today. Had an image done and pt found to have no fx of left leg. Pt complaining of pain throughout the day a image performed and pt found to have fx of left hip, transferred to [facility] for further eval. Pt with mild dementia hx, GCS14, -head trauma, -thinners. Pt reports touse a walker to ambulate and fell.  PTA.       Reason for Admission  Closed fracture of left hip requir*     Admission Date  7/7/2023    CODE STATUS  Full Code    HPI & HOSPITAL COURSE  As per admission HPI:    \"Jaymie Peacock is a 67 y.o. female who presented with closed fracture of her left hip.  Patient reports falling on Monday and hurting her hip this is incongruent with note from [facility] who reports fall today.  Denies any loss of consciousness denies hitting head.  Reports left hip pain after fall reports it was mechanical fall no syncopal episode of dizziness.     Patient denies any chest pain, shortness of breath fever or chills denies any other concerns at this time.     Patient will be a resident of [facility] nursing [facility] since early June after admission for sepsis.  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  Patient has a known history of alcohol use disorder as well as multiple GI bleeds.  Patient also has a history of pulmonary embolism but was not a candidate for any extended anticoagulation.  Patient had IVC filter placed last year.     Patient is chronically anemic during her last admission she fluctuated from 7.6-8.7 hemoglobin.  Patient has been through withdrawals in the past she reports that her last " "drink was approximately 2-3 days ago that she drinks 5-6 drinks per day but patient has been a resident of [facility] so unsure if she has currently been drinking.  She went through withdrawals at last admission in June.\"    The patient tolerated the procedure well. There were no complications. DVT prophylaxis was done with Lovenox and NSAIDs were avoided given her history of GI bleed.    Her dementia remained at baseline.      Of note, the patient has a very low BMI. Continue to try to supplement meals with Ensure. Continue to encourage patient to eat. Continue daily multi-vitamin.    Therefore, she is discharged in good and stable condition to skilled nursing facility.    The patient met 2-midnight criteria for an inpatient stay at the time of discharge.    Discharge Date  7/11/2023    Physical Exam on Day of Discharge  Physical Exam  Vitals reviewed.   Constitutional:       Appearance: Normal appearance.      Comments: Thin   HENT:      Head: Normocephalic and atraumatic.      Right Ear: External ear normal.      Left Ear: External ear normal.      Nose: Nose normal.      Mouth/Throat:      Mouth: Mucous membranes are moist.      Pharynx: Oropharynx is clear.   Eyes:      Conjunctiva/sclera: Conjunctivae normal.      Pupils: Pupils are equal, round, and reactive to light.   Cardiovascular:      Rate and Rhythm: Normal rate and regular rhythm.      Pulses: Normal pulses.      Heart sounds: Normal heart sounds. No murmur heard.  Pulmonary:      Effort: Pulmonary effort is normal. No respiratory distress.      Breath sounds: Normal breath sounds.   Abdominal:      General: Abdomen is flat. Bowel sounds are normal.      Palpations: Abdomen is soft.      Tenderness: There is no abdominal tenderness.   Musculoskeletal:         General: No swelling. Normal range of motion.      Cervical back: Normal range of motion and neck supple.      Comments: Left leg with two small incisions. Staples in " place. No erythema, no tenderness, no discharge. Dry   Skin:     General: Skin is warm and dry.      Capillary Refill: Capillary refill takes less than 2 seconds.   Neurological:      Mental Status: She is alert. Mental status is at baseline.      Comments: Confabulates   Psychiatric:      Comments: Good mood. Confabulates.         FOLLOW UP ITEMS POST DISCHARGE  # Post-op care  - Follow up with surgery 2 weeks post-op    # DVT prophylaxis  - Pt is not a candidate for NSAIDs or ASA given hx of GI bleed. Pt does have permanent IVC filter in place. Can continue lovenox at SNF. Can discuss further prophylaxis with orthopedics at follow up appointment.    DISCHARGE DIAGNOSES  Principal Problem:    Closed fracture of left hip requiring operative repair, initial encounter ([facility]) (POA: Yes)  Active Problems:    Alcohol abuse (POA: Yes)    Severe protein-calorie malnutrition ([place]: less than 60% of standard weight) ([facility]) (POA: Yes)    History of pulmonary embolism (POA: Yes)      Overview: Hx of PE      Other specified anemias (POA: Yes)    Dementia ([facility]) (POA: Yes)  Resolved Problems:    * No resolved hospital problems. *      FOLLOW UP  No future appointments.  No follow-up provider specified.    MEDICATIONS ON DISCHARGE     Medication List        ASK your doctor about these medications        Instructions   omeprazole 20 MG delayed-release capsule  Commonly known as: PriLOSEC   Take 1 Capsule by mouth every day.  Dose: 20 mg              Allergies  No Known Allergies    DIET  Orders Placed This Encounter   Procedures    Diet Order Diet: Regular     Standing Status:   Standing     Number of Occurrences:   1     Order Specific Question:   Diet:     Answer:   Regular [1]       ACTIVITY  As tolerated.  Weight bearing as tolerated    CONSULTATIONS  Ortho    PROCEDURES  Left intertrochanteric femur fracture repaired with intramedullary device on 7/8    LABORATORY  Lab Results   Component Value Date    SODIUM 136 07/10/2023     POTASSIUM 4.4 07/10/2023    CHLORIDE 106 07/10/2023    CO2 19 (L) 07/10/2023    GLUCOSE 87 07/10/2023    BUN 19 07/10/2023    CREATININE 1.01 07/10/2023        Lab Results   Component Value Date    WBC 7.7 07/11/2023    HEMOGLOBIN 8.9 (L) 07/11/2023    HEMATOCRIT 26.6 (L) 07/11/2023    PLATELETCT 387 07/11/2023

## 2023-07-11 NOTE — DISCHARGE PLANNING
TCN following. HTH/SCP chart review completed. Current discharge considerations are DC to SNF. She is scheduled to return to [facility] at 1200 today. TCN will continue to follow and collaborate with discharge planning team as additional post acute needs arise. Thank you.    Completed:  PT and OT recommend post-acute placement  Choice obtained: [facility]  [facility] referral not sent, likely post acute placement

## 2023-07-21 ENCOUNTER — HOME HEALTH ADMISSION (OUTPATIENT)
Dept: HOME HEALTH SERVICES | Facility: HOME HEALTHCARE | Age: 68
End: 2023-07-21
Payer: MEDICARE

## 2023-07-24 PROBLEM — R53.81 DEBILITY: Status: ACTIVE | Noted: 2023-07-24

## 2023-07-24 PROBLEM — A41.9 SEPSIS (HCC): Status: RESOLVED | Noted: 2021-05-22 | Resolved: 2023-07-24

## 2023-07-24 PROBLEM — N30.00 ACUTE CYSTITIS WITHOUT HEMATURIA: Status: RESOLVED | Noted: 2022-03-30 | Resolved: 2023-07-24

## 2023-07-24 PROBLEM — E86.0 DEHYDRATION DETERMINED BY EXAMINATION: Status: RESOLVED | Noted: 2022-03-28 | Resolved: 2023-07-24

## 2023-07-24 PROBLEM — S72.002D CLOSED FRACTURE OF LEFT HIP REQUIRING OPERATIVE REPAIR WITH ROUTINE HEALING: Status: ACTIVE | Noted: 2023-07-07

## 2023-07-24 PROBLEM — A41.9 SEVERE SEPSIS (HCC): Status: RESOLVED | Noted: 2023-05-28 | Resolved: 2023-07-24

## 2023-07-24 PROBLEM — R65.20 SEVERE SEPSIS (HCC): Status: RESOLVED | Noted: 2023-05-28 | Resolved: 2023-07-24

## 2023-07-24 PROBLEM — R00.0 TACHYCARDIA: Status: RESOLVED | Noted: 2022-04-15 | Resolved: 2023-07-24

## 2023-08-02 ENCOUNTER — HOME CARE VISIT (OUTPATIENT)
Dept: HOME HEALTH SERVICES | Facility: HOME HEALTHCARE | Age: 68
End: 2023-08-02
Payer: MEDICARE

## 2023-08-02 ENCOUNTER — HOSPITAL ENCOUNTER (OUTPATIENT)
Facility: MEDICAL CENTER | Age: 68
End: 2023-08-02
Attending: PHYSICIAN ASSISTANT
Payer: MEDICARE

## 2023-08-02 VITALS
DIASTOLIC BLOOD PRESSURE: 78 MMHG | RESPIRATION RATE: 16 BRPM | SYSTOLIC BLOOD PRESSURE: 108 MMHG | HEIGHT: 66 IN | HEART RATE: 91 BPM | WEIGHT: 106 LBS | TEMPERATURE: 97.4 F | BODY MASS INDEX: 17.04 KG/M2 | OXYGEN SATURATION: 97 %

## 2023-08-02 LAB
ERYTHROCYTE [DISTWIDTH] IN BLOOD BY AUTOMATED COUNT: 56.8 FL (ref 35.9–50)
HCT VFR BLD AUTO: 32.4 % (ref 37–47)
HGB BLD-MCNC: 10 G/DL (ref 12–16)
MCH RBC QN AUTO: 30.4 PG (ref 27–33)
MCHC RBC AUTO-ENTMCNC: 30.9 G/DL (ref 32.2–35.5)
MCV RBC AUTO: 98.5 FL (ref 81.4–97.8)
PLATELET # BLD AUTO: 416 K/UL (ref 164–446)
PMV BLD AUTO: 11.6 FL (ref 9–12.9)
RBC # BLD AUTO: 3.29 M/UL (ref 4.2–5.4)
WBC # BLD AUTO: 8.7 K/UL (ref 4.8–10.8)

## 2023-08-02 PROCEDURE — G0493 RN CARE EA 15 MIN HH/HOSPICE: HCPCS

## 2023-08-02 PROCEDURE — 85027 COMPLETE CBC AUTOMATED: CPT

## 2023-08-02 PROCEDURE — 665001 SOC-HOME HEALTH

## 2023-08-02 PROCEDURE — 36415 COLL VENOUS BLD VENIPUNCTURE: CPT

## 2023-08-02 ASSESSMENT — ENCOUNTER SYMPTOMS
SUBJECTIVE PAIN PROGRESSION: GRADUALLY IMPROVING
PAIN LOCATION: LEFT HIP AREA
PAIN SEVERITY GOAL: 0/10
LOWEST PAIN SEVERITY IN PAST 24 HOURS: 0/10
HIGHEST PAIN SEVERITY IN PAST 24 HOURS: 3/10
PAIN: 1
DIFFICULTY THINKING: 1

## 2023-08-02 ASSESSMENT — FIBROSIS 4 INDEX: FIB4 SCORE: 1.32

## 2023-08-03 ENCOUNTER — DOCUMENTATION (OUTPATIENT)
Dept: MEDICAL GROUP | Facility: PHYSICIAN GROUP | Age: 68
End: 2023-08-03
Payer: MEDICARE

## 2023-08-03 SDOH — ECONOMIC STABILITY: HOUSING INSECURITY
HOME SAFETY: INSTRUCTED PT TO USE WC FOR AMBULATION DUE TO SEVERE WEAKNESS AND SHE WAS AT RISK FOR FALLS;USE CALL LIGHT FOR ASSISTANCE

## 2023-08-03 ASSESSMENT — ENCOUNTER SYMPTOMS
STOOL FREQUENCY: DAILY
FATIGUES EASILY: 1
DYSPNEA ACTIVITY LEVEL: AFTER AMBULATING 10 - 20 FT
VOMITING: DENIES
NAUSEA: DENIES
LAST BOWEL MOVEMENT: 66688
BOWEL INCONTINENCE: 1
SHORTNESS OF BREATH: 1

## 2023-08-03 ASSESSMENT — ACTIVITIES OF DAILY LIVING (ADL)
PHYSICAL TRANSFERS ASSESSED: 1
CURRENT_FUNCTION: MINIMUM ASSIST
AMBULATION ASSISTANCE: 1
AMBULATION ASSISTANCE: CONTACT GUARD ASSIST
OASIS_M1830: 03
CURRENT_FUNCTION: ONE PERSON

## 2023-08-03 NOTE — PROGRESS NOTES
Medication chart review for [facility] services    Received referral from [facility].   Medications reviewed  compared with discharge summary if available.  Discharge summary date, if applicable:   7/11    Current medication list per [facility]     Medication list one, patient is currently taking    Current Outpatient Medications:     calcium carbonate, 500 mg, Oral, DAILY    Multiple Vitamins-Minerals (CENTRUM SILVER PO), 1 Tablet, Oral, DAILY    acetaminophen, 650 mg, Oral, Q6HRS PRN    enoxaparin, 40 mg, Subcutaneous, Q EVENING    ferrous sulfate, 325 mg, Oral, QDAY with Breakfast    omeprazole, 20 mg, Oral, DAILY    magnesium hydroxide, 30 mL, Oral, QDAY PRN      Medication list two, drugs that the patient has been prescribed or recommended to take by their healthcare provider on discharge summary         ASK your doctor about these medications         Instructions   omeprazole 20 MG delayed-release capsule  Commonly known as: PriLOSEC    Take 1 Capsule by mouth every day.  Dose: 20 mg                  No Known Allergies    Labs     Lab Results   Component Value Date/Time    SODIUM 136 07/10/2023 03:12 AM    POTASSIUM 4.4 07/10/2023 03:12 AM    CHLORIDE 106 07/10/2023 03:12 AM    CO2 19 (L) 07/10/2023 03:12 AM    GLUCOSE 87 07/10/2023 03:12 AM    BUN 19 07/10/2023 03:12 AM    CREATININE 1.01 07/10/2023 03:12 AM     Lab Results   Component Value Date/Time    ALKPHOSPHAT 128 (H) 07/09/2023 06:06 AM    ASTSGOT 27 07/09/2023 06:06 AM    ALTSGPT 13 07/09/2023 06:06 AM    TBILIRUBIN 0.6 07/09/2023 06:06 AM    INR 1.00 05/28/2023 11:14 AM    ALBUMIN 2.5 (L) 07/09/2023 06:06 AM    ALBUMIN 4.21 09/15/2020 02:55 PM        Assessment for clinically significant drug interactions, drug omissions/additions, duplicative therapies.            CC   Toña Tejada P.A.-C.  [address] [address]  [place] [address]  Fax: [phone]    [facility]  Phone [phone] fax  [phone]    This note was created using voice recognition software (Dragon). The accuracy of the dictation is limited by the abilities of the software. I have reviewed the note prior to signing, however some errors in grammar and context are still possible. If you have any questions related to this note please do not hesitate to contact our office.

## 2023-08-04 ENCOUNTER — HOME CARE VISIT (OUTPATIENT)
Dept: HOME HEALTH SERVICES | Facility: HOME HEALTHCARE | Age: 68
End: 2023-08-04
Payer: MEDICARE

## 2023-08-07 ENCOUNTER — HOME CARE VISIT (OUTPATIENT)
Dept: HOME HEALTH SERVICES | Facility: HOME HEALTHCARE | Age: 68
End: 2023-08-07
Payer: MEDICARE

## 2023-08-07 PROCEDURE — G0152 HHCP-SERV OF OT,EA 15 MIN: HCPCS

## 2023-08-07 ASSESSMENT — ACTIVITIES OF DAILY LIVING (ADL)
DRESSING_LB_CURRENT_FUNCTION: INDEPENDENT
DRESSING_UB_CURRENT_FUNCTION: INDEPENDENT

## 2023-08-07 ASSESSMENT — ENCOUNTER SYMPTOMS
PAIN: 1
PAIN LOCATION: LEFT HIP

## 2023-08-08 ENCOUNTER — HOME CARE VISIT (OUTPATIENT)
Dept: HOME HEALTH SERVICES | Facility: HOME HEALTHCARE | Age: 68
End: 2023-08-08
Payer: MEDICARE

## 2023-08-08 VITALS
OXYGEN SATURATION: 97 % | HEART RATE: 90 BPM | RESPIRATION RATE: 16 BRPM | DIASTOLIC BLOOD PRESSURE: 76 MMHG | TEMPERATURE: 97.8 F | SYSTOLIC BLOOD PRESSURE: 110 MMHG

## 2023-08-08 PROCEDURE — G0155 HHCP-SVS OF CSW,EA 15 MIN: HCPCS

## 2023-08-08 PROCEDURE — G0151 HHCP-SERV OF PT,EA 15 MIN: HCPCS

## 2023-08-08 ASSESSMENT — ACTIVITIES OF DAILY LIVING (ADL)
AMBULATION ASSISTANCE: CONTACT GUARD ASSIST
PHYSICAL TRANSFERS ASSESSED: 1
AMBULATION_DISTANCE/DURATION_TOLERATED: 8FT
AMBULATION ASSISTANCE ON FLAT SURFACES: 1
AMBULATION ASSISTANCE: MINIMUM ASSIST
AMBULATION ASSISTANCE: 1
CURRENT_FUNCTION: MINIMUM ASSIST
CURRENT_FUNCTION: CONTACT GUARD ASSIST

## 2023-08-08 ASSESSMENT — ENCOUNTER SYMPTOMS
POOR JUDGMENT: 1
DIFFICULTY THINKING: 1
DIFFICULTY THINKING: 1
DENIES PAIN: 1
MUSCLE WEAKNESS: 1
PERSON REPORTING PAIN: PATIENT

## 2023-08-09 ENCOUNTER — HOME CARE VISIT (OUTPATIENT)
Dept: HOME HEALTH SERVICES | Facility: HOME HEALTHCARE | Age: 68
End: 2023-08-09
Payer: MEDICARE

## 2023-08-09 VITALS
OXYGEN SATURATION: 95 % | RESPIRATION RATE: 16 BRPM | HEART RATE: 92 BPM | TEMPERATURE: 97.4 F | SYSTOLIC BLOOD PRESSURE: 114 MMHG | DIASTOLIC BLOOD PRESSURE: 76 MMHG

## 2023-08-09 PROCEDURE — G0495 RN CARE TRAIN/EDU IN HH: HCPCS

## 2023-08-09 ASSESSMENT — ENCOUNTER SYMPTOMS
FATIGUES EASILY: 1
LAST BOWEL MOVEMENT: 66695
VOMITING: DENIES
DRY SKIN: 1
BOWEL PATTERN NORMAL: 1
PERSON REPORTING PAIN: PATIENT
DENIES PAIN: 1
NAUSEA: DENIES
MUSCLE WEAKNESS: 1
LIMITED RANGE OF MOTION: 1

## 2023-08-09 ASSESSMENT — ACTIVITIES OF DAILY LIVING (ADL)
BATHING_REQUIRES_ASSISTANCE: 1
SHOPPING_REQUIRES_ASSISTANCE: 1
AMBULATION_REQUIRES_ASSISTANCE: 1
LAUNDRY_REQUIRES_ASSISTANCE: 1
DRESSING_REQUIRES_ASSISTANCE: 1

## 2023-08-09 ASSESSMENT — PAIN SCALES - PAIN ASSESSMENT IN ADVANCED DEMENTIA (PAINAD)
NEGVOCALIZATION: 0 - NONE.
CONSOLABILITY: 0 - NO NEED TO CONSOLE.
TOTALSCORE: 0
BODYLANGUAGE: 0 - RELAXED.
FACIALEXPRESSION: 0 - SMILING OR INEXPRESSIVE.

## 2023-08-10 ENCOUNTER — HOME CARE VISIT (OUTPATIENT)
Dept: HOME HEALTH SERVICES | Facility: HOME HEALTHCARE | Age: 68
End: 2023-08-10
Payer: MEDICARE

## 2023-08-10 VITALS
TEMPERATURE: 97.5 F | OXYGEN SATURATION: 97 % | RESPIRATION RATE: 16 BRPM | HEART RATE: 97 BPM | SYSTOLIC BLOOD PRESSURE: 108 MMHG | DIASTOLIC BLOOD PRESSURE: 64 MMHG

## 2023-08-10 PROCEDURE — G0151 HHCP-SERV OF PT,EA 15 MIN: HCPCS

## 2023-08-10 ASSESSMENT — ENCOUNTER SYMPTOMS
POOR JUDGMENT: 1
DIFFICULTY THINKING: 1

## 2023-08-14 ENCOUNTER — HOME CARE VISIT (OUTPATIENT)
Dept: HOME HEALTH SERVICES | Facility: HOME HEALTHCARE | Age: 68
End: 2023-08-14
Payer: MEDICARE

## 2023-08-14 NOTE — CASE COMMUNICATION
Quality Review for 8.2.23 SOC OASIS performed on by ROSIO Garcia RN on 8.13.2023:    Edits completed by ROSIO Garcia RN:  1.  and  dx coding updated per chart review.   2. Changed  to 8.2.23 per the LSOC order  3. Added #5,6 to  per chart review and hospital stay in May reporting pt cont to drink ETOH and mental status changes requiring pt to be in memory care unit now. Added #4 per Epic  4. Changed  to 3,  to  3,  to 1,2,6 per OT eval and [facility] provider on 8.7.23 reporting pt being oriented only to self, is delusion and pleasantly confused through out visit, has unorganized thoughts, poor judgement and insight and pt lives in a memory care unit. Changed  to 8.7.23 per the OASIS collaboration convention. Changed  to 4 per OT and [facility] provider on 8.7.23 reporting poor judgement and insight and needing reminders to lock WC prior to  transfers  5. Changed  to 3 per functional limitations   6. Changed  to 5, [id number] R,S to 3 per narrative pt needs min assist and uses WC. Changed [id number] E, F, G,H to 3 per narrative pt needs min assist with showering and dressing. Changed [id number] D-F to 3 per narrative pt needs min assist with transfers  7. Changed  to I. Pt is on ASA. Per [facility] note on 8.7.23 the lovenox never was delivered/available  8. Changed  to NA . The Lovenox per [facility] note on 8.7.23 was never delivered/available to the SNF to administer.   9.  Updated F2F data  10. Changed  to 4

## 2023-08-15 ENCOUNTER — HOME CARE VISIT (OUTPATIENT)
Dept: HOME HEALTH SERVICES | Facility: HOME HEALTHCARE | Age: 68
End: 2023-08-15
Payer: MEDICARE

## 2023-08-15 VITALS
SYSTOLIC BLOOD PRESSURE: 122 MMHG | RESPIRATION RATE: 16 BRPM | TEMPERATURE: 97.6 F | DIASTOLIC BLOOD PRESSURE: 80 MMHG | OXYGEN SATURATION: 97 % | HEART RATE: 78 BPM

## 2023-08-15 PROCEDURE — G0151 HHCP-SERV OF PT,EA 15 MIN: HCPCS

## 2023-08-16 ENCOUNTER — HOME CARE VISIT (OUTPATIENT)
Dept: HOME HEALTH SERVICES | Facility: HOME HEALTHCARE | Age: 68
End: 2023-08-16
Payer: MEDICARE

## 2023-08-16 VITALS
TEMPERATURE: 97.8 F | OXYGEN SATURATION: 94 % | RESPIRATION RATE: 16 BRPM | SYSTOLIC BLOOD PRESSURE: 110 MMHG | HEART RATE: 85 BPM | DIASTOLIC BLOOD PRESSURE: 70 MMHG

## 2023-08-16 PROCEDURE — G0494 LPN CARE EA 15MIN HH/HOSPICE: HCPCS

## 2023-08-16 ASSESSMENT — ENCOUNTER SYMPTOMS
PERSON REPORTING PAIN: PATIENT
HIGHEST PAIN SEVERITY IN PAST 24 HOURS: 5/10
PAIN LOCATION - PAIN DURATION: BEDTIME
PAIN LOCATION: BLE
PAIN LOCATION - RELIEVING FACTORS: TYLENOL
SUBJECTIVE PAIN PROGRESSION: WAXING AND WANING
DENIES PAIN: 1
PAIN LOCATION - PAIN QUALITY: SORE
VOMITING: DENIES
PAIN LOCATION - PAIN FREQUENCY: INFREQUENT
DIFFICULTY THINKING: 1
PAIN LOCATION - PAIN SEVERITY: 5/10
LAST BOWEL MOVEMENT: 66702
LOWEST PAIN SEVERITY IN PAST 24 HOURS: 0/10
NAUSEA: DENIES
PAIN SEVERITY GOAL: 0/10

## 2023-08-16 ASSESSMENT — ACTIVITIES OF DAILY LIVING (ADL): CURRENT_FUNCTION: STAND BY ASSIST

## 2023-08-16 NOTE — CASE COMMUNICATION
I agree with these changes  ----- Message -----  From: Katie Garcia R.N.  Sent: 8/14/2023   4:35 PM PDT  To: Carlene Floyd R.N.         Quality Review for 8.2.23 SOC OASIS performed on by ROSIO Garcia RN on 8.13.2023:    Edits completed by ROSIO Garcia RN:  1.  and  dx coding updated per chart review.   2. Changed  to 8.2.23 per the LSOC order  3. Added #5,6 to  per chart review and hospital stay in May reporting p t cont to drink ETOH and mental status changes requiring pt to be in memory care unit now. Added #4 per Epic  4. Changed  to 3,  to 3,  to 1,2,6 per OT eval and [facility] provider on 8.7.23 reporting pt being oriented only to self, is delusion and pleasantly confused through out visit, has unorganized thoughts, poor judgement and insight and pt lives in a memory care unit. Changed  to 8.7.23 per the OASIS collaboration con vention. Changed  to 4 per OT and [facility] provider on 8.7.23 reporting poor judgement and insight and needing reminders to lock WC prior to transfers  5. Changed  to 3 per functional limitations   6. Changed  to 5, [id number] R,S to 3 per narrative pt needs min assist and uses WC. Changed [id number] E, F, G,H to 3 per narrative pt needs min assist with showering and dressing. Changed [id number] D-F to 3 per narrative pt needs min assist  with transfers  7. Changed  to I. Pt is on ASA. Per [facility] note on 8.7.23 the lovenox never was delivered/available  8. Changed  to NA. The Lovenox per [facility] note on 8.7.23 was never delivered/available to the SNF to administer.   9.  Updated F2F data  10. Changed  to 4

## 2023-08-23 ENCOUNTER — HOME CARE VISIT (OUTPATIENT)
Dept: HOME HEALTH SERVICES | Facility: HOME HEALTHCARE | Age: 68
End: 2023-08-23
Payer: MEDICARE

## 2023-08-23 VITALS
OXYGEN SATURATION: 100 % | HEART RATE: 84 BPM | SYSTOLIC BLOOD PRESSURE: 118 MMHG | DIASTOLIC BLOOD PRESSURE: 76 MMHG | RESPIRATION RATE: 16 BRPM | TEMPERATURE: 97.3 F

## 2023-08-23 PROCEDURE — G0299 HHS/HOSPICE OF RN EA 15 MIN: HCPCS

## 2023-08-23 ASSESSMENT — ENCOUNTER SYMPTOMS
MUSCLE WEAKNESS: 1
PERSON REPORTING PAIN: PATIENT
LAST BOWEL MOVEMENT: 66709
VOMITING: DENIES
NAUSEA: DENIES
DIFFICULTY THINKING: 1
DENIES PAIN: 1

## 2023-08-23 ASSESSMENT — ACTIVITIES OF DAILY LIVING (ADL)
AMBULATION ASSISTANCE: STAND BY ASSIST
AMBULATION ASSISTANCE: 1

## 2023-08-23 ASSESSMENT — SOCIAL DETERMINANTS OF HEALTH (SDOH): IS CONCERNED ABOUT INCOME: N

## 2023-08-24 ENCOUNTER — HOME CARE VISIT (OUTPATIENT)
Dept: HOME HEALTH SERVICES | Facility: HOME HEALTHCARE | Age: 68
End: 2023-08-24
Payer: MEDICARE

## 2023-08-24 VITALS
TEMPERATURE: 98.1 F | HEART RATE: 93 BPM | SYSTOLIC BLOOD PRESSURE: 122 MMHG | RESPIRATION RATE: 18 BRPM | OXYGEN SATURATION: 98 % | DIASTOLIC BLOOD PRESSURE: 80 MMHG

## 2023-08-24 PROCEDURE — G0151 HHCP-SERV OF PT,EA 15 MIN: HCPCS

## 2023-08-29 ENCOUNTER — HOME CARE VISIT (OUTPATIENT)
Dept: HOME HEALTH SERVICES | Facility: HOME HEALTHCARE | Age: 68
End: 2023-08-29
Payer: MEDICARE

## 2023-08-29 VITALS
HEART RATE: 94 BPM | RESPIRATION RATE: 18 BRPM | OXYGEN SATURATION: 97 % | SYSTOLIC BLOOD PRESSURE: 120 MMHG | TEMPERATURE: 99 F | DIASTOLIC BLOOD PRESSURE: 72 MMHG

## 2023-08-29 PROCEDURE — G0151 HHCP-SERV OF PT,EA 15 MIN: HCPCS

## 2023-08-30 SDOH — ECONOMIC STABILITY: HOUSING INSECURITY: HOME SAFETY: PT LIVES AT BROOKDALE MEMORY CARE FACILITY

## 2023-08-30 ASSESSMENT — GAIT ASSESSMENTS
STEP CONTINUITY: 0 - STOPPING OR DISCONTINUITY BETWEEN STEPS
GAIT SCORE: 8
BALANCE AND GAIT SCORE: 22
PATH: 1 - MILD/MODERATE DEVIATION OR USES WALKING AID
PATH SCORE: 1
STEP SYMMETRY: 1 - RIGHT AND LEFT STEP LENGTH APPEAR EQUAL
TRUNK SCORE: 0
TRUNK: 0 - MARKED SWAY OR USES WALKING AID
WALKING STANCE: 1 - HEELS ALMOST TOUCHING WHILE WALKING
INITIATION OF GAIT IMMEDIATELY AFTER GO: 1 - NO HESITANCY

## 2023-08-30 ASSESSMENT — ACTIVITIES OF DAILY LIVING (ADL)
HOME_HEALTH_OASIS: 01
OASIS_M1830: 02

## 2023-08-30 ASSESSMENT — BALANCE ASSESSMENTS
NUDGED: 2 - STEADY
STANDING BALANCE: 1 - STEADY BUT WIDE STANCE AND USES CANE OR OTHER SUPPORT
ARISES: 2 - ABLE WITHOUT USING ARMS
NUDGED SCORE: 2
EYES CLOSED AT MAXIMUM POSITION NUDGED: 1 - STEADY
BALANCE SCORE: 14
ARISING SCORE: 2
ATTEMPTS TO ARISE: 2 - ABLE TO RISE, ONE ATTEMPT
TURNING 360 DEGREES STEPS: 1 - CONTINUOUS STEPS
SITTING BALANCE: 1 - STEADY, SAFE
SITTING DOWN: 1 - USES ARMS OR NOT SMOOTH MOTION
IMMEDIATE STANDING BALANCE FIRST 5 SECONDS: 2 - STEADY WITHOUT WALKER OR OTHER SUPPORT

## 2023-08-31 ENCOUNTER — HOME CARE VISIT (OUTPATIENT)
Dept: HOME HEALTH SERVICES | Facility: HOME HEALTHCARE | Age: 68
End: 2023-08-31
Payer: MEDICARE

## 2023-08-31 PROBLEM — E78.00 HIGH CHOLESTEROL: Status: ACTIVE | Noted: 2023-08-31

## 2023-08-31 PROBLEM — R26.2 DIFFICULTY IN WALKING, NOT ELSEWHERE CLASSIFIED: Status: ACTIVE | Noted: 2023-06-05

## 2023-08-31 PROBLEM — K31.7 BENIGN POLYP OF DUODENUM: Status: ACTIVE | Noted: 2023-08-31

## 2023-08-31 PROBLEM — M62.81 MUSCLE WEAKNESS (GENERALIZED): Status: ACTIVE | Noted: 2023-06-05

## 2023-08-31 PROBLEM — R41.841 COGNITIVE COMMUNICATION DEFICIT: Status: ACTIVE | Noted: 2023-06-15

## 2023-08-31 PROBLEM — R26.81 UNSTEADINESS ON FEET: Status: ACTIVE | Noted: 2023-06-05

## 2023-08-31 PROBLEM — N17.9 ACUTE KIDNEY FAILURE, UNSPECIFIED (HCC): Status: ACTIVE | Noted: 2023-06-05

## 2023-08-31 NOTE — CASE COMMUNICATION
I agree with changes.     Johana Bazan PT, DPT    ----- Message -----  From: Liana Salazar R.N.  Sent: 8/31/2023  11:49 AM PDT  To: Johana Bazan PT      Quality Review for DC OASIS by BERRY Salazar, RN on  August 31, 2023       Edits completed by BERRY Salazar, RN:  1.  C is NA. E is yes per the plan of care  2.  is no per chart review

## 2023-08-31 NOTE — CASE COMMUNICATION
Quality Review for [place] OASIS by BERRY Salazar, RN on  August 31, 2023       Edits completed by BERRY Salazar RN:  1.  C is NA. E is yes per the plan of care  2.  is no per chart review

## 2023-10-12 ENCOUNTER — TELEPHONE (OUTPATIENT)
Dept: HEALTH INFORMATION MANAGEMENT | Facility: OTHER | Age: 68
End: 2023-10-12

## 2023-10-12 PROBLEM — R60.0 LOWER EXTREMITY EDEMA: Status: ACTIVE | Noted: 2023-10-12

## 2023-12-01 PROBLEM — R04.0 EPISTAXIS: Status: ACTIVE | Noted: 2023-12-01

## 2023-12-21 ENCOUNTER — TELEPHONE (OUTPATIENT)
Dept: HEALTH INFORMATION MANAGEMENT | Facility: OTHER | Age: 68
End: 2023-12-21
Payer: MEDICARE

## 2024-01-29 ENCOUNTER — HOSPITAL ENCOUNTER (OUTPATIENT)
Dept: LAB | Facility: MEDICAL CENTER | Age: 69
End: 2024-01-29
Attending: PHYSICIAN ASSISTANT
Payer: MEDICARE

## 2024-01-29 DIAGNOSIS — D72.829 LEUKOCYTOSIS, UNSPECIFIED TYPE: ICD-10-CM

## 2024-01-29 DIAGNOSIS — D64.9 NORMOCYTIC ANEMIA: ICD-10-CM

## 2024-01-29 DIAGNOSIS — R74.8 ALKALINE PHOSPHATASE ELEVATION: ICD-10-CM

## 2024-01-29 DIAGNOSIS — E43 SEVERE PROTEIN-CALORIE MALNUTRITION (GOMEZ: LESS THAN 60% OF STANDARD WEIGHT) (HCC): ICD-10-CM

## 2024-01-29 DIAGNOSIS — D75.839 THROMBOCYTOSIS: ICD-10-CM

## 2024-01-29 DIAGNOSIS — E83.51 HYPOCALCEMIA: ICD-10-CM

## 2024-01-29 DIAGNOSIS — D53.9 MACROCYTIC ANEMIA: ICD-10-CM

## 2024-01-29 DIAGNOSIS — K92.2 UPPER GI BLEED: ICD-10-CM

## 2024-01-29 DIAGNOSIS — E78.00 HIGH CHOLESTEROL: ICD-10-CM

## 2024-01-29 DIAGNOSIS — I42.0 DILATED CARDIOMYOPATHY (HCC): ICD-10-CM

## 2024-01-29 DIAGNOSIS — K70.10 ALCOHOLIC HEPATITIS, UNSPECIFIED WHETHER ASCITES PRESENT: ICD-10-CM

## 2024-01-29 DIAGNOSIS — I50.21 ACUTE SYSTOLIC HEART FAILURE (HCC): ICD-10-CM

## 2024-01-29 LAB
25(OH)D3 SERPL-MCNC: 39 NG/ML (ref 30–100)
ALBUMIN SERPL BCP-MCNC: 3.3 G/DL (ref 3.2–4.9)
ALBUMIN/GLOB SERPL: 0.7 G/DL
ALP SERPL-CCNC: 189 U/L (ref 30–99)
ALT SERPL-CCNC: 15 U/L (ref 2–50)
ANION GAP SERPL CALC-SCNC: 16 MMOL/L (ref 7–16)
AST SERPL-CCNC: 29 U/L (ref 12–45)
BASOPHILS # BLD AUTO: 0.9 % (ref 0–1.8)
BASOPHILS # BLD: 0.09 K/UL (ref 0–0.12)
BILIRUB SERPL-MCNC: 0.3 MG/DL (ref 0.1–1.5)
BUN SERPL-MCNC: 27 MG/DL (ref 8–22)
CALCIUM ALBUM COR SERPL-MCNC: 11.1 MG/DL (ref 8.5–10.5)
CALCIUM SERPL-MCNC: 10.5 MG/DL (ref 8.5–10.5)
CHLORIDE SERPL-SCNC: 101 MMOL/L (ref 96–112)
CHOLEST SERPL-MCNC: 212 MG/DL (ref 100–199)
CO2 SERPL-SCNC: 22 MMOL/L (ref 20–33)
CREAT SERPL-MCNC: 1.4 MG/DL (ref 0.5–1.4)
EOSINOPHIL # BLD AUTO: 0.21 K/UL (ref 0–0.51)
EOSINOPHIL NFR BLD: 2.2 % (ref 0–6.9)
ERYTHROCYTE [DISTWIDTH] IN BLOOD BY AUTOMATED COUNT: 55.1 FL (ref 35.9–50)
FOLATE SERPL-MCNC: >40 NG/ML
GFR SERPLBLD CREATININE-BSD FMLA CKD-EPI: 41 ML/MIN/1.73 M 2
GLOBULIN SER CALC-MCNC: 5 G/DL (ref 1.9–3.5)
GLUCOSE SERPL-MCNC: 82 MG/DL (ref 65–99)
HCT VFR BLD AUTO: 37.6 % (ref 37–47)
HDLC SERPL-MCNC: 47 MG/DL
HGB BLD-MCNC: 12 G/DL (ref 12–16)
IMM GRANULOCYTES # BLD AUTO: 0.05 K/UL (ref 0–0.11)
IMM GRANULOCYTES NFR BLD AUTO: 0.5 % (ref 0–0.9)
LDLC SERPL CALC-MCNC: 140 MG/DL
LYMPHOCYTES # BLD AUTO: 1.84 K/UL (ref 1–4.8)
LYMPHOCYTES NFR BLD: 18.9 % (ref 22–41)
MCH RBC QN AUTO: 29.9 PG (ref 27–33)
MCHC RBC AUTO-ENTMCNC: 31.9 G/DL (ref 32.2–35.5)
MCV RBC AUTO: 93.5 FL (ref 81.4–97.8)
MONOCYTES # BLD AUTO: 0.61 K/UL (ref 0–0.85)
MONOCYTES NFR BLD AUTO: 6.3 % (ref 0–13.4)
NEUTROPHILS # BLD AUTO: 6.94 K/UL (ref 1.82–7.42)
NEUTROPHILS NFR BLD: 71.2 % (ref 44–72)
NRBC # BLD AUTO: 0 K/UL
NRBC BLD-RTO: 0 /100 WBC (ref 0–0.2)
NT-PROBNP SERPL IA-MCNC: 158 PG/ML (ref 0–125)
PLATELET # BLD AUTO: 445 K/UL (ref 164–446)
PMV BLD AUTO: 10.6 FL (ref 9–12.9)
POTASSIUM SERPL-SCNC: 4.8 MMOL/L (ref 3.6–5.5)
PROT SERPL-MCNC: 8.3 G/DL (ref 6–8.2)
RBC # BLD AUTO: 4.02 M/UL (ref 4.2–5.4)
SODIUM SERPL-SCNC: 139 MMOL/L (ref 135–145)
TRIGL SERPL-MCNC: 123 MG/DL (ref 0–149)
TSH SERPL DL<=0.005 MIU/L-ACNC: 1.32 UIU/ML (ref 0.38–5.33)
VIT B12 SERPL-MCNC: 710 PG/ML (ref 211–911)
WBC # BLD AUTO: 9.7 K/UL (ref 4.8–10.8)

## 2024-01-29 PROCEDURE — 85025 COMPLETE CBC W/AUTO DIFF WBC: CPT

## 2024-01-29 PROCEDURE — 82746 ASSAY OF FOLIC ACID SERUM: CPT

## 2024-01-29 PROCEDURE — 82607 VITAMIN B-12: CPT

## 2024-01-29 PROCEDURE — 82306 VITAMIN D 25 HYDROXY: CPT

## 2024-01-29 PROCEDURE — 36415 COLL VENOUS BLD VENIPUNCTURE: CPT

## 2024-01-29 PROCEDURE — 80061 LIPID PANEL: CPT

## 2024-01-29 PROCEDURE — 80053 COMPREHEN METABOLIC PANEL: CPT

## 2024-01-29 PROCEDURE — 84443 ASSAY THYROID STIM HORMONE: CPT

## 2024-01-29 PROCEDURE — 83880 ASSAY OF NATRIURETIC PEPTIDE: CPT

## 2024-02-08 ENCOUNTER — OFFICE VISIT (OUTPATIENT)
Dept: CARDIOLOGY | Facility: MEDICAL CENTER | Age: 69
End: 2024-02-08
Attending: INTERNAL MEDICINE
Payer: MEDICARE

## 2024-02-08 VITALS
RESPIRATION RATE: 16 BRPM | WEIGHT: 140 LBS | DIASTOLIC BLOOD PRESSURE: 64 MMHG | BODY MASS INDEX: 22.5 KG/M2 | SYSTOLIC BLOOD PRESSURE: 92 MMHG | OXYGEN SATURATION: 98 % | HEART RATE: 85 BPM | HEIGHT: 66 IN

## 2024-02-08 DIAGNOSIS — R00.0 TACHYCARDIA: ICD-10-CM

## 2024-02-08 DIAGNOSIS — I51.81 STRESS-INDUCED CARDIOMYOPATHY: ICD-10-CM

## 2024-02-08 PROBLEM — R79.89 ELEVATED TROPONIN: Status: RESOLVED | Noted: 2022-04-11 | Resolved: 2024-02-08

## 2024-02-08 PROBLEM — I42.0 DILATED CARDIOMYOPATHY (HCC): Status: RESOLVED | Noted: 2022-04-11 | Resolved: 2024-02-08

## 2024-02-08 PROCEDURE — 3074F SYST BP LT 130 MM HG: CPT | Performed by: INTERNAL MEDICINE

## 2024-02-08 PROCEDURE — 93010 ELECTROCARDIOGRAM REPORT: CPT | Performed by: INTERNAL MEDICINE

## 2024-02-08 PROCEDURE — 99204 OFFICE O/P NEW MOD 45 MIN: CPT | Performed by: INTERNAL MEDICINE

## 2024-02-08 PROCEDURE — 99212 OFFICE O/P EST SF 10 MIN: CPT | Performed by: INTERNAL MEDICINE

## 2024-02-08 PROCEDURE — 93005 ELECTROCARDIOGRAM TRACING: CPT | Performed by: INTERNAL MEDICINE

## 2024-02-08 PROCEDURE — 3078F DIAST BP <80 MM HG: CPT | Performed by: INTERNAL MEDICINE

## 2024-02-08 ASSESSMENT — ENCOUNTER SYMPTOMS
WEIGHT LOSS: 0
BACK PAIN: 0
SYNCOPE: 0
ORTHOPNEA: 0
DIZZINESS: 0
FLANK PAIN: 0
CLAUDICATION: 0
CONSTIPATION: 0
NEAR-SYNCOPE: 0
BLURRED VISION: 0
PALPITATIONS: 0
NAUSEA: 0
ALTERED MENTAL STATUS: 0
DECREASED APPETITE: 0
SHORTNESS OF BREATH: 0
PND: 0
COUGH: 0
IRREGULAR HEARTBEAT: 0
DIARRHEA: 0
VOMITING: 0
ABDOMINAL PAIN: 0
HEARTBURN: 0
DYSPNEA ON EXERTION: 0
WEIGHT GAIN: 0
DEPRESSION: 0
FEVER: 0

## 2024-02-08 ASSESSMENT — FIBROSIS 4 INDEX: FIB4 SCORE: 1.16

## 2024-02-08 NOTE — PROGRESS NOTES
Cardiology Note    Chief Complaint   Patient presents with    New Patient     N/P Dx:Tachycardia, unspecified  N/P Dx:Unspecified systolic (congestive) heart failure       History of Present Illness: Jaymie Peacock is a 68 y.o. female who presents for initial visit. Referred by Autumn Callaway PA-C for tachycardia and systolic heart failure.     Admission 2/19/22 [facility] with nstemi suspected stress cardiomyopathy and demand ischemia.     Now doing well. Without cardiac complaints. Compliant with medications and denies adverse effects. No toxic social habits.    Review of Systems   Constitutional: Negative for decreased appetite, fever, malaise/fatigue, weight gain and weight loss.   HENT:  Negative for congestion and nosebleeds.    Eyes:  Negative for blurred vision.   Cardiovascular:  Negative for chest pain, claudication, dyspnea on exertion, irregular heartbeat, leg swelling, near-syncope, orthopnea, palpitations, paroxysmal nocturnal dyspnea and syncope.   Respiratory:  Negative for cough and shortness of breath.    Endocrine: Negative for cold intolerance and heat intolerance.   Skin:  Negative for rash.   Musculoskeletal:  Negative for back pain.   Gastrointestinal:  Negative for abdominal pain, constipation, diarrhea, heartburn, melena, nausea and vomiting.   Genitourinary:  Negative for dysuria, flank pain and hematuria.   Neurological:  Negative for dizziness.   Psychiatric/Behavioral:  Negative for altered mental status and depression.          Past Medical History:   Diagnosis Date    Acute cystitis without hematuria 3/30/2022    Dehydration determined by examination 3/28/2022    DVT (deep venous thrombosis) (HCC)     ETOH abuse     GI bleed     High cholesterol     Hypertension     Osteoporosis     Pulmonary embolism (HCC)     Sepsis (HCC) 5/22/2021    Severe sepsis (HCC) 5/28/2023    Smoking     Tachycardia 4/15/2022         Past Surgical History:   Procedure Laterality Date    PB OPEN FIX INTER/SUBTROCH  FX,IMPLNT Left 7/8/2023    Procedure: INSERTION, INTRAMEDULLARY LIAN, FEMUR, PROXIMAL;  Surgeon: Surya Singletary M.D.;  Location: SURGERY [facility];  Service: Orthopedics    OK UPPER GI ENDOSCOPY,DIAGNOSIS N/A 8/20/2022    Procedure: GASTROSCOPY;  Surgeon: Kranthi Fernandez M.D.;  Location: [facility];  Service: Gastroenterology    OK UPPER GI ENDOSCOPY,CTRL BLEED N/A 8/20/2022    Procedure: GASTROSCOPY, WITH ARGON PLASMA COAGULATION;  Surgeon: Kratnhi Fernandez M.D.;  Location: SURGERY [facility];  Service: Gastroenterology    OK UPPER GI ENDOSCOPY,DIAGNOSIS N/A 7/31/2022    Procedure: GASTROSCOPY;  Surgeon: Cyn Slade M.D.;  Location: [facility];  Service: Gastroenterology    OK UPPER GI ENDOSCOPY,SCLER INJECT N/A 7/31/2022    Procedure: GASTROSCOPY, WITH SCLEROTHERAPY;  Surgeon: Cyn Slade M.D.;  Location: [facility];  Service: Gastroenterology    OK UPPER GI ENDOSCOPY,CTRL BLEED N/A 7/31/2022    Procedure: EGD, WITH CLIP PLACEMENT;  Surgeon: Cyn Slade M.D.;  Location: [facility];  Service: Gastroenterology    OK UPPER GI ENDOSCOPY,DIAGNOSIS N/A 6/15/2022    Procedure: GASTROSCOPY WITH PUSH ENDOSCOPY;  Surgeon: Claudio Guerrier M.D.;  Location: [facility];  Service: Gastroenterology    OK UPPER GI ENDOSCOPY,CTRL BLEED N/A 05/11/2022    Procedure: GASTROSCOPY, WITH ARGON PLASMA COAGULATION;  Surgeon: Yariel Pagan M.D.;  Location: SURGERY SAME DAY [facility];  Service: Gastroenterology    GASTROSCOPY W/PUSH ENTERSCOPY N/A 05/11/2022    Procedure: GASTROSCOPY, WITH PUSH ENTEROSCOPY;  Surgeon: Yariel Pagan M.D.;  Location: SURGERY SAME DAY [facility];  Service: Gastroenterology    OK UPPER GI ENDOSCOPY,DIAGNOSIS  04/21/2022    Procedure: GASTROSCOPY;  Surgeon: Rell Rivas M.D.;  Location: SURGERY SAME DAY [facility];  Service: Gastroenterology    OK COLONOSCOPY,DIAGNOSTIC  04/21/2022    Procedure: COLONOSCOPY;  Surgeon:  Rell Rivas M.D.;  Location: SURGERY SAME DAY [facility];  Service: Gastroenterology    AL UPPER GI ENDOSCOPY,BIOPSY  04/21/2022    Procedure: GASTROSCOPY, WITH BIOPSY;  Surgeon: Rell Rivas M.D.;  Location: SURGERY SAME DAY [facility];  Service: Gastroenterology    AL UPPER GI ENDOSCOPY,CTRL BLEED  04/21/2022    Procedure: GASTROSCOPY, WITH ARGON PLASMA COAGULATION;  Surgeon: Rell Rivas M.D.;  Location: SURGERY SAME DAY [facility];  Service: Gastroenterology    HIP REPLACEMENT, PARTIAL Right     US-NEEDLE CORE BX-BREAST PANEL Right          Current Outpatient Medications   Medication Sig Dispense Refill    acetaminophen (TYLENOL) 325 MG Tab Take 2 Tablets by mouth every 6 hours as needed for Mild Pain or Moderate Pain. Not to exceed 3000mg in 24 hours  Indications: Pain 60 Tablet 3    aspirin 81 MG EC tablet Take 1 Tablet by mouth every day. Indications: Hx of PE 30 Tablet 3    calcium carbonate (OS-ADELAIDE 500) 1250 (500 Ca) MG Tab Take 2 Tablets by mouth every 8 hours as needed (GERD). 30 Tablet 3    carvedilol (COREG) 3.125 MG Tab Take 1 Tablet by mouth 2 times a day with meals. 60 Tablet 3    ferrous sulfate 325 (65 Fe) MG tablet Take 1 Tablet by mouth every morning with breakfast. Indications: Anemia From Inadequate Iron in the Body 30 Tablet 1    magnesium hydroxide (SM MILK OF MAGNESIA) 400 MG/5ML Suspension TAKE 30 ML (2 TABLESPOONSFUL) BY MOUTH EVERY 24 HOURS AS NEEDED FOR CONSTIPATION DAILY 473 mL 3    Multiple Vitamins-Minerals (MULTIVITAMIN ADULTS) Tab Take 1 Tablet by mouth every day. Indications: dietary supplement 30 Tablet 3    omeprazole (PRILOSEC) 20 MG delayed-release capsule Take 1 Capsule by mouth every day. Indications: Gastroesophageal Reflux Disease 30 Capsule 31    sodium chloride (OCEAN) 0.65 % Solution Administer 1 Spray into affected nostril(S) 2 times a day. 60 mL 3    spironolactone (ALDACTONE) 25 MG Tab Take 1 Tablet by mouth every morning. 30 Tablet 3     No current  "facility-administered medications for this visit.         No Known Allergies      History reviewed. No pertinent family history.      Social History     Socioeconomic History    Marital status:      Spouse name: Not on file    Number of children: Not on file    Years of education: Not on file    Highest education level: Not on file   Occupational History    Not on file   Tobacco Use    Smoking status: Former     Current packs/day: 0.50     Types: Cigarettes    Smokeless tobacco: Never   Vaping Use    Vaping Use: Never used   Substance and Sexual Activity    Alcohol use: Not Currently    Drug use: Not Currently    Sexual activity: Not on file   Other Topics Concern    Not on file   Social History Narrative    Not on file     Social Determinants of Health     Financial Resource Strain: Not on file   Food Insecurity: Not on file   Transportation Needs: Not on file   Physical Activity: Not on file   Stress: Not on file   Social Connections: Feeling Socially Integrated (8/29/2023)    OASIS : Social Isolation     Frequency of experiencing loneliness or isolation: Rarely   Intimate Partner Violence: Not on file   Housing Stability: Not on file         Physical Exam:  Ambulatory Vitals  BP 92/64 (BP Location: Left arm, Patient Position: Sitting, BP Cuff Size: Adult)   Pulse 85   Resp 16   Ht 1.676 m (5' 6\")   Wt 63.5 kg (140 lb)   SpO2 98%    BP Readings from Last 4 Encounters:   02/08/24 92/64   12/07/23 115/81   11/30/23 118/86   10/12/23 126/78     Weight/BMI:   Vitals:    02/08/24 1411   BP: 92/64   Weight: 63.5 kg (140 lb)   Height: 1.676 m (5' 6\")    Body mass index is 22.6 kg/m².  Wt Readings from Last 4 Encounters:   02/08/24 63.5 kg (140 lb)   11/30/23 59.3 kg (130 lb 12.8 oz)   10/12/23 59 kg (130 lb)   08/31/23 54 kg (119 lb)       Physical Exam  Constitutional:       General: She is not in acute distress.  HENT:      Head: Normocephalic and atraumatic.   Eyes:      Conjunctiva/sclera: " "Conjunctivae normal.      Pupils: Pupils are equal, round, and reactive to light.   Neck:      Vascular: No JVD.   Cardiovascular:      Rate and Rhythm: Normal rate and regular rhythm.      Heart sounds: Normal heart sounds. No murmur heard.     No friction rub. No gallop.   Pulmonary:      Effort: Pulmonary effort is normal. No respiratory distress.      Breath sounds: Normal breath sounds. No wheezing or rales.   Chest:      Chest wall: No tenderness.   Abdominal:      General: Bowel sounds are normal. There is no distension.      Palpations: Abdomen is soft.   Musculoskeletal:      Cervical back: Normal range of motion and neck supple.   Skin:     General: Skin is warm and dry.   Neurological:      Mental Status: She is alert and oriented to person, place, and time.   Psychiatric:         Mood and Affect: Affect normal.         Judgment: Judgment normal.         Lab Data Review:  Lab Results   Component Value Date/Time    CHOLSTRLTOT 212 (H) 01/29/2024 10:44 AM     (H) 01/29/2024 10:44 AM    HDL 47 01/29/2024 10:44 AM    TRIGLYCERIDE 123 01/29/2024 10:44 AM       Lab Results   Component Value Date/Time    SODIUM 139 01/29/2024 10:44 AM    POTASSIUM 4.8 01/29/2024 10:44 AM    CHLORIDE 101 01/29/2024 10:44 AM    CO2 22 01/29/2024 10:44 AM    GLUCOSE 82 01/29/2024 10:44 AM    BUN 27 (H) 01/29/2024 10:44 AM    CREATININE 1.40 01/29/2024 10:44 AM     CrCl cannot be calculated (Patient's most recent lab result is older than the maximum 7 days allowed.).  Lab Results   Component Value Date/Time    ALKPHOSPHAT 189 (H) 01/29/2024 10:44 AM    ASTSGOT 29 01/29/2024 10:44 AM    ALTSGPT 15 01/29/2024 10:44 AM    TBILIRUBIN 0.3 01/29/2024 10:44 AM      Lab Results   Component Value Date/Time    WBC 9.7 01/29/2024 10:44 AM     No results found for: \"HBA1C\"  No components found for: \"TROP\"      Cardiac Imaging and Procedures Review:      EKG 2/8/24 interpreted by me sinus 79 bpm    TTE 2/19/22  CONCLUSIONS  Cardiologist " at bedside.  Contrast was used to enhance definition of the endocardial borders.   Technically difficult but adequate study for interpretation.   Severely reduced left ventricular systolic function.  The left ventricular ejection fraction is visually estimated to be 35%.  Basilar sparing in a pattern of stress induced cardiomyopathy.  Normal right ventricular size and systolic function.    TTE 3/31/22  CONCLUSIONS  Compared to the prior echo on 02/19/22, ejection fraction is improved.  The left ventricular ejection fraction is visually estimated to be 55%.  Normal diastolic function.  Normal right ventricular size and systolic function.  Estimated right ventricular systolic pressure is 30 mmHg.        Medical Decision Making:  Problem List Items Addressed This Visit       Stress-induced cardiomyopathy    Tachycardia    Relevant Orders    EKG (Completed)     Stress cardiomyopathy - resolved on repeat echocardiogram. Continue carvedilol and spironolactone.     It was my pleasure to meet with Ms. Peacock.

## 2024-02-19 LAB — EKG IMPRESSION: NORMAL

## (undated) DEVICE — BITE BLOCK ADULT 60FR (100EA/CA)

## (undated) DEVICE — KIT CUSTOM PROCEDURE SINGLE FOR ENDO  (15/CA)

## (undated) DEVICE — MANIFOLD NEPTUNE 1 PORT (20/PK)

## (undated) DEVICE — TOWEL STOP TIMEOUT SAFETY FLAG (40EA/CA)

## (undated) DEVICE — TUBE CONNECTING SUCTION - CLEAR PLASTIC STERILE 72 IN (50EA/CA)

## (undated) DEVICE — GLOVE BIOGEL INDICATOR SZ 8 SURGICAL PF LTX - (50/BX 4BX/CA)

## (undated) DEVICE — CANNULA O2 COMFORT SOFT EAR ADULT 7 FT TUBING (50/CA)

## (undated) DEVICE — SYRINGE SAFETY 3 ML 18 GA X 1 1/2 BLUNT LL (100/BX 8BX/CA)

## (undated) DEVICE — DEVICE HEMOSTATIC CLIPPING RESOLUTION 360 DEGREES (20EA/BX)

## (undated) DEVICE — MASK PANORAMIC OXYGEN PRO2 (30EA/CA)

## (undated) DEVICE — DRESSING TRANSPARENT FILM TEGADERM 4 X 4.75" (50EA/BX)"

## (undated) DEVICE — ERBE SIDE FIRE - (10/CA)

## (undated) DEVICE — STAPLER SKIN DISP - (6/BX 10BX/CA) VISISTAT

## (undated) DEVICE — SET EXTENSION WITH 2 PORTS (48EA/CA) ***PART #2C8610 IS A SUBSTITUTE*****

## (undated) DEVICE — FILM CASSETTE ENDO

## (undated) DEVICE — WATER IRRIGATION STERILE 1000ML (12EA/CA)

## (undated) DEVICE — SYRINGE SAFETY 10 ML 18 GA X 1 1/2 BLUNT LL (100/BX 4BX/CA)

## (undated) DEVICE — SENSOR SPO2 ADULT LNCS ADTX (20/BX) ORDER ITEM #19593

## (undated) DEVICE — FORCEP RADIAL JAW 4 STANDARD CAPACITY W/NEEDLE 240CM (40EA/BX)

## (undated) DEVICE — GLOVE, LITE (PAIR)

## (undated) DEVICE — LACTATED RINGERS INJ 1000 ML - (14EA/CA 60CA/PF)

## (undated) DEVICE — CANISTER SUCTION 3000ML MECHANICAL FILTER AUTO SHUTOFF MEDI-VAC NONSTERILE LF DISP  (40EA/CA)

## (undated) DEVICE — ELECTRODE DUAL RETURN W/ CORD - (50/PK)

## (undated) DEVICE — KIT ANESTHESIA W/CIRCUIT & 3/LT BAG W/FILTER (20EA/CA)

## (undated) DEVICE — SPONGE GAUZE NON-STERILE 4X4 - (2000/CA 10PK/CA)

## (undated) DEVICE — SUCTION INSTRUMENT YANKAUER OPEN TIP W/O VENT (50EA/CA)

## (undated) DEVICE — SODIUM CHL IRRIGATION 0.9% 1000ML (12EA/CA)

## (undated) DEVICE — BAG SPONGE COUNT 10.25 X 32 - BLUE (250/CA)

## (undated) DEVICE — CANISTER SUCTION RIGID RED 1500CC (40EA/CA)

## (undated) DEVICE — SYRINGE 12 CC LUER TIP - (80/BX) OBSOLETE ITEM

## (undated) DEVICE — TUBE E-T HI-LO CUFF 7.0MM (10EA/PK)

## (undated) DEVICE — BLOCK BITE ENDOSCOPIC 2809 - (100/BX) INTERMEDIATE

## (undated) DEVICE — DRAPE LARGE 3 QUARTER - (20/CA)

## (undated) DEVICE — PACK MAJOR ORTHO - (2EA/CA)

## (undated) DEVICE — PROBE CIRCUMFERENTIAL 2.3CM X 220CM

## (undated) DEVICE — DRAPE 36X28IN RAD CARM BND BG - (25/CA) O

## (undated) DEVICE — SENSOR OXIMETER ADULT SPO2 RD SET (20EA/BX)

## (undated) DEVICE — SLEEVE, VASO, THIGH, MED

## (undated) DEVICE — SUTURE 2-0 VICRYL PLUS CT-1 - 8 X 18 INCH(12/BX)

## (undated) DEVICE — GOWN WARMING STANDARD FLEX - (30/CA)

## (undated) DEVICE — TUBING CLEARLINK DUO-VENT - C-FLO (48EA/CA)

## (undated) DEVICE — MASK WITH FACE SHIELD (25/BX 4BX/CA)

## (undated) DEVICE — KIT PROCEDURE DOUBLE ENDO ONLY (5/CA)

## (undated) DEVICE — NEPTUNE 4 PORT MANIFOLD - (20/PK)

## (undated) DEVICE — SUTURE GENERAL

## (undated) DEVICE — PENCIL ELECTSURG 10FT BTN SWH - (50/CA)

## (undated) DEVICE — SYRINGE SAFETY 5 ML 18 GA X 1-1/2 BLUNT LL (100/BX 4BX/CA)

## (undated) DEVICE — KIT  I.V. START (100EA/CA)

## (undated) DEVICE — TOWELS CLOTH SURGICAL - (4/PK 20PK/CA)

## (undated) DEVICE — GLOVE BIOGEL SZ 7.5 SURGICAL PF LTX - (50PR/BX 4BX/CA)

## (undated) DEVICE — Device

## (undated) DEVICE — MASK ANESTHESIA ADULT  - (100/CA)

## (undated) DEVICE — COVER LIGHT HANDLE ALC PLUS DISP (18EA/BX)

## (undated) DEVICE — SET LEADWIRE 5 LEAD BEDSIDE DISPOSABLE ECG (1SET OF 5/EA)

## (undated) DEVICE — ELECTRODE 850 FOAM ADHESIVE - HYDROGEL RADIOTRNSPRNT (50/PK)

## (undated) DEVICE — CONTAINER, SPECIMEN, STERILE

## (undated) DEVICE — CATHETER IV 20 GA X 1-1/4 ---SURG.& SDS ONLY--- (50EA/BX)

## (undated) DEVICE — BOVIE GRNDNG PAD FOR ARGON - 10/BX 4BX/CS

## (undated) DEVICE — SUCTION INSTRUMENT YANKAUER BULBOUS TIP W/O VENT (50EA/CA)

## (undated) DEVICE — TUBE SUCTION YANKAUER  1/4 X 6FT (20EA/CA)"

## (undated) DEVICE — SYRINGE DISP. 60 CC LL - (30/BX, 12BX/CA)**WHEN THESE ARE GONE ORDER #500206**

## (undated) DEVICE — CATHERTER CLEAR SINGLE USE INJECTION THERAPY NEEDLE 25GA X 4MM  2.3MM X 240CM (5EA/BX)

## (undated) DEVICE — GOWN SURGEONS LARGE - (32/CA)